# Patient Record
Sex: FEMALE | Race: WHITE | NOT HISPANIC OR LATINO | Employment: OTHER | URBAN - METROPOLITAN AREA
[De-identification: names, ages, dates, MRNs, and addresses within clinical notes are randomized per-mention and may not be internally consistent; named-entity substitution may affect disease eponyms.]

---

## 2017-08-17 ENCOUNTER — OFFICE VISIT (OUTPATIENT)
Dept: NEUROSURGERY | Facility: CLINIC | Age: 72
End: 2017-08-17

## 2017-08-17 VITALS
SYSTOLIC BLOOD PRESSURE: 120 MMHG | WEIGHT: 192.8 LBS | DIASTOLIC BLOOD PRESSURE: 80 MMHG | TEMPERATURE: 97.6 F | HEIGHT: 64 IN | BODY MASS INDEX: 32.91 KG/M2

## 2017-08-17 DIAGNOSIS — M51.36 DEGENERATIVE DISC DISEASE, LUMBAR: Primary | ICD-10-CM

## 2017-08-17 PROBLEM — M51.369 DEGENERATIVE DISC DISEASE, LUMBAR: Status: ACTIVE | Noted: 2017-08-17

## 2017-08-17 PROCEDURE — 99203 OFFICE O/P NEW LOW 30 MIN: CPT | Performed by: NEUROLOGICAL SURGERY

## 2017-08-17 RX ORDER — ALLOPURINOL 100 MG/1
100 TABLET ORAL DAILY
COMMUNITY
End: 2017-08-24

## 2017-08-17 RX ORDER — HYDROCODONE BITARTRATE AND ACETAMINOPHEN 5; 325 MG/1; MG/1
1 TABLET ORAL EVERY 6 HOURS PRN
Qty: 45 TABLET | Refills: 0 | Status: SHIPPED | OUTPATIENT
Start: 2017-08-17 | End: 2017-11-13 | Stop reason: HOSPADM

## 2017-08-17 RX ORDER — METHYLPREDNISOLONE 4 MG/1
TABLET ORAL
Qty: 21 TABLET | Refills: 0 | Status: SHIPPED | OUTPATIENT
Start: 2017-08-17 | End: 2017-11-06

## 2017-08-17 RX ORDER — ATENOLOL 100 MG/1
100 TABLET ORAL DAILY
COMMUNITY
End: 2018-09-10

## 2017-08-17 RX ORDER — CARISOPRODOL 350 MG/1
350 TABLET ORAL 3 TIMES DAILY
Qty: 30 TABLET | Refills: 0 | Status: SHIPPED | OUTPATIENT
Start: 2017-08-17 | End: 2017-11-06

## 2017-08-17 RX ORDER — GABAPENTIN 400 MG/1
400 CAPSULE ORAL 3 TIMES DAILY
COMMUNITY
End: 2017-11-06 | Stop reason: DRUGHIGH

## 2017-08-17 RX ORDER — LISINOPRIL AND HYDROCHLOROTHIAZIDE 25; 20 MG/1; MG/1
1 TABLET ORAL DAILY
COMMUNITY
End: 2018-09-12 | Stop reason: HOSPADM

## 2017-08-17 NOTE — PROGRESS NOTES
Akua Torres  1945  [id number]      Chief Complaint   Patient presents with   • Back Pain   • Hip Pain     left       HISTORY OF PRESENT ILLNESS:  This is a 72-year-old female who in the past has had a PLIF L4-S1 and unexpected unit well until approximately 6 weeks ago.  She was walking from her garden with the onset of severe pain in her back in her left hip.  It is persisted.  She has renal  toxicity therefore the use of NSAIDs are precluded.  She is had a lumbar MRI and is referred for neurosurgical consultation.  Her symptoms are primarily axial without claudication.     Past Medical History:   Diagnosis Date   • Arthritis    • H/O bladder infections    • Hypertension    • Low back pain        Past Surgical History:   Procedure Laterality Date   • BACK SURGERY     • KNEE ARTHROSCOPY         Family History   Problem Relation Age of Onset   • Cancer Mother    • Cancer Father        Social History     Social History   • Marital status:      Spouse name: N/A   • Number of children: N/A   • Years of education: N/A     Occupational History   • Not on file.     Social History Main Topics   • Smoking status: Never Smoker   • Smokeless tobacco: Never Used   • Alcohol use No   • Drug use: No   • Sexual activity: Defer     Other Topics Concern   • Not on file     Social History Narrative   • No narrative on file       Allergies   Allergen Reactions   • Nsaids    • Penicillins    • Quinidine          Current Outpatient Prescriptions:   •  allopurinol (ZYLOPRIM) 100 MG tablet, Take 100 mg by mouth Daily., Disp: , Rfl:   •  atenolol (TENORMIN) 100 MG tablet, Take 100 mg by mouth Daily., Disp: , Rfl:   •  Ezetimibe (ZETIA PO), Take  by mouth., Disp: , Rfl:   •  gabapentin (NEURONTIN) 400 MG capsule, Take 400 mg by mouth 3 (Three) Times a Day., Disp: , Rfl:   •  lisinopril-hydrochlorothiazide (PRINZIDE,ZESTORETIC) 20-25 MG per tablet, Take 1 tablet by mouth Daily., Disp: , Rfl:     Review of Systems    Constitutional: Negative for activity change, appetite change, chills, diaphoresis, fatigue, fever and unexpected weight change.   HENT: Negative for congestion, dental problem, drooling, ear discharge, ear pain, facial swelling, hearing loss, mouth sores, nosebleeds, postnasal drip, rhinorrhea, sinus pressure, sneezing, sore throat, tinnitus, trouble swallowing and voice change.    Eyes: Negative for photophobia, pain, discharge, redness, itching and visual disturbance.   Respiratory: Negative for apnea, cough, choking, chest tightness, shortness of breath, wheezing and stridor.    Cardiovascular: Negative for chest pain, palpitations and leg swelling.   Gastrointestinal: Positive for constipation. Negative for abdominal distention, abdominal pain, anal bleeding, blood in stool, diarrhea, nausea, rectal pain and vomiting.   Endocrine: Positive for heat intolerance. Negative for cold intolerance, polydipsia, polyphagia and polyuria.   Genitourinary: Positive for flank pain. Negative for decreased urine volume, difficulty urinating, dysuria, enuresis, frequency, genital sores, hematuria and urgency.   Musculoskeletal: Positive for arthralgias, back pain, gait problem, myalgias and neck pain. Negative for joint swelling and neck stiffness.   Skin: Negative for color change, pallor, rash and wound.   Allergic/Immunologic: Negative for environmental allergies, food allergies and immunocompromised state.   Neurological: Positive for tremors and weakness. Negative for dizziness, seizures, syncope, facial asymmetry, speech difficulty, light-headedness, numbness and headaches.   Hematological: Negative for adenopathy. Does not bruise/bleed easily.   Psychiatric/Behavioral: Positive for dysphoric mood. Negative for agitation, behavioral problems, confusion, decreased concentration, hallucinations, self-injury, sleep disturbance and suicidal ideas. The patient is nervous/anxious. The patient is not hyperactive.   "      Vitals:    08/17/17 1747   BP: 120/80   Temp: 97.6 °F (36.4 °C)   Weight: 192 lb 12.8 oz (87.5 kg)   Height: 64\" (162.6 cm)       Neurological Examination:    Mental status/speech: The patient is alert and oriented.  Speech is clear without aphysia or dysarthria.  No overt cognitive deficits.    Cranial nerve examination:    Olfaction: Smell is intact.  Vision: Vision is intact without visual field abnormalities.  Funduscopic examination is normal.  No pupillary irregularity.  Ocular motor examination: The extraocular muscles are intact.  There is no diplopia.  The pupil is round and reactive to both light and accommodation.  There is no nystagmus.  Facial movement/sensation: There is no facial weakness.  Sensation is intact in the first, second, and third divisions of the trigeminal nerve.  The corneal reflex is intact.  Auditory: Hearing is intact to finger rub bilaterally.  Cranial nerves IX, X, XI, XII: Phonation is normal.  No dysphagia.  Tongue is protruded in the midline without atrophy.  The gag reflex is intact.  Shoulder shrug is normal.    Musculoligamentous ligamentous examination: She has paraspinal spasm with restriction to range of motion.  She has pain in her left SI joint.  Straight leg raising is negative.  She has some pain with internal/external rotation of her hip.  She is generally hyporeflexic.  Her strength is intact.     Medical Decision Making:     Diagnostic Data Set:  Lumbar MRI shows excellent fusion L4-S1.  She has adjacent level disease with high-grade spinal stenosis primarily at L3-L4 with Modic changes and evidence of segmental instability.  There is also spinal stenosis at L2-L3       Assessment:  Musculoligamentous strain superimposed upon previous PLIF           Recommendations:  This could be managed conservatively.  I have given her a Medrol Dosepak, Soma 350 mg 3 times a day and Norco 5.0/325 mg every 6 hours when necessary pain.  I've scheduled for heard to have an " epidural or facet block by Dr. Radha bush.  I've send her to physical therapy.  She will call me in 2 weeks to let me know how things are going.  If she continues to have issue she would need to be referred to Dr. Gopi Dillard who is her treating spinal surgeon.         I greatly appreciate the opportunity to see and evaluate this individual.  If you have questions or concerns regarding issues that I may have overlooked please call me at any time: [phone].  Tien Hein M.D.  [facility]  [address] [place] [address].  [facility]  [id number]

## 2017-08-17 NOTE — PATIENT INSTRUCTIONS
Call Dr. Hein on a Monday or Tuesday.   Ask for Sunitha,  and leave a message for  Dr. Hein.  He will call you back at the end of the day as soon as he can.     [phone]

## 2017-08-18 ENCOUNTER — TELEPHONE (OUTPATIENT)
Dept: PAIN MEDICINE | Facility: CLINIC | Age: 72
End: 2017-08-18

## 2017-08-21 ENCOUNTER — HOSPITAL ENCOUNTER (OUTPATIENT)
Dept: PHYSICAL THERAPY | Facility: HOSPITAL | Age: 72
Setting detail: THERAPIES SERIES
Discharge: HOME | End: 2017-08-21
Attending: NEUROLOGICAL SURGERY

## 2017-08-21 DIAGNOSIS — M51.36 DEGENERATIVE DISC DISEASE, LUMBAR: Primary | ICD-10-CM

## 2017-08-21 DIAGNOSIS — Z74.09 IMPAIRED FUNCTIONAL MOBILITY, BALANCE, GAIT, AND ENDURANCE: ICD-10-CM

## 2017-08-21 PROCEDURE — 97161 PT EVAL LOW COMPLEX 20 MIN: CPT | Performed by: PHYSICAL THERAPIST

## 2017-08-21 PROCEDURE — G8979 MOBILITY GOAL STATUS: HCPCS | Performed by: PHYSICAL THERAPIST

## 2017-08-21 PROCEDURE — G8978 MOBILITY CURRENT STATUS: HCPCS | Performed by: PHYSICAL THERAPIST

## 2017-08-21 NOTE — THERAPY EVALUATION
Outpatient Physical Therapy Ortho Initial Evaluation  [facility]     Patient Name: Akua Torres  : 1945  MRN: [id number]  Today's Date: 2017      Visit Date: 2017    Patient Active Problem List   Diagnosis   • Degenerative disc disease, lumbar        Past Medical History:   Diagnosis Date   • Arthritis    • H/O bladder infections    • Hypertension    • Low back pain         Past Surgical History:   Procedure Laterality Date   • BACK SURGERY     • KNEE ARTHROSCOPY         Visit Dx:     ICD-10-CM ICD-9-CM   1. Degenerative disc disease, lumbar M51.36 722.52   2. Impaired functional mobility, balance, gait, and endurance Z74.09 V49.89             Patient History       17 1500          History    Chief Complaint Pain  -CP      Type of Pain Back pain;Lower Extremity / Leg  -CP      Date Current Problem(s) Began 17  -CP      Brief Description of Current Complaint Pt presents with insidious onset of low back pain. States she was in the backyard 5 weeks ago watering flowers, noticed increased L low back pain began quickly. She states she got in bed, rested, pain was less but still present. She states pain has progressed over the past 5 weeks. Currently, pain in low back midline and moves towards left side. Pt has h/o arthritis and PLIF L4-S1 performed about 13 years ago by Dr. Dillard. She has epidural or facet block scheduled for this Thursday.   -CP      Previous treatment for THIS PROBLEM Surgery;Medication  -CP      Patient/Caregiver Goals Relieve pain;Return to prior level of function;Improve mobility  -CP      Current Tobacco Use no  -CP      Smoking Status no  -CP      Patient's Rating of General Health Poor  -CP      Hand Dominance right-handed  -CP      Occupation/sports/leisure activities Pt enjoys gardening in her raised bed and watering her flowers. Pt is retired, states spouse lives with her and is retired as well, single level home.   pt has walk in shower.   -CP       Patient seeing anyone else for problem(s)? Yes  -CP      How has patient tried to help current problem? rest, medication  -CP      What clinical tests have you had for this problem? X-ray;MRI  -CP      Results of Clinical Tests spinal stenosis at L3-L4, modic changes and segmental instability; stenosis L2-L3.   -CP      Are you or can you be pregnant No  -CP      Pain     Pain Location Back  -CP      Pain at Present 3  -CP      Pain at Best 1  -CP      Pain at Worst 9  -CP      Pain Frequency Constant/continuous  -CP      Pain Description Aching;Dull  -CP      What Performance Factors Make the Current Problem(s) WORSE? prolonged standing, walking.   -CP      What Performance Factors Make the Current Problem(s) BETTER? sitting down and right sidelying are most comfortable.   -CP      Pain Comments Pt states can't walk past 10 minutes at a time.   -CP      Tolerance Time- Standing 5 minutes  -CP      Tolerance Time- Sitting unlimited  -CP      Tolerance Time- Walking 10 minutes  -CP      Tolerance Time- Lying 2-3 hours  -CP      Is your sleep disturbed? Yes  -CP      Is medication used to assist with sleep? Yes  -CP      What position do you sleep in? Right sidelying  -CP      Difficulties at work? retired  -CP      Difficulties with ADL's? increased time required to perform; increased pain with ADLs  -CP      Difficulties with recreational activities? pain with walking, gardening.   -CP      Fall Risk Assessment    Any falls in the past year: Yes  -CP      Number of falls reported in the last 12 months 1  -CP      Factors that contributed to the fall: Other (comment)   dizziness from new medication, fell forward, denies injury.   -CP      Daily Activities    Primary Language English  -CP      Are you able to read Yes  -CP      Are you able to write Yes  -CP      Teaching needs identified Home Exercise Program;Management of Condition;Falls Prevention  -CP      Patient is concerned about/has problems with  Walking;Transfers (getting out of a chair, bed);Standing;Performing home management (household chores, shopping, care of dependents);Bed Mobility  -CP      Does patient have problems with the following? Anxiety  -CP      Pt Participated in POC and Goals Yes  -CP      Safety    Are you being hurt, hit, or frightened by anyone at home or in your life? No  -CP        User Key  (r) = Recorded By, (t) = Taken By, (c) = Cosigned By    Initials Name Provider Type    CP Corinne E Perkins, PT Physical Therapist                PT Ortho       08/21/17 1500    Subjective Comments    Subjective Comments Pt presents with c/o low back pain and left leg pain, insidious onset.   -CP    Subjective Pain    Able to rate subjective pain? yes  -CP    Pre-Treatment Pain Level 3  -CP    Post-Treatment Pain Level 4  -CP    Special Tests/Palpation    Special Tests/Palpation Lumbar/SI  -CP    Lumbosacral Palpation    SI Left:;Tender  -CP    Lumbosacral Segment Bilateral:;Guarded/taut  -CP    Piriformis Left:;Guarded/taut  -CP    Erector Spinae (Paraspinals) Guarded/taut  -CP    Lumbosacral Palpation? Yes  -CP    Lumbar/SI Special Tests    Trendelenburg Test (Gluteus Medius Weakness) Positive  -CP    Seated Flexion Test (SI Dysfunction) Positive   pain  -CP    Slump Test (Neural Tension) Negative  -CP    Carmela Westley Test (HNP) Negative  -CP    Long Sit Test (Pelvic Malalignment) Positive   secondary to R knee lack full extension from TKA; vs pelvis  -CP    SLR (Neural Tension) Bilateral:;Negative  -CP    SI Compression Test (SI Dysfunction) Negative  -CP    SI Distraction Test (SI Dysfunction) Negative  -CP    VINAYAK (hip vs. SI Dysfunction) Negative;Bilateral:  -CP    ROM (Range of Motion)    Additional Documentation Trunk (Group)  -CP    Trunk    Flexion AROM Deficit 43   pain midline; increased with coming back to midline  -CP    Extension AROM Deficit 10   pain  -CP    Lt Lat Flexion AROM Deficit 10  -CP    Right Lateral Flexion AROM Deficit  12  -CP    Lt Rotation AROM Deficit WFL  -CP    Right Rotation AROM Deficit 50%  -CP    MMT (Manual Muscle Testing)    General MMT Assessment lower extremity strength deficits identified  -CP    Left Hip    Hip Flexion Gross Movement (4+/5) good plus  -CP    Hip ABduction Gross Movement (4+/5) good plus  -CP    Hip ADduction Gross Movement (4/5) good  -CP    Right Hip    Hip Flexion Gross Movement (4-/5) good minus   pain in left low back  -CP    Hip ABduction Gross Movement (4/5) good  -CP    Hip ADduction Gross Movement (4/5) good  -CP    Lower Extremity    Lower Ext Manual Muscle Testing left hip strength deficit;right hip strength deficit;left knee strength deficit;right knee strength deficit;left ankle strength deficit;right ankle strength deficit  -CP    Left Knee    Knee Extension Gross Movement (4+/5) good plus  -CP    Knee Flexion Gross Movement (4+/5) good plus  -CP    Right Knee    Knee Extension Gross Movement (4/5) good   in limited AROM; unable to achieve full extension; increased  -CP    Knee Flexion Gross Movement (4+/5) good plus  -CP    Left Ankle/Foot    Ankle Dorsiflexion Gross Movement (5/5) normal  -CP    Right Ankle/Foot    Ankle Dorsiflexion Gross Movement (5/5) normal  -CP    Flexibility    Flexibility Tested? Lower Extremity  -CP    Lower Extremity Flexibility    Hamstrings Bilateral:;Moderately limited  -CP    Transfers    Transfer, Comment Increased pain with f/t, increased use of BUE to perform sit-stand.   -CP    Gait Assessment/Treatment    Gait, Comment Ambulated with SPC in LUE. Decreased step length and arm swing noted bilaterally. Increased forward flexed posture and antalgic gait noted.   -CP    Stairs Assessment/Treatment    Stairs, Comment not tested this date.   -CP      User Key  (r) = Recorded By, (t) = Taken By, (c) = Cosigned By    Initials Name Provider Type    CP Corinne E Perkins, PT Physical Therapist                                PT OP Goals       08/21/17 1500        PT Short Term Goals    STG Date to Achieve 09/04/17  -CP     STG 1 Patient will report lumbar pain is reduced by at least 25%.  -CP     STG 1 Progress New  -CP     STG 2 Patient subjectively report that altered leg symptoms in L glute have decreased by 50% with frequency or intensity.  -CP     STG 2 Progress New  -CP     STG 3 Patient is independent with HEP for flexibility and strengthening.  -CP     STG 3 Progress New  -CP     Long Term Goals    LTG Date to Achieve 09/18/17  -CP     LTG 1 Modified Oswestry score is improved by at least 10%.  -CP     LTG 1 Progress New  -CP     LTG 2 Patient is independent with long term HEP for improved postural awareness and stabilization.  -CP     LTG 2 Progress New  -CP     LTG 3 Patient is able to tolerate at least 30 min of standing or walking to improved tolerance to ADLs.  -CP     LTG 3 Progress New  -CP     Time Calculation    PT Goal Re-Cert Due Date 09/18/17  -CP       User Key  (r) = Recorded By, (t) = Taken By, (c) = Cosigned By    Initials Name Provider Type    CP Corinne E Perkins, PT Physical Therapist                PT Assessment/Plan       08/21/17 1500       PT Assessment    Functional Limitations Decreased safety during functional activities;Impaired gait;Limitation in home management;Limitations in community activities;Limitations in functional capacity and performance;Performance in leisure activities;Performance in self-care ADL  -CP     Impairments Balance;Gait;Endurance;Range of motion;Posture;Poor body mechanics;Pain;Muscle strength;Locomotion;Joint mobility  -CP     Assessment Comments Pt is a 73 yo female referred to PT for lumbar DDD, low back pain, and recent left posterior hip pain. She has h/o arthritis, spinal stenosis, and PLIF L4-S1. She verbalized increased pain with both end range lumbar extension and flexion; however most limited with extension based functional activities. She would benefit from skilled PT to improve activity tolerance,  increase spinal stabilization, and decrease pain.   -CP     Please refer to paper survey for additional self-reported information Yes  -CP     Rehab Potential Good  -CP     Patient/caregiver participated in establishment of treatment plan and goals Yes  -CP     Patient would benefit from skilled therapy intervention Yes  -CP     PT Plan    PT Frequency 2x/week   due to higher co-pay, will attempt to decrease to 1x/wk  -CP     Predicted Duration of Therapy Intervention (days/wks) 4 weeks  -CP     Planned CPT's? PT EVAL LOW COMPLEXITY: 08043;PT RE-EVAL: 99573;PT THER PROC EA 15 MIN: 22576;PT MANUAL THERAPY EA 15 MIN: 64953;PT NEUROMUSC RE-EDUCATION EA 15 MIN: 11474;PT GAIT TRAINING EA 15 MIN: 36812;PT HOT OR COLD PACK TREAT MCARE;PT ELECTRICAL STIM UNATTEND: ;PT ULTRASOUND EA 15 MIN: 79102  -CP     PT Plan Comments Recommend skilled PT as noted above to improve functional mobility. Initiate spinal stabilization ther exercises from hooklying next visit and neutral spine position for strengthening. Assess response from injection, pending MD appt Thursday.   -CP       User Key  (r) = Recorded By, (t) = Taken By, (c) = Cosigned By    Initials Name Provider Type    CP Corinne E Perkins, PT Physical Therapist                  Exercises       08/21/17 1500          Subjective Comments    Subjective Comments Pt presents with c/o low back pain and left leg pain, insidious onset.   -CP      Subjective Pain    Able to rate subjective pain? yes  -CP      Pre-Treatment Pain Level 3  -CP      Post-Treatment Pain Level 4  -CP        User Key  (r) = Recorded By, (t) = Taken By, (c) = Cosigned By    Initials Name Provider Type    CP Corinne E Perkins, PT Physical Therapist                              Outcome Measures       08/21/17 1500          Modified Oswestry    Modified Oswestry Score/Comments 60%  -CP      Functional Assessment    Outcome Measure Options Modifed Owestry  -CP        User Key  (r) = Recorded By, (t) = Taken  By, (c) = Cosigned By    Initials Name Provider Type    CP Corinne E Perkins, PT Physical Therapist            Time Calculation:   Start Time: 1515     Therapy Charges for Today     Code Description Service Date Service Provider Modifiers Qty    87707057350 HC PT MOBILITY CURRENT 8/21/2017 Corinne E Perkins, PT GP, CL 1    98064712724 HC PT MOBILITY PROJECTED 8/21/2017 Corinne E Perkins, PT GP, CK 1    18432567312 HC PT EVAL LOW COMPLEXITY 4 8/21/2017 Corinne E Perkins, PT GP 1          PT G-Codes  PT Professional Judgement Used?: Yes  Outcome Measure Options: Bonifacio Chung  Score: 60%  Functional Limitation: Mobility: Walking and moving around  Mobility: Walking and Moving Around Current Status (): At least 60 percent but less than 80 percent impaired, limited or restricted  Mobility: Walking and Moving Around Goal Status (): At least 40 percent but less than 60 percent impaired, limited or restricted         Corinne E. Perkins, PT  8/21/2017

## 2017-08-24 ENCOUNTER — OFFICE VISIT (OUTPATIENT)
Dept: PAIN MEDICINE | Facility: CLINIC | Age: 72
End: 2017-08-24

## 2017-08-24 VITALS
RESPIRATION RATE: 18 BRPM | WEIGHT: 198 LBS | DIASTOLIC BLOOD PRESSURE: 73 MMHG | OXYGEN SATURATION: 95 % | HEIGHT: 64 IN | TEMPERATURE: 98.1 F | BODY MASS INDEX: 33.8 KG/M2 | HEART RATE: 79 BPM | SYSTOLIC BLOOD PRESSURE: 119 MMHG

## 2017-08-24 DIAGNOSIS — M48.062 LUMBAR STENOSIS WITH NEUROGENIC CLAUDICATION: ICD-10-CM

## 2017-08-24 DIAGNOSIS — M70.62 GREATER TROCHANTERIC BURSITIS OF BOTH HIPS: ICD-10-CM

## 2017-08-24 DIAGNOSIS — M70.61 GREATER TROCHANTERIC BURSITIS OF BOTH HIPS: ICD-10-CM

## 2017-08-24 DIAGNOSIS — M47.816 SPONDYLOSIS OF LUMBAR REGION WITHOUT MYELOPATHY OR RADICULOPATHY: ICD-10-CM

## 2017-08-24 DIAGNOSIS — F32.A ANXIETY AND DEPRESSION: ICD-10-CM

## 2017-08-24 DIAGNOSIS — F32.A ANXIETY AND DEPRESSION: Primary | ICD-10-CM

## 2017-08-24 DIAGNOSIS — Z96.653 STATUS POST TOTAL BILATERAL KNEE REPLACEMENT: ICD-10-CM

## 2017-08-24 DIAGNOSIS — M10.00 IDIOPATHIC GOUT, UNSPECIFIED CHRONICITY, UNSPECIFIED SITE: ICD-10-CM

## 2017-08-24 DIAGNOSIS — R53.81 PHYSICAL DECONDITIONING: ICD-10-CM

## 2017-08-24 DIAGNOSIS — F41.9 ANXIETY AND DEPRESSION: Primary | ICD-10-CM

## 2017-08-24 DIAGNOSIS — Z98.1 HISTORY OF LUMBAR FUSION: ICD-10-CM

## 2017-08-24 DIAGNOSIS — E66.9 MILD OBESITY: ICD-10-CM

## 2017-08-24 DIAGNOSIS — F41.9 ANXIETY AND DEPRESSION: ICD-10-CM

## 2017-08-24 PROCEDURE — 99203 OFFICE O/P NEW LOW 30 MIN: CPT | Performed by: ANESTHESIOLOGY

## 2017-08-24 RX ORDER — DULOXETIN HYDROCHLORIDE 30 MG/1
CAPSULE, DELAYED RELEASE ORAL
Refills: 3 | COMMUNITY
Start: 2017-08-11 | End: 2017-11-06

## 2017-08-24 RX ORDER — ALLOPURINOL 300 MG/1
TABLET ORAL
Refills: 0 | COMMUNITY
Start: 2017-07-17 | End: 2018-09-10

## 2017-08-24 RX ORDER — ZOLPIDEM TARTRATE 5 MG/1
TABLET ORAL
Refills: 2 | COMMUNITY
Start: 2017-06-23 | End: 2018-06-06

## 2017-08-24 RX ORDER — PREDNISONE 2.5 MG
TABLET ORAL
Refills: 3 | COMMUNITY
Start: 2017-06-15 | End: 2017-11-06

## 2017-08-24 RX ORDER — EZETIMIBE 10 MG/1
TABLET ORAL
Refills: 7 | COMMUNITY
Start: 2017-08-13 | End: 2018-09-10

## 2017-08-24 RX ORDER — TIZANIDINE 4 MG/1
TABLET ORAL
Refills: 3 | COMMUNITY
Start: 2017-06-15 | End: 2017-11-06

## 2017-08-24 NOTE — PROGRESS NOTES
"Chief Complaint: \"Pain in my lower back and left hip.\"    History of Present Illness:   Patient: Ms. Akua Torres, 72 y.o. female   Referring physician: Dr. Leon Hein   Reason for referral: Consultation for intractable chronic left hip and back pain.   Pain history: Patient reports a 13-year history of pain, which began without incident.  Patient underwent PLIF L4-S1 with Dr. Gopi Man in 2004.  Patient did well until 5 years ago. She underwent follow-up consultation with Dr. Man 4-5 years ago, patient is a poor historian, and was found not to be a surgical candidate. She underwent three injections 4 years ago in [place], without relief.  Pain description: constant lower back pain with intermittent exacerbation, described as sharp sensation.   Radiation of pain: The lower back pain radiates into the hips.  Pain intensity today: 5/10  Average pain intensity last week: 5/10  Pain intensity ranges from: 3/10 to 10/10 (with walking)  Aggravating factors: Pain increases with bending, standing longer than 10 minutes and ambulating more than 10 minutes.  Alleviating factors: Pain decreases with lying and occassionally with sitting.   Associated symptoms:   Patient denies any numbness or weakness in the lower extremities  Patient denies any new bladder or bowel problems.   Patient reports difficulties with her balance but denies recent falls. Patient uses a cane.    Review of previous therapies and additional medical records:  Akua Torres has already failed the following measures, including:   Conservative measures: opioids, physical therapy, and heat   Interventional measures: Three injections 4 years ago in [place], without relief.  Surgical measures: PLIF L4-S1 with Dr. Dillard, 2004. Patient did well until recently about 4-5 years ago.  Akua Torres underwent neurosurgical consultation with Dr. Leon Hein on 08/17/2017, and was found to be a potential surgical " candidate.  Akua Torres presents with significant comorbidities including anxiety and depression, engaged in treatment, hypertension and arthritis engaged in treatment.  In terms of current analgesics, Akua Torres takes: Soma, gabapentin, hydrocodone (occassionally, 1/2 tablet)   I have reviewed Ben Report #[id number] consistent to medication reconciliation.    Review of diagnostic Studies:    MRI Lumbar Spine without Contrast, 07/14/2017. Status post posterior fusion from L4 through S1. Paramagnetic artifact from the pedicle screws and posterior fusion hardware. Associated laminectomies.  Partial osseous fusion at L5-S1.  Lumbar spine is normal and alignment.  No subluxation.  No evidence of fracture.  Severe, anterior marginal osteophytic spurring.  No pathologic lesion is identified in the lumbar spine. Conus is normal in appearance at the L2 level.  T10-T11 and T11-T12: Minimal disc bulges  T12-L1: Mild disc bulge.  No central canal stenosis or neural foraminal stenosis.  L1-L2: Mild disc bulge with a small protrusion adjacent to the left neural foramen.  No central canal stenosis.  No neural foraminal stenosis.  L2-L3: Broad-based disc protrusion and mild endplate spurring.  Mild facet arthropathy. Moderate central canal stenosis.  Moderate bilateral neural foraminal stenosis.  L3-L4: Broad-based disc protrusion, mild endplate spurring, facet arthropathy.  Moderate central canal stenosis. Moderate bilateral neural foraminal stenosis.  L4-L5: Posterior fusion at this level. Broad based disc bulge and mild endplate spurring.  No central canal stenosis. Mild bilateral neural foraminal stenosis.  L5-S1: Diffuse endplate spurring and a diffuse disc/osteophyte complex.  No central canal stenosis.  Mild bilateral neural foraminal stenosis  Xray Bilateral Hips, 07/13/2017. Mild degenerative changes in both hips.  Mild marginal spurring.  Joint space appears normal.  Mild degenerative changes in the SI  joints.  No acute fracture.  Enthesopathic spurring along the greater trochanter both femurs.  Prior posterior fusion from L4 through S1.  Total Body Bone Scan, 06/30/2014. Photopenic deficits in the knees bilaterally consistent with joint replacements. Increased activity in the region of the right patella which is probably degenerative.  Single foci of activity in the left medial malleolus of uncertain significance. No other abnormal tracer activity is identified to suggest a call fracture or metastatic disease.  Three Phase Bone Scan, 06/30/2017. Asymmetric increased activity in the right patella seen on delayed images. No other abnormal radiotracer activity identified.  CT Head, 06/06/2017. Ventricles are enlarged.  Diffuse atrophy.  No evidence of hemorrhage.  No masses identified.  No extra-axial fluid is seen.  Sinuses are normal. Severe degenerative change of the right TMJ.  Xray Right Hip, 12/23/2016. No fracture identified.  Mild degenerative changes in the right hip.  Minimal to mild medial joint space loss.  Mild marginal spurring. Enthesopathic burning along the greater trochanter both hips.  Mild degenerative changes in the SI joints.  Prior fusion from L4 through S1. Limited visualization of the contralateral hip demonstrates minimal to mild degenerative changes.  Xray Right Knee, 12/23/2016. Interval placement of the right knee total arthroplasty.  No evidence of loosening.  No fracture identified. Contralateral knee: Total knee arthroplasty is placed.  Xray Lumbar Spine, 10/06/2016. Spinal fusion is noted at the L4-S1 levels.  Surgical hardware is satisfactorily Leist.  At other levels, disc space reduction seen with anterior and lateral osteophytes.  No facet joint abnormality is seen.  No pars deficit is seen.  No radiographic evidence of injury is noted.    Xray Bilateral Feet, 06/22/2016. Moderate bunions at the first metatarsal head in both feet with moderate osteoarthritic changes.  Moderate  enthesopathic change about the calcanei.  Bones are normal in alignment.  No fracture.  Patient is status post right prior repair of the left and right distal Achilles tendon.  Thickening of the distal Achilles tendon.  Soft tissue swelling in both feet.  Xray Right Knee, 01/29/2016. Moderate osteoarthritic changes in the right knee.  Moderate marginal osteophytic spurring.  Moderate loss of joint space in the medial femorotibial compartment.  No joint effusion.  No evidence of fracture.  Xray Bilateral Hips, 01/29/2016. Mild degenerative changes in both hips.  Mild marginal spurring.  Joint space appears normal.  Mild degenerative changes in the SI joints.  No fracture.  Enthesopathic spurring along the greater trochanter with femurs.  Postsurgical changes from L4 through S1.    Review of Systems   Musculoskeletal: Positive for arthralgias, back pain, gait problem, myalgias, neck pain and neck stiffness.   Neurological: Positive for tremors.   Psychiatric/Behavioral: The patient is nervous/anxious.    All other systems reviewed and are negative.      Patient Active Problem List   Diagnosis   • Degenerative disc disease, lumbar   • Lumbar stenosis with neurogenic claudication   • History of lumbar fusion   • Spondylosis of lumbar region without myelopathy or radiculopathy   • Mild obesity   • Physical deconditioning   • Idiopathic gout   • Anxiety and depression   • Greater trochanteric bursitis of both hips   • Status post total bilateral knee replacement       Past Medical History:   Diagnosis Date   • Anxiety    • Arthritis    • Extremity pain    • GERD (gastroesophageal reflux disease)    • H/O bladder infections    • Hypertension    • Joint pain    • Low back pain    • Neck pain          Past Surgical History:   Procedure Laterality Date   • BACK SURGERY     • KNEE ARTHROSCOPY     • REPLACEMENT TOTAL KNEE BILATERAL           Family History   Problem Relation Age of Onset   • Cancer Mother    • Cancer Father      "     Social History     Social History   • Marital status:      Spouse name: N/A   • Number of children: N/A   • Years of education: N/A     Social History Main Topics   • Smoking status: Never Smoker   • Smokeless tobacco: Never Used   • Alcohol use No   • Drug use: No   • Sexual activity: Defer     Other Topics Concern   • None     Social History Narrative        Current Outpatient Prescriptions:   •  allopurinol (ZYLOPRIM) 300 MG tablet, TK 1 T PO D, Disp: , Rfl: 0  •  atenolol (TENORMIN) 100 MG tablet, Take 100 mg by mouth Daily., Disp: , Rfl:   •  carisoprodol (SOMA) 350 MG tablet, Take 1 tablet by mouth 3 (Three) Times a Day., Disp: 30 tablet, Rfl: 0  •  DULoxetine (CYMBALTA) 30 MG capsule, TK 1 C  PO QD, Disp: , Rfl: 3  •  gabapentin (NEURONTIN) 400 MG capsule, Take 400 mg by mouth 3 (Three) Times a Day., Disp: , Rfl:   •  HYDROcodone-acetaminophen (NORCO) 5-325 MG per tablet, Take 1 tablet by mouth Every 6 (Six) Hours As Needed for Moderate Pain ., Disp: 45 tablet, Rfl: 0  •  lisinopril-hydrochlorothiazide (PRINZIDE,ZESTORETIC) 20-25 MG per tablet, Take 1 tablet by mouth Daily., Disp: , Rfl:   •  MethylPREDNISolone (MEDROL, JOSE ARMANDO,) 4 MG tablet, Take as directed on package instructions., Disp: 21 tablet, Rfl: 0  •  predniSONE (DELTASONE) 2.5 MG tablet, TK 1 T PO TID, Disp: , Rfl: 3  •  tiZANidine (ZANAFLEX) 4 MG tablet, TK 1 T PO Q 8 H, Disp: , Rfl: 3  •  ZETIA 10 MG tablet, TK 1 T   PO QD, Disp: , Rfl: 7  •  zolpidem (AMBIEN) 5 MG tablet, TK 1 T  PO QD, Disp: , Rfl: 2      Allergies   Allergen Reactions   • Nsaids    • Penicillins    • Quinidine          /73 (BP Location: Left arm, Patient Position: Sitting)  Pulse 79  Temp 98.1 °F (36.7 °C) (Temporal Artery )   Resp 18  Ht 64\" (162.6 cm)  Wt 198 lb (89.8 kg)  SpO2 95%  BMI 33.99 kg/m2      Physical Exam:  Constitutional: Patient is oriented to person, place, and time. Vital signs are normal. Patient appears well-developed and " well-nourished.   HENT: Head: Normocephalic and atraumatic. Eyes: Conjunctivae and lids are normal. Pupils: Equal, round, reactive to light.   Neck: Trachea normal. Neck supple. No JVD present.   Pulmonary Respiratory effort: No increased work of breathing or signs of respiratory distress. Auscultation of lungs: Clear to auscultation.   Cardiovascular Auscultation of heart: Normal rate and rhythm, normal S1 and S2, no murmurs.   Musculoskeletal   Gait and station: Gait evaluation demonstrated a normal gait.   Lumbar spine: The range of motion of the lumbar spine is limited secondary to lumbar fusion. Lumbar facet joint loading maneuvers are positive.  Juan Francisco and Gaenslen's tests are negative   Piriformis maneuvers are negative   Palpation of the bilateral ischial tuberosities, unrevealing   Palpation of the bilateral greater trochanter, reveals tenderness bilaterally.    Examination of the Iliotibial band: unrevealing   Hip joints: The range of motion of the hip joints is full and without pain   Neurological: Patient is alert and oriented to person, place, and time. Speech: speech is normal. Cortical function: Normal mental status.   Cranial nerves: Cranial nerves 2-12 intact.   Reflex Scores:  Right patellar: 1+  Left patellar: 1+  Right achilles: 1+  Left achilles: 1+  Motor strength: 5/5  Motor Tone: normal tone.   Involuntary movements: none.   Superficial/Primitive Reflexes: primitive reflexes were absent.   Right Villagran: absent  Left Villagran: absent  Right ankle clonus: absent  Left ankle clonus: absent   No nuchal rigidity. Negative Kernig and Brudzinski.  Spurling sign is negative. Lhermitte sign is negative. Negative long tract signs. Straight leg raising test is negative. Femoral stretch sign is negative.   Sensation: No sensory loss. Sensory exam: intact to light touch, intact pain and temperature sensation, intact vibration sensation and normal proprioception.   Coordination: Normal finger to nose and  heel to shin. Normal balance and negative. Romberg's sign negative.   Skin and subcutaneous tissue: Skin is warm and intact. No rash noted. No cyanosis.   Psychiatric: Judgment and insight: Normal. Orientation to person, place and time: Normal.   Recent and remote memory: Intact.   Mood and affect: Normal.     ASSESSMENT:   1. Lumbar stenosis with neurogenic claudication    2. History of lumbar fusion    3. Spondylosis of lumbar region without myelopathy or radiculopathy    4. Greater trochanteric bursitis of both hips    5. Status post total bilateral knee replacement    6. Idiopathic gout, unspecified chronicity, unspecified site    7. Mild obesity    8. Anxiety and depression    9. Physical deconditioning      PLAN/MEDICAL DECISION MAKING: I had a lengthy conversation with Ms. Akua Torres regarding her chronic pain condition and potential therapeutic options including risks, benefits, alternative therapies, to name a few. Patient has failed to obtain pain relief with conservative measures, as referenced above. I have reviewed all available patient's medical records as well as previous therapies as referenced above.  Patient is status post PLIF L4-S1 with evidence on MRI of adjacent level disease with high-grade spinal stenosis at L3-L4 with Modic changes and spinal stenosis at L2-L3. Therefore, I have proposed the following plan:  1. Pharmacological measures: Reviewed. Discussed. Patient takes Soma, gabapentin, Hydrocodone (occassionally, 1/2 tablet)   2. Interventional pain management measures: Patient will be scheduled for diagnostic and therapeutic bilateral L3-L4 transforaminal epidural steroid injection. We may repeat another epidural depending on patient's outcome.  Patient has declined the possibility of surgery. Therefore, we have discussed the possibility of a spinal cord stimulator trial with [facility] --if failure to obtain pain relief with less invasive therapies-. Patient has received an  educational DVD on spinal cord stimulation therapies.  3. Long-term rehabilitation efforts:  A. The patient does not have a history of falls. I did complete a risk assessment for falls. Fall precautions: Patient has been instructed regarding universal fall precautions, such as;   · Using gait aids a cane or a rolling walker at the appropriate height at all times for ambulation   · Removing all area rugs and coffee tables to create a safe environment at home  · Ensure clean, dry floors  · Wearing supportive footwear and properly fitting clothing  · Ensure bed/chair is appropriate height and patient's feet can touch the floor  · Using a shower transfer bench  · Using walk-in shower and having shower safety bars installed  · Ensure proper lighting, minimize glare  · Have nightlights operational and in use  · Participation in an exercise program for gait training, balance training and strength  · Ensure proper compliance and organization of medications to avoid errors   · Avoid use of over the counter sedatives and alcohol consumption  · Ensure easy access to call bell, glasses, TV control, telephone  · Ensure glasses/hearing aids are in use or close by (on top of night table)  B.  Patient will start a comprehensive physical therapy program for water therapy, therapeutic exercise, core strengthening, gait and balance training, neurodynamics, myofascial release, cupping and dry needling once pain is under control  C.  Start an exercise program such as water therapy  D.  Referral to Dr. Jw Skaggs for psychological screening for spinal cord stimulation, cognitive behavioral therapy and biofeedback.  E.  Referral to [facility]  4.  The patient and her family have been instructed to contact my office with any questions or difficulties. The patient understands the plan and agrees to proceed accordingly.       Patient Care Team:  Nanci Orr MD as PCP - General (Internal  Medicine)  Leon Hein MD as Consulting Physician (Neurosurgery)  Corinne E Perkins, PT as Physical Therapist (Physical Therapy)  Cricket Nettles MD as Consulting Physician (Pain Medicine)     New Medications Ordered This Visit   Medications   • ZETIA 10 MG tablet     Sig: TK 1 T   PO QD     Refill:  7   • DULoxetine (CYMBALTA) 30 MG capsule     Sig: TK 1 C  PO QD     Refill:  3   • allopurinol (ZYLOPRIM) 300 MG tablet     Sig: TK 1 T PO D     Refill:  0   • zolpidem (AMBIEN) 5 MG tablet     Sig: TK 1 T  PO QD     Refill:  2   • predniSONE (DELTASONE) 2.5 MG tablet     Sig: TK 1 T PO TID     Refill:  3   • tiZANidine (ZANAFLEX) 4 MG tablet     Sig: TK 1 T PO Q 8 H     Refill:  3         Future Appointments  Date Time Provider Department Center   8/25/2017 9:30 AM Corinne E Perkins, PT  [facility] None   8/29/2017 10:30 AM Corinne E Perkins, PT  [facility] None   9/1/2017 10:15 AM Corinne E Perkins, PT  [facility] None         Cricket Nettles MD     EMR Dragon/Transcription disclaimer:  Much of this encounter note is an electronic transcription of spoken language to printed text. Electronic transcription of spoken language may permit erroneous, or at times, nonsensical words or phrases to be inadvertently transcribed. Although I have reviewed the note for such errors, some may still exist.

## 2017-08-25 ENCOUNTER — HOSPITAL ENCOUNTER (OUTPATIENT)
Dept: PHYSICAL THERAPY | Facility: HOSPITAL | Age: 72
Setting detail: THERAPIES SERIES
Discharge: HOME | End: 2017-08-25

## 2017-08-25 DIAGNOSIS — Z74.09 IMPAIRED FUNCTIONAL MOBILITY, BALANCE, GAIT, AND ENDURANCE: ICD-10-CM

## 2017-08-25 DIAGNOSIS — M51.36 DEGENERATIVE DISC DISEASE, LUMBAR: Primary | ICD-10-CM

## 2017-08-25 PROCEDURE — G0283 ELEC STIM OTHER THAN WOUND: HCPCS | Performed by: PHYSICAL THERAPIST

## 2017-08-25 PROCEDURE — 97110 THERAPEUTIC EXERCISES: CPT | Performed by: PHYSICAL THERAPIST

## 2017-08-25 NOTE — THERAPY TREATMENT NOTE
Outpatient Physical Therapy Ortho Treatment Note  [facility]     Patient Name: Akua Torres  : 1945  MRN: [id number]  Today's Date: 2017      Visit Date: 2017    Visit Dx:    ICD-10-CM ICD-9-CM   1. Degenerative disc disease, lumbar M51.36 722.52   2. Impaired functional mobility, balance, gait, and endurance Z74.09 V49.89       Patient Active Problem List   Diagnosis   • Degenerative disc disease, lumbar   • Lumbar stenosis with neurogenic claudication   • History of lumbar fusion   • Spondylosis of lumbar region without myelopathy or radiculopathy   • Mild obesity   • Physical deconditioning   • Idiopathic gout   • Anxiety and depression   • Greater trochanteric bursitis of both hips   • Status post total bilateral knee replacement        Past Medical History:   Diagnosis Date   • Anxiety    • Arthritis    • Extremity pain    • GERD (gastroesophageal reflux disease)    • H/O bladder infections    • Hypertension    • Joint pain    • Low back pain    • Neck pain         Past Surgical History:   Procedure Laterality Date   • BACK SURGERY     • KNEE ARTHROSCOPY     • REPLACEMENT TOTAL KNEE BILATERAL                               PT Assessment/Plan       17 0900       PT Assessment    Assessment Comments Pt tolerated initial spinal stabilization ther exercises in clinic after cueing from PT for correct techniques. Trial of estim to lumbar spine today to assist in pain mgmt after ther exercises. Pt educated on aquatic therapy options.   -CP     PT Plan    PT Plan Comments Assess response from ther ex, implement progressed HEP next visit if tolerated. Pt is scheduled for injection .   -CP       User Key  (r) = Recorded By, (t) = Taken By, (c) = Cosigned By    Initials Name Provider Type    CP Corinne E Perkins, PT Physical Therapist                Modalities       17 0900          ELECTRICAL STIMULATION    Attended/Unattended Unattended   pt in supine position with  tioster under BLE for comfort  -CP      Stimulation Type IFC  -CP      Location/Electrode Placement/Other bilateral lumbar paraspinals, bilat SIJ  -CP      Rx Minutes 10 mins  -CP        User Key  (r) = Recorded By, (t) = Taken By, (c) = Cosigned By    Initials Name Provider Type    CP Corinne E Perkins, PT Physical Therapist                Exercises       08/25/17 0900          Subjective Comments    Subjective Comments Pt states she had consult with pain management, discussed injections and possible spinal stimulator. She reports low back pain is unchanged. She states she is considering aquatics at the [facility].   -CP      Subjective Pain    Able to rate subjective pain? yes  -CP      Pre-Treatment Pain Level 5  -CP      Post-Treatment Pain Level 3  -CP      Exercise 1    Exercise Name 1 Nustep Level 3  -CP      Time (Minutes) 1 5  -CP      Exercise 2    Exercise Name 2 Hip adduction iso  -CP      Cueing 2 Verbal  -CP      Reps 2 8  -CP      Exercise 3    Exercise Name 3 Hooklying LTR   BLE on red therapy ball  -CP      Cueing 3 Verbal;Tactile  -CP      Reps 3 8  -CP      Exercise 4    Exercise Name 4 Glute iso with deep abd breathing  -CP      Cueing 4 Verbal  -CP      Reps 4 8  -CP      Exercise 5    Exercise Name 5 Hip abduction supine  -CP      Cueing 5 Verbal  -CP      Reps 5 12  -CP      Additional Comments green theraband  -CP      Exercise 6    Exercise Name 6 SKTC PT assisted  -CP      Cueing 6 Verbal  -CP      Reps 6 2  -CP      Exercise 7    Exercise Name 7 Hip ER stretch  -CP      Cueing 7 Verbal  -CP      Reps 7 2  -CP      Exercise 8    Exercise Name 8 Hamstring stretch PT assisted  -CP      Cueing 8 Verbal  -CP      Reps 8 2  -CP      Exercise 9    Exercise Name 9 Hamstring curls into red therapy ball  -CP      Cueing 9 Verbal  -CP      Reps 9 8  -CP        User Key  (r) = Recorded By, (t) = Taken By, (c) = Cosigned By    Initials Name Provider Type    CP Corinne E Perkins, PT Physical Therapist                                        Time Calculation:   Start Time: 0930  Total Timed Code Minutes- PT: 33 minute(s)    Therapy Charges for Today     Code Description Service Date Service Provider Modifiers Qty    25528189122 HC PT THER PROC EA 15 MIN 8/25/2017 Corinne E Perkins, PT GP 2    16989035843 HC PT ELECTRICAL STIM UNATTENDED 8/25/2017 Corinne E Perkins, PT  1                    Corinne E. Perkins, PT  8/25/2017

## 2017-08-29 ENCOUNTER — HOSPITAL ENCOUNTER (OUTPATIENT)
Dept: PHYSICAL THERAPY | Facility: HOSPITAL | Age: 72
Setting detail: THERAPIES SERIES
Discharge: HOME | End: 2017-08-29

## 2017-08-29 DIAGNOSIS — M51.36 DEGENERATIVE DISC DISEASE, LUMBAR: Primary | ICD-10-CM

## 2017-08-29 DIAGNOSIS — Z74.09 IMPAIRED FUNCTIONAL MOBILITY, BALANCE, GAIT, AND ENDURANCE: ICD-10-CM

## 2017-08-29 PROCEDURE — 97110 THERAPEUTIC EXERCISES: CPT | Performed by: PHYSICAL THERAPIST

## 2017-08-29 PROCEDURE — G0283 ELEC STIM OTHER THAN WOUND: HCPCS | Performed by: PHYSICAL THERAPIST

## 2017-08-29 NOTE — THERAPY TREATMENT NOTE
Outpatient Physical Therapy Ortho Treatment Note  [facility]     Patient Name: Akua Torres  : 1945  MRN: [id number]  Today's Date: 2017      Visit Date: 2017    Visit Dx:    ICD-10-CM ICD-9-CM   1. Degenerative disc disease, lumbar M51.36 722.52   2. Impaired functional mobility, balance, gait, and endurance Z74.09 V49.89       Patient Active Problem List   Diagnosis   • Degenerative disc disease, lumbar   • Lumbar stenosis with neurogenic claudication   • History of lumbar fusion   • Spondylosis of lumbar region without myelopathy or radiculopathy   • Mild obesity   • Physical deconditioning   • Idiopathic gout   • Anxiety and depression   • Greater trochanteric bursitis of both hips   • Status post total bilateral knee replacement        Past Medical History:   Diagnosis Date   • Anxiety    • Arthritis    • Extremity pain    • GERD (gastroesophageal reflux disease)    • H/O bladder infections    • Hypertension    • Joint pain    • Low back pain    • Neck pain         Past Surgical History:   Procedure Laterality Date   • BACK SURGERY     • KNEE ARTHROSCOPY     • REPLACEMENT TOTAL KNEE BILATERAL                               PT Assessment/Plan       17 1000       PT Assessment    Assessment Comments Pt educated on initial HEP, given written copy and green theraband for home use. No change in low back pain with ther ex in clinic, fewer verbal cues required for correct technique with deep abd breathing.   -CP     PT Plan    PT Plan Comments Assess response from injection next visit, as appt was moved to tomorrow. Assess compliance with HEP.   -CP       User Key  (r) = Recorded By, (t) = Taken By, (c) = Cosigned By    Initials Name Provider Type    CP Corinne E Perkins, PT Physical Therapist                Modalities       17 1000          ELECTRICAL STIMULATION    Attended/Unattended Unattended   SUPINE with bolster under BLE  -CP      Stimulation Type IFC  -CP       Location/Electrode Placement/Other bilateral lumbar paraspinals, bilat SIJ  -CP      Rx Minutes 10 mins  -CP        User Key  (r) = Recorded By, (t) = Taken By, (c) = Cosigned By    Initials Name Provider Type    CP Corinne E Perkins, PT Physical Therapist                Exercises       08/29/17 1000          Subjective Comments    Subjective Comments Pt states pain seems to have moved from her left leg to her right leg over the weekend. She reports she wasn't sore after last visit.   -CP      Subjective Pain    Able to rate subjective pain? yes  -CP      Pre-Treatment Pain Level 4  -CP      Post-Treatment Pain Level 2  -CP      Exercise 1    Exercise Name 1 Nustep Level 4  -CP      Time (Minutes) 1 5  -CP      Exercise 2    Exercise Name 2 Hip adduction iso  -CP      Cueing 2 Verbal  -CP      Reps 2 10  -CP      Exercise 3    Exercise Name 3 Hooklying LTR  -CP      Reps 3 10  -CP      Exercise 4    Exercise Name 4 Glute iso with deep abd breathing  -CP      Reps 4 10  -CP      Exercise 5    Exercise Name 5 Hip abduction supine  -CP      Cueing 5 Verbal  -CP      Reps 5 15  -CP      Additional Comments green theraband  -CP      Exercise 6    Exercise Name 6 SKTC PT assisted  -CP      Reps 6 3  -CP      Exercise 7    Exercise Name 7 Hip ER stretch  -CP      Reps 7 2  -CP      Exercise 8    Exercise Name 8 Hamstring stretch PT assisted  -CP      Reps 8 2  -CP      Exercise 9    Exercise Name 9 Hamstring curls into red therapy ball  -CP      Reps 9 10  -CP        User Key  (r) = Recorded By, (t) = Taken By, (c) = Cosigned By    Initials Name Provider Type    CP Corinne E Perkins, PT Physical Therapist                                   Therapy Education       08/29/17 1030          Therapy Education    Education Details Pt educated on HEP, gave written copy and green theraband for home use. HEP included: SKTC, hip rolls, hip add iso, hip abd clams supine, quad set and glute iso, and PPT with abdominal breathing.  -CP       Given HEP  -CP      Program Progressed  -CP      How Provided Verbal;Demonstration;Written  -CP      Provided to Patient  -CP      Level of Understanding Verbalized;Demonstrated  -CP        User Key  (r) = Recorded By, (t) = Taken By, (c) = Cosigned By    Initials Name Provider Type    CP Corinne E Perkins, PT Physical Therapist                Time Calculation:   Start Time: 1030  Total Timed Code Minutes- PT: 35 minute(s)    Therapy Charges for Today     Code Description Service Date Service Provider Modifiers Qty    [id number]  PT THER PROC EA 15 MIN 8/29/2017 Corinne E Perkins, PT GP 2    [id number]  PT ELECTRICAL STIM UNATTENDED 8/29/2017 Corinne E Perkins, PT  1                    Corinne E. Perkins, PT  8/29/2017

## 2017-08-30 ENCOUNTER — OUTSIDE FACILITY SERVICE (OUTPATIENT)
Dept: PAIN MEDICINE | Facility: CLINIC | Age: 72
End: 2017-08-30

## 2017-08-30 PROCEDURE — 99152 MOD SED SAME PHYS/QHP 5/>YRS: CPT | Performed by: ANESTHESIOLOGY

## 2017-08-30 PROCEDURE — 64483 NJX AA&/STRD TFRM EPI L/S 1: CPT | Performed by: ANESTHESIOLOGY

## 2017-08-31 ENCOUNTER — TELEPHONE (OUTPATIENT)
Dept: PAIN MEDICINE | Facility: CLINIC | Age: 72
End: 2017-08-31

## 2017-09-01 ENCOUNTER — APPOINTMENT (OUTPATIENT)
Dept: PHYSICAL THERAPY | Facility: HOSPITAL | Age: 72
End: 2017-09-01

## 2017-09-05 ENCOUNTER — APPOINTMENT (OUTPATIENT)
Dept: PHYSICAL THERAPY | Facility: HOSPITAL | Age: 72
End: 2017-09-05

## 2017-09-07 ENCOUNTER — APPOINTMENT (OUTPATIENT)
Dept: PHYSICAL THERAPY | Facility: HOSPITAL | Age: 72
End: 2017-09-07

## 2017-09-07 ENCOUNTER — TELEPHONE (OUTPATIENT)
Dept: NEUROSURGERY | Facility: CLINIC | Age: 72
End: 2017-09-07

## 2017-09-07 NOTE — TELEPHONE ENCOUNTER
"Provider:  Angel Luis  Caller: Akua Torres  Time of call:  11:40 AM   Phone #:  [phone]  Last visit:   08/17/17  Next visit: none scheduled    Reason for call:     FYI call for Dr. Hein  Pt left vm saying that she was calling with an update for Dr. Hein, did not give any details.     Called pt back, pt states that she is doing better and her walking is better. However she is having a lot of \"stress in her life\", said that Dr. Nettles had given her information on the neuro stimulator and it really stressed her out and she doesn't want to do that.     She did have an HELENE on 08/30, she was interested in getting another appt set up with Dr. Nettles, adv that I would send his office a message for them to call her, she understood.       "

## 2017-09-12 ENCOUNTER — APPOINTMENT (OUTPATIENT)
Dept: PHYSICAL THERAPY | Facility: HOSPITAL | Age: 72
End: 2017-09-12

## 2017-09-13 ENCOUNTER — TELEPHONE (OUTPATIENT)
Dept: NEUROSURGERY | Facility: CLINIC | Age: 72
End: 2017-09-13

## 2017-09-13 NOTE — TELEPHONE ENCOUNTER
"LM with  for a call back to discuss.  She had seen Nishant \"years ago\" for a surgery.  We may have to make the referral but I will wait to speak with the patient before getting that put in.  "

## 2017-09-15 ENCOUNTER — APPOINTMENT (OUTPATIENT)
Dept: PHYSICAL THERAPY | Facility: HOSPITAL | Age: 72
End: 2017-09-15

## 2017-09-19 ENCOUNTER — APPOINTMENT (OUTPATIENT)
Dept: PHYSICAL THERAPY | Facility: HOSPITAL | Age: 72
End: 2017-09-19

## 2017-09-20 ENCOUNTER — APPOINTMENT (OUTPATIENT)
Dept: PHYSICAL THERAPY | Facility: HOSPITAL | Age: 72
End: 2017-09-20

## 2017-09-22 ENCOUNTER — APPOINTMENT (OUTPATIENT)
Dept: PHYSICAL THERAPY | Facility: HOSPITAL | Age: 72
End: 2017-09-22

## 2017-09-26 ENCOUNTER — APPOINTMENT (OUTPATIENT)
Dept: PHYSICAL THERAPY | Facility: HOSPITAL | Age: 72
End: 2017-09-26

## 2017-09-27 NOTE — TELEPHONE ENCOUNTER
Pt never called back. I assume at this point that they have gotten a referral to Dr. Man from another office or have called themselves for the appointment.  Closing encounter.

## 2017-10-02 ENCOUNTER — DOCUMENTATION (OUTPATIENT)
Dept: PHYSICAL THERAPY | Facility: HOSPITAL | Age: 72
End: 2017-10-02

## 2017-10-02 DIAGNOSIS — M51.36 DEGENERATIVE DISC DISEASE, LUMBAR: Primary | ICD-10-CM

## 2017-10-02 DIAGNOSIS — Z74.09 IMPAIRED FUNCTIONAL MOBILITY, BALANCE, GAIT, AND ENDURANCE: ICD-10-CM

## 2017-10-02 NOTE — THERAPY DISCHARGE NOTE
Outpatient Physical Therapy Discharge Summary         Patient Name: Akua Torres  : 1945  MRN: [id number]    Today's Date: 10/2/2017    Visit Dx:    ICD-10-CM ICD-9-CM   1. Degenerative disc disease, lumbar M51.36 722.52   2. Impaired functional mobility, balance, gait, and endurance Z74.09 V49.89             PT OP Goals       10/02/17 1100       PT Short Term Goals    STG Date to Achieve 17  -CP     STG 1 Patient will report lumbar pain is reduced by at least 25%.  -CP     STG 1 Progress Not Met  -CP     STG 2 Patient subjectively report that altered leg symptoms in L glute have decreased by 50% with frequency or intensity.  -CP     STG 2 Progress Not Met  -CP     STG 3 Patient is independent with HEP for flexibility and strengthening.  -CP     STG 3 Progress Not Met  -CP     Long Term Goals    LTG Date to Achieve 17  -CP     LTG 1 Modified Oswestry score is improved by at least 10%.  -CP     LTG 1 Progress Not Met  -CP     LTG 2 Patient is independent with long term HEP for improved postural awareness and stabilization.  -CP     LTG 2 Progress Not Met  -CP     LTG 3 Patient is able to tolerate at least 30 min of standing or walking to improved tolerance to ADLs.  -CP     LTG 3 Progress Not Met  -CP       User Key  (r) = Recorded By, (t) = Taken By, (c) = Cosigned By    Initials Name Provider Type    CP Corinne E Perkins, PT Physical Therapist          OP PT Discharge Summary  Date of Discharge: 10/02/17  Reason for Discharge: Patient/Caregiver request, Non-compliant  Outcomes Achieved: Patient able to partially acheive established goals  Discharge Instructions: Pt was seen for PT IE and 2 treatments. She had injection, was suppose to then f/u with PT; however lack of follow up. Pt will be d/c to home with HEP.       Time Calculation:                    Corinne E. Perkins, PT  10/2/2017

## 2017-11-06 ENCOUNTER — APPOINTMENT (OUTPATIENT)
Dept: PREADMISSION TESTING | Facility: HOSPITAL | Age: 72
End: 2017-11-06

## 2017-11-06 VITALS — HEIGHT: 64 IN | BODY MASS INDEX: 33.27 KG/M2 | WEIGHT: 194.89 LBS

## 2017-11-06 LAB
DEPRECATED RDW RBC AUTO: 50.4 FL (ref 37–54)
ERYTHROCYTE [DISTWIDTH] IN BLOOD BY AUTOMATED COUNT: 14.1 % (ref 11.3–14.5)
HBA1C MFR BLD: 6 % (ref 4.8–5.6)
HCT VFR BLD AUTO: 42.1 % (ref 34.5–44)
HGB BLD-MCNC: 13.3 G/DL (ref 11.5–15.5)
MCH RBC QN AUTO: 30.6 PG (ref 27–31)
MCHC RBC AUTO-ENTMCNC: 31.6 G/DL (ref 32–36)
MCV RBC AUTO: 97 FL (ref 80–99)
PLATELET # BLD AUTO: 251 10*3/MM3 (ref 150–450)
PMV BLD AUTO: 10 FL (ref 6–12)
POTASSIUM BLD-SCNC: 3.8 MMOL/L (ref 3.5–5.5)
RBC # BLD AUTO: 4.34 10*6/MM3 (ref 3.89–5.14)
WBC NRBC COR # BLD: 9.18 10*3/MM3 (ref 3.5–10.8)

## 2017-11-06 PROCEDURE — 36415 COLL VENOUS BLD VENIPUNCTURE: CPT

## 2017-11-06 PROCEDURE — 85027 COMPLETE CBC AUTOMATED: CPT | Performed by: ORTHOPAEDIC SURGERY

## 2017-11-06 PROCEDURE — 83036 HEMOGLOBIN GLYCOSYLATED A1C: CPT | Performed by: ORTHOPAEDIC SURGERY

## 2017-11-06 PROCEDURE — 84132 ASSAY OF SERUM POTASSIUM: CPT | Performed by: ORTHOPAEDIC SURGERY

## 2017-11-06 RX ORDER — GABAPENTIN 300 MG/1
300 CAPSULE ORAL 3 TIMES DAILY
COMMUNITY
End: 2018-09-12 | Stop reason: HOSPADM

## 2017-11-06 NOTE — PAT
MEASURED FOR TEDS/SCDS IN PAT  CALF MEASUREMENT    15IN  LENGTH MEASUREMENT 15IN    Patient to apply Chlorhexadine wipes  to surgical area (as instructed) the night before procedure and the AM of procedure. Wipes provided.    CHAN FROM DR THORNTON'S OFFICE NOTIFIED OF PT FINISHING MACROBID ON 11-2-17 FOR RECENT UTI, SHE STATED SHE WOULD LET DR THORNTON KNOW

## 2017-11-10 ENCOUNTER — HOSPITAL ENCOUNTER (INPATIENT)
Facility: HOSPITAL | Age: 72
LOS: 3 days | Discharge: HOME HEALTH | End: 2017-11-13
Attending: ORTHOPAEDIC SURGERY | Admitting: ORTHOPAEDIC SURGERY

## 2017-11-10 ENCOUNTER — ANESTHESIA EVENT (OUTPATIENT)
Dept: PERIOP | Facility: HOSPITAL | Age: 72
End: 2017-11-10

## 2017-11-10 ENCOUNTER — APPOINTMENT (OUTPATIENT)
Dept: GENERAL RADIOLOGY | Facility: HOSPITAL | Age: 72
End: 2017-11-10

## 2017-11-10 ENCOUNTER — ANESTHESIA (OUTPATIENT)
Dept: PERIOP | Facility: HOSPITAL | Age: 72
End: 2017-11-10

## 2017-11-10 DIAGNOSIS — Z74.09 IMPAIRED MOBILITY AND ADLS: ICD-10-CM

## 2017-11-10 DIAGNOSIS — Z98.1 S/P LUMBAR SPINAL FUSION: ICD-10-CM

## 2017-11-10 DIAGNOSIS — Z78.9 IMPAIRED MOBILITY AND ADLS: ICD-10-CM

## 2017-11-10 DIAGNOSIS — Z74.09 IMPAIRED FUNCTIONAL MOBILITY, BALANCE, GAIT, AND ENDURANCE: Primary | ICD-10-CM

## 2017-11-10 PROBLEM — M54.9 BACK PAIN: Status: ACTIVE | Noted: 2017-11-10

## 2017-11-10 PROBLEM — E78.5 HLD (HYPERLIPIDEMIA): Status: ACTIVE | Noted: 2017-11-10

## 2017-11-10 PROBLEM — I67.1 MIDDLE CEREBRAL ARTERY ANEURYSM: Chronic | Status: ACTIVE | Noted: 2017-11-10

## 2017-11-10 PROBLEM — I10 HTN (HYPERTENSION): Status: ACTIVE | Noted: 2017-11-10

## 2017-11-10 PROBLEM — R73.03 PREDIABETES: Status: ACTIVE | Noted: 2017-11-10

## 2017-11-10 PROBLEM — N28.9 RENAL INSUFFICIENCY: Status: ACTIVE | Noted: 2017-11-10

## 2017-11-10 LAB
ABO GROUP BLD: NORMAL
BLD GP AB SCN SERPL QL: NEGATIVE
GLUCOSE BLDC GLUCOMTR-MCNC: 186 MG/DL (ref 70–130)
POTASSIUM BLDA-SCNC: 4.14 MMOL/L (ref 3.5–5.3)
RH BLD: POSITIVE

## 2017-11-10 PROCEDURE — 86850 RBC ANTIBODY SCREEN: CPT | Performed by: ORTHOPAEDIC SURGERY

## 2017-11-10 PROCEDURE — 97116 GAIT TRAINING THERAPY: CPT

## 2017-11-10 PROCEDURE — 25010000002 DEXAMETHASONE PER 1 MG: Performed by: NURSE ANESTHETIST, CERTIFIED REGISTERED

## 2017-11-10 PROCEDURE — 0ST20ZZ RESECTION OF LUMBAR VERTEBRAL DISC, OPEN APPROACH: ICD-10-PCS | Performed by: ORTHOPAEDIC SURGERY

## 2017-11-10 PROCEDURE — 25010000002 HYDROMORPHONE PER 4 MG: Performed by: NURSE ANESTHETIST, CERTIFIED REGISTERED

## 2017-11-10 PROCEDURE — C1713 ANCHOR/SCREW BN/BN,TIS/BN: HCPCS | Performed by: ORTHOPAEDIC SURGERY

## 2017-11-10 PROCEDURE — 0SG1071 FUSION OF 2 OR MORE LUMBAR VERTEBRAL JOINTS WITH AUTOLOGOUS TISSUE SUBSTITUTE, POSTERIOR APPROACH, POSTERIOR COLUMN, OPEN APPROACH: ICD-10-PCS | Performed by: ORTHOPAEDIC SURGERY

## 2017-11-10 PROCEDURE — 86900 BLOOD TYPING SEROLOGIC ABO: CPT | Performed by: ORTHOPAEDIC SURGERY

## 2017-11-10 PROCEDURE — 25010000002 FENTANYL CITRATE (PF) 100 MCG/2ML SOLUTION: Performed by: NURSE ANESTHETIST, CERTIFIED REGISTERED

## 2017-11-10 PROCEDURE — 0SG10AJ FUSION OF 2 OR MORE LUMBAR VERTEBRAL JOINTS WITH INTERBODY FUSION DEVICE, POSTERIOR APPROACH, ANTERIOR COLUMN, OPEN APPROACH: ICD-10-PCS | Performed by: ORTHOPAEDIC SURGERY

## 2017-11-10 PROCEDURE — 86901 BLOOD TYPING SEROLOGIC RH(D): CPT | Performed by: ORTHOPAEDIC SURGERY

## 2017-11-10 PROCEDURE — 25010000002 HEPARIN (PORCINE) PER 1000 UNITS

## 2017-11-10 PROCEDURE — 25010000002 PROPOFOL 1000 MG/ML EMULSION: Performed by: NURSE ANESTHETIST, CERTIFIED REGISTERED

## 2017-11-10 PROCEDURE — 63710000001 INSULIN LISPRO (HUMAN) PER 5 UNITS: Performed by: NURSE PRACTITIONER

## 2017-11-10 PROCEDURE — 25810000003 SODIUM CHLORIDE 0.9 % WITH KCL 20 MEQ 20-0.9 MEQ/L-% SOLUTION: Performed by: ORTHOPAEDIC SURGERY

## 2017-11-10 PROCEDURE — 25010000002 ALBUMIN HUMAN 5% PER 50 ML: Performed by: NURSE ANESTHETIST, CERTIFIED REGISTERED

## 2017-11-10 PROCEDURE — 0SP304Z REMOVAL OF INTERNAL FIXATION DEVICE FROM LUMBOSACRAL JOINT, OPEN APPROACH: ICD-10-PCS | Performed by: ORTHOPAEDIC SURGERY

## 2017-11-10 PROCEDURE — 97162 PT EVAL MOD COMPLEX 30 MIN: CPT

## 2017-11-10 PROCEDURE — 84132 ASSAY OF SERUM POTASSIUM: CPT | Performed by: ANESTHESIOLOGY

## 2017-11-10 PROCEDURE — G0108 DIAB MANAGE TRN  PER INDIV: HCPCS

## 2017-11-10 PROCEDURE — C2617 STENT, NON-COR, TEM W/O DEL: HCPCS | Performed by: ORTHOPAEDIC SURGERY

## 2017-11-10 PROCEDURE — 86920 COMPATIBILITY TEST SPIN: CPT

## 2017-11-10 PROCEDURE — P9041 ALBUMIN (HUMAN),5%, 50ML: HCPCS | Performed by: NURSE ANESTHETIST, CERTIFIED REGISTERED

## 2017-11-10 PROCEDURE — 25010000002 VANCOMYCIN: Performed by: ORTHOPAEDIC SURGERY

## 2017-11-10 PROCEDURE — 25010000002 VANCOMYCIN 10 G RECONSTITUTED SOLUTION: Performed by: ORTHOPAEDIC SURGERY

## 2017-11-10 PROCEDURE — 76000 FLUOROSCOPY <1 HR PHYS/QHP: CPT

## 2017-11-10 PROCEDURE — 82962 GLUCOSE BLOOD TEST: CPT

## 2017-11-10 PROCEDURE — 25010000002 PROPOFOL 10 MG/ML EMULSION: Performed by: NURSE ANESTHETIST, CERTIFIED REGISTERED

## 2017-11-10 PROCEDURE — 25010000002 PHENYLEPHRINE PER 1 ML: Performed by: NURSE ANESTHETIST, CERTIFIED REGISTERED

## 2017-11-10 DEVICE — ALLOGRFT BONE VIVIGEN CELLUAR MATRX FORMABLE 5CC: Type: IMPLANTABLE DEVICE | Site: SPINE LUMBAR | Status: FUNCTIONAL

## 2017-11-10 DEVICE — SCRW VIPER INNR ST: Type: IMPLANTABLE DEVICE | Site: SPINE LUMBAR | Status: FUNCTIONAL

## 2017-11-10 DEVICE — SCRW CORT VIPER PLS5.5 TI FIX 6X40MM: Type: IMPLANTABLE DEVICE | Site: SPINE LUMBAR | Status: FUNCTIONAL

## 2017-11-10 DEVICE — IMPLANTABLE DEVICE: Type: IMPLANTABLE DEVICE | Site: SPINE LUMBAR | Status: FUNCTIONAL

## 2017-11-10 DEVICE — SPACR OPAL REVOLVE 9X28X10MM: Type: IMPLANTABLE DEVICE | Site: SPINE LUMBAR | Status: FUNCTIONAL

## 2017-11-10 RX ORDER — FAMOTIDINE 10 MG/ML
20 INJECTION, SOLUTION INTRAVENOUS ONCE
Status: DISCONTINUED | OUTPATIENT
Start: 2017-11-10 | End: 2017-11-10

## 2017-11-10 RX ORDER — PREGABALIN 75 MG/1
75 CAPSULE ORAL ONCE
Status: COMPLETED | OUTPATIENT
Start: 2017-11-10 | End: 2017-11-10

## 2017-11-10 RX ORDER — NICOTINE POLACRILEX 4 MG
15 LOZENGE BUCCAL
Status: DISCONTINUED | OUTPATIENT
Start: 2017-11-10 | End: 2017-11-13 | Stop reason: HOSPADM

## 2017-11-10 RX ORDER — FAMOTIDINE 20 MG/1
20 TABLET, FILM COATED ORAL ONCE
Status: COMPLETED | OUTPATIENT
Start: 2017-11-10 | End: 2017-11-10

## 2017-11-10 RX ORDER — ZOLPIDEM TARTRATE 5 MG/1
2.5 TABLET ORAL NIGHTLY PRN
Status: DISCONTINUED | OUTPATIENT
Start: 2017-11-10 | End: 2017-11-13 | Stop reason: HOSPADM

## 2017-11-10 RX ORDER — PROMETHAZINE HYDROCHLORIDE 25 MG/ML
6.25 INJECTION, SOLUTION INTRAMUSCULAR; INTRAVENOUS ONCE AS NEEDED
Status: DISCONTINUED | OUTPATIENT
Start: 2017-11-10 | End: 2017-11-10 | Stop reason: HOSPADM

## 2017-11-10 RX ORDER — ACETAMINOPHEN 500 MG
1000 TABLET ORAL ONCE
Status: COMPLETED | OUTPATIENT
Start: 2017-11-10 | End: 2017-11-10

## 2017-11-10 RX ORDER — FAMOTIDINE 20 MG/1
20 TABLET, FILM COATED ORAL EVERY 12 HOURS SCHEDULED
Status: DISCONTINUED | OUTPATIENT
Start: 2017-11-10 | End: 2017-11-13 | Stop reason: HOSPADM

## 2017-11-10 RX ORDER — ACETAMINOPHEN 325 MG/1
650 TABLET ORAL EVERY 4 HOURS PRN
Status: DISCONTINUED | OUTPATIENT
Start: 2017-11-10 | End: 2017-11-13 | Stop reason: HOSPADM

## 2017-11-10 RX ORDER — NALOXONE HCL 0.4 MG/ML
0.4 VIAL (ML) INJECTION AS NEEDED
Status: DISCONTINUED | OUTPATIENT
Start: 2017-11-10 | End: 2017-11-10 | Stop reason: HOSPADM

## 2017-11-10 RX ORDER — FENTANYL CITRATE 50 UG/ML
INJECTION, SOLUTION INTRAMUSCULAR; INTRAVENOUS AS NEEDED
Status: DISCONTINUED | OUTPATIENT
Start: 2017-11-10 | End: 2017-11-10 | Stop reason: SURG

## 2017-11-10 RX ORDER — OXYCODONE HYDROCHLORIDE AND ACETAMINOPHEN 5; 325 MG/1; MG/1
1 TABLET ORAL ONCE AS NEEDED
Status: DISCONTINUED | OUTPATIENT
Start: 2017-11-10 | End: 2017-11-10 | Stop reason: HOSPADM

## 2017-11-10 RX ORDER — ATENOLOL 50 MG/1
100 TABLET ORAL DAILY
Status: DISCONTINUED | OUTPATIENT
Start: 2017-11-11 | End: 2017-11-13 | Stop reason: HOSPADM

## 2017-11-10 RX ORDER — SODIUM CHLORIDE AND POTASSIUM CHLORIDE 150; 900 MG/100ML; MG/100ML
100 INJECTION, SOLUTION INTRAVENOUS CONTINUOUS
Status: DISCONTINUED | OUTPATIENT
Start: 2017-11-10 | End: 2017-11-13 | Stop reason: HOSPADM

## 2017-11-10 RX ORDER — DEXAMETHASONE SODIUM PHOSPHATE 4 MG/ML
INJECTION, SOLUTION INTRA-ARTICULAR; INTRALESIONAL; INTRAMUSCULAR; INTRAVENOUS; SOFT TISSUE AS NEEDED
Status: DISCONTINUED | OUTPATIENT
Start: 2017-11-10 | End: 2017-11-10 | Stop reason: SURG

## 2017-11-10 RX ORDER — MORPHINE SULFATE 4 MG/ML
1 INJECTION, SOLUTION INTRAMUSCULAR; INTRAVENOUS EVERY 4 HOURS PRN
Status: DISCONTINUED | OUTPATIENT
Start: 2017-11-10 | End: 2017-11-13 | Stop reason: HOSPADM

## 2017-11-10 RX ORDER — LIDOCAINE HYDROCHLORIDE 10 MG/ML
INJECTION, SOLUTION EPIDURAL; INFILTRATION; INTRACAUDAL; PERINEURAL AS NEEDED
Status: DISCONTINUED | OUTPATIENT
Start: 2017-11-10 | End: 2017-11-10 | Stop reason: SURG

## 2017-11-10 RX ORDER — IPRATROPIUM BROMIDE AND ALBUTEROL SULFATE 2.5; .5 MG/3ML; MG/3ML
3 SOLUTION RESPIRATORY (INHALATION) ONCE AS NEEDED
Status: DISCONTINUED | OUTPATIENT
Start: 2017-11-10 | End: 2017-11-10 | Stop reason: HOSPADM

## 2017-11-10 RX ORDER — ONDANSETRON 2 MG/ML
4 INJECTION INTRAMUSCULAR; INTRAVENOUS EVERY 6 HOURS PRN
Status: DISCONTINUED | OUTPATIENT
Start: 2017-11-10 | End: 2017-11-13 | Stop reason: HOSPADM

## 2017-11-10 RX ORDER — HYDROMORPHONE HCL 110MG/55ML
2 PATIENT CONTROLLED ANALGESIA SYRINGE INTRAVENOUS EVERY 4 HOURS PRN
Status: DISCONTINUED | OUTPATIENT
Start: 2017-11-10 | End: 2017-11-13 | Stop reason: HOSPADM

## 2017-11-10 RX ORDER — OXYCODONE AND ACETAMINOPHEN 7.5; 325 MG/1; MG/1
1 TABLET ORAL ONCE AS NEEDED
Status: DISCONTINUED | OUTPATIENT
Start: 2017-11-10 | End: 2017-11-10 | Stop reason: HOSPADM

## 2017-11-10 RX ORDER — DEXTROSE MONOHYDRATE 25 G/50ML
25 INJECTION, SOLUTION INTRAVENOUS
Status: DISCONTINUED | OUTPATIENT
Start: 2017-11-10 | End: 2017-11-13 | Stop reason: HOSPADM

## 2017-11-10 RX ORDER — OXYCODONE AND ACETAMINOPHEN 7.5; 325 MG/1; MG/1
1 TABLET ORAL EVERY 4 HOURS PRN
Status: DISCONTINUED | OUTPATIENT
Start: 2017-11-10 | End: 2017-11-13 | Stop reason: HOSPADM

## 2017-11-10 RX ORDER — ATRACURIUM BESYLATE 10 MG/ML
INJECTION, SOLUTION INTRAVENOUS AS NEEDED
Status: DISCONTINUED | OUTPATIENT
Start: 2017-11-10 | End: 2017-11-10 | Stop reason: SURG

## 2017-11-10 RX ORDER — ALLOPURINOL 300 MG/1
300 TABLET ORAL DAILY
Status: DISCONTINUED | OUTPATIENT
Start: 2017-11-11 | End: 2017-11-13 | Stop reason: HOSPADM

## 2017-11-10 RX ORDER — ALBUMIN, HUMAN INJ 5% 5 %
SOLUTION INTRAVENOUS CONTINUOUS PRN
Status: DISCONTINUED | OUTPATIENT
Start: 2017-11-10 | End: 2017-11-10 | Stop reason: SURG

## 2017-11-10 RX ORDER — HYDROMORPHONE HYDROCHLORIDE 1 MG/ML
0.5 INJECTION, SOLUTION INTRAMUSCULAR; INTRAVENOUS; SUBCUTANEOUS
Status: DISCONTINUED | OUTPATIENT
Start: 2017-11-10 | End: 2017-11-10 | Stop reason: HOSPADM

## 2017-11-10 RX ORDER — NALOXONE HCL 0.4 MG/ML
0.4 VIAL (ML) INJECTION
Status: DISCONTINUED | OUTPATIENT
Start: 2017-11-10 | End: 2017-11-13 | Stop reason: HOSPADM

## 2017-11-10 RX ORDER — BUPIVACAINE HYDROCHLORIDE AND EPINEPHRINE 2.5; 5 MG/ML; UG/ML
INJECTION, SOLUTION INFILTRATION; PERINEURAL AS NEEDED
Status: DISCONTINUED | OUTPATIENT
Start: 2017-11-10 | End: 2017-11-10 | Stop reason: HOSPADM

## 2017-11-10 RX ORDER — BISACODYL 5 MG/1
5 TABLET, DELAYED RELEASE ORAL DAILY PRN
Status: DISCONTINUED | OUTPATIENT
Start: 2017-11-10 | End: 2017-11-13 | Stop reason: HOSPADM

## 2017-11-10 RX ORDER — KETAMINE HCL IN 0.9 % NACL 50 MG/5 ML
SYRINGE (ML) INTRAVENOUS AS NEEDED
Status: DISCONTINUED | OUTPATIENT
Start: 2017-11-10 | End: 2017-11-10 | Stop reason: SURG

## 2017-11-10 RX ORDER — LABETALOL HYDROCHLORIDE 5 MG/ML
5 INJECTION, SOLUTION INTRAVENOUS
Status: DISCONTINUED | OUTPATIENT
Start: 2017-11-10 | End: 2017-11-10 | Stop reason: HOSPADM

## 2017-11-10 RX ORDER — MEPERIDINE HYDROCHLORIDE 25 MG/ML
12.5 INJECTION INTRAMUSCULAR; INTRAVENOUS; SUBCUTANEOUS
Status: DISCONTINUED | OUTPATIENT
Start: 2017-11-10 | End: 2017-11-10 | Stop reason: HOSPADM

## 2017-11-10 RX ORDER — SODIUM CHLORIDE 0.9 % (FLUSH) 0.9 %
1-10 SYRINGE (ML) INJECTION AS NEEDED
Status: DISCONTINUED | OUTPATIENT
Start: 2017-11-10 | End: 2017-11-13 | Stop reason: HOSPADM

## 2017-11-10 RX ORDER — PROMETHAZINE HYDROCHLORIDE 25 MG/1
25 SUPPOSITORY RECTAL ONCE AS NEEDED
Status: DISCONTINUED | OUTPATIENT
Start: 2017-11-10 | End: 2017-11-10 | Stop reason: HOSPADM

## 2017-11-10 RX ORDER — FAMOTIDINE 10 MG/ML
20 INJECTION, SOLUTION INTRAVENOUS EVERY 12 HOURS SCHEDULED
Status: DISCONTINUED | OUTPATIENT
Start: 2017-11-10 | End: 2017-11-13 | Stop reason: HOSPADM

## 2017-11-10 RX ORDER — PROPOFOL 10 MG/ML
VIAL (ML) INTRAVENOUS AS NEEDED
Status: DISCONTINUED | OUTPATIENT
Start: 2017-11-10 | End: 2017-11-10 | Stop reason: SURG

## 2017-11-10 RX ORDER — SODIUM CHLORIDE, SODIUM LACTATE, POTASSIUM CHLORIDE, CALCIUM CHLORIDE 600; 310; 30; 20 MG/100ML; MG/100ML; MG/100ML; MG/100ML
9 INJECTION, SOLUTION INTRAVENOUS CONTINUOUS
Status: DISCONTINUED | OUTPATIENT
Start: 2017-11-10 | End: 2017-11-13 | Stop reason: HOSPADM

## 2017-11-10 RX ORDER — VANCOMYCIN HYDROCHLORIDE
15 EVERY 12 HOURS
Status: COMPLETED | OUTPATIENT
Start: 2017-11-10 | End: 2017-11-10

## 2017-11-10 RX ORDER — HYDRALAZINE HYDROCHLORIDE 20 MG/ML
10 INJECTION INTRAMUSCULAR; INTRAVENOUS EVERY 6 HOURS PRN
Status: DISCONTINUED | OUTPATIENT
Start: 2017-11-10 | End: 2017-11-13 | Stop reason: HOSPADM

## 2017-11-10 RX ORDER — SODIUM CHLORIDE 0.9 % (FLUSH) 0.9 %
1-10 SYRINGE (ML) INJECTION AS NEEDED
Status: DISCONTINUED | OUTPATIENT
Start: 2017-11-10 | End: 2017-11-10 | Stop reason: HOSPADM

## 2017-11-10 RX ORDER — BISACODYL 10 MG
10 SUPPOSITORY, RECTAL RECTAL DAILY PRN
Status: DISCONTINUED | OUTPATIENT
Start: 2017-11-10 | End: 2017-11-13 | Stop reason: HOSPADM

## 2017-11-10 RX ORDER — ZOLPIDEM TARTRATE 5 MG/1
5 TABLET ORAL NIGHTLY PRN
Status: DISCONTINUED | OUTPATIENT
Start: 2017-11-10 | End: 2017-11-13 | Stop reason: HOSPADM

## 2017-11-10 RX ORDER — MAGNESIUM HYDROXIDE 1200 MG/15ML
LIQUID ORAL AS NEEDED
Status: DISCONTINUED | OUTPATIENT
Start: 2017-11-10 | End: 2017-11-10 | Stop reason: HOSPADM

## 2017-11-10 RX ORDER — PROMETHAZINE HYDROCHLORIDE 25 MG/1
25 TABLET ORAL ONCE AS NEEDED
Status: DISCONTINUED | OUTPATIENT
Start: 2017-11-10 | End: 2017-11-10 | Stop reason: HOSPADM

## 2017-11-10 RX ORDER — LISINOPRIL 20 MG/1
20 TABLET ORAL
Status: DISCONTINUED | OUTPATIENT
Start: 2017-11-11 | End: 2017-11-13 | Stop reason: HOSPADM

## 2017-11-10 RX ORDER — HYDRALAZINE HYDROCHLORIDE 20 MG/ML
5 INJECTION INTRAMUSCULAR; INTRAVENOUS
Status: DISCONTINUED | OUTPATIENT
Start: 2017-11-10 | End: 2017-11-10 | Stop reason: HOSPADM

## 2017-11-10 RX ORDER — GABAPENTIN 300 MG/1
300 CAPSULE ORAL EVERY 8 HOURS SCHEDULED
Status: DISCONTINUED | OUTPATIENT
Start: 2017-11-10 | End: 2017-11-13 | Stop reason: HOSPADM

## 2017-11-10 RX ORDER — LIDOCAINE HYDROCHLORIDE 10 MG/ML
0.5 INJECTION, SOLUTION EPIDURAL; INFILTRATION; INTRACAUDAL; PERINEURAL ONCE AS NEEDED
Status: COMPLETED | OUTPATIENT
Start: 2017-11-10 | End: 2017-11-10

## 2017-11-10 RX ORDER — ONDANSETRON 2 MG/ML
4 INJECTION INTRAMUSCULAR; INTRAVENOUS ONCE AS NEEDED
Status: DISCONTINUED | OUTPATIENT
Start: 2017-11-10 | End: 2017-11-10 | Stop reason: HOSPADM

## 2017-11-10 RX ORDER — FENTANYL CITRATE 50 UG/ML
50 INJECTION, SOLUTION INTRAMUSCULAR; INTRAVENOUS
Status: DISCONTINUED | OUTPATIENT
Start: 2017-11-10 | End: 2017-11-10 | Stop reason: HOSPADM

## 2017-11-10 RX ADMIN — GABAPENTIN 300 MG: 300 CAPSULE ORAL at 16:19

## 2017-11-10 RX ADMIN — FAMOTIDINE 20 MG: 20 TABLET, FILM COATED ORAL at 06:27

## 2017-11-10 RX ADMIN — SODIUM CHLORIDE, POTASSIUM CHLORIDE, SODIUM LACTATE AND CALCIUM CHLORIDE 9 ML/HR: 600; 310; 30; 20 INJECTION, SOLUTION INTRAVENOUS at 06:23

## 2017-11-10 RX ADMIN — PROPOFOL 35 MCG/KG/MIN: 10 INJECTION, EMULSION INTRAVENOUS at 08:40

## 2017-11-10 RX ADMIN — HYDROMORPHONE HYDROCHLORIDE 0.5 MG: 1 INJECTION, SOLUTION INTRAMUSCULAR; INTRAVENOUS; SUBCUTANEOUS at 12:46

## 2017-11-10 RX ADMIN — HYDROMORPHONE HYDROCHLORIDE 0.5 MG: 1 INJECTION, SOLUTION INTRAMUSCULAR; INTRAVENOUS; SUBCUTANEOUS at 13:06

## 2017-11-10 RX ADMIN — PHENYLEPHRINE HYDROCHLORIDE 200 MCG: 10 INJECTION INTRAVENOUS at 09:08

## 2017-11-10 RX ADMIN — HYDROMORPHONE HYDROCHLORIDE 0.5 MG: 1 INJECTION, SOLUTION INTRAMUSCULAR; INTRAVENOUS; SUBCUTANEOUS at 12:39

## 2017-11-10 RX ADMIN — INSULIN LISPRO 2 UNITS: 100 INJECTION, SOLUTION INTRAVENOUS; SUBCUTANEOUS at 21:08

## 2017-11-10 RX ADMIN — PHENYLEPHRINE HYDROCHLORIDE 100 MCG: 10 INJECTION INTRAVENOUS at 08:45

## 2017-11-10 RX ADMIN — MUPIROCIN: 20 OINTMENT TOPICAL at 06:27

## 2017-11-10 RX ADMIN — POTASSIUM CHLORIDE AND SODIUM CHLORIDE 100 ML/HR: 900; 150 INJECTION, SOLUTION INTRAVENOUS at 13:42

## 2017-11-10 RX ADMIN — SODIUM CHLORIDE, POTASSIUM CHLORIDE, SODIUM LACTATE AND CALCIUM CHLORIDE: 600; 310; 30; 20 INJECTION, SOLUTION INTRAVENOUS at 10:49

## 2017-11-10 RX ADMIN — DEXAMETHASONE SODIUM PHOSPHATE 4 MG: 4 INJECTION, SOLUTION INTRAMUSCULAR; INTRAVENOUS at 08:25

## 2017-11-10 RX ADMIN — ACETAMINOPHEN 1000 MG: 500 TABLET ORAL at 07:42

## 2017-11-10 RX ADMIN — PROPOFOL 100 MG: 10 INJECTION, EMULSION INTRAVENOUS at 08:17

## 2017-11-10 RX ADMIN — FENTANYL CITRATE 50 MCG: 50 INJECTION INTRAMUSCULAR; INTRAVENOUS at 12:53

## 2017-11-10 RX ADMIN — OXYCODONE HYDROCHLORIDE AND ACETAMINOPHEN 1 TABLET: 7.5; 325 TABLET ORAL at 16:19

## 2017-11-10 RX ADMIN — ATRACURIUM BESYLATE 10 MG: 10 INJECTION, SOLUTION INTRAVENOUS at 10:32

## 2017-11-10 RX ADMIN — FENTANYL CITRATE 25 MCG: 50 INJECTION, SOLUTION INTRAMUSCULAR; INTRAVENOUS at 11:27

## 2017-11-10 RX ADMIN — GABAPENTIN 300 MG: 300 CAPSULE ORAL at 21:01

## 2017-11-10 RX ADMIN — FAMOTIDINE 20 MG: 20 TABLET, FILM COATED ORAL at 21:01

## 2017-11-10 RX ADMIN — VANCOMYCIN HYDROCHLORIDE 1250 MG: 10 INJECTION, POWDER, LYOPHILIZED, FOR SOLUTION INTRAVENOUS at 21:01

## 2017-11-10 RX ADMIN — PHENYLEPHRINE HYDROCHLORIDE 100 MCG: 10 INJECTION INTRAVENOUS at 09:20

## 2017-11-10 RX ADMIN — ALBUMIN HUMAN: 0.05 INJECTION, SOLUTION INTRAVENOUS at 09:48

## 2017-11-10 RX ADMIN — PREGABALIN 75 MG: 75 CAPSULE ORAL at 07:42

## 2017-11-10 RX ADMIN — FENTANYL CITRATE 50 MCG: 50 INJECTION INTRAMUSCULAR; INTRAVENOUS at 13:16

## 2017-11-10 RX ADMIN — LIDOCAINE HYDROCHLORIDE 50 MG: 10 INJECTION, SOLUTION EPIDURAL; INFILTRATION; INTRACAUDAL; PERINEURAL at 08:17

## 2017-11-10 RX ADMIN — ATRACURIUM BESYLATE 50 MG: 10 INJECTION, SOLUTION INTRAVENOUS at 08:17

## 2017-11-10 RX ADMIN — TRANEXAMIC ACID 1 MG/KG/HR: 100 INJECTION, SOLUTION INTRAVENOUS at 09:15

## 2017-11-10 RX ADMIN — PHENYLEPHRINE HYDROCHLORIDE 100 MCG: 10 INJECTION INTRAVENOUS at 11:39

## 2017-11-10 RX ADMIN — LIDOCAINE HYDROCHLORIDE 0.2 ML: 10 INJECTION, SOLUTION EPIDURAL; INFILTRATION; INTRACAUDAL; PERINEURAL at 06:23

## 2017-11-10 RX ADMIN — TRANEXAMIC ACID 880 MG: 100 INJECTION, SOLUTION INTRAVENOUS at 08:45

## 2017-11-10 RX ADMIN — OXYCODONE HYDROCHLORIDE AND ACETAMINOPHEN 1 TABLET: 7.5; 325 TABLET ORAL at 21:02

## 2017-11-10 RX ADMIN — VANCOMYCIN HYDROCHLORIDE 1250 MG: 1 INJECTION, POWDER, LYOPHILIZED, FOR SOLUTION INTRAVENOUS at 07:39

## 2017-11-10 RX ADMIN — KETAMINE HCL-NACL SOLN PREF SY 50 MG/5ML-0.9% (10MG/ML) 20 MG: 10 SOLUTION PREFILLED SYRINGE at 09:01

## 2017-11-10 NOTE — H&P
Patient Care Team:      Chief complaint: back and bilateral hip pain    Subjective:Patient is a 72 y.o.female presents with long history of back problems.  She underwent L4-S1 fusion surgery in 2004 with good results.  About 2 years ago her pain returned with back and bilateral hip pain into right femoral area which has continued to worsen despite conservative treatment.  She denies any other changes in presenting symptoms since last seen by the MD.  No recent fever or chills.   Review of Systems:  General ROS: negative  Cardiovascular ROS: no chest pain or dyspnea on exertion  Respiratory ROS: admits to  Cough due to seasonal allergies, no shortness of breath, or wheezing      Allergies:   Allergies   Allergen Reactions   • Nsaids Other (See Comments)     KIDNEY DAMAGE   • Quinidine Nausea And Vomiting and Other (See Comments)     FEVER     • Penicillins Rash          Latex: no  Contrast Dye: no    Home Meds    Prescriptions Prior to Admission   Medication Sig Dispense Refill Last Dose   • allopurinol (ZYLOPRIM) 300 MG tablet TK 1 T PO D  0 Taking   • atenolol (TENORMIN) 100 MG tablet Take 100 mg by mouth Daily.   Taking   • gabapentin (NEURONTIN) 300 MG capsule Take 300 mg by mouth 3 (Three) Times a Day.      • HYDROcodone-acetaminophen (NORCO) 5-325 MG per tablet Take 1 tablet by mouth Every 6 (Six) Hours As Needed for Moderate Pain . (Patient taking differently: Take 0.5 tablets by mouth Every 6 (Six) Hours As Needed for Moderate Pain .) 45 tablet 0 Taking   • lisinopril-hydrochlorothiazide (PRINZIDE,ZESTORETIC) 20-25 MG per tablet Take 1 tablet by mouth Daily.   Taking   • ZETIA 10 MG tablet TK 1 T   PO QD  7 Taking   • zolpidem (AMBIEN) 5 MG tablet 1/2TABLET NIGHTLY AS NEEDED FOR SLEEP  2 Taking     PMH:   Past Medical History:   Diagnosis Date   • Anxiety    • Anxiety and depression    • Arthritis    • Elevated serum creatinine     SEES DR SMITH EVERY 6 MONTHS- NEPHROLOGIST    • Extremity pain    •  GERD (gastroesophageal reflux disease)    • H/O bladder infections     JUST FINISHED MACROBID ON 11-2-17 FOR RECENT UTI   • Hypercholesteremia    • Hypertension    • Joint pain    • Low back pain    • Neck pain    • Rheumatic fever    • Wears glasses      PSH:    Past Surgical History:   Procedure Laterality Date   • BACK SURGERY  2004    LUMBAR PER DR THORNTON    • CARPAL TUNNEL RELEASE Left    • COLONOSCOPY     • FINGER SURGERY Right     3RD FINGER   • HEEL SPUR EXCISION Bilateral    • KNEE ARTHROSCOPY Bilateral    • REPLACEMENT TOTAL KNEE BILATERAL Bilateral      Immunization History: pneumo: yes   Flu: yes  Tetanus: yes  Social History:   Tobacco: never   Alcohol: no      Physical Exam:There were no vitals taken for this visit.      General Appearance:    Alert, cooperative, no distress, appears stated age   Head:    Normocephalic, without obvious abnormality, atraumatic   Lungs:     Clear to auscultation bilaterally, respirations unlabored    Heart: Regular rate and rhythm, S1 and S2 normal, no murmur, rub    or gallop    Abdomen:    Soft without tenderness   Breast Exam:    deferred   Genitalia:    deferred   Extremities:   Extremities normal, atraumatic, no cyanosis or edema   Skin:   Skin color, texture, turgor normal, no rashes or lesions   Neurologic:   Grossly intact     Results Review: CBC, EKG and K+ on chart.     Impression: Lumbar stenosis and instability    Plan: For lumbar fusion surgery today.  KESHA Gr 11/10/2017 6:55 AM

## 2017-11-10 NOTE — H&P
Patient Name: Akua Torres  MRN: [id number]  : 1945  DOS: 11/10/2017    Attending: Gopi Man MD    Primary Care Provider: Nanci Orr MD      Chief complaint:  back and bilateral hip pain    Subjective   Patient is a 72 y.o. female presented for lumbar spinal fusion by Dr. Man under GA. She tolerated surgery well and is admitted for further medical management.     When seen in PACU she is sedated. She appears comfortable and in stable condition.     Allergies:  Allergies   Allergen Reactions   • Nsaids Other (See Comments)     KIDNEY DAMAGE   • Quinidine Nausea And Vomiting and Other (See Comments)     FEVER     • Penicillins Rash       Meds:  Prescriptions Prior to Admission   Medication Sig Dispense Refill Last Dose   • allopurinol (ZYLOPRIM) 300 MG tablet TK 1 T PO D  0 11/10/2017 at 0445   • atenolol (TENORMIN) 100 MG tablet Take 100 mg by mouth Daily.   11/10/2017 at 0445   • gabapentin (NEURONTIN) 300 MG capsule Take 300 mg by mouth 3 (Three) Times a Day.   11/10/2017 at 0445   • lisinopril-hydrochlorothiazide (PRINZIDE,ZESTORETIC) 20-25 MG per tablet Take 1 tablet by mouth Daily.   11/10/2017 at 0445   • HYDROcodone-acetaminophen (NORCO) 5-325 MG per tablet Take 1 tablet by mouth Every 6 (Six) Hours As Needed for Moderate Pain . (Patient taking differently: Take 0.5 tablets by mouth Every 6 (Six) Hours As Needed for Moderate Pain .) 45 tablet 0 2017   • ZETIA 10 MG tablet TK 1 T   PO QD  7 11/10/2017 at 0445   • zolpidem (AMBIEN) 5 MG tablet 1/2TABLET NIGHTLY AS NEEDED FOR SLEEP  2 2017       History:   Past Medical History:   Diagnosis Date   • Anxiety    • Anxiety and depression    • Arthritis    • Elevated serum creatinine     SEES DR SMITH EVERY 6 MONTHS- NEPHROLOGIST    • Extremity pain    • GERD (gastroesophageal reflux disease)    • H/O bladder infections     JUST FINISHED MACROBID ON 17 FOR RECENT UTI   • Hypercholesteremia    • Hypertension    • Joint  "pain    • Low back pain    • Neck pain    • Rheumatic fever    • Wears glasses      Past Surgical History:   Procedure Laterality Date   • BACK SURGERY  2004    LUMBAR PER DR THORNTON    • CARPAL TUNNEL RELEASE Left    • COLONOSCOPY     • FINGER SURGERY Right     3RD FINGER   • HEEL SPUR EXCISION Bilateral    • KNEE ARTHROSCOPY Bilateral    • REPLACEMENT TOTAL KNEE BILATERAL Bilateral      Family History   Problem Relation Age of Onset   • Cancer Mother    • Cancer Father      Social History   Substance Use Topics   • Smoking status: Never Smoker   • Smokeless tobacco: Never Used   • Alcohol use No       Review of Systems  Review of systems could not be obtained due to   patient sedation status.    Vital Signs  /51  Pulse 59  Temp 96.9 °F (36.1 °C)  Resp 16  Ht 64\" (162.6 cm)  Wt 194 lb (88 kg)  SpO2 97%  BMI 33.3 kg/m2    Physical Exam:    General Appearance:    Sedated, in no acute distress   Head:    Normocephalic, mild orbital and lip edema   Ears:    Ears appear intact with no abnormalities noted   Back:   Surgical dressing CDI. HV present   Lungs:     Clear to auscultation,respirations regular, even and                   unlabored    Heart:    Regular rhythm and normal rate, normal S1 and S2, no            murmur, no gallop, no rub, no click   Abdomen:     Normal bowel sounds, no masses, no organomegaly, soft        non-tender, non-distended, no guarding, no rebound                 tenderness   Genitalia:    Deferred. [place]   Extremities:   Moves all extremities well, no edema, no cyanosis, no              redness   Pulses:   Pulses palpable and equal bilaterally   Skin:   No bleeding, bruising or rash      I reviewed the patient's new clinical results.         Results from last 7 days  Lab Units 11/06/17  1420   WBC 10*3/mm3 9.18   HEMOGLOBIN g/dL 13.3   HEMATOCRIT % 42.1   PLATELETS 10*3/mm3 251       Results from last 7 days  Lab Units 11/06/17  1420   POTASSIUM mmol/L 3.8     Lab Results "   Component Value Date    HGBA1C 6.00 (H) 11/06/2017       Assessment and Plan:   Principal Problem:    S/P lumbar spinal fusion  Active Problems:    Back pain    Anxiety and depression    HTN (hypertension)    HLD (hyperlipidemia)    Prediabetes    Renal insufficiency      Plan  1. PT/OT- ambulate  2. Pain control-prns   3. IS-encourage  4. DVT proph- mechs  5. Bowel regimen  6. Resume home medications as appropriate  7. Monitor post-op labs  8. DC planning     HTN, HLD  - Continue home lisinopril and atenolol   - Hold HCTZ for now  - Monitor BP q4 hrs  - Holding parameters for BP meds  - PRN hydralazine for SBP >170    PreDM  - hgb A1c on 11/6/17 6.0  - Accuchecks ACHS with low dose SSI  - DM educator consult    Renal insufficiency  - BMP in AM      Jennifer Dale, WILFREDO  11/10/17  1:49 PM

## 2017-11-10 NOTE — ANESTHESIA POSTPROCEDURE EVALUATION
Patient: Akua Torres    Procedure Summary     Date Anesthesia Start Anesthesia Stop Room / Location    11/10/17 0813 1216 [facility] / [facility]       Procedure Diagnosis Surgeon Provider    LUMBAR SPINAL FUSION  (N/A Spine Lumbar) No diagnosis on file. MD Von Lazo MD          Anesthesia Type: general, ITN  Last vitals  BP   132/71 (11/10/17 0723)   Temp   97.8 °F (36.6 °C) (11/10/17 0723)   Pulse   79 (11/10/17 0723)   Resp   18 (11/10/17 0723)     SpO2   98 % (11/10/17 0723)     Post Anesthesia Care and Evaluation    Patient location during evaluation: PACU  Patient participation: complete - patient participated  Level of consciousness: responsive to physical stimuli  Pain management: adequate  Airway patency: patent  Anesthetic complications: No anesthetic complications  PONV Status: none  Cardiovascular status: hemodynamically stable and acceptable  Respiratory status: nonlabored ventilation, nasal cannula, oral airway and acceptable  Hydration status: acceptable

## 2017-11-10 NOTE — PLAN OF CARE
Problem: Patient Care Overview (Adult)  Goal: Plan of Care Review  Outcome: Ongoing (interventions implemented as appropriate)    11/10/17 4725   Coping/Psychosocial Response Interventions   Plan Of Care Reviewed With patient   Outcome Evaluation   Outcome Summary/Follow up Plan Patient pain controlled with oral meds. Pt ambulated. Has lozano catheter. Will continue to monitor.   Patient Care Overview   Progress improving

## 2017-11-10 NOTE — ANESTHESIA PROCEDURE NOTES
Airway  Urgency: elective    Date/Time: 11/10/2017 8:19 AM  Airway not difficult    General Information and Staff    Patient location during procedure: OR  CRNA: ELE GUNTER    Indications and Patient Condition  Indications for airway management: airway protection    Preoxygenated: yes  MILS not maintained throughout  Mask difficulty assessment: 1 - vent by mask    Final Airway Details  Final airway type: endotracheal airway      Successful airway: ETT  Cuffed: yes   Successful intubation technique: direct laryngoscopy  Facilitating devices/methods: Bougie  Endotracheal tube insertion site: oral  Blade: Shaji  Blade size: #3  ETT size: 7.0 mm  Cormack-Lehane Classification: grade III - view of epiglottis only  Placement verified by: chest auscultation and capnometry   Measured from: lips  ETT to lips (cm): 20  Number of attempts at approach: 1    Additional Comments  Negative epigastric sounds, Breath sound equal bilaterally with symmetric chest rise and fall

## 2017-11-10 NOTE — PLAN OF CARE
Problem: Patient Care Overview (Adult)  Goal: Plan of Care Review  Outcome: Ongoing (interventions implemented as appropriate)    11/10/17 1735   Coping/Psychosocial Response Interventions   Plan Of Care Reviewed With patient   Outcome Evaluation   Outcome Summary/Follow up Plan Pt ambulated 120 feet with CGA and RW, limited by fatigue. PT will follow to increase strength and progress functional mobility with spinal precautions. Plan is home with HHPT at discharge         Problem: Inpatient Physical Therapy  Goal: Bed Mobility Goal LTG- PT  Outcome: Ongoing (interventions implemented as appropriate)    11/10/17 1735   Bed Mobility PT LTG   Bed Mobility PT LTG, Date Established 11/10/17   Bed Mobility PT LTG, Time to Achieve 5 days   Bed Mobility PT LTG, Activity Type supine to sit/sit to supine  (log roll)   Bed Mobility PT LTG, Causey Level contact guard assist   Bed Mobility PT LTG, Date Goal Reviewed 11/10/17   Bed Mobility PT LTG, Outcome goal ongoing       Goal: Transfer Training Goal 1 LTG- PT  Outcome: Ongoing (interventions implemented as appropriate)    11/10/17 1735   Transfer Training PT LTG   Transfer Training PT LTG, Date Established 11/10/17   Transfer Training PT LTG, Time to Achieve 5 days   Transfer Training PT LTG, Activity Type sit to stand/stand to sit   Transfer Training PT LTG, Causey Level conditional independence   Transfer Training PT LTG, Assist Device walker, rolling   Transfer Training PT LTG, Date Goal Reviewed 11/10/17   Transfer Training PT LTG, Outcome goal ongoing       Goal: Gait Training Goal LTG- PT  Outcome: Ongoing (interventions implemented as appropriate)    11/10/17 1735   Gait Training PT LTG   Gait Training Goal PT LTG, Date Established 11/10/17   Gait Training Goal PT LTG, Time to Achieve 5 days   Gait Training Goal PT LTG, Causey Level conditional independence   Gait Training Goal PT LTG, Assist Device walker, rolling   Gait Training Goal PT LTG,  Distance to Achieve 500 feet   Gait Training Goal PT LTG, Date Goal Reviewed 11/10/17   Gait Training Goal PT LTG, Outcome goal ongoing

## 2017-11-10 NOTE — ANESTHESIA PREPROCEDURE EVALUATION
Anesthesia Evaluation     Patient summary reviewed and Nursing notes reviewed   NPO Solid Status: > 8 hours  NPO Liquid Status: > 8 hours     Airway   Mallampati: II  TM distance: >3 FB  Neck ROM: limited  possible difficult intubation  Dental - normal exam     Pulmonary - normal exam   Cardiovascular   Exercise tolerance: good (4-7 METS)    Rhythm: regular  Rate: normal    (+) hypertension well controlled, hyperlipidemia      Neuro/Psych  (+) psychiatric history,      ROS Comment: Hx of MCA aneurysm, evaluated by dr. corona  GI/Hepatic/Renal/Endo    (+) obesity,  GERD, renal disease,     Musculoskeletal     (+) neck pain,   Abdominal    Substance History      OB/GYN          Other   (+) arthritis                                 Anesthesia Plan    ASA 2     general     intravenous induction   Anesthetic plan and risks discussed with patient.    Plan discussed with CRNA and attending.    No nsaids

## 2017-11-10 NOTE — CONSULTS
"Diabetes Education  Assessment/Teaching    Patient Name:  Akua Torres  YOB: 1945  MRN: [id number]  Admit Date:  11/10/2017      Assessment Date:  11/10/2017       Most Recent Value    General Information      Referral From:  A1c, Blood glucose, MD order    Height  5' 4\" (1.626 m)    Height Method  Stated    Weight  194 lb (88 kg)    Weight Method  Stated    Pregnancy Assessment     Diabetes History     What type of diabetes do you have?  Pre-diabetes    Length of Diabetes Diagnosis  Newly diagnosed <6 months    Current DM knowledge  poor    Have you had diabetes education/teaching in the past?  no    Do you test your blood sugar at home?  no    Have you had low blood sugar? (<70mg/dl)  no    Education Preferences     What areas of diabetes would you like to learn about?  avoiding high blood sugar, diabetes complications, diet information, exercise information, understanding diabetes, testing my blood sugar at home, stress/coping skills, resources on diabetes    Nutrition Information     Assessment Topics     Healthy Eating - Assessment  Needs education    Being Active - Assessment  Needs education    Problem Solving - Assessment  Needs education    Reducing Risk - Assessment  Needs education    Healthy Coping - Assessment  Needs education    Monitoring - Assessment  Needs education    DM Goals     Healthy Eating - Goal  0-7 days from discharge    Being Active - Goal  30-90 days from discharge    Problem Solving - Goal  0-7 days from discharge    Reducing Risk - Goal  0-7 days from discharge    Healthy Coping - Goal  0-7 days from discharge    Monitoring - Goal  0-7 days from discharge               Most Recent Value    DM Education Needs     Meter  Meter provided    Meter Type  Freestyle    Frequency of Testing  3 times a week    Blood Glucose Target Range  <100    Reducing Risks  A1C testing, Blood pressure, Eye exam, Immunizations    Healthy Eating  Reviewed meal plan    Physical Activity  " None    Healthy Coping  Appropriate    Motivation  Moderate    Teaching Method  Explanation, Discussion, Handouts, Teach back    Patient Response  Verbalized understanding            Other Comments:    Education completed with patient and family at the bedside. Patient educated on Pre-diabetes, diabetes and the disease process, types of DM, diagnosis/A1C, monitoring, signs and symptoms, activity and exercise and how to prevent disease from progressing. Changing behavior and goal setting relative to diet, exercise and healthy lifestyle was emphasized. Patient provided a new donated meter and advised to contact PCP for prescription for lancets and strips. Pt. verbalized understanding and advised to consult with PCP for further instructions, discuss A1C result, and POC. Education handouts provided, questions answered and pt. advised to call with any other questions or concerns.        Electronically signed by:  Alen Ma RN  11/10/17 3:44 PM

## 2017-11-11 LAB
ANION GAP SERPL CALCULATED.3IONS-SCNC: 10 MMOL/L (ref 3–11)
BUN BLD-MCNC: 19 MG/DL (ref 9–23)
BUN/CREAT SERPL: 21.1 (ref 7–25)
CALCIUM SPEC-SCNC: 8.7 MG/DL (ref 8.7–10.4)
CHLORIDE SERPL-SCNC: 104 MMOL/L (ref 99–109)
CO2 SERPL-SCNC: 25 MMOL/L (ref 20–31)
CREAT BLD-MCNC: 0.9 MG/DL (ref 0.6–1.3)
DEPRECATED RDW RBC AUTO: 49.7 FL (ref 37–54)
ERYTHROCYTE [DISTWIDTH] IN BLOOD BY AUTOMATED COUNT: 13.8 % (ref 11.3–14.5)
GFR SERPL CREATININE-BSD FRML MDRD: 61 ML/MIN/1.73
GLUCOSE BLD-MCNC: 144 MG/DL (ref 70–100)
GLUCOSE BLDC GLUCOMTR-MCNC: 139 MG/DL (ref 70–130)
GLUCOSE BLDC GLUCOMTR-MCNC: 140 MG/DL (ref 70–130)
GLUCOSE BLDC GLUCOMTR-MCNC: 144 MG/DL (ref 70–130)
GLUCOSE BLDC GLUCOMTR-MCNC: 171 MG/DL (ref 70–130)
HCT VFR BLD AUTO: 37.4 % (ref 34.5–44)
HGB BLD-MCNC: 11.9 G/DL (ref 11.5–15.5)
MCH RBC QN AUTO: 31.2 PG (ref 27–31)
MCHC RBC AUTO-ENTMCNC: 31.8 G/DL (ref 32–36)
MCV RBC AUTO: 98.2 FL (ref 80–99)
PLATELET # BLD AUTO: 258 10*3/MM3 (ref 150–450)
PMV BLD AUTO: 10 FL (ref 6–12)
POTASSIUM BLD-SCNC: 4.4 MMOL/L (ref 3.5–5.5)
RBC # BLD AUTO: 3.81 10*6/MM3 (ref 3.89–5.14)
SODIUM BLD-SCNC: 139 MMOL/L (ref 132–146)
WBC NRBC COR # BLD: 15.87 10*3/MM3 (ref 3.5–10.8)

## 2017-11-11 PROCEDURE — 82962 GLUCOSE BLOOD TEST: CPT

## 2017-11-11 PROCEDURE — 97530 THERAPEUTIC ACTIVITIES: CPT

## 2017-11-11 PROCEDURE — 63710000001 DIPHENHYDRAMINE PER 50 MG: Performed by: INTERNAL MEDICINE

## 2017-11-11 PROCEDURE — 97165 OT EVAL LOW COMPLEX 30 MIN: CPT

## 2017-11-11 PROCEDURE — 25810000003 SODIUM CHLORIDE 0.9 % WITH KCL 20 MEQ 20-0.9 MEQ/L-% SOLUTION: Performed by: ORTHOPAEDIC SURGERY

## 2017-11-11 PROCEDURE — 97116 GAIT TRAINING THERAPY: CPT

## 2017-11-11 PROCEDURE — 85027 COMPLETE CBC AUTOMATED: CPT | Performed by: NURSE PRACTITIONER

## 2017-11-11 PROCEDURE — 80048 BASIC METABOLIC PNL TOTAL CA: CPT | Performed by: NURSE PRACTITIONER

## 2017-11-11 PROCEDURE — 97110 THERAPEUTIC EXERCISES: CPT

## 2017-11-11 RX ORDER — DIPHENHYDRAMINE HCL 25 MG
25 CAPSULE ORAL EVERY 6 HOURS PRN
Status: DISCONTINUED | OUTPATIENT
Start: 2017-11-11 | End: 2017-11-13 | Stop reason: HOSPADM

## 2017-11-11 RX ADMIN — OXYCODONE HYDROCHLORIDE AND ACETAMINOPHEN 1 TABLET: 7.5; 325 TABLET ORAL at 05:49

## 2017-11-11 RX ADMIN — FAMOTIDINE 20 MG: 20 TABLET, FILM COATED ORAL at 21:00

## 2017-11-11 RX ADMIN — DIPHENHYDRAMINE HYDROCHLORIDE 25 MG: 25 CAPSULE ORAL at 21:57

## 2017-11-11 RX ADMIN — GABAPENTIN 300 MG: 300 CAPSULE ORAL at 21:00

## 2017-11-11 RX ADMIN — OXYCODONE HYDROCHLORIDE AND ACETAMINOPHEN 1 TABLET: 7.5; 325 TABLET ORAL at 16:19

## 2017-11-11 RX ADMIN — ALLOPURINOL 300 MG: 300 TABLET ORAL at 09:00

## 2017-11-11 RX ADMIN — FAMOTIDINE 20 MG: 10 INJECTION, SOLUTION INTRAVENOUS at 09:00

## 2017-11-11 RX ADMIN — INSULIN LISPRO 2 UNITS: 100 INJECTION, SOLUTION INTRAVENOUS; SUBCUTANEOUS at 12:23

## 2017-11-11 RX ADMIN — OXYCODONE HYDROCHLORIDE AND ACETAMINOPHEN 1 TABLET: 7.5; 325 TABLET ORAL at 20:15

## 2017-11-11 RX ADMIN — POTASSIUM CHLORIDE AND SODIUM CHLORIDE 100 ML/HR: 900; 150 INJECTION, SOLUTION INTRAVENOUS at 02:15

## 2017-11-11 RX ADMIN — GABAPENTIN 300 MG: 300 CAPSULE ORAL at 16:19

## 2017-11-11 RX ADMIN — GABAPENTIN 300 MG: 300 CAPSULE ORAL at 05:49

## 2017-11-11 RX ADMIN — OXYCODONE HYDROCHLORIDE AND ACETAMINOPHEN 1 TABLET: 7.5; 325 TABLET ORAL at 02:15

## 2017-11-11 NOTE — PLAN OF CARE
Problem: Patient Care Overview (Adult)  Goal: Plan of Care Review  Outcome: Ongoing (interventions implemented as appropriate)    11/11/17 0902   Coping/Psychosocial Response Interventions   Plan Of Care Reviewed With patient   Outcome Evaluation   Outcome Summary/Follow up Plan Pt amb 150ft with RW with CGA, cues for safety. Pt has good pain control, increased independence with mobility today.    Patient Care Overview   Progress progress toward functional goals as expected         Problem: Inpatient Physical Therapy  Goal: Bed Mobility Goal LTG- PT  Outcome: Ongoing (interventions implemented as appropriate)    11/10/17 1735   Bed Mobility PT LTG   Bed Mobility PT LTG, Date Established 11/10/17   Bed Mobility PT LTG, Time to Achieve 5 days   Bed Mobility PT LTG, Activity Type supine to sit/sit to supine  (log roll)   Bed Mobility PT LTG, [place] Level contact guard assist   Bed Mobility PT LTG, Date Goal Reviewed 11/10/17   Bed Mobility PT LTG, Outcome goal ongoing       Goal: Transfer Training Goal 1 LTG- PT  Outcome: Ongoing (interventions implemented as appropriate)    11/10/17 1735   Transfer Training PT LTG   Transfer Training PT LTG, Date Established 11/10/17   Transfer Training PT LTG, Time to Achieve 5 days   Transfer Training PT LTG, Activity Type sit to stand/stand to sit   Transfer Training PT LTG, [place] Level conditional independence   Transfer Training PT LTG, Assist Device walker, rolling   Transfer Training PT LTG, Date Goal Reviewed 11/10/17   Transfer Training PT LTG, Outcome goal ongoing       Goal: Gait Training Goal LTG- PT  Outcome: Ongoing (interventions implemented as appropriate)    11/10/17 1735   Gait Training PT LTG   Gait Training Goal PT LTG, Date Established 11/10/17   Gait Training Goal PT LTG, Time to Achieve 5 days   Gait Training Goal PT LTG, [place] Level conditional independence   Gait Training Goal PT LTG, Assist Device walker, rolling   Gait Training Goal PT  LTG, Distance to Achieve 500 feet   Gait Training Goal PT LTG, Date Goal Reviewed 11/10/17   Gait Training Goal PT LTG, Outcome goal ongoing

## 2017-11-11 NOTE — PLAN OF CARE
Problem: Patient Care Overview (Adult)  Goal: Plan of Care Review  Outcome: Ongoing (interventions implemented as appropriate)    11/11/17 0905   Coping/Psychosocial Response Interventions   Plan Of Care Reviewed With patient   Outcome Evaluation   Outcome Summary/Follow up Plan OT initial eval completed. Pt demonstrates an increased need for assist 2/2 weakness. Recommend pt to receive skilled OT to address ADLs, functional mobility, safety and education to return to PLOF. Recommend pt to go home with assist and home health OT at time of DC.         Problem: Inpatient Occupational Therapy  Goal: Bed Mobility Goal LTG- OT  Outcome: Ongoing (interventions implemented as appropriate)    11/11/17 0905   Bed Mobility OT LTG   Bed Mobility OT LTG, Date Established 11/11/17   Bed Mobility OT LTG, Time to Achieve 2 wks   Bed Mobility OT LTG, Activity Type supine to sit/sit to supine  (log roll)   Bed Mobility OT LTG, [place] Level supervision required   Bed Mobility OT LTG, Outcome goal ongoing       Goal: Transfer Training Goal 1 LTG- OT  Outcome: Ongoing (interventions implemented as appropriate)    11/11/17 0905   Transfer Training OT LTG   Transfer Training OT LTG, Date Established 11/11/17   Transfer Training OT LTG, Time to Achieve 2 wks   Transfer Training OT LTG, Activity Type sit to stand/stand to sit;toilet   Transfer Training OT LTG, [place] Level supervision required   Transfer Training OT LTG, Assist Device walker, rolling   Transfer Training OT LTG, Outcome goal ongoing       Goal: Patient Education Goal LTG- OT  Outcome: Ongoing (interventions implemented as appropriate)    11/11/17 0905   Patient Education OT LTG   Patient Education OT LTG, Date Established 11/11/17   Patient Education OT LTG, Time to Achieve 2 wks   Patient Education OT LTG, Education Type precautions per surgeon;posture/body mechanics;home safety;adaptive equipment mgmt   Patient Education OT LTG, Education Understanding  verbalizes understanding;demonstrates adequately   Patient Education OT LTG Outcome goal ongoing       Goal: LB Dressing Goal LTG- OT  Outcome: Ongoing (interventions implemented as appropriate)    11/11/17 0905   LB Dressing OT LTG   LB Dressing Goal OT LTG, Date Established 11/11/17   LB Dressing Goal OT LTG, Time to Achieve 2 wks   LB Dressing Goal OT LTG, [place] Level supervision required   LB Dressing Goal OT LTG, Adaptive Equipment reacher;shoe horn, long handled;sock-aid   LB Dressing Goal OT LTG, Outcome goal ongoing

## 2017-11-11 NOTE — PROGRESS NOTES
"Ortho Spine Progress Note     LOS: 1 day   Patient Care Team:  Nanci Orr MD as PCP - General (Internal Medicine)  Leon Hein MD as Consulting Physician (Neurosurgery)  Corinne E Perkins, SUSAN as Physical Therapist (Physical Therapy)  Cricket Nettles MD as Consulting Physician (Pain Medicine)    Subjective: Doing well.  Pain much better.    Objective:   Vital Signs:  /67 (BP Location: Left arm, Patient Position: Lying)  Pulse 70  Temp 97.9 °F (36.6 °C) (Oral)   Resp 16  Ht 64\" (162.6 cm)  Wt 194 lb (88 kg)  SpO2 97%  BMI 33.3 kg/m2    Labs:  Lab Results (last 24 hours)     Procedure Component Value Units Date/Time    POC Glucose Fingerstick [[id number]]  (Abnormal) Collected:  11/10/17 2107    Specimen:  Blood Updated:  11/10/17 2109     Glucose 186 (H) mg/dL     Narrative:       Meter: [id number] : [id number] Ariel Connell    POC Glucose Fingerstick [[id number]]  (Abnormal) Collected:  11/11/17 0702    Specimen:  Blood Updated:  11/11/17 0704     Glucose 139 (H) mg/dL     Narrative:       Verify with Lab Meter: [id number] : [id number] Irvin Luna    CBC (No Diff) [[id number]]  (Abnormal) Collected:  11/11/17 0628    Specimen:  Blood Updated:  11/11/17 0732     WBC 15.87 (H) 10*3/mm3      RBC 3.81 (L) 10*6/mm3      Hemoglobin 11.9 g/dL      Hematocrit 37.4 %      MCV 98.2 fL      MCH 31.2 (H) pg      MCHC 31.8 (L) g/dL      RDW 13.8 %      RDW-SD 49.7 fl      MPV 10.0 fL      Platelets 258 10*3/mm3     Basic Metabolic Panel [[id number]]  (Abnormal) Collected:  11/11/17 0628    Specimen:  Blood Updated:  11/11/17 0752     Glucose 144 (H) mg/dL      BUN 19 mg/dL      Creatinine 0.90 mg/dL      Sodium 139 mmol/L      Potassium 4.4 mmol/L      Chloride 104 mmol/L      CO2 25.0 mmol/L      Calcium 8.7 mg/dL      eGFR Non African Amer 61 mL/min/1.73      BUN/Creatinine Ratio 21.1     Anion Gap 10.0 mmol/L     Narrative:       [employer] Guidelines    Stage     Description      "   GFR  1         Normal or High     90+  2         Mild decrease      60-89  3         Moderate decrease  30-59  4         Severe decrease    15-29  5         Kidney failure     <15          Motor intact in lower extremities.  Patient is awake, alert, and oriented.    Assessment/Plan: Doing well.  Preoperatively she had a brace fitted.  She says her  is bringing it from home today.  She may leave her brace off when at bed rest and just going to the bathroom.  She should wear when she is up and about otherwise.  Maybe home Sunday.  I left a prescription for Norco 7.5, #50, one by mouth every 4-6 when necessary pain.  She has staples in her back and these can be removed 2.5 weeks after surgery.    Gopi Man MD  11/11/17  9:31 AM

## 2017-11-11 NOTE — OP NOTE
PREOPERATIVE DIAGNOSES:  1.  Spinal stenosis L2-L3 and L3-L4.  2.  Severe disk degeneration/spondylosis L2-L3 and L3-L4.  3.  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   4.  Status post solid fusion L4-L5-S1.    POSTOPERATIVE DIAGNOSES:  1.  Spinal stenosis L2-L3 and L3-L4.  2.  Severe disk degeneration/spondylosis L2-L3 and L3-L4.  3.  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   4.  Status post solid fusion L4-L5-S1.    PROCEDURES PERFORMED:  1.  Transforaminal lumbar interbody fusion L2-L3 and L3-L4.  2.  Transpedicular fixation with DePuy instrumentation.  3.  Hardware removal L4-L5-S1.   4.  Posterolateral fusion L2-L3 and L3-L4 with bone graft.   5.  Epps-Gaming type osteotomies L2-L3 and L3-L4 to facilitate restoration of lordosis.   6. Harvesting of bone graft locally from spinous processes and laminae.     SURGEON: Gopi Man MD     ASSISTANT: Guillermo Cleary PA-C    ANESTHESIA: General.    DESCRIPTION OF PROCEDURE: Patient was given a preoperative dose of intravenous antibiotic. She had a PENICILLIN allergy. She was given a general anesthetic. She was placed in a supine position on the Ino table. The back was prepped and draped sterilely. The previous midline incision was used. This was carried down to the fascia. The fascia was split and paraspinal musculature and previous scar tissue were elevated.     First hardware at L4-L5-S1 was removed. I removed hardware and inspected the fusion. The fusion was noted to be solid.     Next, instrumentation was performed. Pedicle screws were placed on the left and right sides. The pedicle screws were DePuy transpedicular fixation. Patient had good quality bone and screws obtained good purchase. Two rods contouring lordosis were placed in these screws and the appropriate set screws applied.     To facilitate restoration of lordosis and reduction of retrolisthesis, I did Smith-Gaming type osteotomies. I performed first at  L2-L3. Inferior portion of the L2 lamina was removed. Bilateral facetectomies were performed. This made the motion segment more mobile. I then moved to L3-L4. A similar osteotomy was performed. Inferior portion of the L3 lamina was removed as well as both right and left facet joints. This made the motion segment more mobile.    Next, transforaminal lumbar interbody fusions were performed. These were performed on the right side. I started at L3-L4. An annular window was created between the exiting nerve root above and traversing nerve root medially. A complete diskectomy was performed using endplate abbi, curettes and pituitaries. The motion segment at L3-L4 was severely degenerate. I selected a cage 8 mm in height and 28 in length. Bone graft was packed in the anterior disk space. Bone graft was packed in the interbody fusion cage. The type of cage used was a DePuy/Synthes [place] cage. The cage was packed with bone graft and impacted tangentially the disk space at L3-L4.     I moved cephalad to L2-L3. This was a wider disk space and more mobile motion segment. I did a complete diskectomy from the right side through an annular window. I did this using pituitaries, curettes and endplate abbi. The cage used at L3-L4 was 28 in length and 10 mm in height. Bone graft was packed in the anterior disk space. The cage was then packed with bone graft and impacted in the disk space tangentially. It locked into place well. I then applied compression across the screws and retightened the set screws to lock the cage in place and restore lordosis. Under C-arm, the cages appeared in good position as well as instrumentation.     Next, a posterolateral fusion was performed. The transverse processes of L2 and L3 were decorticated. The prior fusion mass was decorticated. Bone graft was packed in the posterolateral gutters L2-L3-L4 on both the left and right sides.     It should be noted that bone graft was harvested locally from the  spinous processes and laminae. It was mixed with ViviGen allograft. This combination of bone was used as a fusion material.    Prior to bone grafting, I did a copious irrigation. A final torque tightening was done of all the screws. Final C-arm images showed all hardware in good position. I covered exposed dura with thrombin-soaked Gelfoam. I placed a deep Hemovac drain.     The wound was closed in layers using Vicryl. The skin was closed with staples. Sterile dressings were applied. Patient was awakened and transported to recovery room in stable condition.

## 2017-11-11 NOTE — PROGRESS NOTES
Discharge Planning Assessment  [facility]     Patient Name: Akua Torres  MRN: [id number]  Today's Date: 11/11/2017    Admit Date: 11/10/2017          Discharge Needs Assessment       11/11/17 0919    Living Environment    Lives With spouse    Living Arrangements house    Home Accessibility bed and bath on same level;other (see comments)   Has a walk in shower    Stair Railings at Home none    Type of Financial/Environmental Concern none    Transportation Available car;family or friend will provide    Living Environment    Provides Primary Care For no one    Quality Of Family Relationships supportive;involved;helpful    Able to Return to Prior Living Arrangements yes    Discharge Needs Assessment    Concerns To Be Addressed discharge planning concerns    Outpatient/Agency/Support Group Needs homecare agency (specify level of care)    Anticipated Changes Related to Illness none    Equipment Currently Used at Home cane, straight;wheelchair;walker, rolling;commode;shower chair   Pt added a 4 prong rubber base to cane.     Equipment Needed After Discharge none    Discharge Facility/Level Of Care Needs home with home health    Current Discharge Risk dependent with mobility/activities of daily living    Discharge Disposition still a patient    Discharge Contact Information if Applicable [phone] or cell [phone]            Discharge Plan       11/11/17 0921    Case Management/Social Work Plan    Plan Home with [facility]    Patient/Family In Agreement With Plan yes    Additional Comments Consult received for Home PT, supplies and staple removal. Discussed with pt at  and she is agreeable and would like to use someone at [facility]. Referral for Home PT called to Nikole for potential d/c for Sunday or Monday. She will have pt put on the schedule for Monday or Tuesday at the latest. [facility] info placed in AVS.  Pt denies needing any equipment due to she has multiple DME. CM will cont to  follow for any d/c needs.         Discharge Placement     No information found                Demographic Summary       11/11/17 0917    Referral Information    Referral Source physician    Reason For Consult other (see comments)   Home PT, supplies, staples out at home 2.5 weeks post op    Contact Information    Permission Granted to Share Information With     Primary Care Physician Information    Name Dr. Nanci Orr            Functional Status       11/11/17 0918    Functional Status Prior    Ambulation 1-->assistive equipment    Transferring 1-->assistive equipment    Toileting 1-->assistive equipment    Bathing 1-->assistive equipment   Has a bath chair    Dressing 2-->assistive person    Eating 0-->independent    Communication 0-->understands/communicates without difficulty    Swallowing 0-->swallows foods/liquids without difficulty    IADL    Medications independent    Meal Preparation assistive person    Housekeeping assistive person    Laundry assistive person    Shopping assistive person    Oral Care independent    Activity Tolerance    Current Activity Limitations none    Usual Activity Tolerance fair    Current Activity Tolerance fair    Employment/Financial    Employment/Finance Comments Has [employer] Medicare with script coverage that is affordable.             Psychosocial     None            Abuse/Neglect     None            Legal     None            Substance Abuse     None            Patient Forms     None          Talia Zamora

## 2017-11-11 NOTE — THERAPY EVALUATION
Acute Care - Occupational Therapy Initial Evaluation  [facility]     Patient Name: Akua Torres  : 1945  MRN: [id number]  Today's Date: 2017  Onset of Illness/Injury or Date of Surgery Date: 11/10/17  Date of Referral to OT: 11/10/17  Referring Physician: MD Donovan     Admit Date: 11/10/2017       ICD-10-CM ICD-9-CM   1. Impaired functional mobility, balance, gait, and endurance Z74.09 V49.89   2. Impaired mobility and ADLs Z74.09 799.89     Patient Active Problem List   Diagnosis   • Degenerative disc disease, lumbar   • Lumbar stenosis with neurogenic claudication   • History of lumbar fusion   • Spondylosis of lumbar region without myelopathy or radiculopathy   • Mild obesity   • Physical deconditioning   • Idiopathic gout   • Anxiety and depression   • Greater trochanteric bursitis of both hips   • Status post total bilateral knee replacement   • Back pain   • HTN (hypertension)   • HLD (hyperlipidemia)   • Prediabetes   • S/P lumbar spinal fusion   • Renal insufficiency     Past Medical History:   Diagnosis Date   • Anxiety    • Anxiety and depression    • Arthritis    • Elevated serum creatinine     SEES DR SMITH EVERY 6 MONTHS- NEPHROLOGIST    • Extremity pain    • GERD (gastroesophageal reflux disease)    • H/O bladder infections     JUST FINISHED MACROBID ON 17 FOR RECENT UTI   • Hypercholesteremia    • Hypertension    • Joint pain    • Low back pain    • Neck pain    • Rheumatic fever    • Wears glasses      Past Surgical History:   Procedure Laterality Date   • BACK SURGERY      LUMBAR PER DR THORNTON    • CARPAL TUNNEL RELEASE Left    • COLONOSCOPY     • FINGER SURGERY Right     3RD FINGER   • HEEL SPUR EXCISION Bilateral    • KNEE ARTHROSCOPY Bilateral    • REPLACEMENT TOTAL KNEE BILATERAL Bilateral           OT ASSESSMENT FLOWSHEET (last 72 hours)      OT Evaluation       17 0816 17 0812 11/10/17 1620 11/10/17 1600 11/10/17 0718    Rehab Evaluation     Document Type evaluation  - therapy note (daily note)  - evaluation  -      Subjective Information no complaints;complains of  - no complaints;agree to therapy  - agree to therapy;complains of;pain  -      Patient Effort, Rehab Treatment good  - good  - good  -      Symptoms Noted During/After Treatment fatigue  - fatigue  - fatigue  -      General Information    Patient Profile Review yes  -  yes  -      Onset of Illness/Injury or Date of Surgery Date 11/10/17  -  11/10/17  -      Referring Physician MD Donovan   -  MD Donovan  -      General Observations Pt received in supine, IV heplocked, no visitors present  -  Pt supine in bed, hemovac, lozano, IV, SCDs.  present  -      Pertinent History Of Current Problem Pt is 72 YOF who presented for surgical management of progressive and worsening back pain that failed to improve with conservative tx  -  Pt presents for surgical management of back pain and dysfunction that failed to improve with conservative management  -      Precautions/Limitations fall precautions;spinal precautions;other (see comments)   Hemovac  - fall precautions;spinal precautions  - fall precautions;spinal precautions   hemovac  -      Prior Level of Function min assist:;all household mobility;community mobility;gait;transfer;bed mobility;bathing;dressing   Increased time/pain/effort for LB ADLs  -  min assist:;all household mobility;community mobility;gait;transfer;bed mobility   Pain increased with activity, RW for mobility  -      Equipment Currently Used at Home walker, rolling;cane, straight;wheelchair;commode;shower chair  -  walker, rolling;cane, straight;wheelchair;commode;shower chair  -  walker, rolling;cane, straight;wheelchair  -    Plans/Goals Discussed With patient;agreed upon  -  patient and family;agreed upon  -      Risks Reviewed patient:;LOB;nausea/vomiting;dizziness;increased discomfort;increased  drainage;lines disloged  -  patient and family:;LOB;increased discomfort  -      Benefits Reviewed patient:;improve function;increase independence;increase strength;increase balance;increase knowledge  -  patient and family:;improve function;increase independence;increase strength;increase balance;decrease pain  -      Barriers to Rehab previous functional deficit  -  previous functional deficit  -      Living Environment    Lives With spouse  -  spouse  -  spouse  -    Living Arrangements house  -  house  -  house  -    Home Accessibility bed and bath on same level   walk in shower  -  bed and bath on same level   walk-in shower  -  bed and bath on same level;no concerns  -    Stair Railings at Home     none  -    Type of Financial/Environmental Concern     none  -    Transportation Available     car;family or friend will provide  -    Clinical Impression    Date of Referral to OT 11/10/17  -        OT Diagnosis Impaired ADLs and functional mobility  -        Criteria for Skilled Therapeutic Interventions Met yes  -        Rehab Potential good, to achieve stated therapy goals  -        Therapy Frequency daily  -        Anticipated Equipment Needs At Discharge bedside commode  -        Anticipated Discharge Disposition home with home health  -        Functional Level Prior    Ambulation    3-->assistive equipment and person  -TP 1-->assistive equipment   walker or cane  -TS    Transferring    2-->assistive person  -TP 1-->assistive equipment   walker or cane  -TS    Toileting    3-->assistive equipment and person  -TP 0-->independent  -TS    Bathing    2-->assistive person  -TP 0-->independent  -TS    Dressing    2-->assistive person  -TP 0-->independent  -TS    Eating    0-->independent  -TP 0-->independent  -TS    Communication    0-->understands/communicates without difficulty  -TP 0-->understands/communicates without difficulty  -TS    Swallowing    0-->swallows  foods/liquids without difficulty  -TP 0-->swallows foods/liquids without difficulty  -TS    Prior Functional Level Comment    uSED WALKER PRIOR TO SX  -TP     Vital Signs    Pre Systolic BP Rehab  122  -SJ       Pre Treatment Diastolic BP  75  -SJ       Pain Assessment    Pain Assessment 0-10  -MC 0-10  -SJ 0-10  -MJ      Pain Score 0  -MC 0  -SJ 7  -MJ      Post Pain Score 3  -MC 3  -SJ 0  -MJ      Pain Type Acute pain  - Acute pain;Surgical pain  -SJ Acute pain  -      Pain Location Back  -MC Back  -SJ Back  -MJ      Pain Intervention(s) Repositioned;Ambulation/increased activity  - Repositioned;Ambulation/increased activity  - Repositioned;Ambulation/increased activity;Medication (See MAR)  -      Vision Assessment/Intervention    Visual Impairment WFL with corrective lenses  -        Cognitive Assessment/Intervention    Current Cognitive/Communication Assessment functional  - functional  - functional  -      Orientation Status oriented x 4  -MC oriented x 4  -SJ oriented x 4  -MJ      Follows Commands/Answers Questions 100% of the time  - 100% of the time;able to follow single-step instructions;needs cueing;needs increased time;needs repetition  - 100% of the time;able to follow multi-step instructions;needs cueing  -      Personal Safety mild impairment;decreased awareness, need for safety;decreased insight to deficits   Cues for safety  - mild impairment  - mild impairment   cues for back precautions  -      Personal Safety Interventions fall prevention program maintained;gait belt;muscle strengthening facilitated;nonskid shoes/slippers when out of bed;supervised activity  - fall prevention program maintained;gait belt;muscle strengthening facilitated;nonskid shoes/slippers when out of bed  -SJ       Short/Long Term Memory intact short term memory;intact long term memory  -        ROM (Range of Motion)    General ROM no range of motion deficits identified  -  no range of  motion deficits identified  -      General ROM Detail for BUE. Defer BLE to PT  -MC  for B LE; defer UE to OT  -MJ      MMT (Manual Muscle Testing)    General MMT Assessment no strength deficits identified  -  lower extremity strength deficits identified  -      General MMT Assessment Detail BUE not formally tested 2/2 back sx. Pt observed using BUE functionally throughout IE  -        Bed Mobility, Assessment/Treatment    Bed Mobility, Assistive Device bed rails  -MC bed rails  -SJ bed rails  -      Bed Mobility, Roll Left, [place] supervision required  -MC supervision required  -SJ       Bed Mob, Supine to Sit, [place] supervision required  -MC supervision required  -SJ moderate assist (50% patient effort);verbal cues required  -      Bed Mob, Sit to Supine, [place] minimum assist (75% patient effort);verbal cues required  -MC minimum assist (75% patient effort);verbal cues required  -SJ minimum assist (75% patient effort);verbal cues required  -      Bed Mobility, Safety Issues decreased use of arms for pushing/pulling;decreased use of legs for bridging/pushing  - decreased use of arms for pushing/pulling;decreased use of legs for bridging/pushing  -SJ decreased use of arms for pushing/pulling;decreased use of legs for bridging/pushing  -      Bed Mobility, Impairments strength decreased;pain  -MC strength decreased;pain  -SJ strength decreased;pain  -MJ      Bed Mobility, Comment Cues for log roll technique and to adhere to spinal precautions.  Pt required assist with BLE for sit to supine   - CUES FOR LOG ROLLING AND BACK PRECAUTIONS  -SJ Pt performed log roll into/out of bed, verbal cues for sequencing. Assist with trunk for sidelying to sit and assist with legs for sit to sidelying  -      Transfer Assessment/Treatment    Transfers, Sit-Stand [place] contact guard assist;2 person assist required  - contact guard assist;verbal cues required  -SJ minimum assist (75%  patient effort);verbal cues required  -MJ      Transfers, Stand-Sit [place] contact guard assist;2 person assist required  -MC contact guard assist;verbal cues required  -SJ contact guard assist;verbal cues required  -MJ      Transfers, Sit-Stand-Sit, Assist Device rolling walker  -MC rolling walker  -SJ rolling walker  -MJ      Transfer, Safety Issues sequencing ability decreased;step length decreased;weight-shifting ability decreased  -MC sequencing ability decreased;step length decreased;weight-shifting ability decreased  -SJ sequencing ability decreased;step length decreased;weight-shifting ability decreased  -MJ      Transfer, Impairments strength decreased;pain  -MC strength decreased;pain  -SJ strength decreased;pain  -MJ      Transfer, Comment VC for hand placement, safe transfer technique and to adhere to spinal precautions  -MC cues for safety and back precautions  -SJ Verbal cues to push up from bed with UEs to stand and to reach back for bed to lower into sitting.  -MJ      Functional Mobility    Functional Mobility- Ind. Level contact guard assist;2 person assist required  -MC        Functional Mobility- Device rolling walker  -MC        Functional Mobility-Distance (Feet) 175  -        Functional Mobility- Comment Pt required max VC to maintain hands on RWx at all times during mobility. Pt impulsively lets go of walker multiple times throughout IE when walking and talking at same time  -        Lower Body Bathing Assessment/Training    LB Bathing Assess/Train, Comment OT issued LH sponge, simulated use and educated pt on use of AE to perform LB ADLs while adhering to spinal precautions  -        Toileting Assessment/Training    Toileting Assess/Train, Comment OT issued reacher and sock aid, simulated use and educated pt on use of AE to perform LB ADLs while adhering to spinal precautions  -        Motor Skills/Interventions    Additional Documentation Balance Skills Training (Group);Fine  Motor Coordination Training (Group);Gross Motor Coordination Training (Group)  -        Balance Skills Training    Sitting-Level of Assistance Close supervision  -        Sitting-Balance Support Feet supported  -        Standing-Level of Assistance Contact guard  -        Static Standing Balance Support assistive device  -        Gait Balance-Level of Assistance Contact guard;x2  -        Gait Balance Support assistive device  -        Therapy Exercises    Bilateral Lower Extremities  AROM:   post op day 2 protocol  -SJ --   initiate POD #1  -MJ      Fine Motor Coordination Training    Opposition Right:;Left:;intact  -        Sensory Assessment/Intervention    Light Touch LUE;RUE  -  --   denies numbness/tingling; can actively DF both ankles  -      LUE Light Touch WNL  -        RUE Light Touch WNL  -        General Therapy Interventions    Planned Therapy Interventions ADL retraining;adaptive equipment training;balance training;bed mobility training;strengthening;transfer training  -        Positioning and Restraints    Pre-Treatment Position in bed  - in bed  - in bed  -      Post Treatment Position bed  - bed  - bed  -      In Bed notified nsg;fowlers;call light within reach;encouraged to call for assist;with PT  - fowlers;call light within reach;encouraged to call for assist;with nsg;with other staff  - notified nsg;supine;call light within reach;encouraged to call for assist;with family/caregiver;SCD pump applied  -MJ      Lower Extremity    Lower Ext Manual Muscle Testing Detail   R LE grossly 4/5; L LE grossly 4+/5  -MJ        User Key  (r) = Recorded By, (t) = Taken By, (c) = Cosigned By    Initials Name Effective Dates    SJ Maribel Gibson, PT 06/19/15 -     TS Tona Block, RN 06/16/16 -     MC Suzanne Bowers, OT 03/14/16 -     MJ Henrique Clements, PT 03/14/16 -     TP Priscila Ward RN 07/10/17 -            Occupational Therapy Education     Title: PT OT SLP  Therapies (Active)     Topic: Occupational Therapy (Active)     Point: ADL training (Done)    Description: Instruct learner(s) on proper safety adaptation and remediation techniques during self care or transfers.   Instruct in proper use of assistive devices.    Learning Progress Summary    Learner Readiness Method Response Comment Documented by Status   Patient Acceptance E,D,H SAVANNAH,NR   11/11/17 0905 Done               Point: Precautions (Done)    Description: Instruct learner(s) on prescribed precautions during self-care and functional transfers.    Learning Progress Summary    Learner Readiness Method Response Comment Documented by Status   Patient Acceptance E,D,EMILIANO NUÑEZ,AUBREY   11/11/17 0905 Done               Point: Body mechanics (Done)    Description: Instruct learner(s) on proper positioning and spine alignment during self-care, functional mobility activities and/or exercises.    Learning Progress Summary    Learner Readiness Method Response Comment Documented by Status   Patient Acceptance E,ARPITA,H SAVANNAH,AUBREY   11/11/17 0905 Done                      User Key     Initials Effective Dates Name Provider Type Discipline     03/14/16 -  Suzanne Bowers, OT Occupational Therapist OT                  OT Recommendation and Plan  Anticipated Equipment Needs At Discharge: bedside commode  Anticipated Discharge Disposition: home with home health  Planned Therapy Interventions: ADL retraining, adaptive equipment training, balance training, bed mobility training, strengthening, transfer training  Therapy Frequency: daily  Plan of Care Review  Plan Of Care Reviewed With: patient  Outcome Summary/Follow up Plan: OT initial eval completed. Pt demonstrates an increased need for assist 2/2 weakness.  Recommend pt to receive skilled OT to address ADLs, functional mobility, safety and education to return to PLOF.  Recommend pt to go home with assist and home health OT at time of DC.          OT Goals       11/11/17 0905          Bed  Mobility OT LTG    Bed Mobility OT LTG, Date Established 11/11/17  -      Bed Mobility OT LTG, Time to Achieve 2 wks  -      Bed Mobility OT LTG, Activity Type supine to sit/sit to supine   log roll  -      Bed Mobility OT LTG, [place] Level supervision required  -      Bed Mobility OT LTG, Outcome goal ongoing  -      Transfer Training OT LTG    Transfer Training OT LTG, Date Established 11/11/17  -      Transfer Training OT LTG, Time to Achieve 2 wks  -      Transfer Training OT LTG, Activity Type sit to stand/stand to sit;toilet  -      Transfer Training OT LTG, [place] Level supervision required  -      Transfer Training OT LTG, Assist Device walker, rolling  -      Transfer Training OT LTG, Outcome goal ongoing  -      Patient Education OT LTG    Patient Education OT LTG, Date Established 11/11/17  -      Patient Education OT LTG, Time to Achieve 2 wks  -      Patient Education OT LTG, Education Type precautions per surgeon;posture/body mechanics;home safety;adaptive equipment mgmt  -      Patient Education OT LTG, Education Understanding verbalizes understanding;demonstrates adequately  -      Patient Education OT LTG Outcome goal ongoing  -      LB Dressing OT LTG    LB Dressing Goal OT LTG, Date Established 11/11/17  -      LB Dressing Goal OT LTG, Time to Achieve 2 wks  -      LB Dressing Goal OT LTG, [place] Level supervision required  -      LB Dressing Goal OT LTG, Adaptive Equipment reacher;shoe horn, long handled;sock-aid  -      LB Dressing Goal OT LTG, Outcome goal ongoing  -        User Key  (r) = Recorded By, (t) = Taken By, (c) = Cosigned By    Initials Name Provider Type    DAVI Bowers OT Occupational Therapist                Outcome Measures       11/11/17 0816 11/11/17 0812 11/10/17 1620    How much help from another person do you currently need...    Turning from your back to your side while in flat bed without using bedrails?  4   -SJ 3  -MJ    Moving from lying on back to sitting on the side of a flat bed without bedrails?  3  -SJ 2  -MJ    Moving to and from a bed to a chair (including a wheelchair)?  3  -SJ 3  -MJ    Standing up from a chair using your arms (e.g., wheelchair, bedside chair)?  3  -SJ 3  -MJ    Climbing 3-5 steps with a railing?  3  -SJ 2  -MJ    To walk in hospital room?  3  -SJ 3  -MJ    AM-PAC 6 Clicks Score  19  -SJ 16  -MJ    How much help from another is currently needed...    Putting on and taking off regular lower body clothing? 2  -MC      Bathing (including washing, rinsing, and drying) 2  -MC      Toileting (which includes using toilet bed pan or urinal) 2  -MC      Putting on and taking off regular upper body clothing 3  -MC      Taking care of personal grooming (such as brushing teeth) 3  -MC      Eating meals 3  -MC      Score 15  -MC      Functional Assessment    Outcome Measure Options AM-PAC 6 Clicks Daily Activity (OT)  -MC AM-PAC 6 Clicks Basic Mobility (PT)  -SJ AM-PAC 6 Clicks Basic Mobility (PT)  -      User Key  (r) = Recorded By, (t) = Taken By, (c) = Cosigned By    Initials Name Provider Type    BERE Gibson, PT Physical Therapist    DAVI Bowers OT Occupational Therapist    CLAY Clements, PT Physical Therapist          Time Calculation:   OT Start Time: 0816    Therapy Charges for Today     Code Description Service Date Service Provider Modifiers Qty    [id number]  OT EVAL LOW COMPLEXITY 3 11/11/2017 Suzanne Bowers OT GO 1    [id number]  OT THERAPEUTIC ACT EA 15 MIN 11/11/2017 Suzanne Bowers OT GO 1               Suzanne Bowers OT  11/11/2017

## 2017-11-11 NOTE — PLAN OF CARE
Problem: Patient Care Overview (Adult)  Goal: Plan of Care Review  Outcome: Ongoing (interventions implemented as appropriate)    11/11/17 0232   Coping/Psychosocial Response Interventions   Plan Of Care Reviewed With patient   Outcome Evaluation   Outcome Summary/Follow up Plan rested well this shift with pain meds given. lozano intact and will be removed at 0600. no c/o numbness or tingling. vss and noted   Patient Care Overview   Progress improving       Goal: Adult Individualization and Mutuality  Outcome: Ongoing (interventions implemented as appropriate)  Goal: Discharge Needs Assessment  Outcome: Ongoing (interventions implemented as appropriate)    Problem: Perioperative Period (Adult)  Goal: Signs and Symptoms of Listed Potential Problems Will be Absent or Manageable (Perioperative Period)  Outcome: Ongoing (interventions implemented as appropriate)

## 2017-11-11 NOTE — THERAPY TREATMENT NOTE
Acute Care - Physical Therapy Treatment Note  [facility]     Patient Name: Akua Torres  : 1945  MRN: [id number]  Today's Date: 2017  Onset of Illness/Injury or Date of Surgery Date: 11/10/17  Date of Referral to PT: 11/10/17  Referring Physician: MD Donovan     Admit Date: 11/10/2017    Visit Dx:    ICD-10-CM ICD-9-CM   1. Impaired functional mobility, balance, gait, and endurance Z74.09 V49.89   2. Impaired mobility and ADLs Z74.09 799.89     Patient Active Problem List   Diagnosis   • Degenerative disc disease, lumbar   • Lumbar stenosis with neurogenic claudication   • History of lumbar fusion   • Spondylosis of lumbar region without myelopathy or radiculopathy   • Mild obesity   • Physical deconditioning   • Idiopathic gout   • Anxiety and depression   • Greater trochanteric bursitis of both hips   • Status post total bilateral knee replacement   • Back pain   • HTN (hypertension)   • HLD (hyperlipidemia)   • Prediabetes   • S/P lumbar spinal fusion   • Renal insufficiency               Adult Rehabilitation Note       17 0812          Rehab Assessment/Intervention    Discipline physical therapist  -      Document Type therapy note (daily note)  -SJ      Subjective Information no complaints;agree to therapy  -SJ      Patient Effort, Rehab Treatment good  -SJ      Symptoms Noted During/After Treatment fatigue  -SJ      Precautions/Limitations fall precautions;spinal precautions  -SJ      Recorded by [SJ] Maribel Gibson PT      Vital Signs    Pre Systolic BP Rehab 122  -SJ      Pre Treatment Diastolic BP 75  -SJ      Recorded by [SJ] Maribel Gibson PT      Pain Assessment    Pain Assessment 0-10  -SJ      Pain Score 0  -SJ      Post Pain Score 3  -SJ      Pain Type Acute pain;Surgical pain  -SJ      Pain Location Back  -SJ      Pain Intervention(s) Repositioned;Ambulation/increased activity  -SJ      Recorded by [SJ] Maribel Gibson PT      Cognitive Assessment/Intervention     Current Cognitive/Communication Assessment functional  -SJ      Orientation Status oriented x 4  -SJ      Follows Commands/Answers Questions 100% of the time;able to follow single-step instructions;needs cueing;needs increased time;needs repetition  -SJ      Personal Safety mild impairment  -SJ      Personal Safety Interventions fall prevention program maintained;gait belt;muscle strengthening facilitated;nonskid shoes/slippers when out of bed  -SJ      Recorded by [SJ] Maribel Gibson, PT      Bed Mobility, Assessment/Treatment    Bed Mobility, Assistive Device bed rails  -SJ      Bed Mobility, Roll Left, Lane supervision required  -SJ      Bed Mob, Supine to Sit, Lane supervision required  -SJ      Bed Mob, Sit to Supine, Lane minimum assist (75% patient effort);verbal cues required  -SJ      Bed Mobility, Safety Issues decreased use of arms for pushing/pulling;decreased use of legs for bridging/pushing  -SJ      Bed Mobility, Impairments strength decreased;pain  -SJ      Bed Mobility, Comment CUES FOR LOG ROLLING AND BACK PRECAUTIONS  -SJ      Recorded by [SJ] Maribel Gibson PT      Transfer Assessment/Treatment    Transfers, Sit-Stand Lane contact guard assist;verbal cues required  -SJ      Transfers, Stand-Sit Lane contact guard assist;verbal cues required  -SJ      Transfers, Sit-Stand-Sit, Assist Device rolling walker  -SJ      Transfer, Safety Issues sequencing ability decreased;step length decreased;weight-shifting ability decreased  -SJ      Transfer, Impairments strength decreased;pain  -SJ      Transfer, Comment cues for safety and back precautions  -SJ      Recorded by [SJ] Maribel Gibson, PT      Gait Assessment/Treatment    Gait, Lane Level contact guard assist;verbal cues required  -SJ      Gait, Assistive Device rolling walker  -SJ      Gait, Distance (Feet) 150  -SJ      Gait, Gait Pattern Analysis swing-through gait  -SJ      Gait, Gait Deviations  nixon decreased;decreased heel strike;double stance time increased  -SJ      Gait, Safety Issues step length decreased  -SJ      Gait, Impairments strength decreased;pain  -SJ      Gait, Comment cues for safety, as pt frequently lets go of RW. Slow gait speed.  -SJ      Recorded by [SJ] Maribel Gibson PT      Therapy Exercises    Bilateral Lower Extremities AROM:   post op day 2 protocol  -SJ      Recorded by [SJ] Maribel Gibson PT      Positioning and Restraints    Pre-Treatment Position in bed  -SJ      Post Treatment Position bed  -SJ      In Bed fowlers;call light within reach;encouraged to call for assist;with nsg;with other staff  -SJ      Recorded by [SJ] Maribel Gibson, PT        User Key  (r) = Recorded By, (t) = Taken By, (c) = Cosigned By    Initials Name Effective Dates    BERE Gibson, PT 06/19/15 -                 IP PT Goals       11/10/17 1735          Bed Mobility PT LTG    Bed Mobility PT LTG, Date Established 11/10/17  -MJ      Bed Mobility PT LTG, Time to Achieve 5 days  -MJ      Bed Mobility PT LTG, Activity Type supine to sit/sit to supine   log roll  -MJ      Bed Mobility PT LTG, [place] Level contact guard assist  -MJ      Bed Mobility PT LTG, Date Goal Reviewed 11/10/17  -MJ      Bed Mobility PT LTG, Outcome goal ongoing  -MJ      Transfer Training PT LTG    Transfer Training PT LTG, Date Established 11/10/17  -MJ      Transfer Training PT LTG, Time to Achieve 5 days  -MJ      Transfer Training PT LTG, Activity Type sit to stand/stand to sit  -MJ      Transfer Training PT LTG, [place] Level conditional independence  -MJ      Transfer Training PT LTG, Assist Device walker, rolling  -MJ      Transfer Training PT  LTG, Date Goal Reviewed 11/10/17  -MJ      Transfer Training PT LTG, Outcome goal ongoing  -MJ      Gait Training PT LTG    Gait Training Goal PT LTG, Date Established 11/10/17  -MJ      Gait Training Goal PT LTG, Time to Achieve 5 days  -MJ      Gait Training  Goal PT LTG, [place] Level conditional independence  -MJ      Gait Training Goal PT LTG, Assist Device walker, rolling  -MJ      Gait Training Goal PT LTG, Distance to Achieve 500 feet  -MJ      Gait Training Goal PT LTG, Date Goal Reviewed 11/10/17  -MJ      Gait Training Goal PT LTG, Outcome goal ongoing  -MJ        User Key  (r) = Recorded By, (t) = Taken By, (c) = Cosigned By    Initials Name Provider Type    CLAY Clements PT Physical Therapist          Physical Therapy Education     Title: PT OT SLP Therapies (Active)     Topic: Physical Therapy (Active)     Point: Mobility training (Active)    Learning Progress Summary    Learner Readiness Method Response Comment Documented by Status   Patient Acceptance E,D NR   11/11/17 0904 Active    Acceptance TB DU  TP 11/10/17 1739 Done    Acceptance E,D SAVANNAH,NR Reviewed back precautions, log roll, benefits of mobility  11/10/17 1735 Done   Significant Other Acceptance E,D SAVANNAHNR Reviewed back precautions, log roll, benefits of mobility  11/10/17 1735 Done               Point: Home exercise program (Active)    Learning Progress Summary    Learner Readiness Method Response Comment Documented by Status   Patient Acceptance E,D NR   11/11/17 0904 Active    Acceptance TB DU  TP 11/10/17 1739 Done               Point: Body mechanics (Active)    Learning Progress Summary    Learner Readiness Method Response Comment Documented by Status   Patient Acceptance E,D NR   11/11/17 0904 Active    Acceptance TB DU  TP 11/10/17 1739 Done    Acceptance ED SAVANNAHNR Reviewed back precautions, log roll, benefits of mobility  11/10/17 1735 Done   Significant Other Acceptance EARPITANR Reviewed back precautions, log roll, benefits of mobility  11/10/17 1735 Done               Point: Precautions (Active)    Learning Progress Summary    Learner Readiness Method Response Comment Documented by Status   Patient Acceptance E,D NR   11/11/17 0904 Active    Acceptance TB DU  TP  11/10/17 1739 Done    Acceptance E,AUBREY MALIK Reviewed back precautions, log roll, benefits of mobility  11/10/17 1735 Done   Significant Other Acceptance E,D AUBREY NUÑEZ Reviewed back precautions, log roll, benefits of mobility  11/10/17 1735 Done                      User Key     Initials Effective Dates Name Provider Type Discipline     06/19/15 -  Maribel Gibson, PT Physical Therapist PT     03/14/16 -  Henrique Clements, PT Physical Therapist PT     07/10/17 -  Priscila Ward RN Registered Nurse Nurse                    PT Recommendation and Plan  Anticipated Discharge Disposition: home with assist, home with home health  Demonstrates Need for Referral to Another Service: home health care  Planned Therapy Interventions: balance training, bed mobility training, gait training, home exercise program, patient/family education, strengthening, transfer training  PT Frequency: daily  Plan of Care Review  Plan Of Care Reviewed With: patient  Progress: progress toward functional goals as expected  Outcome Summary/Follow up Plan: Pt amb 150ft with RW with CGA, cues for safety. Pt has good pain control, increased independence with mobility today.           Outcome Measures       11/11/17 0812 11/10/17 1620       How much help from another person do you currently need...    Turning from your back to your side while in flat bed without using bedrails? 4  -SJ 3  -MJ     Moving from lying on back to sitting on the side of a flat bed without bedrails? 3  -SJ 2  -MJ     Moving to and from a bed to a chair (including a wheelchair)? 3  -SJ 3  -MJ     Standing up from a chair using your arms (e.g., wheelchair, bedside chair)? 3  -SJ 3  -MJ     Climbing 3-5 steps with a railing? 3  -SJ 2  -MJ     To walk in hospital room? 3  -SJ 3  -MJ     AM-PAC 6 Clicks Score 19  -SJ 16  -MJ     Functional Assessment    Outcome Measure Options AM-PAC 6 Clicks Basic Mobility (PT)  -SJ AM-PAC 6 Clicks Basic Mobility (PT)  -MJ       User Key  (r) =  Recorded By, (t) = Taken By, (c) = Cosigned By    Initials Name Provider Type    SJ Maribel Gibson, PT Physical Therapist    CLAY Clements PT Physical Therapist           Time Calculation:         PT Charges       11/11/17 0904          Time Calculation    Start Time 0812  -      PT Received On 11/11/17  -      PT Goal Re-Cert Due Date 11/20/17  -      Time Calculation- PT    Total Timed Code Minutes- PT 39 minute(s)  -        User Key  (r) = Recorded By, (t) = Taken By, (c) = Cosigned By    Initials Name Provider Type    BERE Gibson, PT Physical Therapist          Therapy Charges for Today     Code Description Service Date Service Provider Modifiers Qty    30570727923 HC GAIT TRAINING EA 15 MIN 11/11/2017 Maribel Gibson, PT GP 2    70402538487 HC PT THER PROC EA 15 MIN 11/11/2017 Maribel Gibson PT GP 1          PT G-Codes  Outcome Measure Options: AM-PAC 6 Clicks Basic Mobility (PT)    Maribel Gibson PT  11/11/2017

## 2017-11-12 LAB
GLUCOSE BLDC GLUCOMTR-MCNC: 113 MG/DL (ref 70–130)
GLUCOSE BLDC GLUCOMTR-MCNC: 138 MG/DL (ref 70–130)
GLUCOSE BLDC GLUCOMTR-MCNC: 149 MG/DL (ref 70–130)
GLUCOSE BLDC GLUCOMTR-MCNC: 168 MG/DL (ref 70–130)

## 2017-11-12 PROCEDURE — 97110 THERAPEUTIC EXERCISES: CPT

## 2017-11-12 PROCEDURE — 97116 GAIT TRAINING THERAPY: CPT

## 2017-11-12 PROCEDURE — 82962 GLUCOSE BLOOD TEST: CPT

## 2017-11-12 PROCEDURE — 25010000002 HYDROMORPHONE PER 4 MG: Performed by: ORTHOPAEDIC SURGERY

## 2017-11-12 RX ADMIN — HYDROMORPHONE HYDROCHLORIDE 2 MG: 2 INJECTION, SOLUTION INTRAMUSCULAR; INTRAVENOUS; SUBCUTANEOUS at 22:09

## 2017-11-12 RX ADMIN — FAMOTIDINE 20 MG: 20 TABLET, FILM COATED ORAL at 10:16

## 2017-11-12 RX ADMIN — GABAPENTIN 300 MG: 300 CAPSULE ORAL at 14:33

## 2017-11-12 RX ADMIN — FAMOTIDINE 20 MG: 20 TABLET, FILM COATED ORAL at 22:09

## 2017-11-12 RX ADMIN — OXYCODONE HYDROCHLORIDE AND ACETAMINOPHEN 1 TABLET: 7.5; 325 TABLET ORAL at 22:09

## 2017-11-12 RX ADMIN — GABAPENTIN 300 MG: 300 CAPSULE ORAL at 05:35

## 2017-11-12 RX ADMIN — ALLOPURINOL 300 MG: 300 TABLET ORAL at 10:15

## 2017-11-12 RX ADMIN — GABAPENTIN 300 MG: 300 CAPSULE ORAL at 22:09

## 2017-11-12 RX ADMIN — LISINOPRIL 20 MG: 20 TABLET ORAL at 10:16

## 2017-11-12 RX ADMIN — OXYCODONE HYDROCHLORIDE AND ACETAMINOPHEN 1 TABLET: 7.5; 325 TABLET ORAL at 03:36

## 2017-11-12 RX ADMIN — INSULIN LISPRO 2 UNITS: 100 INJECTION, SOLUTION INTRAVENOUS; SUBCUTANEOUS at 12:54

## 2017-11-12 RX ADMIN — ATENOLOL 100 MG: 50 TABLET ORAL at 10:16

## 2017-11-12 RX ADMIN — ZOLPIDEM TARTRATE 5 MG: 5 TABLET, FILM COATED ORAL at 22:09

## 2017-11-12 RX ADMIN — OXYCODONE HYDROCHLORIDE AND ACETAMINOPHEN 1 TABLET: 7.5; 325 TABLET ORAL at 10:16

## 2017-11-12 RX ADMIN — OXYCODONE HYDROCHLORIDE AND ACETAMINOPHEN 1 TABLET: 7.5; 325 TABLET ORAL at 14:31

## 2017-11-12 NOTE — PLAN OF CARE
Problem: Patient Care Overview (Adult)  Goal: Plan of Care Review  Outcome: Ongoing (interventions implemented as appropriate)    11/12/17 0940   Coping/Psychosocial Response Interventions   Plan Of Care Reviewed With patient;spouse   Outcome Evaluation   Outcome Summary/Follow up Plan Patient increased gait distance to 210 feet with RW this AM, ambulated with back brace on. Still requires cues for back precautions with t/f and bed mobility. Min assist to get in and out of bed today. Plan is d/c home tomorrow. Will continue to progress as able.    Patient Care Overview   Progress progress toward functional goals as expected         Problem: Inpatient Physical Therapy  Goal: Bed Mobility Goal LTG- PT  Outcome: Ongoing (interventions implemented as appropriate)    11/12/17 0940   Bed Mobility PT LTG   Bed Mobility PT LTG, Date Established 11/10/17   Bed Mobility PT LTG, Time to Achieve 5 days   Bed Mobility PT LTG, Activity Type supine to sit/sit to supine   Bed Mobility PT LTG, [place] Level contact guard assist   Bed Mobility PT LTG, Date Goal Reviewed 11/12/17   Bed Mobility PT LTG, Outcome goal ongoing       Goal: Transfer Training Goal 1 LTG- PT  Outcome: Ongoing (interventions implemented as appropriate)    11/12/17 0940   Transfer Training PT LTG   Transfer Training PT LTG, Date Established 11/10/17   Transfer Training PT LTG, Time to Achieve 5 days   Transfer Training PT LTG, Activity Type sit to stand/stand to sit   Transfer Training PT LTG, [place] Level conditional independence   Transfer Training PT LTG, Assist Device walker, rolling   Transfer Training PT LTG, Date Goal Reviewed 11/12/17   Transfer Training PT LTG, Outcome goal ongoing       Goal: Gait Training Goal LTG- PT  Outcome: Ongoing (interventions implemented as appropriate)    11/12/17 0940   Gait Training PT LTG   Gait Training Goal PT LTG, Date Established 11/10/17   Gait Training Goal PT LTG, Time to Achieve 5 days   Gait Training  Goal PT LTG, [place] Level conditional independence   Gait Training Goal PT LTG, Assist Device walker, rolling   Gait Training Goal PT LTG, Distance to Achieve 500 feet   Gait Training Goal PT LTG, Date Goal Reviewed 11/12/17   Gait Training Goal PT LTG, Outcome goal ongoing

## 2017-11-12 NOTE — PROGRESS NOTES
"Ortho Spine Progress Note     LOS: 2 days   Patient Care Team:  Nanci Orr MD as PCP - General (Internal Medicine)  Leon Hein MD as Consulting Physician (Neurosurgery)  Corinne E Perkins, SUSAN as Physical Therapist (Physical Therapy)  Cricket Nettles MD as Consulting Physician (Pain Medicine)    Subjective: Doing well.  Eating okay.  Up walking in hallways.    Objective:   Vital Signs:  /73 (BP Location: Left arm, Patient Position: Lying)  Pulse 91  Temp 98.6 °F (37 °C) (Oral)   Resp 16  Ht 64\" (162.6 cm)  Wt 194 lb (88 kg)  SpO2 95%  BMI 33.3 kg/m2    Labs:  Lab Results (last 24 hours)     Procedure Component Value Units Date/Time    POC Glucose Fingerstick [[id number]]  (Abnormal) Collected:  11/11/17 1133    Specimen:  Blood Updated:  11/11/17 1136     Glucose 171 (H) mg/dL     Narrative:       Verify with Lab Meter: [id number] : [id number] Bryan Cheryl    POC Glucose Fingerstick [[id number]]  (Abnormal) Collected:  11/11/17 1618    Specimen:  Blood Updated:  11/11/17 1623     Glucose 144 (H) mg/dL     Narrative:       Verify with Lab Meter: [id number] : [id number] Bryan Cheryl    POC Glucose Fingerstick [[id number]]  (Abnormal) Collected:  11/11/17 2001    Specimen:  Blood Updated:  11/11/17 2003     Glucose 140 (H) mg/dL     Narrative:       Meter: [id number] : [id number] Ariel Connell    POC Glucose Fingerstick [[id number]]  (Abnormal) Collected:  11/12/17 0721    Specimen:  Blood Updated:  11/12/17 0722     Glucose 149 (H) mg/dL     Narrative:       Meter: [id number] : [id number] Garland Anderson          Patient is up walking in hallways.    Assessment/Plan: Anticipate home Monday.  If okay with Jennifer and Dr. DICK  There is a prescription already written for her.    Gopi Man MD  11/12/17  10:08 AM      "

## 2017-11-12 NOTE — THERAPY TREATMENT NOTE
Acute Care - Physical Therapy Treatment Note  [facility]     Patient Name: Akua Torres  : 1945  MRN: [id number]  Today's Date: 2017  Onset of Illness/Injury or Date of Surgery Date: 11/10/17  Date of Referral to PT: 11/10/17  Referring Physician: MD Donovan     Admit Date: 11/10/2017    Visit Dx:    ICD-10-CM ICD-9-CM   1. Impaired functional mobility, balance, gait, and endurance Z74.09 V49.89   2. Impaired mobility and ADLs Z74.09 799.89   3. S/P lumbar spinal fusion Z98.1 V45.4     Patient Active Problem List   Diagnosis   • Degenerative disc disease, lumbar   • Lumbar stenosis with neurogenic claudication   • History of lumbar fusion   • Spondylosis of lumbar region without myelopathy or radiculopathy   • Mild obesity   • Physical deconditioning   • Idiopathic gout   • Anxiety and depression   • Greater trochanteric bursitis of both hips   • Status post total bilateral knee replacement   • Back pain   • HTN (hypertension)   • HLD (hyperlipidemia)   • Prediabetes   • S/P lumbar spinal fusion   • Renal insufficiency               Adult Rehabilitation Note       17 0940 17 0812       Rehab Assessment/Intervention    Discipline physical therapist  -LR physical therapist  -SJ     Document Type therapy note (daily note)  -LR therapy note (daily note)  -SJ     Subjective Information agree to therapy;complains of;pain  -LR no complaints;agree to therapy  -SJ     Patient Effort, Rehab Treatment good  -LR good  -SJ     Symptoms Noted During/After Treatment none  -LR fatigue  -SJ     Precautions/Limitations brace on when up;fall precautions;spinal precautions;other (see comments)   hemovac, back brace when up walking  -LR fall precautions;spinal precautions  -SJ     Specific Treatment Considerations  brought in back brace from [employer] today.   -LR      Recorded by [LR] Teresa Blackwell, PT [SJ] Maribel Gibson PT     Vital Signs    Pre Systolic BP Rehab  122  -SJ      Pre Treatment Diastolic BP  75  -SJ     Recorded by  [SJ] Maribel Gibson PT     Pain Assessment    Pain Assessment 0-10  -LR 0-10  -SJ     Pain Score 3  -LR 0  -SJ     Post Pain Score 3  -LR 3  -SJ     Pain Type Acute pain  -LR Acute pain;Surgical pain  -SJ     Pain Location Back  -LR Back  -SJ     Pain Orientation Lower  -LR      Pain Intervention(s) Repositioned;Ambulation/increased activity  -LR Repositioned;Ambulation/increased activity  -SJ     Recorded by [LR] Teresa Blackwell, PT [SJ] Maribel Gibson PT     Cognitive Assessment/Intervention    Current Cognitive/Communication Assessment functional  -LR functional  -SJ     Orientation Status oriented x 4;required verbal cueing (specifiy in comments)  -LR oriented x 4  -SJ     Follows Commands/Answers Questions 100% of the time;able to follow single-step instructions;needs cueing;needs repetition  -% of the time;able to follow single-step instructions;needs cueing;needs increased time;needs repetition  -SJ     Personal Safety mild impairment;at risk behaviors demonstrated;decreased awareness, need for safety  -LR mild impairment  -SJ     Personal Safety Interventions  fall prevention program maintained;gait belt;muscle strengthening facilitated;nonskid shoes/slippers when out of bed  -SJ     Recorded by [LR] Teresa Blackwell, PT [SJ] Maribel Gibson, PT     Bed Mobility, Assessment/Treatment    Bed Mobility, Assistive Device head of bed elevated;bed rails  -LR bed rails  -SJ     Bed Mobility, Roll Left, [place]  supervision required  -SJ     Bed Mob, Supine to Sit, [place] verbal cues required;minimum assist (75% patient effort)  -LR supervision required  -SJ     Bed Mob, Sit to Supine, [place] verbal cues required;minimum assist (75% patient effort)  -LR minimum assist (75% patient effort);verbal cues required  -SJ     Bed Mobility, Safety Issues impaired trunk control for bed mobility  -LR decreased use of arms for  pushing/pulling;decreased use of legs for bridging/pushing  -SJ     Bed Mobility, Impairments pain  -LR strength decreased;pain  -SJ     Bed Mobility, Comment Verbal cues for correct log roll technique. Cues to keep hips and shoulders aligned throughout. Min assist with trunk to sit up and min assist to lift LEs back up into bed.   -LR CUES FOR LOG ROLLING AND BACK PRECAUTIONS  -SJ     Recorded by [LR] Teresa Blackwell, PT [SJ] Maribel Gibson, PT     Transfer Assessment/Treatment    Transfers, Sit-Stand [place] verbal cues required;contact guard assist  -LR contact guard assist;verbal cues required  -SJ     Transfers, Stand-Sit [place] verbal cues required;contact guard assist  -LR contact guard assist;verbal cues required  -SJ     Transfers, Sit-Stand-Sit, Assist Device rolling walker  -LR rolling walker  -SJ     Toilet Transfer, [place] verbal cues required;contact guard assist  -LR      Toilet Transfer, Assistive Device rolling walker  -LR      Transfer, Safety Issues sequencing ability decreased;step length decreased;weight-shifting ability decreased  -LR sequencing ability decreased;step length decreased;weight-shifting ability decreased  -SJ     Transfer, Impairments strength decreased;pain  -LR strength decreased;pain  -SJ     Transfer, Comment Verbal cues for correct hand placement with t/f and to limit trunk flexion during t/f. Patient initially leaning far forward when rising to stand. Assisted pt to and from bathroom. Cues ofr use of grab bar.   -LR cues for safety and back precautions  -SJ     Recorded by [LR] Teresa Blackwell, PT [SJ] Maribel Gibson, PT     Gait Assessment/Treatment    Gait, [place] Level verbal cues required;contact guard assist  -LR contact guard assist;verbal cues required  -SJ     Gait, Assistive Device rolling walker  -LR rolling walker  -SJ     Gait, Distance (Feet) 210  -  -SJ     Gait, Gait Pattern Analysis swing-through gait  -LR  swing-through gait  -SJ     Gait, Gait Deviations bilateral:;step length decreased;toe-to-floor clearance decreased;weight-shifting ability decreased  -LR nixon decreased;decreased heel strike;double stance time increased  -SJ     Gait, Safety Issues step length decreased;weight-shifting ability decreased  -LR step length decreased  -SJ     Gait, Impairments strength decreased;pain  -LR strength decreased;pain  -SJ     Gait, Comment Patient ambulated with step through gait pattern at slow pace. Verbal cues for upright posture, decreased UE weight bearing, and increased LE weight bearing. Improved with cues for correction. Cues to stop walking if she has to let go of RW with one hand. Gait limited by fatigue.   -LR cues for safety, as pt frequently lets go of RW. Slow gait speed.  -SJ     Recorded by [LR] Teresa Blackwell, PT [SJ] Maribel Gibson PT     Therapy Exercises    Bilateral Lower Extremities AROM:;10 reps;supine;ankle pumps/circles;glut sets;heel slides;hip ER;hip IR;quad sets;other reps   ab sets; cues for technique  -LR AROM:   post op day 2 protocol  -SJ     Recorded by [LR] Teresa Blackwell, PT [SJ] Maribel Gibson PT     Positioning and Restraints    Pre-Treatment Position in bed  -LR in bed  -SJ     Post Treatment Position bed  -LR bed  -SJ     In Bed notified nsg;supine;call light within reach;encouraged to call for assist;exit alarm on;with nsg;side rails up x2  -LR fowlers;call light within reach;encouraged to call for assist;with nsg;with other staff  -SJ     Recorded by [LR] Teresa Blackwell, PT [SJ] Maribel Gibson PT       User Key  (r) = Recorded By, (t) = Taken By, (c) = Cosigned By    Initials Name Effective Dates    BERE Gibson, PT 06/19/15 -     LR Teresa Blackwell PT 06/19/15 -                 IP PT Goals       11/12/17 0940 11/10/17 9708       Bed Mobility PT LTG    Bed Mobility PT LTG, Date Established 11/10/17  -LR 11/10/17  -MJ     Bed Mobility PT LTG,  Time to Achieve 5 days  -LR 5 days  -MJ     Bed Mobility PT LTG, Activity Type supine to sit/sit to supine  -LR supine to sit/sit to supine   log roll  -MJ     Bed Mobility PT LTG, [place] Level contact guard assist  -LR contact guard assist  -MJ     Bed Mobility PT LTG, Date Goal Reviewed 11/12/17  -LR 11/10/17  -MJ     Bed Mobility PT LTG, Outcome goal ongoing  -LR goal ongoing  -MJ     Transfer Training PT LTG    Transfer Training PT LTG, Date Established 11/10/17  -LR 11/10/17  -MJ     Transfer Training PT LTG, Time to Achieve 5 days  -LR 5 days  -MJ     Transfer Training PT LTG, Activity Type sit to stand/stand to sit  -LR sit to stand/stand to sit  -MJ     Transfer Training PT LTG, [place] Level conditional independence  -LR conditional independence  -MJ     Transfer Training PT LTG, Assist Device walker, rolling  -LR walker, rolling  -MJ     Transfer Training PT  LTG, Date Goal Reviewed 11/12/17  -LR 11/10/17  -MJ     Transfer Training PT LTG, Outcome goal ongoing  -LR goal ongoing  -MJ     Gait Training PT LTG    Gait Training Goal PT LTG, Date Established 11/10/17  -LR 11/10/17  -MJ     Gait Training Goal PT LTG, Time to Achieve 5 days  -LR 5 days  -MJ     Gait Training Goal PT LTG, [place] Level conditional independence  -LR conditional independence  -MJ     Gait Training Goal PT LTG, Assist Device walker, rolling  -LR walker, rolling  -MJ     Gait Training Goal PT LTG, Distance to Achieve 500 feet  - feet  -MJ     Gait Training Goal PT LTG, Date Goal Reviewed 11/12/17  -LR 11/10/17  -MJ     Gait Training Goal PT LTG, Outcome goal ongoing  -LR goal ongoing  -MJ       User Key  (r) = Recorded By, (t) = Taken By, (c) = Cosigned By    Initials Name Provider Type    LR Teresa Blackwell, PT Physical Therapist    CLAY Clements, PT Physical Therapist          Physical Therapy Education     Title: PT OT SLP Therapies (Active)     Topic: Physical Therapy (Done)     Point: Mobility  training (Done)    Learning Progress Summary    Learner Readiness Method Response Comment Documented by Status   Patient Acceptance E,D VU,NR Educated on donning/doffing back brace. Educated on safety with mobility and back precautions.  11/12/17 1026 Done    Acceptance E,D NR   11/11/17 0904 Active    Acceptance TB DU  TP 11/10/17 1739 Done    Acceptance E,D VU,NR Reviewed back precautions, log roll, benefits of mobility MJ 11/10/17 1735 Done   Significant Other Acceptance E,D VU,NR Educated on donning/doffing back brace. Educated on safety with mobility and back precautions.  11/12/17 1026 Done    Acceptance E,D VU,NR Reviewed back precautions, log roll, benefits of mobility  11/10/17 1735 Done               Point: Home exercise program (Done)    Learning Progress Summary    Learner Readiness Method Response Comment Documented by Status   Patient Acceptance E,D VU,NR Educated on donning/doffing back brace. Educated on safety with mobility and back precautions.  11/12/17 1026 Done    Acceptance E,D NR   11/11/17 0904 Active    Acceptance TB DU   11/10/17 1739 Done   Significant Other Acceptance E,D VU,NR Educated on donning/doffing back brace. Educated on safety with mobility and back precautions.  11/12/17 1026 Done               Point: Body mechanics (Done)    Learning Progress Summary    Learner Readiness Method Response Comment Documented by Status   Patient Acceptance E,D VU,NR Educated on donning/doffing back brace. Educated on safety with mobility and back precautions.  11/12/17 1026 Done    Acceptance E,D NR   11/11/17 0904 Active    Acceptance TB DU   11/10/17 1739 Done    Acceptance E,D VU,NR Reviewed back precautions, log roll, benefits of mobility  11/10/17 1735 Done   Significant Other Acceptance E,D VU,NR Educated on donning/doffing back brace. Educated on safety with mobility and back precautions.  11/12/17 1026 Done    Acceptance E,D VU,NR Reviewed back precautions, log  roll, benefits of mobility  11/10/17 1735 Done               Point: Precautions (Done)    Learning Progress Summary    Learner Readiness Method Response Comment Documented by Status   Patient Acceptance ARPITA MCALLISTER NR Educated on donning/doffing back brace. Educated on safety with mobility and back precautions.  11/12/17 1026 Done    Acceptance ARPITA MCALLISTER  SJ 11/11/17 0904 Active    Acceptance TB DU  TP 11/10/17 1739 Done    Acceptance ARPITA MCALLISTER NR Reviewed back precautions, log roll, benefits of mobility  11/10/17 1735 Done   Significant Other Acceptance ARPITA MCALLISTER NR Educated on donning/doffing back brace. Educated on safety with mobility and back precautions.  11/12/17 1026 Done    Acceptance ARPITA MCALLISTER NR Reviewed back precautions, log roll, benefits of mobility  11/10/17 1735 Done                      User Key     Initials Effective Dates Name Provider Type Discipline     06/19/15 -  Maribel Gibson, PT Physical Therapist PT     06/19/15 -  Teresa Blackwell, PT Physical Therapist PT     03/14/16 -  Henrique Clements, PT Physical Therapist PT     07/10/17 -  Priscila Ward RN Registered Nurse Nurse                    PT Recommendation and Plan  Anticipated Discharge Disposition: home with assist, home with home health  Demonstrates Need for Referral to Another Service: home health care  Planned Therapy Interventions: balance training, bed mobility training, gait training, home exercise program, patient/family education, strengthening, transfer training  PT Frequency: daily  Plan of Care Review  Plan Of Care Reviewed With: patient, spouse  Progress: progress toward functional goals as expected  Outcome Summary/Follow up Plan: Patient increased gait distance to 210 feet with RW this AM, ambulated with back brace on. Still requires cues for back precautions with t/f and bed mobility. Min assist to get in and out of bed today. Plan is d/c home tomorrow.  Will continue to progress as able.           Outcome Measures        11/12/17 0940 11/11/17 0816 11/11/17 0812    How much help from another person do you currently need...    Turning from your back to your side while in flat bed without using bedrails? 4  -LR  4  -SJ    Moving from lying on back to sitting on the side of a flat bed without bedrails? 3  -LR  3  -SJ    Moving to and from a bed to a chair (including a wheelchair)? 3  -LR  3  -SJ    Standing up from a chair using your arms (e.g., wheelchair, bedside chair)? 3  -LR  3  -SJ    Climbing 3-5 steps with a railing? 3  -LR  3  -SJ    To walk in hospital room? 3  -LR  3  -SJ    AM-PAC 6 Clicks Score 19  -LR  19  -SJ    How much help from another is currently needed...    Putting on and taking off regular lower body clothing?  2  -MC     Bathing (including washing, rinsing, and drying)  2  -MC     Toileting (which includes using toilet bed pan or urinal)  2  -MC     Putting on and taking off regular upper body clothing  3  -MC     Taking care of personal grooming (such as brushing teeth)  3  -MC     Eating meals  3  -MC     Score  15  -MC     Functional Assessment    Outcome Measure Options AM-PAC 6 Clicks Basic Mobility (PT)  -LR AM-PAC 6 Clicks Daily Activity (OT)  -MC AM-PAC 6 Clicks Basic Mobility (PT)  -SJ      11/10/17 1620          How much help from another person do you currently need...    Turning from your back to your side while in flat bed without using bedrails? 3  -MJ      Moving from lying on back to sitting on the side of a flat bed without bedrails? 2  -MJ      Moving to and from a bed to a chair (including a wheelchair)? 3  -MJ      Standing up from a chair using your arms (e.g., wheelchair, bedside chair)? 3  -MJ      Climbing 3-5 steps with a railing? 2  -MJ      To walk in hospital room? 3  -MJ      AM-PAC 6 Clicks Score 16  -MJ      Functional Assessment    Outcome Measure Options AM-PAC 6 Clicks Basic Mobility (PT)  -MJ        User Key  (r) = Recorded By, (t) = Taken By, (c) = Cosigned By    Initials  Name Provider Type    BERE Gibson, PT Physical Therapist    LR Teresa Blackwell, PT Physical Therapist    DAVI Bowers, OT Occupational Therapist    CLAY Clements, PT Physical Therapist           Time Calculation:         PT Charges       11/12/17 1029          Time Calculation    Start Time 0940  -LR      PT Received On 11/12/17  -LR      PT Goal Re-Cert Due Date 11/20/17  -LR      Time Calculation- PT    Total Timed Code Minutes- PT 32 minute(s)  -LR        User Key  (r) = Recorded By, (t) = Taken By, (c) = Cosigned By    Initials Name Provider Type    LR Teresa Blackwell, PT Physical Therapist          Therapy Charges for Today     Code Description Service Date Service Provider Modifiers Qty    41070287305 HC GAIT TRAINING EA 15 MIN 11/12/2017 Teresa Blackwell, PT GP 1    76676567492 HC PT THER PROC EA 15 MIN 11/12/2017 Teresa Blackwell, PT GP 1          PT G-Codes  Outcome Measure Options: AM-PAC 6 Clicks Basic Mobility (PT)    Teresa Blackwell, PT  11/12/2017

## 2017-11-12 NOTE — PLAN OF CARE
Problem: Patient Care Overview (Adult)  Goal: Plan of Care Review  Outcome: Ongoing (interventions implemented as appropriate)    11/12/17 0322   Coping/Psychosocial Response Interventions   Plan Of Care Reviewed With patient   Outcome Evaluation   Outcome Summary/Follow up Plan Pt VSS. No c/o pain. Rested.   Patient Care Overview   Progress progress toward functional goals as expected         Problem: Perioperative Period (Adult)  Goal: Signs and Symptoms of Listed Potential Problems Will be Absent or Manageable (Perioperative Period)  Outcome: Ongoing (interventions implemented as appropriate)

## 2017-11-13 VITALS
WEIGHT: 194 LBS | RESPIRATION RATE: 17 BRPM | TEMPERATURE: 99.3 F | BODY MASS INDEX: 33.12 KG/M2 | HEIGHT: 64 IN | SYSTOLIC BLOOD PRESSURE: 128 MMHG | OXYGEN SATURATION: 96 % | HEART RATE: 87 BPM | DIASTOLIC BLOOD PRESSURE: 89 MMHG

## 2017-11-13 PROBLEM — D72.829 LEUKOCYTOSIS: Status: ACTIVE | Noted: 2017-11-13

## 2017-11-13 LAB
GLUCOSE BLDC GLUCOMTR-MCNC: 116 MG/DL (ref 70–130)
GLUCOSE BLDC GLUCOMTR-MCNC: 122 MG/DL (ref 70–130)

## 2017-11-13 PROCEDURE — 97110 THERAPEUTIC EXERCISES: CPT

## 2017-11-13 PROCEDURE — 82962 GLUCOSE BLOOD TEST: CPT

## 2017-11-13 PROCEDURE — 97116 GAIT TRAINING THERAPY: CPT

## 2017-11-13 PROCEDURE — 94799 UNLISTED PULMONARY SVC/PX: CPT

## 2017-11-13 RX ORDER — DOCUSATE SODIUM 100 MG/1
100 CAPSULE, LIQUID FILLED ORAL 2 TIMES DAILY
Qty: 60 CAPSULE | Refills: 0 | Status: SHIPPED | OUTPATIENT
Start: 2017-11-13 | End: 2019-06-25

## 2017-11-13 RX ORDER — HYDROCODONE BITARTRATE AND ACETAMINOPHEN 7.5; 325 MG/1; MG/1
1 TABLET ORAL EVERY 4 HOURS PRN
Qty: 50 TABLET | Refills: 0
Start: 2017-11-13 | End: 2018-06-06

## 2017-11-13 RX ADMIN — FAMOTIDINE 20 MG: 20 TABLET, FILM COATED ORAL at 08:35

## 2017-11-13 RX ADMIN — ALLOPURINOL 300 MG: 300 TABLET ORAL at 08:35

## 2017-11-13 RX ADMIN — ATENOLOL 100 MG: 50 TABLET ORAL at 08:35

## 2017-11-13 RX ADMIN — OXYCODONE HYDROCHLORIDE AND ACETAMINOPHEN 1 TABLET: 7.5; 325 TABLET ORAL at 08:35

## 2017-11-13 RX ADMIN — LISINOPRIL 20 MG: 20 TABLET ORAL at 08:35

## 2017-11-13 RX ADMIN — BISACODYL 10 MG: 10 SUPPOSITORY RECTAL at 11:53

## 2017-11-13 RX ADMIN — ZOLPIDEM TARTRATE 2.5 MG: 5 TABLET, FILM COATED ORAL at 01:37

## 2017-11-13 RX ADMIN — BISACODYL 5 MG: 5 TABLET, COATED ORAL at 11:53

## 2017-11-13 NOTE — DISCHARGE SUMMARY
Patient Name: Akua Torres  MRN: [id number]  : 1945  DOS: 2017    Attending: Gopi Man MD    Primary Care Provider: Nanci Orr MD    Date of Admission:.11/10/2017  5:30 AM    Date of Discharge:  2017    Discharge Diagnosis: Principal Problem:    S/P lumbar spinal fusion  Active Problems:    Back pain    Anxiety and depression    HTN (hypertension)    HLD (hyperlipidemia)    Prediabetes    Renal insufficiency    Leukocytosis, likely reactive      Hospital Course  Patient is a 72 y.o. female presented for lumbar spinal fusion by Dr. Man under [place]. She tolerated surgery well and was admitted for further medical management.      Patient was provided pain medications as needed for pain control.    She was seen by PT and has progressed well over her stay.  She used an IS for atelectasis prophylaxis and mechanicals for DVT prophylaxis.  Home medications were resumed as appropriate, and labs were monitored and remained fairly stable.   Her Hgb A1C was elevated on her pre-op labs. A diabetes educator provided her with diabetic education and a glucometer.    With the progress she has made, she is ready for DC home today.    Discussed with patient regarding plan and she shows understanding and agreement.        Procedures Performed  PREOPERATIVE DIAGNOSES:  1.  Spinal stenosis L2-L3 and L3-L4.  2.  Severe disk degeneration/spondylosis L2-L3 and L3-L4.  3.  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   4.  Status post solid fusion L4-L5-S1.     POSTOPERATIVE DIAGNOSES:  1.  Spinal stenosis L2-L3 and L3-L4.  2.  Severe disk degeneration/spondylosis L2-L3 and L3-L4.  3.  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   4.  Status post solid fusion L4-L5-S1.     PROCEDURES PERFORMED:  1.  Transforaminal lumbar interbody fusion L2-L3 and L3-L4.  2.  Transpedicular fixation with DePuy instrumentation.  3.  Hardware removal L4-L5-S1.   4.  Posterolateral fusion L2-L3 and  "L3-L4 with bone graft.   5.  Epps-Gaming type osteotomies L2-L3 and L3-L4 to facilitate restoration of lordosis.   6. Harvesting of bone graft locally from spinous processes and laminae.      SURGEON: Gopi Man MD        Pertinent Test Results:    I reviewed the patient's new clinical results.     Results from last 7 days  Lab Units 17  0628 17  1420   WBC 10*3/mm3 15.87* 9.18   HEMOGLOBIN g/dL 11.9 13.3   HEMATOCRIT % 37.4 42.1   PLATELETS 10*3/mm3 258 251       Results from last 7 days  Lab Units 17  0628 17  1420   SODIUM mmol/L 139  --    POTASSIUM mmol/L 4.4 3.8   CHLORIDE mmol/L 104  --    CO2 mmol/L 25.0  --    BUN mg/dL 19  --    CREATININE mg/dL 0.90  --    CALCIUM mg/dL 8.7  --    GLUCOSE mg/dL 144*  --      Results for ERYN STEVE (MRN [id number]) as of 2017 12:00   Ref. Range 2017 16:41 2017 20:11 2017 07:06 2017 11:45   Glucose Latest Ref Range: 70 - 130 mg/dL 113 138 (H) 116 122     I reviewed the patient's new imaging including images and reports.      Physical therapy: Patient increased gait distance to 210 feet with RW this AM, ambulated with back brace on. Still requires cues for back precautions with t/f and bed mobility. Min assist to get in and out of bed today. Plan is d/c home tomorrow.  Will continue to progress as able.     Discharge Assessment:    Vital Signs  /89 (BP Location: Right arm, Patient Position: Lying)  Pulse 87  Temp 99.3 °F (37.4 °C) (Oral)   Resp 17  Ht 64\" (162.6 cm)  Wt 194 lb (88 kg)  SpO2 96%  BMI 33.3 kg/m2  Temp (24hrs), Av.4 °F (36.9 °C), Min:97.8 °F (36.6 °C), Max:99.3 °F (37.4 °C)      General Appearance:    Alert, cooperative, in no acute distress   Lungs:     Clear to auscultation,respirations regular, even and                   unlabored    Heart:    Regular rhythm and normal rate, normal S1 and S2   Abdomen:     Normal bowel sounds, no masses, no organomegaly, soft        " non-tender, non-distended, no guarding, no rebound                 tenderness   Extremities:   Moves all extremities well, no edema, no cyanosis, no              redness   Pulses:   Pulses palpable and equal bilaterally   Skin:   No bleeding, bruising or rash. Back dressing CDI   Neurologic:   Cranial nerves 2 - 12 grossly intact, sensation intact. Flexion and dorsiflexion intact bilateral feet.       Discharge Disposition: Home    Discharge Medications   Akua Torres   Home Medication Instructions [facility]:[id number]    Printed on:11/13/17 1201   Medication Information                      allopurinol (ZYLOPRIM) 300 MG tablet  TK 1 T PO D             atenolol (TENORMIN) 100 MG tablet  Take 100 mg by mouth Daily.             docusate sodium (COLACE) 100 MG capsule  Take 1 capsule by mouth 2 (Two) Times a Day.             gabapentin (NEURONTIN) 300 MG capsule  Take 300 mg by mouth 3 (Three) Times a Day.             HYDROcodone-acetaminophen (NORCO) 7.5-325 MG per tablet  Take 1 tablet by mouth Every 6 (Six) Hours As Needed for Moderate Pain .             lisinopril-hydrochlorothiazide (PRINZIDE,ZESTORETIC) 20-25 MG per tablet  Take 1 tablet by mouth Daily.             ZETIA 10 MG tablet  TK 1 T   PO QD             zolpidem (AMBIEN) 5 MG tablet  1/2TABLET NIGHTLY AS NEEDED FOR SLEEP                 Discharge Diet: Consistent carb    Activity at Discharge: ambulate    Follow-up Appointments  Dr. Man per his orders    Discharge took over 30 min.    WILFREDO Kramer  11/13/17  12:01 PM

## 2017-11-13 NOTE — PLAN OF CARE
Problem: Patient Care Overview (Adult)  Goal: Plan of Care Review  Outcome: Ongoing (interventions implemented as appropriate)    11/13/17 1031   Coping/Psychosocial Response Interventions   Plan Of Care Reviewed With patient;spouse   Outcome Evaluation   Outcome Summary/Follow up Plan Patient ambulated 140 feet with RW, limited by pain. Patient has been d/c home with assist.    Patient Care Overview   Progress progress toward functional goals as expected         Problem: Inpatient Physical Therapy  Goal: Bed Mobility Goal LTG- PT  Outcome: Unable to achieve outcome(s) by discharge Date Met:  11/13/17 11/13/17 1031   Bed Mobility PT LTG   Bed Mobility PT LTG, Date Established 11/10/17   Bed Mobility PT LTG, Time to Achieve 5 days   Bed Mobility PT LTG, Activity Type supine to sit/sit to supine   Bed Mobility PT LTG, [place] Level contact guard assist   Bed Mobility PT LTG, Date Goal Reviewed 11/13/17   Bed Mobility PT LTG, Outcome goal partially met  (achieved for supine to sit)   Bed Mobility PT LTG, Reason Goal Not Met discharged from facility       Goal: Transfer Training Goal 1 LTG- PT  Outcome: Unable to achieve outcome(s) by discharge Date Met:  11/13/17 11/13/17 1031   Transfer Training PT LTG   Transfer Training PT LTG, Date Established 11/10/17   Transfer Training PT LTG, Time to Achieve 5 days   Transfer Training PT LTG, Activity Type sit to stand/stand to sit   Transfer Training PT LTG, [place] Level conditional independence   Transfer Training PT LTG, Assist Device walker, rolling   Transfer Training PT LTG, Date Goal Reviewed 11/13/17   Transfer Training PT LTG, Outcome goal not met   Transfer Training PT LTG, Reason Goal Not Met discharged from facility       Goal: Gait Training Goal LTG- PT  Outcome: Unable to achieve outcome(s) by discharge Date Met:  11/13/17 11/13/17 1031   Gait Training PT LTG   Gait Training Goal PT LTG, Date Established 11/10/17   Gait Training Goal PT LTG,  Time to Achieve 5 days   Gait Training Goal PT LTG, [place] Level conditional independence   Gait Training Goal PT LTG, Assist Device walker, rolling   Gait Training Goal PT LTG, Distance to Achieve 500 feet   Gait Training Goal PT LTG, Date Goal Reviewed 11/13/17   Gait Training Goal PT LTG, Outcome goal not met   Gait Training Goal PT LTG, Reason Goal Not Met discharged from facility

## 2017-11-13 NOTE — PROGRESS NOTES
" progress note      Akua Torres  [id number]  1945     LOS: 2 days     Attending: Gopi Man MD    Primary Care Provider: Nanci Orr MD      Chief Complaint/Reason for visit:  No chief complaint on file.      Subjective   Doing well. Ambulated. Had a rash last night.  C/o some urinary incontinence .   Objective     Vital Signs  Blood pressure 136/70, pulse 86, temperature 98.3 °F (36.8 °C), temperature source Oral, resp. rate 16, height 64\" (162.6 cm), weight 194 lb (88 kg), SpO2 95 %.  Temp (24hrs), Av.5 °F (36.9 °C), Min:98.3 °F (36.8 °C), Max:98.6 °F (37 °C)      Intake/Output:    Intake/Output Summary (Last 24 hours) at 17  Last data filed at 17 1100   Gross per 24 hour   Intake              200 ml   Output              125 ml   Net               75 ml            Physical Exam:     General Appearance:    Alert, cooperative, in no acute distress   Head:    Normocephalic, without obvious abnormality, atraumatic    Lungs:     Normal effort, symmetric chest rise, no crepitus, clear to      auscultation bilaterally                Heart:    Regular rhythm and normal rate, normal S1 and S2    Abdomen:     Normal bowel sounds, no masses, no organomegaly, soft        non-tender, non-distended, no guarding, no rebound                tenderness   Extremities:   No clubbing, cyanosis or edema.  No deformities.    Back: Intact dressing. HV in place.   Pulses:   Pulses palpable and equal bilaterally   Skin:   No bleeding, bruising or rash   Neurologic:   Moves all extremities with no obvious focal motor deficit.  Cranial nerves 2 - 12 grossly intact     Results Review:     I reviewed the patient's new clinical results.     Results from last 7 days  Lab Units 17  0617  1420   WBC 10*3/mm3 15.87* 9.18   HEMOGLOBIN g/dL 11.9 13.3   HEMATOCRIT % 37.4 42.1   PLATELETS 10*3/mm3 258 251          Results from last 7 days  Lab Units 1728 17  1420   SODIUM mmol/L " 139  --    POTASSIUM mmol/L 4.4 3.8   CHLORIDE mmol/L 104  --    CO2 mmol/L 25.0  --    BUN mg/dL 19  --    CREATININE mg/dL 0.90  --    CALCIUM mg/dL 8.7  --    GLUCOSE mg/dL 144*  --        Results for ERYN STEVE (MRN [id number]) as of 11/12/2017 20:51   Ref. Range 11/11/2017 20:01 11/12/2017 07:21 11/12/2017 11:21 11/12/2017 16:41 11/12/2017 20:11   Glucose Latest Ref Range: 70 - 130 mg/dL 140 (H) 149 (H) 168 (H) 113 138 (H)     I reviewed the patient's new imaging including images and reports.    All medications reviewed.     allopurinol 300 mg Oral Daily   atenolol 100 mg Oral Daily   famotidine 20 mg Intravenous Q12H   Or      famotidine 20 mg Oral Q12H   gabapentin 300 mg Oral Q8H   insulin lispro 0-7 Units Subcutaneous 4x Daily AC & at Bedtime   lisinopril 20 mg Oral Q24H       acetaminophen 650 mg Q4H PRN   bisacodyl 5 mg Daily PRN   bisacodyl 10 mg Daily PRN   dextrose 25 g Q15 Min PRN   dextrose 15 g Q15 Min PRN   diphenhydrAMINE 25 mg Q6H PRN   glucagon (human recombinant) 1 mg Q15 Min PRN   hydrALAZINE 10 mg Q6H PRN   HYDROmorphone 2 mg Q4H PRN   And     naloxone 0.4 mg Q5 Min PRN   Morphine 1 mg Q4H PRN   And     naloxone 0.4 mg Q5 Min PRN   ondansetron 4 mg Q6H PRN   ondansetron 4 mg Q6H PRN   oxyCODONE-acetaminophen 1 tablet Q4H PRN   sodium chloride 500 mL TID PRN   sodium chloride 1-10 mL PRN   zolpidem 2.5 mg Nightly PRN   zolpidem 5 mg Nightly PRN       Assessment/Plan     S/P lumbar spinal fusion    Anxiety and depression    Back pain    HTN (hypertension)    HLD (hyperlipidemia)    Prediabetes    Renal insufficiency       Plan  1. PT/OT. Ambulate.   2. Pain control-prns   3. IS-encouraged  4. DVT proph-Mech.   5. Bowel regimen  6. Monitor post-op labs  7. DC planning. Home soon, possibly tomorrow.     HTN, HLD  - Continue home lisinopril and atenolol   - Hold HCTZ for now  - Monitor BP q4 hrs  - Holding parameters for BP meds  - PRN hydralazine for SBP >170     PreDM  - hgb A1c on  11/6/17 6.0  - Accuchecks ACHS with low dose SSI  - DM educator consult( 11/10)    Check PVR. If symptoms persist, check UA    Discussed with patient and RN    Brit Galo MD  11/12/17  8:47 PM

## 2017-11-13 NOTE — THERAPY DISCHARGE NOTE
Acute Care - Physical Therapy Treatment Note/Discharge   [place]     Patient Name: Akua Torres  : 1945  MRN: [id number]  Today's Date: 2017  Onset of Illness/Injury or Date of Surgery Date: 11/10/17  Date of Referral to PT: 11/10/17  Referring Physician: MD Donovan     Admit Date: 11/10/2017    Visit Dx:    ICD-10-CM ICD-9-CM   1. Impaired functional mobility, balance, gait, and endurance Z74.09 V49.89   2. Impaired mobility and ADLs Z74.09 799.89   3. S/P lumbar spinal fusion Z98.1 V45.4     Patient Active Problem List   Diagnosis   • Degenerative disc disease, lumbar   • Lumbar stenosis with neurogenic claudication   • History of lumbar fusion   • Spondylosis of lumbar region without myelopathy or radiculopathy   • Mild obesity   • Physical deconditioning   • Idiopathic gout   • Anxiety and depression   • Greater trochanteric bursitis of both hips   • Status post total bilateral knee replacement   • Back pain   • HTN (hypertension)   • HLD (hyperlipidemia)   • Prediabetes   • S/P lumbar spinal fusion   • Renal insufficiency   • Leukocytosis, likely reactive       Physical Therapy Education     Title: PT OT SLP Therapies (Done)     Topic: Physical Therapy (Done)     Point: Mobility training (Done)    Learning Progress Summary    Learner Readiness Method Response Comment Documented by Status   Patient Acceptance E,D VU,NR Educated on back precautions and safety with mobility. LR 17 1413 Done    Acceptance E AUBREY NUÑEZ  MM 17 0918 Done    Acceptance E,D SAVANNAHNR Educated on donning/doffing back brace. Educated on safety with mobility and back precautions. LR 17 1026 Done    Acceptance E,D NR  SJ 17 0904 Active    Acceptance TB DU  TP 11/10/17 1739 Done    Acceptance E,D SAVANNAHNR Reviewed back precautions, log roll, benefits of mobility  11/10/17 1735 Done   Family Acceptance E SAVANNAHNR   17 0918 Done   Significant Other Acceptance E,D SAVANNAH,NR Educated on back precautions  and safety with mobility. LR 11/13/17 1413 Done    Acceptance E,D VU,NR Educated on donning/doffing back brace. Educated on safety with mobility and back precautions. LR 11/12/17 1026 Done    Acceptance E,D VU,NR Reviewed back precautions, log roll, benefits of mobility  11/10/17 1735 Done               Point: Home exercise program (Done)    Learning Progress Summary    Learner Readiness Method Response Comment Documented by Status   Patient Acceptance E,D VU,NR Educated on back precautions and safety with mobility. LR 11/13/17 1413 Done    Acceptance E VU,NR   11/13/17 0918 Done    Acceptance E,D VU,NR Educated on donning/doffing back brace. Educated on safety with mobility and back precautions. LR 11/12/17 1026 Done    Acceptance E,D NR   11/11/17 0904 Active    Acceptance TB DU  TP 11/10/17 1739 Done   Family Acceptance E VU,NR   11/13/17 0918 Done   Significant Other Acceptance E,D VU,NR Educated on back precautions and safety with mobility. LR 11/13/17 1413 Done    Acceptance E,D VU,NR Educated on donning/doffing back brace. Educated on safety with mobility and back precautions.  11/12/17 1026 Done               Point: Body mechanics (Done)    Learning Progress Summary    Learner Readiness Method Response Comment Documented by Status   Patient Acceptance E,D VU,NR Educated on back precautions and safety with mobility.  11/13/17 1413 Done    Acceptance E VU,NR   11/13/17 0918 Done    Acceptance E,D VU,NR Educated on donning/doffing back brace. Educated on safety with mobility and back precautions. LR 11/12/17 1026 Done    Acceptance E,D NR   11/11/17 0904 Active    Acceptance TB DU  TP 11/10/17 1739 Done    Acceptance E,D VU,NR Reviewed back precautions, log roll, benefits of mobility  11/10/17 1735 Done   Family Acceptance E VU,NR   11/13/17 0918 Done   Significant Other Acceptance E,D VU,NR Educated on back precautions and safety with mobility. LR 11/13/17 1413 Done    Acceptance E,D VU,NR  Educated on donning/doffing back brace. Educated on safety with mobility and back precautions.  11/12/17 1026 Done    Acceptance E,D SAVANNAHNR Reviewed back precautions, log roll, benefits of mobility  11/10/17 1735 Done               Point: Precautions (Done)    Learning Progress Summary    Learner Readiness Method Response Comment Documented by Status   Patient Acceptance E,D SAVANNAHNR Educated on back precautions and safety with mobility.  11/13/17 1413 Done    Acceptance E AUBREY NUÑEZ   11/13/17 0918 Done    Acceptance E,D SAVANNAHNR Educated on donning/doffing back brace. Educated on safety with mobility and back precautions.  11/12/17 1026 Done    Acceptance E,D AUBREY   11/11/17 0904 Active    Acceptance TB DU   11/10/17 1739 Done    Acceptance E,D SAVANNAHNR Reviewed back precautions, log roll, benefits of mobility  11/10/17 1735 Done   Family Acceptance AUBREY BOYLE   11/13/17 0918 Done   Significant Other Acceptance E,D SAVANNAHNR Educated on back precautions and safety with mobility.  11/13/17 1413 Done    Acceptance E,D SAVANNAHNR Educated on donning/doffing back brace. Educated on safety with mobility and back precautions.  11/12/17 1026 Done    Acceptance E,D SAVANNAHNR Reviewed back precautions, log roll, benefits of mobility  11/10/17 1735 Done                      User Key     Initials Effective Dates Name Provider Type Discipline     06/19/15 -  Maribel Gibson, PT Physical Therapist PT     06/19/15 -  Teresa Blackwell, PT Physical Therapist PT     03/14/16 -  Henrique Clements, PT Physical Therapist PT     07/25/16 -  Suzanne Sue, RN Registered Nurse Nurse    TP 07/10/17 -  Priscila Ward, RN Registered Nurse Nurse                    IP PT Goals       11/13/17 1031 11/12/17 0940 11/10/17 1735    Bed Mobility PT LTG    Bed Mobility PT LTG, Date Established 11/10/17  -LR 11/10/17  -LR 11/10/17  -MJ    Bed Mobility PT LTG, Time to Achieve 5 days  -LR 5 days  -LR 5 days  -MJ    Bed Mobility PT LTG, Activity Type  supine to sit/sit to supine  -LR supine to sit/sit to supine  -LR supine to sit/sit to supine   log roll  -MJ    Bed Mobility PT LTG, [place] contact guard assist  -LR contact guard assist  -LR contact guard assist  -MJ    Bed Mobility PT LTG, Date Goal Reviewed 11/13/17  -LR 11/12/17  -LR 11/10/17  -MJ    Bed Mobility PT LTG, Outcome goal partially met   achieved for supine to sit  -LR goal ongoing  -LR goal ongoing  -MJ    Bed Mobility PT LTG, Reason Goal Not Met discharged from facility  -LR      Transfer Training PT LTG    Transfer Training PT LTG, Date Established 11/10/17  -LR 11/10/17  -LR 11/10/17  -MJ    Transfer Training PT LTG, Time to Achieve 5 days  -LR 5 days  -LR 5 days  -MJ    Transfer Training PT LTG, Activity Type sit to stand/stand to sit  -LR sit to stand/stand to sit  -LR sit to stand/stand to sit  -MJ    Transfer Training PT LTG, [place] conditional independence  -LR conditional independence  -LR conditional independence  -MJ    Transfer Training PT LTG, Assist Device walker, rolling  -LR walker, rolling  -LR walker, rolling  -MJ    Transfer Training PT  LTG, Date Goal Reviewed 11/13/17  -LR 11/12/17  -LR 11/10/17  -MJ    Transfer Training PT LTG, Outcome goal not met  -LR goal ongoing  -LR goal ongoing  -MJ    Transfer Training PT LTG, Reason Goal Not Met discharged from facility  -LR      Gait Training PT LTG    Gait Training Goal PT LTG, Date Established 11/10/17  -LR 11/10/17  -LR 11/10/17  -MJ    Gait Training Goal PT LTG, Time to Achieve 5 days  -LR 5 days  -LR 5 days  -MJ    Gait Training Goal PT LTG, [place] conditional independence  -LR conditional independence  -LR conditional independence  -MJ    Gait Training Goal PT LTG, Assist Device walker, rolling  -LR walker, rolling  -LR walker, rolling  -MJ    Gait Training Goal PT LTG, Distance to Achieve 500 feet  - feet  - feet  -MJ    Gait Training Goal PT LTG, Date Goal Reviewed 11/13/17  -LR  11/12/17  -LR 11/10/17  -MJ    Gait Training Goal PT LTG, Outcome goal not met  -LR goal ongoing  -LR goal ongoing  -MJ    Gait Training Goal PT LTG, Reason Goal Not Met discharged from facility  -LR        User Key  (r) = Recorded By, (t) = Taken By, (c) = Cosigned By    Initials Name Provider Type    LR Teresa Blackwell, PT Physical Therapist    MJ Henrique Clements, PT Physical Therapist              Adult Rehabilitation Note       11/13/17 1031 11/12/17 0940 11/11/17 0812    Rehab Assessment/Intervention    Discipline physical therapist  -LR physical therapist  -LR physical therapist  -    Document Type therapy note (daily note);discharge summary  -LR therapy note (daily note)  -LR therapy note (daily note)  -SJ    Subjective Information agree to therapy;no complaints  -LR agree to therapy;complains of;pain  -LR no complaints;agree to therapy  -SJ    Patient Effort, Rehab Treatment good  -LR good  -LR good  -SJ    Symptoms Noted During/After Treatment increased pain  -LR none  -LR fatigue  -SJ    Symptoms Noted Comment Notified RN.   -LR      Precautions/Limitations fall precautions;spinal precautions  -LR brace on when up;fall precautions;spinal precautions;other (see comments)   hemovac, back brace when up walking  -LR fall precautions;spinal precautions  -SJ    Specific Treatment Considerations   brought in back brace from [facility] today.   -LR     Recorded by [LR] Teresa Blackwell, PT [LR] Teresa Blackwell, PT [SJ] Maribel Gibson, PT    Vital Signs    Pre Systolic BP Rehab   122  -SJ    Pre Treatment Diastolic BP   75  -SJ    Recorded by   [SJ] Maribel Gibson PT    Pain Assessment    Pain Assessment 0-10  -LR 0-10  -LR 0-10  -SJ    Pain Score 4  -LR 3  -LR 0  -SJ    Post Pain Score 5  -LR 3  -LR 3  -SJ    Pain Type Acute pain  -LR Acute pain  -LR Acute pain;Surgical pain  -SJ    Pain Location Back  -LR Back  -LR Back  -SJ    Pain Orientation Lower  -LR Lower  -LR     Pain  Intervention(s) Repositioned;Ambulation/increased activity  -LR Repositioned;Ambulation/increased activity  -LR Repositioned;Ambulation/increased activity  -SJ    Recorded by [LR] Teresa Blackwell, PT [LR] Teresa Blackwell, PT [SJ] Maribel Gibson, PT    Cognitive Assessment/Intervention    Current Cognitive/Communication Assessment functional  -LR functional  -LR functional  -SJ    Orientation Status oriented x 4;required verbal cueing (specifiy in comments)  -LR oriented x 4;required verbal cueing (specifiy in comments)  -LR oriented x 4  -SJ    Follows Commands/Answers Questions 100% of the time;able to follow single-step instructions;needs cueing;needs repetition  -% of the time;able to follow single-step instructions;needs cueing;needs repetition  -% of the time;able to follow single-step instructions;needs cueing;needs increased time;needs repetition  -SJ    Personal Safety WNL/WFL  -LR mild impairment;at risk behaviors demonstrated;decreased awareness, need for safety  -LR mild impairment  -SJ    Personal Safety Interventions   fall prevention program maintained;gait belt;muscle strengthening facilitated;nonskid shoes/slippers when out of bed  -SJ    Recorded by [LR] Teresa Blackwell, PT [LR] Teresa Blackwell, PT [SJ] Maribel Gibson, PT    Bed Mobility, Assessment/Treatment    Bed Mobility, Assistive Device head of bed elevated;bed rails  -LR head of bed elevated;bed rails  -LR bed rails  -SJ    Bed Mobility, Roll Left, [place]   supervision required  -SJ    Bed Mob, Supine to Sit, [place] verbal cues required;contact guard assist  -LR verbal cues required;minimum assist (75% patient effort)  -LR supervision required  -SJ    Bed Mob, Sit to Supine, [place] verbal cues required;minimum assist (75% patient effort)  -LR verbal cues required;minimum assist (75% patient effort)  -LR minimum assist (75% patient effort);verbal cues required  -SJ    Bed Mobility, Safety  Issues decreased use of legs for bridging/pushing  -LR impaired trunk control for bed mobility  -LR decreased use of arms for pushing/pulling;decreased use of legs for bridging/pushing  -SJ    Bed Mobility, Impairments strength decreased;pain  -LR pain  -LR strength decreased;pain  -SJ    Bed Mobility, Comment Verbal cues for correct log roll technique and to keep hips and shoulders aligned throughout. Min assist to lift LEs back up into bed.   -LR Verbal cues for correct log roll technique. Cues to keep hips and shoulders aligned throughout. Min assist with trunk to sit up and min assist to lift LEs back up into bed.   -LR CUES FOR LOG ROLLING AND BACK PRECAUTIONS  -SJ    Recorded by [LR] Teresa Blackwell, PT [LR] Teresa Blackwell, PT [SJ] Maribel Gibson PT    Transfer Assessment/Treatment    Transfers, Sit-Stand [place] verbal cues required;contact guard assist  -LR verbal cues required;contact guard assist  -LR contact guard assist;verbal cues required  -SJ    Transfers, Stand-Sit [place] verbal cues required;contact guard assist  -LR verbal cues required;contact guard assist  -LR contact guard assist;verbal cues required  -SJ    Transfers, Sit-Stand-Sit, Assist Device rolling walker  -LR rolling walker  -LR rolling walker  -SJ    Toilet Transfer, [place]  verbal cues required;contact guard assist  -LR     Toilet Transfer, Assistive Device  rolling walker  -LR     Transfer, Safety Issues sequencing ability decreased;step length decreased;weight-shifting ability decreased  -LR sequencing ability decreased;step length decreased;weight-shifting ability decreased  -LR sequencing ability decreased;step length decreased;weight-shifting ability decreased  -SJ    Transfer, Impairments strength decreased;pain  -LR strength decreased;pain  -LR strength decreased;pain  -SJ    Transfer, Comment Verbal cues to push up from bed to stand and to reach back for bed to lower into sitting. Requires verbal  cues to limit trunk flexion during t/f. Donned back brace while sitting on EOB.   -LR Verbal cues for correct hand placement with t/f and to limit trunk flexion during t/f. Patient initially leaning far forward when rising to stand. Assisted pt to and from bathroom. Cues ofr use of grab bar.   -LR cues for safety and back precautions  -SJ    Recorded by [LR] Teresa Blackwell, PT [LR] Teresa Blackwell, PT [SJ] Maribel Gibson PT    Gait Assessment/Treatment    Gait, [place] Level verbal cues required;contact guard assist  -LR verbal cues required;contact guard assist  -LR contact guard assist;verbal cues required  -SJ    Gait, Assistive Device rolling walker  -LR rolling walker  -LR rolling walker  -SJ    Gait, Distance (Feet) 140  -  -  -SJ    Gait, Gait Pattern Analysis swing-through gait  -LR swing-through gait  -LR swing-through gait  -SJ    Gait, Gait Deviations bilateral:;step length decreased;toe-to-floor clearance decreased;weight-shifting ability decreased  -LR bilateral:;step length decreased;toe-to-floor clearance decreased;weight-shifting ability decreased  -LR nixon decreased;decreased heel strike;double stance time increased  -SJ    Gait, Safety Issues sequencing ability decreased;step length decreased;weight-shifting ability decreased  -LR step length decreased;weight-shifting ability decreased  -LR step length decreased  -SJ    Gait, Impairments strength decreased;pain  -LR strength decreased;pain  -LR strength decreased;pain  -SJ    Gait, Comment Patient ambulated with step through gait pattern at slow pace. Verbal cues for increased step length, increased heel strike, and increased foot clearance. Improved with cues for correction. Gait limited by pain.   -LR Patient ambulated with step through gait pattern at slow pace. Verbal cues for upright posture, decreased UE weight bearing, and increased LE weight bearing. Improved with cues for correction. Cues to stop walking  if she has to let go of RW with one hand. Gait limited by fatigue.   -LR cues for safety, as pt frequently lets go of RW. Slow gait speed.  -SJ    Recorded by [LR] Teresa Blackwell, PT [LR] Teresa Blackwell, PT [SJ] Maribel Gibson PT    Therapy Exercises    Bilateral Lower Extremities AROM:;10 reps;supine;ankle pumps/circles;glut sets;heel slides;hip ER;hip IR;quad sets;other reps   ab sets; cues for technique.   -LR AROM:;10 reps;supine;ankle pumps/circles;glut sets;heel slides;hip ER;hip IR;quad sets;other reps   ab sets; cues for technique  -LR AROM:   post op day 2 protocol  -SJ    Recorded by [LR] Teresa Blackwell PT [LR] Teresa Blackwell, PT [SJ] Maribel Gibson PT    Positioning and Restraints    Pre-Treatment Position in bed  -LR in bed  -LR in bed  -SJ    Post Treatment Position bed  -LR bed  -LR bed  -SJ    In Bed notified nsg;supine;call light within reach;encouraged to call for assist;exit alarm on;with family/caregiver;side rails up x2  -LR notified nsg;supine;call light within reach;encouraged to call for assist;exit alarm on;with nsg;side rails up x2  -LR fowlers;call light within reach;encouraged to call for assist;with nsg;with other staff  -SJ    Recorded by [LR] Teresa Blackwell PT [LR] Teresa Blackwell, PT [SJ] Maribel Gibson PT      User Key  (r) = Recorded By, (t) = Taken By, (c) = Cosigned By    Initials Name Effective Dates    BERE Gibson, PT 06/19/15 -     LR Teresa Blackwell PT 06/19/15 -           PT Recommendation and Plan  Anticipated Discharge Disposition: home with assist, home with home health  Demonstrates Need for Referral to Another Service: home health care  Planned Therapy Interventions: balance training, bed mobility training, gait training, home exercise program, patient/family education, strengthening, transfer training  PT Frequency: daily  Plan of Care Review  Plan Of Care Reviewed With: patient, spouse  Progress: progress  toward functional goals as expected  Outcome Summary/Follow up Plan: Patient ambulated 140 feet with RW, limited by pain. Patient has been d/c home with assist.           Outcome Measures       11/13/17 1031 11/12/17 0940 11/11/17 0816    How much help from another person do you currently need...    Turning from your back to your side while in flat bed without using bedrails? 4  -LR 4  -LR     Moving from lying on back to sitting on the side of a flat bed without bedrails? 3  -LR 3  -LR     Moving to and from a bed to a chair (including a wheelchair)? 3  -LR 3  -LR     Standing up from a chair using your arms (e.g., wheelchair, bedside chair)? 3  -LR 3  -LR     Climbing 3-5 steps with a railing? 3  -LR 3  -LR     To walk in hospital room? 3  -LR 3  -LR     AM-PAC 6 Clicks Score 19  -LR 19  -LR     How much help from another is currently needed...    Putting on and taking off regular lower body clothing?   2  -MC    Bathing (including washing, rinsing, and drying)   2  -MC    Toileting (which includes using toilet bed pan or urinal)   2  -MC    Putting on and taking off regular upper body clothing   3  -MC    Taking care of personal grooming (such as brushing teeth)   3  -MC    Eating meals   3  -MC    Score   15  -MC    Functional Assessment    Outcome Measure Options AM-PAC 6 Clicks Basic Mobility (PT)  -LR AM-PAC 6 Clicks Basic Mobility (PT)  -LR AM-PAC 6 Clicks Daily Activity (OT)  -MC      11/11/17 0812 11/10/17 1620       How much help from another person do you currently need...    Turning from your back to your side while in flat bed without using bedrails? 4  -SJ 3  -MJ     Moving from lying on back to sitting on the side of a flat bed without bedrails? 3  -SJ 2  -MJ     Moving to and from a bed to a chair (including a wheelchair)? 3  -SJ 3  -MJ     Standing up from a chair using your arms (e.g., wheelchair, bedside chair)? 3  -SJ 3  -MJ     Climbing 3-5 steps with a railing? 3  -SJ 2  -MJ     To walk in  hospital room? 3  -SJ 3  -MJ     AM-PAC 6 Clicks Score 19  -SJ 16  -MJ     Functional Assessment    Outcome Measure Options AM-PAC 6 Clicks Basic Mobility (PT)  -SJ AM-PAC 6 Clicks Basic Mobility (PT)  -MJ       User Key  (r) = Recorded By, (t) = Taken By, (c) = Cosigned By    Initials Name Provider Type    SJ Maribel Gibson, PT Physical Therapist    LR Teresa Blackwell, PT Physical Therapist    DAVI Bowers, OT Occupational Therapist    CLAY Clements, PT Physical Therapist           Time Calculation:         PT Charges       11/13/17 1416          Time Calculation    Start Time 1031  -LR      PT Received On 11/13/17  -LR      PT Goal Re-Cert Due Date 11/20/17  -LR      Time Calculation- PT    Total Timed Code Minutes- PT 32 minute(s)  -LR        User Key  (r) = Recorded By, (t) = Taken By, (c) = Cosigned By    Initials Name Provider Type    LR Teresa Blackwell, PT Physical Therapist          Therapy Charges for Today     Code Description Service Date Service Provider Modifiers Qty    24185233050 HC GAIT TRAINING EA 15 MIN 11/12/2017 Teresa Blackwell, PT GP 1    19086274829 HC PT THER PROC EA 15 MIN 11/12/2017 Teresa Blackwell, PT GP 1    55437515368 HC GAIT TRAINING EA 15 MIN 11/13/2017 Teresa Blackwell, PT GP 1    10592905399 HC PT THER PROC EA 15 MIN 11/13/2017 Teresa Blackwell, PT GP 1          PT G-Codes  Outcome Measure Options: AM-PAC 6 Clicks Basic Mobility (PT)    PT Discharge Summary  Anticipated Discharge Disposition: home with assist, home with home health  Reason for Discharge: Discharge from facility  Outcomes Achieved: Patient able to partially acheive established goals  Discharge Destination: Home with assist, Home with home health    Teresa Blackwell, PT  11/13/2017

## 2017-11-13 NOTE — PLAN OF CARE
Problem: Patient Care Overview (Adult)  Goal: Plan of Care Review  Outcome: Ongoing (interventions implemented as appropriate)    11/13/17 1213   Coping/Psychosocial Response Interventions   Plan Of Care Reviewed With patient;spouse   Outcome Evaluation   Outcome Summary/Follow up Plan [facility]   Patient Care Overview   Progress improving

## 2017-11-14 LAB
ABO + RH BLD: NORMAL
ABO + RH BLD: NORMAL
BH BB BLOOD EXPIRATION DATE: NORMAL
BH BB BLOOD EXPIRATION DATE: NORMAL
BH BB BLOOD TYPE BARCODE: 5100
BH BB BLOOD TYPE BARCODE: 5100
BH BB DISPENSE STATUS: NORMAL
BH BB DISPENSE STATUS: NORMAL
BH BB PRODUCT CODE: NORMAL
BH BB PRODUCT CODE: NORMAL
BH BB UNIT NUMBER: NORMAL
BH BB UNIT NUMBER: NORMAL
CROSSMATCH INTERPRETATION: NORMAL
CROSSMATCH INTERPRETATION: NORMAL
UNIT  ABO: NORMAL
UNIT  ABO: NORMAL
UNIT  RH: NORMAL
UNIT  RH: NORMAL

## 2017-11-22 ENCOUNTER — TELEPHONE (OUTPATIENT)
Dept: PAIN MEDICINE | Facility: CLINIC | Age: 72
End: 2017-11-22

## 2017-11-24 ENCOUNTER — APPOINTMENT (OUTPATIENT)
Dept: MRI IMAGING | Facility: HOSPITAL | Age: 72
End: 2017-11-24

## 2017-11-24 ENCOUNTER — HOSPITAL ENCOUNTER (EMERGENCY)
Facility: HOSPITAL | Age: 72
Discharge: HOME | End: 2017-11-24
Attending: EMERGENCY MEDICINE | Admitting: EMERGENCY MEDICINE

## 2017-11-24 VITALS
OXYGEN SATURATION: 94 % | HEIGHT: 64 IN | HEART RATE: 70 BPM | TEMPERATURE: 98.1 F | BODY MASS INDEX: 30.73 KG/M2 | RESPIRATION RATE: 26 BRPM | SYSTOLIC BLOOD PRESSURE: 120 MMHG | DIASTOLIC BLOOD PRESSURE: 48 MMHG | WEIGHT: 180 LBS

## 2017-11-24 DIAGNOSIS — G89.18 POSTOPERATIVE PAIN: Primary | ICD-10-CM

## 2017-11-24 DIAGNOSIS — M54.5 LOW BACK PAIN, UNSPECIFIED BACK PAIN LATERALITY, UNSPECIFIED CHRONICITY, WITH SCIATICA PRESENCE UNSPECIFIED: ICD-10-CM

## 2017-11-24 LAB
ALBUMIN SERPL-MCNC: 4.1 G/DL (ref 3.2–4.8)
ALBUMIN/GLOB SERPL: 1.6 G/DL (ref 1.5–2.5)
ALP SERPL-CCNC: 113 U/L (ref 25–100)
ALT SERPL W P-5'-P-CCNC: 10 U/L (ref 7–40)
ANION GAP SERPL CALCULATED.3IONS-SCNC: 11 MMOL/L (ref 3–11)
AST SERPL-CCNC: 16 U/L (ref 0–33)
BASOPHILS # BLD AUTO: 0.03 10*3/MM3 (ref 0–0.2)
BASOPHILS NFR BLD AUTO: 0.3 % (ref 0–1)
BILIRUB SERPL-MCNC: 0.5 MG/DL (ref 0.3–1.2)
BUN BLD-MCNC: 17 MG/DL (ref 9–23)
BUN/CREAT SERPL: 18.9 (ref 7–25)
CALCIUM SPEC-SCNC: 9.2 MG/DL (ref 8.7–10.4)
CHLORIDE SERPL-SCNC: 98 MMOL/L (ref 99–109)
CO2 SERPL-SCNC: 31 MMOL/L (ref 20–31)
CREAT BLD-MCNC: 0.9 MG/DL (ref 0.6–1.3)
CRP SERPL-MCNC: 3.34 MG/DL (ref 0–1)
DEPRECATED RDW RBC AUTO: 51.3 FL (ref 37–54)
EOSINOPHIL # BLD AUTO: 0.3 10*3/MM3 (ref 0–0.3)
EOSINOPHIL NFR BLD AUTO: 3.3 % (ref 0–3)
ERYTHROCYTE [DISTWIDTH] IN BLOOD BY AUTOMATED COUNT: 14.4 % (ref 11.3–14.5)
ERYTHROCYTE [SEDIMENTATION RATE] IN BLOOD: 60 MM/HR (ref 0–30)
GFR SERPL CREATININE-BSD FRML MDRD: 61 ML/MIN/1.73
GLOBULIN UR ELPH-MCNC: 2.5 GM/DL
GLUCOSE BLD-MCNC: 104 MG/DL (ref 70–100)
HCT VFR BLD AUTO: 32.8 % (ref 34.5–44)
HGB BLD-MCNC: 10.2 G/DL (ref 11.5–15.5)
IMM GRANULOCYTES # BLD: 0.01 10*3/MM3 (ref 0–0.03)
IMM GRANULOCYTES NFR BLD: 0.1 % (ref 0–0.6)
LYMPHOCYTES # BLD AUTO: 2.6 10*3/MM3 (ref 0.6–4.8)
LYMPHOCYTES NFR BLD AUTO: 28.7 % (ref 24–44)
MCH RBC QN AUTO: 30 PG (ref 27–31)
MCHC RBC AUTO-ENTMCNC: 31.1 G/DL (ref 32–36)
MCV RBC AUTO: 96.5 FL (ref 80–99)
MONOCYTES # BLD AUTO: 0.52 10*3/MM3 (ref 0–1)
MONOCYTES NFR BLD AUTO: 5.7 % (ref 0–12)
NEUTROPHILS # BLD AUTO: 5.59 10*3/MM3 (ref 1.5–8.3)
NEUTROPHILS NFR BLD AUTO: 61.9 % (ref 41–71)
PLATELET # BLD AUTO: 359 10*3/MM3 (ref 150–450)
PMV BLD AUTO: 9.3 FL (ref 6–12)
POTASSIUM BLD-SCNC: 3.1 MMOL/L (ref 3.5–5.5)
PROT SERPL-MCNC: 6.6 G/DL (ref 5.7–8.2)
RBC # BLD AUTO: 3.4 10*6/MM3 (ref 3.89–5.14)
SODIUM BLD-SCNC: 140 MMOL/L (ref 132–146)
WBC NRBC COR # BLD: 9.05 10*3/MM3 (ref 3.5–10.8)

## 2017-11-24 PROCEDURE — 0 GADOBENATE DIMEGLUMINE 529 MG/ML SOLUTION: Performed by: EMERGENCY MEDICINE

## 2017-11-24 PROCEDURE — 25010000002 HYDROMORPHONE PER 4 MG: Performed by: EMERGENCY MEDICINE

## 2017-11-24 PROCEDURE — 72158 MRI LUMBAR SPINE W/O & W/DYE: CPT

## 2017-11-24 PROCEDURE — 85652 RBC SED RATE AUTOMATED: CPT | Performed by: EMERGENCY MEDICINE

## 2017-11-24 PROCEDURE — 96375 TX/PRO/DX INJ NEW DRUG ADDON: CPT

## 2017-11-24 PROCEDURE — 96374 THER/PROPH/DIAG INJ IV PUSH: CPT

## 2017-11-24 PROCEDURE — 80053 COMPREHEN METABOLIC PANEL: CPT | Performed by: EMERGENCY MEDICINE

## 2017-11-24 PROCEDURE — 86140 C-REACTIVE PROTEIN: CPT | Performed by: EMERGENCY MEDICINE

## 2017-11-24 PROCEDURE — A9577 INJ MULTIHANCE: HCPCS | Performed by: EMERGENCY MEDICINE

## 2017-11-24 PROCEDURE — 99284 EMERGENCY DEPT VISIT MOD MDM: CPT

## 2017-11-24 PROCEDURE — 85025 COMPLETE CBC W/AUTO DIFF WBC: CPT | Performed by: EMERGENCY MEDICINE

## 2017-11-24 PROCEDURE — 25010000002 ONDANSETRON PER 1 MG: Performed by: EMERGENCY MEDICINE

## 2017-11-24 RX ORDER — ONDANSETRON 2 MG/ML
4 INJECTION INTRAMUSCULAR; INTRAVENOUS ONCE
Status: COMPLETED | OUTPATIENT
Start: 2017-11-24 | End: 2017-11-24

## 2017-11-24 RX ORDER — OXYCODONE AND ACETAMINOPHEN 7.5; 325 MG/1; MG/1
1 TABLET ORAL EVERY 6 HOURS PRN
Qty: 10 TABLET | Refills: 0 | Status: SHIPPED | OUTPATIENT
Start: 2017-11-24 | End: 2018-06-06

## 2017-11-24 RX ORDER — HYDROMORPHONE HYDROCHLORIDE 1 MG/ML
0.5 INJECTION, SOLUTION INTRAMUSCULAR; INTRAVENOUS; SUBCUTANEOUS ONCE
Status: COMPLETED | OUTPATIENT
Start: 2017-11-24 | End: 2017-11-24

## 2017-11-24 RX ADMIN — HYDROMORPHONE HYDROCHLORIDE 0.5 MG: 1 INJECTION, SOLUTION INTRAMUSCULAR; INTRAVENOUS; SUBCUTANEOUS at 13:08

## 2017-11-24 RX ADMIN — GADOBENATE DIMEGLUMINE 17 ML: 529 INJECTION, SOLUTION INTRAVENOUS at 13:58

## 2017-11-24 RX ADMIN — ONDANSETRON 4 MG: 2 INJECTION INTRAMUSCULAR; INTRAVENOUS at 13:08

## 2017-11-24 NOTE — ED PROVIDER NOTES
Subjective   HPI Comments: 72-year-old white female status post spinal fusion lumbar 11/10/2017 per Dr. Man complaining of low back pain with reference to right hip.  Patient states that she had been doing well, her wound has looked good she has not had any fever but states that she started having intense right hip and posterior buttock pain for the past 2 days.  Last night was the worst.  She denies any fall injury or other complaints including numbness or weakness to lower extremities.    Patient is a 72 y.o. female presenting with back pain.   History provided by:  Patient and relative  Back Pain   Location:  Lumbar spine  Radiates to:  Does not radiate  Pain severity:  Severe  Pain is:  Same all the time  Onset quality:  Gradual  Timing:  Constant  Chronicity:  New  Context: not falling    Relieved by:  Nothing  Worsened by:  Movement  Ineffective treatments:  None tried  Associated symptoms: no abdominal pain, no abdominal swelling, no bladder incontinence, no fever, no perianal numbness and no tingling        Review of Systems   Constitutional: Negative for fever.   Gastrointestinal: Negative for abdominal pain.   Genitourinary: Negative for bladder incontinence.   Musculoskeletal: Positive for back pain.   Neurological: Negative for tingling.   All other systems reviewed and are negative.      Past Medical History:   Diagnosis Date   • Anxiety    • Anxiety and depression    • Arthritis    • Elevated serum creatinine     SEES DR SMITH EVERY 6 MONTHS- NEPHROLOGIST    • Extremity pain    • GERD (gastroesophageal reflux disease)    • H/O bladder infections     JUST FINISHED MACROBID ON 11-2-17 FOR RECENT UTI   • Hypercholesteremia    • Hypertension    • Joint pain    • Low back pain    • Neck pain    • Rheumatic fever    • Wears glasses        Allergies   Allergen Reactions   • Nsaids Other (See Comments)     KIDNEY DAMAGE   • Quinidine Nausea And Vomiting and Other (See Comments)     FEVER     •  Penicillins Rash       Past Surgical History:   Procedure Laterality Date   • BACK SURGERY  2004    LUMBAR PER DR MAN    • CARPAL TUNNEL RELEASE Left    • COLONOSCOPY     • FINGER SURGERY Right     3RD FINGER   • HEEL SPUR EXCISION Bilateral    • KNEE ARTHROSCOPY Bilateral    • LUMBAR DISCECTOMY FUSION INSTRUMENTATION N/A 11/10/2017    Procedure: LUMBAR SPINAL FUSION ;  Surgeon: Gopi Man MD;  Location: [facility];  Service:    • REPLACEMENT TOTAL KNEE BILATERAL Bilateral        Family History   Problem Relation Age of Onset   • Cancer Mother    • Cancer Father        Social History     Social History   • Marital status:      Spouse name: N/A   • Number of children: N/A   • Years of education: N/A     Social History Main Topics   • Smoking status: Never Smoker   • Smokeless tobacco: Never Used   • Alcohol use No   • Drug use: No   • Sexual activity: Defer     Other Topics Concern   • None     Social History Narrative           Objective   Physical Exam   Constitutional: She appears well-developed and well-nourished.   In pain   HENT:   Head: Normocephalic and atraumatic.   Right Ear: External ear normal.   Left Ear: External ear normal.   Eyes: Conjunctivae are normal.   Cardiovascular: Normal rate, regular rhythm and normal heart sounds.    No murmur heard.  Pulmonary/Chest: Effort normal and breath sounds normal. No respiratory distress.   Musculoskeletal: Normal range of motion. She exhibits no edema or tenderness.   Incision down lumbar area looks normal with minimal redness around staples and no discharge or swelling.  Lower extremity is normal with no tenderness to the extremity itself including the calf or upper leg.  Neurovascular status is normal.   Neurological: She is alert. She has normal reflexes.   Skin: Skin is warm and dry. No rash noted.   Psychiatric: She has a normal mood and affect. Her behavior is normal. Judgment and thought content normal.   Nursing note and vitals  reviewed.      Procedures        Recent Results (from the past 24 hour(s))   Comprehensive Metabolic Panel    Collection Time: 11/24/17  1:04 PM   Result Value Ref Range    Glucose 104 (H) 70 - 100 mg/dL    BUN 17 9 - 23 mg/dL    Creatinine 0.90 0.60 - 1.30 mg/dL    Sodium 140 132 - 146 mmol/L    Potassium 3.1 (L) 3.5 - 5.5 mmol/L    Chloride 98 (L) 99 - 109 mmol/L    CO2 31.0 20.0 - 31.0 mmol/L    Calcium 9.2 8.7 - 10.4 mg/dL    Total Protein 6.6 5.7 - 8.2 g/dL    Albumin 4.10 3.20 - 4.80 g/dL    ALT (SGPT) 10 7 - 40 U/L    AST (SGOT) 16 0 - 33 U/L    Alkaline Phosphatase 113 (H) 25 - 100 U/L    Total Bilirubin 0.5 0.3 - 1.2 mg/dL    eGFR Non African Amer 61 >60 mL/min/1.73    Globulin 2.5 gm/dL    A/G Ratio 1.6 1.5 - 2.5 g/dL    BUN/Creatinine Ratio 18.9 7.0 - 25.0    Anion Gap 11.0 3.0 - 11.0 mmol/L   Sedimentation Rate    Collection Time: 11/24/17  1:04 PM   Result Value Ref Range    Sed Rate 60 (H) 0 - 30 mm/hr   C-reactive Protein    Collection Time: 11/24/17  1:04 PM   Result Value Ref Range    C-Reactive Protein 3.34 (H) 0.00 - 1.00 mg/dL   CBC Auto Differential    Collection Time: 11/24/17  1:04 PM   Result Value Ref Range    WBC 9.05 3.50 - 10.80 10*3/mm3    RBC 3.40 (L) 3.89 - 5.14 10*6/mm3    Hemoglobin 10.2 (L) 11.5 - 15.5 g/dL    Hematocrit 32.8 (L) 34.5 - 44.0 %    MCV 96.5 80.0 - 99.0 fL    MCH 30.0 27.0 - 31.0 pg    MCHC 31.1 (L) 32.0 - 36.0 g/dL    RDW 14.4 11.3 - 14.5 %    RDW-SD 51.3 37.0 - 54.0 fl    MPV 9.3 6.0 - 12.0 fL    Platelets 359 150 - 450 10*3/mm3    Neutrophil % 61.9 41.0 - 71.0 %    Lymphocyte % 28.7 24.0 - 44.0 %    Monocyte % 5.7 0.0 - 12.0 %    Eosinophil % 3.3 (H) 0.0 - 3.0 %    Basophil % 0.3 0.0 - 1.0 %    Immature Grans % 0.1 0.0 - 0.6 %    Neutrophils, Absolute 5.59 1.50 - 8.30 10*3/mm3    Lymphocytes, Absolute 2.60 0.60 - 4.80 10*3/mm3    Monocytes, Absolute 0.52 0.00 - 1.00 10*3/mm3    Eosinophils, Absolute 0.30 0.00 - 0.30 10*3/mm3    Basophils, Absolute 0.03 0.00 - 0.20  "10*3/mm3    Immature Grans, Absolute 0.01 0.00 - 0.03 10*3/mm3     Note: In addition to lab results from this visit, the labs listed above may include labs taken at another facility or during a different encounter within the last 24 hours. Please correlate lab times with ED admission and discharge times for further clarification of the services performed during this visit.    MRI Lumbar Spine With & Without Contrast   Final Result   1. Postoperative changes from L2 to S1 posterior lumbar interbody fusion   with postsurgical changes of the posterior soft tissues.   2. Fluid collection within the posterior soft tissues spanning the L3-S1   levels which extends to epidural location at the L2-L3 and L3-L4 levels.   Postcontrast administration sequences demonstrate minimal peripheral   enhancement of the superior portions without thick rind which may   represent developing phlegmonous process or early abscess formation   without mature abscess identified. Sterility of this collection cannot   be determined from imaging alone, most consistent with postsurgical   collection.   3. Corrugated appearance of the distal cauda equina specifically past   the L2-L3 level concerning for arachnoiditis in the setting of recent   instrumentation.   4. Multilevel spondylitic changes with limited visualization however   severe spinal canal narrowing noted at the L2-L3 and L3-L4 levels with   disc osteophyte complexes and posterior element degenerative changes.        DICTATED:     11/24/2017   EDITED:          11/24/2017           This report was finalized on 11/24/2017 3:06 PM by Dr. Benji Valera.            Vitals:    11/24/17 1017 11/24/17 1300 11/24/17 1530   BP: 134/84 108/74 104/74   BP Location: Left arm     Patient Position: Sitting     Pulse: 76 63 71   Resp: 26     Temp: 98.1 °F (36.7 °C)     TempSrc: Oral     SpO2: 100% 97% 94%   Weight: 180 lb (81.6 kg)     Height: 64\" (162.6 cm)       Medications   HYDROmorphone (DILAUDID) " injection 0.5 mg (0.5 mg Intravenous Given 11/24/17 1308)   ondansetron (ZOFRAN) injection 4 mg (4 mg Intravenous Given 11/24/17 1308)   gadobenate dimeglumine (MULTIHANCE) injection 17 mL (17 mL Intravenous Given 11/24/17 1358)     ECG/EMG Results (last 24 hours)     ** No results found for the last 24 hours. **          ED Course  ED Course   Comment By Time   I spoke with the radiologist Dr. Valera as well as Dr. Henderson who is on call for Dr. Man.  H&P and results were given.  Dr. Henderson read through the findings as well as reading the MRI images himself.  His conclusion is that these are normal postoperative changes.  Given this, and the fact that the patient is afebrile, nontoxic, and has a normal white blood cell count suggest that there is no postoperative infection at this time.  He recommended the patient go home, more pain medication written, and that she follow up with Dr. Man Monday morning.  If however, the patient has any fever or pain worsens she is to come back. KESHA Perez 11/24 [id number]                  [facility]    Final diagnoses:   Postoperative pain   Low back pain, unspecified back pain laterality, unspecified chronicity, with sciatica presence unspecified            KESHA Perez  11/24/17 2551

## 2017-11-24 NOTE — DISCHARGE INSTRUCTIONS
Follow-up with Dr. Man this Monday.  Return if pain worsens, fever, loss of bowel or bladder control, or problems sooner.

## 2017-12-29 ENCOUNTER — HOSPITAL ENCOUNTER (EMERGENCY)
Facility: HOSPITAL | Age: 72
Discharge: HOME | End: 2017-12-29
Attending: EMERGENCY MEDICINE | Admitting: EMERGENCY MEDICINE

## 2017-12-29 ENCOUNTER — APPOINTMENT (OUTPATIENT)
Dept: GENERAL RADIOLOGY | Facility: HOSPITAL | Age: 72
End: 2017-12-29

## 2017-12-29 VITALS
OXYGEN SATURATION: 98 % | RESPIRATION RATE: 16 BRPM | HEIGHT: 63 IN | DIASTOLIC BLOOD PRESSURE: 57 MMHG | HEART RATE: 73 BPM | TEMPERATURE: 98.4 F | SYSTOLIC BLOOD PRESSURE: 107 MMHG | WEIGHT: 170 LBS | BODY MASS INDEX: 30.12 KG/M2

## 2017-12-29 DIAGNOSIS — M54.50 ACUTE MIDLINE LOW BACK PAIN WITHOUT SCIATICA: Primary | ICD-10-CM
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
## 2017-12-29 PROCEDURE — 72100 X-RAY EXAM L-S SPINE 2/3 VWS: CPT

## 2017-12-29 PROCEDURE — 96372 THER/PROPH/DIAG INJ SC/IM: CPT

## 2017-12-29 PROCEDURE — 25010000002 FENTANYL CITRATE (PF) 100 MCG/2ML SOLUTION: Performed by: EMERGENCY MEDICINE

## 2017-12-29 PROCEDURE — 99285 EMERGENCY DEPT VISIT HI MDM: CPT

## 2017-12-29 PROCEDURE — 73030 X-RAY EXAM OF SHOULDER: CPT

## 2017-12-29 RX ORDER — DIAZEPAM 5 MG/1
5 TABLET ORAL ONCE
Status: COMPLETED | OUTPATIENT
Start: 2017-12-29 | End: 2017-12-29

## 2017-12-29 RX ORDER — FENTANYL CITRATE 50 UG/ML
100 INJECTION, SOLUTION INTRAMUSCULAR; INTRAVENOUS ONCE
Status: COMPLETED | OUTPATIENT
Start: 2017-12-29 | End: 2017-12-29

## 2017-12-29 RX ORDER — OXYCODONE AND ACETAMINOPHEN 7.5; 325 MG/1; MG/1
1-2 TABLET ORAL EVERY 6 HOURS PRN
Qty: 10 TABLET | Refills: 0 | Status: SHIPPED | OUTPATIENT
Start: 2017-12-29 | End: 2018-06-06

## 2017-12-29 RX ORDER — OXYCODONE AND ACETAMINOPHEN 7.5; 325 MG/1; MG/1
1 TABLET ORAL ONCE
Status: COMPLETED | OUTPATIENT
Start: 2017-12-29 | End: 2017-12-29

## 2017-12-29 RX ADMIN — OXYCODONE HYDROCHLORIDE AND ACETAMINOPHEN 1 TABLET: 7.5; 325 TABLET ORAL at 17:14

## 2017-12-29 RX ADMIN — DIAZEPAM 5 MG: 5 TABLET ORAL at 14:37

## 2017-12-29 RX ADMIN — FENTANYL CITRATE 100 MCG: 50 INJECTION INTRAMUSCULAR; INTRAVENOUS at 14:32

## 2018-06-06 ENCOUNTER — APPOINTMENT (OUTPATIENT)
Dept: GENERAL RADIOLOGY | Facility: HOSPITAL | Age: 73
End: 2018-06-06

## 2018-06-06 ENCOUNTER — HOSPITAL ENCOUNTER (EMERGENCY)
Facility: HOSPITAL | Age: 73
Discharge: HOME | End: 2018-06-06
Attending: EMERGENCY MEDICINE | Admitting: EMERGENCY MEDICINE

## 2018-06-06 ENCOUNTER — APPOINTMENT (OUTPATIENT)
Dept: CT IMAGING | Facility: HOSPITAL | Age: 73
End: 2018-06-06

## 2018-06-06 VITALS
HEIGHT: 64 IN | OXYGEN SATURATION: 98 % | RESPIRATION RATE: 20 BRPM | DIASTOLIC BLOOD PRESSURE: 65 MMHG | HEART RATE: 80 BPM | SYSTOLIC BLOOD PRESSURE: 138 MMHG | TEMPERATURE: 97.4 F | BODY MASS INDEX: 30.73 KG/M2 | WEIGHT: 180 LBS

## 2018-06-06 DIAGNOSIS — R25.1 TREMOR: ICD-10-CM

## 2018-06-06 DIAGNOSIS — T50.905A ADVERSE EFFECT OF DRUG, INITIAL ENCOUNTER: ICD-10-CM

## 2018-06-06 DIAGNOSIS — R19.7 DIARRHEA, UNSPECIFIED TYPE: ICD-10-CM

## 2018-06-06 DIAGNOSIS — Z87.440 HISTORY OF UTI: ICD-10-CM

## 2018-06-06 DIAGNOSIS — R10.84 DIFFUSE ABDOMINAL PAIN: Primary | ICD-10-CM

## 2018-06-06 LAB
ALBUMIN SERPL-MCNC: 4.52 G/DL (ref 3.2–4.8)
ALBUMIN/GLOB SERPL: 1.8 G/DL (ref 1.5–2.5)
ALP SERPL-CCNC: 110 U/L (ref 25–100)
ALT SERPL W P-5'-P-CCNC: 18 U/L (ref 7–40)
ANION GAP SERPL CALCULATED.3IONS-SCNC: 11 MMOL/L (ref 3–11)
AST SERPL-CCNC: 25 U/L (ref 0–33)
BACTERIA UR QL AUTO: ABNORMAL /HPF
BASOPHILS # BLD AUTO: 0.02 10*3/MM3 (ref 0–0.2)
BASOPHILS NFR BLD AUTO: 0.2 % (ref 0–1)
BILIRUB SERPL-MCNC: 0.7 MG/DL (ref 0.3–1.2)
BILIRUB UR QL STRIP: NEGATIVE
BUN BLD-MCNC: 25 MG/DL (ref 9–23)
BUN/CREAT SERPL: 23.1 (ref 7–25)
CALCIUM SPEC-SCNC: 9.4 MG/DL (ref 8.7–10.4)
CHLORIDE SERPL-SCNC: 104 MMOL/L (ref 99–109)
CLARITY UR: ABNORMAL
CO2 SERPL-SCNC: 27 MMOL/L (ref 20–31)
COLOR UR: YELLOW
CREAT BLD-MCNC: 1.08 MG/DL (ref 0.6–1.3)
DEPRECATED RDW RBC AUTO: 48.9 FL (ref 37–54)
EOSINOPHIL # BLD AUTO: 0.13 10*3/MM3 (ref 0–0.3)
EOSINOPHIL NFR BLD AUTO: 1.6 % (ref 0–3)
ERYTHROCYTE [DISTWIDTH] IN BLOOD BY AUTOMATED COUNT: 13.7 % (ref 11.3–14.5)
GFR SERPL CREATININE-BSD FRML MDRD: 50 ML/MIN/1.73
GLOBULIN UR ELPH-MCNC: 2.6 GM/DL
GLUCOSE BLD-MCNC: 162 MG/DL (ref 70–100)
GLUCOSE UR STRIP-MCNC: NEGATIVE MG/DL
HCT VFR BLD AUTO: 42.5 % (ref 34.5–44)
HGB BLD-MCNC: 13.5 G/DL (ref 11.5–15.5)
HGB UR QL STRIP.AUTO: NEGATIVE
HOLD SPECIMEN: NORMAL
HOLD SPECIMEN: NORMAL
HYALINE CASTS UR QL AUTO: ABNORMAL /LPF
IMM GRANULOCYTES # BLD: 0.01 10*3/MM3 (ref 0–0.03)
IMM GRANULOCYTES NFR BLD: 0.1 % (ref 0–0.6)
KETONES UR QL STRIP: ABNORMAL
LEUKOCYTE ESTERASE UR QL STRIP.AUTO: ABNORMAL
LIPASE SERPL-CCNC: 30 U/L (ref 6–51)
LYMPHOCYTES # BLD AUTO: 2.32 10*3/MM3 (ref 0.6–4.8)
LYMPHOCYTES NFR BLD AUTO: 28.2 % (ref 24–44)
MCH RBC QN AUTO: 30.7 PG (ref 27–31)
MCHC RBC AUTO-ENTMCNC: 31.8 G/DL (ref 32–36)
MCV RBC AUTO: 96.6 FL (ref 80–99)
MONOCYTES # BLD AUTO: 0.35 10*3/MM3 (ref 0–1)
MONOCYTES NFR BLD AUTO: 4.3 % (ref 0–12)
NEUTROPHILS # BLD AUTO: 5.39 10*3/MM3 (ref 1.5–8.3)
NEUTROPHILS NFR BLD AUTO: 65.6 % (ref 41–71)
NITRITE UR QL STRIP: NEGATIVE
PH UR STRIP.AUTO: <=5 [PH] (ref 5–8)
PLATELET # BLD AUTO: 210 10*3/MM3 (ref 150–450)
PMV BLD AUTO: 10.7 FL (ref 6–12)
POTASSIUM BLD-SCNC: 3.8 MMOL/L (ref 3.5–5.5)
PROT SERPL-MCNC: 7.1 G/DL (ref 5.7–8.2)
PROT UR QL STRIP: ABNORMAL
RBC # BLD AUTO: 4.4 10*6/MM3 (ref 3.89–5.14)
RBC # UR: ABNORMAL /HPF
REF LAB TEST METHOD: ABNORMAL
SODIUM BLD-SCNC: 142 MMOL/L (ref 132–146)
SP GR UR STRIP: 1.03 (ref 1–1.03)
SQUAMOUS #/AREA URNS HPF: ABNORMAL /HPF
TROPONIN I SERPL-MCNC: 0.01 NG/ML (ref 0–0.07)
UROBILINOGEN UR QL STRIP: ABNORMAL
WBC NRBC COR # BLD: 8.22 10*3/MM3 (ref 3.5–10.8)
WBC UR QL AUTO: ABNORMAL /HPF
WHOLE BLOOD HOLD SPECIMEN: NORMAL
WHOLE BLOOD HOLD SPECIMEN: NORMAL

## 2018-06-06 PROCEDURE — 25010000002 ONDANSETRON PER 1 MG: Performed by: PHYSICIAN ASSISTANT

## 2018-06-06 PROCEDURE — 83690 ASSAY OF LIPASE: CPT | Performed by: EMERGENCY MEDICINE

## 2018-06-06 PROCEDURE — 93005 ELECTROCARDIOGRAM TRACING: CPT

## 2018-06-06 PROCEDURE — 74176 CT ABD & PELVIS W/O CONTRAST: CPT

## 2018-06-06 PROCEDURE — 99284 EMERGENCY DEPT VISIT MOD MDM: CPT

## 2018-06-06 PROCEDURE — 71045 X-RAY EXAM CHEST 1 VIEW: CPT

## 2018-06-06 PROCEDURE — 84484 ASSAY OF TROPONIN QUANT: CPT

## 2018-06-06 PROCEDURE — 96374 THER/PROPH/DIAG INJ IV PUSH: CPT

## 2018-06-06 PROCEDURE — 96361 HYDRATE IV INFUSION ADD-ON: CPT

## 2018-06-06 PROCEDURE — 80053 COMPREHEN METABOLIC PANEL: CPT | Performed by: EMERGENCY MEDICINE

## 2018-06-06 PROCEDURE — 93005 ELECTROCARDIOGRAM TRACING: CPT | Performed by: EMERGENCY MEDICINE

## 2018-06-06 PROCEDURE — 81001 URINALYSIS AUTO W/SCOPE: CPT | Performed by: EMERGENCY MEDICINE

## 2018-06-06 PROCEDURE — 85025 COMPLETE CBC W/AUTO DIFF WBC: CPT

## 2018-06-06 RX ORDER — ARIPIPRAZOLE 2 MG/1
2 TABLET ORAL DAILY
COMMUNITY
End: 2018-09-10

## 2018-06-06 RX ORDER — SODIUM CHLORIDE 0.9 % (FLUSH) 0.9 %
10 SYRINGE (ML) INJECTION AS NEEDED
Status: DISCONTINUED | OUTPATIENT
Start: 2018-06-06 | End: 2018-06-07 | Stop reason: HOSPADM

## 2018-06-06 RX ORDER — ESCITALOPRAM OXALATE 20 MG/1
20 TABLET ORAL DAILY
COMMUNITY
End: 2018-09-10

## 2018-06-06 RX ORDER — ONDANSETRON 2 MG/ML
4 INJECTION INTRAMUSCULAR; INTRAVENOUS ONCE
Status: COMPLETED | OUTPATIENT
Start: 2018-06-06 | End: 2018-06-06

## 2018-06-06 RX ADMIN — ONDANSETRON 4 MG: 2 INJECTION INTRAMUSCULAR; INTRAVENOUS at 20:42

## 2018-06-06 RX ADMIN — SODIUM CHLORIDE 1000 ML: 9 INJECTION, SOLUTION INTRAVENOUS at 20:22

## 2018-06-06 NOTE — ED PROVIDER NOTES
Subjective   Akua Torres is a 73 y.o.female who presents to the ED with complaints of diarrhea. The patient reports she visited the ED at [facility] 2 weeks ago where she was diagnosed with a kidney infection and started on Bactrim. She then saw her PCP 5 days ago, who switched her to Macrobid. However, she developed severe diarrhea after starting Macrobid so she called her PCP 3 days ago, who told her to stop taking it. She states that then she experienced some relief of her diarrhea after stopping the Macrobid, but she has continued to have intermittent diarrhea with up to 8 episodes a day, which prompted presentation to the ED. She describes her stool to be loose and watery. She denies seeing any blood or mucous in her stool. She also complains of substernal abdominal pain and vomiting. She denies any dysuria, fever, or chills. She has not had any abdominal surgeries in the past. She does have a chronic tremor in her hands and feet. The patient is allergic to Penicillin, Sulfa, and Microbid. There are no other complaints at this time.        History provided by:  Patient and relative  Diarrhea   The primary symptoms include abdominal pain, vomiting and diarrhea. Primary symptoms do not include fever or dysuria. The illness began more than 7 days ago. The onset was sudden. Progression since onset: waxing and waning.   The illness does not include chills. Significant associated medical issues include GERD.       Review of Systems   Constitutional: Negative for chills and fever.   Gastrointestinal: Positive for abdominal pain, diarrhea and vomiting.   Genitourinary: Negative for dysuria.   All other systems reviewed and are negative.      Past Medical History:   Diagnosis Date   • Anxiety    • Anxiety and depression    • Arthritis    • Elevated serum creatinine     SEES DR SMITH EVERY 6 MONTHS- NEPHROLOGIST    • Extremity pain    • GERD (gastroesophageal reflux disease)    • H/O bladder infections      JUST FINISHED MACROBID ON 11-2-17 FOR RECENT UTI   • Hypercholesteremia    • Hypertension    • Joint pain    • Low back pain    • Neck pain    • Rheumatic fever    • Wears glasses        Allergies   Allergen Reactions   • Nsaids Other (See Comments)     KIDNEY DAMAGE   • Quinidine Nausea And Vomiting and Other (See Comments)     FEVER     • Nitrofuran Derivatives Diarrhea   • Bactrim [Sulfamethoxazole-Trimethoprim] Rash   • Penicillins Rash       Past Surgical History:   Procedure Laterality Date   • BACK SURGERY  2004    LUMBAR PER DR MAN    • CARPAL TUNNEL RELEASE Left    • COLONOSCOPY     • FINGER SURGERY Right     3RD FINGER   • HEEL SPUR EXCISION Bilateral    • KNEE ARTHROSCOPY Bilateral    • LUMBAR DISCECTOMY FUSION INSTRUMENTATION N/A 11/10/2017    Procedure: LUMBAR SPINAL FUSION ;  Surgeon: Gopi Man MD;  Location: [facility];  Service:    • REPLACEMENT TOTAL KNEE BILATERAL Bilateral        Family History   Problem Relation Age of Onset   • Cancer Mother    • Cancer Father        Social History     Social History   • Marital status:      Social History Main Topics   • Smoking status: Never Smoker   • Smokeless tobacco: Never Used   • Alcohol use No   • Drug use: No   • Sexual activity: Defer     Other Topics Concern   • Not on file         Objective   Physical Exam   Constitutional: She is oriented to person, place, and time. She appears well-developed and well-nourished.   General weakness   HENT:   Head: Normocephalic and atraumatic.   Nose: Nose normal.   Eyes: Conjunctivae are normal. No scleral icterus.   Neck: Normal range of motion. Neck supple.   Cardiovascular: Normal rate, regular rhythm and normal heart sounds.    No murmur heard.  Pulmonary/Chest: Effort normal and breath sounds normal. No respiratory distress.   Abdominal: Soft. Bowel sounds are increased. There is tenderness. There is no CVA tenderness.   Hyperactive bowel sounds. Mild diffuse abdominal TTP.    Musculoskeletal: Normal range of motion. She exhibits no edema (No pedal edema).   Neurological: She is alert and oriented to person, place, and time. She displays tremor.   General tremor to BUE and BLE. Pt has close follow up with neurology in near future. Generalized weakness.   Skin: Skin is warm and dry.   Psychiatric: She has a normal mood and affect. Her behavior is normal.   Nursing note and vitals reviewed.      Procedures         ED Course  ED Course as of Jun 06 2211 Wed Jun 06, 2018   2140 ER workup revealed normal CBC and chemistries.  Lipase is normal at 30.  Troponin is 0.01.  Chest x-ray showed no active disease.  Urinalysis revealed 3-5 white blood cells, no bacteria, and negative nitrite.  CT scan of the abdomen and pelvis without contrast revealed no evidence of acute intra-abdominal or intrapelvic pathology.  Exam is stable.  Suspect adverse side effects from recent antibiotics.  We did order stool studies, including fecal leukocytes and C. difficile, as well as stool culture, but patient has been unable to provide a specimen during the ER evaluation.  She needs to follow up closely with her PCP, Dr. Orr, and provide a sample if diarrhea persists.  [FC]   2159 Discussed all results with patient and spouse.  Patient says that she is feeling much better after IV fluids.  She still cannot provide a stool specimen.  She will follow-up closely with PCP for recheck in 2-3 days.  She also has some antinausea medication at home and does not need anything at the time of discharge.  [FC]      ED Course User Index  [FC] Reba Beck PA-C     Recent Results (from the past 24 hour(s))   Comprehensive Metabolic Panel    Collection Time: 06/06/18  7:36 PM   Result Value Ref Range    Glucose 162 (H) 70 - 100 mg/dL    BUN 25 (H) 9 - 23 mg/dL    Creatinine 1.08 0.60 - 1.30 mg/dL    Sodium 142 132 - 146 mmol/L    Potassium 3.8 3.5 - 5.5 mmol/L    Chloride 104 99 - 109 mmol/L    CO2 27.0 20.0 - 31.0 mmol/L     Calcium 9.4 8.7 - 10.4 mg/dL    Total Protein 7.1 5.7 - 8.2 g/dL    Albumin 4.52 3.20 - 4.80 g/dL    ALT (SGPT) 18 7 - 40 U/L    AST (SGOT) 25 0 - 33 U/L    Alkaline Phosphatase 110 (H) 25 - 100 U/L    Total Bilirubin 0.7 0.3 - 1.2 mg/dL    eGFR Non African Amer 50 (L) >60 mL/min/1.73    Globulin 2.6 gm/dL    A/G Ratio 1.8 1.5 - 2.5 g/dL    BUN/Creatinine Ratio 23.1 7.0 - 25.0    Anion Gap 11.0 3.0 - 11.0 mmol/L   Lipase    Collection Time: 06/06/18  7:36 PM   Result Value Ref Range    Lipase 30 6 - 51 U/L   Light Blue Top    Collection Time: 06/06/18  7:36 PM   Result Value Ref Range    Extra Tube hold for add-on    Green Top (Gel)    Collection Time: 06/06/18  7:36 PM   Result Value Ref Range    Extra Tube Hold for add-ons.    Lavender Top    Collection Time: 06/06/18  7:36 PM   Result Value Ref Range    Extra Tube hold for add-on    Gold Top - SST    Collection Time: 06/06/18  7:36 PM   Result Value Ref Range    Extra Tube Hold for add-ons.    CBC Auto Differential    Collection Time: 06/06/18  7:36 PM   Result Value Ref Range    WBC 8.22 3.50 - 10.80 10*3/mm3    RBC 4.40 3.89 - 5.14 10*6/mm3    Hemoglobin 13.5 11.5 - 15.5 g/dL    Hematocrit 42.5 34.5 - 44.0 %    MCV 96.6 80.0 - 99.0 fL    MCH 30.7 27.0 - 31.0 pg    MCHC 31.8 (L) 32.0 - 36.0 g/dL    RDW 13.7 11.3 - 14.5 %    RDW-SD 48.9 37.0 - 54.0 fl    MPV 10.7 6.0 - 12.0 fL    Platelets 210 150 - 450 10*3/mm3    Neutrophil % 65.6 41.0 - 71.0 %    Lymphocyte % 28.2 24.0 - 44.0 %    Monocyte % 4.3 0.0 - 12.0 %    Eosinophil % 1.6 0.0 - 3.0 %    Basophil % 0.2 0.0 - 1.0 %    Immature Grans % 0.1 0.0 - 0.6 %    Neutrophils, Absolute 5.39 1.50 - 8.30 10*3/mm3    Lymphocytes, Absolute 2.32 0.60 - 4.80 10*3/mm3    Monocytes, Absolute 0.35 0.00 - 1.00 10*3/mm3    Eosinophils, Absolute 0.13 0.00 - 0.30 10*3/mm3    Basophils, Absolute 0.02 0.00 - 0.20 10*3/mm3    Immature Grans, Absolute 0.01 0.00 - 0.03 10*3/mm3   POC Troponin, Rapid    Collection Time: 06/06/18  7:40  PM   Result Value Ref Range    Troponin I 0.01 0.00 - 0.07 ng/mL   Urinalysis With / Microscopic If Indicated (No Culture) - Urine, Clean Catch    Collection Time: 06/06/18  7:43 PM   Result Value Ref Range    Color, UA Yellow Yellow, Straw    Appearance, UA Cloudy (A) Clear    pH, UA <=5.0 5.0 - 8.0    Specific Gravity, UA 1.026 1.001 - 1.030    Glucose, UA Negative Negative    Ketones, UA Trace (A) Negative    Bilirubin, UA Negative Negative    Blood, UA Negative Negative    Protein, UA Trace (A) Negative    Leuk Esterase, UA Small (1+) (A) Negative    Nitrite, UA Negative Negative    Urobilinogen, UA 1.0 E.U./dL 0.2 - 1.0 E.U./dL   Urinalysis, Microscopic Only - Urine, Clean Catch    Collection Time: 06/06/18  7:43 PM   Result Value Ref Range    RBC, UA 0-2 None Seen, 0-2 /HPF    WBC, UA 3-5 (A) None Seen, 0-2 /HPF    Bacteria, UA None Seen None Seen, Trace /HPF    Squamous Epithelial Cells, UA 7-12 (A) None Seen, 0-2 /HPF    Hyaline Casts, UA 0-6 0 - 6 /LPF    Methodology Automated Microscopy      Note: In addition to lab results from this visit, the labs listed above may include labs taken at another facility or during a different encounter within the last 24 hours. Please correlate lab times with ED admission and discharge times for further clarification of the services performed during this visit.    CT Abdomen Pelvis Without Contrast   Final Result     No acute findings visualized in the abdomen or pelvis.      THIS DOCUMENT HAS BEEN ELECTRONICALLY SIGNED BY ANA SAHU MD      XR Chest 1 View   Final Result     No acute findings.      THIS DOCUMENT HAS BEEN ELECTRONICALLY SIGNED BY ANA SAHU MD        Vitals:    06/06/18 1924 06/06/18 2030 06/06/18 2100 06/06/18 2130   BP: 177/79 118/60 135/64 138/65   BP Location: Left arm      Patient Position: Sitting      Pulse: 80      Resp: 20      Temp: 97.4 °F (36.3 °C)      TempSrc: Oral      SpO2: 99% 95% 94% 98%   Weight: 81.6 kg (180 lb)      Height: 162.6 cm  "(64\")        Medications   sodium chloride 0.9 % flush 10 mL (not administered)   sodium chloride 0.9 % bolus 1,000 mL (0 mL Intravenous Stopped 6/6/18 2202)   ondansetron (ZOFRAN) injection 4 mg (4 mg Intravenous Given 6/6/18 2042)     ECG/EMG Results (last 24 hours)     Procedure Component Value Units Date/Time    ECG 12 Lead [[id number]] Collected:  06/06/18 1930     Updated:  06/06/18 1931                        MDM    Final diagnoses:   Diffuse abdominal pain   Diarrhea, unspecified type   History of UTI   Adverse effect of drug, initial encounter   Tremor       Documentation assistance provided by carmen Harris.  Information recorded by the carmen was done at my direction and has been verified and validated by me.     Arcelia Harris  06/06/18 2106       Arcelia Harris  06/06/18 2158       Reba Beck PA-C  06/06/18 2211    "

## 2018-06-07 NOTE — DISCHARGE INSTRUCTIONS
ER evaluation showed resolution of acute urinary tract infection.  All other lab work, as well as CT scan of abdomen and pelvis without contrast showed no acute abnormalities.  Patient needs to continue to increase fluid intake.  She has antinausea medications at home and she needs to continue those.  If diarrhea persists, she needs to follow-up with PCP and has stool studies performed.    Immediately return to the ER if symptoms worsen.    Schedule an appointment with Dr. Orr to follow up with current symptoms.    Keep appointment with Dr. Little to follow up with tremor.

## 2018-06-18 ENCOUNTER — HOSPITAL ENCOUNTER (OUTPATIENT)
Dept: PHYSICAL THERAPY | Facility: HOSPITAL | Age: 73
Setting detail: THERAPIES SERIES
Discharge: HOME | End: 2018-06-18

## 2018-06-18 ENCOUNTER — TRANSCRIBE ORDERS (OUTPATIENT)
Dept: PHYSICAL THERAPY | Facility: HOSPITAL | Age: 73
End: 2018-06-18

## 2018-06-18 DIAGNOSIS — M75.102 ROTATOR CUFF SYNDROME OF LEFT SHOULDER: Primary | ICD-10-CM

## 2018-06-18 DIAGNOSIS — M75.102 ROTATOR CUFF SYNDROME, LEFT: Primary | ICD-10-CM

## 2018-06-18 PROCEDURE — G8985 CARRY GOAL STATUS: HCPCS | Performed by: PHYSICAL THERAPIST

## 2018-06-18 PROCEDURE — 97110 THERAPEUTIC EXERCISES: CPT | Performed by: PHYSICAL THERAPIST

## 2018-06-18 PROCEDURE — 97161 PT EVAL LOW COMPLEX 20 MIN: CPT | Performed by: PHYSICAL THERAPIST

## 2018-06-18 PROCEDURE — G8984 CARRY CURRENT STATUS: HCPCS | Performed by: PHYSICAL THERAPIST

## 2018-06-18 NOTE — THERAPY EVALUATION
Outpatient Physical Therapy Ortho Initial Evaluation  [facility]     Patient Name: Akua Torres  : 1945  MRN: [id number]  Today's Date: 2018      Visit Date: 2018    Patient Active Problem List   Diagnosis   • Degenerative disc disease, lumbar   • Lumbar stenosis with neurogenic claudication   • History of lumbar fusion   • Spondylosis of lumbar region without myelopathy or radiculopathy   • Mild obesity   • Physical deconditioning   • Idiopathic gout   • Anxiety and depression   • Greater trochanteric bursitis of both hips   • Status post total bilateral knee replacement   • Back pain   • HTN (hypertension)   • HLD (hyperlipidemia)   • Prediabetes   • S/P lumbar spinal fusion   • Renal insufficiency   • Leukocytosis, likely reactive        Past Medical History:   Diagnosis Date   • Anxiety    • Anxiety and depression    • Arthritis    • Elevated serum creatinine     SEES DR SMITH EVERY 6 MONTHS- NEPHROLOGIST    • Extremity pain    • GERD (gastroesophageal reflux disease)    • H/O bladder infections     JUST FINISHED MACROBID ON 17 FOR RECENT UTI   • Hypercholesteremia    • Hypertension    • Joint pain    • Low back pain    • Neck pain    • Rheumatic fever    • Wears glasses         Past Surgical History:   Procedure Laterality Date   • BACK SURGERY      LUMBAR PER DR THORNTON    • CARPAL TUNNEL RELEASE Left    • COLONOSCOPY     • FINGER SURGERY Right     3RD FINGER   • HEEL SPUR EXCISION Bilateral    • KNEE ARTHROSCOPY Bilateral    • LUMBAR DISCECTOMY FUSION INSTRUMENTATION N/A 11/10/2017    Procedure: LUMBAR SPINAL FUSION ;  Surgeon: Gopi Thornton MD;  Location: [facility];  Service:    • REPLACEMENT TOTAL KNEE BILATERAL Bilateral        Visit Dx:     ICD-10-CM ICD-9-CM   1. Rotator cuff syndrome of left shoulder M75.102 726.10             Patient History     Row Name 18 0900             History    Chief Complaint Pain  -JANE      Type of Pain Shoulder pain  -JANE       "Date Current Problem(s) Began 03/18/18   \"2 to 3 months\"  -JANE      Brief Description of Current Complaint Patient states that she had an insidious onset of left shoulder a few months ago.  The pain is with elevation and is from the neck to the upper arm that is \"sore\" in nature, while the whole arm \"aches\" at times.  Pain is with elevation whether it is weighted or unweighted.  -JANE      Occupation/sports/leisure activities Pt enjoys gardening in her raised bed and watering her flowers. Pt is retired, states spouse lives with her and is retired as well, single level home.  -JANE      How has patient tried to help current problem? voltaren (didn't help)  -JANE      What clinical tests have you had for this problem? X-ray  -JANE      Results of Clinical Tests degenerative  -JANE      Are you or can you be pregnant No  -JANE         Pain     Pain Location Shoulder  -JANE      Pain at Present 5  -JANE      Pain at Best 3  -JANE      Pain at Worst 9  -JANE      What Performance Factors Make the Current Problem(s) WORSE? \"any movement\"  -JANE      What Performance Factors Make the Current Problem(s) BETTER? avoidance, rest  -JANE      Difficulties at work? 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 gardening  -JANE         Fall Risk Assessment    Any falls in the past year: No  -JANE         Services    Do you plan to receive Home Health services in the near future No  -JANE         Daily Activities    Primary Language English  -JANE      Pt Participated in POC and Goals Yes  -JANE         Safety    Are you being hurt, hit, or frightened by anyone at home or in your life? No  -JANE        User Key  (r) = Recorded By, (t) = Taken By, (c) = Cosigned By    Initials Name Provider Type    JANE Cain, PT Physical Therapist                PT Ortho     Row Name 06/18/18 1000       Posture/Observations    Posture/Observations Comments generalized unwillingness to move the LUE.  No aparent " swelling/bruising.  -JANE       Special Tests/Palpation    Special Tests/Palpation Shoulder  -JANE       Shoulder Girdle Palpation    Subacromial Space Left:;Tender  -JANE    AC Joint Left:;Crepitus;Tender  -JANE    Long Head of Biceps Left:;Guarded/taut;Tender  -JANE    Deltoid Left:;Tender  -JANE    Upper Trap Left:;Guarded/taut;Tender  -JANE    Shoulder Girdle Palpation? Yes  -JANE       Shoulder Impingement/Rotator Cuff Special Tests    Jarrell-Rayray Test (RC Lesion vs. Bursitis) Left:;Positive  -JANE    Full Can Test (RC Lesion) Left:;Positive   weak and painful  -JANE    Drop Arm Test (Full Thickness RC Lesion) Negative  -JANE    Shoulder Impingement/Rotator Cuff Special Tests Comments +AC shear  -JANE       Biceps/Labral Special Tests    Athens's Test (Labral Test) --   pain in both positions  -JANE       General ROM    RT Upper Ext Rt Shoulder Flexion;Rt Shoulder External Rotation;Rt Shoulder Internal Rotation;Rt Shoulder ABduction  -JANE    LT Upper Ext Lt Shoulder ABduction;Lt Shoulder Flexion;Lt Shoulder External Rotation;Lt Shoulder Internal Rotation  -JANE       Right Upper Ext    Rt Shoulder Abduction AROM 130  -JANE    Rt Shoulder Flexion AROM 130  -JANE    Rt Shoulder External Rotation AROM C6  -JANE    Rt Shoulder Internal Rotation AROM L3  -JANE       Left Upper Ext    Lt Shoulder Abduction AROM 60  -JANE    Lt Shoulder Abduction PROM 90  -JANE    Lt Shoulder Flexion AROM 60  -JANE    Lt Shoulder Flexion PROM 90  -JANE    Lt Shoulder External Rotation AROM hbh with elbow at side to ear with head turn  -JANE    Lt Shoulder External Rotation PROM 60 at 45 ABD  -JANE    Lt Shoulder Internal Rotation AROM no attempt at HBB  -JANE    Lt Shoulder Internal Rotation PROM 35 at 45 ABD  -JANE       MMT (Manual Muscle Testing)    Additional Documentation General Assessment (Manual Muscle Testing) (Group)  -JANE       General Assessment (Manual Muscle Testing)    Comment, General Manual Muscle Testing (MMT) Assessment rt shoulder 5/5. left ER 4/5 in neutral, 4/5  in IR position, ABD 3/5 in range,   -JANE      User Key  (r) = Recorded By, (t) = Taken By, (c) = Cosigned By    Initials Name Provider Type    JANE Cain PT Physical Therapist                      Therapy Education  Education Details: initiated HEP per exercise flowsheet  Given: HEP  Program: New  How Provided: Verbal, Demonstration, Written  Provided to: Patient  Level of Understanding: Verbalized, Demonstrated           PT OP Goals     Row Name 06/18/18 1100          PT Short Term Goals    STG Date to Achieve 07/09/18  -JANE     STG 1 Patient to demonstrate shoulder elevation in the scapular plane AROM to at least 90 without pain  -JANE     STG 1 Progress New  -JANE     STG 2 Patient to report pre treatment pain </= 3/10  -JANE     STG 2 Progress New  -JANE        Long Term Goals    LTG Date to Achieve 07/16/18  -JANE     LTG 1 Patient to acheive HBH without head turn  -JANE     LTG 1 Progress New  -JANE     LTG 2 Patient to improve Quick Dash score to at least 36%  -JANE     LTG 2 Progress New  -JANE     LTG 3 Patient to acheive shoulder elevation to at least 125 degrees AROM  -JANE     LTG 3 Progress New  -JANE     LTG 4 Patient to acheive hand behind back to at least the belt line  -JANE     LTG 4 Progress New  -JANE        Time Calculation    PT Goal Re-Cert Due Date 09/16/18  -JANE       User Key  (r) = Recorded By, (t) = Taken By, (c) = Cosigned By    Initials Name Provider Type    JANE Cain PT Physical Therapist                PT Assessment/Plan     Row Name 06/18/18 1100          PT Assessment    Functional Limitations Decreased safety during functional activities;Impaired gait;Limitation in home management;Limitations in community activities;Limitations in functional capacity and performance;Performance in leisure activities;Performance in self-care ADL  -JANE     Impairments Endurance;Range of motion;Posture;Poor body mechanics;Pain;Muscle strength;Locomotion;Joint mobility  -JANE     Assessment Comments Patient presents  with 4 month history of progressive left shoulder pain with signs of a probably RTC tear along supraspinatus.  Patient has pain with initiating overhead flexion and with acheiving hand behind back/head.  Pain limits even PROM to at least 90  -JANE     Please refer to paper survey for additional self-reported information Yes  -JANE     Rehab Potential Good  -JANE     Patient/caregiver participated in establishment of treatment plan and goals Yes  -JANE     Patient would benefit from skilled therapy intervention Yes  -JANE        PT Plan    PT Frequency 1x/week   d/t high copay  -JANE     Predicted Duration of Therapy Intervention (Therapy Eval) 6 visits  -JANE     Planned CPT's? PT EVAL LOW COMPLEXITY: 74876;PT THER PROC EA 15 MIN: 63244;PT MANUAL THERAPY EA 15 MIN: 72256;PT HOT OR COLD PACK TREAT MCARE;PT ELECTRICAL STIM UNATTEND: ;PT ULTRASOUND EA 15 MIN: 98391  -JANE     PT Plan Comments ROM activities progressing to controlled stretching/strengthening.  postural exercises, modalities/manual prn  -JANE       User Key  (r) = Recorded By, (t) = Taken By, (c) = Cosigned By    Initials Name Provider Type    JANE Cain, PT Physical Therapist                  Exercises     Row Name 06/18/18 1100             Total Minutes    80334 - PT Therapeutic Exercise Minutes 10  -JANE         Exercise 2    Exercise Name 2 pendelums fwd/back  -JANE      Reps 2 15  -JANE      Additional Comments attempted circles and side to side but had pain and held  -JANE         Exercise 3    Exercise Name 3 table walk outs  -JANE      Reps 3 10  -JANE         Exercise 4    Exercise Name 4 upper trap stretch  -JANE      Sets 4 2  -JANE      Time 4 30 seconds  -JANE        User Key  (r) = Recorded By, (t) = Taken By, (c) = Cosigned By    Initials Name Provider Type    JANE Cain, PT Physical Therapist                        Outcome Measure Options: Quick DASH         Time Calculation:     Therapy Suggested Charges     Code   Minutes Charges    39622 (CPT®)  Pt  Neuromusc Re Education Ea 15 Min      73530 (CPT®) Hc Pt Ther Proc Ea 15 Min 10 1    86514 (CPT®) Hc Gait Training Ea 15 Min      43093 (CPT®) Hc Pt Therapeutic Act Ea 15 Min      28118 (CPT®) Hc Pt Manual Therapy Ea 15 Min      58731 (CPT®) Hc Pt Ther Massage- Per 15 Min      91078 (CPT®) Hc Pt Iontophoresis Ea 15 Min      60916 (CPT®) Hc Pt Elec Stim Ea-Per 15 Min      19710 (CPT®) Hc Pt Ultrasound Ea 15 Min      90136 (CPT®) Hc Pt Self Care/Mgmt/Train Ea 15 Min      Total  10 1          Start Time: 0944     Therapy Charges for Today     Code Description Service Date Service Provider Modifiers Qty    93255708739 HC PT CARRY MOV HAND OBJ CURRENT 6/18/2018 Charli Cain, PT GP, CK 1    68135727919 HC PT CARRY MOV HAND OBJ PROJECTED 6/18/2018 Charli Cain, PT GP, CI 1    39994385751 HC PT THER PROC EA 15 MIN 6/18/2018 Chalri Cain, PT GP 1    51609764769 HC PT EVAL LOW COMPLEXITY 3 6/18/2018 Charli Cain, PT GP 1          PT G-Codes  PT Professional Judgement Used?: Yes  Outcome Measure Options: Quick DASH  Score: 59.09  Functional Limitation: Carrying, moving and handling objects  Carrying, Moving and Handling Objects Current Status (): At least 40 percent but less than 60 percent impaired, limited or restricted  Carrying, Moving and Handling Objects Goal Status (): At least 1 percent but less than 20 percent impaired, limited or restricted         Charli Cain, PT  6/18/2018

## 2018-06-25 ENCOUNTER — HOSPITAL ENCOUNTER (OUTPATIENT)
Dept: PHYSICAL THERAPY | Facility: HOSPITAL | Age: 73
Setting detail: THERAPIES SERIES
Discharge: HOME | End: 2018-06-25

## 2018-06-25 DIAGNOSIS — M75.102 ROTATOR CUFF SYNDROME OF LEFT SHOULDER: Primary | ICD-10-CM

## 2018-06-25 PROCEDURE — 97140 MANUAL THERAPY 1/> REGIONS: CPT | Performed by: PHYSICAL THERAPIST

## 2018-06-25 PROCEDURE — 97110 THERAPEUTIC EXERCISES: CPT | Performed by: PHYSICAL THERAPIST

## 2018-06-25 NOTE — THERAPY TREATMENT NOTE
Outpatient Physical Therapy Ortho Treatment Note   [facility]     Patient Name: Akua Torres  : 1945  MRN: [id number]  Today's Date: 2018      Visit Date: 2018    Visit Dx:    ICD-10-CM ICD-9-CM   1. Rotator cuff syndrome of left shoulder M75.102 726.10       Patient Active Problem List   Diagnosis   • Degenerative disc disease, lumbar   • Lumbar stenosis with neurogenic claudication   • History of lumbar fusion   • Spondylosis of lumbar region without myelopathy or radiculopathy   • Mild obesity   • Physical deconditioning   • Idiopathic gout   • Anxiety and depression   • Greater trochanteric bursitis of both hips   • Status post total bilateral knee replacement   • Back pain   • HTN (hypertension)   • HLD (hyperlipidemia)   • Prediabetes   • S/P lumbar spinal fusion   • Renal insufficiency   • Leukocytosis, likely reactive        Past Medical History:   Diagnosis Date   • Anxiety    • Anxiety and depression    • Arthritis    • Elevated serum creatinine     SEES DR SMITH EVERY 6 MONTHS- NEPHROLOGIST    • Extremity pain    • GERD (gastroesophageal reflux disease)    • H/O bladder infections     JUST FINISHED MACROBID ON 17 FOR RECENT UTI   • Hypercholesteremia    • Hypertension    • Joint pain    • Low back pain    • Neck pain    • Rheumatic fever    • Wears glasses         Past Surgical History:   Procedure Laterality Date   • BACK SURGERY  2004    LUMBAR PER DR THORNTON    • CARPAL TUNNEL RELEASE Left    • COLONOSCOPY     • FINGER SURGERY Right     3RD FINGER   • HEEL SPUR EXCISION Bilateral    • KNEE ARTHROSCOPY Bilateral    • LUMBAR DISCECTOMY FUSION INSTRUMENTATION N/A 11/10/2017    Procedure: LUMBAR SPINAL FUSION ;  Surgeon: Gopi Thornton MD;  Location: [facility];  Service:    • REPLACEMENT TOTAL KNEE BILATERAL Bilateral                              PT Assessment/Plan     Row Name 18 1000          PT Assessment    Assessment Comments Patient demonstrates much  improved ROM and strength than on evaluation.    -JANE        PT Plan    PT Plan Comments add band strengthening exercises to tolerance  -JANE       User Key  (r) = Recorded By, (t) = Taken By, (c) = Cosigned By    Initials Name Provider Type    JANE Cain, PT Physical Therapist                    Exercises     Row Name 06/25/18 1000 06/25/18 0900          Subjective Comments    Subjective Comments Patient states that since she was seen last week she received an injection and her shoulder feels a lot better.  She has been compliant with her exercises and her ROM is improved.  She continues with painful arc.  -JANE  --        Subjective Pain    Able to rate subjective pain? yes  -JANE  --     Pre-Treatment Pain Level 3  -JANE  --     Post-Treatment Pain Level 2  -JANE  --        Total Minutes    96658 - PT Therapeutic Exercise Minutes 15  -JNAE  --     47179 - PT Manual Therapy Minutes  -- 15  -JANE        Exercise 2    Exercise Name 2 pendelums fwd/back and circles  -JANE  --     Reps 2 20 each  -JANE  --        Exercise 3    Exercise Name 3 table walk outs  -JANE  --     Reps 3 10  -JANE  --        Exercise 4    Exercise Name 4 upper trap stretch  -JANE  --     Sets 4 2  -JANE  --     Time 4 30 seconds  -JANE  --        Exercise 5    Exercise Name 5 wand flexion  -JANE  --     Reps 5 15  -JANE  --        Exercise 6    Exercise Name 6 wand ER  -JANE  --     Reps 6 12  -JANE  --        Exercise 7    Exercise Name 7 pulleys flexion  -JANE  --     Time 7 3 minutes  -JANE  --       User Key  (r) = Recorded By, (t) = Taken By, (c) = Cosigned By    Initials Name Provider Type    JANE Cain, PT Physical Therapist                        Manual Rx (last 36 hours)      Manual Treatments     Row Name 06/25/18 0900             Total Minutes    47147 - PT Manual Therapy Minutes 15  -JANE         Manual Rx 1    Manual Rx 1 Location left shoulder  -JANE      Manual Rx 1 Type PROM;joint play;LAD;AP mobilizations  -JANE      Manual Rx 1 Duration 15  -JANE        User  Key  (r) = Recorded By, (t) = Taken By, (c) = Cosigned By    Initials Name Provider Type    JANE Charli Cain, PT Physical Therapist              Therapy Education  Education Details: gave home pulley unit, added wand flexion and ER.  Given: HEP  Program: New  How Provided: Verbal, Demonstration, Written  Provided to: Patient  Level of Understanding: Verbalized, Demonstrated              Time Calculation:   Start Time: 0944  Therapy Suggested Charges     Code   Minutes Charges    63862 (CPT®) Hc Pt Neuromusc Re Education Ea 15 Min      99096 (CPT®) Hc Pt Ther Proc Ea 15 Min 15 1    30729 (CPT®) Hc Gait Training Ea 15 Min      03901 (CPT®) Hc Pt Therapeutic Act Ea 15 Min      31649 (CPT®) Hc Pt Manual Therapy Ea 15 Min 15 1    00946 (CPT®) Hc Pt Ther Massage- Per 15 Min      15694 (CPT®) Hc Pt Iontophoresis Ea 15 Min      76715 (CPT®) Hc Pt Elec Stim Ea-Per 15 Min      94160 (CPT®) Hc Pt Ultrasound Ea 15 Min      40848 (CPT®) Hc Pt Self Care/Mgmt/Train Ea 15 Min      Total  30 2        Therapy Charges for Today     Code Description Service Date Service Provider Modifiers Qty    92249655829 HC PT THER PROC EA 15 MIN 6/25/2018 Charli Cain, PT GP 1    83438441046 HC PT MANUAL THERAPY EA 15 MIN 6/25/2018 Charli Cain, PT GP 1                    Charli Cain, PT  6/25/2018

## 2018-07-05 ENCOUNTER — HOSPITAL ENCOUNTER (OUTPATIENT)
Dept: PHYSICAL THERAPY | Facility: HOSPITAL | Age: 73
Setting detail: THERAPIES SERIES
Discharge: HOME | End: 2018-07-05

## 2018-07-05 DIAGNOSIS — M75.102 ROTATOR CUFF SYNDROME OF LEFT SHOULDER: Primary | ICD-10-CM

## 2018-07-05 PROCEDURE — 97110 THERAPEUTIC EXERCISES: CPT | Performed by: PHYSICAL THERAPIST

## 2018-07-05 PROCEDURE — 97140 MANUAL THERAPY 1/> REGIONS: CPT | Performed by: PHYSICAL THERAPIST

## 2018-07-05 NOTE — THERAPY TREATMENT NOTE
Outpatient Physical Therapy Ortho Treatment Note   [facility]     Patient Name: Akua Torres  : 1945  MRN: [id number]  Today's Date: 2018      Visit Date: 2018    Visit Dx:    ICD-10-CM ICD-9-CM   1. Rotator cuff syndrome of left shoulder M75.102 726.10       Patient Active Problem List   Diagnosis   • Degenerative disc disease, lumbar   • Lumbar stenosis with neurogenic claudication   • History of lumbar fusion   • Spondylosis of lumbar region without myelopathy or radiculopathy   • Mild obesity   • Physical deconditioning   • Idiopathic gout   • Anxiety and depression   • Greater trochanteric bursitis of both hips   • Status post total bilateral knee replacement   • Back pain   • HTN (hypertension)   • HLD (hyperlipidemia)   • Prediabetes   • S/P lumbar spinal fusion   • Renal insufficiency   • Leukocytosis, likely reactive        Past Medical History:   Diagnosis Date   • Anxiety    • Anxiety and depression    • Arthritis    • Elevated serum creatinine     SEES DR SMITH EVERY 6 MONTHS- NEPHROLOGIST    • Extremity pain    • GERD (gastroesophageal reflux disease)    • H/O bladder infections     JUST FINISHED MACROBID ON 17 FOR RECENT UTI   • Hypercholesteremia    • Hypertension    • Joint pain    • Low back pain    • Neck pain    • Rheumatic fever    • Wears glasses         Past Surgical History:   Procedure Laterality Date   • BACK SURGERY  2004    LUMBAR PER DR THORNTON    • CARPAL TUNNEL RELEASE Left    • COLONOSCOPY     • FINGER SURGERY Right     3RD FINGER   • HEEL SPUR EXCISION Bilateral    • KNEE ARTHROSCOPY Bilateral    • LUMBAR DISCECTOMY FUSION INSTRUMENTATION N/A 11/10/2017    Procedure: LUMBAR SPINAL FUSION ;  Surgeon: Gopi Thornton MD;  Location: [facility];  Service:    • REPLACEMENT TOTAL KNEE BILATERAL Bilateral                              PT Assessment/Plan     Row Name 18 1100          PT Assessment    Assessment Comments Patient demonstrates much  "improved pain and ROM.  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  -JANE        Subjective Pain    Able to rate subjective pain?  -- yes  -JANE     Pre-Treatment Pain Level  -- 2  -JANE     Post-Treatment Pain Level  -- 1  -JANE        Total Minutes    61885 - PT Therapeutic Exercise Minutes  -- 30  -JANE     92152 - PT Manual Therapy Minutes 10  -JANE  --        Exercise 3    Exercise Name 3  -- table walk outs  -JANE     Reps 3  -- 10  -JANE     Time 3  -- 5 seconds hold at end  -JANE        Exercise 4    Exercise Name 4  -- upper trap stretch  -JANE     Sets 4  -- 2  -JANE     Time 4  -- 30 seconds  -JANE        Exercise 5    Exercise Name 5  -- wand flexion  -JANE     Reps 5  -- 15  -JANE        Exercise 6    Exercise Name 6  -- wand ER  -JANE     Reps 6  -- 15  -JANE        Exercise 7    Exercise Name 7  -- pulleys flexion  -JANE     Time 7  -- 3 minutes  -JANE        Exercise 8    Exercise Name 8  -- red band ER  -JANE     Reps 8  -- 15  -JANE        Exercise 9    Exercise Name 9  -- red band IR  -JANE     Reps 9  -- 15  -JANE        Exercise 10    Exercise Name 10  -- red band mid rows  -JANE     Reps 10  -- 15  -JANE        Exercise 11    Exercise Name 11  -- red band low rows  -JANE     Reps 11  -- 15  -JANE       User Key  (r) = Recorded By, (t) = Taken By, (c) = Cosigned By    Initials Name Provider Type    JANE Cain, PT Physical Therapist                        Manual Rx (last 36 hours)      Manual Treatments     Row Name 07/05/18 1100             Total Minutes    28676 - PT Manual Therapy Minutes 10  -JANE         Manual Rx 1    Manual Rx 1 Location left shoulder  -JANE  "     Manual Rx 1 Type PROM;joint play;LAD;AP mobilizations  -JANE      Manual Rx 1 Duration 10 minutes  -JNAE        User Key  (r) = Recorded By, (t) = Taken By, (c) = Cosigned By    Initials Name Provider Type    JANE Cain, PT Physical Therapist              Therapy Education  Education Details: gave red band and added ER/IR and mid/low rows  Given: HEP  Program: New  How Provided: Verbal, Demonstration, Written  Provided to: Patient  Level of Understanding: Verbalized, Demonstrated              Time Calculation:   Start Time: 0945  Therapy Suggested Charges     Code   Minutes Charges    78351 (CPT®) Hc Pt Neuromusc Re Education Ea 15 Min      80201 (CPT®) Hc Pt Ther Proc Ea 15 Min      32938 (CPT®) Hc Gait Training Ea 15 Min      80147 (CPT®) Hc Pt Therapeutic Act Ea 15 Min      79814 (CPT®) Hc Pt Manual Therapy Ea 15 Min 10 1    65396 (CPT®) Hc Pt Ther Massage- Per 15 Min      03878 (CPT®) Hc Pt Iontophoresis Ea 15 Min      48123 (CPT®) Hc Pt Elec Stim Ea-Per 15 Min      45814 (CPT®) Hc Pt Ultrasound Ea 15 Min      36274 (CPT®) Hc Pt Self Care/Mgmt/Train Ea 15 Min      Total  10 1        Therapy Charges for Today     Code Description Service Date Service Provider Modifiers Qty    15175467458 HC PT MANUAL THERAPY EA 15 MIN 7/5/2018 Charli Cain, PT GP 1    04937364972 HC PT THER PROC EA 15 MIN 7/5/2018 Charli Cain, PT GP 2                    Charli Cain, PT  7/5/2018

## 2018-07-16 ENCOUNTER — HOSPITAL ENCOUNTER (OUTPATIENT)
Dept: PHYSICAL THERAPY | Facility: HOSPITAL | Age: 73
Setting detail: THERAPIES SERIES
Discharge: HOME | End: 2018-07-16

## 2018-07-16 DIAGNOSIS — M75.102 ROTATOR CUFF SYNDROME OF LEFT SHOULDER: Primary | ICD-10-CM

## 2018-07-16 PROCEDURE — G8985 CARRY GOAL STATUS: HCPCS | Performed by: PHYSICAL THERAPIST

## 2018-07-16 PROCEDURE — 97110 THERAPEUTIC EXERCISES: CPT | Performed by: PHYSICAL THERAPIST

## 2018-07-16 PROCEDURE — G8984 CARRY CURRENT STATUS: HCPCS | Performed by: PHYSICAL THERAPIST

## 2018-07-16 NOTE — THERAPY PROGRESS REPORT/RE-CERT
Outpatient Physical Therapy Ortho Re-Assessment  [facility]     Patient Name: Akua Torres  : 1945  MRN: [id number]  Today's Date: 2018      Visit Date: 2018    Patient Active Problem List   Diagnosis   • Degenerative disc disease, lumbar   • Lumbar stenosis with neurogenic claudication   • History of lumbar fusion   • Spondylosis of lumbar region without myelopathy or radiculopathy   • Mild obesity   • Physical deconditioning   • Idiopathic gout   • Anxiety and depression   • Greater trochanteric bursitis of both hips   • Status post total bilateral knee replacement   • Back pain   • HTN (hypertension)   • HLD (hyperlipidemia)   • Prediabetes   • S/P lumbar spinal fusion   • Renal insufficiency   • Leukocytosis, likely reactive        Past Medical History:   Diagnosis Date   • Anxiety    • Anxiety and depression    • Arthritis    • Elevated serum creatinine     SEES DR SMITH EVERY 6 MONTHS- NEPHROLOGIST    • Extremity pain    • GERD (gastroesophageal reflux disease)    • H/O bladder infections     JUST FINISHED MACROBID ON 17 FOR RECENT UTI   • Hypercholesteremia    • Hypertension    • Joint pain    • Low back pain    • Neck pain    • Rheumatic fever    • Wears glasses         Past Surgical History:   Procedure Laterality Date   • BACK SURGERY      LUMBAR PER DR THORNTON    • CARPAL TUNNEL RELEASE Left    • COLONOSCOPY     • FINGER SURGERY Right     3RD FINGER   • HEEL SPUR EXCISION Bilateral    • KNEE ARTHROSCOPY Bilateral    • LUMBAR DISCECTOMY FUSION INSTRUMENTATION N/A 11/10/2017    Procedure: LUMBAR SPINAL FUSION ;  Surgeon: Gopi Thornton MD;  Location: [facility];  Service:    • REPLACEMENT TOTAL KNEE BILATERAL Bilateral        Visit Dx:     ICD-10-CM ICD-9-CM   1. Rotator cuff syndrome of left shoulder M75.102 726.10                 PT Ortho     Row Name 18 0900       Posture/Observations    Posture/Observations Comments fwd shoulder and head posture.   resting tremors noted worse in sitting/standing than supine.  -JANE       Shoulder Girdle Palpation    Subacromial Space --   nontender  -JANE    AC Joint --   nontender  -JANE    Long Head of Biceps --   nontender  -JANE    Deltoid Guarded/taut  -JANE    Upper Trap Guarded/taut  -JANE       Shoulder Impingement/Rotator Cuff Special Tests    Jarrell-Rayray Test (RC Lesion vs. Bursitis) Left:;Positive  -JANE    Neer Impingement Test (RC Lesion vs. Bursitis) Negative  -JANE    Empty Can Test (RC Lesion) Negative  -JANE    Speed's Test (LH of Biceps Lesion) Negative  -JANE       Biceps/Labral Special Tests    [place]'s Test (Labral Test) Negative  -JANE       Left Upper Ext    Lt Shoulder Abduction AROM 95  -JANE    Lt Shoulder Abduction PROM WNL  -JANE    Lt Shoulder Flexion AROM 115  -JANE    Lt Shoulder Flexion PROM WNL  -JANE    Lt Shoulder External Rotation AROM HBH without head turn  -JANE    Lt Shoulder External Rotation PROM 80 at 80 ABD  -JANE    Lt Shoulder Internal Rotation AROM HBB to T11  -JANE    Lt Shoulder Internal Rotation PROM WNL  -JANE       General Assessment (Manual Muscle Testing)    Comment, General Manual Muscle Testing (MMT) Assessment left ER 4+/5 in neutral, 4+/5 in IR position, 5/5 IR and ABD, FC weak and painful  -JANE      User Key  (r) = Recorded By, (t) = Taken By, (c) = Cosigned By    Initials Name Provider Type    JANE Cain, PT Physical Therapist                      Therapy Education  Education Details: added pulley ABD and band ABD/FLX  Given: HEP  Program: New  How Provided: Verbal, Demonstration, Written  Provided to: Patient  Level of Understanding: Verbalized, Demonstrated           PT OP Goals     Row Name 07/16/18 0900          PT Short Term Goals    STG Date to Achieve 07/09/18  -JANE     STG 1 Patient to demonstrate shoulder elevation in the scapular plane AROM to at least 90 without pain  -JANE     STG 1 Progress Met  -JANE     STG 2 Patient to report pre treatment pain </= 3/10  -JANE     STG 2 Progress Met   -JANE        Long Term Goals    LTG Date to Achieve 07/16/18  -JANE     LTG 1 Patient to acheive HBH without head turn  -JANE     LTG 1 Progress Met  -JANE     LTG 2 Patient to improve Quick Dash score to at least 36%  -JANE     LTG 2 Progress Met  -JANE     LTG 2 Progress Comments 18.18%  -JANE     LTG 3 Patient to acheive shoulder elevation to at least 125 degrees AROM  -JANE     LTG 3 Progress Progressing  -JANE     LTG 3 Progress Comments 115  -JANE     LTG 4 Patient to acheive hand behind back to at least the belt line  -JANE     LTG 4 Progress Met  -JANE       User Key  (r) = Recorded By, (t) = Taken By, (c) = Cosigned By    Initials Name Provider Type    JANE Cain, PT Physical Therapist                PT Assessment/Plan     Row Name 07/16/18 1000          PT Assessment    Assessment Comments Patient demonstrates significant improvement in her overall reactivity/ROM and strength.  She continues to be limited in active shoulder abduction that is not limted passively.  She is compliant with her HEP and has made signfiicant improvements  -JANE        PT Plan    Predicted Duration of Therapy Intervention (Therapy Eval) 2 vsiits  -JANE     Planned CPT's? PT THER PROC EA 15 MIN: 31437;PT MANUAL THERAPY EA 15 MIN: 11218;PT HOT OR COLD PACK TREAT MCARE  -JANE     PT Plan Comments follow up in two week for finalization and progression if indicated.  At that time d/c will be anticipated.  -JANE       User Key  (r) = Recorded By, (t) = Taken By, (c) = Cosigned By    Initials Name Provider Type    JANE Cain, PT Physical Therapist                  Exercises     Row Name 07/16/18 0900             Subjective Comments    Subjective Comments Patient states that she is doing better every day.  She notes that she thinks the exercises from last session may have made her a little sore.  -JANE         Subjective Pain    Able to rate subjective pain? yes  -JANE      Pre-Treatment Pain Level 2  -JANE      Subjective Pain Comment only while raising  -JANE          Total Minutes    35727 - PT Therapeutic Exercise Minutes 26  -JANE      00968 - PT Manual Therapy Minutes 5  -JANE         Exercise 1    Exercise Name 1 reassessment performed today with functional testing including strength and ROM.  -JANE         Exercise 5    Exercise Name 5 wand flexion  -JANE      Reps 5 15  -JANE         Exercise 6    Exercise Name 6 wand ER  -JANE      Reps 6 15  -JANE         Exercise 7    Exercise Name 7 pulleys ABD  -JANE      Time 7 3 minutes  -JANE         Exercise 8    Exercise Name 8 red band ER  -JANE      Reps 8 15  -JANE         Exercise 9    Exercise Name 9 red band IR  -JANE      Reps 9 15  -JANE         Exercise 10    Exercise Name 10 red band mid rows  -JANE      Reps 10 15  -JANE         Exercise 11    Exercise Name 11 red band low rows  -JANE      Reps 11 15  -JANE         Exercise 12    Exercise Name 12 red band ABD  -JANE      Reps 12 15  -JANE         Exercise 13    Exercise Name 13 red band FLX  -JANE      Reps 13 15  -JANE        User Key  (r) = Recorded By, (t) = Taken By, (c) = Cosigned By    Initials Name Provider Type    JANE Cain, PT Physical Therapist           Manual Rx (last 36 hours)      Manual Treatments     Row Name 07/16/18 0900             Total Minutes    52676 - PT Manual Therapy Minutes 5  -JANE         Manual Rx 1    Manual Rx 1 Location left shoulder  -JANE      Manual Rx 1 Type PROM;joint play;LAD;AP mobilizations  -JANE      Manual Rx 1 Duration 5 mins  -JANE        User Key  (r) = Recorded By, (t) = Taken By, (c) = Cosigned By    Initials Name Provider Type    JANE Cain, PT Physical Therapist                      Outcome Measure Options: Quick DASH         Time Calculation:     Therapy Suggested Charges     Code   Minutes Charges    53949 (CPT®) Hc Pt Neuromusc Re Education Ea 15 Min      58447 (CPT®) Hc Pt Ther Proc Ea 15 Min 26 2    35982 (CPT®) Hc Gait Training Ea 15 Min      35148 (CPT®) Hc Pt Therapeutic Act Ea 15 Min      47215 (CPT®) Hc Pt Manual Therapy Ea 15 Min 5      92976 (CPT®) Hc Pt Ther Massage- Per 15 Min      11085 (CPT®) Hc Pt Iontophoresis Ea 15 Min      70356 (CPT®) Hc Pt Elec Stim Ea-Per 15 Min      66219 (CPT®) Hc Pt Ultrasound Ea 15 Min      29426 (CPT®) Hc Pt Self Care/Mgmt/Train Ea 15 Min      Total  31 2          Start Time: 0945     Therapy Charges for Today     Code Description Service Date Service Provider Modifiers Qty    40526194587 HC PT CARRY MOV HAND OBJ CURRENT 7/16/2018 Charli Cain, PT GP, CI 1    05888365216 HC PT CARRY MOV HAND OBJ PROJECTED 7/16/2018 Charli Cain, PT GP, CI 1    63530090431 HC PT THER PROC EA 15 MIN 7/16/2018 Charli Cain, PT GP 2          PT G-Codes  PT Professional Judgement Used?: Yes  Outcome Measure Options: Quick DASH  Score: 18.18%  Functional Limitation: Carrying, moving and handling objects  Carrying, Moving and Handling Objects Current Status (): At least 1 percent but less than 20 percent impaired, limited or restricted  Carrying, Moving and Handling Objects Goal Status (): At least 1 percent but less than 20 percent impaired, limited or restricted         Charli Cain, PT  7/16/2018

## 2018-07-24 ENCOUNTER — OFFICE VISIT (OUTPATIENT)
Dept: FAMILY MEDICINE CLINIC | Facility: CLINIC | Age: 73
End: 2018-07-24

## 2018-07-24 VITALS
HEART RATE: 66 BPM | TEMPERATURE: 97.7 F | OXYGEN SATURATION: 98 % | WEIGHT: 183 LBS | BODY MASS INDEX: 31.41 KG/M2 | DIASTOLIC BLOOD PRESSURE: 60 MMHG | SYSTOLIC BLOOD PRESSURE: 112 MMHG

## 2018-07-24 DIAGNOSIS — F32.A ANXIETY AND DEPRESSION: ICD-10-CM

## 2018-07-24 DIAGNOSIS — M10.09 IDIOPATHIC GOUT OF MULTIPLE SITES, UNSPECIFIED CHRONICITY: ICD-10-CM

## 2018-07-24 DIAGNOSIS — F41.9 ANXIETY AND DEPRESSION: ICD-10-CM

## 2018-07-24 DIAGNOSIS — E78.2 MIXED HYPERLIPIDEMIA: ICD-10-CM

## 2018-07-24 DIAGNOSIS — Z98.1 HISTORY OF LUMBAR FUSION: ICD-10-CM

## 2018-07-24 DIAGNOSIS — E66.9 MILD OBESITY: ICD-10-CM

## 2018-07-24 DIAGNOSIS — R25.2 LEG CRAMPS: Primary | ICD-10-CM

## 2018-07-24 DIAGNOSIS — R25.1 TREMORS OF NERVOUS SYSTEM: ICD-10-CM

## 2018-07-24 DIAGNOSIS — I10 ESSENTIAL HYPERTENSION: ICD-10-CM

## 2018-07-24 DIAGNOSIS — Z96.653 STATUS POST TOTAL BILATERAL KNEE REPLACEMENT: ICD-10-CM

## 2018-07-24 DIAGNOSIS — M48.062 LUMBAR STENOSIS WITH NEUROGENIC CLAUDICATION: ICD-10-CM

## 2018-07-24 PROCEDURE — 99214 OFFICE O/P EST MOD 30 MIN: CPT | Performed by: FAMILY MEDICINE

## 2018-07-24 RX ORDER — TRAMADOL HYDROCHLORIDE 50 MG/1
50 TABLET ORAL EVERY 8 HOURS SCHEDULED
COMMUNITY
End: 2018-09-12 | Stop reason: HOSPADM

## 2018-07-24 NOTE — PROGRESS NOTES
Subjective   Akua Torres is a 73 y.o. female    Patient presents today as a new patient to this practice.  Her previous primary care physician was Dr. Orr at the [facility].  Her primary concerns today are her tremor and leg cramps.  She has an appointment tomorrow with her neurologist regarding her tremor.  She was placed on gabapentin 300 mg 3 times a day for cramping approximately 3 years ago but doesn't think it has significantly improved her symptoms and she still will scream out in pain with cramps in her legs.  These generally occur while she is seated.  She points to her inner thigh areas as her primary area of cramps.  She does not have typical cramping in the calves or feet at night.  She is uncertain if any lab testing has been done to check for causes.  She does get regular lab work particularly following her chronic kidney disease so I suspect she is at least had calcium levels checked as well as potassium sodium and chloride.  In anticipation of the neurologist ordering some lab work will bring out my orders today so that she can combine whatever needs to be done and avoid duplication.    Other chronic medical problems include hyperlipidemia, hypertension, obesity, chronic back pain with previous lumbar fusion surgery, chronic kidney disease, gout, anxiety and depression, osteoarthritis with previous bilateral total knee replacement surgeries.  Medication list is reconciled.  No refills are needed today.  She will be due for her annual Medicare wellness in October.        The following portions of the patient's history were reviewed and updated as appropriate: allergies, current medications, past social history and problem list    Review of Systems   Constitutional: Negative.  Negative for chills, fatigue, fever and unexpected weight change.   HENT: Negative for trouble swallowing and voice change.    Respiratory: Negative.  Negative for cough, chest tightness, shortness of breath and  wheezing.    Cardiovascular: Negative for chest pain, palpitations and leg swelling.   Gastrointestinal: Negative.  Negative for abdominal pain, nausea and vomiting.   Endocrine: Negative for polydipsia, polyphagia and polyuria.   Genitourinary: Negative.  Negative for dysuria, frequency and urgency.   Musculoskeletal: Positive for back pain. Negative for arthralgias, gait problem and myalgias.   Skin: Negative for color change and rash.   Neurological: Negative for dizziness, tremors, syncope, weakness, numbness and headaches.   Hematological: Negative for adenopathy. Does not bruise/bleed easily.   Psychiatric/Behavioral: Negative for dysphoric mood. The patient is not nervous/anxious.        Objective     Vitals:    07/24/18 1304   BP: 112/60   Pulse: 66   Temp: 97.7 °F (36.5 °C)   SpO2: 98%       Physical Exam   Constitutional: She is oriented to person, place, and time. She appears well-developed and well-nourished.   HENT:   Head: Normocephalic.   Mouth/Throat: Oropharynx is clear and moist.   Eyes: Pupils are equal, round, and reactive to light. Conjunctivae are normal.   Neck: Neck supple. No JVD present. No thyromegaly present.   Cardiovascular: Normal rate, regular rhythm, normal heart sounds, intact distal pulses and normal pulses.    No murmur heard.  Pulmonary/Chest: Effort normal and breath sounds normal. No respiratory distress.   Abdominal: Soft. Bowel sounds are normal. There is no hepatosplenomegaly. There is no tenderness.   Musculoskeletal: She exhibits no edema.   Lymphadenopathy:     She has no cervical adenopathy.   Neurological: She is alert and oriented to person, place, and time. She has normal reflexes. She displays tremor. No cranial nerve deficit or sensory deficit.   Skin: Skin is warm and dry. No rash noted.   Psychiatric: She has a normal mood and affect. Her behavior is normal.   Nursing note and vitals reviewed.      Assessment/Plan   Problem List Items Addressed This Visit         Cardiovascular and Mediastinum    HTN (hypertension)    HLD (hyperlipidemia)       Digestive    Mild obesity       Other    Lumbar stenosis with neurogenic claudication    History of lumbar fusion    Idiopathic gout    Anxiety and depression    Status post total bilateral knee replacement      Other Visit Diagnoses     Leg cramps    -  Primary    Relevant Orders    Comprehensive Metabolic Panel    TSH    T4, Free    Magnesium    Vitamin B12    Calcium, Ionized    C-reactive Protein    Sedimentation Rate    Tremors of nervous system        Relevant Orders    Comprehensive Metabolic Panel    TSH    T4, Free    Magnesium    Vitamin B12    Calcium, Ionized    C-reactive Protein    Sedimentation Rate

## 2018-07-29 ENCOUNTER — RESULTS ENCOUNTER (OUTPATIENT)
Dept: FAMILY MEDICINE CLINIC | Facility: CLINIC | Age: 73
End: 2018-07-29

## 2018-07-29 DIAGNOSIS — R25.2 LEG CRAMPS: ICD-10-CM

## 2018-07-29 DIAGNOSIS — R25.1 TREMORS OF NERVOUS SYSTEM: ICD-10-CM

## 2018-08-16 ENCOUNTER — TELEPHONE (OUTPATIENT)
Dept: FAMILY MEDICINE CLINIC | Facility: CLINIC | Age: 73
End: 2018-08-16

## 2018-08-16 NOTE — TELEPHONE ENCOUNTER
Labs all appear quite good.  There is slight deterioration in her kidney function.  Male copy of labs to patient

## 2018-08-16 NOTE — TELEPHONE ENCOUNTER
----- Message from Anabel Salvador sent at 8/15/2018  4:08 PM EDT -----  Contact: PT  REQUESTING A CALL ABOUT LAB RESULTS, COLLECTED AT [facility] ON 7/26/18. CAN REACH PT [phone]

## 2018-08-20 NOTE — TELEPHONE ENCOUNTER
Spoke with patient about her labs and kidney functions.  She understood and had no other questions.  Did not want copy mailed to her at this time

## 2018-08-30 ENCOUNTER — TRANSCRIBE ORDERS (OUTPATIENT)
Dept: FAMILY MEDICINE CLINIC | Facility: CLINIC | Age: 73
End: 2018-08-30

## 2018-08-30 ENCOUNTER — TELEPHONE (OUTPATIENT)
Dept: FAMILY MEDICINE CLINIC | Facility: CLINIC | Age: 73
End: 2018-08-30

## 2018-08-30 DIAGNOSIS — M25.552 HIP PAIN, BILATERAL: Primary | ICD-10-CM

## 2018-08-30 DIAGNOSIS — M25.551 HIP PAIN, BILATERAL: Primary | ICD-10-CM

## 2018-08-30 NOTE — TELEPHONE ENCOUNTER
----- Message from Concetta Iniguez sent at 8/30/2018 11:03 AM EDT -----  Patient is having bilateral hip pain and wants to see Dr.Kirk turner? I put in the referral/just need the order signed. Patient sees Dr.Van Tony..  Thanks,  Concetta..

## 2018-08-31 ENCOUNTER — DOCUMENTATION (OUTPATIENT)
Dept: PHYSICAL THERAPY | Facility: HOSPITAL | Age: 73
End: 2018-08-31

## 2018-08-31 DIAGNOSIS — M75.102 ROTATOR CUFF SYNDROME OF LEFT SHOULDER: Primary | ICD-10-CM

## 2018-08-31 DIAGNOSIS — Z74.09 IMPAIRED FUNCTIONAL MOBILITY, BALANCE, GAIT, AND ENDURANCE: ICD-10-CM

## 2018-08-31 DIAGNOSIS — M51.36 DEGENERATIVE DISC DISEASE, LUMBAR: ICD-10-CM

## 2018-09-07 ENCOUNTER — HOSPITAL ENCOUNTER (EMERGENCY)
Facility: HOSPITAL | Age: 73
Discharge: HOME | End: 2018-09-08
Attending: EMERGENCY MEDICINE | Admitting: EMERGENCY MEDICINE

## 2018-09-07 ENCOUNTER — APPOINTMENT (OUTPATIENT)
Dept: GENERAL RADIOLOGY | Facility: HOSPITAL | Age: 73
End: 2018-09-07

## 2018-09-07 DIAGNOSIS — G89.29 OTHER CHRONIC PAIN: Primary | ICD-10-CM

## 2018-09-07 DIAGNOSIS — F32.A DEPRESSION, UNSPECIFIED DEPRESSION TYPE: ICD-10-CM

## 2018-09-07 LAB
ALBUMIN SERPL-MCNC: 4.53 G/DL (ref 3.2–4.8)
ALBUMIN/GLOB SERPL: 2.2 G/DL (ref 1.5–2.5)
ALP SERPL-CCNC: 106 U/L (ref 25–100)
ALT SERPL W P-5'-P-CCNC: 12 U/L (ref 7–40)
AMPHET+METHAMPHET UR QL: NEGATIVE
AMPHETAMINES UR QL: NEGATIVE
ANION GAP SERPL CALCULATED.3IONS-SCNC: 7 MMOL/L (ref 3–11)
APAP SERPL-MCNC: <10 MCG/ML (ref 0–30)
AST SERPL-CCNC: 23 U/L (ref 0–33)
BACTERIA UR QL AUTO: ABNORMAL /HPF
BARBITURATES UR QL SCN: NEGATIVE
BASOPHILS # BLD AUTO: 0.02 10*3/MM3 (ref 0–0.2)
BASOPHILS NFR BLD AUTO: 0.2 % (ref 0–1)
BENZODIAZ UR QL SCN: POSITIVE
BILIRUB SERPL-MCNC: 1.2 MG/DL (ref 0.3–1.2)
BILIRUB UR QL STRIP: ABNORMAL
BUN BLD-MCNC: 24 MG/DL (ref 9–23)
BUN/CREAT SERPL: 24.5 (ref 7–25)
BUPRENORPHINE SERPL-MCNC: NEGATIVE NG/ML
CALCIUM SPEC-SCNC: 9.6 MG/DL (ref 8.7–10.4)
CANNABINOIDS SERPL QL: NEGATIVE
CHLORIDE SERPL-SCNC: 100 MMOL/L (ref 99–109)
CLARITY UR: ABNORMAL
CO2 SERPL-SCNC: 31 MMOL/L (ref 20–31)
COCAINE UR QL: NEGATIVE
COLOR UR: ABNORMAL
CREAT BLD-MCNC: 0.98 MG/DL (ref 0.6–1.3)
DEPRECATED RDW RBC AUTO: 50.5 FL (ref 37–54)
EOSINOPHIL # BLD AUTO: 0.12 10*3/MM3 (ref 0–0.3)
EOSINOPHIL NFR BLD AUTO: 1.1 % (ref 0–3)
ERYTHROCYTE [DISTWIDTH] IN BLOOD BY AUTOMATED COUNT: 14.7 % (ref 11.3–14.5)
ETHANOL BLD-MCNC: <10 MG/DL (ref 0–10)
GFR SERPL CREATININE-BSD FRML MDRD: 55 ML/MIN/1.73
GLOBULIN UR ELPH-MCNC: 2.1 GM/DL
GLUCOSE BLD-MCNC: 98 MG/DL (ref 70–100)
GLUCOSE UR STRIP-MCNC: NEGATIVE MG/DL
HCT VFR BLD AUTO: 46.8 % (ref 34.5–44)
HGB BLD-MCNC: 15.3 G/DL (ref 11.5–15.5)
HGB UR QL STRIP.AUTO: NEGATIVE
IMM GRANULOCYTES # BLD: 0.05 10*3/MM3 (ref 0–0.03)
IMM GRANULOCYTES NFR BLD: 0.5 % (ref 0–0.6)
INR PPP: 1.02 (ref 0.91–1.09)
KETONES UR QL STRIP: ABNORMAL
LEUKOCYTE ESTERASE UR QL STRIP.AUTO: NEGATIVE
LYMPHOCYTES # BLD AUTO: 2.57 10*3/MM3 (ref 0.6–4.8)
LYMPHOCYTES NFR BLD AUTO: 24.2 % (ref 24–44)
MAGNESIUM SERPL-MCNC: 1.8 MG/DL (ref 1.3–2.7)
MCH RBC QN AUTO: 30.8 PG (ref 27–31)
MCHC RBC AUTO-ENTMCNC: 32.7 G/DL (ref 32–36)
MCV RBC AUTO: 94.2 FL (ref 80–99)
METHADONE UR QL SCN: NEGATIVE
MONOCYTES # BLD AUTO: 0.77 10*3/MM3 (ref 0–1)
MONOCYTES NFR BLD AUTO: 7.2 % (ref 0–12)
NEUTROPHILS # BLD AUTO: 7.15 10*3/MM3 (ref 1.5–8.3)
NEUTROPHILS NFR BLD AUTO: 67.3 % (ref 41–71)
NITRITE UR QL STRIP: NEGATIVE
OPIATES UR QL: POSITIVE
OXYCODONE UR QL SCN: NEGATIVE
PCP UR QL SCN: NEGATIVE
PH UR STRIP.AUTO: <=5 [PH] (ref 5–8)
PLATELET # BLD AUTO: 201 10*3/MM3 (ref 150–450)
PMV BLD AUTO: 10.5 FL (ref 6–12)
POTASSIUM BLD-SCNC: 4.1 MMOL/L (ref 3.5–5.5)
PROPOXYPH UR QL: NEGATIVE
PROT SERPL-MCNC: 6.6 G/DL (ref 5.7–8.2)
PROT UR QL STRIP: ABNORMAL
PROTHROMBIN TIME: 10.7 SECONDS (ref 9.6–11.5)
RBC # BLD AUTO: 4.97 10*6/MM3 (ref 3.89–5.14)
RBC # UR: ABNORMAL /HPF
REF LAB TEST METHOD: ABNORMAL
SALICYLATES SERPL-MCNC: 1.8 MG/DL (ref 0–29)
SODIUM BLD-SCNC: 138 MMOL/L (ref 132–146)
SP GR UR STRIP: 1.03 (ref 1–1.03)
SQUAMOUS #/AREA URNS HPF: ABNORMAL /HPF
T4 FREE SERPL-MCNC: 1.38 NG/DL (ref 0.89–1.76)
TRICYCLICS UR QL SCN: NEGATIVE
TROPONIN I SERPL-MCNC: 0 NG/ML (ref 0–0.07)
TSH SERPL DL<=0.05 MIU/L-ACNC: 1.25 MIU/ML (ref 0.35–5.35)
UROBILINOGEN UR QL STRIP: ABNORMAL
WBC NRBC COR # BLD: 10.63 10*3/MM3 (ref 3.5–10.8)
WBC UR QL AUTO: ABNORMAL /HPF

## 2018-09-07 PROCEDURE — 93005 ELECTROCARDIOGRAM TRACING: CPT | Performed by: EMERGENCY MEDICINE

## 2018-09-07 PROCEDURE — 71045 X-RAY EXAM CHEST 1 VIEW: CPT

## 2018-09-07 PROCEDURE — 80307 DRUG TEST PRSMV CHEM ANLYZR: CPT | Performed by: EMERGENCY MEDICINE

## 2018-09-07 PROCEDURE — 81001 URINALYSIS AUTO W/SCOPE: CPT | Performed by: EMERGENCY MEDICINE

## 2018-09-07 PROCEDURE — 83735 ASSAY OF MAGNESIUM: CPT | Performed by: EMERGENCY MEDICINE

## 2018-09-07 PROCEDURE — 80053 COMPREHEN METABOLIC PANEL: CPT | Performed by: EMERGENCY MEDICINE

## 2018-09-07 PROCEDURE — 25010000002 HYDROMORPHONE PER 4 MG: Performed by: EMERGENCY MEDICINE

## 2018-09-07 PROCEDURE — 84484 ASSAY OF TROPONIN QUANT: CPT

## 2018-09-07 PROCEDURE — 84443 ASSAY THYROID STIM HORMONE: CPT | Performed by: EMERGENCY MEDICINE

## 2018-09-07 PROCEDURE — 80306 DRUG TEST PRSMV INSTRMNT: CPT | Performed by: EMERGENCY MEDICINE

## 2018-09-07 PROCEDURE — 85610 PROTHROMBIN TIME: CPT | Performed by: EMERGENCY MEDICINE

## 2018-09-07 PROCEDURE — 96374 THER/PROPH/DIAG INJ IV PUSH: CPT

## 2018-09-07 PROCEDURE — 99284 EMERGENCY DEPT VISIT MOD MDM: CPT

## 2018-09-07 PROCEDURE — 85025 COMPLETE CBC W/AUTO DIFF WBC: CPT | Performed by: EMERGENCY MEDICINE

## 2018-09-07 PROCEDURE — 84439 ASSAY OF FREE THYROXINE: CPT | Performed by: EMERGENCY MEDICINE

## 2018-09-07 RX ADMIN — HYDROMORPHONE HYDROCHLORIDE 1 MG: 1 INJECTION, SOLUTION INTRAMUSCULAR; INTRAVENOUS; SUBCUTANEOUS at 22:07

## 2018-09-08 VITALS
HEIGHT: 64 IN | TEMPERATURE: 98.2 F | OXYGEN SATURATION: 97 % | RESPIRATION RATE: 18 BRPM | SYSTOLIC BLOOD PRESSURE: 134 MMHG | WEIGHT: 180 LBS | DIASTOLIC BLOOD PRESSURE: 80 MMHG | HEART RATE: 104 BPM | BODY MASS INDEX: 30.73 KG/M2

## 2018-09-08 NOTE — DISCHARGE INSTRUCTIONS
Follow up at [facility] tomorrow morning for in-patient treatment as discussed.     Return to the ED if you develop any acute or worsening symptoms.

## 2018-09-08 NOTE — ED PROVIDER NOTES
"Subjective   73-year-old female presents for evaluation of depression and chronic pain.  Of note, the patient states that she has a history of chronic pain in her back.  She states that she has been experiencing pain for years.  She states that for the past several months she has been experiencing significant depression secondary to her chronic pain.  She states that \"no one will help me.\"  She denies any suicidality.  No homicidal ideation.  No prior history of suicide attempts.  She lives at home with her spouse who takes good care of her and is a good historian.  He feels safe with her at home.  However, he states that the patient clearly needs help with her depression at this point that she is extremely tearful and has poor quality of life secondary to her chronic pain.        History provided by:  Patient and spouse  Mental Health Problem   Presenting symptoms: depression    Presenting symptoms: no homicidal ideas, no suicidal thoughts, no suicidal threats and no suicide attempt    Patient accompanied by: Spouse.  Onset quality:  Gradual  Timing:  Constant  Relieved by:  Nothing  Ineffective treatments:  None tried  Risk factors: no hx of suicide attempts        Review of Systems   Musculoskeletal: Positive for arthralgias (Shoulder pain) and back pain.   Psychiatric/Behavioral: Negative for homicidal ideas and suicidal ideas.       Past Medical History:   Diagnosis Date   • Anxiety    • Anxiety and depression    • Arthritis    • Cataract    • Depression    • Elevated serum creatinine     SEES DR SMITH EVERY 6 MONTHS- NEPHROLOGIST    • Extremity pain    • GERD (gastroesophageal reflux disease)    • Gout    • H/O bladder infections     JUST FINISHED MACROBID ON 11-2-17 FOR RECENT UTI   • Hypercholesteremia    • Hypertension    • Joint pain    • Kidney disease    • Low back pain    • Neck pain    • Rheumatic fever    • Urinary tract infection    • Wears glasses        Allergies   Allergen Reactions   • Nsaids " Other (See Comments)     KIDNEY DAMAGE   • Quinidine Nausea And Vomiting and Other (See Comments)     FEVER     • Nitrofuran Derivatives Diarrhea   • Bactrim [Sulfamethoxazole-Trimethoprim] Rash   • Penicillins Rash       Past Surgical History:   Procedure Laterality Date   • BACK SURGERY  2004    LUMBAR PER DR MAN    • CARPAL TUNNEL RELEASE Left    • COLONOSCOPY     • EYE SURGERY     • FINGER SURGERY Right     3RD FINGER   • HEEL SPUR EXCISION Bilateral    • KNEE ARTHROSCOPY Bilateral    • LUMBAR DISCECTOMY FUSION INSTRUMENTATION N/A 11/10/2017    Procedure: LUMBAR SPINAL FUSION ;  Surgeon: Gopi Man MD;  Location: [facility];  Service:    • REPLACEMENT TOTAL KNEE BILATERAL Bilateral        Family History   Problem Relation Age of Onset   • Cancer Mother    • Colon polyps Mother    • Hypertension Mother    • Cancer Father    • Hypertension Brother    • Hyperlipidemia Maternal Uncle    • Kidney disease Maternal Uncle        Social History     Social History   • Marital status:      Social History Main Topics   • Smoking status: Never Smoker   • Smokeless tobacco: Never Used   • Alcohol use No   • Drug use: No   • Sexual activity: Defer     Other Topics Concern   • Not on file         Objective   Physical Exam   Constitutional: She is oriented to person, place, and time. She appears well-developed and well-nourished.   Tearful, anxious elderly female   HENT:   Head: Normocephalic and atraumatic.   Mouth/Throat: Oropharynx is clear and moist.   Eyes: Pupils are equal, round, and reactive to light. EOM are normal.   Cardiovascular: Normal rate, regular rhythm and normal heart sounds.  Exam reveals no gallop and no friction rub.    No murmur heard.  Pulmonary/Chest: Effort normal and breath sounds normal. No respiratory distress. She has no wheezes. She has no rales.   Abdominal: Soft. Bowel sounds are normal. She exhibits no distension and no mass. There is no tenderness. There is no rebound and  no guarding.   Musculoskeletal: Normal range of motion. She exhibits no edema.   Neurological: She is alert and oriented to person, place, and time. No cranial nerve deficit.   Skin: Skin is warm and dry. No rash noted. She is not diaphoretic. No erythema.   Psychiatric: Judgment and thought content normal.   Anxious   Nursing note and vitals reviewed.      Procedures         ED Course  ED Course as of Sep 07 2329   Fri Sep 07, 2018   2326 73-year-old female presents for evaluation of chronic pain and depression.  She states that she has been experiencing depression for the past several months secondary to her chronic pain.  On arrival to the ED, patient tearful and anxious but nontoxic appearing.  She denies any SI, HI, ADH, and has no prior history of suicide attempts.  She lives with her  at home and lives in a safe environment.  Benign exam.  Labs unrevealing.  EKG unrevealing.  Patient deemed medically clear.  The patient was evaluated by the mobile  from [facility] who felt that the patient would benefit from inpatient treatment.  However, the patient cannot be admitted after midnight as she is a geriatric patient and this is [facility]'s new policy.  She can't however be admitted first thing tomorrow morning.  Options were discussed with the patient's  who is agreeable to take the patient home tonight and take her to [facility] for inpatient treatment first thing in the morning.  Pain control provided in the ED.  Patient deemed stable for discharge.  She will follow-up tomorrow morning as directed.  Agreeable with plan and given appropriate return precautions.  [DD]      ED Course User Index  [DD] Alen Pulido MD       Recent Results (from the past 24 hour(s))   Comprehensive Metabolic Panel    Collection Time: 09/07/18 10:06 PM   Result Value Ref Range    Glucose 98 70 - 100 mg/dL    BUN 24 (H) 9 - 23 mg/dL    Creatinine 0.98 0.60 - 1.30 mg/dL    Sodium 138 132 - 146 mmol/L     Potassium 4.1 3.5 - 5.5 mmol/L    Chloride 100 99 - 109 mmol/L    CO2 31.0 20.0 - 31.0 mmol/L    Calcium 9.6 8.7 - 10.4 mg/dL    Total Protein 6.6 5.7 - 8.2 g/dL    Albumin 4.53 3.20 - 4.80 g/dL    ALT (SGPT) 12 7 - 40 U/L    AST (SGOT) 23 0 - 33 U/L    Alkaline Phosphatase 106 (H) 25 - 100 U/L    Total Bilirubin 1.2 0.3 - 1.2 mg/dL    eGFR Non African Amer 55 (L) >60 mL/min/1.73    Globulin 2.1 gm/dL    A/G Ratio 2.2 1.5 - 2.5 g/dL    BUN/Creatinine Ratio 24.5 7.0 - 25.0    Anion Gap 7.0 3.0 - 11.0 mmol/L   Protime-INR    Collection Time: 09/07/18 10:06 PM   Result Value Ref Range    Protime 10.7 9.6 - 11.5 Seconds    INR 1.02 0.91 - 1.09   T4, Free    Collection Time: 09/07/18 10:06 PM   Result Value Ref Range    Free T4 1.38 0.89 - 1.76 ng/dL   TSH    Collection Time: 09/07/18 10:06 PM   Result Value Ref Range    TSH 1.248 0.350 - 5.350 mIU/mL   Magnesium    Collection Time: 09/07/18 10:06 PM   Result Value Ref Range    Magnesium 1.8 1.3 - 2.7 mg/dL   Salicylate Level    Collection Time: 09/07/18 10:06 PM   Result Value Ref Range    Salicylate 1.8 0.0 - 29.0 mg/dL   Ethanol    Collection Time: 09/07/18 10:06 PM   Result Value Ref Range    Ethanol <10 0 - 10 mg/dL   Acetaminophen Level    Collection Time: 09/07/18 10:06 PM   Result Value Ref Range    Acetaminophen <10.0 0.0 - 30.0 mcg/mL   CBC Auto Differential    Collection Time: 09/07/18 10:07 PM   Result Value Ref Range    WBC 10.63 3.50 - 10.80 10*3/mm3    RBC 4.97 3.89 - 5.14 10*6/mm3    Hemoglobin 15.3 11.5 - 15.5 g/dL    Hematocrit 46.8 (H) 34.5 - 44.0 %    MCV 94.2 80.0 - 99.0 fL    MCH 30.8 27.0 - 31.0 pg    MCHC 32.7 32.0 - 36.0 g/dL    RDW 14.7 (H) 11.3 - 14.5 %    RDW-SD 50.5 37.0 - 54.0 fl    MPV 10.5 6.0 - 12.0 fL    Platelets 201 150 - 450 10*3/mm3    Neutrophil % 67.3 41.0 - 71.0 %    Lymphocyte % 24.2 24.0 - 44.0 %    Monocyte % 7.2 0.0 - 12.0 %    Eosinophil % 1.1 0.0 - 3.0 %    Basophil % 0.2 0.0 - 1.0 %    Immature Grans % 0.5 0.0 - 0.6 %     Neutrophils, Absolute 7.15 1.50 - 8.30 10*3/mm3    Lymphocytes, Absolute 2.57 0.60 - 4.80 10*3/mm3    Monocytes, Absolute 0.77 0.00 - 1.00 10*3/mm3    Eosinophils, Absolute 0.12 0.00 - 0.30 10*3/mm3    Basophils, Absolute 0.02 0.00 - 0.20 10*3/mm3    Immature Grans, Absolute 0.05 (H) 0.00 - 0.03 10*3/mm3   POC Troponin, Rapid    Collection Time: 09/07/18 10:09 PM   Result Value Ref Range    Troponin I 0.00 0.00 - 0.07 ng/mL   Urinalysis With Microscopic If Indicated (No Culture) - Urine, Catheter    Collection Time: 09/07/18 10:14 PM   Result Value Ref Range    Color, UA Dark Yellow (A) Yellow, Straw    Appearance, UA Cloudy (A) Clear    pH, UA <=5.0 5.0 - 8.0    Specific Gravity, UA 1.033 (H) 1.001 - 1.030    Glucose, UA Negative Negative    Ketones, UA Trace (A) Negative    Bilirubin, UA Moderate (2+) (A) Negative    Blood, UA Negative Negative    Protein, UA Trace (A) Negative    Leuk Esterase, UA Negative Negative    Nitrite, UA Negative Negative    Urobilinogen, UA 1.0 E.U./dL 0.2 - 1.0 E.U./dL   Urine Drug Screen - Urine, Clean Catch    Collection Time: 09/07/18 10:14 PM   Result Value Ref Range    THC, Screen, Urine Negative Negative    Phencyclidine (PCP), Urine Negative Negative    Cocaine Screen, Urine Negative Negative    Methamphetamine, Urine Negative Negative    Opiate Screen Positive (A) Negative    Amphetamine Screen, Urine Negative Negative    Benzodiazepine Screen, Urine Positive (A) Negative    Tricyclic Antidepressants Screen Negative Negative    Methadone Screen, Urine Negative Negative    Barbiturates Screen, Urine Negative Negative    Oxycodone Screen, Urine Negative Negative    Propoxyphene Screen Negative Negative    Buprenorphine, Screen, Urine Negative Negative   Urinalysis, Microscopic Only - Urine, Clean Catch    Collection Time: 09/07/18 10:14 PM   Result Value Ref Range    RBC, UA 3-6 (A) None Seen, 0-2 /HPF    WBC, UA 0-2 None Seen, 0-2 /HPF    Bacteria, UA None Seen None Seen, Trace  "/HPF    Squamous Epithelial Cells, UA 3-6 (A) None Seen, 0-2 /HPF    Methodology Manual Light Microscopy      Note: In addition to lab results from this visit, the labs listed above may include labs taken at another facility or during a different encounter within the last 24 hours. Please correlate lab times with ED admission and discharge times for further clarification of the services performed during this visit.    XR Chest 1 View   Final Result     No acute abnormality.      THIS DOCUMENT HAS BEEN ELECTRONICALLY SIGNED BY CATIA NAILS MD        Vitals:    09/07/18 1852 09/07/18 1858 09/07/18 2230   BP:  145/86 137/84   BP Location:  Left arm    Patient Position:  Sitting    Pulse:  104    Resp:  18    Temp:  98.2 °F (36.8 °C)    TempSrc: Oral Oral    SpO2:  98%    Weight:  81.6 kg (180 lb)    Height:  162.6 cm (64\")      Medications   HYDROmorphone (DILAUDID) injection 1 mg (1 mg Intravenous Given 9/7/18 2207)     ECG/EMG Results (last 24 hours)     Procedure Component Value Units Date/Time    ECG 12 Lead [[id number]] Collected:  09/07/18 2158     Updated:  09/07/18 2200                  Recent Results (from the past 24 hour(s))   Comprehensive Metabolic Panel    Collection Time: 09/07/18 10:06 PM   Result Value Ref Range    Glucose 98 70 - 100 mg/dL    BUN 24 (H) 9 - 23 mg/dL    Creatinine 0.98 0.60 - 1.30 mg/dL    Sodium 138 132 - 146 mmol/L    Potassium 4.1 3.5 - 5.5 mmol/L    Chloride 100 99 - 109 mmol/L    CO2 31.0 20.0 - 31.0 mmol/L    Calcium 9.6 8.7 - 10.4 mg/dL    Total Protein 6.6 5.7 - 8.2 g/dL    Albumin 4.53 3.20 - 4.80 g/dL    ALT (SGPT) 12 7 - 40 U/L    AST (SGOT) 23 0 - 33 U/L    Alkaline Phosphatase 106 (H) 25 - 100 U/L    Total Bilirubin 1.2 0.3 - 1.2 mg/dL    eGFR Non African Amer 55 (L) >60 mL/min/1.73    Globulin 2.1 gm/dL    A/G Ratio 2.2 1.5 - 2.5 g/dL    BUN/Creatinine Ratio 24.5 7.0 - 25.0    Anion Gap 7.0 3.0 - 11.0 mmol/L   Protime-INR    Collection Time: 09/07/18 10:06 PM   Result " Value Ref Range    Protime 10.7 9.6 - 11.5 Seconds    INR 1.02 0.91 - 1.09   T4, Free    Collection Time: 09/07/18 10:06 PM   Result Value Ref Range    Free T4 1.38 0.89 - 1.76 ng/dL   TSH    Collection Time: 09/07/18 10:06 PM   Result Value Ref Range    TSH 1.248 0.350 - 5.350 mIU/mL   Magnesium    Collection Time: 09/07/18 10:06 PM   Result Value Ref Range    Magnesium 1.8 1.3 - 2.7 mg/dL   Salicylate Level    Collection Time: 09/07/18 10:06 PM   Result Value Ref Range    Salicylate 1.8 0.0 - 29.0 mg/dL   Ethanol    Collection Time: 09/07/18 10:06 PM   Result Value Ref Range    Ethanol <10 0 - 10 mg/dL   Acetaminophen Level    Collection Time: 09/07/18 10:06 PM   Result Value Ref Range    Acetaminophen <10.0 0.0 - 30.0 mcg/mL   CBC Auto Differential    Collection Time: 09/07/18 10:07 PM   Result Value Ref Range    WBC 10.63 3.50 - 10.80 10*3/mm3    RBC 4.97 3.89 - 5.14 10*6/mm3    Hemoglobin 15.3 11.5 - 15.5 g/dL    Hematocrit 46.8 (H) 34.5 - 44.0 %    MCV 94.2 80.0 - 99.0 fL    MCH 30.8 27.0 - 31.0 pg    MCHC 32.7 32.0 - 36.0 g/dL    RDW 14.7 (H) 11.3 - 14.5 %    RDW-SD 50.5 37.0 - 54.0 fl    MPV 10.5 6.0 - 12.0 fL    Platelets 201 150 - 450 10*3/mm3    Neutrophil % 67.3 41.0 - 71.0 %    Lymphocyte % 24.2 24.0 - 44.0 %    Monocyte % 7.2 0.0 - 12.0 %    Eosinophil % 1.1 0.0 - 3.0 %    Basophil % 0.2 0.0 - 1.0 %    Immature Grans % 0.5 0.0 - 0.6 %    Neutrophils, Absolute 7.15 1.50 - 8.30 10*3/mm3    Lymphocytes, Absolute 2.57 0.60 - 4.80 10*3/mm3    Monocytes, Absolute 0.77 0.00 - 1.00 10*3/mm3    Eosinophils, Absolute 0.12 0.00 - 0.30 10*3/mm3    Basophils, Absolute 0.02 0.00 - 0.20 10*3/mm3    Immature Grans, Absolute 0.05 (H) 0.00 - 0.03 10*3/mm3   POC Troponin, Rapid    Collection Time: 09/07/18 10:09 PM   Result Value Ref Range    Troponin I 0.00 0.00 - 0.07 ng/mL   Urinalysis With Microscopic If Indicated (No Culture) - Urine, Catheter    Collection Time: 09/07/18 10:14 PM   Result Value Ref Range    Color,  UA Dark Yellow (A) Yellow, Straw    Appearance, UA Cloudy (A) Clear    pH, UA <=5.0 5.0 - 8.0    Specific Gravity, UA 1.033 (H) 1.001 - 1.030    Glucose, UA Negative Negative    Ketones, UA Trace (A) Negative    Bilirubin, UA Moderate (2+) (A) Negative    Blood, UA Negative Negative    Protein, UA Trace (A) Negative    Leuk Esterase, UA Negative Negative    Nitrite, UA Negative Negative    Urobilinogen, UA 1.0 E.U./dL 0.2 - 1.0 E.U./dL   Urine Drug Screen - Urine, Clean Catch    Collection Time: 09/07/18 10:14 PM   Result Value Ref Range    THC, Screen, Urine Negative Negative    Phencyclidine (PCP), Urine Negative Negative    Cocaine Screen, Urine Negative Negative    Methamphetamine, Urine Negative Negative    Opiate Screen Positive (A) Negative    Amphetamine Screen, Urine Negative Negative    Benzodiazepine Screen, Urine Positive (A) Negative    Tricyclic Antidepressants Screen Negative Negative    Methadone Screen, Urine Negative Negative    Barbiturates Screen, Urine Negative Negative    Oxycodone Screen, Urine Negative Negative    Propoxyphene Screen Negative Negative    Buprenorphine, Screen, Urine Negative Negative   Urinalysis, Microscopic Only - Urine, Clean Catch    Collection Time: 09/07/18 10:14 PM   Result Value Ref Range    RBC, UA 3-6 (A) None Seen, 0-2 /HPF    WBC, UA 0-2 None Seen, 0-2 /HPF    Bacteria, UA None Seen None Seen, Trace /HPF    Squamous Epithelial Cells, UA 3-6 (A) None Seen, 0-2 /HPF    Methodology Manual Light Microscopy      Note: In addition to lab results from this visit, the labs listed above may include labs taken at another facility or during a different encounter within the last 24 hours. Please correlate lab times with ED admission and discharge times for further clarification of the services performed during this visit.    XR Chest 1 View   Final Result     No acute abnormality.      THIS DOCUMENT HAS BEEN ELECTRONICALLY SIGNED BY CATIA NAILS MD        Vitals:    09/07/18  "1852 09/07/18 1858 09/07/18 2230   BP:  145/86 137/84   BP Location:  Left arm    Patient Position:  Sitting    Pulse:  104    Resp:  18    Temp:  98.2 °F (36.8 °C)    TempSrc: Oral Oral    SpO2:  98%    Weight:  81.6 kg (180 lb)    Height:  162.6 cm (64\")      Medications   HYDROmorphone (DILAUDID) injection 1 mg (1 mg Intravenous Given 9/7/18 2207)     ECG/EMG Results (last 24 hours)     Procedure Component Value Units Date/Time    ECG 12 Lead [[id number]] Collected:  09/07/18 2158     Updated:  09/07/18 2200          Recent Results (from the past 24 hour(s))   Comprehensive Metabolic Panel    Collection Time: 09/07/18 10:06 PM   Result Value Ref Range    Glucose 98 70 - 100 mg/dL    BUN 24 (H) 9 - 23 mg/dL    Creatinine 0.98 0.60 - 1.30 mg/dL    Sodium 138 132 - 146 mmol/L    Potassium 4.1 3.5 - 5.5 mmol/L    Chloride 100 99 - 109 mmol/L    CO2 31.0 20.0 - 31.0 mmol/L    Calcium 9.6 8.7 - 10.4 mg/dL    Total Protein 6.6 5.7 - 8.2 g/dL    Albumin 4.53 3.20 - 4.80 g/dL    ALT (SGPT) 12 7 - 40 U/L    AST (SGOT) 23 0 - 33 U/L    Alkaline Phosphatase 106 (H) 25 - 100 U/L    Total Bilirubin 1.2 0.3 - 1.2 mg/dL    eGFR Non African Amer 55 (L) >60 mL/min/1.73    Globulin 2.1 gm/dL    A/G Ratio 2.2 1.5 - 2.5 g/dL    BUN/Creatinine Ratio 24.5 7.0 - 25.0    Anion Gap 7.0 3.0 - 11.0 mmol/L   Protime-INR    Collection Time: 09/07/18 10:06 PM   Result Value Ref Range    Protime 10.7 9.6 - 11.5 Seconds    INR 1.02 0.91 - 1.09   T4, Free    Collection Time: 09/07/18 10:06 PM   Result Value Ref Range    Free T4 1.38 0.89 - 1.76 ng/dL   TSH    Collection Time: 09/07/18 10:06 PM   Result Value Ref Range    TSH 1.248 0.350 - 5.350 mIU/mL   Magnesium    Collection Time: 09/07/18 10:06 PM   Result Value Ref Range    Magnesium 1.8 1.3 - 2.7 mg/dL   Salicylate Level    Collection Time: 09/07/18 10:06 PM   Result Value Ref Range    Salicylate 1.8 0.0 - 29.0 mg/dL   Ethanol    Collection Time: 09/07/18 10:06 PM   Result Value Ref Range    " Ethanol <10 0 - 10 mg/dL   Acetaminophen Level    Collection Time: 09/07/18 10:06 PM   Result Value Ref Range    Acetaminophen <10.0 0.0 - 30.0 mcg/mL   CBC Auto Differential    Collection Time: 09/07/18 10:07 PM   Result Value Ref Range    WBC 10.63 3.50 - 10.80 10*3/mm3    RBC 4.97 3.89 - 5.14 10*6/mm3    Hemoglobin 15.3 11.5 - 15.5 g/dL    Hematocrit 46.8 (H) 34.5 - 44.0 %    MCV 94.2 80.0 - 99.0 fL    MCH 30.8 27.0 - 31.0 pg    MCHC 32.7 32.0 - 36.0 g/dL    RDW 14.7 (H) 11.3 - 14.5 %    RDW-SD 50.5 37.0 - 54.0 fl    MPV 10.5 6.0 - 12.0 fL    Platelets 201 150 - 450 10*3/mm3    Neutrophil % 67.3 41.0 - 71.0 %    Lymphocyte % 24.2 24.0 - 44.0 %    Monocyte % 7.2 0.0 - 12.0 %    Eosinophil % 1.1 0.0 - 3.0 %    Basophil % 0.2 0.0 - 1.0 %    Immature Grans % 0.5 0.0 - 0.6 %    Neutrophils, Absolute 7.15 1.50 - 8.30 10*3/mm3    Lymphocytes, Absolute 2.57 0.60 - 4.80 10*3/mm3    Monocytes, Absolute 0.77 0.00 - 1.00 10*3/mm3    Eosinophils, Absolute 0.12 0.00 - 0.30 10*3/mm3    Basophils, Absolute 0.02 0.00 - 0.20 10*3/mm3    Immature Grans, Absolute 0.05 (H) 0.00 - 0.03 10*3/mm3   POC Troponin, Rapid    Collection Time: 09/07/18 10:09 PM   Result Value Ref Range    Troponin I 0.00 0.00 - 0.07 ng/mL   Urinalysis With Microscopic If Indicated (No Culture) - Urine, Catheter    Collection Time: 09/07/18 10:14 PM   Result Value Ref Range    Color, UA Dark Yellow (A) Yellow, Straw    Appearance, UA Cloudy (A) Clear    pH, UA <=5.0 5.0 - 8.0    Specific Gravity, UA 1.033 (H) 1.001 - 1.030    Glucose, UA Negative Negative    Ketones, UA Trace (A) Negative    Bilirubin, UA Moderate (2+) (A) Negative    Blood, UA Negative Negative    Protein, UA Trace (A) Negative    Leuk Esterase, UA Negative Negative    Nitrite, UA Negative Negative    Urobilinogen, UA 1.0 E.U./dL 0.2 - 1.0 E.U./dL   Urine Drug Screen - Urine, Clean Catch    Collection Time: 09/07/18 10:14 PM   Result Value Ref Range    THC, Screen, Urine Negative Negative     "Phencyclidine (PCP), Urine Negative Negative    Cocaine Screen, Urine Negative Negative    Methamphetamine, Urine Negative Negative    Opiate Screen Positive (A) Negative    Amphetamine Screen, Urine Negative Negative    Benzodiazepine Screen, Urine Positive (A) Negative    Tricyclic Antidepressants Screen Negative Negative    Methadone Screen, Urine Negative Negative    Barbiturates Screen, Urine Negative Negative    Oxycodone Screen, Urine Negative Negative    Propoxyphene Screen Negative Negative    Buprenorphine, Screen, Urine Negative Negative   Urinalysis, Microscopic Only - Urine, Clean Catch    Collection Time: 09/07/18 10:14 PM   Result Value Ref Range    RBC, UA 3-6 (A) None Seen, 0-2 /HPF    WBC, UA 0-2 None Seen, 0-2 /HPF    Bacteria, UA None Seen None Seen, Trace /HPF    Squamous Epithelial Cells, UA 3-6 (A) None Seen, 0-2 /HPF    Methodology Manual Light Microscopy      Note: In addition to lab results from this visit, the labs listed above may include labs taken at another facility or during a different encounter within the last 24 hours. Please correlate lab times with ED admission and discharge times for further clarification of the services performed during this visit.    XR Chest 1 View   Final Result     No acute abnormality.      THIS DOCUMENT HAS BEEN ELECTRONICALLY SIGNED BY CATIA NAILS MD        Vitals:    09/07/18 1858 09/07/18 2230 09/07/18 2330 09/07/18 2357   BP: 145/86 137/84 134/80    BP Location: Left arm      Patient Position: Sitting      Pulse: 104      Resp: 18      Temp: 98.2 °F (36.8 °C)      TempSrc: Oral      SpO2: 98%   97%   Weight: 81.6 kg (180 lb)      Height: 162.6 cm (64\")        Medications   HYDROmorphone (DILAUDID) injection 1 mg (1 mg Intravenous Given 9/7/18 2207)     ECG/EMG Results (last 24 hours)     Procedure Component Value Units Date/Time    ECG 12 Lead [[id number]] Collected:  09/07/18 2158     Updated:  09/07/18 2200          MDM    Final diagnoses:   Other " chronic pain   Depression, unspecified depression type       Documentation assistance provided by carmen Harris.  Information recorded by the carmen was done at my direction and has been verified and validated by me.     Arcelia Harris  09/07/18 [id number]       Arcelia Harris  09/07/18 1642       Alen Pulido MD  09/08/18 0136

## 2018-09-10 ENCOUNTER — APPOINTMENT (OUTPATIENT)
Dept: GENERAL RADIOLOGY | Facility: HOSPITAL | Age: 73
End: 2018-09-10

## 2018-09-10 ENCOUNTER — APPOINTMENT (OUTPATIENT)
Dept: CT IMAGING | Facility: HOSPITAL | Age: 73
End: 2018-09-10

## 2018-09-10 ENCOUNTER — HOSPITAL ENCOUNTER (INPATIENT)
Facility: HOSPITAL | Age: 73
LOS: 2 days | Discharge: HOME | End: 2018-09-12
Attending: EMERGENCY MEDICINE | Admitting: INTERNAL MEDICINE

## 2018-09-10 ENCOUNTER — APPOINTMENT (OUTPATIENT)
Dept: ULTRASOUND IMAGING | Facility: HOSPITAL | Age: 73
End: 2018-09-10

## 2018-09-10 ENCOUNTER — APPOINTMENT (OUTPATIENT)
Dept: NEUROLOGY | Facility: HOSPITAL | Age: 73
End: 2018-09-10
Attending: EMERGENCY MEDICINE

## 2018-09-10 DIAGNOSIS — I95.9 HYPOTENSION, UNSPECIFIED HYPOTENSION TYPE: ICD-10-CM

## 2018-09-10 DIAGNOSIS — R00.1 BRADYCARDIA: ICD-10-CM

## 2018-09-10 DIAGNOSIS — Z74.09 IMPAIRED FUNCTIONAL MOBILITY, BALANCE, GAIT, AND ENDURANCE: ICD-10-CM

## 2018-09-10 DIAGNOSIS — N28.9 RENAL INSUFFICIENCY: ICD-10-CM

## 2018-09-10 DIAGNOSIS — Z74.09 IMPAIRED MOBILITY AND ADLS: ICD-10-CM

## 2018-09-10 DIAGNOSIS — R41.82 ALTERED MENTAL STATUS, UNSPECIFIED ALTERED MENTAL STATUS TYPE: Primary | ICD-10-CM

## 2018-09-10 DIAGNOSIS — Z78.9 IMPAIRED MOBILITY AND ADLS: ICD-10-CM

## 2018-09-10 DIAGNOSIS — R94.01 ABNORMAL EEG: ICD-10-CM

## 2018-09-10 DIAGNOSIS — R82.5 POSITIVE URINE DRUG SCREEN: ICD-10-CM

## 2018-09-10 PROBLEM — R40.0 SOMNOLENCE: Status: ACTIVE | Noted: 2018-09-10

## 2018-09-10 PROBLEM — N17.9 ACUTE RENAL FAILURE (ARF) (HCC): Status: ACTIVE | Noted: 2018-09-10

## 2018-09-10 PROBLEM — F32.A DEPRESSION: Status: ACTIVE | Noted: 2018-09-10

## 2018-09-10 LAB
ALBUMIN SERPL-MCNC: 4.35 G/DL (ref 3.2–4.8)
ALBUMIN/GLOB SERPL: 2.1 G/DL (ref 1.5–2.5)
ALP SERPL-CCNC: 94 U/L (ref 25–100)
ALT SERPL W P-5'-P-CCNC: 22 U/L (ref 7–40)
AMPHET+METHAMPHET UR QL: NEGATIVE
AMPHETAMINES UR QL: NEGATIVE
ANION GAP SERPL CALCULATED.3IONS-SCNC: 10 MMOL/L (ref 3–11)
ARTERIAL PATENCY WRIST A: ABNORMAL
AST SERPL-CCNC: 30 U/L (ref 0–33)
ATMOSPHERIC PRESS: ABNORMAL MMHG
BACTERIA UR QL AUTO: ABNORMAL /HPF
BARBITURATES UR QL SCN: NEGATIVE
BASE EXCESS BLDA CALC-SCNC: 3.8 MMOL/L (ref 0–2)
BASOPHILS # BLD AUTO: 0.01 10*3/MM3 (ref 0–0.2)
BASOPHILS NFR BLD AUTO: 0.1 % (ref 0–1)
BDY SITE: ABNORMAL
BENZODIAZ UR QL SCN: POSITIVE
BILIRUB SERPL-MCNC: 0.6 MG/DL (ref 0.3–1.2)
BILIRUB UR QL STRIP: NEGATIVE
BUN BLD-MCNC: 57 MG/DL (ref 9–23)
BUN/CREAT SERPL: 25 (ref 7–25)
BUPRENORPHINE SERPL-MCNC: NEGATIVE NG/ML
CALCIUM SPEC-SCNC: 9 MG/DL (ref 8.7–10.4)
CANNABINOIDS SERPL QL: NEGATIVE
CHLORIDE SERPL-SCNC: 100 MMOL/L (ref 99–109)
CLARITY UR: ABNORMAL
CO2 BLDA-SCNC: 31.5 MMOL/L (ref 22–33)
CO2 SERPL-SCNC: 30 MMOL/L (ref 20–31)
COCAINE UR QL: NEGATIVE
COHGB MFR BLD: 1 % (ref 0–2)
COLOR UR: YELLOW
CREAT BLD-MCNC: 2.28 MG/DL (ref 0.6–1.3)
CREAT UR-MCNC: 88.5 MG/DL
D-LACTATE SERPL-SCNC: 1 MMOL/L (ref 0.5–2)
DEPRECATED RDW RBC AUTO: 51.3 FL (ref 37–54)
EOSINOPHIL # BLD AUTO: 0.05 10*3/MM3 (ref 0–0.3)
EOSINOPHIL NFR BLD AUTO: 0.5 % (ref 0–3)
EOSINOPHIL SPEC QL MICRO: 0 % EOS/100 CELLS (ref 0–0)
ERYTHROCYTE [DISTWIDTH] IN BLOOD BY AUTOMATED COUNT: 14.9 % (ref 11.3–14.5)
GFR SERPL CREATININE-BSD FRML MDRD: 21 ML/MIN/1.73
GLOBULIN UR ELPH-MCNC: 2.1 GM/DL
GLUCOSE BLD-MCNC: 148 MG/DL (ref 70–100)
GLUCOSE BLDC GLUCOMTR-MCNC: 124 MG/DL (ref 70–130)
GLUCOSE UR STRIP-MCNC: NEGATIVE MG/DL
HCO3 BLDA-SCNC: 29.9 MMOL/L (ref 20–26)
HCT VFR BLD AUTO: 41.9 % (ref 34.5–44)
HCT VFR BLD CALC: 42.3 %
HGB BLD-MCNC: 13.2 G/DL (ref 11.5–15.5)
HGB BLDA-MCNC: 13.8 G/DL (ref 14–18)
HGB UR QL STRIP.AUTO: NEGATIVE
HOLD SPECIMEN: NORMAL
HOLD SPECIMEN: NORMAL
HOROWITZ INDEX BLD+IHG-RTO: 28 %
HYALINE CASTS UR QL AUTO: ABNORMAL /LPF
IMM GRANULOCYTES # BLD: 0.03 10*3/MM3 (ref 0–0.03)
IMM GRANULOCYTES NFR BLD: 0.3 % (ref 0–0.6)
KETONES UR QL STRIP: ABNORMAL
LEUKOCYTE ESTERASE UR QL STRIP.AUTO: NEGATIVE
LYMPHOCYTES # BLD AUTO: 1.84 10*3/MM3 (ref 0.6–4.8)
LYMPHOCYTES NFR BLD AUTO: 18.5 % (ref 24–44)
MAGNESIUM SERPL-MCNC: 2.2 MG/DL (ref 1.3–2.7)
MCH RBC QN AUTO: 29.7 PG (ref 27–31)
MCHC RBC AUTO-ENTMCNC: 31.5 G/DL (ref 32–36)
MCV RBC AUTO: 94.2 FL (ref 80–99)
METHADONE UR QL SCN: NEGATIVE
METHGB BLD QL: 0.9 % (ref 0–1.5)
MODALITY: ABNORMAL
MONOCYTES # BLD AUTO: 0.64 10*3/MM3 (ref 0–1)
MONOCYTES NFR BLD AUTO: 6.4 % (ref 0–12)
NEUTROPHILS # BLD AUTO: 7.4 10*3/MM3 (ref 1.5–8.3)
NEUTROPHILS NFR BLD AUTO: 74.5 % (ref 41–71)
NITRITE UR QL STRIP: NEGATIVE
OPIATES UR QL: POSITIVE
OXYCODONE UR QL SCN: POSITIVE
OXYHGB MFR BLDV: 97 % (ref 94–99)
PCO2 BLDA: 50.3 MM HG (ref 35–45)
PCP UR QL SCN: NEGATIVE
PH BLDA: 7.38 PH UNITS (ref 7.35–7.45)
PH UR STRIP.AUTO: <=5 [PH] (ref 5–8)
PLATELET # BLD AUTO: 191 10*3/MM3 (ref 150–450)
PMV BLD AUTO: 10.8 FL (ref 6–12)
PO2 BLDA: 121 MM HG (ref 83–108)
POTASSIUM BLD-SCNC: 4.4 MMOL/L (ref 3.5–5.5)
PROPOXYPH UR QL: NEGATIVE
PROT SERPL-MCNC: 6.4 G/DL (ref 5.7–8.2)
PROT UR QL STRIP: NEGATIVE
RBC # BLD AUTO: 4.45 10*6/MM3 (ref 3.89–5.14)
RBC # UR: ABNORMAL /HPF
REF LAB TEST METHOD: ABNORMAL
SODIUM BLD-SCNC: 140 MMOL/L (ref 132–146)
SODIUM UR-SCNC: 69 MMOL/L (ref 30–90)
SP GR UR STRIP: 1.02 (ref 1–1.03)
SQUAMOUS #/AREA URNS HPF: ABNORMAL /HPF
TRICYCLICS UR QL SCN: NEGATIVE
TROPONIN I SERPL-MCNC: 0 NG/ML (ref 0–0.07)
UROBILINOGEN UR QL STRIP: ABNORMAL
WBC NRBC COR # BLD: 9.94 10*3/MM3 (ref 3.5–10.8)
WBC UR QL AUTO: ABNORMAL /HPF
WHOLE BLOOD HOLD SPECIMEN: NORMAL
WHOLE BLOOD HOLD SPECIMEN: NORMAL

## 2018-09-10 PROCEDURE — 94760 N-INVAS EAR/PLS OXIMETRY 1: CPT

## 2018-09-10 PROCEDURE — 85025 COMPLETE CBC W/AUTO DIFF WBC: CPT | Performed by: EMERGENCY MEDICINE

## 2018-09-10 PROCEDURE — 80053 COMPREHEN METABOLIC PANEL: CPT | Performed by: EMERGENCY MEDICINE

## 2018-09-10 PROCEDURE — 70450 CT HEAD/BRAIN W/O DYE: CPT

## 2018-09-10 PROCEDURE — 95819 EEG AWAKE AND ASLEEP: CPT

## 2018-09-10 PROCEDURE — 82805 BLOOD GASES W/O2 SATURATION: CPT | Performed by: EMERGENCY MEDICINE

## 2018-09-10 PROCEDURE — 99291 CRITICAL CARE FIRST HOUR: CPT | Performed by: INTERNAL MEDICINE

## 2018-09-10 PROCEDURE — 94799 UNLISTED PULMONARY SVC/PX: CPT

## 2018-09-10 PROCEDURE — 76775 US EXAM ABDO BACK WALL LIM: CPT

## 2018-09-10 PROCEDURE — 80306 DRUG TEST PRSMV INSTRMNT: CPT | Performed by: EMERGENCY MEDICINE

## 2018-09-10 PROCEDURE — 25010000002 HEPARIN (PORCINE) PER 1000 UNITS: Performed by: NURSE PRACTITIONER

## 2018-09-10 PROCEDURE — 83735 ASSAY OF MAGNESIUM: CPT | Performed by: EMERGENCY MEDICINE

## 2018-09-10 PROCEDURE — 81001 URINALYSIS AUTO W/SCOPE: CPT | Performed by: EMERGENCY MEDICINE

## 2018-09-10 PROCEDURE — 84300 ASSAY OF URINE SODIUM: CPT | Performed by: INTERNAL MEDICINE

## 2018-09-10 PROCEDURE — 25010000002 NALOXONE PER 1 MG: Performed by: EMERGENCY MEDICINE

## 2018-09-10 PROCEDURE — 84484 ASSAY OF TROPONIN QUANT: CPT

## 2018-09-10 PROCEDURE — 94660 CPAP INITIATION&MGMT: CPT

## 2018-09-10 PROCEDURE — 82570 ASSAY OF URINE CREATININE: CPT | Performed by: INTERNAL MEDICINE

## 2018-09-10 PROCEDURE — 99285 EMERGENCY DEPT VISIT HI MDM: CPT

## 2018-09-10 PROCEDURE — 25010000002 NALOXONE PER 1 MG

## 2018-09-10 PROCEDURE — 87040 BLOOD CULTURE FOR BACTERIA: CPT | Performed by: EMERGENCY MEDICINE

## 2018-09-10 PROCEDURE — 36600 WITHDRAWAL OF ARTERIAL BLOOD: CPT | Performed by: EMERGENCY MEDICINE

## 2018-09-10 PROCEDURE — 82962 GLUCOSE BLOOD TEST: CPT

## 2018-09-10 PROCEDURE — 83605 ASSAY OF LACTIC ACID: CPT | Performed by: EMERGENCY MEDICINE

## 2018-09-10 PROCEDURE — 71045 X-RAY EXAM CHEST 1 VIEW: CPT

## 2018-09-10 PROCEDURE — 87205 SMEAR GRAM STAIN: CPT | Performed by: INTERNAL MEDICINE

## 2018-09-10 PROCEDURE — 93005 ELECTROCARDIOGRAM TRACING: CPT | Performed by: EMERGENCY MEDICINE

## 2018-09-10 RX ORDER — BACLOFEN 10 MG/1
10 TABLET ORAL 3 TIMES DAILY
COMMUNITY
End: 2018-09-12 | Stop reason: HOSPADM

## 2018-09-10 RX ORDER — FLUOXETINE 10 MG/1
10 CAPSULE ORAL DAILY
Status: ON HOLD | COMMUNITY
End: 2018-09-11

## 2018-09-10 RX ORDER — SODIUM CHLORIDE 0.9 % (FLUSH) 0.9 %
1-10 SYRINGE (ML) INJECTION AS NEEDED
Status: DISCONTINUED | OUTPATIENT
Start: 2018-09-10 | End: 2018-09-12 | Stop reason: HOSPADM

## 2018-09-10 RX ORDER — ALLOPURINOL 300 MG/1
300 TABLET ORAL DAILY
COMMUNITY
End: 2018-09-12 | Stop reason: HOSPADM

## 2018-09-10 RX ORDER — ATENOLOL 50 MG/1
50 TABLET ORAL DAILY
COMMUNITY
End: 2018-09-12 | Stop reason: HOSPADM

## 2018-09-10 RX ORDER — NALOXONE HYDROCHLORIDE 0.4 MG/ML
INJECTION, SOLUTION INTRAMUSCULAR; INTRAVENOUS; SUBCUTANEOUS
Status: COMPLETED
Start: 2018-09-10 | End: 2018-09-10

## 2018-09-10 RX ORDER — LORAZEPAM 0.5 MG/1
0.5 TABLET ORAL EVERY 8 HOURS PRN
COMMUNITY
End: 2018-09-12 | Stop reason: HOSPADM

## 2018-09-10 RX ORDER — ACETAMINOPHEN 325 MG/1
650 TABLET ORAL EVERY 6 HOURS PRN
Status: ON HOLD | COMMUNITY
End: 2021-01-19 | Stop reason: SDUPTHER

## 2018-09-10 RX ORDER — HEPARIN SODIUM 5000 [USP'U]/ML
5000 INJECTION, SOLUTION INTRAVENOUS; SUBCUTANEOUS EVERY 8 HOURS SCHEDULED
Status: DISCONTINUED | OUTPATIENT
Start: 2018-09-10 | End: 2018-09-12 | Stop reason: HOSPADM

## 2018-09-10 RX ORDER — IPRATROPIUM BROMIDE AND ALBUTEROL SULFATE 2.5; .5 MG/3ML; MG/3ML
3 SOLUTION RESPIRATORY (INHALATION) EVERY 6 HOURS PRN
Status: DISCONTINUED | OUTPATIENT
Start: 2018-09-10 | End: 2018-09-12 | Stop reason: HOSPADM

## 2018-09-10 RX ORDER — NALOXONE HCL 0.4 MG/ML
1.6 VIAL (ML) INJECTION ONCE
Status: COMPLETED | OUTPATIENT
Start: 2018-09-10 | End: 2018-09-10

## 2018-09-10 RX ORDER — ACETAMINOPHEN 650 MG/1
650 SUPPOSITORY RECTAL EVERY 6 HOURS PRN
Status: DISCONTINUED | OUTPATIENT
Start: 2018-09-10 | End: 2018-09-11

## 2018-09-10 RX ORDER — NALOXONE HCL 0.4 MG/ML
0.4 VIAL (ML) INJECTION ONCE
Status: COMPLETED | OUTPATIENT
Start: 2018-09-10 | End: 2018-09-10

## 2018-09-10 RX ORDER — ALUMINA, MAGNESIA, AND SIMETHICONE 2400; 2400; 240 MG/30ML; MG/30ML; MG/30ML
SUSPENSION ORAL EVERY 6 HOURS PRN
COMMUNITY
End: 2018-12-19

## 2018-09-10 RX ORDER — ESCITALOPRAM OXALATE 10 MG/1
10 TABLET ORAL DAILY
COMMUNITY
End: 2018-09-12 | Stop reason: HOSPADM

## 2018-09-10 RX ORDER — SODIUM CHLORIDE 0.9 % (FLUSH) 0.9 %
10 SYRINGE (ML) INJECTION AS NEEDED
Status: DISCONTINUED | OUTPATIENT
Start: 2018-09-10 | End: 2018-09-12 | Stop reason: HOSPADM

## 2018-09-10 RX ORDER — TRAZODONE HYDROCHLORIDE 50 MG/1
50 TABLET ORAL NIGHTLY
COMMUNITY
End: 2018-09-12 | Stop reason: HOSPADM

## 2018-09-10 RX ORDER — SODIUM CHLORIDE, SODIUM LACTATE, POTASSIUM CHLORIDE, CALCIUM CHLORIDE 600; 310; 30; 20 MG/100ML; MG/100ML; MG/100ML; MG/100ML
125 INJECTION, SOLUTION INTRAVENOUS CONTINUOUS
Status: DISCONTINUED | OUTPATIENT
Start: 2018-09-10 | End: 2018-09-12 | Stop reason: HOSPADM

## 2018-09-10 RX ORDER — PANTOPRAZOLE SODIUM 40 MG/10ML
40 INJECTION, POWDER, LYOPHILIZED, FOR SOLUTION INTRAVENOUS
Status: DISCONTINUED | OUTPATIENT
Start: 2018-09-11 | End: 2018-09-12 | Stop reason: HOSPADM

## 2018-09-10 RX ADMIN — NALOXONE HYDROCHLORIDE 0.8 MG: 0.4 INJECTION, SOLUTION INTRAMUSCULAR; INTRAVENOUS; SUBCUTANEOUS at 13:10

## 2018-09-10 RX ADMIN — SODIUM CHLORIDE 1000 ML: 9 INJECTION, SOLUTION INTRAVENOUS at 09:31

## 2018-09-10 RX ADMIN — NALOXONE HYDROCHLORIDE 0.4 MG: 0.4 INJECTION, SOLUTION INTRAMUSCULAR; INTRAVENOUS; SUBCUTANEOUS at 07:59

## 2018-09-10 RX ADMIN — NALOXONE HYDROCHLORIDE 1.6 MG: 0.4 INJECTION, SOLUTION INTRAMUSCULAR; INTRAVENOUS; SUBCUTANEOUS at 08:06

## 2018-09-10 RX ADMIN — HEPARIN SODIUM 5000 UNITS: 5000 INJECTION, SOLUTION INTRAVENOUS; SUBCUTANEOUS at 22:05

## 2018-09-10 RX ADMIN — SODIUM CHLORIDE 1000 ML: 9 INJECTION, SOLUTION INTRAVENOUS at 10:50

## 2018-09-10 RX ADMIN — SODIUM CHLORIDE, POTASSIUM CHLORIDE, SODIUM LACTATE AND CALCIUM CHLORIDE 125 ML/HR: 600; 310; 30; 20 INJECTION, SOLUTION INTRAVENOUS at 13:34

## 2018-09-10 NOTE — ED PROVIDER NOTES
Subjective   Akua Torres is a 73 y.o.female who presents to the ED with c/o altered mental status with her last known normal to be yesterday at 2100. Per nursing staff, she is at [facility] for depression and this morning [facility] staff checked on her at 0500 noting her to mentally altered. She experienced several vomiting episodes and was given Zofran. She was recently placed on Trazodone. Per her , it is normal for her to sleep a lot but she will wake up easily and will be alert and will be responsive. An adequate history is not able to be obtained due to her mental status change.               History provided by:  Caregiver  History limited by:  Mental status change  Altered Mental Status   Presenting symptoms: partial responsiveness    Severity:  Moderate  Most recent episode:  Today  Episode history:  Multiple  Timing:  Unable to specify  Progression:  Unable to specify  Chronicity:  Recurrent  Context: recent change in medication    Associated symptoms: nausea and vomiting        Review of Systems   Unable to perform ROS: Mental status change   HENT:        Tongue abrasion   Gastrointestinal: Positive for nausea and vomiting.   Skin: Positive for wound.   Psychiatric/Behavioral: Positive for behavioral problems.       Past Medical History:   Diagnosis Date   • Anxiety    • Anxiety and depression    • Arthritis    • Cataract    • Depression    • Elevated serum creatinine     SEES DR SMITH EVERY 6 MONTHS- NEPHROLOGIST    • Extremity pain    • GERD (gastroesophageal reflux disease)    • Gout    • H/O bladder infections     JUST FINISHED MACROBID ON 11-2-17 FOR RECENT UTI   • Hypercholesteremia    • Hypertension    • Joint pain    • Kidney disease    • Low back pain    • Neck pain    • Rheumatic fever    • Urinary tract infection    • Wears glasses        Allergies   Allergen Reactions   • Nsaids Other (See Comments)     KIDNEY DAMAGE   • Quinidine Nausea And Vomiting and Other (See Comments)      FEVER     • Nitrofuran Derivatives Diarrhea   • Bactrim [Sulfamethoxazole-Trimethoprim] Rash   • Penicillins Rash       Past Surgical History:   Procedure Laterality Date   • BACK SURGERY  2004    LUMBAR PER DR MAN    • CARPAL TUNNEL RELEASE Left    • COLONOSCOPY     • EYE SURGERY     • FINGER SURGERY Right     3RD FINGER   • HEEL SPUR EXCISION Bilateral    • KNEE ARTHROSCOPY Bilateral    • LUMBAR DISCECTOMY FUSION INSTRUMENTATION N/A 11/10/2017    Procedure: LUMBAR SPINAL FUSION ;  Surgeon: Gopi Man MD;  Location: [facility];  Service:    • REPLACEMENT TOTAL KNEE BILATERAL Bilateral        Family History   Problem Relation Age of Onset   • Cancer Mother    • Colon polyps Mother    • Hypertension Mother    • Cancer Father    • Hypertension Brother    • Hyperlipidemia Maternal Uncle    • Kidney disease Maternal Uncle        Social History     Social History   • Marital status:      Social History Main Topics   • Smoking status: Never Smoker   • Smokeless tobacco: Never Used   • Alcohol use No   • Drug use: No   • Sexual activity: Defer     Other Topics Concern   • Not on file         Objective   Physical Exam   Constitutional: She appears well-developed and well-nourished.   Poorly responsive.    HENT:   Head: Normocephalic and atraumatic.   Right Ear: External ear normal.   Left Ear: External ear normal.   Nose: Nose normal.   Bite marks on right lateral tongue adjacent to the right canine.  No facial deformity or traumatic changes    Eyes: Pupils are equal, round, and reactive to light. Conjunctivae are normal. No scleral icterus.   Neck: Normal range of motion. Neck supple.   Cardiovascular: Regular rhythm and normal heart sounds.  Bradycardia present.  Exam reveals no gallop and no friction rub.    No murmur heard.  Pulmonary/Chest: Effort normal. No accessory muscle usage. No respiratory distress. She has no decreased breath sounds. She has no wheezes. She has no rhonchi. She has no  rales.   Sonorous respirations.    Abdominal: Soft. Bowel sounds are normal.   Neurological: She displays normal reflexes.   Skin: Skin is warm and dry.       Critical Care  Performed by: KOFI MCMILLAN  Authorized by: KOFI MCMILLAN     Critical care provider statement:     Critical care time (minutes):  60    Critical care time was exclusive of:  Separately billable procedures and treating other patients and teaching time    Critical care was necessary to treat or prevent imminent or life-threatening deterioration of the following conditions:  CNS failure or compromise and renal failure (altered mental status and renal insufficiency )    Critical care was time spent personally by me on the following activities:  Evaluation of patient's response to treatment, examination of patient, obtaining history from patient or surrogate, ordering and performing treatments and interventions, ordering and review of laboratory studies, ordering and review of radiographic studies, re-evaluation of patient's condition, development of treatment plan with patient or surrogate, pulse oximetry and discussions with consultants               ED Course  Recent Results (from the past 24 hour(s))   Comprehensive Metabolic Panel    Collection Time: 09/10/18  8:14 AM   Result Value Ref Range    Glucose 148 (H) 70 - 100 mg/dL    BUN 57 (H) 9 - 23 mg/dL    Creatinine 2.28 (H) 0.60 - 1.30 mg/dL    Sodium 140 132 - 146 mmol/L    Potassium 4.4 3.5 - 5.5 mmol/L    Chloride 100 99 - 109 mmol/L    CO2 30.0 20.0 - 31.0 mmol/L    Calcium 9.0 8.7 - 10.4 mg/dL    Total Protein 6.4 5.7 - 8.2 g/dL    Albumin 4.35 3.20 - 4.80 g/dL    ALT (SGPT) 22 7 - 40 U/L    AST (SGOT) 30 0 - 33 U/L    Alkaline Phosphatase 94 25 - 100 U/L    Total Bilirubin 0.6 0.3 - 1.2 mg/dL    eGFR Non African Amer 21 (L) >60 mL/min/1.73    Globulin 2.1 gm/dL    A/G Ratio 2.1 1.5 - 2.5 g/dL    BUN/Creatinine Ratio 25.0 7.0 - 25.0    Anion Gap 10.0 3.0 - 11.0 mmol/L   Magnesium     Collection Time: 09/10/18  8:14 AM   Result Value Ref Range    Magnesium 2.2 1.3 - 2.7 mg/dL   Light Blue Top    Collection Time: 09/10/18  8:14 AM   Result Value Ref Range    Extra Tube hold for add-on    Green Top (Gel)    Collection Time: 09/10/18  8:14 AM   Result Value Ref Range    Extra Tube Hold for add-ons.    Lavender Top    Collection Time: 09/10/18  8:14 AM   Result Value Ref Range    Extra Tube hold for add-on    Gold Top - SST    Collection Time: 09/10/18  8:14 AM   Result Value Ref Range    Extra Tube Hold for add-ons.    CBC Auto Differential    Collection Time: 09/10/18  8:14 AM   Result Value Ref Range    WBC 9.94 3.50 - 10.80 10*3/mm3    RBC 4.45 3.89 - 5.14 10*6/mm3    Hemoglobin 13.2 11.5 - 15.5 g/dL    Hematocrit 41.9 34.5 - 44.0 %    MCV 94.2 80.0 - 99.0 fL    MCH 29.7 27.0 - 31.0 pg    MCHC 31.5 (L) 32.0 - 36.0 g/dL    RDW 14.9 (H) 11.3 - 14.5 %    RDW-SD 51.3 37.0 - 54.0 fl    MPV 10.8 6.0 - 12.0 fL    Platelets 191 150 - 450 10*3/mm3    Neutrophil % 74.5 (H) 41.0 - 71.0 %    Lymphocyte % 18.5 (L) 24.0 - 44.0 %    Monocyte % 6.4 0.0 - 12.0 %    Eosinophil % 0.5 0.0 - 3.0 %    Basophil % 0.1 0.0 - 1.0 %    Immature Grans % 0.3 0.0 - 0.6 %    Neutrophils, Absolute 7.40 1.50 - 8.30 10*3/mm3    Lymphocytes, Absolute 1.84 0.60 - 4.80 10*3/mm3    Monocytes, Absolute 0.64 0.00 - 1.00 10*3/mm3    Eosinophils, Absolute 0.05 0.00 - 0.30 10*3/mm3    Basophils, Absolute 0.01 0.00 - 0.20 10*3/mm3    Immature Grans, Absolute 0.03 0.00 - 0.03 10*3/mm3   Lactic Acid, Plasma    Collection Time: 09/10/18  8:14 AM   Result Value Ref Range    Lactate 1.0 0.5 - 2.0 mmol/L   Urinalysis With Microscopic If Indicated (No Culture) - Urine, Clean Catch    Collection Time: 09/10/18  8:16 AM   Result Value Ref Range    Color, UA Yellow Yellow, Straw    Appearance, UA Cloudy (A) Clear    pH, UA <=5.0 5.0 - 8.0    Specific Gravity, UA 1.020 1.001 - 1.030    Glucose, UA Negative Negative    Ketones, UA Trace (A) Negative     Bilirubin, UA Negative Negative    Blood, UA Negative Negative    Protein, UA Negative Negative    Leuk Esterase, UA Negative Negative    Nitrite, UA Negative Negative    Urobilinogen, UA 0.2 E.U./dL 0.2 - 1.0 E.U./dL   Urine Drug Screen - Urine, Clean Catch    Collection Time: 09/10/18  8:16 AM   Result Value Ref Range    THC, Screen, Urine Negative Negative    Phencyclidine (PCP), Urine Negative Negative    Cocaine Screen, Urine Negative Negative    Methamphetamine, Urine Negative Negative    Opiate Screen Positive (A) Negative    Amphetamine Screen, Urine Negative Negative    Benzodiazepine Screen, Urine Positive (A) Negative    Tricyclic Antidepressants Screen Negative Negative    Methadone Screen, Urine Negative Negative    Barbiturates Screen, Urine Negative Negative    Oxycodone Screen, Urine Positive (A) Negative    Propoxyphene Screen Negative Negative    Buprenorphine, Screen, Urine Negative Negative   Urinalysis, Microscopic Only - Urine, Clean Catch    Collection Time: 09/10/18  8:16 AM   Result Value Ref Range    RBC, UA 3-6 (A) None Seen, 0-2 /HPF    WBC, UA 3-5 (A) None Seen, 0-2 /HPF    Bacteria, UA None Seen None Seen, Trace /HPF    Squamous Epithelial Cells, UA 0-2 None Seen, 0-2 /HPF    Hyaline Casts, UA 13-20 0 - 6 /LPF    Methodology Manual Light Microscopy    POC Troponin, Rapid    Collection Time: 09/10/18  8:21 AM   Result Value Ref Range    Troponin I 0.00 0.00 - 0.07 ng/mL   Blood Gas, Arterial    Collection Time: 09/10/18  8:22 AM   Result Value Ref Range    Site Arterial: right radial     Melvin's Test N/A     pH, Arterial 7.383 7.350 - 7.450 pH units    pCO2, Arterial 50.3 (H) 35.0 - 45.0 mm Hg    pO2, Arterial 121.0 (H) 83.0 - 108.0 mm Hg    HCO3, Arterial 29.9 (H) 20.0 - 26.0 mmol/L    Base Excess, Arterial 3.8 (H) 0.0 - 2.0 mmol/L    Hemoglobin, Blood Gas 13.8 (L) 14 - 18 g/dL    Hematocrit, Blood Gas 42.3 %    Oxyhemoglobin 97.0 94 - 99 %    Methemoglobin 0.90 0.00 - 1.50 %     Carboxyhemoglobin 1.0 0 - 2 %    CO2 Content 31.5 22 - 33    Barometric Pressure for Blood Gas  mmHg    Modality Cannula     FIO2 28 %   POC Glucose Once    Collection Time: 09/10/18  8:24 AM   Result Value Ref Range    Glucose 124 70 - 130 mg/dL     Note: In addition to lab results from this visit, the labs listed above may include labs taken at another facility or during a different encounter within the last 24 hours. Please correlate lab times with ED admission and discharge times for further clarification of the services performed during this visit.    XR Chest 1 View   Preliminary Result   Mild elevation identified of the left hemidiaphragm. No   evidence of acute parenchymal disease.       D:  09/10/2018   E:  09/10/2018              CT Head Without Contrast   Final Result     No acute findings.      THIS DOCUMENT HAS BEEN ELECTRONICALLY SIGNED BY MADY HUMMEL MD        Vitals:    09/10/18 1015 09/10/18 1017 09/10/18 1030 09/10/18 1045   BP: 101/61 101/61 (!) 80/48 (!) 84/60   Pulse:  56 55 54   Resp:  16     Temp:       TempSrc:       SpO2:  100% 99% 99%   Weight:       Height:         Medications   sodium chloride 0.9 % flush 10 mL (not administered)   sodium chloride 0.9 % bolus 1,000 mL (1,000 mL Intravenous New Bag 9/10/18 1050)   naloxone (NARCAN) injection 0.4 mg (0.4 mg Intravenous Given 9/10/18 0759)   naloxone (NARCAN) injection 1.6 mg (1.6 mg Intravenous Given 9/10/18 0806)   sodium chloride 0.9 % bolus 1,000 mL (0 mL Intravenous Stopped 9/10/18 1030)     ECG/EMG Results (last 24 hours)     Procedure Component Value Units Date/Time    ECG 12 Lead [[id number]] Collected:  09/10/18 0801     Updated:  09/10/18 0803          ED Course as of Sep 10 1056   Mon Sep 10, 2018   0751 Patient is reexamined in the CT scanner.  She is slightly more responsive and actually verbal with discomfort on moving.  [HH]   0818 Vision slightly more alert on latest reevaluation.  EEG is pending.  [HH]   0921 Patient remains  quite somnolent.  No clear seizure activity seen on the EEG.  CO2 is mildly elevated though with normal pH and adequate oxygenation.  Rapid drug screen is positive for opiates, though she has not known to be on these at the [facility], and additionally did not respond to Narcan.  She is known to be on benzodiazepine and took a dose of trazodone yesterday.  Evaluation today is otherwise unrewarding.  Systolic blood pressure remains adequate no her diastolic is mildly depressed.  Creatinine is newly elevated.  [HH]   0948 Slowing consistent with medication.   [ML]   1002 She does serially reevaluated.  Her blood pressure is mildly depressed.  Her mental status remains depressed.  I discussed findings with Dr Lu will admit for definitive inpatient care  [HH]      ED Course User Index  [HH] Phong Robles MD  [ML] Vaughn Velásquez                     [facility]    Final diagnoses:   Altered mental status, unspecified altered mental status type   Renal insufficiency   Positive urine drug screen   Abnormal EEG   Hypotension, unspecified hypotension type   Bradycardia       Documentation assistance provided by carmen Velásquez.  Information recorded by the scribe was done at my direction and has been verified and validated by me.     Vaughn Velásquez  09/10/18 0813       Vaughn Velásquez  09/10/18 0927       Vaughn Velásquez  09/10/18 1006       Phong Robles MD  09/10/18 1056

## 2018-09-10 NOTE — PROGRESS NOTES
Discharge Planning Assessment  [facility]     Patient Name: Akua Torres  MRN: [id number]  Today's Date: 9/10/2018    Admit Date: 9/10/2018          Discharge Needs Assessment     Row Name 09/10/18 1415       Living Environment    Lives With spouse    Name(s) of Who Lives With Patient Mehran Torres (spouse)    Current Living Arrangements home/apartment/condo    Primary Care Provided by spouse/significant other    Provides Primary Care For no one, unable/limited ability to care for self    Family Caregiver if Needed spouse    Family Caregiver Names Mehran    Quality of Family Relationships involved;helpful;supportive    Able to Return to Prior Arrangements yes    Living Arrangement Comments Pt and spouse live in 1 level home in [facility].       Transition Planning    Patient/Family Anticipates Transition to home with family    Transportation Anticipated family or friend will provide       Discharge Needs Assessment    Readmission Within the Last 30 Days no previous admission in last 30 days    Concerns to be Addressed discharge planning    Equipment Currently Used at Home bath bench;wheelchair;walker, rolling;grab bar;shower chair            Discharge Plan     Row Name 09/10/18 1418       Plan    Plan TBD    Patient/Family in Agreement with Plan yes    Plan Comments Met with spouse at bedside. Spouse states patient was in the ER at [facility] on 9-7 for depression and  was told to go to go home then go to  [facility] the next morning. She has been there until this morning when she came to our ER today.  They live in 1 level home in [facility]. Patient does use RW while in the house. Pt has had [facility] in the past.. She does see Dr Sales PCP. She has [employer] insurance to cover her medication, she gets her medication filled at [facility] on [address].        Destination     No service coordination in this encounter.      Durable Medical Equipment     No service coordination in this encounter.       Dialysis/Infusion     No service coordination in this encounter.      Home Medical Care     No service coordination in this encounter.      Social Care     No service coordination in this encounter.                Demographic Summary     Row Name 09/10/18 1413       General Information    Admission Type inpatient    Arrived From --   [facility]    Referral Source admission list    Reason for Consult discharge planning    Preferred Language English       Contact Information    Permission Granted to Share Info With             Functional Status     Row Name 09/10/18 1413       Functional Status    Usual Activity Tolerance good    Functional Status Comments Pt does use RW in the house       Functional Status, IADL    Medications independent    Meal Preparation assistive equipment    Housekeeping assistive equipment    Laundry assistive equipment    Shopping assistive person    IADL Comments Pt's spouse does assist with ADL's if needed.            Psychosocial    No documentation.           Abuse/Neglect    No documentation.           Legal    No documentation.           Substance Abuse    No documentation.           Patient Forms    No documentation.         Lexie Rabago RN

## 2018-09-10 NOTE — NURSING NOTE
[facility] contacted regarding medical records. Records requested. Spoke with NICHOLE Lilly. She stated she would fax any documents she was able to locate.

## 2018-09-10 NOTE — H&P
"  CRITICAL CARE ADMISSION NOTE    Chief Complaint     Altered mental status      Principal Problem:    Altered mental status. Felt to be drug related (trazodone, opiates, benzodiazepine)  Active Problems:    HTN (hypertension)    Acute renal failure (ARF) (CMS/HCC)    HLD (hyperlipidemia)    Depression    Spondylosis of lumbar region without myelopathy or radiculopathy    Prediabetes      History of Present Illness     Akua Torres is a 73 y.o. female, nonsmoker, who was found earlier this morning with altered mental status while a patient at \"[facility]".  Patient had presented to [facility] ER because of profound depression on 9/7/18. The next morning her  transported her from the ER to \"[facility]\" where she has been since that time. She was being treated for depression and was apparently doing well on her  last talked to her yesterday evening.  Her last known normal was 2100 the evening of 9/9/18.  At 0500 this morning patient was found lethargic with altered mental status. She then suffered with multiple episodes of vomiting. In reviewing her medication she was apparently given trazodone last evening. She was not supposed to be receiving any narcotics but drug screen revealed the presence of opiates and oxycodone. In the ER it was apparent that since being seen in [facility] ER 9/7/18 she had developed acute renal insufficiency (creatinine had increased from 0.98 to 2.28. On arrival to determine whether or not she has had adequate urine output while at \"[facility]". Her  reports the patient is unable to tolerate NSAIDs or oxycodone though this has not been related on allergy list.  Complete review of view of systems is unable to be obtained at this time due to patient's mental status.      Problem List, Surgical History, Family, Social History, and ROS     Patient Active Problem List   Diagnosis   • Degenerative disc disease, lumbar   • Lumbar stenosis with neurogenic claudication   • History of " lumbar fusion   • Spondylosis of lumbar region without myelopathy or radiculopathy   • Mild obesity   • Physical deconditioning   • Idiopathic gout   • Anxiety and depression   • Greater trochanteric bursitis of both hips   • Status post total bilateral knee replacement   • Back pain   • HTN (hypertension)   • HLD (hyperlipidemia)   • Prediabetes   • Depression     Past Surgical History:   Procedure Laterality Date   • BACK SURGERY  2004    LUMBAR PER DR MAN    • CARPAL TUNNEL RELEASE Left    • COLONOSCOPY     • EYE SURGERY     • FINGER SURGERY Right     3RD FINGER   • HEEL SPUR EXCISION Bilateral    • KNEE ARTHROSCOPY Bilateral    • LUMBAR DISCECTOMY FUSION INSTRUMENTATION N/A 11/10/2017    Procedure: LUMBAR SPINAL FUSION ;  Surgeon: Gopi Man MD;  Location: [facility];  Service:    • REPLACEMENT TOTAL KNEE BILATERAL Bilateral        Allergies   Allergen Reactions   • Nsaids Other (See Comments)     KIDNEY DAMAGE   • Quinidine Nausea And Vomiting and Other (See Comments)     FEVER     • Nitrofuran Derivatives Diarrhea   • Bactrim [Sulfamethoxazole-Trimethoprim] Rash   • Penicillins Rash     No current facility-administered medications on file prior to encounter.      Current Outpatient Prescriptions on File Prior to Encounter   Medication Sig   • gabapentin (NEURONTIN) 300 MG capsule Take 300 mg by mouth 3 (Three) Times a Day.   • lisinopril-hydrochlorothiazide (PRINZIDE,ZESTORETIC) 20-25 MG per tablet Take 1 tablet by mouth Daily.   • traMADol (ULTRAM) 50 MG tablet Take 50 mg by mouth Every 8 (Eight) Hours.   • [DISCONTINUED] allopurinol (ZYLOPRIM) 300 MG tablet TK 1 T PO D   • [DISCONTINUED] atenolol (TENORMIN) 100 MG tablet Take 100 mg by mouth Daily.   • [DISCONTINUED] escitalopram (LEXAPRO) 20 MG tablet Take 20 mg by mouth Daily.   • docusate sodium (COLACE) 100 MG capsule Take 1 capsule by mouth 2 (Two) Times a Day.   • [DISCONTINUED] ARIPiprazole (ABILIFY) 2 MG tablet Take 2 mg by mouth Daily.  "  • [DISCONTINUED] ZETIA 10 MG tablet TK 1 T   PO QD     MEDICATION LIST AND ALLERGIES REVIEWED.    Family History   Problem Relation Age of Onset   • Cancer Mother    • Colon polyps Mother    • Hypertension Mother    • Cancer Father    • Hypertension Brother    • Hyperlipidemia Maternal Uncle    • Kidney disease Maternal Uncle      Social History   Substance Use Topics   • Smoking status: Never Smoker   • Smokeless tobacco: Never Used   • Alcohol use No     Social History     Social History Narrative   • Patient is  to her second . She has 2 daughters one of whom is a nurse at [employer] but the  tells me they do not \"come around\". He suggests this is the reason for her depression      FAMILY AND SOCIAL HISTORY REVIEWED.    Review of Systems   Unable to perform ROS: Mental status change         Physical Exam and Clinical Information   BP (!) 83/40   Pulse 56   Temp 97.6 °F (36.4 °C) (Oral)   Resp 10   Ht 165.1 cm (65\")   Wt 77.1 kg (170 lb)   SpO2 99%   BMI 28.29 kg/m²     Physical Exam:    Telemetry: Rhythm: sinus bradycardia (09/10/18 1200)       Constitutional:  Somnolent     HEENT: Normocephalic and atraumatic. Pupils equal and reactive but large    Neck:  Normal range of motion. Neck supple.   No JVD present.   No thyromegaly.    Cardiovascular: Regular rate and rhythm.  No murmurs, rub or gallop.  No peripheral edema.   Respiratory: Normal respiratory effort.  Clear to auscultation.  Clear to percussion.    Abdominal:  Soft. No masses.   Non-tender. No distension.   No hepatosplenomegaly.   MSK: No deformities. Evidence of previous bilateral total knee replacements    Extremities: No digital cyanosis or clubbing. positive pulses    Skin: Skin is warm and dry.  No rashes, lesions or ulcers noted.    Neurological:   Will respond to her name but otherwise does not follow commands. Although only minimal improvement she does become more alert after Narcan. She begins to moan " and will grimace to discomfort. She tried to answer her 's questions verbally and was able to give him date of birth.    Exam nonfocal. Difficult. Reflexes are depressed    Psychiatric:  Unable to assess at the time of exam due to lethargy      Lines/Drains/Airways: Peripheral IV(s)       Results from last 7 days  Lab Units 09/10/18  0814 09/07/18  2207   WBC 10*3/mm3 9.94 10.63   HEMOGLOBIN g/dL 13.2 15.3   PLATELETS 10*3/mm3 191 201       Results from last 7 days  Lab Units 09/10/18  0814 09/07/18  2206   SODIUM mmol/L 140 138   POTASSIUM mmol/L 4.4 4.1   CO2 mmol/L 30.0 31.0   BUN mg/dL 57* 24*   CREATININE mg/dL 2.28* 0.98   MAGNESIUM mg/dL 2.2 1.8   GLUCOSE mg/dL 148* 98     Estimated Creatinine Clearance: 22.2 mL/min (A) (by C-G formula based on SCr of 2.28 mg/dL (H)).        Results from last 7 days  Lab Units 09/10/18  0822   PH, ARTERIAL pH units 7.383   PCO2, ARTERIAL mm Hg 50.3*   PO2 ART mm Hg 121.0*     Lab Results   Component Value Date    LACTATE 1.0 09/10/2018        IMAGES:     CT of head: No focal abnormalities    Chest x-ray: No consolidative infiltrates. Mild elevation of left diaphragm      I reviewed the patient's results and images.     Assesment     Hospital Problem List     * (Principal)Altered mental status. Felt to be drug related (trazodone, opiates, benzodiazepine)    HTN (hypertension)    Acute renal failure (ARF) (CMS/HCC)    HLD (hyperlipidemia)    Depression    Spondylosis of lumbar region without myelopathy or radiculopathy    Prediabetes        Discussion: Situation somewhat confusing. I am fairly sure this is drug related as it fits her presentation, and she did have a slight response to Narcan. She has benzodiazepines in her tox screen which I presume are playing a role, but I do not want to reverse them at this time unless her respiratory status deteriorated. Trazodone was not picked up on the drug screen and would not respond immediately given by the vent support. I do  not think she has had a neurologic event as her exam is nonfocal.    Regarding her acute renal insufficiency. This is come on very rapidly. She was hypotensive in the ER which may play a role, and was taking lisinopril as an outpatient.    Plan/Recommendations       1. Admit to ICU  2. Place Alston catheter, IV fluids, and if not improved by the a.m. consult nephrology.  3. Supportive measures, monitor neurologic status, hold sedatives/narcotics  4. NIPPV for hypercapnia, follow ABG  5. Am labs  6. DVT Prophylaxis: Heparin  7. GI Prophylaxis: Pantoprazole  8. Renal ultrasound, avoid NSAID's, avoid lisinopril, let blood pressure ride high if necessary    Critical Care time spent in direct patient care: 45 minutes (excluding procedure time, if applicable) including high complexity decision making to assess, manipulate, and support vital organ system failure in this individual who has impairment of one or more vital organ systems such that there is a high probability of imminent or life threatening deterioration in the patient’s condition.    WILFREDO Hernandez, ACNP-BC  Pulmonary and Critical Care Service  9/10/2018 2:03 PM     Gopi Puente MD  Pulmonary and Critical Care Medicine  09/10/18 2:20 PM        CC: Nanci Orr MD

## 2018-09-10 NOTE — CONSULTS
NAL Consult Note    Akua Torres  1945  [id number]    Date of Admit:  9/10/2018    Date of Consult: 9/10/2018        Requesting Provider: Gerber Lu,*  Evaluating Physician: Leon Calderon MD        Reason for Consultation:  LOTTIE  History of present illness:    Patient is a 73 y.o.  Yr old female WITH H/O HTN, DEPRESSION, GOUT TRANSERRED HERE FROM THE [facility] WITH AMS AND RESP DISTRESS AND WE ARE ASKED TO SEE THE PATIENT BECAUSE OF LOTTIE. CREAT 0.98-2.28 OVER THE PAST 3 DAYS. HAS HAD LOW BP'S IN THE 90'S. WAS ON ACE AND HCTZ. PATIENT LETHARGIC AND UNABLE TO GIVE ANY H/O. ? CKD AND SEES DR SMITH.     Past Medical History:   Diagnosis Date   • Anxiety    • Anxiety and depression    • Arthritis    • Cataract    • Depression    • Elevated serum creatinine     SEES DR SMITH EVERY 6 MONTHS- NEPHROLOGIST    • Extremity pain    • GERD (gastroesophageal reflux disease)    • Gout    • H/O bladder infections     JUST FINISHED MACROBID ON 11-2-17 FOR RECENT UTI   • Hypercholesteremia    • Hypertension    • Joint pain    • Kidney disease    • Low back pain    • Neck pain    • Rheumatic fever    • Urinary tract infection    • Wears glasses        Past Surgical History:   Procedure Laterality Date   • BACK SURGERY  2004    LUMBAR PER DR MAN    • CARPAL TUNNEL RELEASE Left    • COLONOSCOPY     • EYE SURGERY     • FINGER SURGERY Right     3RD FINGER   • HEEL SPUR EXCISION Bilateral    • KNEE ARTHROSCOPY Bilateral    • LUMBAR DISCECTOMY FUSION INSTRUMENTATION N/A 11/10/2017    Procedure: LUMBAR SPINAL FUSION ;  Surgeon: Gopi Man MD;  Location: [facility];  Service:    • REPLACEMENT TOTAL KNEE BILATERAL Bilateral        Social History     Social History   • Marital status:      Social History Main Topics   • Smoking status: Never Smoker   • Smokeless tobacco: Never Used   • Alcohol use No   • Drug use: No   • Sexual activity: Defer     Other Topics Concern   • Not on  file       family history includes Cancer in her father and mother; Colon polyps in her mother; Hyperlipidemia in her maternal uncle; Hypertension in her brother and mother; Kidney disease in her maternal uncle.    Allergies   Allergen Reactions   • Nsaids Other (See Comments)     KIDNEY DAMAGE   • Quinidine Nausea And Vomiting and Other (See Comments)     FEVER     • Nitrofuran Derivatives Diarrhea   • Bactrim [Sulfamethoxazole-Trimethoprim] Rash   • Penicillins Rash       Medication:    Current Facility-Administered Medications:   •  acetaminophen (TYLENOL) suppository 650 mg, 650 mg, Rectal, Q6H PRN, Gopi Puente MD  •  heparin (porcine) 5000 UNIT/ML injection 5,000 Units, 5,000 Units, Subcutaneous, Q8H, Paramjit Smith APRN  •  ipratropium-albuterol (DUO-NEB) nebulizer solution 3 mL, 3 mL, Nebulization, Q6H PRN, Paramjit Smith APRN  •  lactated ringers infusion, 125 mL/hr, Intravenous, Continuous, Gopi Puente MD  •  [START ON 9/11/2018] pantoprazole (PROTONIX) injection 40 mg, 40 mg, Intravenous, Q AM, Paramjit Smith APRN  •  sodium chloride 0.9 % flush 1-10 mL, 1-10 mL, Intravenous, PRN, Paramjit Smith APRN  •  sodium chloride 0.9 % flush 10 mL, 10 mL, Intravenous, PRN, Phong Robles MD    Prescriptions Prior to Admission   Medication Sig Dispense Refill Last Dose   • acetaminophen (TYLENOL) 325 MG tablet Take 650 mg by mouth Every 6 (Six) Hours As Needed for Mild Pain .      • allopurinol (ZYLOPRIM) 300 MG tablet Take 300 mg by mouth Daily.      • aluminum-magnesium hydroxide-simethicone (MAALOX MAX) 400-400-40 MG/5ML suspension Take  by mouth Every 6 (Six) Hours As Needed for Indigestion or Heartburn.      • atenolol (TENORMIN) 50 MG tablet Take 50 mg by mouth Daily.      • baclofen (LIORESAL) 10 MG tablet Take 10 mg by mouth 3 (Three) Times a Day.      • bismuth subsalicylate (KAOPECTATE) 262 MG/15ML suspension Take 30 mL by mouth Every 6 (Six) Hours As Needed  "for Indigestion.      • escitalopram (LEXAPRO) 10 MG tablet Take 10 mg by mouth Daily.      • FLUoxetine (PROzac) 10 MG capsule Take 10 mg by mouth Daily.      • gabapentin (NEURONTIN) 300 MG capsule Take 300 mg by mouth 3 (Three) Times a Day.   Taking   • lisinopril-hydrochlorothiazide (PRINZIDE,ZESTORETIC) 20-25 MG per tablet Take 1 tablet by mouth Daily.   Taking   • LORazepam (ATIVAN) 0.5 MG tablet Take 0.5 mg by mouth Every 8 (Eight) Hours As Needed for Anxiety.      • traMADol (ULTRAM) 50 MG tablet Take 50 mg by mouth Every 8 (Eight) Hours.   Taking   • traZODone (DESYREL) 50 MG tablet Take 50 mg by mouth Every Night.      • docusate sodium (COLACE) 100 MG capsule Take 1 capsule by mouth 2 (Two) Times a Day. 60 capsule 0 Taking       Review of Systems: UNOBTAINABLE    Physical Exam:   Vital Signs   Blood pressure 106/56, pulse 57, temperature 97.6 °F (36.4 °C), temperature source Oral, resp. rate 24, height 165.1 cm (65\"), weight 77.1 kg (170 lb), SpO2 100 %, not currently breastfeeding.     GENERAL: BIPAP. UNRESPONSIVE   HEENT: Normocephalic, atraumatic.  PER.  No conjunctivitis. No icterus. Oropharynx clear without evidence of thrush or exudate. No evidence of peridontal disease.    NECK: Supple without nuchal rigidity. No mass.  LYMPH: No painful cervical, axillary or inguinal lymphadenopathy.  HEART: RRR; No murmur, rubs, gallops. No bruits in neck, abdomen, or groins, no edema  LUNGS: rhonchi.  ABDOMEN: Soft, nontender, nondistended. Positive bowel sounds. No rebound or guarding. NO mass or HSM.  JOINTS:  Full range of motion, no redness or tenderness.  EXT:  No cyanosis, clubbing or edema.  :  BLADDER NOT DISTENDED.  SKIN: Warm and dry without rash  NEURO: Oriented to PPT. No focal neurological deficits. Strength equal bilateral  PSYCHIATRIC: Normal insight and judgement. Cooperative with PE    Laboratory Data    Results from last 7 days  Lab Units 09/10/18  0814 09/07/18  2207   HEMOGLOBIN g/dL 13.2 " 15.3   HEMATOCRIT % 41.9 46.8*       Results from last 7 days  Lab Units 09/10/18  0814 09/07/18  2206   SODIUM mmol/L 140 138   POTASSIUM mmol/L 4.4 4.1   CHLORIDE mmol/L 100 100   CO2 mmol/L 30.0 31.0   BUN mg/dL 57* 24*   CREATININE mg/dL 2.28* 0.98   CALCIUM mg/dL 9.0 9.6   MAGNESIUM mg/dL 2.2 1.8   ALBUMIN g/dL 4.35 4.53       Results from last 7 days  Lab Units 09/10/18  0814   GLUCOSE mg/dL 148*       Results from last 7 days  Lab Units 09/10/18  0816   COLOR UA  Yellow   CLARITY UA  Cloudy*   PH, URINE  <=5.0   SPECIFIC GRAVITY, URINE  1.020   GLUCOSE UA  Negative   KETONES UA  Trace*   BILIRUBIN UA  Negative   PROTEIN UA  Negative   BLOOD UA  Negative   LEUKOCYTES UA  Negative   NITRITE UA  Negative       Results from last 7 days  Lab Units 09/10/18  0814   ALK PHOS U/L 94   BILIRUBIN mg/dL 0.6   ALT (SGPT) U/L 22   AST (SGOT) U/L 30     Estimated Creatinine Clearance: 22.2 mL/min (A) (by C-G formula based on SCr of 2.28 mg/dL (H)).    Radiology: RENAL U/S PENDING.      Impression: LOTTIE LIKELY FROM VOLUME DEPLETION AND HYPOTENSION WITH ACE EFFECT. STARTING TO MAKE URINE.      PLAN: Thank you for asking us to see Akua Torres, I recommend the following: AGREE HOLDING ACE AND IVF'S. CHECK U EOS'S AND CPK. WILL FOLLOW. SHOULD RECOVER GFR.       Leon Calderon MD  9/10/2018  3:37 PM

## 2018-09-11 ENCOUNTER — APPOINTMENT (OUTPATIENT)
Dept: GENERAL RADIOLOGY | Facility: HOSPITAL | Age: 73
End: 2018-09-11

## 2018-09-11 ENCOUNTER — ANCILLARY PROCEDURE (OUTPATIENT)
Dept: SPEECH THERAPY | Facility: HOSPITAL | Age: 73
End: 2018-09-11
Attending: INTERNAL MEDICINE

## 2018-09-11 LAB
ALBUMIN SERPL-MCNC: 3.55 G/DL (ref 3.2–4.8)
ALBUMIN/GLOB SERPL: 2.2 G/DL (ref 1.5–2.5)
ALP SERPL-CCNC: 75 U/L (ref 25–100)
ALT SERPL W P-5'-P-CCNC: 29 U/L (ref 7–40)
ANION GAP SERPL CALCULATED.3IONS-SCNC: 6 MMOL/L (ref 3–11)
APTT PPP: 27.1 SECONDS (ref 55–70)
AST SERPL-CCNC: 72 U/L (ref 0–33)
BASOPHILS # BLD AUTO: 0.01 10*3/MM3 (ref 0–0.2)
BASOPHILS NFR BLD AUTO: 0.1 % (ref 0–1)
BILIRUB SERPL-MCNC: 0.9 MG/DL (ref 0.3–1.2)
BUN BLD-MCNC: 28 MG/DL (ref 9–23)
BUN/CREAT SERPL: 29.2 (ref 7–25)
CALCIUM SPEC-SCNC: 8.4 MG/DL (ref 8.7–10.4)
CHLORIDE SERPL-SCNC: 105 MMOL/L (ref 99–109)
CK SERPL-CCNC: 1638 U/L (ref 26–174)
CO2 SERPL-SCNC: 28 MMOL/L (ref 20–31)
CREAT BLD-MCNC: 0.96 MG/DL (ref 0.6–1.3)
DEPRECATED RDW RBC AUTO: 53.3 FL (ref 37–54)
EOSINOPHIL # BLD AUTO: 0.08 10*3/MM3 (ref 0–0.3)
EOSINOPHIL NFR BLD AUTO: 1 % (ref 0–3)
ERYTHROCYTE [DISTWIDTH] IN BLOOD BY AUTOMATED COUNT: 15.1 % (ref 11.3–14.5)
GFR SERPL CREATININE-BSD FRML MDRD: 57 ML/MIN/1.73
GLOBULIN UR ELPH-MCNC: 1.7 GM/DL
GLUCOSE BLD-MCNC: 114 MG/DL (ref 70–100)
HCT VFR BLD AUTO: 37.8 % (ref 34.5–44)
HGB BLD-MCNC: 11.6 G/DL (ref 11.5–15.5)
IMM GRANULOCYTES # BLD: 0.01 10*3/MM3 (ref 0–0.03)
IMM GRANULOCYTES NFR BLD: 0.1 % (ref 0–0.6)
INR PPP: 1.02 (ref 0.91–1.09)
LYMPHOCYTES # BLD AUTO: 1.8 10*3/MM3 (ref 0.6–4.8)
LYMPHOCYTES NFR BLD AUTO: 23.1 % (ref 24–44)
MAGNESIUM SERPL-MCNC: 1.8 MG/DL (ref 1.3–2.7)
MCH RBC QN AUTO: 29.8 PG (ref 27–31)
MCHC RBC AUTO-ENTMCNC: 30.7 G/DL (ref 32–36)
MCV RBC AUTO: 97.2 FL (ref 80–99)
MONOCYTES # BLD AUTO: 0.51 10*3/MM3 (ref 0–1)
MONOCYTES NFR BLD AUTO: 6.5 % (ref 0–12)
NEUTROPHILS # BLD AUTO: 5.39 10*3/MM3 (ref 1.5–8.3)
NEUTROPHILS NFR BLD AUTO: 69.3 % (ref 41–71)
PHOSPHATE SERPL-MCNC: 2.6 MG/DL (ref 2.4–5.1)
PLATELET # BLD AUTO: 164 10*3/MM3 (ref 150–450)
PMV BLD AUTO: 11 FL (ref 6–12)
POTASSIUM BLD-SCNC: 4.3 MMOL/L (ref 3.5–5.5)
PROT SERPL-MCNC: 5.2 G/DL (ref 5.7–8.2)
PROTHROMBIN TIME: 10.7 SECONDS (ref 9.6–11.5)
RBC # BLD AUTO: 3.89 10*6/MM3 (ref 3.89–5.14)
SODIUM BLD-SCNC: 139 MMOL/L (ref 132–146)
WBC NRBC COR # BLD: 7.79 10*3/MM3 (ref 3.5–10.8)

## 2018-09-11 PROCEDURE — 36600 WITHDRAWAL OF ARTERIAL BLOOD: CPT | Performed by: INTERNAL MEDICINE

## 2018-09-11 PROCEDURE — 92612 ENDOSCOPY SWALLOW (FEES) VID: CPT

## 2018-09-11 PROCEDURE — 94799 UNLISTED PULMONARY SVC/PX: CPT

## 2018-09-11 PROCEDURE — 99233 SBSQ HOSP IP/OBS HIGH 50: CPT | Performed by: INTERNAL MEDICINE

## 2018-09-11 PROCEDURE — 94660 CPAP INITIATION&MGMT: CPT

## 2018-09-11 PROCEDURE — 83735 ASSAY OF MAGNESIUM: CPT | Performed by: INTERNAL MEDICINE

## 2018-09-11 PROCEDURE — 80053 COMPREHEN METABOLIC PANEL: CPT | Performed by: INTERNAL MEDICINE

## 2018-09-11 PROCEDURE — 82550 ASSAY OF CK (CPK): CPT | Performed by: INTERNAL MEDICINE

## 2018-09-11 PROCEDURE — 85025 COMPLETE CBC W/AUTO DIFF WBC: CPT | Performed by: INTERNAL MEDICINE

## 2018-09-11 PROCEDURE — 71045 X-RAY EXAM CHEST 1 VIEW: CPT

## 2018-09-11 PROCEDURE — 82805 BLOOD GASES W/O2 SATURATION: CPT | Performed by: INTERNAL MEDICINE

## 2018-09-11 PROCEDURE — 25010000002 HEPARIN (PORCINE) PER 1000 UNITS: Performed by: NURSE PRACTITIONER

## 2018-09-11 PROCEDURE — 92610 EVALUATE SWALLOWING FUNCTION: CPT

## 2018-09-11 PROCEDURE — 85610 PROTHROMBIN TIME: CPT | Performed by: INTERNAL MEDICINE

## 2018-09-11 PROCEDURE — 85730 THROMBOPLASTIN TIME PARTIAL: CPT | Performed by: INTERNAL MEDICINE

## 2018-09-11 PROCEDURE — 84100 ASSAY OF PHOSPHORUS: CPT | Performed by: INTERNAL MEDICINE

## 2018-09-11 RX ORDER — ACETAMINOPHEN 325 MG/1
650 TABLET ORAL EVERY 6 HOURS PRN
Status: DISCONTINUED | OUTPATIENT
Start: 2018-09-11 | End: 2018-09-12 | Stop reason: HOSPADM

## 2018-09-11 RX ORDER — ESCITALOPRAM OXALATE 20 MG/1
20 TABLET ORAL DAILY
Status: DISCONTINUED | OUTPATIENT
Start: 2018-09-11 | End: 2018-09-12 | Stop reason: HOSPADM

## 2018-09-11 RX ORDER — FLUOXETINE HYDROCHLORIDE 20 MG/1
20 CAPSULE ORAL DAILY
Status: DISCONTINUED | OUTPATIENT
Start: 2018-09-11 | End: 2018-09-11 | Stop reason: ALTCHOICE

## 2018-09-11 RX ORDER — MAGNESIUM SULFATE HEPTAHYDRATE 40 MG/ML
4 INJECTION, SOLUTION INTRAVENOUS DAILY PRN
Status: DISCONTINUED | OUTPATIENT
Start: 2018-09-11 | End: 2018-09-12 | Stop reason: HOSPADM

## 2018-09-11 RX ADMIN — MAGNESIUM SULFATE HEPTAHYDRATE 4 G: 40 INJECTION, SOLUTION INTRAVENOUS at 10:46

## 2018-09-11 RX ADMIN — HEPARIN SODIUM 5000 UNITS: 5000 INJECTION, SOLUTION INTRAVENOUS; SUBCUTANEOUS at 14:12

## 2018-09-11 RX ADMIN — HEPARIN SODIUM 5000 UNITS: 5000 INJECTION, SOLUTION INTRAVENOUS; SUBCUTANEOUS at 05:43

## 2018-09-11 RX ADMIN — HEPARIN SODIUM 5000 UNITS: 5000 INJECTION, SOLUTION INTRAVENOUS; SUBCUTANEOUS at 21:08

## 2018-09-11 RX ADMIN — PANTOPRAZOLE SODIUM 40 MG: 40 INJECTION, POWDER, FOR SOLUTION INTRAVENOUS at 05:43

## 2018-09-11 RX ADMIN — ACETAMINOPHEN 650 MG: 325 TABLET ORAL at 20:01

## 2018-09-11 RX ADMIN — SODIUM CHLORIDE, POTASSIUM CHLORIDE, SODIUM LACTATE AND CALCIUM CHLORIDE 125 ML/HR: 600; 310; 30; 20 INJECTION, SOLUTION INTRAVENOUS at 05:43

## 2018-09-11 NOTE — PLAN OF CARE
Problem: Fall Risk (Adult)  Goal: Identify Related Risk Factors and Signs and Symptoms  Outcome: Outcome(s) achieved Date Met: 09/11/18    Goal: Absence of Fall  Outcome: Ongoing (interventions implemented as appropriate)      Problem: Patient Care Overview  Goal: Plan of Care Review  Outcome: Ongoing (interventions implemented as appropriate)   09/11/18 0456   Coping/Psychosocial   Plan of Care Reviewed With patient   Coping/Psychosocial   Patient Agreement with Plan of Care agrees   Plan of Care Review   Progress improving   OTHER   Outcome Summary Patient is now arousable to voice and follows commands - GCS 13. Oriented to self and time only. VSS, UOP adequate. Will continue to monitor.       Problem: Renal Failure/Kidney Injury, Acute (Adult)  Goal: Signs and Symptoms of Listed Potential Problems Will be Absent, Minimized or Managed (Renal Failure/Kidney Injury, Acute)  Outcome: Ongoing (interventions implemented as appropriate)

## 2018-09-11 NOTE — PROGRESS NOTES
Clinical Nutrition Note      Patient Name: Akua Torres  MRN: [id number]  Admission date: 9/10/2018      Reason for Assessment:  Multidisciplinary Rounds    Additional information obtained during MDR: RN reports pt drowsy becoming  more alert, requesting food. MD states he is okay w/ feeding if she passes dysphagia evaluation.    Current diet: Diet Regular; Thin; Cardiac    Pertinent medical data reviewed    Intervention:  Menu provided, advised of alternate selections, menu adjusted to pt's preferences  Plan of care and goals reviewed    Monitor:  RD to follow per protocol      Yun Jansen MS,RD,LD  09/11/18 5:41 PM  Time: 20min

## 2018-09-11 NOTE — PROGRESS NOTES
"   LOS: 1 day    Patient Care Team:  Nanci Orr MD as PCP - General (Internal Medicine)  Leon Hein MD as Consulting Physician (Neurosurgery)  Perkins, Corinne E, PT as Physical Therapist (Physical Therapy)  Cricket Nettles MD as Consulting Physician (Pain Medicine)    Reason For Visit:  F/U LOTTIE.  Subjective           Review of Systems:    Pulm: No soa   CV:  No CP      Objective       FLUoxetine 20 mg Oral Daily   heparin (porcine) 5,000 Units Subcutaneous Q8H   pantoprazole 40 mg Intravenous Q AM       lactated ringers 125 mL/hr Last Rate: 125 mL/hr (09/11/18 0543)         Vital Signs:  Blood pressure 108/52, pulse 65, temperature 98 °F (36.7 °C), temperature source Axillary, resp. rate 16, height 165.1 cm (65\"), weight 77.1 kg (170 lb), SpO2 91 %, not currently breastfeeding.    Flowsheet Rows      First Filed Value   Admission Height  165.1 cm (65\") Documented at 09/10/2018 0734   Admission Weight  77.1 kg (170 lb) Documented at 09/10/2018 0734          09/10 0701 - 09/11 0700  In: 2026.4 [I.V.:2026.4]  Out: 1380 [Urine:1380]    Physical Exam:    General Appearance: NAD, alert and cooperative, Ox3  Eyes: PER, conjunctivae and sclerae normal, no icterus  Lungs: respirations regular and unlabored, no crepitus, clear to auscultation  Heart/CV: regular rhythm & normal rate, no murmur, no gallop, no rub and no edema  Abdomen: not distended, soft, non-tender, no masses,  bowel sounds present  Skin: No rash, Warm and dry    Radiology: RENAL U/S: UNREMARKABLE EXCEPT CORTICAL CYSTS.            Labs: CPK 1638. U EOS NEG. U NA 69.    Results from last 7 days  Lab Units 09/11/18  0659 09/10/18  0814 09/07/18  2207   WBC 10*3/mm3 7.79 9.94 10.63   HEMOGLOBIN g/dL 11.6 13.2 15.3   HEMATOCRIT % 37.8 41.9 46.8*   PLATELETS 10*3/mm3 164 191 201       Results from last 7 days  Lab Units 09/11/18  0659 09/10/18  0814 09/07/18  2206   SODIUM mmol/L 139 140 138   POTASSIUM mmol/L 4.3 4.4 4.1   CHLORIDE mmol/L 105 100 100 "   CO2 mmol/L 28.0 30.0 31.0   BUN mg/dL 28* 57* 24*   CREATININE mg/dL 0.96 2.28* 0.98   CALCIUM mg/dL 8.4* 9.0 9.6   PHOSPHORUS mg/dL 2.6  --   --    MAGNESIUM mg/dL 1.8 2.2 1.8   ALBUMIN g/dL 3.55 4.35 4.53       Results from last 7 days  Lab Units 09/11/18  0659   GLUCOSE mg/dL 114*         Results from last 7 days  Lab Units 09/11/18  0659   ALK PHOS U/L 75   BILIRUBIN mg/dL 0.9   ALT (SGPT) U/L 29   AST (SGOT) U/L 72*       Results from last 7 days  Lab Units 09/10/18  0822   PH, ARTERIAL pH units 7.383   PO2 ART mm Hg 121.0*   PCO2, ARTERIAL mm Hg 50.3*   HCO3 ART mmol/L 29.9*         Results from last 7 days  Lab Units 09/10/18  0816   COLOR UA  Yellow   CLARITY UA  Cloudy*   PH, URINE  <=5.0   SPECIFIC GRAVITY, URINE  1.020   GLUCOSE UA  Negative   KETONES UA  Trace*   BILIRUBIN UA  Negative   PROTEIN UA  Negative   BLOOD UA  Negative   LEUKOCYTES UA  Negative   NITRITE UA  Negative       Estimated Creatinine Clearance: 52.8 mL/min (by C-G formula based on SCr of 0.96 mg/dL).      Assessment     Principal Problem:    Altered mental status. Felt to be drug related (trazodone, opiates, benzodiazepine)  Active Problems:    Spondylosis of lumbar region without myelopathy or radiculopathy    HTN (hypertension)    HLD (hyperlipidemia)    Prediabetes    Acute renal failure (ARF) (CMS/HCC)    Depression            Impression: LOTTIE RESOLVED. MILD RHABDO. HTN WITH LOW BP.            Recommendations: DISCHARGE SOON OK WITH RENAL. WOULD KEEP OFF BP MEDS. LAB AM.      Leon Calderon MD  09/11/18  9:26 AM

## 2018-09-11 NOTE — PROGRESS NOTES
"Intensivist Note     9/11/2018  Hospital Day: 1  * No surgery found *      Ms. Akua Torres, 73 y.o. female is followed for:  Principal Problem:    Altered mental status. Felt to be drug related (trazodone, opiates, benzodiazepine)  Active Problems:    Acute renal failure due to hypotension. Now resolved (ARF) (CMS/HCC)    HTN (hypertension)    HLD (hyperlipidemia)    Depression    Spondylosis of lumbar region without myelopathy or radiculopathy    Prediabetes       SUBJECTIVE     Akua Torres is a 73 y.o. female, nonsmoker, who was found earlier this morning with altered mental status while a patient at \"[facility]".  Patient had presented to [facility] ER because of profound depression on 9/7/18. The next morning her  transported her from the ER to \"[facility]\" where she has been since that time. She was being treated for depression and was apparently doing well when her  last talked to her yesterday evening.  Her last known normal was 2100 the evening of 9/9/18.  At 0500 9/10/18 the patient was found lethargic with altered mental status. She then suffered multiple episodes of vomiting. In reviewing her medication she was apparently given trazodone the previous evening. She was not supposed to be receiving any narcotics but drug screen revealed the presence of opiates and oxycodone. In the ER it was apparent that since being seen in [facility] ER 9/7/18 she had developed acute renal insufficiency (creatinine had increased from 0.98 to 2.28. It was impossible to determine from records whether or not she has had adequate urine output while at \"[facility]".     Over the course of the evening the patient has \"awakened\" and is now alert and oriented ×3. She remains profoundly depressed but is able to discuss her situation appropriately. I note that her CK this morning was 1638 indicating that she at least had a component of rhabdomyolysis suggesting that she was \"down\" for quite a while before she was found. " "This explains why she had such a bump in her creatinine (was down, hypotensive, causing rhabdomyolysis and subsequent renal insufficiency). She has no nausea or vomiting or diarrhea. Denies headache, chest pain, dyspnea etc.    The patient's relevant past medical, surgical and social history were reviewed and updated in Epic as appropriate.    OBJECTIVE     /61   Pulse 65   Temp 98 °F (36.7 °C) (Axillary)   Resp 18   Ht 165.1 cm (65\")   Wt 77.1 kg (170 lb)   SpO2 91%   BMI 28.29 kg/m²   Flow (L/min): 2    Flowsheet Rows      First Filed Value   Admission Height  165.1 cm (65\") Documented at 09/10/2018 0734   Admission Weight  77.1 kg (170 lb) Documented at 09/10/2018 0734        Intake & Output (last day)       09/10 0701 - 09/11 0700 09/11 0701 - 09/12 0700    I.V. (mL/kg) 2026.4 (26.3) 330 (4.3)    Total Intake(mL/kg) 2026.4 (26.3) 330 (4.3)    Urine (mL/kg/hr) 1380 350 (0.6)    Total Output 1380 350    Net +646.4 -20                Exam:  General Exam:  Elderly depressed appearing white female in NAD.  HEENT: Pupils equal and reactive. Nose and throat clear.  Neck:                          Supple, no JVD, thyromegaly, or adenopathy  Lungs: Clear anteriorly and laterally  Cardiovascular: RRR without murmurs or gallops. HR 66  Abdomen: Soft nontender without organomegaly or masses.   and rectal: Deferred.  Extremities: No cyanosis clubbing edema.  Neurologic:                 Symmetric strength but diffusely weak. No focal deficits.    Chest X-Ray 9/11/18: Normal heart size and clear lung fields.    INFUSIONS    lactated ringers 125 mL/hr Last Rate: 125 mL/hr (09/11/18 0543)         Results from last 7 days  Lab Units 09/11/18  0659 09/10/18  0814 09/07/18  2207   WBC 10*3/mm3 7.79 9.94 10.63   HEMOGLOBIN g/dL 11.6 13.2 15.3   HEMATOCRIT % 37.8 41.9 46.8*   PLATELETS 10*3/mm3 164 191 201       Results from last 7 days  Lab Units 09/11/18  0659 09/10/18  0814   SODIUM mmol/L 139 140   POTASSIUM mmol/L " "4.3 4.4   CHLORIDE mmol/L 105 100   CO2 mmol/L 28.0 30.0   BUN mg/dL 28* 57*   CREATININE mg/dL 0.96 2.28*   GLUCOSE mg/dL 114* 148*   CALCIUM mg/dL 8.4* 9.0       Results from last 7 days  Lab Units 09/11/18  0659 09/10/18  0814 09/07/18  2206   MAGNESIUM mg/dL 1.8 2.2 1.8   PHOSPHORUS mg/dL 2.6  --   --        Results from last 7 days  Lab Units 09/11/18  0659 09/10/18  0814 09/07/18  2206   ALK PHOS U/L 75 94 106*   BILIRUBIN mg/dL 0.9 0.6 1.2   ALT (SGPT) U/L 29 22 12   AST (SGOT) U/L 72* 30 23       Lab Results   Component Value Date    SEDRATE 60 (H) 11/24/2017     No results found for: BNP  Lab Results   Component Value Date    CKTOTAL 1,638 (H) 09/11/2018     Lab Results   Component Value Date    TSH 1.248 09/07/2018     Lab Results   Component Value Date    LACTATE 1.0 09/10/2018     No results found for: CORTISOL      Results from last 7 days  Lab Units 09/10/18  0822   PH, ARTERIAL pH units 7.383   PCO2, ARTERIAL mm Hg 50.3*   PO2 ART mm Hg 121.0*   HCO3 ART mmol/L 29.9*   FIO2 % 28       I reviewed the patient's results, images and medication.    Assessment/Plan   ASSESSMENT      Principal Problem:    Altered mental status. Felt to be drug related (trazodone, opiates, benzodiazepine)  Active Problems:    Acute renal failure due to hypotension. Now resolved (ARF) (CMS/HCC)    HTN (hypertension)    HLD (hyperlipidemia)    Depression    Spondylosis of lumbar region without myelopathy or radiculopathy    Prediabetes      DISCUSSION: It appears that her problem was drug related. I do not know why she had narcotics in her drug screen as she tells me that she is \"afraid of them due to her concerns about addiction\". I know she was given trazodone there and that in combination with possible narcotics could explain why she was \"down\". She must of been hypotensive inducing some rhabdomyolysis and acute renal insufficiency. That however has now completely resolved.    PLAN     1. Discontinue fluids  2. Advance " diet  3. Can be transferred directly back to [facility] if they are ready to accept her today    Plan of care and goals reviewed with mulitdisciplinary team at daily rounds.    I discussed the patient's findings and my recommendations with patient, family and nursing staff    Time spent Critical care 30 min (It does not include procedure time).    Gopi Puente MD  Intensive Care Medicine  09/11/18 2:24 PM

## 2018-09-11 NOTE — THERAPY EVALUATION
Acute Care - Speech Language Pathology   Swallow Initial Evaluation [facility]     Patient Name: Akua Torres  : 1945  MRN: [id number]  Today's Date: 2018               Admit Date: 9/10/2018    Visit Dx:     ICD-10-CM ICD-9-CM   1. Altered mental status, unspecified altered mental status type R41.82 780.97   2. Renal insufficiency N28.9 593.9   3. Positive urine drug screen R82.5 796.0   4. Abnormal EEG R94.01 794.02   5. Hypotension, unspecified hypotension type I95.9 458.9   6. Bradycardia R00.1 427.89     Patient Active Problem List   Diagnosis   • Degenerative disc disease, lumbar   • Lumbar stenosis with neurogenic claudication   • History of lumbar fusion   • Spondylosis of lumbar region without myelopathy or radiculopathy   • Mild obesity   • Physical deconditioning   • Idiopathic gout   • Anxiety and depression   • Greater trochanteric bursitis of both hips   • Status post total bilateral knee replacement   • Back pain   • HTN (hypertension)   • HLD (hyperlipidemia)   • Prediabetes   • S/P lumbar spinal fusion   • Renal insufficiency   • Leukocytosis, likely reactive   • Altered mental status. Felt to be drug related (trazodone, opiates, benzodiazepine)   • Acute renal failure (ARF) (CMS/HCC)   • Depression     Past Medical History:   Diagnosis Date   • Anxiety    • Anxiety and depression    • Arthritis    • Cataract    • Depression    • Elevated serum creatinine     SEES DR SMITH EVERY 6 MONTHS- NEPHROLOGIST    • Extremity pain    • GERD (gastroesophageal reflux disease)    • Gout    • H/O bladder infections     JUST FINISHED MACROBID ON 17 FOR RECENT UTI   • Hypercholesteremia    • Hypertension    • Joint pain    • Kidney disease    • Low back pain    • Neck pain    • Rheumatic fever    • Urinary tract infection    • Wears glasses      Past Surgical History:   Procedure Laterality Date   • BACK SURGERY      LUMBAR PER DR THORNTON    • CARPAL TUNNEL RELEASE Left    •  "COLONOSCOPY     • EYE SURGERY     • FINGER SURGERY Right     3RD FINGER   • HEEL SPUR EXCISION Bilateral    • KNEE ARTHROSCOPY Bilateral    • LUMBAR DISCECTOMY FUSION INSTRUMENTATION N/A 11/10/2017    Procedure: LUMBAR SPINAL FUSION ;  Surgeon: Gopi Man MD;  Location: [facility];  Service:    • REPLACEMENT TOTAL KNEE BILATERAL Bilateral           SWALLOW EVALUATION (last 72 hours)      SLP Adult Swallow Evaluation     Row Name 09/11/18 1045                   Rehab Evaluation    Document Type evaluation  -MP        Subjective Information complains of;pain   Blood pressure cuff pinching - nurse aid notified  -MP        Patient Observations alert;cooperative  -MP        Patient Effort good  -MP           General Information    Patient Profile Reviewed yes  -MP        Pertinent History Of Current Problem Pt admitted w/ AMS (appears to be drug-related). Pt at \"[facility]. Recent ER visit 9/7 for profound depression.  transferred pt to \"[facility]\" 9/8.  w/ concerns of pt safety when eating and drinking. Pt reports getting \"strangled\" when drinking liquids.  -MP        Current Method of Nutrition NPO  -MP        Precautions/Limitations, Vision WFL;for purposes of eval  -MP        Precautions/Limitations, Hearing WFL;for purposes of eval  -MP        Prior Level of Function-Communication WFL  -MP        Prior Level of Function-Swallowing no diet consistency restrictions;safe, efficient swallowing in all situations  -MP        Plans/Goals Discussed with patient;spouse/S.O.;agreed upon  -MP        Barriers to Rehab none identified  -MP        Patient's Goals for Discharge patient did not state  -MP           Pain Assessment    Additional Documentation Pain Scale: Numbers Pre/Post-Treatment (Group)  -MP           Pain Scale: Numbers Pre/Post-Treatment    Pain Scale: Numbers, Pretreatment 7/10  -MP        Pain Scale: Numbers, Post-Treatment 7/10  -MP        Pain Location - Side Bilateral  -MP        Pain " Location - Orientation lower  -MP        Pain Location hip  -MP           Oral Motor and Function    Dentition Assessment natural, present and adequate  -MP        Secretion Management WNL/WFL  -MP        Mucosal Quality moist, healthy  -MP           Oral Musculature and Cranial Nerve Assessment    Oral Motor General Assessment WFL  -MP           Clinical Swallow Eval    Oral Prep Phase WFL  -MP        Oral Transit WFL  -MP        Oral Residue WFL  -MP        Pharyngeal Phase suspected pharyngeal impairment  -MP        Clinical Swallow Evaluation Summary Pt presents w/ inconsistent throat clear on sips of thin. Will proceed w/ instrumental assessment.  -MP           Pharyngeal Phase Concerns    Pharyngeal Phase Concerns throat clear  -MP        Throat Clear thin  -MP        Pharyngeal Phase Concerns, Comment Inconsistent throat clear w/ sips of thin  -MP           Clinical Impression    SLP Swallowing Diagnosis suspected pharyngeal dysfunction  -MP        Functional Impact risk of aspiration/pneumonia  -MP        Rehab Potential/Prognosis, Swallowing good, to achieve stated therapy goals  -MP        Swallow Criteria for Skilled Therapeutic Interventions Met demonstrates skilled criteria  -MP           Recommendations    Therapy Frequency (Swallow) 5 days per week  -MP        Predicted Duration Therapy Intervention (Days) until discharge  -MP        SLP Diet Recommendation NPO;other (see comments)   until FEES  -MP        Recommended Diagnostics FEES  -MP        SLP Rec. for Method of Medication Administration meds whole;meds crushed;with pudding or applesauce;as tolerated  -          User Key  (r) = Recorded By, (t) = Taken By, (c) = Cosigned By    Initials Name Effective Dates    Henrique Salas MS, CFY-SLP 08/15/18 -         EDUCATION  The patient has been educated in the following areas:   Dysphagia (Swallowing Impairment).    SLP Recommendation and Plan  SLP Swallowing Diagnosis: suspected pharyngeal  dysfunction  SLP Diet Recommendation: NPO, other (see comments) (until FEES)           Recommended Diagnostics: FEES  Swallow Criteria for Skilled Therapeutic Interventions Met: demonstrates skilled criteria     Rehab Potential/Prognosis, Swallowing: good, to achieve stated therapy goals  Therapy Frequency (Swallow): 5 days per week  Predicted Duration Therapy Intervention (Days): until discharge       Plan of Care Reviewed With: patient, spouse  Plan of Care Review  Plan of Care Reviewed With: patient, spouse             Time Calculation:         Time Calculation- SLP     Row Name 09/11/18 1149             Time Calculation- SLP    SLP Start Time 1045  -MP      SLP Received On 09/11/18  -        User Key  (r) = Recorded By, (t) = Taken By, (c) = Cosigned By    Initials Name Provider Type    MP Henrique Rose MS, CFY-SLP Speech and Language Pathologist          Therapy Charges for Today     Code Description Service Date Service Provider Modifiers Qty    [id number]  ST EVAL ORAL PHARYNG SWALLOW 4 9/11/2018 Henrique Rose MS, CFY-SLP GN 1               Henrique Rose MS, CFTYRA-SLP  9/11/2018

## 2018-09-11 NOTE — MBS/VFSS/FEES
Acute Care - Speech Language Pathology   Swallow Initial Evaluation [facility]   Fiberoptic Endoscopic Evaluation of Swallowing (FEES)     Patient Name: Akua Torres  : 1945  MRN: [id number]  Today's Date: 2018               Admit Date: 9/10/2018    Visit Dx:     ICD-10-CM ICD-9-CM   1. Altered mental status, unspecified altered mental status type R41.82 780.97   2. Renal insufficiency N28.9 593.9   3. Positive urine drug screen R82.5 796.0   4. Abnormal EEG R94.01 794.02   5. Hypotension, unspecified hypotension type I95.9 458.9   6. Bradycardia R00.1 427.89     Patient Active Problem List   Diagnosis   • Degenerative disc disease, lumbar   • Lumbar stenosis with neurogenic claudication   • History of lumbar fusion   • Spondylosis of lumbar region without myelopathy or radiculopathy   • Mild obesity   • Physical deconditioning   • Idiopathic gout   • Anxiety and depression   • Greater trochanteric bursitis of both hips   • Status post total bilateral knee replacement   • Back pain   • HTN (hypertension)   • HLD (hyperlipidemia)   • Prediabetes   • S/P lumbar spinal fusion   • Renal insufficiency   • Leukocytosis, likely reactive   • Altered mental status. Felt to be drug related (trazodone, opiates, benzodiazepine)   • Acute renal failure due to hypotension. Now resolved (ARF) (CMS/HCC)   • Depression     Past Medical History:   Diagnosis Date   • Anxiety    • Anxiety and depression    • Arthritis    • Cataract    • Depression    • Elevated serum creatinine     SEES DR SMITH EVERY 6 MONTHS- NEPHROLOGIST    • Extremity pain    • GERD (gastroesophageal reflux disease)    • Gout    • H/O bladder infections     JUST FINISHED MACROBID ON 17 FOR RECENT UTI   • Hypercholesteremia    • Hypertension    • Joint pain    • Kidney disease    • Low back pain    • Neck pain    • Rheumatic fever    • Urinary tract infection    • Wears glasses      Past Surgical History:   Procedure Laterality Date   •  "BACK SURGERY  2004    LUMBAR PER DR MAN    • CARPAL TUNNEL RELEASE Left    • COLONOSCOPY     • EYE SURGERY     • FINGER SURGERY Right     3RD FINGER   • HEEL SPUR EXCISION Bilateral    • KNEE ARTHROSCOPY Bilateral    • LUMBAR DISCECTOMY FUSION INSTRUMENTATION N/A 11/10/2017    Procedure: LUMBAR SPINAL FUSION ;  Surgeon: Gopi Man MD;  Location: [facility];  Service:    • REPLACEMENT TOTAL KNEE BILATERAL Bilateral           SWALLOW EVALUATION (last 72 hours)      SLP Adult Swallow Evaluation     Row Name 09/11/18 1500 09/11/18 1045                Rehab Evaluation    Document Type evaluation  - evaluation  -MP       Subjective Information no complaints  - complains of;pain   Blood pressure cuff pinching - nurse aid notified  -MP       Patient Observations alert;cooperative  -SM alert;cooperative  -MP       Patient Effort  -- good  -MP          General Information    Patient Profile Reviewed  -- yes  -MP       Pertinent History Of Current Problem  -- Pt admitted w/ AMS (appears to be drug-related). Pt at [facility]. Recent ER visit 9/7 for profound depression.  transferred pt to \"[facility] 9/8.  w/ concerns of pt safety when eating and drinking. Pt reports getting \"strangled\" when drinking liquids.  -MP       Current Method of Nutrition NPO  -SM NPO  -MP       Precautions/Limitations, Vision  -- WFL;for purposes of eval  -MP       Precautions/Limitations, Hearing  -- WFL;for purposes of eval  -MP       Prior Level of Function-Communication  -- WFL  -MP       Prior Level of Function-Swallowing safe, efficient swallowing in all situations  - no diet consistency restrictions;safe, efficient swallowing in all situations  -MP       Plans/Goals Discussed with patient;spouse/S.O.;agreed upon  -SM patient;spouse/S.O.;agreed upon  -MP       Barriers to Rehab none identified  -SM none identified  -MP       Patient's Goals for Discharge patient did not state  -SM patient did not state  -MP       "    Pain Assessment    Additional Documentation Pain Scale: FACES Pre/Post-Treatment (Group)  -SM Pain Scale: Numbers Pre/Post-Treatment (Group)  -MP          Pain Scale: Numbers Pre/Post-Treatment    Pain Scale: Numbers, Pretreatment  -- 7/10  -MP       Pain Scale: Numbers, Post-Treatment  -- 7/10  -MP       Pain Location - Side  -- Bilateral  -MP       Pain Location - Orientation  -- lower  -MP       Pain Location  -- hip  -MP          Pain Scale: FACES Pre/Post-Treatment    Pain: FACES Scale, Pretreatment 4-->hurts little more  -SM  --       Pain: FACES Scale, Post-Treatment 2-->hurts little bit  -SM  --       Pre/Post Treatment Pain Comment c/o tongue pain and soreness  -SM  --          Oral Motor and Function    Dentition Assessment  -- natural, present and adequate  -MP       Secretion Management  -- WNL/WFL  -MP       Mucosal Quality  -- moist, healthy  -MP          Oral Musculature and Cranial Nerve Assessment    Oral Motor General Assessment  -- WFL  -MP          Clinical Swallow Eval    Oral Prep Phase  -- WFL  -MP       Oral Transit  -- WFL  -MP       Oral Residue  -- WFL  -MP       Pharyngeal Phase  -- suspected pharyngeal impairment  -MP       Clinical Swallow Evaluation Summary  -- Pt presents w/ inconsistent throat clear on sips of thin. Will proceed w/ instrumental assessment.  -MP          Pharyngeal Phase Concerns    Pharyngeal Phase Concerns  -- throat clear  -MP       Throat Clear  -- thin  -MP       Pharyngeal Phase Concerns, Comment  -- Inconsistent throat clear w/ sips of thin  -MP          Fiberoptic Endoscopic Evaluation of Swallowing (FEES)    Risks/Benefits Reviewed risks/benefits explained;patient;family;agreed to eval  -SM  --       Nasal Entry right:  -SM  --          Anatomy and Physiology    Velopharyngeal WFL  -SM  --       Base of Tongue symmetrical;range reduced  -SM  --       Epiglottis WFL  -SM  --       Laryngeal Function Breathing symmetrical  -SM  --       Laryngeal Function  Phonation symmetrical  -SM  --       Laryngeal Function to Breath Hold TVT/FVF/Arytenoid;sustained closure  -SM  --       Secretion Rating Scale (Rey et al. 1996) 0- normal, no visible secretions  -SM  --       Secretion Description thin;clear  -SM  --       Ice Chips DNA  -SM  --       Spontaneous Swallow frequency adequate  -SM  --       Sensory sensed scope  -SM  --       Consistencies Trialed thin liquids;pudding/puree;Regular textures  -SM  --          FEES Interpretation    Oral Phase prolonged manipulation;with solids only;prespill of liquids into pharynx;with liquids only  -SM  --          Initiation of Pharyngeal Swallow    Initiation of Pharyngeal Swallow bolus in pyriform sinuses  -SM  --       Pharyngeal Phase impaired pharyngeal phase of swallowing  -SM  --       Penetration Before the Swallow thin liquids;secondary to reduced back of tongue control;secondary to delayed swallow initiation or mistiming  -SM  --       Rosenbek's Scale thin:;5-->Level 5  -SM  --       Attempted Compensatory Maneuvers bolus size  -SM  --       Response to Attempted Compensatory Maneuvers prevented penetration  -SM  --       Pharyngeal Phase, Comment Suspect partially, if not fully, related to lethargy.   -SM  --          Clinical Impression    SLP Swallowing Diagnosis mild-moderate;oral dysfunction;mild;pharyngeal dysfunction;other (see comments)   waxing/waning alertness impacting  -SM suspected pharyngeal dysfunction  -MP       Functional Impact risk of aspiration/pneumonia  -SM risk of aspiration/pneumonia  -MP       Rehab Potential/Prognosis, Swallowing good, to achieve stated therapy goals  -SM good, to achieve stated therapy goals  -MP       Swallow Criteria for Skilled Therapeutic Interventions Met demonstrates skilled criteria  -SM demonstrates skilled criteria  -MP          Recommendations    Therapy Frequency (Swallow) 5 days per week  -SM 5 days per week  -MP       Predicted Duration Therapy Intervention  (Days) until discharge  -SM until discharge  -MP       SLP Diet Recommendation regular textures;thin liquids;other (see comments)   feed only when alert. Adjust solids as needed  -SM NPO;other (see comments)   until FEES  -MP       Recommended Diagnostics  -- FEES  -MP       Recommended Precautions and Strategies upright posture during/after eating;small bites of food and sips of liquid  -SM  --       SLP Rec. for Method of Medication Administration meds whole;with thin liquids;with pudding or applesauce;as tolerated  - meds whole;meds crushed;with pudding or applesauce;as tolerated  -MP         User Key  (r) = Recorded By, (t) = Taken By, (c) = Cosigned By    Initials Name Effective Dates    Kenna Bravo, MS CCC-SLP 06/22/15 -     Henrique Salas MS, CFY-SLP 08/15/18 -         EDUCATION  The patient has been educated in the following areas:   Dysphagia (Swallowing Impairment) Modified Diet Instruction.    SLP Recommendation and Plan  SLP Swallowing Diagnosis: mild-moderate, oral dysfunction, mild, pharyngeal dysfunction, other (see comments) (waxing/waning alertness impacting)  SLP Diet Recommendation: regular textures, thin liquids, other (see comments) (feed only when alert. Adjust solids as needed)  Recommended Precautions and Strategies: upright posture during/after eating, small bites of food and sips of liquid           Swallow Criteria for Skilled Therapeutic Interventions Met: demonstrates skilled criteria     Rehab Potential/Prognosis, Swallowing: good, to achieve stated therapy goals  Therapy Frequency (Swallow): 5 days per week  Predicted Duration Therapy Intervention (Days): until discharge       Plan of Care Reviewed With: patient, spouse  Plan of Care Review  Plan of Care Reviewed With: patient, spouse          SLP GOALS     Row Name 09/11/18 2609             Oral Nutrition/Hydration Goal 1 (SLP)    Oral Nutrition/Hydration Goal 1, SLP LTG: Tolerate regular diet and thin liquids  without s/s aspiration with 100% accuracy and no cues  -SM      Time Frame (Oral Nutrition/Hydration Goal 1, SLP) by discharge  -SM      Barriers (Oral Nutrition/Hydration Goal 1, SLP) Use of compensations until returns to baseline. If issues persist, will need to add lignual strengthening and timing/initiation goals  -SM         Swallow Management Recall Goal 1 (SLP)    Activity (Swallow Management Recall Goal 1, SLP) safe diet level/texture  -SM      [place]/Accuracy (Swallow Management Recall Goal 1, SLP) independently (over 90% accuracy)  -SM      Time Frame (Swallow Management Recall Goal 1, SLP) short term goal (STG);by discharge  -SM         Swallow Compensatory Strategies Goal 1 (SLP)    Activity (Swallow Compensatory Strategies/Techniques Goal 1, SLP) compensatory strategies;small cup sips;small straw sips  -SM      [place]/Accuracy (Swallow Compensatory Strategies/Techniques Goal 1, SLP) independently (over 90% accuracy)  -SM      Time Frame (Swallow Compensatory Strategies/Techniques Goal 1, SLP) short term goal (STG);by discharge  -        User Key  (r) = Recorded By, (t) = Taken By, (c) = Cosigned By    Initials Name Provider Type    Kenna Bravo MS CCC-SLP Speech and Language Pathologist               Time Calculation:         Time Calculation- SLP     Row Name 09/11/18 1620 09/11/18 1149          Time Calculation- SLP    SLP Start Time 1500  - 1045  -MP     SLP Received On 09/11/18  - 09/11/18  -MP       User Key  (r) = Recorded By, (t) = Taken By, (c) = Cosigned By    Initials Name Provider Type    Kenna Bravo MS CCC-SLP Speech and Language Pathologist    Henrique Salas MS, CFY-SLP Speech and Language Pathologist          Therapy Charges for Today     Code Description Service Date Service Provider Modifiers Qty    [id number] HC ST FIBEROPTIC ENDO EVAL SWALL 5 9/11/2018 Kenna Mustafa MS CCC-SLP GN 1               Kenna Mustafa MS  CCC-SLP  9/11/2018

## 2018-09-11 NOTE — PROGRESS NOTES
Continued Stay Note  [facility]     Patient Name: Akua Torres  MRN: [id number]  Today's Date: 9/11/2018    Admit Date: 9/10/2018          Discharge Plan     Row Name 09/11/18 1320       Plan    Plan pt ok to d/c home.     Plan Comments Spoke with pt and spouse. Pt reportds she was feeling depressed and went to [place] for help. She states she does not want to return to [place]. She is concerned that she is feeling so weak and wants to work with PT. Spouse states pt is usually not this sleepy and agrees working with PT woul dbe helpful.     Final Discharge Disposition Code 01 - home or self-care              Discharge Codes    No documentation.       Expected Discharge Date and Time     Expected Discharge Date Expected Discharge Time    Sep 14, 2018             CAROLYNN Doan

## 2018-09-11 NOTE — PLAN OF CARE
Problem: Patient Care Overview  Goal: Plan of Care Review  Outcome: Ongoing (interventions implemented as appropriate)   09/11/18 1147   Coping/Psychosocial   Plan of Care Reviewed With patient;spouse   SLP evaluation completed. Pt presenting w/ inconsistent throat clear on thin liquids. Reports getting strangled when drinking liquids. Will proceed w/ FEES, scheduled for this PM. Okay for meds crushed or whole in applesauce, as tolerated. Please see note for further details and recommendations.

## 2018-09-11 NOTE — PLAN OF CARE
Problem: Patient Care Overview  Goal: Plan of Care Review  Outcome: Ongoing (interventions implemented as appropriate)   09/11/18 0688   Coping/Psychosocial   Plan of Care Reviewed With patient;spouse   SLP evaluation completed. FEES: Lethargy impacting. Prolonged oral manipulation with solids - benefited from liquid mix to assist. Decreased oral control with liquids resulting in deep penetration before the swallow with thin liquids - did not sense or attempt to clear. Prevented penetration with small single sips. No significant residue. Recommend: Regular diet and thin liquids when fully alert. Small single sips, ok with straws. Meds as tolerated. SLP will follow for likely temporary use of precautions. If difficulties persist, will add strengthening goals. Thanks. Please see note for further details and recommendations.

## 2018-09-12 VITALS
SYSTOLIC BLOOD PRESSURE: 160 MMHG | WEIGHT: 170 LBS | TEMPERATURE: 98.5 F | DIASTOLIC BLOOD PRESSURE: 85 MMHG | BODY MASS INDEX: 28.32 KG/M2 | RESPIRATION RATE: 17 BRPM | HEIGHT: 65 IN | OXYGEN SATURATION: 98 % | HEART RATE: 78 BPM

## 2018-09-12 LAB
ALBUMIN SERPL-MCNC: 3.33 G/DL (ref 3.2–4.8)
ANION GAP SERPL CALCULATED.3IONS-SCNC: 9 MMOL/L (ref 3–11)
ARTERIAL PATENCY WRIST A: ABNORMAL
ATMOSPHERIC PRESS: ABNORMAL MMHG
BASE EXCESS BLDA CALC-SCNC: 5.7 MMOL/L (ref 0–2)
BDY SITE: ABNORMAL
BUN BLD-MCNC: 19 MG/DL (ref 9–23)
BUN/CREAT SERPL: 24.7 (ref 7–25)
CALCIUM SPEC-SCNC: 8.4 MG/DL (ref 8.7–10.4)
CHLORIDE SERPL-SCNC: 105 MMOL/L (ref 99–109)
CK SERPL-CCNC: 693 U/L (ref 26–174)
CO2 BLDA-SCNC: 32.3 MMOL/L (ref 22–33)
CO2 SERPL-SCNC: 26 MMOL/L (ref 20–31)
COHGB MFR BLD: 1.4 % (ref 0–2)
CREAT BLD-MCNC: 0.77 MG/DL (ref 0.6–1.3)
GFR SERPL CREATININE-BSD FRML MDRD: 73 ML/MIN/1.73
GLUCOSE BLD-MCNC: 85 MG/DL (ref 70–100)
HCO3 BLDA-SCNC: 30.8 MMOL/L (ref 20–26)
HCT VFR BLD CALC: 36.3 %
HGB BLDA-MCNC: 11.9 G/DL (ref 14–18)
HOROWITZ INDEX BLD+IHG-RTO: 28 %
METHGB BLD QL: 1 % (ref 0–1.5)
MODALITY: ABNORMAL
OXYHGB MFR BLDV: 95.7 % (ref 94–99)
PCO2 BLDA: 46.3 MM HG (ref 35–45)
PH BLDA: 7.43 PH UNITS (ref 7.35–7.45)
PHOSPHATE SERPL-MCNC: 2.8 MG/DL (ref 2.4–5.1)
PO2 BLDA: 89.8 MM HG (ref 83–108)
POTASSIUM BLD-SCNC: 4.5 MMOL/L (ref 3.5–5.5)
SODIUM BLD-SCNC: 140 MMOL/L (ref 132–146)

## 2018-09-12 PROCEDURE — 97165 OT EVAL LOW COMPLEX 30 MIN: CPT

## 2018-09-12 PROCEDURE — 97116 GAIT TRAINING THERAPY: CPT

## 2018-09-12 PROCEDURE — 99239 HOSP IP/OBS DSCHRG MGMT >30: CPT | Performed by: NURSE PRACTITIONER

## 2018-09-12 PROCEDURE — 25010000002 HEPARIN (PORCINE) PER 1000 UNITS: Performed by: NURSE PRACTITIONER

## 2018-09-12 PROCEDURE — 80069 RENAL FUNCTION PANEL: CPT | Performed by: INTERNAL MEDICINE

## 2018-09-12 PROCEDURE — 97162 PT EVAL MOD COMPLEX 30 MIN: CPT

## 2018-09-12 PROCEDURE — 82550 ASSAY OF CK (CPK): CPT | Performed by: INTERNAL MEDICINE

## 2018-09-12 RX ORDER — ESCITALOPRAM OXALATE 20 MG/1
20 TABLET ORAL DAILY
Qty: 30 TABLET | Refills: 1 | Status: SHIPPED | OUTPATIENT
Start: 2018-09-13 | End: 2018-09-19 | Stop reason: SDUPTHER

## 2018-09-12 RX ADMIN — ESCITALOPRAM OXALATE 20 MG: 20 TABLET ORAL at 08:11

## 2018-09-12 RX ADMIN — SODIUM CHLORIDE, POTASSIUM CHLORIDE, SODIUM LACTATE AND CALCIUM CHLORIDE 125 ML/HR: 600; 310; 30; 20 INJECTION, SOLUTION INTRAVENOUS at 02:43

## 2018-09-12 RX ADMIN — PANTOPRAZOLE SODIUM 40 MG: 40 INJECTION, POWDER, FOR SOLUTION INTRAVENOUS at 06:18

## 2018-09-12 RX ADMIN — HEPARIN SODIUM 5000 UNITS: 5000 INJECTION, SOLUTION INTRAVENOUS; SUBCUTANEOUS at 06:18

## 2018-09-12 NOTE — THERAPY EVALUATION
Acute Care - Occupational Therapy Initial Evaluation  [facility]     Patient Name: Akua Torres  : 1945  MRN: [id number]  Today's Date: 2018  Onset of Illness/Injury or Date of Surgery: 09/10/18  Date of Referral to OT: 18  Referring Physician: WILFREDO Smith    Admit Date: 9/10/2018       ICD-10-CM ICD-9-CM   1. Altered mental status, unspecified altered mental status type R41.82 780.97   2. Renal insufficiency N28.9 593.9   3. Positive urine drug screen R82.5 796.0   4. Abnormal EEG R94.01 794.02   5. Hypotension, unspecified hypotension type I95.9 458.9   6. Bradycardia R00.1 427.89   7. Impaired functional mobility, balance, gait, and endurance Z74.09 V49.89   8. Impaired mobility and ADLs Z74.09 799.89     Patient Active Problem List   Diagnosis   • Degenerative disc disease, lumbar   • Lumbar stenosis with neurogenic claudication   • History of lumbar fusion   • Spondylosis of lumbar region without myelopathy or radiculopathy   • Mild obesity   • Physical deconditioning   • Idiopathic gout   • Anxiety and depression   • Greater trochanteric bursitis of both hips   • Status post total bilateral knee replacement   • Back pain   • HTN (hypertension)   • HLD (hyperlipidemia)   • Prediabetes   • S/P lumbar spinal fusion   • Renal insufficiency   • Leukocytosis, likely reactive   • Altered mental status. Felt to be drug related (trazodone, opiates, benzodiazepine)   • Acute renal failure due to hypotension. Now resolved (ARF) (CMS/[facility]ly [facility])   • Depression     Past Medical History:   Diagnosis Date   • Anxiety    • Anxiety and depression    • Arthritis    • Cataract    • Depression    • Elevated serum creatinine     SEES DR SMITH EVERY 6 MONTHS- NEPHROLOGIST    • Extremity pain    • GERD (gastroesophageal reflux disease)    • Gout    • H/O bladder infections     JUST FINISHED MACROBID ON 17 FOR RECENT UTI   • Hypercholesteremia    • Hypertension    • Joint pain    • Kidney disease    •  Low back pain    • Neck pain    • Rheumatic fever    • Urinary tract infection    • Wears glasses      Past Surgical History:   Procedure Laterality Date   • BACK SURGERY  2004    LUMBAR PER DR MAN    • CARPAL TUNNEL RELEASE Left    • COLONOSCOPY     • EYE SURGERY     • FINGER SURGERY Right     3RD FINGER   • HEEL SPUR EXCISION Bilateral    • KNEE ARTHROSCOPY Bilateral    • LUMBAR DISCECTOMY FUSION INSTRUMENTATION N/A 11/10/2017    Procedure: LUMBAR SPINAL FUSION ;  Surgeon: Gopi Man MD;  Location: [facility] OR;  Service:    • REPLACEMENT TOTAL KNEE BILATERAL Bilateral           OT ASSESSMENT FLOWSHEET (last 72 hours)      Occupational Therapy Evaluation     Row Name 09/12/18 0805                   OT Evaluation Time/Intention    Subjective Information no complaints  -CL        Document Type evaluation  -CL        Mode of Treatment occupational therapy  -CL        Patient Effort good  -CL        Symptoms Noted During/After Treatment none  -CL           General Information    Patient Profile Reviewed? yes  -CL        Onset of Illness/Injury or Date of Surgery 09/11/18  -CL        Referring Physician WILFREDO Smith  -CL        Prior Level of Function independent:;all household mobility;transfer;ADL's;dressing;bathing  -CL        Equipment Currently Used at Home walker, rolling;wheelchair;shower chair  -CL        Pertinent History of Current Functional Problem Pt presented to the ED from the [place] 2/2 to [facility]. Pt found to have acute renal failure.   -CL        Existing Precautions/Restrictions fall;other (see comments)   hx of back sx 11/2017, back brace for comfort  -CL        Risks Reviewed patient and family:;LOB;nausea/vomiting;dizziness;increased discomfort;change in vital signs;lines disloged  -CL        Benefits Reviewed patient and family:;improve function;increase independence;increase strength;increase balance;decrease pain;increase knowledge  -CL        Barriers to Rehab none identified  -CL            Relationship/Environment    Lives With spouse  -CL           Resource/Environmental Concerns    Current Living Arrangements home/apartment/condo  -CL           Home Main Entrance    Number of Stairs, Main Entrance one  -CL           Cognitive Assessment/Intervention- PT/OT    Affect/Mental Status (Cognitive) emotionally labile  -CL        Orientation Status (Cognition) oriented x 4  -CL        Follows Commands (Cognition) follows one step commands;over 90% accuracy;verbal cues/prompting required;repetition of directions required  -CL        Cognitive Function (Cognitive) safety deficit  -CL        Safety Deficit (Cognitive) mild deficit;awareness of need for assistance;safety precautions awareness  -CL        Personal Safety Interventions fall prevention program maintained;gait belt;nonskid shoes/slippers when out of bed  -CL           Bed Mobility Assessment/Treatment    Bed Mobility Assessment/Treatment rolling right;sidelying-sit  -CL        Rolling Right [place] (Bed Mobility) minimum assist (75% patient effort);verbal cues  -CL        Sidelying-Sit [place] (Bed Mobility) minimum assist (75% patient effort);2 person assist;verbal cues  -CL        Assistive Device (Bed Mobility) draw sheet;bed rails;head of bed elevated  -CL        Comment (Bed Mobility) VCs for log roll d/t c/o back pain.   -CL           Functional Mobility    Functional Mobility- Comment Defer to PT.   -CL           Transfer Assessment/Treatment    Transfer Assessment/Treatment sit-stand transfer;stand-sit transfer;toilet transfer  -CL        Comment (Transfers) Pt stood from the EOB and took 3-4 side steps to the BSC w/ HHA.   -CL           Sit-Stand Transfer    Sit-Stand [place] (Transfers) minimum assist (75% patient effort);verbal cues  -CL           Stand-Sit Transfer    Stand-Sit [place] (Transfers) minimum assist (75% patient effort);verbal cues  -CL           Toilet Transfer    Type (Toilet Transfer) stand  pivot/stand step  -CL        [place] Level (Toilet Transfer) minimum assist (75% patient effort);verbal cues  -CL        Assistive Device (Toilet Transfer) commode, bedside without drop arms  -CL           ADL Assessment/Intervention    BADL Assessment/Intervention toileting;upper body dressing  -CL           Upper Body Dressing Assessment/Training    Upper Body Dressing [place] Level don;moderate assist (50% patient effort)   back brace  -CL        Upper Body Dressing Position edge of bed sitting  -CL           Toileting Assessment/Training    [place] Level (Toileting) adjust/manage clothing;minimum assist (75% patient effort);perform perineal hygiene;set up  -CL        Assistive Devices (Toileting) commode, bedside without drop arms  -CL        Toileting Position supported sitting;supported standing  -CL           General ROM    GENERAL ROM COMMENTS BUE grossly WFL.   -CL           MMT (Manual Muscle Testing)    General MMT Comments BUE grossly 3+/5.   -CL           Motor Assessment/Interventions    Additional Documentation Balance (Group)  -CL           Balance    Balance static sitting balance;static standing balance  -CL           Static Sitting Balance    Level of [place] (Unsupported Sitting, Static Balance) supervision  -CL        Sitting Position (Unsupported Sitting, Static Balance) sitting in chair  -CL           Static Standing Balance    Level of [place] (Supported Standing, Static Balance) contact guard assist  -CL        Assistive Device Utilized (Supported Standing, Static Balance) rolling walker  -CL           Sensory Assessment/Intervention    Sensory General Assessment no sensation deficits identified  -CL           Positioning and Restraints    Pre-Treatment Position in bed  -CL        Post Treatment Position bsc  -CL        On BS commode notified nsg;sitting;call light within reach;encouraged to call for assist;with PT   Pt transitioned to PT treatment  -CL            Pain Scale: Numbers Pre/Post-Treatment    Pain Scale: Numbers, Pretreatment 0/10 - no pain  -CL        Pain Scale: Numbers, Post-Treatment 0/10 - no pain  -CL           Clinical Impression (OT)    Date of Referral to OT 09/11/18  -CL        OT Diagnosis Decreased independence in ADLs.   -CL        Patient/Family Goals Statement (OT Eval) Return to PLOF.   -CL        Criteria for Skilled Therapeutic Interventions Met (OT Eval) yes;treatment indicated  -CL        Rehab Potential (OT Eval) good, to achieve stated therapy goals  -CL        Therapy Frequency (OT Eval) daily  -CL        Anticipated Equipment Needs at Discharge (OT) --   TBA further  -CL        Anticipated Discharge Disposition (OT) home with assist;home with home health  -CL           Vital Signs    Pre Systolic BP Rehab 134  -CL        Pre Treatment Diastolic BP 69  -CL        Post Treatment Diastolic BP 63  -CL        Post SpO2 (%) 97  -CL        O2 Delivery Post Treatment room air  -CL        Pre Patient Position Supine  -CL        Intra Patient Position Standing  -CL        Post Patient Position Sitting  -CL           OT Goals    Bed Mobility Goal Selection (OT) bed mobility, OT goal 1  -CL        Transfer Goal Selection (OT) transfer, OT goal 1  -CL        Dressing Goal Selection (OT) dressing, OT goal 1  -CL        Functional Mobility Goal Selection (OT) functional mobility, OT goal 1  -CL        Additional Documentation Functional Mobility Selection (OT) (Row)  -CL           Bed Mobility Goal 1 (OT)    Activity/Assistive Device (Bed Mobility Goal 1, OT) rolling to left;rolling to right;sidelying to sit;sit to sidelying   log roll technique  -CL        [place] Level/Cues Needed (Bed Mobility Goal 1, OT) contact guard assist;verbal cues required  -CL        Time Frame (Bed Mobility Goal 1, OT) by discharge;long term goal (LTG)  -CL        Progress/Outcomes (Bed Mobility Goal 1, OT) goal ongoing  -CL           Transfer Goal 1 (OT)     Activity/Assistive Device (Transfer Goal 1, OT) sit-to-stand/stand-to-sit;toilet  -CL        [place] Level/Cues Needed (Transfer Goal 1, OT) standby assist;verbal cues required  -CL        Time Frame (Transfer Goal 1, OT) by discharge;long term goal (LTG)  -CL        Progress/Outcome (Transfer Goal 1, OT) goal ongoing  -CL           Dressing Goal 1 (OT)    Activity/Assistive Device (Dressing Goal 1, OT) lower body dressing   Don/doff socks  -CL        [place]/Cues Needed (Dressing Goal 1, OT) minimum assist (75% or more patient effort);verbal cues required  -CL        Time Frame (Dressing Goal 1, OT) by discharge;long term goal (LTG)  -CL        Progress/Outcome (Dressing Goal 1, OT) goal ongoing  -CL           Functional Mobility Goal 1 (OT)    Activity/Assistive Device (Functional Mobility Goal 1, OT) walker, rolling  -CL        [place] Level/Cues Needed (Functional Mobility Goal 1, OT) standby assist;verbal cues required  -CL        Distance Goal 1 (Functional Mobility, OT) --   to/from the bathroom  -CL        Time Frame (Functional Mobility Goal 1, OT) by discharge;long term goal (LTG)  -CL        Progress/Outcome (Functional Mobility Goal 1, OT) goal ongoing  -CL           Living Environment    Home Accessibility stairs to enter home   walk-in shower  -CL          User Key  (r) = Recorded By, (t) = Taken By, (c) = Cosigned By    Initials Name Effective Dates    CL Drea Phillip, OT 04/03/18 -            Occupational Therapy Education     Title: PT OT SLP Therapies (Active)     Topic: Occupational Therapy (Active)     Point: ADL training (Active)     Description: Instruct learner(s) on proper safety adaptation and remediation techniques during self care or transfers.   Instruct in proper use of assistive devices.   Learning Progress Summary     Learner Status Readiness Method Response Comment Documented by    Patient Active Acceptance E NR Pt educated on appropriate safety precautions, t/f  techniques, positioning, role of OT, and benefits of therapy. CL 09/12/18 1348    Family Active Acceptance E NR Pt educated on appropriate safety precautions, t/f techniques, positioning, role of OT, and benefits of therapy. CL 09/12/18 1348          Point: Precautions (Active)     Description: Instruct learner(s) on prescribed precautions during self-care and functional transfers.   Learning Progress Summary     Learner Status Readiness Method Response Comment Documented by    Patient Active Acceptance E NR Pt educated on appropriate safety precautions, t/f techniques, positioning, role of OT, and benefits of therapy. CL 09/12/18 1348    Family Active Acceptance E NR Pt educated on appropriate safety precautions, t/f techniques, positioning, role of OT, and benefits of therapy. CL 09/12/18 1348          Point: Body mechanics (Active)     Description: Instruct learner(s) on proper positioning and spine alignment during self-care, functional mobility activities and/or exercises.   Learning Progress Summary     Learner Status Readiness Method Response Comment Documented by    Patient Active Acceptance E NR Pt educated on appropriate safety precautions, t/f techniques, positioning, role of OT, and benefits of therapy. CL 09/12/18 1348    Family Active Acceptance E NR Pt educated on appropriate safety precautions, t/f techniques, positioning, role of OT, and benefits of therapy.  09/12/18 1348                      User Key     Initials Effective Dates Name Provider Type Discipline    CL 04/03/18 -  Drea Phillip, OT Occupational Therapist OT                  OT Recommendation and Plan  Outcome Summary/Treatment Plan (OT)  Anticipated Equipment Needs at Discharge (OT):  (TBA further)  Anticipated Discharge Disposition (OT): home with assist, home with home health  Therapy Frequency (OT Eval): daily  Plan of Care Review  Plan of Care Reviewed With: patient, spouse  Plan of Care Reviewed With: patient, spouse  Outcome  Summary: OT completed a brief chart review relating to presenting physical deficits. Pt Set-up-Supervision for toileting, Min Ax2 for bed mobility. Recommend cont skilled IPOT intervention. Recommend pt DC home w/ assist and HH OT/PT services.           Outcome Measures     Row Name 09/12/18 0825 09/12/18 0805          How much help from another person do you currently need...    Turning from your back to your side while in flat bed without using bedrails? 3  -LS  --     Moving from lying on back to sitting on the side of a flat bed without bedrails? 2  -LS  --     Moving to and from a bed to a chair (including a wheelchair)? 3  -LS  --     Standing up from a chair using your arms (e.g., wheelchair, bedside chair)? 3  -LS  --     Climbing 3-5 steps with a railing? 2  -LS  --     To walk in hospital room? 3  -LS  --     AM-PAC 6 Clicks Score 16  -LS  --        How much help from another is currently needed...    Putting on and taking off regular lower body clothing?  -- 2  -CL     Bathing (including washing, rinsing, and drying)  -- 2  -CL     Toileting (which includes using toilet bed pan or urinal)  -- 3  -CL     Putting on and taking off regular upper body clothing  -- 3  -CL     Taking care of personal grooming (such as brushing teeth)  -- 4  -CL     Eating meals  -- 4  -CL     Score  -- 18  -CL        Functional Assessment    Outcome Measure Options AM-PAC 6 Clicks Basic Mobility (PT)  -LS AM-PAC 6 Clicks Daily Activity (OT)  -CL       User Key  (r) = Recorded By, (t) = Taken By, (c) = Cosigned By    Initials Name Provider Type    LS Shira Su, PT Physical Therapist    CL Drea Phillip, OT Occupational Therapist          Time Calculation:         Time Calculation- OT     Row Name 09/12/18 1349 09/12/18 0825          Time Calculation- OT    OT Start Time 0805 -CL  --     OT Received On 09/12/18  -CL  --     OT Goal Re-Cert Due Date 09/22/18 -CL  --        Timed Charges    98482 - Gait Training Minutes   --  11  -LS       User Key  (r) = Recorded By, (t) = Taken By, (c) = Cosigned By    Initials Name Provider Type    LS Shira Su, PT Physical Therapist    CL Drea Phillip OT Occupational Therapist        Therapy Suggested Charges     Code   Minutes Charges    None           Therapy Charges for Today     Code Description Service Date Service Provider Modifiers Qty    22555449823 HC OT EVAL LOW COMPLEXITY 4 9/12/2018 Drea Phillip OT GO 1    [id number]  OT THER SUPP EA 15 MIN 9/12/2018 Drea Phillip OT GO 2               Drea Phillip OT  9/12/2018

## 2018-09-12 NOTE — PROGRESS NOTES
Continued Stay Note  [facility]     Patient Name: Akua Torres  MRN: [id number]  Today's Date: 9/12/2018    Admit Date: 9/10/2018          Discharge Plan     Row Name 09/12/18 1011       Plan    Plan Pt scheduled with [facility] for therapy    Plan Comments spoke with pt. she states she does not want to return to [place]. Scheduled outpatient therapy with [facility] for October 17, 2:30 p.m. pt requests home health. notified CM.     Row Name 09/12/18 1009       Plan    Plan Comments Home with [facility] PT/OT    Row Name 09/12/18 1008       Plan    Final Discharge Disposition Code 06 - home with home health care    Final Note Home with [facility] for pt/ot              Discharge Codes    No documentation.       Expected Discharge Date and Time     Expected Discharge Date Expected Discharge Time    Sep 14, 2018             CAROLYNN Doan

## 2018-09-12 NOTE — PLAN OF CARE
Problem: Fall Risk (Adult)  Goal: Absence of Fall  Outcome: Ongoing (interventions implemented as appropriate)      Problem: Patient Care Overview  Goal: Plan of Care Review  Outcome: Ongoing (interventions implemented as appropriate)   09/12/18 0419   Coping/Psychosocial   Plan of Care Reviewed With patient   Coping/Psychosocial   Patient Agreement with Plan of Care agrees   Plan of Care Review   Progress no change   OTHER   Outcome Summary Patient in much better mood tonight. Oriented x4. VSS, UOP adequate. Will continue to monitor.       Problem: Renal Failure/Kidney Injury, Acute (Adult)  Goal: Signs and Symptoms of Listed Potential Problems Will be Absent, Minimized or Managed (Renal Failure/Kidney Injury, Acute)  Outcome: Ongoing (interventions implemented as appropriate)

## 2018-09-12 NOTE — DISCHARGE SUMMARY
"DISCHARGE SUMMARY     Admit date: 9/10/2018  Date of Discharge:  9/12/2018    Discharge Diagnoses  Hospital Problem List     * (Principal)Altered mental status. Felt to be drug related (trazodone, opiates, benzodiazepine)    Acute renal failure due to hypotension. Now resolved (ARF) (CMS/HCC    Depression     HTN (hypertension)    Prediabetes    HLD (hyperlipidemia)    Spondylosis of lumbar region without myelopathy or radiculopathy           Past Surgical History:   Procedure Laterality Date   • BACK SURGERY  2004    LUMBAR PER DR MAN    • CARPAL TUNNEL RELEASE Left    • COLONOSCOPY     • EYE SURGERY     • FINGER SURGERY Right     3RD FINGER   • HEEL SPUR EXCISION Bilateral    • KNEE ARTHROSCOPY Bilateral    • LUMBAR DISCECTOMY FUSION INSTRUMENTATION N/A 11/10/2017    Procedure: LUMBAR SPINAL FUSION ;  Surgeon: Gopi Man MD;  Location: [facility];  Service:    • REPLACEMENT TOTAL KNEE BILATERAL Bilateral        History of Present Illness    Patient is a 73 y.o. female presented with: Altered mental status     Akua Torres is a 73 y.o. female, nonsmoker, who was found earlier the morning of 9/10 with altered mental status while being a patient at \"[facility]\".  Previously the patient had presented to [facility] ER because of profound depression on 9/7, and on 9/8 her  transported her from the ER to \"[facility]\" where she has been since that time. She was being treated for depression and was apparently doing well when her  last talked to her the previous evening (9/9/18 at 2130).  At 0500 on 9/10 Mrs. Torres was found lethargic with altered mental status. She then was noted to have multiple episodes of vomiting. In reviewing her medication she was apparently given trazodone the previous evening. She was not supposed to be receiving any narcotics but drug screen at [facility] ER revealed the presence of opiates and oxycodone. In addition it was apparent that since being seen in [facility] ER 9/7 she had " "developed acute renal insufficiency (creatinine had increased from 0.98 to 2.28). It if difficult to determine whether or not she has had adequate urine output while at \"[facility]\". Her  reported the patient is unable to tolerate NSAIDs or oxycodone though this has not been related on allergy list. There was no documentation however she had received either of these.  Complete review of view of systems was unable to be obtained at this time due to patient's mental status.    Hospital Course    Mrs. Torres was admitted to the ICU for reasons mentioned above in the HPI. She was placed on IVFs with avoidance of nephrotoxic agents and nephrology was consulted. Her CK was elevated indicating a likely diagnosis of rhabdomyolysis. Over the next few days her mental status began to improve to baseline as did her renal function.     Throughout her hospitalization Mrs. Torres has remained profoundly depressed but discusses her situation appropriately and without suicidal ideations. SLP was consulted with concerned for aspiration and FEES was performed with only minor abnormalities. She was placed on a regular diet and strengthening exercises. Swallowing improved with her baseline mentation. PT/OT were consulted to aid in enhanced mobilization and recommend further skilled PT services. The patient refused to go back to  [facility] although she had good reason to be there initially and still seemed significantly depressed. I could not encourage her however to go back, and although I talked to the  he did not wish to try to encourage her to do so. Social work and case management were then consulted and have arranged for Mrs. Torres to go home with home health care for PT/OT and scheduled outpatient therapy with [facility]. The patient informs me that she has assisted devices in the home and does not require additional equipment. At this time it is felt that Mrs. Torres has returned to " baseline, has been deemed mentally/emotionally sound, and has reached the maximum benefit from hospital therapy and is appropriate for discharge on 9/12/18.     Procedures Performed:    9/11/18 FEES     Consults:    Leon Calderon MD Nephrology     Pertinent Test Results:     Results from last 7 days  Lab Units 09/11/18  0659   WBC 10*3/mm3 7.79   HEMOGLOBIN g/dL 11.6   HEMATOCRIT % 37.8   PLATELETS 10*3/mm3 164       Results from last 7 days  Lab Units 09/12/18  0445 09/11/18  0659 09/10/18  0814   SODIUM mmol/L 140 139 140   POTASSIUM mmol/L 4.5 4.3 4.4   CHLORIDE mmol/L 105 105 100   CO2 mmol/L 26.0 28.0 30.0   BUN mg/dL 19 28* 57*   CREATININE mg/dL 0.77 0.96 2.28*   GLUCOSE mg/dL 85 114* 148*   CALCIUM mg/dL 8.4* 8.4* 9.0   PHOSPHORUS mg/dL 2.8 2.6  --      Physical Exam:    GENERAL: Patient sitting in chair and conversant. No acute distress.   HEENT:  Atraumatic. Neck supple. Trachea midline. PERRLA 4mm. EOM WNL. Tongue midline.     LUNGS:  Chest rise of normal depth and symmetric. Lungs clear to auscultation throughout all lung fields.    HEART:  S1S2 detected. No JVD. No cartoid bruit. Regular rate and rhythm. NSR. No rub, murmur, or gallop.    ABDOMEN:  Soft, round, nondistended, and nontender. Bowel sounds normoactive in all quadrants. No HSM.    EXTREMITIES:  No clubbing, edema, or cyanosis. Peripheral pulses 2+ and regular. Capillary refill <3sec. Mild tremor.     NEURO/PSYCH:  A&O x4. Follows commands. Moves all extremities.      Condition on Discharge:  Stable     Discharge Disposition: Home or Self Care    Discharge Medications:      Discharge Medications      Changes to Medications      Instructions Start Date   escitalopram 20 MG tablet  Commonly known as:  LEXAPRO  What changed:  · medication strength  · how much to take   20 mg, Oral, Daily         Continue These Medications      Instructions Start Date   acetaminophen 325 MG tablet  Commonly known as:  TYLENOL   650 mg, Oral, Every 6  Hours PRN      aluminum-magnesium hydroxide-simethicone 400-400-40 MG/5ML suspension  Commonly known as:  MAALOX MAX   Oral, Every 6 Hours PRN      docusate sodium 100 MG capsule  Commonly known as:  COLACE   100 mg, Oral, 2 Times Daily      KAOPECTATE 262 MG/15ML suspension  Generic drug:  bismuth subsalicylate   30 mL, Oral, Every 6 Hours PRN         Stop These Medications    allopurinol 300 MG tablet  Commonly known as:  ZYLOPRIM     atenolol 50 MG tablet  Commonly known as:  TENORMIN     baclofen 10 MG tablet  Commonly known as:  LIORESAL     gabapentin 300 MG capsule  Commonly known as:  NEURONTIN     lisinopril-hydrochlorothiazide 20-25 MG per tablet  Commonly known as:  PRINZIDE,ZESTORETIC     LORazepam 0.5 MG tablet  Commonly known as:  ATIVAN     traMADol 50 MG tablet  Commonly known as:  ULTRAM     traZODone 50 MG tablet  Commonly known as:  DESYREL            Discharge Diet: Diet Regular; Thin; Cardiac    Activity at Discharge: As tolerated    Follow-up Appointments  Future Appointments  Date Time Provider Department Center   10/24/2018 10:00 AM Salty Hernandez MD [facility] None     Additional Instructions for the Follow-ups that You Need to Schedule     Ambulatory Referral to Home Health    As directed      Face to Face Visit Date:  9/12/2018    Follow-up Provider for Plan of Care?:  I treated the patient in an acute care facility and will not continue treatment after discharge.    Follow-up Provider:  NANCI ORR [[id number]]    Reason/Clinical Findings:  pt/ot    Describe mobility limitations that make leaving home difficult:  pt is very weak. s/p hospitalization    Nursing/Therapeutic Services Requested:  Physical Therapy Occupational Therapy    Frequency:  1 Week 1         Discharge Follow-up with PCP    As directed      Currently Documented PCP:  Nanci Orr MD  PCP Phone Number:  [phone]    Follow Up Details:  1 week- review home medications and restart per PCP clearance          Discharge Follow-up with Specified Provider: [facility]    As directed      To:  [facility]    Follow Up Details:  appointment already scheduled for Oct 17 at 2:30PM               Test Results Pending at Discharge   Order Current Status    Blood Culture - Blood, Preliminary result    Blood Culture - Blood, Preliminary result           Code Status and Medical Interventions:   Ordered at: 09/10/18 1145     Code Status:    CPR     Medical Interventions (Level of Support Prior to Arrest):    Full     1. Discharge today   2. Follow-up with PCP in 1 week regarding home medications  3. Appointment made for [facility] on October 17 at 2:30PM  4. Home health arranged per social work (HH has already contacted )   5. Educated on avoidance of nephrotoxic agents  6. Educated on risk factors of depression, self harm, and/or suicidal ideation- please seek immediate assistance and call 911 if these signs/symptoms develop     WILFREDO Bravo, [facility]   Pulmonary and Critical Care     Discharge Time Spent: 45 Minutes

## 2018-09-12 NOTE — PROGRESS NOTES
Continued Stay Note  [facility]     Patient Name: Akua Torres  MRN: [id number]  Today's Date: 9/12/2018    Admit Date: 9/10/2018          Discharge Plan     Row Name 09/12/18 1009       Plan    Plan Comments Home with [facility] PT/OT    Row Name 09/12/18 1008       Plan    Final Discharge Disposition Code 06 - home with home health care    Final Note Home with [facility]  for pt/ot              Discharge Codes    No documentation.       Expected Discharge Date and Time     Expected Discharge Date Expected Discharge Time    Sep 14, 2018             Lexie Rabago RN

## 2018-09-12 NOTE — PROGRESS NOTES
Clinical Nutrition Note      Patient Name: Akua Torres  MRN: [id number]  Admission date: 9/10/2018      Reason for Assessment:  Multidisciplinary Rounds    Additional information obtained during MDR:  RN reports ready for dc or transfer; eating well, if she goes home,  will need assistance w/ HH for PT/OT.    Current diet: Diet Regular; Thin; Cardiac    Pertinent medical data reviewed    Intervention:  Plan of care and goals reviewed    Monitor:  RD to follow per protocol      Yun Jansen MS,RD,LD  09/12/18 11:24 AM  Time: 20min

## 2018-09-12 NOTE — PLAN OF CARE
Problem: Patient Care Overview  Goal: Plan of Care Review  Outcome: Ongoing (interventions implemented as appropriate)   09/12/18 8851   Coping/Psychosocial   Plan of Care Reviewed With patient;spouse   OTHER   Outcome Summary PT evaluation completed. Pt demonstrates generalized LE weakness and decreased indep re: functional mobility (with evolving signs/symptoms), warranting further skilled PT services to promote PLOF and safe d/c. Recommend d/c home with assist, HHPT, and use of RWx based upon current level of function. Edu pt/family re: PT recommendations and benefit of further ambulation with NSG.

## 2018-09-12 NOTE — PLAN OF CARE
Problem: Patient Care Overview  Goal: Plan of Care Review  Outcome: Ongoing (interventions implemented as appropriate)   09/12/18 4312   Coping/Psychosocial   Plan of Care Reviewed With patient;spouse   Coping/Psychosocial   Patient Agreement with Plan of Care agrees   OTHER   Outcome Summary OT completed a brief chart review relating to presenting physical deficits. Pt Set-up-Supervision for toileting, Min Ax2 for bed mobility. Recommend cont skilled IPOT intervention. Recommend pt DC home w/ assist and HH OT/PT services.

## 2018-09-12 NOTE — THERAPY EVALUATION
Acute Care - Physical Therapy Initial Evaluation  [facility]     Patient Name: Akua Torres  : 1945  MRN: [id number]  Today's Date: 2018   Onset of Illness/Injury or Date of Surgery: 09/10/18  Date of Referral to PT: 18  Referring Physician: WILFREDO Smith      Admit Date: 9/10/2018    Visit Dx:     ICD-10-CM ICD-9-CM   1. Altered mental status, unspecified altered mental status type R41.82 780.97   2. Renal insufficiency N28.9 593.9   3. Positive urine drug screen R82.5 796.0   4. Abnormal EEG R94.01 794.02   5. Hypotension, unspecified hypotension type I95.9 458.9   6. Bradycardia R00.1 427.89   7. Impaired functional mobility, balance, gait, and endurance Z74.09 V49.89     Patient Active Problem List   Diagnosis   • Degenerative disc disease, lumbar   • Lumbar stenosis with neurogenic claudication   • History of lumbar fusion   • Spondylosis of lumbar region without myelopathy or radiculopathy   • Mild obesity   • Physical deconditioning   • Idiopathic gout   • Anxiety and depression   • Greater trochanteric bursitis of both hips   • Status post total bilateral knee replacement   • Back pain   • HTN (hypertension)   • HLD (hyperlipidemia)   • Prediabetes   • S/P lumbar spinal fusion   • Renal insufficiency   • Leukocytosis, likely reactive   • Altered mental status. Felt to be drug related (trazodone, opiates, benzodiazepine)   • Acute renal failure due to hypotension. Now resolved (ARF) (CMS/[facility])   • Depression     Past Medical History:   Diagnosis Date   • Anxiety    • Anxiety and depression    • Arthritis    • Cataract    • Depression    • Elevated serum creatinine     SEES DR SMITH EVERY 6 MONTHS- NEPHROLOGIST    • Extremity pain    • GERD (gastroesophageal reflux disease)    • Gout    • H/O bladder infections     JUST FINISHED MACROBID ON 17 FOR RECENT UTI   • Hypercholesteremia    • Hypertension    • Joint pain    • Kidney disease    • Low back pain    • Neck pain    •  Rheumatic fever    • Urinary tract infection    • Wears glasses      Past Surgical History:   Procedure Laterality Date   • BACK SURGERY  2004    LUMBAR PER DR MAN    • CARPAL TUNNEL RELEASE Left    • COLONOSCOPY     • EYE SURGERY     • FINGER SURGERY Right     3RD FINGER   • HEEL SPUR EXCISION Bilateral    • KNEE ARTHROSCOPY Bilateral    • LUMBAR DISCECTOMY FUSION INSTRUMENTATION N/A 11/10/2017    Procedure: LUMBAR SPINAL FUSION ;  Surgeon: Gopi Man MD;  Location: [facility] OR;  Service:    • REPLACEMENT TOTAL KNEE BILATERAL Bilateral         PT ASSESSMENT (last 12 hours)      Physical Therapy Evaluation     Row Name 09/12/18 0825          PT Evaluation Time/Intention    Subjective Information no complaints  -LS     Document Type evaluation  -LS     Mode of Treatment physical therapy  -LS     Patient Effort good  -LS     Row Name 09/12/18 0825          General Information    Patient Profile Reviewed? yes  -LS     Onset of Illness/Injury or Date of Surgery 09/10/18  -LS     Referring Physician WILFREDO Smith  -LS     Patient Observations alert;cooperative;agree to therapy  -LS     Patient/Family Observations  present.   -LS     Prior Level of Function independent:;gait;transfer;ADL's;bathing;dressing  -LS     Equipment Currently Used at Home walker, rolling   please refer to OT eval for full equipment list  -LS     Pertinent History of Current Functional Problem To [facility] from [facility] with AMS, vomiting; found to have acute renal failure. Being treated at [facility] for depression.   -LS     Existing Precautions/Restrictions fall;other (see comments)   hx back sx Nov 2017; use of brace for comfort  -LS     Risks Reviewed patient:;spouse/S.O.:;LOB;dizziness;increased discomfort;change in vital signs  -LS     Benefits Reviewed patient:;spouse/S.O.:;improve function;increase independence;increase strength;increase balance;increase knowledge  -LS     Row Name 09/12/18 0825          Relationship/Environment     Lives With spouse  -[facility] Name 09/12/18 0825          Resource/Environmental Concerns    Current Living Arrangements home/apartment/condo   was at [facility]  -[facility] Name 09/12/18 0825          Home Main Entrance    Number of Stairs, Main Entrance one  -[facility] Name 09/12/18 0825          Cognitive Assessment/Interventions    Additional Documentation Cognitive Assessment/Intervention (Group)  -[facility] Name 09/12/18 0825          Cognitive Assessment/Intervention- PT/OT    Affect/Mental Status (Cognitive) emotionally labile  -     Orientation Status (Cognition) oriented x 4  -     Follows Commands (Cognition) follows one step commands;over 90% accuracy;verbal cues/prompting required  -     Cognitive Function (Cognitive) safety deficit  -     Safety Deficit (Cognitive) mild deficit;insight into deficits/self awareness;safety precautions awareness  -     Personal Safety Interventions fall prevention program maintained;gait belt;nonskid shoes/slippers when out of bed  -[facility] Name 09/12/18 0825          Safety Issues, Functional Mobility    Impairments Affecting Function (Mobility) balance;cognition;coordination;endurance/activity tolerance;strength  -[facility] Name 09/12/18 0825          Bed Mobility Assessment/Treatment    Comment (Bed Mobility) on [facility] upon arrival  -[facility] Name 09/12/18 0825          Transfer Assessment/Treatment    Transfer Assessment/Treatment sit-stand transfer;stand-sit transfer;toilet transfer  -     Comment (Transfers) VC's for hand placement.   -LS     Sit-Stand [place] (Transfers) contact guard;verbal cues  -     Stand-Sit [place] (Transfers) contact guard;verbal cues  -     [place] Level (Toilet Transfer) contact guard;verbal cues  -     Assistive Device (Toilet Transfer) walker, front-wheeled;commode, bedside without drop arms  -[facility] Name 09/12/18 0825          Sit-Stand Transfer    Assistive Device (Sit-Stand Transfers)  walker, front-wheeled  -LS     Row Name 09/12/18 0825          Stand-Sit Transfer    Assistive Device (Stand-Sit Transfers) walker, front-wheeled  -LS     Row Name 09/12/18 0825          Toilet Transfer    Type (Toilet Transfer) sit-stand  -     Row Name 09/12/18 0825          Gait/Stairs Assessment/Training    07378 - Gait Training Minutes  11  -     Gait/Stairs Assessment/Training gait/ambulation assistive device  -     [place] Level (Gait) minimum assist (75% patient effort);verbal cues  -     Assistive Device (Gait) walker, front-wheeled  -LS     Distance in Feet (Gait) 90  -LS     Deviations/Abnormal Patterns (Gait) nixon decreased;stride length decreased;gait speed decreased  -LS     Bilateral Gait Deviations forward flexed posture;heel strike decreased  -LS     Comment (Gait/Stairs) Very slow pace; constant vc's for increasing step length. Distance limited by fatigue; progressed to CGA with distance.   -     Row Name 09/12/18 0825          General ROM    GENERAL ROM COMMENTS BLE WFL  -     Row Name 09/12/18 0825          MMT (Manual Muscle Testing)    General MMT Comments BLE grossly 4-/5  -     Row Name 09/12/18 0825          Motor Assessment/Intervention    Additional Documentation Balance (Group);Therapeutic Exercise (Group)  -     Row Name 09/12/18 0825          Balance    Balance static sitting balance;static standing balance  -     Row Name 09/12/18 0825          Static Sitting Balance    Level of [place] (Unsupported Sitting, Static Balance) supervision  -     Sitting Position (Unsupported Sitting, Static Balance) sitting in chair  -     Row Name 09/12/18 0825          Static Standing Balance    Level of [place] (Supported Standing, Static Balance) contact guard assist  -     Assistive Device Utilized (Supported Standing, Static Balance) rolling walker  -     Row Name 09/12/18 0825          Sensory Assessment/Intervention    Sensory General Assessment no  sensation deficits identified   BLE  -LS     Row Name 09/12/18 0825          Pain Scale: Numbers Pre/Post-Treatment    Pain Scale: Numbers, Pretreatment 0/10 - no pain  -LS     Pain Scale: Numbers, Post-Treatment 0/10 - no pain  -LS     Row Name 09/12/18 0825          Plan of Care Review    Plan of Care Reviewed With patient;spouse  -LS     Row Name 09/12/18 0825          Physical Therapy Clinical Impression    Date of Referral to PT 09/11/18  -LS     PT Diagnosis (PT Clinical Impression) impaired functional mobility, balance, gait  -LS     Patient/Family Goals Statement (PT Clinical Impression) return to PLOF; go home  -     Criteria for Skilled Interventions Met (PT Clinical Impression) yes;treatment indicated  -LS     Rehab Potential (PT Clinical Summary) good, to achieve stated therapy goals  -LS     Care Plan Review (PT) evaluation/treatment results reviewed  -LS     Care Plan Review, Other Participant (PT Clinical Impression) spouse  -LS     Row Name 09/12/18 0825          Vital Signs    Pre Systolic BP Rehab 134  -LS     Pre Treatment Diastolic BP 69  -LS     Post Systolic BP Rehab 147  -LS     Post Treatment Diastolic BP 71  -LS     Posttreatment Heart Rate (beats/min) 67  -LS     O2 Delivery Pre Treatment room air  -LS     Post SpO2 (%) 98  -LS     O2 Delivery Post Treatment room air  -LS     Pre Patient Position Sitting  -LS     Intra Patient Position Standing  -LS     Post Patient Position Sitting  -LS     Row Name 09/12/18 0825          Physical Therapy Goals    Bed Mobility Goal Selection (PT) bed mobility, PT goal 1  -LS     Transfer Goal Selection (PT) transfer, PT goal 1  -LS     Gait Training Goal Selection (PT) gait training, PT goal 1  -LS     Stairs Goal Selection (PT) stairs, PT goal 1  -LS     Additional Documentation Stairs Goal Selection (PT) (Row)  -     Row Name 09/12/18 0825          Bed Mobility Goal 1 (PT)    Activity/Assistive Device (Bed Mobility Goal 1, PT) sit to supine/supine to  sit  -LS     [place] Level/Cues Needed (Bed Mobility Goal 1, PT) independent  -LS     Time Frame (Bed Mobility Goal 1, PT) 2 weeks  -LS     Progress/Outcomes (Bed Mobility Goal 1, PT) goal ongoing  -LS     Row Name 09/12/18 0825          Transfer Goal 1 (PT)    Activity/Assistive Device (Transfer Goal 1, PT) sit-to-stand/stand-to-sit;walker, rolling  -LS     [place] Level/Cues Needed (Transfer Goal 1, PT) supervision required  -LS     Time Frame (Transfer Goal 1, PT) 2 weeks  -LS     Progress/Outcome (Transfer Goal 1, PT) goal ongoing  -LS     Row Name 09/12/18 0825          Gait Training Goal 1 (PT)    Activity/Assistive Device (Gait Training Goal 1, PT) gait (walking locomotion);walker, rolling  -LS     [place] Level (Gait Training Goal 1, PT) supervision required  -LS     Distance (Gait Goal 1, PT) 150  -LS     Time Frame (Gait Training Goal 1, PT) 2 weeks  -LS     Progress/Outcome (Gait Training Goal 1, PT) goal ongoing  -LS     Row Name 09/12/18 0825          Stairs Goal 1 (PT)    Activity/Assistive Device (Stairs Goal 1, PT) ascending stairs;descending stairs  -LS     [place] Level/Cues Needed (Stairs Goal 1, PT) contact guard assist  -LS     Number of Stairs (Stairs Goal 1, PT) 1  -LS     Time Frame (Stairs Goal 1, PT) 2 weeks  -LS     Progress/Outcome (Stairs Goal 1, PT) goal ongoing  -LS     Row Name 09/12/18 0825          Positioning and Restraints    Pre-Treatment Position bedside commode  -LS     Post Treatment Position chair  -LS     In Chair notified nsg;reclined;call light within reach;encouraged to call for assist;exit alarm on;with family/caregiver;RUE elevated;LUE elevated;waffle cushion;legs elevated;heels elevated  -LS     Row Name 09/12/18 0825          Living Environment    Home Accessibility stairs to enter home  -LS       User Key  (r) = Recorded By, (t) = Taken By, (c) = Cosigned By    Initials Name Provider Type    LS Shira Su, PT Physical Therapist           Physical Therapy Education     Title: PT OT SLP Therapies (Active)     Topic: Physical Therapy (Active)     Point: Mobility training (Active)    Learning Progress Summary     Learner Status Readiness Method Response Comment Documented by    Patient Active Acceptance E,D NR  LS 09/12/18 0825    Significant Other Active Acceptance E,D NR  LS 09/12/18 0825          Point: Home exercise program (Active)    Learning Progress Summary     Learner Status Readiness Method Response Comment Documented by    Patient Active Acceptance E,D NR  LS 09/12/18 0825    Significant Other Active Acceptance E,D NR  LS 09/12/18 0825          Point: Body mechanics (Active)    Learning Progress Summary     Learner Status Readiness Method Response Comment Documented by    Patient Active Acceptance E,D NR  LS 09/12/18 0825    Significant Other Active Acceptance E,D NR  LS 09/12/18 0825          Point: Precautions (Active)    Learning Progress Summary     Learner Status Readiness Method Response Comment Documented by    Patient Active Acceptance E,D NR  LS 09/12/18 0825    Significant Other Active Acceptance E,D NR  LS 09/12/18 0825                      User Key     Initials Effective Dates Name Provider Type Discipline     06/19/15 -  Shira Su, PT Physical Therapist PT                PT Recommendation and Plan  Anticipated Discharge Disposition (PT): home with assist, home with home health (based upon mobility status; return to [facility] if approp)  Planned Therapy Interventions (PT Eval): balance training, bed mobility training, gait training, home exercise program, patient/family education, postural re-education, strengthening, stair training, transfer training  Therapy Frequency (PT Clinical Impression): daily  Outcome Summary/Treatment Plan (PT)  Anticipated Discharge Disposition (PT): home with assist, home with home health (based upon mobility status; return to [facility] if approp)  Plan of Care Reviewed With: patient,  spouse  Outcome Summary: PT evaluation completed. Pt demonstrates generalized LE weakness and decreased indep re: functional mobility (with evolving signs/symptoms), warranting further skilled PT services to promote PLOF and safe d/c. Recommend d/c home with assist, HHPT, and use of RWx based upon current level of function. Edu pt/family re: PT recommendations and benefit of further ambulation with NSG.           Outcome Measures     Row Name 09/12/18 0825             How much help from another person do you currently need...    Turning from your back to your side while in flat bed without using bedrails? 3  -LS      Moving from lying on back to sitting on the side of a flat bed without bedrails? 2  -LS      Moving to and from a bed to a chair (including a wheelchair)? 3  -LS      Standing up from a chair using your arms (e.g., wheelchair, bedside chair)? 3  -LS      Climbing 3-5 steps with a railing? 2  -LS      To walk in hospital room? 3  -LS      AM-PAC 6 Clicks Score 16  -         Functional Assessment    Outcome Measure Options AM-PAC 6 Clicks Basic Mobility (PT)  -        User Key  (r) = Recorded By, (t) = Taken By, (c) = Cosigned By    Initials Name Provider Type    Shira Carlton, PT Physical Therapist           Time Calculation:         PT Charges     Row Name 09/12/18 0825             Time Calculation    Start Time 0825  -      PT Received On 09/12/18  -      PT Goal Re-Cert Due Date 09/22/18  -         Time Calculation- PT    Total Timed Code Minutes- PT 11 minute(s)  -LS         Timed Charges    41815 - Gait Training Minutes  11  -LS        User Key  (r) = Recorded By, (t) = Taken By, (c) = Cosigned By    Initials Name Provider Type    Shira Carlton, PT Physical Therapist        Therapy Suggested Charges     Code   Minutes Charges    42031 (CPT®) Hc Pt Neuromusc Re Education Ea 15 Min      95279 (CPT®) Hc Pt Ther Proc Ea 15 Min      51123 (CPT®) Hc Gait Training Ea 15 Min 11 1    78132  (CPT®) Hc Pt Therapeutic Act Ea 15 Min      41672 (CPT®) Hc Pt Manual Therapy Ea 15 Min      59799 (CPT®) Hc Pt Iontophoresis Ea 15 Min      53028 (CPT®) Hc Pt Elec Stim Ea-Per 15 Min      79684 (CPT®) Hc Pt Ultrasound Ea 15 Min      69421 (CPT®) Hc Pt Self Care/Mgmt/Train Ea 15 Min      02102 (CPT®) Hc Pt Prosthetic (S) Train Initial Encounter, Each 15 Min      59369 (CPT®) Hc Pt Orthotic(S)/Prosthetic(S) Encounter, Each 15 Min      93812 (CPT®) Hc Orthotic(S) Mgmt/Train Initial Encounter, Each 15min      Total  11 1        Therapy Charges for Today     Code Description Service Date Service Provider Modifiers Qty    43403963450 HC PT EVAL MOD COMPLEXITY 3 9/12/2018 Shira Su, PT GP 1    84532913892 HC GAIT TRAINING EA 15 MIN 9/12/2018 Shira Su, PT GP 1    98380201013 HC PT THER SUPP EA 15 MIN 9/12/2018 Shira Su, PT GP 2          PT G-Codes  Outcome Measure Options: AM-PAC 6 Clicks Basic Mobility (PT)  AM-PAC 6 Clicks Score: 16      Shira Su, PT  9/12/2018

## 2018-09-12 NOTE — PROGRESS NOTES
"   LOS: 2 days    Patient Care Team:  Nanci Orr MD as PCP - General (Internal Medicine)  Leon Hein MD as Consulting Physician (Neurosurgery)  Perkins, Corinne E, PT as Physical Therapist (Physical Therapy)  Cricket Nettles MD as Consulting Physician (Pain Medicine)    Reason For Visit:  F/U LOTTIE  Subjective           Review of Systems:    Pulm: No soa   CV:  No CP      Objective       escitalopram 20 mg Oral Daily   heparin (porcine) 5,000 Units Subcutaneous Q8H   pantoprazole 40 mg Intravenous Q AM       lactated ringers 125 mL/hr Last Rate: 125 mL/hr (09/12/18 0243)         Vital Signs:  Blood pressure 134/69, pulse 64, temperature 97.1 °F (36.2 °C), temperature source Axillary, resp. rate 16, height 165.1 cm (65\"), weight 77.1 kg (170 lb), SpO2 94 %, not currently breastfeeding.    Flowsheet Rows      First Filed Value   Admission Height  165.1 cm (65\") Documented at 09/10/2018 0734   Admission Weight  77.1 kg (170 lb) Documented at 09/10/2018 0734          09/11 0701 - 09/12 0700  In: 2403.4 [I.V.:2403.4]  Out: 2250 [Urine:2250]    Physical Exam:    General Appearance: NAD, alert and cooperative, Ox3  Eyes: PER, conjunctivae and sclerae normal, no icterus  Lungs: respirations regular and unlabored, no crepitus, clear to auscultation  Heart/CV: regular rhythm & normal rate, no murmur, no gallop, no rub and no edema  Abdomen: not distended, soft, non-tender, no masses,  bowel sounds present  Skin: No rash, Warm and dry    Radiology:            Labs:     Results from last 7 days  Lab Units 09/11/18  0659 09/10/18  0814 09/07/18  2207   WBC 10*3/mm3 7.79 9.94 10.63   HEMOGLOBIN g/dL 11.6 13.2 15.3   HEMATOCRIT % 37.8 41.9 46.8*   PLATELETS 10*3/mm3 164 191 201       Results from last 7 days  Lab Units 09/12/18  0445 09/11/18  0659 09/10/18  0814 09/07/18  2206   SODIUM mmol/L 140 139 140 138   POTASSIUM mmol/L 4.5 4.3 4.4 4.1   CHLORIDE mmol/L 105 105 100 100   CO2 mmol/L 26.0 28.0 30.0 31.0   BUN " mg/dL 19 28* 57* 24*   CREATININE mg/dL 0.77 0.96 2.28* 0.98   CALCIUM mg/dL 8.4* 8.4* 9.0 9.6   PHOSPHORUS mg/dL 2.8 2.6  --   --    MAGNESIUM mg/dL  --  1.8 2.2 1.8   ALBUMIN g/dL 3.33 3.55 4.35 4.53       Results from last 7 days  Lab Units 09/12/18  0445   GLUCOSE mg/dL 85         Results from last 7 days  Lab Units 09/11/18  0659   ALK PHOS U/L 75   BILIRUBIN mg/dL 0.9   ALT (SGPT) U/L 29   AST (SGOT) U/L 72*       Results from last 7 days  Lab Units 09/11/18  0455   PH, ARTERIAL pH units 7.432   PO2 ART mm Hg 89.8   PCO2, ARTERIAL mm Hg 46.3*   HCO3 ART mmol/L 30.8*         Results from last 7 days  Lab Units 09/10/18  0816   COLOR UA  Yellow   CLARITY UA  Cloudy*   PH, URINE  <=5.0   SPECIFIC GRAVITY, URINE  1.020   GLUCOSE UA  Negative   KETONES UA  Trace*   BILIRUBIN UA  Negative   PROTEIN UA  Negative   BLOOD UA  Negative   LEUKOCYTES UA  Negative   NITRITE UA  Negative       Estimated Creatinine Clearance: 63.3 mL/min (by C-G formula based on SCr of 0.77 mg/dL).      Assessment     Principal Problem:    Altered mental status. Felt to be drug related (trazodone, opiates, benzodiazepine)  Active Problems:    Spondylosis of lumbar region without myelopathy or radiculopathy    HTN (hypertension)    HLD (hyperlipidemia)    Prediabetes    Acute renal failure due to hypotension. Now resolved (ARF) (CMS/[facility])    Depression            Impression: LOTTIE RESOLVED. RHABDO WITH IMPROVED CPK.            Recommendations: WILL SIGN OFF.      Leon Calderon MD  09/12/18  9:19 AM

## 2018-09-12 NOTE — PROGRESS NOTES
Case Management Discharge Note    Final Note: Home with [facility]  for pt/ot    Destination     No service has been selected for the patient.      Durable Medical Equipment     No service has been selected for the patient.      Dialysis/Infusion     No service has been selected for the patient.      Home Medical Care     No service has been selected for the patient.      Social Care     No service has been selected for the patient.             Final Discharge Disposition Code: 06 - home with home health care

## 2018-09-13 ENCOUNTER — NURSE TRIAGE (OUTPATIENT)
Dept: CALL CENTER | Facility: HOSPITAL | Age: 73
End: 2018-09-13

## 2018-09-13 ENCOUNTER — TRANSITIONAL CARE MANAGEMENT TELEPHONE ENCOUNTER (OUTPATIENT)
Dept: FAMILY MEDICINE CLINIC | Facility: CLINIC | Age: 73
End: 2018-09-13

## 2018-09-13 ENCOUNTER — TELEPHONE (OUTPATIENT)
Dept: FAMILY MEDICINE CLINIC | Facility: CLINIC | Age: 73
End: 2018-09-13

## 2018-09-13 NOTE — TELEPHONE ENCOUNTER
----- Message from Mer Daley sent at 9/13/2018  9:07 AM EDT -----  Contact: PT.  PT. SEE'S DR. OROZCO.  PT. JUST WAS DISCHARGED FROM [facility] RECENTLY.    PT. WONDERING WHO PUT HER ON LEXAPRO?  PT. WANTS CLARIFICATION ON THIS MED.    PT. HAS A F/U APPT. NEXT Wednesday; PT. IS NOT SLEEPING.    PT. CAN BE REACHED @ [facility] #.

## 2018-09-13 NOTE — TELEPHONE ENCOUNTER
"Pt advised per guideline and states she will call in a.m and make appointment to see MD within 24 hour's.     Reason for Disposition  • [1] Depression AND [2] worsening (e.g.,sleeping poorly, less able to do activities of daily living)    Additional Information  • Negative: Difficulty breathing  • Depression is suspected  • Negative: Patient attempted suicide  • Negative: Patient is threatening suicide now  • Negative: Patient is threatening to kill a specific person  • Negative: [1] Patient is very confused (disoriented, slurred speech) AND [2] no other adult (e.g., friend or family member) available  • Negative: [1] Difficult to awaken or acting very confused (disoriented, slurred speech) AND [2] new onset  • Negative: Sounds like a life-threatening emergency to the triager  • Negative: Alcohol use, abuse or dependence, question or problem related to  • Negative: Drug abuse or dependence, question or problem related to  • Negative: Bipolar disorder (manic depression)  • Negative: Depression during the postpartum period (< 1 year since delivery)  • Negative: [1] Depression AND [2] unable to do any of normal activities (e.g., self care, school, work; in comparison to baseline).  • Negative: Bizarre or confused behavior  • Negative: Patient sounds very sick or weak to the triager    Answer Assessment - Initial Assessment Questions  1. DESCRIPTION: \"Tell me about your sleeping problem.\"       \"I'm afraid to sleep they had to pump on my chest recently\"  2. ONSET: \"How long have you been having trouble sleeping?\" (e.g., days, weeks, months)      day's  3. RECURRENT: \"Have you had sleeping problems before?\"  If yes: \"What happened that time?\" \"What helped your sleeping problem go away in the past?\"       Denies  4. STRESS: \"Is there anything in your life that is making you feel stressed or tense?\"      Recent depression  5. PAIN: \"Do you have any pain that is keeping you awake?\" (e.g., back pain, headache, abdominal " "pain)      Denies  6. CAFFEINE ABUSE: \"Do you drink caffeinated beverages, and how much each day?\" (e.g., coffee, tea, mayra)      Denies  7. SUBSTANCE ABUSE: \"Do you use any illegal drugs or alcohol?\"      Denies  8. OTHER SYMPTOMS: \"Do you have any other symptoms?\"  (e.g., difficulty breathing)      Denies    Answer Assessment - Initial Assessment Questions  1. CONCERN: \"What happened that made you call today?\"      insomnia  2. DEPRESSION SYMPTOM SCREENING: \"How are you feeling overall?\" (e.g., decreased energy, increased sleeping or difficulty sleeping, difficulty concentrating, feelings of sadness, guilt, hopelessness, or worthlessness)      Yes  3. RISK OF HARM - SUICIDAL IDEATION:  \"Do you ever have thoughts of hurting or killing yourself?\"  (e.g., yes, no, no but preoccupation with thoughts about death)    - INTENT:  \"Do you have thoughts of hurting or killing yourself right NOW?\" (e.g., yes, no, N/A)    - PLAN: \"Do you have a specific plan for how you would do this?\" (e.g., gun, knife, overdose, no plan, N/A)      Denies  4. RISK OF HARM - HOMICIDAL IDEATION:  \"Do you ever have thoughts of hurting or killing someone else?\"  (e.g., yes, no, no but preoccupation with thoughts about death) Denies     - INTENT:  \"Do you have thoughts of hurting or killing someone right NOW?\" (e.g., yes, no, N/A)    - PLAN: \"Do you have a specific plan for how you would do this?\" (e.g., gun, knife, no plan, N/A)       Denies  5. FUNCTIONAL IMPAIRMENT: \"How have things been going for you overall in your life? Have you had any more difficulties than usual doing your normal daily activities?\"  (e.g., better, same, worse; self-care, school, work, interactions)      Yes  6. SUPPORT: \"Who is with you now?\" \"Who do you live with?\" \"Do you have family or friends nearby who you can talk to?\"         7. THERAPIST: \"Do you have a counselor or therapist? Name?\"      PCP and [facility]  8. STRESSORS: \"Has there been " "any new stress or recent changes in your life?\"      Mother passed away two year's ago and my children won't have anything to do with me  9. DRUG ABUSE/ALCOHOL: \"Do you drink alcohol or use any illegal drugs?\"       Denies  10. OTHER: \"Do you have any other health or medical symptoms right now?\" (e.g., fever)        Denies  11. PREGNANCY: \"Is there any chance you are pregnant?\" \"When was your last menstrual period?\"        n/a    Protocols used: DEPRESSION-ADULT-AH, INSOMNIA-ADULT-AH      "

## 2018-09-13 NOTE — TELEPHONE ENCOUNTER
Patient informed that medication had been prescribed while she was inpatient and advised her to continue taking it. She has a follow up September 19. I also recommended Imodium for diarrhea and melatonin for insomnia.

## 2018-09-13 NOTE — OUTREACH NOTE
"NURIS call completed.  Please refer to TCM call flowsheet for call documentation.  Pt states she is doing \"allright\" though only slept 1 hr last night and the night before.  She confirms she is taking lexapro as ordered at discharge and has stopped all DC stop-RXs, as instructed.  She is eating and drinking adequately. C/O \"slight\" diarrhea.  Will start otc melatonin and immodium prn as recommended by PCP office earlier today. Good sleep hygiene practices recommended as well. Pt confirms NURIS appt 9/19/18.  States she wishes Dr Hernandes to be her PCP, not Nanci Orr.  Care team information updated as per pt request  "

## 2018-09-14 ENCOUNTER — HOSPITAL ENCOUNTER (EMERGENCY)
Facility: HOSPITAL | Age: 73
Discharge: HOME | End: 2018-09-15
Attending: EMERGENCY MEDICINE | Admitting: EMERGENCY MEDICINE

## 2018-09-14 ENCOUNTER — NURSE TRIAGE (OUTPATIENT)
Dept: CALL CENTER | Facility: HOSPITAL | Age: 73
End: 2018-09-14

## 2018-09-14 VITALS — BODY MASS INDEX: 28.29 KG/M2 | WEIGHT: 170 LBS

## 2018-09-14 DIAGNOSIS — G47.00 INSOMNIA, UNSPECIFIED TYPE: ICD-10-CM

## 2018-09-14 DIAGNOSIS — F41.9 ANXIETY: Primary | ICD-10-CM

## 2018-09-14 PROCEDURE — 93005 ELECTROCARDIOGRAM TRACING: CPT | Performed by: EMERGENCY MEDICINE

## 2018-09-14 PROCEDURE — 99284 EMERGENCY DEPT VISIT MOD MDM: CPT

## 2018-09-14 NOTE — TELEPHONE ENCOUNTER
"Vu is asking if she can take 2 melatonin at hs for sleep.  Melatonin is not listed on her avs.  She states that the \"head nurse\" called her yesterday to check on her and told her that she could take 3mg melatonin at hs for sleep.  Caller states that she did take it and she only slept for 2 hours.  Referred her to her PCP or her pharmacist.    Reason for Disposition  • Caller has medication question about med not prescribed by PCP and triager unable to answer question (e.g., compatibility with other med, storage)    Additional Information  • Negative: Drug overdose and nurse unable to answer question  • Negative: Caller requesting information not related to medicine  • Negative: Caller requesting a prescription for Strep throat and has a positive culture result  • Negative: Rash while taking a medication or within 3 days of stopping it  • Negative: Immunization reaction suspected  • Negative: [1] Asthma and [2] having symptoms of asthma (cough, wheezing, etc)  • Negative: MORE THAN A DOUBLE DOSE of a prescription or over-the-counter (OTC) drug  • Negative: [1] DOUBLE DOSE (an extra dose or lesser amount) of over-the-counter (OTC) drug AND [2] any symptoms (e.g., dizziness, nausea, pain, sleepiness)  • Negative: [1] DOUBLE DOSE (an extra dose or lesser amount) of prescription drug AND [2] any symptoms (e.g., dizziness, nausea, pain, sleepiness)  • Negative: Took another person's prescription drug  • Negative: [1] DOUBLE DOSE (an extra dose or lesser amount) of prescription drug AND [2] NO symptoms (Exception: a double dose of antibiotics)  • Negative: Diabetes drug error or overdose (e.g., insulin or extra dose)  • Negative: [1] Request for URGENT new prescription or refill of \"essential\" medication (i.e., likelihood of harm to patient if not taken) AND [2] triager unable to fill per unit policy  • Negative: [1] Prescription not at pharmacy AND [2] was prescribed today by PCP  • Negative: Pharmacy calling with " "prescription questions and triager unable to answer question  • Negative: Caller has URGENT medication question about med that PCP prescribed and triager unable to answer question  • Negative: Caller has NON-URGENT medication question about med that PCP prescribed and triager unable to answer question  • Negative: Caller requesting a NON-URGENT new prescription or refill and triager unable to refill per unit policy    Answer Assessment - Initial Assessment Questions  1. SYMPTOMS: \"Do you have any symptoms?\"  no  2. SEVERITY: If symptoms are present, ask \"Are they mild, moderate or severe?\"      no    Protocols used: MEDICATION QUESTION CALL-ADULT-      "

## 2018-09-15 ENCOUNTER — APPOINTMENT (OUTPATIENT)
Dept: GENERAL RADIOLOGY | Facility: HOSPITAL | Age: 73
End: 2018-09-15

## 2018-09-15 VITALS
DIASTOLIC BLOOD PRESSURE: 70 MMHG | HEART RATE: 76 BPM | HEIGHT: 64 IN | OXYGEN SATURATION: 96 % | BODY MASS INDEX: 29.02 KG/M2 | WEIGHT: 170 LBS | TEMPERATURE: 98 F | SYSTOLIC BLOOD PRESSURE: 136 MMHG | RESPIRATION RATE: 18 BRPM

## 2018-09-15 LAB
ALBUMIN SERPL-MCNC: 3.81 G/DL (ref 3.2–4.8)
ALBUMIN/GLOB SERPL: 1.9 G/DL (ref 1.5–2.5)
ALP SERPL-CCNC: 74 U/L (ref 25–100)
ALT SERPL W P-5'-P-CCNC: 29 U/L (ref 7–40)
AMPHET+METHAMPHET UR QL: NEGATIVE
AMPHETAMINES UR QL: NEGATIVE
ANION GAP SERPL CALCULATED.3IONS-SCNC: 6 MMOL/L (ref 3–11)
AST SERPL-CCNC: 50 U/L (ref 0–33)
BACTERIA SPEC AEROBE CULT: NORMAL
BACTERIA SPEC AEROBE CULT: NORMAL
BACTERIA UR QL AUTO: ABNORMAL /HPF
BARBITURATES UR QL SCN: NEGATIVE
BASOPHILS # BLD AUTO: 0.02 10*3/MM3 (ref 0–0.2)
BASOPHILS NFR BLD AUTO: 0.2 % (ref 0–1)
BENZODIAZ UR QL SCN: NEGATIVE
BILIRUB SERPL-MCNC: 0.6 MG/DL (ref 0.3–1.2)
BILIRUB UR QL STRIP: NEGATIVE
BNP SERPL-MCNC: 79 PG/ML (ref 0–100)
BUN BLD-MCNC: 11 MG/DL (ref 9–23)
BUN/CREAT SERPL: 12.5 (ref 7–25)
BUPRENORPHINE SERPL-MCNC: NEGATIVE NG/ML
CALCIUM SPEC-SCNC: 8.7 MG/DL (ref 8.7–10.4)
CANNABINOIDS SERPL QL: NEGATIVE
CHLORIDE SERPL-SCNC: 106 MMOL/L (ref 99–109)
CLARITY UR: CLEAR
CO2 SERPL-SCNC: 26 MMOL/L (ref 20–31)
COCAINE UR QL: NEGATIVE
COLOR UR: YELLOW
CREAT BLD-MCNC: 0.88 MG/DL (ref 0.6–1.3)
DEPRECATED RDW RBC AUTO: 49.5 FL (ref 37–54)
EOSINOPHIL # BLD AUTO: 0.09 10*3/MM3 (ref 0–0.3)
EOSINOPHIL NFR BLD AUTO: 1.1 % (ref 0–3)
ERYTHROCYTE [DISTWIDTH] IN BLOOD BY AUTOMATED COUNT: 14.5 % (ref 11.3–14.5)
GFR SERPL CREATININE-BSD FRML MDRD: 63 ML/MIN/1.73
GLOBULIN UR ELPH-MCNC: 2 GM/DL
GLUCOSE BLD-MCNC: 112 MG/DL (ref 70–100)
GLUCOSE UR STRIP-MCNC: NEGATIVE MG/DL
HCT VFR BLD AUTO: 38.1 % (ref 34.5–44)
HGB BLD-MCNC: 12.1 G/DL (ref 11.5–15.5)
HGB UR QL STRIP.AUTO: NEGATIVE
HOLD SPECIMEN: NORMAL
HOLD SPECIMEN: NORMAL
HYALINE CASTS UR QL AUTO: ABNORMAL /LPF
IMM GRANULOCYTES # BLD: 0.03 10*3/MM3 (ref 0–0.03)
IMM GRANULOCYTES NFR BLD: 0.4 % (ref 0–0.6)
KETONES UR QL STRIP: NEGATIVE
LEUKOCYTE ESTERASE UR QL STRIP.AUTO: ABNORMAL
LYMPHOCYTES # BLD AUTO: 2.15 10*3/MM3 (ref 0.6–4.8)
LYMPHOCYTES NFR BLD AUTO: 26.4 % (ref 24–44)
MCH RBC QN AUTO: 30 PG (ref 27–31)
MCHC RBC AUTO-ENTMCNC: 31.8 G/DL (ref 32–36)
MCV RBC AUTO: 94.5 FL (ref 80–99)
METHADONE UR QL SCN: NEGATIVE
MONOCYTES # BLD AUTO: 0.56 10*3/MM3 (ref 0–1)
MONOCYTES NFR BLD AUTO: 6.9 % (ref 0–12)
NEUTROPHILS # BLD AUTO: 5.33 10*3/MM3 (ref 1.5–8.3)
NEUTROPHILS NFR BLD AUTO: 65.4 % (ref 41–71)
NITRITE UR QL STRIP: NEGATIVE
OPIATES UR QL: NEGATIVE
OXYCODONE UR QL SCN: NEGATIVE
PCP UR QL SCN: NEGATIVE
PH UR STRIP.AUTO: 7.5 [PH] (ref 5–8)
PLATELET # BLD AUTO: 167 10*3/MM3 (ref 150–450)
PMV BLD AUTO: 10.2 FL (ref 6–12)
POTASSIUM BLD-SCNC: 3.6 MMOL/L (ref 3.5–5.5)
PROPOXYPH UR QL: NEGATIVE
PROT SERPL-MCNC: 5.8 G/DL (ref 5.7–8.2)
PROT UR QL STRIP: NEGATIVE
RBC # BLD AUTO: 4.03 10*6/MM3 (ref 3.89–5.14)
RBC # UR: ABNORMAL /HPF
REF LAB TEST METHOD: ABNORMAL
SODIUM BLD-SCNC: 138 MMOL/L (ref 132–146)
SP GR UR STRIP: 1.01 (ref 1–1.03)
SQUAMOUS #/AREA URNS HPF: ABNORMAL /HPF
TRICYCLICS UR QL SCN: NEGATIVE
TROPONIN I SERPL-MCNC: 0.01 NG/ML (ref 0–0.07)
UROBILINOGEN UR QL STRIP: ABNORMAL
WBC NRBC COR # BLD: 8.15 10*3/MM3 (ref 3.5–10.8)
WBC UR QL AUTO: ABNORMAL /HPF
WHOLE BLOOD HOLD SPECIMEN: NORMAL
WHOLE BLOOD HOLD SPECIMEN: NORMAL

## 2018-09-15 PROCEDURE — 96376 TX/PRO/DX INJ SAME DRUG ADON: CPT

## 2018-09-15 PROCEDURE — 96374 THER/PROPH/DIAG INJ IV PUSH: CPT

## 2018-09-15 PROCEDURE — 81001 URINALYSIS AUTO W/SCOPE: CPT | Performed by: EMERGENCY MEDICINE

## 2018-09-15 PROCEDURE — 85025 COMPLETE CBC W/AUTO DIFF WBC: CPT | Performed by: EMERGENCY MEDICINE

## 2018-09-15 PROCEDURE — 84484 ASSAY OF TROPONIN QUANT: CPT

## 2018-09-15 PROCEDURE — 83880 ASSAY OF NATRIURETIC PEPTIDE: CPT | Performed by: EMERGENCY MEDICINE

## 2018-09-15 PROCEDURE — 71045 X-RAY EXAM CHEST 1 VIEW: CPT

## 2018-09-15 PROCEDURE — 80053 COMPREHEN METABOLIC PANEL: CPT | Performed by: EMERGENCY MEDICINE

## 2018-09-15 PROCEDURE — 25010000002 LORAZEPAM PER 2 MG: Performed by: EMERGENCY MEDICINE

## 2018-09-15 PROCEDURE — 80306 DRUG TEST PRSMV INSTRMNT: CPT | Performed by: EMERGENCY MEDICINE

## 2018-09-15 RX ORDER — LORAZEPAM 2 MG/ML
0.5 INJECTION INTRAMUSCULAR ONCE
Status: COMPLETED | OUTPATIENT
Start: 2018-09-15 | End: 2018-09-15

## 2018-09-15 RX ORDER — SODIUM CHLORIDE 0.9 % (FLUSH) 0.9 %
10 SYRINGE (ML) INJECTION AS NEEDED
Status: DISCONTINUED | OUTPATIENT
Start: 2018-09-15 | End: 2018-09-15 | Stop reason: HOSPADM

## 2018-09-15 RX ADMIN — LORAZEPAM 0.5 MG: 2 INJECTION INTRAMUSCULAR; INTRAVENOUS at 06:06

## 2018-09-15 RX ADMIN — LORAZEPAM 0.5 MG: 2 INJECTION INTRAMUSCULAR; INTRAVENOUS at 01:33

## 2018-09-15 NOTE — DISCHARGE INSTRUCTIONS
Follow-up at the [facility] or with their other supplied psychiatric resources.  She develop fever, chills, nausea, vomiting, chest pain or other new symptoms return to the emergency department immediately for further evaluation.

## 2018-09-15 NOTE — ED PROVIDER NOTES
"John Torres is a 73 y.o.female with past medical history of anxiety and depression who presents to the ED via EMS with c/o anxiety and shortness of breath with onset four days ago. The patient reports she went to [facility] last Saturday, , for depression. She states that the next night, she believes she was overdosed on her medications and reports the staff at [facility] was unable to wake her up. She reports she \"almost \" due to the medications, and she was brought to the ICU. She stayed in the ICU through Wednesday, and she was taken off all her old medications and was put on Lexapro. She reports recent problems with insomnia and states she has been unable to sleep in the past four days. She currently complains of feeling anxious and \"scared\" due to her inability to sleep, as well as shortness of breath. There are no other acute complaints at this time. She denies any similar previous episodes and denies seeing her psychiatrist for her current anxiety. She reports she lives at home.        History provided by:  Patient  Anxiety   Presents for initial visit. Onset was in the past 7 days. The problem has been gradually worsening. Symptoms include insomnia, nervous/anxious behavior and shortness of breath. Symptoms occur constantly. The most recent episode lasted 4 days. The severity of symptoms is moderate and causing significant distress. The quality of sleep is poor.     Risk factors include change in medication. Her past medical history is significant for anxiety/panic attacks and depression. Treatments tried: Lexapro. The treatment provided no relief.       Review of Systems   Respiratory: Positive for shortness of breath.    Psychiatric/Behavioral: The patient is nervous/anxious and has insomnia.    All other systems reviewed and are negative.      Past Medical History:   Diagnosis Date   • Anxiety    • Anxiety and depression    • Arthritis    • Cataract    • Depression    • Elevated " serum creatinine     SEES DR SMITH EVERY 6 MONTHS- NEPHROLOGIST    • Extremity pain    • GERD (gastroesophageal reflux disease)    • Gout    • H/O bladder infections     JUST FINISHED MACROBID ON 11-2-17 FOR RECENT UTI   • Hypercholesteremia    • Hypertension    • Joint pain    • Kidney disease    • Low back pain    • Neck pain    • Rheumatic fever    • Urinary tract infection    • Wears glasses        Allergies   Allergen Reactions   • Nsaids Other (See Comments)     KIDNEY DAMAGE   • Quinidine Nausea And Vomiting and Other (See Comments)     FEVER     • Nitrofuran Derivatives Diarrhea   • Bactrim [Sulfamethoxazole-Trimethoprim] Rash   • Penicillins Rash       Past Surgical History:   Procedure Laterality Date   • BACK SURGERY  2004    LUMBAR PER DR THORNTON    • CARPAL TUNNEL RELEASE Left    • COLONOSCOPY     • EYE SURGERY     • FINGER SURGERY Right     3RD FINGER   • HEEL SPUR EXCISION Bilateral    • KNEE ARTHROSCOPY Bilateral    • LUMBAR DISCECTOMY FUSION INSTRUMENTATION N/A 11/10/2017    Procedure: LUMBAR SPINAL FUSION ;  Surgeon: Gopi Thornton MD;  Location: [facility];  Service:    • REPLACEMENT TOTAL KNEE BILATERAL Bilateral        Family History   Problem Relation Age of Onset   • Cancer Mother    • Colon polyps Mother    • Hypertension Mother    • Cancer Father    • Hypertension Brother    • Hyperlipidemia Maternal Uncle    • Kidney disease Maternal Uncle        Social History     Social History   • Marital status:      Social History Main Topics   • Smoking status: Never Smoker   • Smokeless tobacco: Never Used   • Alcohol use No   • Drug use: No   • Sexual activity: Defer     Other Topics Concern   • Not on file         Objective   Physical Exam   Constitutional: She is oriented to person, place, and time. She appears well-developed and well-nourished. No distress.   Patient is tearful   HENT:   Head: Normocephalic and atraumatic.   Eyes: Conjunctivae are normal. No scleral icterus.    Neck: Normal range of motion. Neck supple.   Cardiovascular: Normal rate, regular rhythm and normal heart sounds.    No murmur heard.  Pulmonary/Chest: Effort normal and breath sounds normal. No respiratory distress.   Abdominal: Soft. Bowel sounds are normal. There is no tenderness. There is no rebound and no guarding.   Musculoskeletal: Normal range of motion. She exhibits no tenderness.   Neurological: She is alert and oriented to person, place, and time.   Skin: Skin is warm and dry. She is not diaphoretic.   Psychiatric:   Anxious   Nursing note and vitals reviewed.      Procedures         ED Course     Recent Results (from the past 24 hour(s))   Comprehensive Metabolic Panel    Collection Time: 09/15/18  1:26 AM   Result Value Ref Range    Glucose 112 (H) 70 - 100 mg/dL    BUN 11 9 - 23 mg/dL    Creatinine 0.88 0.60 - 1.30 mg/dL    Sodium 138 132 - 146 mmol/L    Potassium 3.6 3.5 - 5.5 mmol/L    Chloride 106 99 - 109 mmol/L    CO2 26.0 20.0 - 31.0 mmol/L    Calcium 8.7 8.7 - 10.4 mg/dL    Total Protein 5.8 5.7 - 8.2 g/dL    Albumin 3.81 3.20 - 4.80 g/dL    ALT (SGPT) 29 7 - 40 U/L    AST (SGOT) 50 (H) 0 - 33 U/L    Alkaline Phosphatase 74 25 - 100 U/L    Total Bilirubin 0.6 0.3 - 1.2 mg/dL    eGFR Non African Amer 63 >60 mL/min/1.73    Globulin 2.0 gm/dL    A/G Ratio 1.9 1.5 - 2.5 g/dL    BUN/Creatinine Ratio 12.5 7.0 - 25.0    Anion Gap 6.0 3.0 - 11.0 mmol/L   BNP    Collection Time: 09/15/18  1:26 AM   Result Value Ref Range    BNP 79.0 0.0 - 100.0 pg/mL   Urinalysis With Microscopic If Indicated (No Culture) - Urine, Clean Catch    Collection Time: 09/15/18  1:26 AM   Result Value Ref Range    Color, UA Yellow Yellow, Straw    Appearance, UA Clear Clear    pH, UA 7.5 5.0 - 8.0    Specific Gravity, UA 1.010 1.001 - 1.030    Glucose, UA Negative Negative    Ketones, UA Negative Negative    Bilirubin, UA Negative Negative    Blood, UA Negative Negative    Protein, UA Negative Negative    Leuk Esterase, UA  Trace (A) Negative    Nitrite, UA Negative Negative    Urobilinogen, UA 0.2 E.U./dL 0.2 - 1.0 E.U./dL   Light Blue Top    Collection Time: 09/15/18  1:26 AM   Result Value Ref Range    Extra Tube hold for add-on    Green Top (Gel)    Collection Time: 09/15/18  1:26 AM   Result Value Ref Range    Extra Tube Hold for add-ons.    Lavender Top    Collection Time: 09/15/18  1:26 AM   Result Value Ref Range    Extra Tube hold for add-on    Gold Top - SST    Collection Time: 09/15/18  1:26 AM   Result Value Ref Range    Extra Tube Hold for add-ons.    CBC Auto Differential    Collection Time: 09/15/18  1:26 AM   Result Value Ref Range    WBC 8.15 3.50 - 10.80 10*3/mm3    RBC 4.03 3.89 - 5.14 10*6/mm3    Hemoglobin 12.1 11.5 - 15.5 g/dL    Hematocrit 38.1 34.5 - 44.0 %    MCV 94.5 80.0 - 99.0 fL    MCH 30.0 27.0 - 31.0 pg    MCHC 31.8 (L) 32.0 - 36.0 g/dL    RDW 14.5 11.3 - 14.5 %    RDW-SD 49.5 37.0 - 54.0 fl    MPV 10.2 6.0 - 12.0 fL    Platelets 167 150 - 450 10*3/mm3    Neutrophil % 65.4 41.0 - 71.0 %    Lymphocyte % 26.4 24.0 - 44.0 %    Monocyte % 6.9 0.0 - 12.0 %    Eosinophil % 1.1 0.0 - 3.0 %    Basophil % 0.2 0.0 - 1.0 %    Immature Grans % 0.4 0.0 - 0.6 %    Neutrophils, Absolute 5.33 1.50 - 8.30 10*3/mm3    Lymphocytes, Absolute 2.15 0.60 - 4.80 10*3/mm3    Monocytes, Absolute 0.56 0.00 - 1.00 10*3/mm3    Eosinophils, Absolute 0.09 0.00 - 0.30 10*3/mm3    Basophils, Absolute 0.02 0.00 - 0.20 10*3/mm3    Immature Grans, Absolute 0.03 0.00 - 0.03 10*3/mm3   Urinalysis, Microscopic Only - Urine, Clean Catch    Collection Time: 09/15/18  1:26 AM   Result Value Ref Range    RBC, UA 0-2 None Seen, 0-2 /HPF    WBC, UA 0-2 None Seen, 0-2 /HPF    Bacteria, UA None Seen None Seen, Trace /HPF    Squamous Epithelial Cells, UA 3-6 (A) None Seen, 0-2 /HPF    Hyaline Casts, UA None Seen 0 - 6 /LPF    Methodology Automated Microscopy    POC Troponin, Rapid    Collection Time: 09/15/18  2:11 AM   Result Value Ref Range     "Troponin I 0.01 0.00 - 0.07 ng/mL     Note: In addition to lab results from this visit, the labs listed above may include labs taken at another facility or during a different encounter within the last 24 hours. Please correlate lab times with ED admission and discharge times for further clarification of the services performed during this visit.    XR Chest 1 View   Final Result   Addendum 1 of 1   Typographic error in the impression. The word \"malady\" should state    \"abnormality\".      THIS DOCUMENT HAS BEEN ELECTRONICALLY SIGNED BY SUELLEN LYLE MD      Final   No acute cardiopulmonary malady or significant change since 09/07/2018.       THIS DOCUMENT HAS BEEN ELECTRONICALLY SIGNED BY SUELLEN LYLE MD        Vitals:    09/14/18 2306   BP: (!) 166/133   BP Location: Right arm   Patient Position: Lying   Pulse: 71   Resp: 20   Temp: 97.6 °F (36.4 °C)   TempSrc: Oral   SpO2: 98%   Weight: 77.1 kg (170 lb)   Height: 162.6 cm (64\")     Medications   sodium chloride 0.9 % flush 10 mL (not administered)   LORazepam (ATIVAN) injection 0.5 mg (0.5 mg Intravenous Given 9/15/18 0133)     ECG/EMG Results (last 24 hours)     ** No results found for the last 24 hours. **                        [facility]    Final diagnoses:   Anxiety   Insomnia, unspecified type       Documentation assistance provided by carmen Oliva.  Information recorded by the scribe was done at my direction and has been verified and validated by me.     Arturo Oliva  09/15/18 0003       Arturo Oliva  09/15/18 0248       Mahad Fierro DO  09/16/18 0150    "

## 2018-09-19 ENCOUNTER — OFFICE VISIT (OUTPATIENT)
Dept: FAMILY MEDICINE CLINIC | Facility: CLINIC | Age: 73
End: 2018-09-19

## 2018-09-19 VITALS
TEMPERATURE: 98.4 F | OXYGEN SATURATION: 99 % | BODY MASS INDEX: 31.76 KG/M2 | SYSTOLIC BLOOD PRESSURE: 126 MMHG | DIASTOLIC BLOOD PRESSURE: 78 MMHG | HEART RATE: 70 BPM | WEIGHT: 186 LBS | HEIGHT: 64 IN

## 2018-09-19 DIAGNOSIS — G47.00 INSOMNIA, UNSPECIFIED TYPE: ICD-10-CM

## 2018-09-19 DIAGNOSIS — Z86.79 HISTORY OF HYPERTENSION: ICD-10-CM

## 2018-09-19 DIAGNOSIS — F33.41 RECURRENT MAJOR DEPRESSIVE DISORDER, IN PARTIAL REMISSION (HCC): Primary | ICD-10-CM

## 2018-09-19 DIAGNOSIS — F41.9 ANXIETY: ICD-10-CM

## 2018-09-19 PROCEDURE — 99495 TRANSJ CARE MGMT MOD F2F 14D: CPT | Performed by: PHYSICIAN ASSISTANT

## 2018-09-19 RX ORDER — ESCITALOPRAM OXALATE 20 MG/1
20 TABLET ORAL DAILY
Qty: 30 TABLET | Refills: 3 | Status: SHIPPED | OUTPATIENT
Start: 2018-09-19 | End: 2019-09-25 | Stop reason: SDUPTHER

## 2018-09-19 NOTE — PROGRESS NOTES
Subjective    is being seen for follow-up after hospitalization at [facility].      The date of hospitalization were September 10 through September 12, 2018 (Not on file)- .     Discharge Diagnosis during hospitalization was number-one altered mental status #2 acute renal failure due to hypotension #3 depression #4 history of hypertension for 5 prediabetes #6 hyperlipidemia #7 spondylosis of lumbar region.     Hospital course: Patient is a 73-year-old female who was brought to the emergency department on 10 September after being found with altered mental status at the [facility].  She had presented there with worsening depression.  She had been at the [facility] September 7 and eighth through 10th.  Medications had been changed while she was there.  Patient was admitted to the ICU for acute renal failure in the setting of altered mental status with relationship to excessive overmedication.  She continued to deny any homicidal or suicidal ideations or any intentional medication overdosage.  Throughout hospitalization she continued to be profoundly depressed but discussed her situation without any suicidal ideations.  She refused to go back to the [facility] and was scheduled to be followed up with her psychiatric nurse practitioner [facility].    Discharged to: Home    Discharged on the following medicines: See below     Discharge Medications          Accurate as of 9/19/18 12:38 PM. If you have any questions, ask your nurse or doctor.               Continue These Medications      Instructions Start Date   acetaminophen 325 MG tablet  Commonly known as:  TYLENOL   650 mg, Oral, Every 6 Hours PRN      aluminum-magnesium hydroxide-simethicone 400-400-40 MG/5ML suspension  Commonly known as:  MAALOX MAX   Oral, Every 6 Hours PRN      docusate sodium 100 MG capsule  Commonly known as:  COLACE   100 mg, Oral, 2 Times Daily      escitalopram 20 MG tablet  Commonly known as:  LEXAPRO   20 mg, Oral, Daily       KAOPECTATE 262 MG/15ML suspension  Generic drug:  bismuth subsalicylate   30 mL, Oral, Every 6 Hours PRN               Information from the hospitalization was patient was to schedule follow-up with [facility].  She has since decided that she would like to see a new psychiatrist.  She is requesting referral to new psychiatrist.    Review of Systems   Constitutional: Negative for appetite change, fatigue and unexpected weight change.   Respiratory: Negative for chest tightness and shortness of breath.    Gastrointestinal: Negative for abdominal pain, diarrhea and nausea.   Neurological: Negative for dizziness, tremors, weakness, light-headedness and headaches.   Psychiatric/Behavioral: Positive for dysphoric mood and sleep disturbance. Negative for agitation, behavioral problems, confusion, decreased concentration, hallucinations and suicidal ideas. The patient is nervous/anxious. The patient is not hyperactive.         Again denies any homicidal or suicidal ideations.  Has slept well with alprazolam 1 mg tablets half to 1 nightly for the past week.  She states this is an old prescription from Dr. Orr.       Objective     Vitals:    09/19/18 1115   BP: 126/78   Pulse: 70   Temp: 98.4 °F (36.9 °C)   SpO2: 99%       Physical Exam   Constitutional: She is oriented to person, place, and time. She appears well-developed and well-nourished. No distress.   Neck: No thyroid mass and no thyromegaly present.   Cardiovascular: Normal rate, regular rhythm and normal heart sounds.    Pulmonary/Chest: Effort normal.   Neurological: She is alert and oriented to person, place, and time.   Skin: She is not diaphoretic.   Psychiatric: Her speech is normal and behavior is normal. Judgment and thought content normal. Her mood appears anxious. Her affect is not angry and not inappropriate. Cognition and memory are normal. She exhibits a depressed mood. She is attentive.   Nursing note and vitals  reviewed.      Assessment/Plan     Problem List Items Addressed This Visit        Other    Depression - Primary    Relevant Medications    escitalopram (LEXAPRO) 20 MG tablet    Other Relevant Orders    Ambulatory Referral to Psychiatry      Other Visit Diagnoses     Anxiety        Insomnia, unspecified type        History of hypertension                Transitional Care Management Certification  I certify that the following are true:  1. Communication was made within 2 business days of discharge.  2. Complexity of Medical Decision Making is moderate.  3. Face to face visit occurred within 7 days.    *Note: 46595 is for high complexity patients with a face to face visit within 7 days of discharge.  83107 is for high complexity patients with a face to face on days 8-14 post discharge or medium complexity with face to face visit within 14 days post discharge.    Prescription written for Xanax 1 mg half to 1 nightly #30 with 2 refills discussed abuse potential is medication controlled substance status of this medication, clearly expressed not to take higher dose than prescribed.  We'll refer to new psychiatrist, patient will follow-up with us in 3 months and as needed.  She will continue on Lexapro current dosage until seen by psychiatry.  Blood pressure is been high in the past and is normal now off medication we discussed this at blood pressure becomes elevated again she will follow-up for further evaluation

## 2018-09-21 ENCOUNTER — TELEPHONE (OUTPATIENT)
Dept: FAMILY MEDICINE CLINIC | Facility: CLINIC | Age: 73
End: 2018-09-21

## 2018-09-21 NOTE — TELEPHONE ENCOUNTER
----- Message from Conectta Iniguez sent at 9/21/2018 10:06 AM EDT -----  Patient's home health nurse called me, Jeannette from [facility] and patient is having a hard time getting in to counseling therapy. She was going to [facility] and Jeannette stated that she has a follow up appt there next week but you didn't want her to go to [facility]? I wanted to ask you before I tell her to keep her appt at [facility]. Just let me know and I'll call Jeannette back..  Thanks,  Concetta..

## 2018-09-21 NOTE — TELEPHONE ENCOUNTER
I have only seen this patient once before which was the other day as a hospital follow-up.  Evidently she has a lot of depression which was uncontrolled to the point of being admitted to the [facility] then back to the hospital ER, she ended up in the ICU because of medication issues.  She states that she wanted to see somebody else besides [facility], I don't know the reasons why, maybe you can talk to the patient.  May be somebody else at [facility] can see her other than who she was seen by previously I don't have a preference at all.

## 2018-09-24 ENCOUNTER — TELEPHONE (OUTPATIENT)
Dept: FAMILY MEDICINE CLINIC | Facility: CLINIC | Age: 73
End: 2018-09-24

## 2018-09-24 RX ORDER — LISINOPRIL AND HYDROCHLOROTHIAZIDE 20; 12.5 MG/1; MG/1
1 TABLET ORAL DAILY
Qty: 30 TABLET | Refills: 2 | Status: SHIPPED | OUTPATIENT
Start: 2018-09-24 | End: 2018-12-19 | Stop reason: SDUPTHER

## 2018-09-24 NOTE — TELEPHONE ENCOUNTER
BP was taken by physical therapy earlier today, patient was having a headache earlier today but feeling ok now.

## 2018-09-24 NOTE — TELEPHONE ENCOUNTER
They stopped her blood pressure medications because her pressure went too low in the hospital Im going to restart her on a lower dosage.  Please call in lisinopril 20/HCT 12.5 area have her take 1 daily #30 with 2 refills.  Have her follow-up with us in the office in one month for recheck of blood pressure.

## 2018-09-24 NOTE — TELEPHONE ENCOUNTER
----- Message from Anabel Salvador sent at 9/24/2018 11:35 AM EDT -----  Contact: PT  PT WAS JUST THERE WORKING WITH HER AND HER BP /92. SAID SHE HAS BEEN RESTING SO SHE WASN'T SURE WHAT WAS CAUSING IT. REQUESTING A CALL BACK FROM NURSE

## 2018-10-03 ENCOUNTER — TELEPHONE (OUTPATIENT)
Dept: FAMILY MEDICINE CLINIC | Facility: CLINIC | Age: 73
End: 2018-10-03

## 2018-10-03 NOTE — TELEPHONE ENCOUNTER
Pt states she is itching, she says she wanted to let us know that  fit her in today at 3:30 for her Psych referral. Patient notified to call back if she needs to schedule an appt with us. Patient verbalized understanding

## 2018-10-03 NOTE — TELEPHONE ENCOUNTER
----- Message from Mer Daley sent at 10/3/2018  1:21 PM EDT -----  Contact: PT.  PT. SEE'S ILIR.  PT. IS WANTING MICHELLE BACH, TO CONTACT HER BACK RE. PROBLEM'S WITH MEDICATIONS.   PT. HAS AN UPCOMING APPT. ON: 12-; PT. TOLD TO CALL ANYTIME FOR ANY ?'S/PROBLEM'S.    PT. CAN BE REACHED @ [facility] #.

## 2018-12-19 ENCOUNTER — LAB REQUISITION (OUTPATIENT)
Dept: LAB | Facility: HOSPITAL | Age: 73
End: 2018-12-19

## 2018-12-19 ENCOUNTER — OFFICE VISIT (OUTPATIENT)
Dept: FAMILY MEDICINE CLINIC | Facility: CLINIC | Age: 73
End: 2018-12-19

## 2018-12-19 VITALS
DIASTOLIC BLOOD PRESSURE: 86 MMHG | HEART RATE: 85 BPM | OXYGEN SATURATION: 99 % | HEIGHT: 64 IN | TEMPERATURE: 98.3 F | SYSTOLIC BLOOD PRESSURE: 138 MMHG | BODY MASS INDEX: 34.01 KG/M2 | WEIGHT: 199.2 LBS

## 2018-12-19 DIAGNOSIS — Z00.00 ROUTINE GENERAL MEDICAL EXAMINATION AT A HEALTH CARE FACILITY: ICD-10-CM

## 2018-12-19 DIAGNOSIS — M10.09 IDIOPATHIC GOUT OF MULTIPLE SITES, UNSPECIFIED CHRONICITY: ICD-10-CM

## 2018-12-19 DIAGNOSIS — M25.50 ARTHRALGIA, UNSPECIFIED JOINT: ICD-10-CM

## 2018-12-19 DIAGNOSIS — I10 ESSENTIAL HYPERTENSION: Primary | ICD-10-CM

## 2018-12-19 DIAGNOSIS — E78.2 MIXED HYPERLIPIDEMIA: ICD-10-CM

## 2018-12-19 LAB
CHOLEST SERPL-MCNC: 220 MG/DL (ref 0–200)
HDLC SERPL-MCNC: 52 MG/DL (ref 40–60)
LDLC SERPL CALC-MCNC: 105 MG/DL (ref 0–100)
TRIGL SERPL-MCNC: 317 MG/DL (ref 0–150)
URATE SERPL-MCNC: 9 MG/DL (ref 3.1–7.8)
VLDLC SERPL CALC-MCNC: 63.4 MG/DL

## 2018-12-19 PROCEDURE — 99213 OFFICE O/P EST LOW 20 MIN: CPT | Performed by: PHYSICIAN ASSISTANT

## 2018-12-19 PROCEDURE — 36415 COLL VENOUS BLD VENIPUNCTURE: CPT | Performed by: PHYSICIAN ASSISTANT

## 2018-12-19 RX ORDER — ESTRADIOL 10 UG/1
TABLET VAGINAL
COMMUNITY
Start: 2018-10-11 | End: 2019-08-29

## 2018-12-19 RX ORDER — LISINOPRIL AND HYDROCHLOROTHIAZIDE 20; 12.5 MG/1; MG/1
1 TABLET ORAL DAILY
Qty: 30 TABLET | Refills: 5 | Status: SHIPPED | OUTPATIENT
Start: 2018-12-19 | End: 2019-03-19

## 2018-12-19 RX ORDER — MIRTAZAPINE 15 MG/1
15 TABLET, FILM COATED ORAL NIGHTLY
Refills: 0 | COMMUNITY
Start: 2018-11-05 | End: 2019-09-25

## 2019-01-23 NOTE — PROGRESS NOTES
" progress note      Akua Torres  [id number]  1945     LOS: 1 day     Attending: Gopi Man MD    Primary Care Provider: Nanci Orr MD      Chief Complaint/Reason for visit:  No chief complaint on file.      Subjective   Doing well. Ambulated. Good pain control. Excellent relief of pain in buttocks and legs following surgery. No n/vom/sob.   [place]ston out, awaits spontaneous voiding.  Objective     Vital Signs  Blood pressure 155/62, pulse 93, temperature 98.7 °F (37.1 °C), temperature source Oral, resp. rate 16, height 64\" (162.6 cm), weight 194 lb (88 kg), SpO2 97 %.  Temp (24hrs), Av.1 °F (36.7 °C), Min:97.6 °F (36.4 °C), Max:98.7 °F (37.1 °C)      Intake/Output:    Intake/Output Summary (Last 24 hours) at 17 2155  Last data filed at 17 1716   Gross per 24 hour   Intake              480 ml   Output              700 ml   Net             -220 ml            Physical Exam:     General Appearance:    Alert, cooperative, in no acute distress   Head:    Normocephalic, without obvious abnormality, atraumatic    Lungs:     Normal effort, symmetric chest rise, no crepitus, clear to      auscultation bilaterally                Heart:    Regular rhythm and normal rate, normal S1 and S2    Abdomen:     Normal bowel sounds, no masses, no organomegaly, soft        non-tender, non-distended, no guarding, no rebound                tenderness   Extremities:   No clubbing, cyanosis or edema.  No deformities.    Back: Intact dressing. HV in place.   Pulses:   Pulses palpable and equal bilaterally   Skin:   No bleeding, bruising or rash   Neurologic:   Moves all extremities with no obvious focal motor deficit.  Cranial nerves 2 - 12 grossly intact     Results Review:     I reviewed the patient's new clinical results.     Results from last 7 days  Lab Units 17  0628 17  1420   WBC 10*3/mm3 15.87* 9.18   HEMOGLOBIN g/dL 11.9 13.3   HEMATOCRIT % 37.4 42.1   PLATELETS 10*3/mm3 258 251 " Telephone Encounter by Lyla Douglas RN, BSN at 08/18/17 03:32 PM     Author:  Lyla Douglas RN, BSN Service:  (none) Author Type:  Registered Nurse     Filed:  08/18/17 03:34 PM Encounter Date:  8/18/2017 Status:  Signed     :  Lyla Douglas RN, BSN (Registered Nurse)            Sent to Dr. Lucas.   Procedure patient from this morning.[KM1.1M]  Any reason she can't take clonazepam today? (already has at home)[KM1.2M]      Revision History        User Key Date/Time User Provider Type Action    > KM1.2 08/18/17 03:34 PM Lyla Douglas RN, BSN Registered Nurse Sign     KM1.1 08/18/17 03:32 PM Lyla Douglas RN, BSN Registered Nurse     M - Manual                 Results for ERYN STEVE (MRN [id number]) as of 11/11/2017 21:57   Ref. Range 11/11/2017 06:28 11/11/2017 07:02 11/11/2017 11:33 11/11/2017 16:18   Glucose Latest Ref Range: 70 - 130 mg/dL 144 (H) 139 (H) 171 (H) 144 (H)         Results from last 7 days  Lab Units 11/11/17  0628 11/06/17  1420   SODIUM mmol/L 139  --    POTASSIUM mmol/L 4.4 3.8   CHLORIDE mmol/L 104  --    CO2 mmol/L 25.0  --    BUN mg/dL 19  --    CREATININE mg/dL 0.90  --    CALCIUM mg/dL 8.7  --    GLUCOSE mg/dL 144*  --      I reviewed the patient's new imaging including images and reports.    All medications reviewed.     allopurinol 300 mg Oral Daily   atenolol 100 mg Oral Daily   famotidine 20 mg Intravenous Q12H   Or      famotidine 20 mg Oral Q12H   gabapentin 300 mg Oral Q8H   insulin lispro 0-7 Units Subcutaneous 4x Daily AC & at Bedtime   lisinopril 20 mg Oral Q24H       acetaminophen 650 mg Q4H PRN   bisacodyl 5 mg Daily PRN   bisacodyl 10 mg Daily PRN   dextrose 25 g Q15 Min PRN   dextrose 15 g Q15 Min PRN   diphenhydrAMINE 25 mg Q6H PRN   glucagon (human recombinant) 1 mg Q15 Min PRN   hydrALAZINE 10 mg Q6H PRN   HYDROmorphone 2 mg Q4H PRN   And     naloxone 0.4 mg Q5 Min PRN   Morphine 1 mg Q4H PRN   And     naloxone 0.4 mg Q5 Min PRN   ondansetron 4 mg Q6H PRN   ondansetron 4 mg Q6H PRN   oxyCODONE-acetaminophen 1 tablet Q4H PRN   sodium chloride 500 mL TID PRN   sodium chloride 1-10 mL PRN   zolpidem 2.5 mg Nightly PRN   zolpidem 5 mg Nightly PRN       Assessment/Plan     Principal Problem:    S/P lumbar spinal fusion  Active Problems:    Anxiety and depression    Back pain    HTN (hypertension)    HLD (hyperlipidemia)    Prediabetes    Renal insufficiency       Plan  1. PT/OT. Ambulate.   2. Pain control-prns   3. IS-encouraged  4. DVT proph-Mech.   5. Bowel regimen  6. Monitor post-op labs  7. DC planning. Home soon, possibly tomorrow.     HTN, HLD  - Continue home lisinopril and atenolol   - Hold HCTZ for now  -  Monitor BP q4 hrs  - Holding parameters for BP meds  - PRN hydralazine for SBP >170     PreDM  - hgb A1c on 11/6/17 6.0  - Accuchecks ACHS with low dose SSI  - DM educator consult( 11/10)    Discussed with patient and with .    Brit Galo MD  11/11/17  9:55 PM

## 2019-03-19 ENCOUNTER — LAB REQUISITION (OUTPATIENT)
Dept: LAB | Facility: HOSPITAL | Age: 74
End: 2019-03-19

## 2019-03-19 ENCOUNTER — OFFICE VISIT (OUTPATIENT)
Dept: FAMILY MEDICINE CLINIC | Facility: CLINIC | Age: 74
End: 2019-03-19

## 2019-03-19 VITALS
DIASTOLIC BLOOD PRESSURE: 80 MMHG | BODY MASS INDEX: 35.85 KG/M2 | HEART RATE: 77 BPM | OXYGEN SATURATION: 98 % | SYSTOLIC BLOOD PRESSURE: 138 MMHG | HEIGHT: 64 IN | WEIGHT: 210 LBS | TEMPERATURE: 98.1 F

## 2019-03-19 DIAGNOSIS — I10 ESSENTIAL HYPERTENSION: ICD-10-CM

## 2019-03-19 DIAGNOSIS — R53.83 FATIGUE, UNSPECIFIED TYPE: ICD-10-CM

## 2019-03-19 DIAGNOSIS — Z00.00 ROUTINE GENERAL MEDICAL EXAMINATION AT A HEALTH CARE FACILITY: ICD-10-CM

## 2019-03-19 DIAGNOSIS — E66.9 OBESITY (BMI 30-39.9): ICD-10-CM

## 2019-03-19 DIAGNOSIS — R73.03 PREDIABETES: ICD-10-CM

## 2019-03-19 DIAGNOSIS — R63.5 UNEXPLAINED WEIGHT GAIN: Primary | ICD-10-CM

## 2019-03-19 DIAGNOSIS — Z00.00 ENCOUNTER FOR MEDICARE ANNUAL WELLNESS EXAM: ICD-10-CM

## 2019-03-19 PROCEDURE — 36415 COLL VENOUS BLD VENIPUNCTURE: CPT | Performed by: PHYSICIAN ASSISTANT

## 2019-03-19 PROCEDURE — G0439 PPPS, SUBSEQ VISIT: HCPCS | Performed by: PHYSICIAN ASSISTANT

## 2019-03-19 RX ORDER — LISINOPRIL 20 MG/1
20 TABLET ORAL DAILY
Qty: 30 TABLET | Refills: 5 | Status: SHIPPED | OUTPATIENT
Start: 2019-03-19 | End: 2019-06-25 | Stop reason: SDUPTHER

## 2019-03-19 RX ORDER — HYDROXYZINE HYDROCHLORIDE 25 MG/1
TABLET, FILM COATED ORAL
Refills: 2 | COMMUNITY
Start: 2019-02-19 | End: 2019-08-29

## 2019-03-19 RX ORDER — ATENOLOL 25 MG/1
25 TABLET ORAL 2 TIMES DAILY
Refills: 0 | COMMUNITY
Start: 2019-02-23 | End: 2019-03-19 | Stop reason: SDUPTHER

## 2019-03-19 RX ORDER — ATENOLOL 25 MG/1
25 TABLET ORAL 2 TIMES DAILY
Qty: 60 TABLET | Refills: 5 | Status: SHIPPED | OUTPATIENT
Start: 2019-03-19 | End: 2019-06-25 | Stop reason: SDUPTHER

## 2019-03-19 RX ORDER — LISINOPRIL 20 MG/1
20 TABLET ORAL DAILY
Refills: 0 | COMMUNITY
Start: 2019-02-23 | End: 2019-03-19 | Stop reason: SDUPTHER

## 2019-03-19 NOTE — PROGRESS NOTES
John Torres is a 73 y.o. female  Medicare Wellness-Initial Visit (Medicare Wellness Exam ) and Hypertension (Follow up on ERIKA, req refills on Lisinopril and Atenolol )      History of Present Illness  Patient is here today for 2 separate issues she is here for follow-up on medications for hypertension, she had medication changes where she was seen at [facility] for tachycardia, was seen by Dr. Pearl and was changed from hydrochlorothiazide to atenolol 1 month ago.  States her blood pressures been doing very well and heart rate stayed well controlled.  She is also here for a Medicare wellness visit see separate template.  The following portions of the patient's history were reviewed and updated as appropriate: allergies, current medications, past social history and problem list    Review of Systems   Constitutional: Negative for fatigue and unexpected weight change.   Respiratory: Negative for cough, chest tightness and shortness of breath.    Cardiovascular: Negative for chest pain, palpitations and leg swelling.   Gastrointestinal: Negative for nausea.   Skin: Negative for color change and rash.   Neurological: Negative for dizziness, syncope, weakness and headaches.       Objective     Vitals:    03/19/19 1200   BP: 138/80   Pulse: 77   Temp: 98.1 °F (36.7 °C)   SpO2: 98%       Physical Exam   Constitutional: She appears well-developed and well-nourished. No distress.   Neck: No JVD present.   Cardiovascular: Normal rate, regular rhythm, normal heart sounds and intact distal pulses.   No murmur heard.  Pulmonary/Chest: Effort normal and breath sounds normal. No respiratory distress. She exhibits no tenderness.   Abdominal: Soft. She exhibits no distension. There is no tenderness.   Musculoskeletal: She exhibits no edema.   Skin: Skin is warm and dry. She is not diaphoretic. No erythema. No pallor.   Psychiatric: She has a normal mood and affect.   Nursing note and vitals  reviewed.      Assessment/Plan     There are no diagnoses linked to this encounter.

## 2019-03-19 NOTE — PROGRESS NOTES
QUICK REFERENCE INFORMATION:  The ABCs of the Annual Wellness Visit    Initial Medicare Wellness Visit    HEALTH RISK ASSESSMENT    1945    Recent Hospitalizations:  Recently treated at the following:  Other: [facility].        Current Medical Providers:  Patient Care Team:  Salty Hernandez MD as PCP - General (Family Medicine)  Leon Hein MD as Consulting Physician (Neurosurgery)  Perkins, Corinne E, PT as Physical Therapist (Physical Therapy)  Cricket Nettles MD as Consulting Physician (Pain Medicine)        Smoking Status:  Social History     Tobacco Use   Smoking Status Never Smoker   Smokeless Tobacco Never Used       Alcohol Consumption:  Social History     Substance and Sexual Activity   Alcohol Use No       Depression Screen:   PHQ-2/PHQ-9 Depression Screening 3/19/2019   Little interest or pleasure in doing things 0   Feeling down, depressed, or hopeless 0   Total Score 0       Health Habits and Functional and Cognitive Screening:  Functional & Cognitive Status 3/19/2019   Do you have difficulty preparing food and eating? No   Do you have difficulty bathing yourself, getting dressed or grooming yourself? No   Do you have difficulty using the toilet? No   Do you have difficulty moving around from place to place? No   Do you have trouble with steps or getting out of a bed or a chair? Yes   In the past year have you fallen or experienced a near fall? Yes   Current Diet Well Balanced Diet   Dental Exam Up to date   Eye Exam Up to date   Exercise (times per week) 0 times per week   Current Exercise Activities Include None   Do you need help using the phone?  No   Are you deaf or do you have serious difficulty hearing?  No   Do you need help with transportation? Yes   Do you need help shopping? No   Do you need help preparing meals?  No   Do you need help with housework?  No   Do you need help with laundry? No   Do you need help taking your medications? No   Do you need help  managing money? No   Have you felt unusual stress, anger or loneliness in the last month? Yes   Who do you live with? Spouse   If you need help, do you have trouble finding someone available to you? No   Have you been bothered in the last four weeks by sexual problems? No   Do you have difficulty concentrating, remembering or making decisions? No           Does the patient have evidence of cognitive impairment? No    Asiprin use counseling: Does not need ASA (and currently is not on it)      Recent Lab Results:    Visual Acuity:  No exam data present    Age-appropriate Screening Schedule:  Refer to the list below for future screening recommendations based on patient's age, sex and/or medical conditions. Orders for these recommended tests are listed in the plan section. The patient has been provided with a written plan.    Health Maintenance   Topic Date Due   • ZOSTER VACCINE (1 of 2) 03/19/2020 (Originally 4/30/1994)   • MAMMOGRAM  09/01/2019   • LIPID PANEL  12/19/2019   • TDAP/TD VACCINES (2 - Td) 04/20/2021   • COLONOSCOPY  02/01/2025   • INFLUENZA VACCINE  Completed   • PNEUMOCOCCAL VACCINES (65+ LOW/MEDIUM RISK)  Completed        Subjective   History of Present Illness    Akua Torres is a 73 y.o. female who presents for an Annual Wellness Visit.    The following portions of the patient's history were reviewed and updated as appropriate: allergies, current medications, past medical history, past social history, past surgical history and problem list.    Outpatient Medications Prior to Visit   Medication Sig Dispense Refill   • acetaminophen (TYLENOL) 325 MG tablet Take 650 mg by mouth Every 6 (Six) Hours As Needed for Mild Pain .     • docusate sodium (COLACE) 100 MG capsule Take 1 capsule by mouth 2 (Two) Times a Day. 60 capsule 0   • escitalopram (LEXAPRO) 20 MG tablet Take 1 tablet by mouth Daily. 30 tablet 3   • hydrOXYzine (ATARAX) 25 MG tablet TK 1 T PO BID PRA  2   • mirtazapine (REMERON) 15 MG  "tablet TK 1/2 T PO HS FOR 1 WK THEN TK 1 T PO HS  0   • YUVAFEM 10 MCG tablet vaginal tablet      • atenolol (TENORMIN) 25 MG tablet Take 25 mg by mouth 2 (Two) Times a Day. Take 25 mg by mouth 2 (Two) Times a Day.  0   • lisinopril (PRINIVIL,ZESTRIL) 20 MG tablet Take 20 mg by mouth Daily. Take 20 mg by mouth Daily.  0   • lisinopril-hydrochlorothiazide (ZESTORETIC) 20-12.5 MG per tablet Take 1 tablet by mouth Daily. 30 tablet 5     No facility-administered medications prior to visit.        Patient Active Problem List   Diagnosis   • Degenerative disc disease, lumbar   • Lumbar stenosis with neurogenic claudication   • History of lumbar fusion   • Spondylosis of lumbar region without myelopathy or radiculopathy   • Mild obesity   • Physical deconditioning   • Idiopathic gout   • Anxiety and depression   • Greater trochanteric bursitis of both hips   • Status post total bilateral knee replacement   • Back pain   • HTN (hypertension)   • HLD (hyperlipidemia)   • Prediabetes   • S/P lumbar spinal fusion   • Renal insufficiency   • Leukocytosis, likely reactive   • Altered mental status. Felt to be drug related (trazodone, opiates, benzodiazepine)   • Acute renal failure due to hypotension. Now resolved (ARF) (CMS/HCC)   • Depression   • Encounter for Medicare annual wellness exam       Advance Care Planning:  has an advance directive - a copy has been provided and is in file    Identification of Risk Factors:  Risk factors include: weight .    Review of Systems    Compared to one year ago, the patient feels her physical health is better.  Compared to one year ago, the patient feels her mental health is better.    Objective     Physical Exam    Vitals:    03/19/19 1200   BP: 138/80   Pulse: 77   Temp: 98.1 °F (36.7 °C)   TempSrc: Oral   SpO2: 98%   Weight: 95.3 kg (210 lb)   Height: 162.6 cm (64\")       Patient's Body mass index is 36.05 kg/m². BMI is above normal parameters. Recommendations include: nutrition " counseling.      Assessment/Plan   Patient Self-Management and Personalized Health Advice  The patient has been provided with information about: weight management and preventive services including:   · Nutrition counseling provided.    Visit Diagnoses:    ICD-10-CM ICD-9-CM   1. Unexplained weight gain R63.5 783.1   2. Fatigue, unspecified type R53.83 780.79   3. Prediabetes R73.03 790.29   4. Obesity (BMI 30-39.9) E66.9 278.00   5. Essential hypertension I10 401.9   6. Encounter for Medicare annual wellness exam Z00.00 V70.0       Orders Placed This Encounter   Procedures   • TSH     Standing Status:   Future     Number of Occurrences:   1     Standing Expiration Date:   3/19/2020   • Hemoglobin A1c     Standing Status:   Future     Number of Occurrences:   1     Standing Expiration Date:   3/19/2020       Outpatient Encounter Medications as of 3/19/2019   Medication Sig Dispense Refill   • acetaminophen (TYLENOL) 325 MG tablet Take 650 mg by mouth Every 6 (Six) Hours As Needed for Mild Pain .     • atenolol (TENORMIN) 25 MG tablet Take 1 tablet by mouth 2 (Two) Times a Day. Take 25 mg by mouth 2 (Two) Times a Day. 60 tablet 5   • docusate sodium (COLACE) 100 MG capsule Take 1 capsule by mouth 2 (Two) Times a Day. 60 capsule 0   • escitalopram (LEXAPRO) 20 MG tablet Take 1 tablet by mouth Daily. 30 tablet 3   • hydrOXYzine (ATARAX) 25 MG tablet TK 1 T PO BID PRA  2   • lisinopril (PRINIVIL,ZESTRIL) 20 MG tablet Take 1 tablet by mouth Daily. Take 20 mg by mouth Daily. 30 tablet 5   • mirtazapine (REMERON) 15 MG tablet TK 1/2 T PO HS FOR 1 WK THEN TK 1 T PO HS  0   • YUVAFEM 10 MCG tablet vaginal tablet      • [DISCONTINUED] atenolol (TENORMIN) 25 MG tablet Take 25 mg by mouth 2 (Two) Times a Day. Take 25 mg by mouth 2 (Two) Times a Day.  0   • [DISCONTINUED] lisinopril (PRINIVIL,ZESTRIL) 20 MG tablet Take 20 mg by mouth Daily. Take 20 mg by mouth Daily.  0   • [DISCONTINUED] lisinopril-hydrochlorothiazide  (ZESTORETIC) 20-12.5 MG per tablet Take 1 tablet by mouth Daily. 30 tablet 5     No facility-administered encounter medications on file as of 3/19/2019.        Reviewed use of high risk medication in the elderly: yes  Reviewed for potential of harmful drug interactions in the elderly: yes    Follow Up:  Return in about 6 months (around 9/19/2019).     An After Visit Summary and PPPS with all of these plans were given to the patient.

## 2019-03-20 LAB
HBA1C MFR BLD: 6 % (ref 4.8–5.6)
Lab: NORMAL
TSH SERPL DL<=0.005 MIU/L-ACNC: 0.92 UIU/ML (ref 0.45–4.5)

## 2019-03-28 ENCOUNTER — HOSPITAL ENCOUNTER (OUTPATIENT)
Dept: NUTRITION | Facility: HOSPITAL | Age: 74
Setting detail: RECURRING SERIES
Discharge: HOME | End: 2019-03-28

## 2019-03-28 VITALS — BODY MASS INDEX: 36.94 KG/M2 | WEIGHT: 208.5 LBS | HEIGHT: 63 IN

## 2019-03-28 PROCEDURE — 97802 MEDICAL NUTRITION INDIV IN: CPT | Performed by: DIETITIAN, REGISTERED

## 2019-04-19 ENCOUNTER — TELEPHONE (OUTPATIENT)
Dept: NUTRITION | Facility: HOSPITAL | Age: 74
End: 2019-04-19

## 2019-04-19 NOTE — PROGRESS NOTES
Adult Outpatient Nutrition  Assessment    Patient Name:  Akua Torres  YOB: 1945  MRN: [id number]    Assessment Date:  4/19/2019    Comments:  Patient was seen on 3/28/19 for nutrition counseling appointment, she cancelled follow up visit scheduled for 3/24/19 due to health issues. Patient reports she has been sick a lot, having a hard time focusing on nutrition. However, she has been mindful of her food choices such as switching to whole wheat products and reading nutrition labels. She thinks some weight loss, but unsure exact weight or how much she lost. Patient does resistance band exercises every other day. Encouraged patient to call back to reschedule follow up visit when she feels better.         Electronically signed by:  Frida Humphrey RD  04/19/19 10:36 AM

## 2019-04-24 ENCOUNTER — APPOINTMENT (OUTPATIENT)
Dept: NUTRITION | Facility: HOSPITAL | Age: 74
End: 2019-04-24

## 2019-06-25 ENCOUNTER — OFFICE VISIT (OUTPATIENT)
Dept: FAMILY MEDICINE CLINIC | Facility: CLINIC | Age: 74
End: 2019-06-25

## 2019-06-25 VITALS
SYSTOLIC BLOOD PRESSURE: 132 MMHG | OXYGEN SATURATION: 98 % | DIASTOLIC BLOOD PRESSURE: 80 MMHG | WEIGHT: 205 LBS | HEART RATE: 66 BPM | HEIGHT: 64 IN | TEMPERATURE: 98.8 F | BODY MASS INDEX: 35 KG/M2

## 2019-06-25 DIAGNOSIS — G47.33 OBSTRUCTIVE SLEEP APNEA SYNDROME: ICD-10-CM

## 2019-06-25 DIAGNOSIS — I10 ESSENTIAL HYPERTENSION: Primary | ICD-10-CM

## 2019-06-25 PROCEDURE — 99213 OFFICE O/P EST LOW 20 MIN: CPT | Performed by: PHYSICIAN ASSISTANT

## 2019-06-25 RX ORDER — LISINOPRIL 20 MG/1
20 TABLET ORAL DAILY
Qty: 30 TABLET | Refills: 5 | Status: SHIPPED | OUTPATIENT
Start: 2019-06-25 | End: 2020-01-06 | Stop reason: SDUPTHER

## 2019-06-25 RX ORDER — ATENOLOL 25 MG/1
25 TABLET ORAL 2 TIMES DAILY
Qty: 60 TABLET | Refills: 5 | Status: SHIPPED | OUTPATIENT
Start: 2019-06-25 | End: 2020-01-06 | Stop reason: SDUPTHER

## 2019-06-25 NOTE — PROGRESS NOTES
Subjective   Akua Torres is a 74 y.o. female  Hypertension (Follow up on BP, doing well on medications )      History of Present Illness  Patient comes today for follow-up on hypertension blood pressures well controlled medication states she is feeling very well.  She was recently diagnosed with sleep apnea and states she feels tremendously better on her CPAP machine she no longer has palpitations and wakes up feeling energized.  The following portions of the patient's history were reviewed and updated as appropriate: allergies, current medications, past social history and problem list    Review of Systems   Constitutional: Negative for fatigue and unexpected weight change.   Respiratory: Negative for cough, chest tightness and shortness of breath.    Cardiovascular: Negative for chest pain, palpitations and leg swelling.   Gastrointestinal: Negative for nausea.   Skin: Negative for color change and rash.   Neurological: Negative for dizziness, syncope, weakness and headaches.       Objective     Vitals:    06/25/19 1116   BP: 132/80   Pulse: 66   Temp: 98.8 °F (37.1 °C)   SpO2: 98%       Physical Exam   Constitutional: She appears well-developed and well-nourished. No distress.   Neck: No JVD present.   Cardiovascular: Normal rate, regular rhythm, normal heart sounds and intact distal pulses.   No murmur heard.  Pulmonary/Chest: Effort normal and breath sounds normal. No respiratory distress. She exhibits no tenderness.   Abdominal: Soft. She exhibits no distension. There is no tenderness.   Musculoskeletal: She exhibits no edema.   Skin: Skin is warm and dry. She is not diaphoretic. No erythema. No pallor.   Psychiatric: She has a normal mood and affect.   Nursing note and vitals reviewed.      Assessment/Plan     Diagnoses and all orders for this visit:    Essential hypertension    Obstructive sleep apnea syndrome    Other orders  -     atenolol (TENORMIN) 25 MG tablet; Take 1 tablet by mouth 2 (Two)  Times a Day. Take 25 mg by mouth 2 (Two) Times a Day.  -     lisinopril (PRINIVIL,ZESTRIL) 20 MG tablet; Take 1 tablet by mouth Daily. Take 20 mg by mouth Daily.    Follow-up in 6 months for recheck.

## 2019-08-29 ENCOUNTER — APPOINTMENT (OUTPATIENT)
Dept: PREADMISSION TESTING | Facility: HOSPITAL | Age: 74
End: 2019-08-29

## 2019-08-29 ENCOUNTER — HOSPITAL ENCOUNTER (OUTPATIENT)
Dept: GENERAL RADIOLOGY | Facility: HOSPITAL | Age: 74
Discharge: HOME | End: 2019-08-29
Admitting: ORTHOPAEDIC SURGERY

## 2019-08-29 VITALS — BODY MASS INDEX: 32.44 KG/M2 | HEIGHT: 64 IN | WEIGHT: 190 LBS

## 2019-08-29 LAB
ABO GROUP BLD: NORMAL
ALBUMIN SERPL-MCNC: 4.4 G/DL (ref 3.5–5.2)
ALBUMIN/GLOB SERPL: 2.1 G/DL
ALP SERPL-CCNC: 112 U/L (ref 39–117)
ALT SERPL W P-5'-P-CCNC: 8 U/L (ref 1–33)
ANION GAP SERPL CALCULATED.3IONS-SCNC: 13 MMOL/L (ref 5–15)
AST SERPL-CCNC: 14 U/L (ref 1–32)
BACTERIA UR QL AUTO: ABNORMAL /HPF
BASOPHILS # BLD AUTO: 0.06 10*3/MM3 (ref 0–0.2)
BASOPHILS NFR BLD AUTO: 0.7 % (ref 0–1.5)
BILIRUB SERPL-MCNC: 0.5 MG/DL (ref 0.2–1.2)
BILIRUB UR QL STRIP: NEGATIVE
BLD GP AB SCN SERPL QL: NEGATIVE
BUN BLD-MCNC: 13 MG/DL (ref 8–23)
BUN/CREAT SERPL: 13.5 (ref 7–25)
CALCIUM SPEC-SCNC: 9.4 MG/DL (ref 8.6–10.5)
CHLORIDE SERPL-SCNC: 105 MMOL/L (ref 98–107)
CLARITY UR: CLEAR
CO2 SERPL-SCNC: 26 MMOL/L (ref 22–29)
COLOR UR: YELLOW
CREAT BLD-MCNC: 0.96 MG/DL (ref 0.57–1)
CRP SERPL-MCNC: 0.55 MG/DL (ref 0–0.5)
DEPRECATED RDW RBC AUTO: 50.9 FL (ref 37–54)
EOSINOPHIL # BLD AUTO: 0.29 10*3/MM3 (ref 0–0.4)
EOSINOPHIL NFR BLD AUTO: 3.6 % (ref 0.3–6.2)
ERYTHROCYTE [DISTWIDTH] IN BLOOD BY AUTOMATED COUNT: 14.5 % (ref 12.3–15.4)
ERYTHROCYTE [SEDIMENTATION RATE] IN BLOOD: 15 MM/HR (ref 0–30)
GFR SERPL CREATININE-BSD FRML MDRD: 57 ML/MIN/1.73
GLOBULIN UR ELPH-MCNC: 2.1 GM/DL
GLUCOSE BLD-MCNC: 213 MG/DL (ref 65–99)
GLUCOSE UR STRIP-MCNC: NEGATIVE MG/DL
HBA1C MFR BLD: 5.6 % (ref 4.8–5.6)
HCT VFR BLD AUTO: 48 % (ref 34–46.6)
HGB BLD-MCNC: 14.6 G/DL (ref 12–15.9)
HGB UR QL STRIP.AUTO: NEGATIVE
HYALINE CASTS UR QL AUTO: ABNORMAL /LPF
IMM GRANULOCYTES # BLD AUTO: 0.02 10*3/MM3 (ref 0–0.05)
IMM GRANULOCYTES NFR BLD AUTO: 0.2 % (ref 0–0.5)
KETONES UR QL STRIP: NEGATIVE
LEUKOCYTE ESTERASE UR QL STRIP.AUTO: ABNORMAL
LYMPHOCYTES # BLD AUTO: 2.32 10*3/MM3 (ref 0.7–3.1)
LYMPHOCYTES NFR BLD AUTO: 28.7 % (ref 19.6–45.3)
MCH RBC QN AUTO: 29 PG (ref 26.6–33)
MCHC RBC AUTO-ENTMCNC: 30.4 G/DL (ref 31.5–35.7)
MCV RBC AUTO: 95.4 FL (ref 79–97)
MONOCYTES # BLD AUTO: 0.43 10*3/MM3 (ref 0.1–0.9)
MONOCYTES NFR BLD AUTO: 5.3 % (ref 5–12)
NEUTROPHILS # BLD AUTO: 4.96 10*3/MM3 (ref 1.7–7)
NEUTROPHILS NFR BLD AUTO: 61.5 % (ref 42.7–76)
NITRITE UR QL STRIP: NEGATIVE
NRBC BLD AUTO-RTO: 0 /100 WBC (ref 0–0.2)
PH UR STRIP.AUTO: <=5 [PH] (ref 5–8)
PLATELET # BLD AUTO: 241 10*3/MM3 (ref 140–450)
PMV BLD AUTO: 9.9 FL (ref 6–12)
POTASSIUM BLD-SCNC: 4.1 MMOL/L (ref 3.5–5.2)
PROT SERPL-MCNC: 6.5 G/DL (ref 6–8.5)
PROT UR QL STRIP: ABNORMAL
RBC # BLD AUTO: 5.03 10*6/MM3 (ref 3.77–5.28)
RBC # UR: ABNORMAL /HPF
REF LAB TEST METHOD: ABNORMAL
RH BLD: POSITIVE
SODIUM BLD-SCNC: 144 MMOL/L (ref 136–145)
SP GR UR STRIP: 1.02 (ref 1–1.03)
SQUAMOUS #/AREA URNS HPF: ABNORMAL /HPF
UROBILINOGEN UR QL STRIP: ABNORMAL
WBC NRBC COR # BLD: 8.08 10*3/MM3 (ref 3.4–10.8)
WBC UR QL AUTO: ABNORMAL /HPF

## 2019-08-29 PROCEDURE — 71046 X-RAY EXAM CHEST 2 VIEWS: CPT

## 2019-08-29 PROCEDURE — 36415 COLL VENOUS BLD VENIPUNCTURE: CPT

## 2019-08-29 PROCEDURE — 87086 URINE CULTURE/COLONY COUNT: CPT | Performed by: ORTHOPAEDIC SURGERY

## 2019-08-29 PROCEDURE — 83036 HEMOGLOBIN GLYCOSYLATED A1C: CPT | Performed by: ORTHOPAEDIC SURGERY

## 2019-08-29 PROCEDURE — 86900 BLOOD TYPING SEROLOGIC ABO: CPT | Performed by: ORTHOPAEDIC SURGERY

## 2019-08-29 PROCEDURE — 87186 SC STD MICRODIL/AGAR DIL: CPT | Performed by: ORTHOPAEDIC SURGERY

## 2019-08-29 PROCEDURE — 86140 C-REACTIVE PROTEIN: CPT | Performed by: ORTHOPAEDIC SURGERY

## 2019-08-29 PROCEDURE — 85025 COMPLETE CBC W/AUTO DIFF WBC: CPT | Performed by: ORTHOPAEDIC SURGERY

## 2019-08-29 PROCEDURE — 85652 RBC SED RATE AUTOMATED: CPT | Performed by: ORTHOPAEDIC SURGERY

## 2019-08-29 PROCEDURE — 81001 URINALYSIS AUTO W/SCOPE: CPT | Performed by: ORTHOPAEDIC SURGERY

## 2019-08-29 PROCEDURE — 80053 COMPREHEN METABOLIC PANEL: CPT | Performed by: ORTHOPAEDIC SURGERY

## 2019-08-29 PROCEDURE — 86901 BLOOD TYPING SEROLOGIC RH(D): CPT | Performed by: ORTHOPAEDIC SURGERY

## 2019-08-29 PROCEDURE — 87077 CULTURE AEROBIC IDENTIFY: CPT | Performed by: ORTHOPAEDIC SURGERY

## 2019-08-29 PROCEDURE — 86850 RBC ANTIBODY SCREEN: CPT | Performed by: ORTHOPAEDIC SURGERY

## 2019-08-29 RX ORDER — HYDROXYZINE HYDROCHLORIDE 25 MG/1
25 TABLET, FILM COATED ORAL 2 TIMES DAILY PRN
COMMUNITY
End: 2020-02-18 | Stop reason: SDUPTHER

## 2019-08-29 ASSESSMENT — HOOS JR
HOOS JR SCORE: 32.735
HOOS JR SCORE: 18

## 2019-08-31 LAB
BACTERIA SPEC AEROBE CULT: ABNORMAL
BACTERIA SPEC AEROBE CULT: ABNORMAL

## 2019-09-03 ENCOUNTER — APPOINTMENT (OUTPATIENT)
Dept: PREADMISSION TESTING | Facility: HOSPITAL | Age: 74
End: 2019-09-03

## 2019-09-03 LAB
BACTERIA UR QL AUTO: ABNORMAL /HPF
BILIRUB UR QL STRIP: NEGATIVE
CLARITY UR: CLEAR
COLOR UR: YELLOW
GLUCOSE UR STRIP-MCNC: NEGATIVE MG/DL
HGB UR QL STRIP.AUTO: NEGATIVE
HYALINE CASTS UR QL AUTO: ABNORMAL /LPF
KETONES UR QL STRIP: NEGATIVE
LEUKOCYTE ESTERASE UR QL STRIP.AUTO: NEGATIVE
MUCOUS THREADS URNS QL MICRO: ABNORMAL /HPF
NITRITE UR QL STRIP: NEGATIVE
PH UR STRIP.AUTO: 5.5 [PH] (ref 5–8)
PROT UR QL STRIP: ABNORMAL
RBC # UR: ABNORMAL /HPF
REF LAB TEST METHOD: ABNORMAL
SP GR UR STRIP: 1.02 (ref 1–1.03)
SQUAMOUS #/AREA URNS HPF: ABNORMAL /HPF
UROBILINOGEN UR QL STRIP: ABNORMAL
WBC UR QL AUTO: ABNORMAL /HPF

## 2019-09-03 PROCEDURE — 87086 URINE CULTURE/COLONY COUNT: CPT | Performed by: ORTHOPAEDIC SURGERY

## 2019-09-03 PROCEDURE — 81001 URINALYSIS AUTO W/SCOPE: CPT | Performed by: ORTHOPAEDIC SURGERY

## 2019-09-05 LAB — BACTERIA SPEC AEROBE CULT: NO GROWTH

## 2019-09-09 ENCOUNTER — ANESTHESIA EVENT (OUTPATIENT)
Dept: PERIOP | Facility: HOSPITAL | Age: 74
End: 2019-09-09

## 2019-09-09 ENCOUNTER — APPOINTMENT (OUTPATIENT)
Dept: GENERAL RADIOLOGY | Facility: HOSPITAL | Age: 74
End: 2019-09-09

## 2019-09-09 ENCOUNTER — HOSPITAL ENCOUNTER (INPATIENT)
Facility: HOSPITAL | Age: 74
LOS: 9 days | Discharge: NURSING, REHAB OR LONG-TERM CARE | End: 2019-09-18
Attending: ORTHOPAEDIC SURGERY | Admitting: ORTHOPAEDIC SURGERY

## 2019-09-09 ENCOUNTER — ANESTHESIA (OUTPATIENT)
Dept: PERIOP | Facility: HOSPITAL | Age: 74
End: 2019-09-09

## 2019-09-09 DIAGNOSIS — Z74.09 IMPAIRED FUNCTIONAL MOBILITY, BALANCE, GAIT, AND ENDURANCE: Primary | ICD-10-CM

## 2019-09-09 DIAGNOSIS — Z78.9 IMPAIRED MOBILITY AND ADLS: ICD-10-CM

## 2019-09-09 DIAGNOSIS — I63.232 STENOSIS OF LEFT INTERNAL CAROTID ARTERY WITH CEREBRAL INFARCTION (HCC): ICD-10-CM

## 2019-09-09 DIAGNOSIS — Z74.09 IMPAIRED MOBILITY AND ADLS: ICD-10-CM

## 2019-09-09 PROBLEM — I48.91 A-FIB (HCC): Status: ACTIVE | Noted: 2019-09-09

## 2019-09-09 PROBLEM — G47.33 OSA TREATED WITH BIPAP: Status: ACTIVE | Noted: 2019-09-09

## 2019-09-09 PROBLEM — M16.11 ARTHRITIS OF RIGHT HIP: Status: ACTIVE | Noted: 2019-09-09

## 2019-09-09 LAB
ABO GROUP BLD: NORMAL
BLD GP AB SCN SERPL QL: NEGATIVE
GLUCOSE BLDC GLUCOMTR-MCNC: 174 MG/DL (ref 70–130)
RH BLD: POSITIVE
T&S EXPIRATION DATE: NORMAL

## 2019-09-09 PROCEDURE — 25010000002 FENTANYL CITRATE (PF) 100 MCG/2ML SOLUTION: Performed by: NURSE ANESTHETIST, CERTIFIED REGISTERED

## 2019-09-09 PROCEDURE — 86923 COMPATIBILITY TEST ELECTRIC: CPT

## 2019-09-09 PROCEDURE — 25010000002 ONDANSETRON PER 1 MG: Performed by: NURSE ANESTHETIST, CERTIFIED REGISTERED

## 2019-09-09 PROCEDURE — 86850 RBC ANTIBODY SCREEN: CPT | Performed by: ORTHOPAEDIC SURGERY

## 2019-09-09 PROCEDURE — 82962 GLUCOSE BLOOD TEST: CPT

## 2019-09-09 PROCEDURE — 76000 FLUOROSCOPY <1 HR PHYS/QHP: CPT

## 2019-09-09 PROCEDURE — 97530 THERAPEUTIC ACTIVITIES: CPT

## 2019-09-09 PROCEDURE — C1776 JOINT DEVICE (IMPLANTABLE): HCPCS | Performed by: ORTHOPAEDIC SURGERY

## 2019-09-09 PROCEDURE — 86901 BLOOD TYPING SEROLOGIC RH(D): CPT | Performed by: ORTHOPAEDIC SURGERY

## 2019-09-09 PROCEDURE — 25010000003 CEFAZOLIN IN DEXTROSE 2-4 GM/100ML-% SOLUTION: Performed by: ORTHOPAEDIC SURGERY

## 2019-09-09 PROCEDURE — 0SR902A REPLACEMENT OF RIGHT HIP JOINT WITH METAL ON POLYETHYLENE SYNTHETIC SUBSTITUTE, UNCEMENTED, OPEN APPROACH: ICD-10-PCS | Performed by: ORTHOPAEDIC SURGERY

## 2019-09-09 PROCEDURE — 86900 BLOOD TYPING SEROLOGIC ABO: CPT | Performed by: ORTHOPAEDIC SURGERY

## 2019-09-09 PROCEDURE — 25010000002 PROPOFOL 10 MG/ML EMULSION: Performed by: NURSE ANESTHETIST, CERTIFIED REGISTERED

## 2019-09-09 PROCEDURE — 25010000002 HYDROMORPHONE PER 4 MG: Performed by: NURSE ANESTHETIST, CERTIFIED REGISTERED

## 2019-09-09 PROCEDURE — 73502 X-RAY EXAM HIP UNI 2-3 VIEWS: CPT

## 2019-09-09 PROCEDURE — 97162 PT EVAL MOD COMPLEX 30 MIN: CPT

## 2019-09-09 DEVICE — R3 3 HOLE ACETABULAR SHELL 52MM
Type: IMPLANTABLE DEVICE | Site: HIP | Status: FUNCTIONAL
Brand: R3 ACETABULAR

## 2019-09-09 DEVICE — POLARSTEM COLLAR STANDARD                                    NON-CEMENTED WITH TI/HA 2
Type: IMPLANTABLE DEVICE | Site: HIP | Status: FUNCTIONAL
Brand: POLARSTEM

## 2019-09-09 DEVICE — IMPLANTABLE DEVICE: Type: IMPLANTABLE DEVICE | Site: HIP | Status: FUNCTIONAL

## 2019-09-09 DEVICE — OXINIUM FEMORAL HEAD 12/14 TAPER                                    36 MM +0
Type: IMPLANTABLE DEVICE | Site: HIP | Status: FUNCTIONAL
Brand: OXINIUM

## 2019-09-09 DEVICE — REFLECTION SPHERICAL HEAD SCREW 20MM
Type: IMPLANTABLE DEVICE | Site: HIP | Status: FUNCTIONAL
Brand: REFLECTION

## 2019-09-09 DEVICE — R3 0 DEGREE XLPE ACETABULAR LINER                                    36MM INNER DIAMETER X OUTER DIAMETER 52MM
Type: IMPLANTABLE DEVICE | Site: HIP | Status: FUNCTIONAL
Brand: R3

## 2019-09-09 DEVICE — REFLECTION SPHERICAL HEAD SCREW 25MM
Type: IMPLANTABLE DEVICE | Site: HIP | Status: FUNCTIONAL
Brand: REFLECTION

## 2019-09-09 RX ORDER — ESCITALOPRAM OXALATE 20 MG/1
20 TABLET ORAL DAILY
Status: DISCONTINUED | OUTPATIENT
Start: 2019-09-10 | End: 2019-09-18 | Stop reason: HOSPADM

## 2019-09-09 RX ORDER — DOCUSATE SODIUM 100 MG/1
100 CAPSULE, LIQUID FILLED ORAL 2 TIMES DAILY
Status: DISCONTINUED | OUTPATIENT
Start: 2019-09-09 | End: 2019-09-18 | Stop reason: HOSPADM

## 2019-09-09 RX ORDER — TRAMADOL HYDROCHLORIDE 50 MG/1
50 TABLET ORAL EVERY 6 HOURS PRN
Status: DISCONTINUED | OUTPATIENT
Start: 2019-09-09 | End: 2019-09-18 | Stop reason: HOSPADM

## 2019-09-09 RX ORDER — FENTANYL CITRATE 50 UG/ML
INJECTION, SOLUTION INTRAMUSCULAR; INTRAVENOUS AS NEEDED
Status: DISCONTINUED | OUTPATIENT
Start: 2019-09-09 | End: 2019-09-09 | Stop reason: SURG

## 2019-09-09 RX ORDER — ONDANSETRON 2 MG/ML
4 INJECTION INTRAMUSCULAR; INTRAVENOUS EVERY 6 HOURS PRN
Status: DISCONTINUED | OUTPATIENT
Start: 2019-09-09 | End: 2019-09-18 | Stop reason: HOSPADM

## 2019-09-09 RX ORDER — PROMETHAZINE HYDROCHLORIDE 25 MG/1
25 TABLET ORAL ONCE AS NEEDED
Status: DISCONTINUED | OUTPATIENT
Start: 2019-09-09 | End: 2019-09-09 | Stop reason: HOSPADM

## 2019-09-09 RX ORDER — SODIUM CHLORIDE 9 MG/ML
150 INJECTION, SOLUTION INTRAVENOUS CONTINUOUS
Status: DISCONTINUED | OUTPATIENT
Start: 2019-09-09 | End: 2019-09-10

## 2019-09-09 RX ORDER — SODIUM CHLORIDE 0.9 % (FLUSH) 0.9 %
3 SYRINGE (ML) INJECTION EVERY 12 HOURS SCHEDULED
Status: DISCONTINUED | OUTPATIENT
Start: 2019-09-09 | End: 2019-09-09 | Stop reason: HOSPADM

## 2019-09-09 RX ORDER — ONDANSETRON 2 MG/ML
INJECTION INTRAMUSCULAR; INTRAVENOUS AS NEEDED
Status: DISCONTINUED | OUTPATIENT
Start: 2019-09-09 | End: 2019-09-09 | Stop reason: SURG

## 2019-09-09 RX ORDER — CEFAZOLIN SODIUM 2 G/100ML
2 INJECTION, SOLUTION INTRAVENOUS ONCE
Status: COMPLETED | OUTPATIENT
Start: 2019-09-09 | End: 2019-09-09

## 2019-09-09 RX ORDER — PROMETHAZINE HYDROCHLORIDE 12.5 MG/1
12.5 TABLET ORAL EVERY 6 HOURS PRN
Status: DISCONTINUED | OUTPATIENT
Start: 2019-09-09 | End: 2019-09-18 | Stop reason: HOSPADM

## 2019-09-09 RX ORDER — MIRTAZAPINE 15 MG/1
15 TABLET, FILM COATED ORAL NIGHTLY
Status: DISCONTINUED | OUTPATIENT
Start: 2019-09-09 | End: 2019-09-18 | Stop reason: HOSPADM

## 2019-09-09 RX ORDER — HYDROCODONE BITARTRATE AND ACETAMINOPHEN 5; 325 MG/1; MG/1
1 TABLET ORAL EVERY 4 HOURS PRN
Status: DISCONTINUED | OUTPATIENT
Start: 2019-09-09 | End: 2019-09-18 | Stop reason: HOSPADM

## 2019-09-09 RX ORDER — HYDROMORPHONE HYDROCHLORIDE 1 MG/ML
0.5 INJECTION, SOLUTION INTRAMUSCULAR; INTRAVENOUS; SUBCUTANEOUS
Status: DISCONTINUED | OUTPATIENT
Start: 2019-09-09 | End: 2019-09-18 | Stop reason: HOSPADM

## 2019-09-09 RX ORDER — BISACODYL 10 MG
10 SUPPOSITORY, RECTAL RECTAL DAILY PRN
Status: DISCONTINUED | OUTPATIENT
Start: 2019-09-09 | End: 2019-09-18 | Stop reason: HOSPADM

## 2019-09-09 RX ORDER — HYDROMORPHONE HYDROCHLORIDE 1 MG/ML
0.25 INJECTION, SOLUTION INTRAMUSCULAR; INTRAVENOUS; SUBCUTANEOUS
Status: DISCONTINUED | OUTPATIENT
Start: 2019-09-09 | End: 2019-09-09 | Stop reason: HOSPADM

## 2019-09-09 RX ORDER — LISINOPRIL 20 MG/1
20 TABLET ORAL DAILY
Status: DISCONTINUED | OUTPATIENT
Start: 2019-09-09 | End: 2019-09-10

## 2019-09-09 RX ORDER — PROMETHAZINE HYDROCHLORIDE 25 MG/ML
6.25 INJECTION, SOLUTION INTRAMUSCULAR; INTRAVENOUS ONCE AS NEEDED
Status: DISCONTINUED | OUTPATIENT
Start: 2019-09-09 | End: 2019-09-09 | Stop reason: HOSPADM

## 2019-09-09 RX ORDER — FAMOTIDINE 20 MG/1
20 TABLET, FILM COATED ORAL
Status: COMPLETED | OUTPATIENT
Start: 2019-09-09 | End: 2019-09-09

## 2019-09-09 RX ORDER — PREGABALIN 75 MG/1
75 CAPSULE ORAL ONCE
Status: COMPLETED | OUTPATIENT
Start: 2019-09-09 | End: 2019-09-09

## 2019-09-09 RX ORDER — FENTANYL CITRATE 50 UG/ML
50 INJECTION, SOLUTION INTRAMUSCULAR; INTRAVENOUS
Status: DISCONTINUED | OUTPATIENT
Start: 2019-09-09 | End: 2019-09-09 | Stop reason: HOSPADM

## 2019-09-09 RX ORDER — LIDOCAINE HYDROCHLORIDE 10 MG/ML
0.5 INJECTION, SOLUTION EPIDURAL; INFILTRATION; INTRACAUDAL; PERINEURAL ONCE AS NEEDED
Status: COMPLETED | OUTPATIENT
Start: 2019-09-09 | End: 2019-09-09

## 2019-09-09 RX ORDER — MAGNESIUM HYDROXIDE 1200 MG/15ML
LIQUID ORAL AS NEEDED
Status: DISCONTINUED | OUTPATIENT
Start: 2019-09-09 | End: 2019-09-09 | Stop reason: HOSPADM

## 2019-09-09 RX ORDER — PROPOFOL 10 MG/ML
VIAL (ML) INTRAVENOUS AS NEEDED
Status: DISCONTINUED | OUTPATIENT
Start: 2019-09-09 | End: 2019-09-09 | Stop reason: SURG

## 2019-09-09 RX ORDER — BISACODYL 5 MG/1
10 TABLET, DELAYED RELEASE ORAL DAILY PRN
Status: DISCONTINUED | OUTPATIENT
Start: 2019-09-09 | End: 2019-09-18 | Stop reason: HOSPADM

## 2019-09-09 RX ORDER — CEFAZOLIN SODIUM 2 G/100ML
2 INJECTION, SOLUTION INTRAVENOUS EVERY 8 HOURS
Status: COMPLETED | OUTPATIENT
Start: 2019-09-09 | End: 2019-09-10

## 2019-09-09 RX ORDER — SODIUM CHLORIDE, SODIUM LACTATE, POTASSIUM CHLORIDE, CALCIUM CHLORIDE 600; 310; 30; 20 MG/100ML; MG/100ML; MG/100ML; MG/100ML
9 INJECTION, SOLUTION INTRAVENOUS CONTINUOUS PRN
Status: DISCONTINUED | OUTPATIENT
Start: 2019-09-09 | End: 2019-09-09 | Stop reason: HOSPADM

## 2019-09-09 RX ORDER — ASPIRIN 325 MG
325 TABLET ORAL DAILY
Status: DISCONTINUED | OUTPATIENT
Start: 2019-09-10 | End: 2019-09-09

## 2019-09-09 RX ORDER — LABETALOL HYDROCHLORIDE 5 MG/ML
10 INJECTION, SOLUTION INTRAVENOUS EVERY 4 HOURS PRN
Status: DISCONTINUED | OUTPATIENT
Start: 2019-09-09 | End: 2019-09-11

## 2019-09-09 RX ORDER — HYDROXYZINE HYDROCHLORIDE 25 MG/1
25 TABLET, FILM COATED ORAL 2 TIMES DAILY PRN
Status: DISCONTINUED | OUTPATIENT
Start: 2019-09-09 | End: 2019-09-18 | Stop reason: HOSPADM

## 2019-09-09 RX ORDER — ATENOLOL 25 MG/1
25 TABLET ORAL 2 TIMES DAILY
Status: DISCONTINUED | OUTPATIENT
Start: 2019-09-09 | End: 2019-09-18 | Stop reason: HOSPADM

## 2019-09-09 RX ORDER — ASPIRIN 325 MG
325 TABLET ORAL DAILY
Status: DISCONTINUED | OUTPATIENT
Start: 2019-09-09 | End: 2019-09-11

## 2019-09-09 RX ORDER — NALOXONE HCL 0.4 MG/ML
0.1 VIAL (ML) INJECTION
Status: DISCONTINUED | OUTPATIENT
Start: 2019-09-09 | End: 2019-09-18 | Stop reason: HOSPADM

## 2019-09-09 RX ORDER — SODIUM CHLORIDE 0.9 % (FLUSH) 0.9 %
3-10 SYRINGE (ML) INJECTION AS NEEDED
Status: DISCONTINUED | OUTPATIENT
Start: 2019-09-09 | End: 2019-09-09 | Stop reason: HOSPADM

## 2019-09-09 RX ORDER — PROMETHAZINE HYDROCHLORIDE 25 MG/1
25 SUPPOSITORY RECTAL ONCE AS NEEDED
Status: DISCONTINUED | OUTPATIENT
Start: 2019-09-09 | End: 2019-09-09 | Stop reason: HOSPADM

## 2019-09-09 RX ADMIN — HYDROMORPHONE HYDROCHLORIDE 0.25 MG: 1 INJECTION, SOLUTION INTRAMUSCULAR; INTRAVENOUS; SUBCUTANEOUS at 15:25

## 2019-09-09 RX ADMIN — LIDOCAINE HYDROCHLORIDE 0.5 ML: 10 INJECTION, SOLUTION EPIDURAL; INFILTRATION; INTRACAUDAL; PERINEURAL at 10:19

## 2019-09-09 RX ADMIN — PROPOFOL 100 MG: 10 INJECTION, EMULSION INTRAVENOUS at 12:49

## 2019-09-09 RX ADMIN — PROPOFOL 25 MCG/KG/MIN: 10 INJECTION, EMULSION INTRAVENOUS at 12:57

## 2019-09-09 RX ADMIN — FENTANYL CITRATE 50 MCG: 50 INJECTION INTRAMUSCULAR; INTRAVENOUS at 15:35

## 2019-09-09 RX ADMIN — FENTANYL CITRATE 50 MCG: 50 INJECTION INTRAMUSCULAR; INTRAVENOUS at 16:15

## 2019-09-09 RX ADMIN — CEFAZOLIN SODIUM 2 G: 2 INJECTION, SOLUTION INTRAVENOUS at 21:35

## 2019-09-09 RX ADMIN — ASPIRIN 325 MG ORAL TABLET 325 MG: 325 PILL ORAL at 22:06

## 2019-09-09 RX ADMIN — EPHEDRINE SULFATE 10 MG: 50 INJECTION INTRAMUSCULAR; INTRAVENOUS; SUBCUTANEOUS at 14:22

## 2019-09-09 RX ADMIN — SODIUM CHLORIDE 150 ML/HR: 9 INJECTION, SOLUTION INTRAVENOUS at 16:55

## 2019-09-09 RX ADMIN — FENTANYL CITRATE 25 MCG: 50 INJECTION, SOLUTION INTRAMUSCULAR; INTRAVENOUS at 13:33

## 2019-09-09 RX ADMIN — PROPOFOL 30 MG: 10 INJECTION, EMULSION INTRAVENOUS at 12:47

## 2019-09-09 RX ADMIN — CEFAZOLIN SODIUM 2 G: 2 INJECTION, SOLUTION INTRAVENOUS at 12:21

## 2019-09-09 RX ADMIN — EPHEDRINE SULFATE 10 MG: 50 INJECTION INTRAMUSCULAR; INTRAVENOUS; SUBCUTANEOUS at 13:08

## 2019-09-09 RX ADMIN — HYDROCODONE BITARTRATE AND ACETAMINOPHEN 1 TABLET: 5; 325 TABLET ORAL at 18:01

## 2019-09-09 RX ADMIN — FENTANYL CITRATE 25 MCG: 50 INJECTION, SOLUTION INTRAMUSCULAR; INTRAVENOUS at 13:12

## 2019-09-09 RX ADMIN — HYDROXYZINE HYDROCHLORIDE 25 MG: 25 TABLET, FILM COATED ORAL at 21:04

## 2019-09-09 RX ADMIN — ONDANSETRON 4 MG: 2 INJECTION INTRAMUSCULAR; INTRAVENOUS at 14:35

## 2019-09-09 RX ADMIN — FENTANYL CITRATE 50 MCG: 50 INJECTION, SOLUTION INTRAMUSCULAR; INTRAVENOUS at 13:38

## 2019-09-09 RX ADMIN — EPHEDRINE SULFATE 5 MG: 50 INJECTION INTRAMUSCULAR; INTRAVENOUS; SUBCUTANEOUS at 13:04

## 2019-09-09 RX ADMIN — FAMOTIDINE 20 MG: 20 TABLET ORAL at 10:19

## 2019-09-09 RX ADMIN — FENTANYL CITRATE 50 MCG: 50 INJECTION, SOLUTION INTRAMUSCULAR; INTRAVENOUS at 15:03

## 2019-09-09 RX ADMIN — PROPOFOL 30 MG: 10 INJECTION, EMULSION INTRAVENOUS at 12:39

## 2019-09-09 RX ADMIN — HYDROMORPHONE HYDROCHLORIDE 0.25 MG: 1 INJECTION, SOLUTION INTRAMUSCULAR; INTRAVENOUS; SUBCUTANEOUS at 15:15

## 2019-09-09 RX ADMIN — MUPIROCIN 1 APPLICATION: 20 OINTMENT TOPICAL at 10:19

## 2019-09-09 RX ADMIN — FENTANYL CITRATE 25 MCG: 50 INJECTION, SOLUTION INTRAMUSCULAR; INTRAVENOUS at 14:43

## 2019-09-09 RX ADMIN — TRANEXAMIC ACID 1000 MG: 100 INJECTION, SOLUTION INTRAVENOUS at 14:17

## 2019-09-09 RX ADMIN — PREGABALIN 75 MG: 75 CAPSULE ORAL at 10:19

## 2019-09-09 RX ADMIN — FENTANYL CITRATE 25 MCG: 50 INJECTION, SOLUTION INTRAMUSCULAR; INTRAVENOUS at 14:50

## 2019-09-09 RX ADMIN — SODIUM CHLORIDE, POTASSIUM CHLORIDE, SODIUM LACTATE AND CALCIUM CHLORIDE 9 ML/HR: 600; 310; 30; 20 INJECTION, SOLUTION INTRAVENOUS at 10:19

## 2019-09-09 RX ADMIN — TRANEXAMIC ACID 1000 MG: 100 INJECTION, SOLUTION INTRAVENOUS at 12:31

## 2019-09-09 RX ADMIN — DOCUSATE SODIUM 100 MG: 100 CAPSULE, LIQUID FILLED ORAL at 21:04

## 2019-09-09 RX ADMIN — PROPOFOL 30 MG: 10 INJECTION, EMULSION INTRAVENOUS at 12:44

## 2019-09-09 RX ADMIN — MIRTAZAPINE 15 MG: 15 TABLET, FILM COATED ORAL at 21:04

## 2019-09-09 RX ADMIN — TRAMADOL HYDROCHLORIDE 50 MG: 50 TABLET, FILM COATED ORAL at 21:04

## 2019-09-09 NOTE — ANESTHESIA PREPROCEDURE EVALUATION
Anesthesia Evaluation     Patient summary reviewed and Nursing notes reviewed   no history of anesthetic complications:  NPO Solid Status: > 8 hours  NPO Liquid Status: > 2 hours           Airway   Mallampati: III  TM distance: >3 FB  Neck ROM: full  Possible difficult intubation  Dental - normal exam     Pulmonary    (+) sleep apnea, decreased breath sounds,   Cardiovascular   Exercise tolerance: good (4-7 METS)    Rhythm: regular  Rate: normal    (+) hypertension well controlled 2 medications or greater, dysrhythmias Atrial Fib, hyperlipidemia,       Neuro/Psych  (+) psychiatric history Anxiety,     GI/Hepatic/Renal/Endo    (+) morbid obesity, GERD,  renal disease ARF, diabetes mellitus type 2,     Musculoskeletal     (+) back pain, neck pain,   Abdominal   (+) obese,     Abdomen: soft.   Substance History      OB/GYN          Other   (+) arthritis                     Anesthesia Plan    ASA 3     spinal     intravenous induction   Anesthetic plan, all risks, benefits, and alternatives have been provided, discussed and informed consent has been obtained with: patient.    Plan discussed with CRNA.

## 2019-09-09 NOTE — H&P
Pre-Op H&P  Akua Torres  [id number]  1945    Chief complaint: Right hip pain    HPI:    Patient is a 74 y.o.female who presents with right hip pain and found to have right hip osteoarthritis.  Failed conservative treatment.  Here today to undergo right anterior total hip arthroplasty.     Review of Systems:  General ROS: negative for chills, fever or skin lesions;  No changes since last office visit  Cardiovascular ROS: no chest pain or dyspnea on exertion.  History of afib with uncontrolled YUNIOR.  No further episodes  Respiratory ROS: no cough, shortness of breath, or wheezing    Allergies:   Allergies   Allergen Reactions   • Nsaids Other (See Comments)     KIDNEY DAMAGE   • Quinidine Nausea And Vomiting and Other (See Comments)     FEVER     • Nitrofuran Derivatives Diarrhea   • Bactrim [Sulfamethoxazole-Trimethoprim] Rash   • Penicillins Rash       Home Meds:    No current facility-administered medications on file prior to encounter.      Current Outpatient Medications on File Prior to Encounter   Medication Sig Dispense Refill   • acetaminophen (TYLENOL) 325 MG tablet Take 650 mg by mouth Every 6 (Six) Hours As Needed for Mild Pain .     • atenolol (TENORMIN) 25 MG tablet Take 1 tablet by mouth 2 (Two) Times a Day. Take 25 mg by mouth 2 (Two) Times a Day. 60 tablet 5   • escitalopram (LEXAPRO) 20 MG tablet Take 1 tablet by mouth Daily. 30 tablet 3   • lisinopril (PRINIVIL,ZESTRIL) 20 MG tablet Take 1 tablet by mouth Daily. Take 20 mg by mouth Daily. 30 tablet 5   • mirtazapine (REMERON) 15 MG tablet Take 15 mg by mouth Every Night. TK 1/2 T PO HS FOR 1 WK THEN TK 1 T PO HS  0       PMH:   Past Medical History:   Diagnosis Date   • A-fib (CMS/[facility])    • Anxiety    • Anxiety and depression    • Arthritis    • Cataract    • Depression    • Elevated serum creatinine     SEES DR SMITH EVERY 6 MONTHS- NEPHROLOGIST    • Extremity pain    • GERD (gastroesophageal reflux disease)    • Gout    • H/O  "bladder infections     JUST FINISHED MACROBID ON 11-2-17 FOR RECENT UTI   • Hypercholesteremia    • Hypertension    • Joint pain    • Kidney disease    • Kidney disease, chronic, stage III (GFR 30-59 ml/min) (CMS/[facility])    • Low back pain    • Neck pain    • Rheumatic fever    • Sleep apnea    • Urinary tract infection    • Wears glasses      PSH:    Past Surgical History:   Procedure Laterality Date   • BACK SURGERY  2004    LUMBAR PER DR MAN    • CARPAL TUNNEL RELEASE Left    • COLONOSCOPY     • EYE SURGERY     • FINGER SURGERY Right     3RD FINGER   • HEEL SPUR EXCISION Bilateral    • KNEE ARTHROSCOPY Bilateral    • LUMBAR DISCECTOMY FUSION INSTRUMENTATION N/A 11/10/2017    Procedure: LUMBAR SPINAL FUSION ;  Surgeon: Gopi Man MD;  Location: [facility];  Service:    • REPLACEMENT TOTAL KNEE BILATERAL Bilateral      Social History:   Tobacco:   Social History     Tobacco Use   Smoking Status Never Smoker   Smokeless Tobacco Never Used      Alcohol:     Social History     Substance and Sexual Activity   Alcohol Use No       Vitals:           /81 (BP Location: Right arm, Patient Position: Lying)   Pulse 67   Temp 98.2 °F (36.8 °C) (Temporal)   Resp 18   Ht 162.6 cm (64\")   Wt 86.2 kg (190 lb)   SpO2 96%   BMI 32.61 kg/m²     Physical Exam:  General Appearance:    Alert, cooperative, no distress, appears stated age   Head:    Normocephalic, without obvious abnormality, atraumatic   Lungs:     Clear to auscultation bilaterally, respirations unlabored    Heart:   Regular rate and rhythm, S1 and S2 normal, no murmur, rub    or gallop    Abdomen:    Soft, non-tender.  +bowel sounds   Breast Exam:    deferred   Genitalia:    deferred   Extremities:   Extremities normal, atraumatic, no cyanosis or edema   Skin:   Skin color, texture, turgor normal, no rashes or lesions   Neurologic:   Grossly intact   Results Review  LABS:  Lab Results   Component Value Date    WBC 8.08 08/29/2019    HGB 14.6 08/29/2019 "    HCT 48.0 (H) 08/29/2019    MCV 95.4 08/29/2019     08/29/2019    NEUTROABS 4.96 08/29/2019    GLUCOSE 213 (H) 08/29/2019    BUN 13 08/29/2019    CREATININE 0.96 08/29/2019    EGFRIFNONA 57 (L) 08/29/2019     08/29/2019    K 4.1 08/29/2019     08/29/2019    CO2 26.0 08/29/2019    MG 1.8 09/11/2018    PHOS 2.8 09/12/2018    CALCIUM 9.4 08/29/2019    ALBUMIN 4.40 08/29/2019    AST 14 08/29/2019    ALT 8 08/29/2019    BILITOT 0.5 08/29/2019       RADIOLOGY:  Imaging Results (last 72 hours)     ** No results found for the last 72 hours. **          I reviewed the patient's new clinical results.    Cancer Staging (if applicable)  Cancer Patient: __ yes _x_no __unknown; If yes, clinical stage T:__ N:__M:__, stage group or __N/A    Impression: Right hip osteoarthritis    Plan: Right anterior total hip arthroplasty    Veena Lam, APRN   9/9/2019   10:55 AM

## 2019-09-09 NOTE — PLAN OF CARE
Problem: Patient Care Overview  Goal: Plan of Care Review  Outcome: Ongoing (interventions implemented as appropriate)   09/09/19 9162   Plan of Care Review   Progress improving   OTHER   Outcome Summary Patient able to take steps from bed to chair, limited by dizziness, near syncopal event. Recommend rehab at d/c based on current functional status and limited to no support at home. Will continue to progress as able. PADD score of 4.    Coping/Psychosocial   Plan of Care Reviewed With patient;friend

## 2019-09-09 NOTE — H&P
Patient Name: Akua Torres  MRN: [id number]  : 1945  DOS: 2019    Attending: Darrin New MD    Primary Care Provider: Salty Hernandez MD      Chief complaint:  Right hip pain    Subjective   Patient is a 74 y.o. female presented for scheduled surgery by Dr. New. She anticipates a right total hip replacement today. Her hip has been painful for about 2 years. She uses a walker for ambulation. She denies recent falls.    When seen preop she is doing well. Her pain is well controlled. She denies nausea, shortness of breath or chest pain. No hx of DVT or PE.    She is known to us from lumbar fusion in 2017; which she recovered well.  She has history of afib and is followed by cardiology, Dr. Pearl.     ( above is noted/ agree. She underwent right NICA by  under GA, tolerated surgery well and was admitted for further management.    Seen in her room afterwards. Doing well. Sleepy. No c/o f/c/n/vom/sob. Took only steps from bed to chair with PT due to dizziness, near syncope. )wy    Allergies:  Allergies   Allergen Reactions   • Nsaids Other (See Comments)     KIDNEY DAMAGE   • Quinidine Nausea And Vomiting and Other (See Comments)     FEVER     • Nitrofuran Derivatives Diarrhea   • Bactrim [Sulfamethoxazole-Trimethoprim] Rash   • Penicillins Rash       Meds:  Medications Prior to Admission   Medication Sig Dispense Refill Last Dose   • acetaminophen (TYLENOL) 325 MG tablet Take 650 mg by mouth Every 6 (Six) Hours As Needed for Mild Pain .   2019 at Unknown time   • atenolol (TENORMIN) 25 MG tablet Take 1 tablet by mouth 2 (Two) Times a Day. Take 25 mg by mouth 2 (Two) Times a Day. 60 tablet 5 2019 at 0800   • escitalopram (LEXAPRO) 20 MG tablet Take 1 tablet by mouth Daily. 30 tablet 3 2019 at 0800   • hydrOXYzine (ATARAX) 25 MG tablet Take 25 mg by mouth 2 (Two) Times a Day As Needed for Itching.   2019 at 0800   • lisinopril (PRINIVIL,ZESTRIL) 20 MG  tablet Take 1 tablet by mouth Daily. Take 20 mg by mouth Daily. 30 tablet 5 9/8/2019 at Unknown time   • mirtazapine (REMERON) 15 MG tablet Take 15 mg by mouth Every Night. TK 1/2 T PO HS FOR 1 WK THEN TK 1 T PO HS  0 9/7/2019 at 2100       History:   Past Medical History:   Diagnosis Date   • A-fib (CMS/[facility])    • Anxiety    • Anxiety and depression    • Arthritis    • Cataract    • Depression    • Elevated serum creatinine     SEES DR SMITH EVERY 6 MONTHS- NEPHROLOGIST    • Extremity pain    • GERD (gastroesophageal reflux disease)    • Gout    • H/O bladder infections     JUST FINISHED MACROBID ON 11-2-17 FOR RECENT UTI   • Hypercholesteremia    • Hypertension    • Joint pain    • Kidney disease    • Kidney disease, chronic, stage III (GFR 30-59 ml/min) (CMS/[facility])    • Low back pain    • Neck pain    • Rheumatic fever    • Sleep apnea    • Urinary tract infection    • Wears glasses      Past Surgical History:   Procedure Laterality Date   • BACK SURGERY  2004    LUMBAR PER DR THORNTON    • CARPAL TUNNEL RELEASE Left    • COLONOSCOPY     • EYE SURGERY     • FINGER SURGERY Right     3RD FINGER   • HEEL SPUR EXCISION Bilateral    • KNEE ARTHROSCOPY Bilateral    • LUMBAR DISCECTOMY FUSION INSTRUMENTATION N/A 11/10/2017    Procedure: LUMBAR SPINAL FUSION ;  Surgeon: Gopi Thornton MD;  Location: [facility];  Service:    • REPLACEMENT TOTAL KNEE BILATERAL Bilateral      Family History   Problem Relation Age of Onset   • Cancer Mother    • Colon polyps Mother    • Hypertension Mother    • Cancer Father    • Hypertension Brother    • Hyperlipidemia Maternal Uncle    • Kidney disease Maternal Uncle      Social History     Tobacco Use   • Smoking status: Never Smoker   • Smokeless tobacco: Never Used   Substance Use Topics   • Alcohol use: No   • Drug use: No   She is  with 2 children. She is a retired .    Review of Systems  Pertinent items are noted in HPI, all other systems reviewed and negative    Vital  "Signs  Visit Vitals  /81 (BP Location: Right arm, Patient Position: Lying)   Pulse 67   Temp 98.2 °F (36.8 °C) (Temporal)   Resp 18   Ht 162.6 cm (64\")   Wt 86.2 kg (190 lb)   SpO2 96%   BMI 32.61 kg/m²       Physical Exam:    General Appearance:    Alert, cooperative, in no acute distress   Head:    Normocephalic, without obvious abnormality, atraumatic   Eyes:            Lids and lashes normal, conjunctivae and sclerae normal, no   icterus, no pallor, corneas clear   Ears:    Ears appear intact with no abnormalities noted   Neck:   No adenopathy, supple, trachea midline, no thyromegaly   Lungs:     Clear to auscultation,respirations regular, even and                   unlabored    Heart:    Regular rhythm and normal rate, normal S1 and S2, no            murmur, no gallop   Abdomen:     Normal bowel sounds, no masses, no organomegaly, soft        non-tender, non-distended, no guarding, no rebound                 tenderness   Genitalia:    Deferred   Extremities:   no edema, no cyanosis, no              Redness  ( postop: right hip dressing CDI)wy   Pulses:   Pulses palpable and equal bilaterally   Skin:   No bleeding, bruising or rash   Neurologic:   Cranial nerves 2 - 12 grossly intact, sensation intact      I reviewed the patient's new clinical results.     Results for ERYN STEVE (MRN [id number]) as of 9/9/2019 12:41   Ref. Range 8/29/2019 10:58   Glucose Latest Ref Range: 65 - 99 mg/dL 213 (H)   Sodium Latest Ref Range: 136 - 145 mmol/L 144   Potassium Latest Ref Range: 3.5 - 5.2 mmol/L 4.1   CO2 Latest Ref Range: 22.0 - 29.0 mmol/L 26.0   Chloride Latest Ref Range: 98 - 107 mmol/L 105   Anion Gap Latest Ref Range: 5.0 - 15.0 mmol/L 13.0   Creatinine Latest Ref Range: 0.57 - 1.00 mg/dL 0.96   BUN Latest Ref Range: 8 - 23 mg/dL 13   BUN/Creatinine Ratio Latest Ref Range: 7.0 - 25.0  13.5   Calcium Latest Ref Range: 8.6 - 10.5 mg/dL 9.4   eGFR Non  Am Latest Ref Range: >60 mL/min/1.73 57 (L) "   Alkaline Phosphatase Latest Ref Range: 39 - 117 U/L 112   Total Protein Latest Ref Range: 6.0 - 8.5 g/dL 6.5   ALT (SGPT) Latest Ref Range: 1 - 33 U/L 8   AST (SGOT) Latest Ref Range: 1 - 32 U/L 14   Total Bilirubin Latest Ref Range: 0.2 - 1.2 mg/dL 0.5   Albumin Latest Ref Range: 3.50 - 5.20 g/dL 4.40   Globulin Latest Units: gm/dL 2.1   A/G Ratio Latest Units: g/dL 2.1   Hemoglobin A1C Latest Ref Range: 4.80 - 5.60 % 5.60   C-Reactive Protein Latest Ref Range: 0.00 - 0.50 mg/dL 0.55 (H)   WBC Latest Ref Range: 3.40 - 10.80 10*3/mm3 8.08   RBC Latest Ref Range: 3.77 - 5.28 10*6/mm3 5.03   Hemoglobin Latest Ref Range: 12.0 - 15.9 g/dL 14.6   Hematocrit Latest Ref Range: 34.0 - 46.6 % 48.0 (H)   RDW Latest Ref Range: 12.3 - 15.4 % 14.5   MCV Latest Ref Range: 79.0 - 97.0 fL 95.4   MCH Latest Ref Range: 26.6 - 33.0 pg 29.0   MCHC Latest Ref Range: 31.5 - 35.7 g/dL 30.4 (L)   MPV Latest Ref Range: 6.0 - 12.0 fL 9.9   Platelets Latest Ref Range: 140 - 450 10*3/mm3 241     Assessment and Plan:    s/p right NICA, anterior.    Arthritis of right hip    Anxiety and depression    HTN (hypertension)    HO A-fib (CMS/HCC)    YUNIOR treated with BiPAP      Plan  1. PT/OT- early ambulation postop  2. Pain control-prns   3. IS-encourage  4. DVT proph- mechs/ASA  5. Bowel regimen  6. Resume home medications as appropriate  7. Monitor post-op labs  8. Discharge planning ( IPR recommended due to functional status and limited help at discharge at home)wy    HTN, aifb  - Continue home atenolol and lisinopril  - Monitor BP   - Holding parameters for BP meds  - Labetalol PRN for SBP>170    YUNIOR  - bipap at night      WILFREDO Kramer  09/09/19  12:38 PM     I have personally performed the evaluation on this patient. My history is consistent  with HPI obtained. My exam findings are listed above. I have personally reviewed and discussed the above formulated treatment plan with pt and MARIUM VILLALOBOS.wy

## 2019-09-09 NOTE — ANESTHESIA POSTPROCEDURE EVALUATION
Patient: Akua Torres    Procedure Summary     Date:  09/09/19 Room / Location:   [facility] 19 /  [facility]    Anesthesia Start:  1220 Anesthesia Stop:      Procedure:  TOTAL ANTERIOR RIGHT HIP ARTHROPLASTY (Right Hip) Diagnosis:      Surgeon:  Darrin New MD Provider:  Valeriano Foy MD    Anesthesia Type:  spinal ASA Status:  3          Anesthesia Type: spinal  Last vitals  BP   142/77 (09/09/19 1500)   Temp   99.1 °F (37.3 °C) (09/09/19 1500)   Pulse   76 (09/09/19 1500)   Resp   18 (09/09/19 1008)     SpO2   94 % (09/09/19 1500)     Post Anesthesia Care and Evaluation    Patient location during evaluation: PACU  Patient participation: complete - patient cannot participate  Level of consciousness: responsive to physical stimuli  Pain score: 0  Pain management: adequate  Airway patency: patent  Anesthetic complications: No anesthetic complications    Cardiovascular status: stable  Respiratory status: acceptable, nasal cannula, oral airway and spontaneous ventilation  Hydration status: stable    Comments: Pt transferred to PACU with O2. Vital signs stable. Report to PACU RN and care accepted.

## 2019-09-09 NOTE — OP NOTE
TOTAL HIP ARTHROPLASTY ANTERIOR  Progress Note    Akua Torres  9/9/2019    Pre-op Diagnosis:   Osteoarthritis right hip       Post-Op Diagnosis Codes:  Osteoarthritis right hip    Procedure/CPT® Codes:  73025    Procedure(s):  TOTAL ANTERIOR RIGHT HIP ARTHROPLASTY    Surgeon(s):  Darrin New MD    Anesthesia: Spinal    Staff:   Circulator: Aminata Locke RN; Blas Pizarro RN  Radiology Technologist: Salty Leary RT  Scrub Person: Jodie Martin  Vendor Representative: David Uribe  Nursing Assistant: Sunitha Watts; Ramila Ortiz  Assistant: Yue Benedict PA    Estimated Blood Loss: 620 mL    Urine Voided: * No values recorded between 9/9/2019 12:20 PM and 9/9/2019  2:50 PM *    Specimens:                None          Drains:      Findings: Expected with acetabular margin osteophytes and high-grade posterior superior femoral head delamination and eburnation    Complications: None      Implants: [employer] R3 52 acetabular cup; screw x2; N/N polyethylene liner             Polarstem press-fit femur size 2 KA with +0/36 neck/head adapter      Indications:  74-year-old woman with end-stage right hip osteoarthritis symptoms. X-rays show circumferential narrowing both hips with mild acetabular margin osteophytes. Pain is limiting routine daily activities. Risks benefits indications and rationale for total hip arthroplasty discussed at length with patient. She signs her own consent after all questions are answered.      Procedure:  While in the OR spinal anesthetic achieved with satisfactory level. Turned to the supine position and prepped and draped in standard fashion for anterior approach to the left hip on the [place] table. Fluoroscopy used to assess limb lengths. These were restored with final component selection and position. Incision made over the tensor fascia veena and routine dissection carried down to raise the fascia over the medial margin of the tensor muscle belly.  Circumflex vessels were identified and cauterized. Bleeding reasonably controlled with estimated total blood loss 620 mL. Femoral neck isolated. T-shaped capsulotomy created. Mild effusion relieved. Standard neck cut made and head removed without difficulty noting superior cartilage irregularities. Acetabulum was exposed circumferentially. Acetabulum reamed from size 45 to size 51 noting satisfactory exposure of the subchondral bone with the size 51. Size 52 final component was seated with satisfactory press-fit characteristics.  2 additional fixation screws were placed due to uncertain press-fit. Polyethylene insert placed without difficulty. Horizontal tilt was thought to be approximately 35 to 40° from Hilgenreiner's line. Anteversion approximately 20 to 25°. Component was under the anterior inferior acetabular margin. Acetabulum was thoroughly irrigated before during and after component placement.      Attention turned to the proximal femur. Trochanteric hook placed and hip externally rotated eventually to 155 ° after complete posterior lateral releases. Standard piriformis landmarks were used. Broached from size 0/1 to size 2 with good position relative to standard landmarks. Calcar reamer was passed after trials showed satisfactory recovery of limb lengths. Final component seated completely relative to the reaming with excellent rotational stability. Limb lengths and soft tissue tension appeared to be restored best with +0x36 mm head and neck adapter. Final components assembled and reduced. Wound thoroughly irrigated and closed in layers without a drain. Bleeding appeared to be well controlled at closure. Standard Prineo dressing applied. Final counts were correct. Patient stable to recovery having tolerated procedure well throughout.          Darrin New MD     Date: 9/9/2019  Time: 2:53 PM

## 2019-09-09 NOTE — ANESTHESIA PROCEDURE NOTES
Airway  Urgency: elective    Date/Time: 9/9/2019 12:50 PM  Airway not difficult    General Information and Staff    Patient location during procedure: OR  CRNA: Mildred Ryan CRNA    Indications and Patient Condition  Indications for airway management: airway protection    Preoxygenated: yes  MILS maintained throughout  Mask difficulty assessment: 1 - vent by mask    Final Airway Details  Final airway type: supraglottic airway      Successful airway: I-gel  Size 4    Number of attempts at approach: 1    Additional Comments  Pt to OR 19. After multiple attempts at spinal per two CRNAs and dr. Foy. POC changed to GA. Pt supine on stretcher with bilateral arms to side.  ASA monitors applied. Pre-O2 with 100%. SIVI. LMA placed atraumatic. +ETCO2. +BBS. Ortho boots applied and then pt moved to [place] table with OR staff assistance.

## 2019-09-09 NOTE — THERAPY EVALUATION
Patient Name: Akua Torres  : 1945    MRN: [id number]                              Today's Date: 2019       Admit Date: 2019    Visit Dx:     ICD-10-CM ICD-9-CM   1. Impaired functional mobility, balance, gait, and endurance Z74.09 V49.89     Patient Active Problem List   Diagnosis   • Degenerative disc disease, lumbar   • Lumbar stenosis with neurogenic claudication   • History of lumbar fusion   • Spondylosis of lumbar region without myelopathy or radiculopathy   • Mild obesity   • Physical deconditioning   • Idiopathic gout   • Anxiety and depression   • Greater trochanteric bursitis of both hips   • Status post total bilateral knee replacement   • Back pain   • HTN (hypertension)   • HLD (hyperlipidemia)   • Prediabetes   • S/P lumbar spinal fusion   • Renal insufficiency   • Leukocytosis, likely reactive   • Altered mental status. Felt to be drug related (trazodone, opiates, benzodiazepine)   • Acute renal failure due to hypotension. Now resolved (ARF) (CMS/[facility])   • Depression   • Encounter for Medicare annual wellness exam   • Arthritis of right hip   • HO A-fib (CMS/HCC)   • YUNIOR treated with BiPAP     Past Medical History:   Diagnosis Date   • A-fib (CMS/HCC)    • Anxiety    • Anxiety and depression    • Arthritis    • Cataract    • Depression    • Elevated serum creatinine     SEES DR SMITH EVERY 6 MONTHS- NEPHROLOGIST    • Extremity pain    • GERD (gastroesophageal reflux disease)    • Gout    • H/O bladder infections     JUST FINISHED MACROBID ON 17 FOR RECENT UTI   • Hypercholesteremia    • Hypertension    • Joint pain    • Kidney disease    • Kidney disease, chronic, stage III (GFR 30-59 ml/min) (CMS/HCC)    • Low back pain    • Neck pain    • Rheumatic fever    • Sleep apnea    • Urinary tract infection    • Wears glasses      Past Surgical History:   Procedure Laterality Date   • BACK SURGERY  2004    LUMBAR PER DR THORNTON    • CARPAL TUNNEL RELEASE Left    • COLONOSCOPY      • EYE SURGERY     • FINGER SURGERY Right     3RD FINGER   • HEEL SPUR EXCISION Bilateral    • KNEE ARTHROSCOPY Bilateral    • LUMBAR DISCECTOMY FUSION INSTRUMENTATION N/A 11/10/2017    Procedure: LUMBAR SPINAL FUSION ;  Surgeon: Gopi Man MD;  Location: [facility] OR;  Service:    • REPLACEMENT TOTAL KNEE BILATERAL Bilateral      General Information     Row Name 09/09/19 1705          PT Evaluation Time/Intention    Document Type  evaluation  -LR     Mode of Treatment  physical therapy;individual therapy  -LR     Row Name 09/09/19 1705          General Information    Patient Profile Reviewed?  yes  -LR     Prior Level of Function  min assist:;all household mobility;community mobility;gait;transfer;bed mobility;home management;cooking;cleaning;using stairs;shopping;independent:;driving used cane at all times, limited long distances  -LR     Existing Precautions/Restrictions  fall;right;hip, anterior;other (see comments) dizziness decreased safety awareness  -LR     Barriers to Rehab  previous functional deficit  -LR     Row Name 09/09/19 1705          Relationship/Environment    Lives With  spouse patient's spouse is currently sick and unable to care for patient  -LR     Row Name 09/09/19 1705          Resource/Environmental Concerns    Current Living Arrangements  home/apartment/condo  -LR     Row Name 09/09/19 1705          Home Main Entrance    Number of Stairs, Main Entrance  none  -LR     Row Name 09/09/19 1705          Stairs Within Home, Primary    Number of Stairs, Within Home, Primary  none  -LR     Row Name 09/09/19 1705          Cognitive Assessment/Intervention- PT/OT    Orientation Status (Cognition)  oriented x 4;other (see comments)  -LR     Cognitive Assessment/Intervention Comment  lethargic and sleepy d/t anesthesia  -LR     Row Name 09/09/19 1705          Safety Issues, Functional Mobility    Safety Issues Affecting Function (Mobility)  ability to follow commands;at risk behavior  observed;awareness of need for assistance;impulsivity;safety precaution awareness;problem solving;sequencing abilities;positioning of assistive device;safety precautions follow-through/compliance;judgment;insight into deficits/self awareness stood before being cued to do so, difficulty following commands during mobility training.   -LR       User Key  (r) = Recorded By, (t) = Taken By, (c) = Cosigned By    Initials Name Provider Type    Teresa Yang, PT Physical Therapist        Mobility     Row Name 09/09/19 1705          Bed Mobility Assessment/Treatment    Bed Mobility Assessment/Treatment  supine-sit  -LR     Supine-Sit [place] (Bed Mobility)  verbal cues;minimum assist (75% patient effort)  -LR     Assistive Device (Bed Mobility)  head of bed elevated;bed rails  -LR     Comment (Bed Mobility)  Verbal cues to move LEs towards EOB and to push up from bed to raise trunk into sitting. Required increased time to perform. Denied dizziness upon sitting up.   -LR     Row Name 09/09/19 1705          Transfer Assessment/Treatment    Comment (Transfers)  Patient stood up from bed prior to being cued to do so. Donned gait belt in standing and placed RW in front of patient. Patient c/o worsening dizziness and demonstrated inability to  RW on R. Patient assisted to chair.   -LR     Row Name 09/09/19 1705          Bed-Chair Transfer    Bed-Chair [place] (Transfers)  verbal cues;minimum assist (75% patient effort);2 person assist  -LR     Assistive Device (Bed-Chair Transfers)  walker, front-wheeled  -LR     Row Name 09/09/19 1705          Sit-Stand Transfer    Sit-Stand [place] (Transfers)  verbal cues;contact guard;2 person assist  -LR     Assistive Device (Sit-Stand Transfers)  walker, front-wheeled  -LR     Row Name 09/09/19 1705          Gait/Stairs Assessment/Training    [place] Level (Gait)  verbal cues;minimum assist (75% patient effort);2 person assist  -LR     Assistive Device  (Gait)  walker, front-wheeled  -LR     Distance in Feet (Gait)  2  -LR     Pattern (Gait)  step-to  -LR     Deviations/Abnormal Patterns (Gait)  bilateral deviations;nixon decreased;gait speed decreased  -LR     Bilateral Gait Deviations  heel strike decreased;forward flexed posture  -LR     Right Sided Gait Deviations  weight shift ability decreased  -LR     Comment (Gait/Stairs)  Patient took a few steps from bed, unsteady throughout and having difficulty maintaining  on RW with R hand. Patient stepped forward with L LE first and had difficulty advancing R LE forward. Cued for correct sequencing. Patient c/o worsening dizziness and requested to sit down. Assisted to recliner chair. Obtained BP and notified RN.   -LR     Row Name 09/09/19 1705          Mobility Assessment/Intervention    Extremity Weight-bearing Status  right lower extremity  -LR     Right Lower Extremity (Weight-bearing Status)  weight-bearing as tolerated (WBAT)  -LR       User Key  (r) = Recorded By, (t) = Taken By, (c) = Cosigned By    Initials Name Provider Type    Teresa Yang, PT Physical Therapist        Obj/Interventions     Row Name 09/09/19 1710          General ROM    GENERAL ROM COMMENTS  L LE AROM WFL; R hip AROM impaired 25%  -LR     Row Name 09/09/19 1710          MMT (Manual Muscle Testing)    General MMT Comments  L LE WFL; R hip functionally 2/5, unable to perform SLR independently, no knee buckling with gait  -LR     Row Name 09/09/19 1710          Therapeutic Exercise    Lower Extremity (Therapeutic Exercise)  gluteal sets;quad sets, right  -LR     Lower Extremity Range of Motion (Therapeutic Exercise)  ankle dorsiflexion/plantar flexion, right  -LR     Exercise Type (Therapeutic Exercise)  AROM (active range of motion);isotonic contraction, concentric;isometric contraction, static  -LR     Position (Therapeutic Exercise)  supine  -LR     Sets/Reps (Therapeutic Exercise)  x10 reps each  -LR     Comment  (Therapeutic Exercise)  cues for technique; able to actively DF bilaterally  -LR     Row Name 09/09/19 1710          Sensory Assessment/Intervention    Sensory General Assessment  -- denies numbness/tingling; light touch equal and intact  -LR       User Key  (r) = Recorded By, (t) = Taken By, (c) = Cosigned By    Initials Name Provider Type    LR Teresa Blackwell, PT Physical Therapist        Goals/Plan     Row Name 09/09/19 1705          Bed Mobility Goal 1 (PT)    Activity/Assistive Device (Bed Mobility Goal 1, PT)  sit to supine/supine to sit  -LR     [place] Level/Cues Needed (Bed Mobility Goal 1, PT)  conditional independence  -LR     Time Frame (Bed Mobility Goal 1, PT)  long term goal (LTG);3 days  -LR     Progress/Outcomes (Bed Mobility Goal 1, PT)  goal ongoing  -LR     Row Name 09/09/19 1705          Transfer Goal 1 (PT)    Activity/Assistive Device (Transfer Goal 1, PT)  sit-to-stand/stand-to-sit;walker, rolling  -LR     [place] Level/Cues Needed (Transfer Goal 1, PT)  conditional independence  -LR     Time Frame (Transfer Goal 1, PT)  long term goal (LTG);3 days  -LR     Progress/Outcome (Transfer Goal 1, PT)  goal ongoing  -LR     Row Name 09/09/19 1705          Gait Training Goal 1 (PT)    Activity/Assistive Device (Gait Training Goal 1, PT)  gait (walking locomotion);walker, rolling  -LR     [place] Level (Gait Training Goal 1, PT)  conditional independence  -LR     Distance (Gait Goal 1, PT)  150 feet  -LR     Time Frame (Gait Training Goal 1, PT)  long term goal (LTG);3 days  -LR     Progress/Outcome (Gait Training Goal 1, PT)  goal ongoing  -LR       User Key  (r) = Recorded By, (t) = Taken By, (c) = Cosigned By    Initials Name Provider Type    LR Teresa Blackwell, PT Physical Therapist        Clinical Impression     Row Name 09/09/19 1705          Pain Assessment    Additional Documentation  Pain Scale: Numbers Pre/Post-Treatment (Group)  -LR     Row Name 09/09/19 1705           Pain Scale: Numbers Pre/Post-Treatment    Pain Scale: Numbers, Pretreatment  5/10  -LR     Pain Scale: Numbers, Post-Treatment  7/10  -LR     Pain Location - Side  Right  -LR     Pain Location  hip  -LR     Pain Intervention(s)  Ambulation/increased activity;Cold applied;Repositioned  -LR     Row Name 09/09/19 1705          Plan of Care Review    Plan of Care Reviewed With  patient;friend  -LR     Row Name 09/09/19 1705          Physical Therapy Clinical Impression    Patient/Family Goals Statement (PT Clinical Impression)  decrease pain  -LR     Criteria for Skilled Interventions Met (PT Clinical Impression)  yes;treatment indicated  -LR     Rehab Potential (PT Clinical Summary)  good, to achieve stated therapy goals  -LR     Row Name 09/09/19 1705          Positioning and Restraints    Pre-Treatment Position  in bed  -LR     Post Treatment Position  chair  -LR     In Chair  notified nsg;reclined;sitting;call light within reach;encouraged to call for assist;exit alarm on;with family/caregiver;compression device;legs elevated  -LR       User Key  (r) = Recorded By, (t) = Taken By, (c) = Cosigned By    Initials Name Provider Type    LR Teresa Blackwell, PT Physical Therapist        Outcome Measures     Row Name 09/09/19 1705          How much help from another person do you currently need...    Turning from your back to your side while in flat bed without using bedrails?  3  -LR     Moving from lying on back to sitting on the side of a flat bed without bedrails?  3  -LR     Moving to and from a bed to a chair (including a wheelchair)?  2  -LR     Standing up from a chair using your arms (e.g., wheelchair, bedside chair)?  2  -LR     Climbing 3-5 steps with a railing?  1  -LR     To walk in hospital room?  2  -LR     AM-PAC 6 Clicks Score (PT)  13  -LR     Row Name 09/09/19 1705          Functional Assessment    Outcome Measure Options  AM-PAC 6 Clicks Basic Mobility (PT)  -LR       User Key  (r) =  Recorded By, (t) = Taken By, (c) = Cosigned By    Initials Name Provider Type    Teresa Yang, PT Physical Therapist        Physical Therapy Education     Title: PT OT SLP Therapies (Done)     Topic: Physical Therapy (Done)     Point: Mobility training (Done)     Learning Progress Summary           Patient Acceptance, E,D, VU,NR by LR at 9/9/2019  5:05 PM    Comment:  Educated on anterior hip precautions, weight bearing status, correct supine to sit t/f technique, correct sit<->stand t/f technique, correct gait mechanics, and progression of POC.   Family Acceptance, E,D, VU,NR by LR at 9/9/2019  5:05 PM    Comment:  Educated on anterior hip precautions, weight bearing status, correct supine to sit t/f technique, correct sit<->stand t/f technique, correct gait mechanics, and progression of POC.                   Point: Home exercise program (Done)     Learning Progress Summary           Patient Acceptance, E,D, VU,NR by LR at 9/9/2019  5:05 PM    Comment:  Educated on anterior hip precautions, weight bearing status, correct supine to sit t/f technique, correct sit<->stand t/f technique, correct gait mechanics, and progression of POC.   Family Acceptance, E,D, VU,NR by LR at 9/9/2019  5:05 PM    Comment:  Educated on anterior hip precautions, weight bearing status, correct supine to sit t/f technique, correct sit<->stand t/f technique, correct gait mechanics, and progression of POC.                   Point: Body mechanics (Done)     Learning Progress Summary           Patient Acceptance, E,D, VU,NR by LR at 9/9/2019  5:05 PM    Comment:  Educated on anterior hip precautions, weight bearing status, correct supine to sit t/f technique, correct sit<->stand t/f technique, correct gait mechanics, and progression of POC.   Family Acceptance, E,D, VU,NR by LR at 9/9/2019  5:05 PM    Comment:  Educated on anterior hip precautions, weight bearing status, correct supine to sit t/f technique, correct sit<->stand t/f  technique, correct gait mechanics, and progression of POC.                   Point: Precautions (Done)     Learning Progress Summary           Patient Acceptance, E,D, VU,NR by LR at 9/9/2019  5:05 PM    Comment:  Educated on anterior hip precautions, weight bearing status, correct supine to sit t/f technique, correct sit<->stand t/f technique, correct gait mechanics, and progression of POC.   Family Acceptance, E,D, VU,NR by LR at 9/9/2019  5:05 PM    Comment:  Educated on anterior hip precautions, weight bearing status, correct supine to sit t/f technique, correct sit<->stand t/f technique, correct gait mechanics, and progression of POC.                               User Key     Initials Effective Dates Name Provider Type Discipline    LR 06/19/15 -  Teresa Blackwell, PT Physical Therapist PT              PT Recommendation and Plan  Planned Therapy Interventions (PT Eval): balance training, bed mobility training, gait training, home exercise program, patient/family education, ROM (range of motion), stair training, strengthening, transfer training  Outcome Summary/Treatment Plan (PT)  Anticipated Equipment Needs at Discharge (PT): (none)  Anticipated Discharge Disposition (PT): skilled nursing facility  Plan of Care Reviewed With: patient, friend  Progress: improving  Outcome Summary: Patient able to take steps from bed to chair, limited by dizziness, near syncopal event. Recommend rehab at d/c based on current functional status and limited to no support at home. Will continue to progress as able. PADD score of 4.      Time Calculation:   PT Charges     Row Name 09/09/19 1705             Time Calculation    Start Time  1705  -LR      PT Received On  09/09/19  -LR      PT Goal Re-Cert Due Date  09/19/19  -LR         Time Calculation- PT    Total Timed Code Minutes- PT  8 minute(s)  -LR         Timed Charges    19094 - PT Therapeutic Exercise Minutes  2  -LR      45846 - PT Therapeutic Activity Minutes  6  -LR         User Key  (r) = Recorded By, (t) = Taken By, (c) = Cosigned By    Initials Name Provider Type    LR Teresa Blackwell, PT Physical Therapist        Therapy Charges for Today     Code Description Service Date Service Provider Modifiers Qty    [id number]  PT THERAPEUTIC ACT EA 15 MIN 9/9/2019 Teresa Blackwell, PT GP 1    [id number]  PT THER SUPP EA 15 MIN 9/9/2019 Teresa Blackwell, PT GP 2    [id number]  PT EVAL MOD COMPLEXITY 3 9/9/2019 Teresa Blackwell, PT GP 1          PT G-Codes  Outcome Measure Options: AM-PAC 6 Clicks Basic Mobility (PT)  AM-PAC 6 Clicks Score (PT): 13    Teresa Blackwell, PT  9/9/2019

## 2019-09-10 ENCOUNTER — APPOINTMENT (OUTPATIENT)
Dept: CT IMAGING | Facility: HOSPITAL | Age: 74
End: 2019-09-10

## 2019-09-10 ENCOUNTER — APPOINTMENT (OUTPATIENT)
Dept: MRI IMAGING | Facility: HOSPITAL | Age: 74
End: 2019-09-10

## 2019-09-10 PROBLEM — Z96.641 STATUS POST TOTAL REPLACEMENT OF RIGHT HIP: Status: ACTIVE | Noted: 2019-09-10

## 2019-09-10 PROBLEM — D62 ACUTE BLOOD LOSS ANEMIA: Status: ACTIVE | Noted: 2019-09-10

## 2019-09-10 LAB
ANION GAP SERPL CALCULATED.3IONS-SCNC: 11 MMOL/L (ref 5–15)
BASOPHILS # BLD AUTO: 0.01 10*3/MM3 (ref 0–0.2)
BASOPHILS NFR BLD AUTO: 0.1 % (ref 0–1.5)
BUN BLD-MCNC: 29 MG/DL (ref 8–23)
BUN/CREAT SERPL: 19.7 (ref 7–25)
CALCIUM SPEC-SCNC: 7.6 MG/DL (ref 8.6–10.5)
CHLORIDE SERPL-SCNC: 103 MMOL/L (ref 98–107)
CO2 SERPL-SCNC: 27 MMOL/L (ref 22–29)
CREAT BLD-MCNC: 1.47 MG/DL (ref 0.57–1)
DEPRECATED RDW RBC AUTO: 52.3 FL (ref 37–54)
EOSINOPHIL # BLD AUTO: 0.01 10*3/MM3 (ref 0–0.4)
EOSINOPHIL NFR BLD AUTO: 0.1 % (ref 0.3–6.2)
ERYTHROCYTE [DISTWIDTH] IN BLOOD BY AUTOMATED COUNT: 15 % (ref 12.3–15.4)
GFR SERPL CREATININE-BSD FRML MDRD: 35 ML/MIN/1.73
GLUCOSE BLD-MCNC: 158 MG/DL (ref 65–99)
HCT VFR BLD AUTO: 33.9 % (ref 34–46.6)
HGB BLD-MCNC: 10.2 G/DL (ref 12–15.9)
IMM GRANULOCYTES # BLD AUTO: 0.07 10*3/MM3 (ref 0–0.05)
IMM GRANULOCYTES NFR BLD AUTO: 0.6 % (ref 0–0.5)
LYMPHOCYTES # BLD AUTO: 1.63 10*3/MM3 (ref 0.7–3.1)
LYMPHOCYTES NFR BLD AUTO: 13.4 % (ref 19.6–45.3)
MCH RBC QN AUTO: 28.6 PG (ref 26.6–33)
MCHC RBC AUTO-ENTMCNC: 30.1 G/DL (ref 31.5–35.7)
MCV RBC AUTO: 95 FL (ref 79–97)
MONOCYTES # BLD AUTO: 1.13 10*3/MM3 (ref 0.1–0.9)
MONOCYTES NFR BLD AUTO: 9.3 % (ref 5–12)
NEUTROPHILS # BLD AUTO: 9.35 10*3/MM3 (ref 1.7–7)
NEUTROPHILS NFR BLD AUTO: 76.5 % (ref 42.7–76)
NRBC BLD AUTO-RTO: 0 /100 WBC (ref 0–0.2)
PLATELET # BLD AUTO: 172 10*3/MM3 (ref 140–450)
PMV BLD AUTO: 9.9 FL (ref 6–12)
POTASSIUM BLD-SCNC: 5 MMOL/L (ref 3.5–5.2)
RBC # BLD AUTO: 3.57 10*6/MM3 (ref 3.77–5.28)
SODIUM BLD-SCNC: 141 MMOL/L (ref 136–145)
WBC NRBC COR # BLD: 12.2 10*3/MM3 (ref 3.4–10.8)

## 2019-09-10 PROCEDURE — 97530 THERAPEUTIC ACTIVITIES: CPT | Performed by: OCCUPATIONAL THERAPIST

## 2019-09-10 PROCEDURE — 85025 COMPLETE CBC W/AUTO DIFF WBC: CPT | Performed by: ORTHOPAEDIC SURGERY

## 2019-09-10 PROCEDURE — 99223 1ST HOSP IP/OBS HIGH 75: CPT | Performed by: PSYCHIATRY & NEUROLOGY

## 2019-09-10 PROCEDURE — 70450 CT HEAD/BRAIN W/O DYE: CPT

## 2019-09-10 PROCEDURE — 97110 THERAPEUTIC EXERCISES: CPT

## 2019-09-10 PROCEDURE — 70551 MRI BRAIN STEM W/O DYE: CPT

## 2019-09-10 PROCEDURE — 97116 GAIT TRAINING THERAPY: CPT

## 2019-09-10 PROCEDURE — 25010000003 CEFAZOLIN IN DEXTROSE 2-4 GM/100ML-% SOLUTION: Performed by: ORTHOPAEDIC SURGERY

## 2019-09-10 PROCEDURE — 97166 OT EVAL MOD COMPLEX 45 MIN: CPT | Performed by: OCCUPATIONAL THERAPIST

## 2019-09-10 PROCEDURE — 80048 BASIC METABOLIC PNL TOTAL CA: CPT | Performed by: NURSE PRACTITIONER

## 2019-09-10 PROCEDURE — 25010000002 HYDROMORPHONE PER 4 MG: Performed by: ORTHOPAEDIC SURGERY

## 2019-09-10 RX ORDER — HYDROCODONE BITARTRATE AND ACETAMINOPHEN 5; 325 MG/1; MG/1
1 TABLET ORAL ONCE AS NEEDED
Status: COMPLETED | OUTPATIENT
Start: 2019-09-10 | End: 2019-09-10

## 2019-09-10 RX ORDER — SODIUM CHLORIDE 9 MG/ML
100 INJECTION, SOLUTION INTRAVENOUS CONTINUOUS
Status: DISCONTINUED | OUTPATIENT
Start: 2019-09-10 | End: 2019-09-18 | Stop reason: HOSPADM

## 2019-09-10 RX ADMIN — MIRTAZAPINE 15 MG: 15 TABLET, FILM COATED ORAL at 20:26

## 2019-09-10 RX ADMIN — DOCUSATE SODIUM 100 MG: 100 CAPSULE, LIQUID FILLED ORAL at 09:25

## 2019-09-10 RX ADMIN — HYDROCODONE BITARTRATE AND ACETAMINOPHEN 1 TABLET: 5; 325 TABLET ORAL at 12:56

## 2019-09-10 RX ADMIN — SODIUM CHLORIDE 150 ML/HR: 9 INJECTION, SOLUTION INTRAVENOUS at 00:22

## 2019-09-10 RX ADMIN — HYDROCODONE BITARTRATE AND ACETAMINOPHEN 1 TABLET: 5; 325 TABLET ORAL at 09:24

## 2019-09-10 RX ADMIN — DOCUSATE SODIUM 100 MG: 100 CAPSULE, LIQUID FILLED ORAL at 20:27

## 2019-09-10 RX ADMIN — ATENOLOL 25 MG: 25 TABLET ORAL at 20:27

## 2019-09-10 RX ADMIN — SODIUM CHLORIDE 150 ML/HR: 9 INJECTION, SOLUTION INTRAVENOUS at 08:06

## 2019-09-10 RX ADMIN — TRAMADOL HYDROCHLORIDE 50 MG: 50 TABLET, FILM COATED ORAL at 04:13

## 2019-09-10 RX ADMIN — HYDROCODONE BITARTRATE AND ACETAMINOPHEN 1 TABLET: 5; 325 TABLET ORAL at 18:05

## 2019-09-10 RX ADMIN — ESCITALOPRAM OXALATE 20 MG: 20 TABLET ORAL at 09:24

## 2019-09-10 RX ADMIN — HYDROXYZINE HYDROCHLORIDE 25 MG: 25 TABLET, FILM COATED ORAL at 10:18

## 2019-09-10 RX ADMIN — ATENOLOL 25 MG: 25 TABLET ORAL at 09:26

## 2019-09-10 RX ADMIN — CEFAZOLIN SODIUM 2 G: 2 INJECTION, SOLUTION INTRAVENOUS at 04:13

## 2019-09-10 RX ADMIN — SODIUM CHLORIDE 100 ML/HR: 9 INJECTION, SOLUTION INTRAVENOUS at 12:03

## 2019-09-10 RX ADMIN — HYDROMORPHONE HYDROCHLORIDE 0.5 MG: 1 INJECTION, SOLUTION INTRAMUSCULAR; INTRAVENOUS; SUBCUTANEOUS at 20:26

## 2019-09-10 RX ADMIN — SODIUM CHLORIDE 100 ML/HR: 9 INJECTION, SOLUTION INTRAVENOUS at 20:36

## 2019-09-10 RX ADMIN — HYDROCODONE BITARTRATE AND ACETAMINOPHEN 1 TABLET: 5; 325 TABLET ORAL at 20:26

## 2019-09-10 NOTE — PLAN OF CARE
Problem: Patient Care Overview  Goal: Plan of Care Review  Outcome: Ongoing (interventions implemented as appropriate)   09/10/19 1507   Plan of Care Review   Progress declining   OTHER   Outcome Summary Pt only able to ambulate 2 feet with MaxAx2 and platform RW due to worsening weakness and knee buckling. Pt required more assist with all functional mobility and ther ex. RN aware and neuro following. Will continue to progress mobility as able.    Coping/Psychosocial   Plan of Care Reviewed With patient       Problem: Hip Arthroplasty (Total, Partial) (Adult)  Goal: Signs and Symptoms of Listed Potential Problems Will be Absent, Minimized or Managed (Hip Arthroplasty)  Outcome: Ongoing (interventions implemented as appropriate)   09/10/19 1507   Goal/Outcome Evaluation   Problems Assessed (Hip Arthroplasty) functional deficit;pain   Problems Present (Hip Arthroplasty) functional deficit;pain

## 2019-09-10 NOTE — PROGRESS NOTES
Discharge Planning Assessment  [facility]     Patient Name: Akua Torres  MRN: [id number]  Today's Date: 9/10/2019    Admit Date: 9/9/2019    Discharge Needs Assessment     Row Name 09/10/19 0941       Living Environment    Lives With  spouse    Name(s) of Who Lives With Patient  Mehran Torres- spouse    Current Living Arrangements  home/apartment/condo    Primary Care Provided by  self    Provides Primary Care For  no one    Family Caregiver if Needed  spouse    Family Caregiver Names  Mehran- spouse    Quality of Family Relationships  unable to assess       Resource/Environmental Concerns    Resource/Environmental Concerns  none    Transportation Concerns  car, none       Transition Planning    Patient/Family Anticipates Transition to  inpatient rehabilitation facility    Patient/Family Anticipated Services at Transition  ;rehabilitation services    Transportation Anticipated  health plan transportation       Discharge Needs Assessment    Concerns to be Addressed  discharge planning    Equipment Currently Used at Home  commode;shower chair;walker, rolling;wheelchair    Anticipated Changes Related to Illness  inability to care for self    Equipment Needed After Discharge  none    Outpatient/Agency/Support Group Needs  inpatient rehabilitation facility    Discharge Facility/Level of Care Needs  nursing facility, skilled        Discharge Plan     Row Name 09/10/19 0944       Plan    Plan  SNF/STR- [place]    Patient/Family in Agreement with Plan  yes    Plan Comments  Spoke with pt. at bedside. Pt. plans to dc to STR facility. Lives in [place] with spouse who is ill amd unable to help care for her. She states she has RW, BSC and WC from previous surgeries. Denies need for additional DME. Have sent referral to [facility] and they have tentatively accepted pending insurance approval. Will cont. to monitor and update.     Final Discharge Disposition Code  03 - skilled nursing facility (SNF)         Destination      No service coordination in this encounter.      Durable Medical Equipment      No service coordination in this encounter.      Dialysis/Infusion      No service coordination in this encounter.      Home Medical Care      No service coordination in this encounter.      Therapy      No service coordination in this encounter.      Community Resources      No service coordination in this encounter.        Expected Discharge Date and Time     Expected Discharge Date Expected Discharge Time    Sep 12, 2019         Demographic Summary    No documentation.       Functional Status     Row Name 09/10/19 0940       Functional Status    Usual Activity Tolerance  moderate       Functional Status, IADL    Medications  independent    Meal Preparation  independent    Housekeeping  independent    Laundry  independent    Shopping  independent        Psychosocial    No documentation.       Abuse/Neglect    No documentation.       Legal    No documentation.       Substance Abuse    No documentation.       Patient Forms    No documentation.           Nikole Marino, RN

## 2019-09-10 NOTE — PLAN OF CARE
Problem: Patient Care Overview  Goal: Plan of Care Review  Outcome: Ongoing (interventions implemented as appropriate)   09/09/19 1955   Plan of Care Review   Progress improving   OTHER   Outcome Summary Patient arrived to the unit at aprox 1635 . Asleep for the first hour and drowsy. Prineo dressing CDI. Numbness present on RLE. Attempted to ambulate with PT however pt was still drowsy and was experiencing BUE weakness. Up to chair ambulated to bathroom.  vitals stable lisinopril held. Will continue to monitor    Coping/Psychosocial   Plan of Care Reviewed With patient       Problem: Hip Arthroplasty (Total, Partial) (Adult)  Goal: Signs and Symptoms of Listed Potential Problems Will be Absent, Minimized or Managed (Hip Arthroplasty)  Outcome: Ongoing (interventions implemented as appropriate)   09/09/19 1955   Goal/Outcome Evaluation   Problems Assessed (Hip Arthroplasty) all   Problems Present (Hip Arthroplasty) pain;peripheral nerve impairment;functional deficit     Goal: Anesthesia/Sedation Recovery   09/09/19 1955   Goal/Outcome Evaluation   Anesthesia/Sedation Recovery progressing toward baseline

## 2019-09-10 NOTE — PLAN OF CARE
"Problem: Patient Care Overview  Goal: Plan of Care Review   09/09/19 5870   Plan of Care Review   Progress no change   OTHER   Outcome Summary RUE weakness and numbness, Dr MARY contacted, stated to \"give AM aspirin (325mg) now, monitor, may do a CT scan in AM, if it hasn't resolved.\" Pt has allergy to NSAIDs, states she's not supposed to takes Nsaids due to their risk for kidney damage, per her doctor. Informed patient that her pre-op BUN/Creatinine were WNL. Pt then stated, \"well I guess a few aspirin after surgery won't hurt.\" Aspirin administered. AM creatinine increased to 1.47 BUN 29. IVF NS at 150ml continued. VSS, SBP low 100's HS atentolol held, 2L while sleeping. Pt able to ambulate a few steps in room, distance limited by fatigue and pain. PRN tramadol and ice packs for pain, pt has a history of AMS with opiate use. Dr New and Dr MARY following. PT working with patient. CM consulted, pt concerned  is elderly and unable to help her physically.   Coping/Psychosocial   Plan of Care Reviewed With patient         "

## 2019-09-10 NOTE — CONSULTS
Referring Provider: Dr. Galo  Reason for Consultation: Postoperative right hand numbness and weakness    Patient Care Team:  Salty Hernandez MD as PCP - General (Family Medicine)  Leon Hein MD as Consulting Physician (Neurosurgery)  Perkins, Corinne E, PT as Physical Therapist (Physical Therapy)  Cricket Nettles MD as Consulting Physician (Pain Medicine)    Chief complaint postoperative right hand numbness and weakness    Subjective .     History of present illness: 74-yo RH woman with PMH notable for atrial fibrillation, anxiety and depression, HTN, HLD, YUNIOR on BiPAP, chronic kidney disease and rheumatic fever admitted on 9/9/2019 for elective right anterior total hip arthroplasty due to underlying osteoarthritis and hip pain for about 2 years, having failed conservative management.  She has had lumbar fusion in November 2017, and numerous other surgeries.  She underwent the procedure yesterday without immediate complications.  This morning she reported numbness and lack of  in her right hand although she is able to move her thumb and index finger slightly.  She reports she noticed this yesterday sometime after the surgery, and has noted some improvement in that initially she could not move any of her fingers.  She notes no new numbness at all.  She acknowledges some weakness of the proximal arm as well but can at least move it.  She has not noticed asymmetry of foot movement.  She has been very hesitant to move her right proximal lower extremity at all as it is exquisitely painful with any movement.  She notes that she has had pain in her shoulders for a few months which she has attributed to a great deal of pushing and pulling and lifting herself with her arms.  She denies any vision loss, monocular or in the visual fields or any visual disturbance at all.  She has not had any jaw claudication.  She notes no change in her usual shoulder pain today, except that perhaps the  left hurts more than the right, and notes no new pain in her right upper extremity.  She has no headache and has noticed no difficulty with speech or swallowing.  She denies previous history of stroke or right arm weakness.  She has had no fever or chills, no nausea or vomiting or abdominal pain no shortness of breath or chest pain or palpitations.  Currently on aspirin.    Review of Systems  Pertinent items are noted in HPI, all other systems reviewed and negative     History  Past Medical History:   Diagnosis Date   • A-fib (CMS/[facility])    • Anxiety    • Anxiety and depression    • Arthritis    • Cataract    • Depression    • Elevated serum creatinine     SEES DR SMITH EVERY 6 MONTHS- NEPHROLOGIST    • Extremity pain    • GERD (gastroesophageal reflux disease)    • Gout    • H/O bladder infections     JUST FINISHED MACROBID ON 11-2-17 FOR RECENT UTI   • Hypercholesteremia    • Hypertension    • Joint pain    • Kidney disease    • Kidney disease, chronic, stage III (GFR 30-59 ml/min) (CMS/[facility])    • Low back pain    • Neck pain    • Rheumatic fever    • Sleep apnea    • Urinary tract infection    • Wears glasses    ,   Past Surgical History:   Procedure Laterality Date   • BACK SURGERY  2004    LUMBAR PER DR THORNTON    • CARPAL TUNNEL RELEASE Left    • COLONOSCOPY     • EYE SURGERY     • FINGER SURGERY Right     3RD FINGER   • HEEL SPUR EXCISION Bilateral    • KNEE ARTHROSCOPY Bilateral    • LUMBAR DISCECTOMY FUSION INSTRUMENTATION N/A 11/10/2017    Procedure: LUMBAR SPINAL FUSION ;  Surgeon: Gopi Thornton MD;  Location:  [facility];  Service:    • REPLACEMENT TOTAL KNEE BILATERAL Bilateral    • TOTAL HIP ARTHROPLASTY Right 9/9/2019    Procedure: TOTAL ANTERIOR RIGHT HIP ARTHROPLASTY;  Surgeon: Darrin New MD;  Location:  [facility];  Service: Orthopedics   ,   Family History   Problem Relation Age of Onset   • Cancer Mother    • Colon polyps Mother    • Hypertension Mother    • Cancer Father    • Hypertension  "Brother    • Hyperlipidemia Maternal Uncle    • Kidney disease Maternal Uncle    ,   Social History     Tobacco Use   • Smoking status: Never Smoker   • Smokeless tobacco: Never Used   Substance Use Topics   • Alcohol use: No   • Drug use: No   ,   Medications Prior to Admission   Medication Sig Dispense Refill Last Dose   • acetaminophen (TYLENOL) 325 MG tablet Take 650 mg by mouth Every 6 (Six) Hours As Needed for Mild Pain .   9/8/2019 at Unknown time   • atenolol (TENORMIN) 25 MG tablet Take 1 tablet by mouth 2 (Two) Times a Day. Take 25 mg by mouth 2 (Two) Times a Day. 60 tablet 5 9/9/2019 at 0800   • escitalopram (LEXAPRO) 20 MG tablet Take 1 tablet by mouth Daily. 30 tablet 3 9/9/2019 at 0800   • hydrOXYzine (ATARAX) 25 MG tablet Take 25 mg by mouth 2 (Two) Times a Day As Needed for Itching.   9/9/2019 at 0800   • lisinopril (PRINIVIL,ZESTRIL) 20 MG tablet Take 1 tablet by mouth Daily. Take 20 mg by mouth Daily. 30 tablet 5 9/8/2019 at Unknown time   • mirtazapine (REMERON) 15 MG tablet Take 15 mg by mouth Every Night. TK 1/2 T PO HS FOR 1 WK THEN TK 1 T PO HS  0 9/7/2019 at 2100   , Scheduled Meds:    aspirin 325 mg Oral Daily   atenolol 25 mg Oral BID   docusate sodium 100 mg Oral BID   escitalopram 20 mg Oral Daily   mirtazapine 15 mg Oral Nightly   , Continuous Infusions:    sodium chloride 100 mL/hr Last Rate: 100 mL/hr (09/10/19 1203)   , PRN Meds:  bisacodyl  •  bisacodyl  •  HYDROcodone-acetaminophen  •  HYDROmorphone **AND** naloxone  •  hydrOXYzine  •  labetalol  •  magnesium hydroxide  •  ondansetron  •  promethazine  •  sodium chloride  •  traMADol and Allergies:  Nsaids; Quinidine; Nitrofuran derivatives; Bactrim [sulfamethoxazole-trimethoprim]; and Penicillins    Objective     Vital Signs   Blood pressure 123/52, pulse 84, temperature 98.7 °F (37.1 °C), temperature source Oral, resp. rate 16, height 162.6 cm (64\"), weight 86.2 kg (190 lb), SpO2 96 %, not currently breastfeeding.    Physical " Exam:   Well-developed elderly white woman in no acute distress, alert and fully oriented, pleasant and cooperative with exam.  She has normal language, attention, memory and fund of knowledge and no dysarthria.  Pupils 2.5 mm and reactive, visual fields full to confrontation, extraocular movements intact, normal facial sensation and movement, hearing intact to voice, palate elevates symmetrically and tongue protrudes midline.  She has mild decrease in right shoulder elevation  Motor: Strength is 5/5 in the left upper extremity with no pronator drift, on the right, proximal right upper extremity strength is 2/5, elbow flexion is 2-, elbow extension 2+,  is 0-1 except she has slight movement in the first and second digits of the right hand; cannot extend the fingers.  Distal right lower extremity is 5/5, symmetric to the left.  Proximal right lower extremity not tested, not tolerable to patient  Muscle tone is normal to decreased in the right upper extremity  Coordination is commensurate with strength  Light touch is intact and symmetric in upper extremities  Reflexes hypoactive throughout, no response to plantar stim  Neck is supple, no carotid bruit appreciated  Heart sounds regular, no murmur appreciated  Lungs clear to auscultation, normal respiratory effort  Abdomen soft, obese, NT, ND with positive bowel sounds  Skin warm and dry, no rashes appreciated  Head normocephalic and atraumatic  Psych: Normal affect and thought content, cooperative with exam    Results Review:   I reviewed the patient's new clinical results.  I reviewed the patient's new imaging results and agree with the interpretation.  I reviewed the patient's other test results and agree with the interpretation  Labs reviewed, glucose 158 BUN 29 creatinine 1.47 GFR 35  CBC with white count 12.2, H&H 10.2 and 33.9, platelets 172  Hemoglobin A1c 5.6  UA with 2+ bacteria 100 protein otherwise unremarkable  Sed rate 15 CRP slightly elevated at  0.55  Head CT images from this morning reviewed, agree unremarkable    Assessment/Plan       Status post total replacement of right hip    Anxiety and depression    HTN (hypertension)    Renal insufficiency    Leukocytosis, likely reactive    Arthritis of right hip    HO A-fib (CMS/HCC)    YUNIOR treated with BiPAP    Acute blood loss anemia, mild, asymptomatic      Right upper extremity weakness without numbness or pain-concerning for possible small stroke, given weakness of multiple muscle groups without pain or numbness (as would be more typical with peripheral nerve injury).  We will plan on MRI.  Discussed with patient and Dr. Galo.    I discussed the patients findings and my recommendations with patient and consulting provider    Maribel East MD  09/10/19  1:57 PM

## 2019-09-10 NOTE — THERAPY TREATMENT NOTE
Acute Care - Physical Therapy Treatment Note   [facility]     Patient Name: Akua Torres  : 1945  MRN: [id number]  Today's Date: 9/10/2019             Admit Date: 2019    Visit Dx:    ICD-10-CM ICD-9-CM   1. Impaired functional mobility, balance, gait, and endurance Z74.09 V49.89     Patient Active Problem List   Diagnosis   • Degenerative disc disease, lumbar   • Lumbar stenosis with neurogenic claudication   • History of lumbar fusion   • Spondylosis of lumbar region without myelopathy or radiculopathy   • Mild obesity   • Physical deconditioning   • Idiopathic gout   • Anxiety and depression   • Greater trochanteric bursitis of both hips   • Status post total bilateral knee replacement   • Back pain   • HTN (hypertension)   • HLD (hyperlipidemia)   • Prediabetes   • S/P lumbar spinal fusion   • Renal insufficiency   • Leukocytosis, likely reactive   • Altered mental status. Felt to be drug related (trazodone, opiates, benzodiazepine)   • Acute renal failure due to hypotension. Now resolved (ARF) (CMS/[facility])   • Depression   • Encounter for Medicare annual wellness exam   • Arthritis of right hip   • HO A-fib (CMS/[facility])   • YUNIOR treated with BiPAP   • Status post total replacement of right hip   • Acute blood loss anemia, mild, asymptomatic       Therapy Treatment    Rehabilitation Treatment Summary     Row Name 09/10/19 1507 09/10/19 0905          Treatment Time/Intention    Discipline  physical therapist  -MJ  physical therapist  -MJ     Document Type  therapy note (daily note)  -MJ  therapy note (daily note)  -MJ     Subjective Information  complains of;weakness  -MJ  complains of;pain  -MJ     Mode of Treatment  physical therapy  -MJ  physical therapy  -MJ     Patient/Family Observations  Pt sitting EOB with OT  -  Pt sitting UIC  -     Care Plan Review  patient/other agree to care plan  -MJ  patient/other agree to care plan  -MJ     Patient Effort  adequate  -MJ  good  -MJ     Existing  Precautions/Restrictions  fall;right;hip, anterior;other (see comments)  (Significant)  new onset R sided weakness  -MJ  fall;right;hip, anterior;other (see comments)  (Significant)  R hand numbness/weakness  -MJ     Treatment Considerations/Comments  Pt with worse sitting balance this session and worsening R UE and LE weakness  (Significant)   -MJ  --     Recorded by [MJ] Henrique Clements, PT 09/10/19 1555 [MJ] Henrique Clements, PT 09/10/19 0937     Row Name 09/10/19 0905             Cognitive Assessment/Intervention    Additional Documentation  Cognitive Assessment/Intervention (Group)  -MJ      Recorded by [MJ] Henrique Clements, PT 09/10/19 0937      Row Name 09/10/19 1507 09/10/19 0905          Cognitive Assessment/Intervention- PT/OT    Affect/Mental Status (Cognitive)  anxious  -MJ  anxious  -MJ     Orientation Status (Cognition)  oriented x 4  -MJ  oriented x 4  -MJ     Follows Commands (Cognition)  follows one step commands;75-90% accuracy;verbal cues/prompting required;repetition of directions required  -MJ  follows one step commands;over 90% accuracy;verbal cues/prompting required;repetition of directions required  -MJ     Recorded by [MJ] Henrique Clements, PT 09/10/19 1555 [MJ] Henrique Clements, PT 09/10/19 0937     Row Name 09/10/19 1507 09/10/19 0905          Safety Issues, Functional Mobility    Impairments Affecting Function (Mobility)  balance;coordination;endurance/activity tolerance;pain;grasp;strength;range of motion (ROM)  -MJ  endurance/activity tolerance;pain;range of motion (ROM);strength  -MJ     Recorded by [MJ] Henrique Clements, PT 09/10/19 1555 [MJ] Henrique Clements, PT 09/10/19 0937     Row Name 09/10/19 1507 09/10/19 0905          Mobility Assessment/Intervention    Right Lower Extremity (Weight-bearing Status)  weight-bearing as tolerated (WBAT)  -MJ  weight-bearing as tolerated (WBAT)  -MJ     Recorded by [MJ] Henrique Clements, PT 09/10/19 1555 [MJ] Henrique Clements, PT 09/10/19 0937     Row Name 09/10/19 1507 09/10/19  0905          Bed Mobility Assessment/Treatment    Bed Mobility Assessment/Treatment  --  sit-supine  -MJ     Supine-Sit [place] (Bed Mobility)  not tested Pt sitting EOB  -MJ  not tested Pt UIC  -MJ     Sit-Supine [place] (Bed Mobility)  moderate assist (50% patient effort);2 person assist;verbal cues  -MJ  moderate assist (50% patient effort);2 person assist;verbal cues  -MJ     Bed Mobility, Safety Issues  decreased use of legs for bridging/pushing;decreased use of arms for pushing/pulling  -MJ  decreased use of legs for bridging/pushing;decreased use of arms for pushing/pulling  -MJ     Assistive Device (Bed Mobility)  bed rails;head of bed elevated  -  --     Comment (Bed Mobility)  Verbal cues for sequencing, assist with legs and trunk  -  Verbal cues for sequencing, assist with legs and trunk. Pt yells out during transition from sit to supine  -     Recorded by [MJ] Henrique Clements, PT 09/10/19 1555 [MJ] Henrique Clements, PT 09/10/19 0937     Row Name 09/10/19 1507 09/10/19 0905          Transfer Assessment/Treatment    Transfer Assessment/Treatment  toilet transfer;chair-bed transfer  -MJ  sit-stand transfer;stand-sit transfer;chair-bed transfer  -     Comment (Transfers)  Verbal cues for correct hand placement. Perfomed x4 reps. Pt required assist to place R UE on platform RW and cues to push from bed/chair with L UE. Assisted pt to [facility], pt with knee buckling with weight bearing. After attempting gait assisted pt back to bed.   -  Verbal cues for correct hand placement and to step R LE out prior to t/f. Performed x2 reps. Pt required assist to keep R hand on RW as inability to  has been present since surgery  -     Recorded by [MJ] Henrique Clements, PT 09/10/19 1555 [MJ] Henrique Clements, PT 09/10/19 0937     Row Name 09/10/19 1507 09/10/19 0905          Chair-Bed Transfer    Chair-Bed [place] (Transfers)  maximum assist (25% patient effort);2 person assist;verbal cues  -MJ  minimum assist  (75% patient effort);2 person assist;verbal cues  -MJ     Assistive Device (Chair-Bed Transfers)  other (see comments) B UE support, gait belt  -MJ  walker, front-wheeled  -MJ     Recorded by [MJ] Henrique Clements, PT 09/10/19 1555 [MJ] Henrique Clements, PT 09/10/19 0937     Row Name 09/10/19 1507 09/10/19 0905          Sit-Stand Transfer    Sit-Stand [place] (Transfers)  moderate assist (50% patient effort);2 person assist;verbal cues  -MJ  minimum assist (75% patient effort);2 person assist;verbal cues  -MJ     Assistive Device (Sit-Stand Transfers)  walker, platform  -MJ  walker, front-wheeled  -MJ     Recorded by [MJ] Henrique Clements, PT 09/10/19 1555 [MJ] Henrique Clements, PT 09/10/19 0937     Row Name 09/10/19 1507 09/10/19 0905          Stand-Sit Transfer    Stand-Sit [place] (Transfers)  moderate assist (50% patient effort);2 person assist;verbal cues  -MJ  minimum assist (75% patient effort);verbal cues  -MJ     Assistive Device (Stand-Sit Transfers)  walker, platform  -MJ  walker, front-wheeled  -MJ     Recorded by [MJ] Henrique Clements, PT 09/10/19 1555 [MJ] Henrique Clements, PT 09/10/19 0937     Row Name 09/10/19 1507             Toilet Transfer    Type (Toilet Transfer)  sit-stand;stand-sit  -MJ      [place] (Toilet Transfer)  maximum assist (25% patient effort);2 person assist;verbal cues  -MJ      Assistive Device (Toilet Transfer)  commode, bedside without drop arms;walker, platform  -MJ      Recorded by [MJ] Henrique Clements, PT 09/10/19 1555      Row Name 09/10/19 1507 09/10/19 0905          Gait/Stairs Assessment/Training    58327 - Gait Training Minutes   8  -MJ  10  -MJ     [place] (Gait)  maximum assist (25% patient effort);2 person assist;verbal cues  -MJ  minimum assist (75% patient effort);2 person assist;verbal cues  -MJ     Assistive Device (Gait)  walker, front-wheeled  -MJ  walker, front-wheeled  -MJ     Distance in Feet (Gait)  2  -MJ  10  -MJ     Pattern (Gait)  step-to  -MJ   step-to;step-through  -MJ     Deviations/Abnormal Patterns (Gait)  right sided deviations;antalgic;bilateral deviations;nixon decreased;stride length decreased  -MJ  right sided deviations;antalgic;bilateral deviations;nixon decreased;stride length decreased  -MJ     Bilateral Gait Deviations  forward flexed posture;heel strike decreased;weight shift ability decreased  -MJ  forward flexed posture;heel strike decreased;weight shift ability decreased  -MJ     Right Sided Gait Deviations  knee buckling, right side  -MJ  --     Comment (Gait/Stairs)  Pt demo step to gait pattern at slow pace. Pt with worsening knee buckling when weight bearing on R knee, unable to progress ambulation due to it. Assist to keep R UE on platform RW. Close chair follow [place] [facility] [place] due to deteriorating quality.   -MJ  Pt initially demo step to gait pattern, moments of progression to step through. Cues for upright posture and to stay in middle of RW. Pt requires MaxA to keep R hand on RW, unable to  at all. Pt reports R knee weakness with increased distance. Gait limited by pain and fatigue, chair brought up behind pt  -MJ     Recorded by [MJ] Henrique Clements, PT 09/10/19 1555 [MJ] Henrique Clements, PT 09/10/19 0937     Row Name 09/10/19 1507 09/10/19 0905          Therapeutic Exercise    44272 - PT Therapeutic Exercise Minutes  10  -MJ  9  -MJ     16376 - PT Therapeutic Activity Minutes  8  -MJ  4  -MJ     Recorded by [MJ] Henrique Clements, PT 09/10/19 1555 [MJ] Henrique Clements, PT 09/10/19 0937     Row Name 09/10/19 1507 09/10/19 0905          Therapeutic Exercise    Lower Extremity (Therapeutic Exercise)  gluteal sets;heel slides, right;LAQ (long arc quad), right;quad sets, right;SAQ (short arc quad), right;SLR (straight leg raise), right  -MJ  gluteal sets;heel slides, right;LAQ (long arc quad), right;quad sets, right;SLR (straight leg raise), right  -MJ     Lower Extremity Range of Motion (Therapeutic Exercise)  ankle dorsiflexion/plantar  flexion, right;hip abduction/adduction, right  -MJ  hip abduction/adduction, right;ankle dorsiflexion/plantar flexion, right  -MJ     Exercise Type (Therapeutic Exercise)  AAROM (active assistive range of motion)  -MJ  AROM (active range of motion);isometric contraction, static  -MJ     Position (Therapeutic Exercise)  seated;supine  -MJ  seated;supine  -MJ     Sets/Reps (Therapeutic Exercise)  15x each  -MJ  15x each  -MJ     Comment (Therapeutic Exercise)  Cues for technique. ModA w/ SLR, hip abd, heel slides  -MJ  Cues for technique. ModA w/ hip abd, SLR  -MJ     Recorded by [MJ] Henrique Clements, PT 09/10/19 1555 [MJ] Henrique Clements, PT 09/10/19 0937     Row Name 09/10/19 1507 09/10/19 0905          Positioning and Restraints    Pre-Treatment Position  in bed  -MJ  sitting in chair/recliner  -MJ     Post Treatment Position  bed  -MJ  bed  -MJ     In Bed  notified nsg;supine;call light within reach;encouraged to call for assist;exit alarm on  -MJ  notified nsg;supine;call light within reach;encouraged to call for assist;exit alarm on;SCD pump applied  -MJ     Recorded by [MJ] Henrique Clements, PT 09/10/19 1555 [MJ] Henrique Clements, PT 09/10/19 0937     Row Name 09/10/19 1507 09/10/19 0905          Pain Scale: Numbers Pre/Post-Treatment    Pain Scale: Numbers, Pretreatment  2/10  -MJ  3/10  -MJ     Pain Scale: Numbers, Post-Treatment  3/10  -MJ  5/10  -MJ     Pain Location - Side  Right  -MJ  Right  -MJ     Pain Location  hip  -MJ  hip  -MJ     Pain Intervention(s)  Repositioned;Ambulation/increased activity  -MJ  Repositioned;Ambulation/increased activity;Cold pack  -MJ     Recorded by [MJ] Henrique Clements, PT 09/10/19 1555 [MJ] Henrique Clements, PT 09/10/19 0937     Row Name                Wound 09/09/19 1352 Right hip Incision    Wound - Properties Group Date first assessed: 09/09/19 [KD] Time first assessed: 1352 [KD] Side: Right [KD] Location: hip [KD] Primary Wound Type: Incision [KD] Recorded by:  [KD] Blas Pizarro, RN  09/09/19 1352    Row Name 09/10/19 1507 09/10/19 0905          Plan of Care Review    Plan of Care Reviewed With  patient  -MJ  patient  -MJ     Recorded by [MJ] Henrique Clements, PT 09/10/19 1555 [MJ] Henrique Clements, PT 09/10/19 0937     Row Name 09/10/19 1507 09/10/19 0905          Outcome Summary/Treatment Plan (PT)    Daily Summary of Progress (PT)  unable to show any progress toward functional goals  -MJ  progress toward functional goals is gradual  -MJ     Recorded by [MJ] Henrique Clements, PT 09/10/19 1555 [MJ] Henrique Clements, PT 09/10/19 0937       User Key  (r) = Recorded By, (t) = Taken By, (c) = Cosigned By    Initials Name Effective Dates Discipline     Henrique Clements, PT 04/03/18 -  PT    Blas Gray, RN 06/18/19 -  Nurse          Wound 09/09/19 1352 Right hip Incision (Active)   Dressing Appearance no drainage;intact;dry 9/10/2019 12:00 PM   Closure Liquid skin adhesive 9/9/2019  9:04 PM   Base dry;clean 9/9/2019  9:04 PM   Drainage Amount none 9/9/2019  9:04 PM   Dressing Care, Wound open to air 9/9/2019  4:35 PM           Physical Therapy Education     Title: PT OT SLP Therapies (Done)     Topic: Physical Therapy (Done)     Point: Mobility training (Done)     Learning Progress Summary           Patient Acceptance, E,D, VU,NR by CLAY at 9/10/2019  3:07 PM    Comment:  Reviewed HEP, gait mechanics, benefits of mobility, safety with mobility    Acceptance, E,D, VU,NR by CLAY at 9/10/2019  9:05 AM    Comment:  Reviewed anterior hip precautions, gait mechanics, benefits of mobility    Acceptance, E,D, VU,NR by REJI at 9/9/2019  5:05 PM    Comment:  Educated on anterior hip precautions, weight bearing status, correct supine to sit t/f technique, correct sit<->stand t/f technique, correct gait mechanics, and progression of POC.   Family Acceptance, E,D, VU,NR by REJI at 9/9/2019  5:05 PM    Comment:  Educated on anterior hip precautions, weight bearing status, correct supine to sit t/f technique, correct sit<->stand  t/f technique, correct gait mechanics, and progression of POC.                   Point: Home exercise program (Done)     Learning Progress Summary           Patient Acceptance, E,D, VU,NR by CLAY at 9/10/2019  3:07 PM    Comment:  Reviewed HEP, gait mechanics, benefits of mobility, safety with mobility    Acceptance, E,D, VU,NR by MJ at 9/10/2019  9:05 AM    Comment:  Reviewed anterior hip precautions, gait mechanics, benefits of mobility    Acceptance, E,D, VU,NR by LR at 9/9/2019  5:05 PM    Comment:  Educated on anterior hip precautions, weight bearing status, correct supine to sit t/f technique, correct sit<->stand t/f technique, correct gait mechanics, and progression of POC.   Family Acceptance, E,D, VU,NR by LR at 9/9/2019  5:05 PM    Comment:  Educated on anterior hip precautions, weight bearing status, correct supine to sit t/f technique, correct sit<->stand t/f technique, correct gait mechanics, and progression of POC.                   Point: Body mechanics (Done)     Learning Progress Summary           Patient Acceptance, E,D, VU,NR by CLAY at 9/10/2019  3:07 PM    Comment:  Reviewed HEP, gait mechanics, benefits of mobility, safety with mobility    Acceptance, E,D, VU,NR by CLAY at 9/10/2019  9:05 AM    Comment:  Reviewed anterior hip precautions, gait mechanics, benefits of mobility    Acceptance, E,D, VU,NR by LR at 9/9/2019  5:05 PM    Comment:  Educated on anterior hip precautions, weight bearing status, correct supine to sit t/f technique, correct sit<->stand t/f technique, correct gait mechanics, and progression of POC.   Family Acceptance, E,D, VU,NR by LR at 9/9/2019  5:05 PM    Comment:  Educated on anterior hip precautions, weight bearing status, correct supine to sit t/f technique, correct sit<->stand t/f technique, correct gait mechanics, and progression of POC.                   Point: Precautions (Done)     Learning Progress Summary           Patient Acceptance, E,D, VU,NR by  at 9/10/2019   3:07 PM    Comment:  Reviewed HEP, gait mechanics, benefits of mobility, safety with mobility    Acceptance, E,ARPITA, SAVANNAH,NR by MJ at 9/10/2019  9:05 AM    Comment:  Reviewed anterior hip precautions, gait mechanics, benefits of mobility    Acceptance, E,ARPITA, SAVANNAH,NR by LR at 9/9/2019  5:05 PM    Comment:  Educated on anterior hip precautions, weight bearing status, correct supine to sit t/f technique, correct sit<->stand t/f technique, correct gait mechanics, and progression of POC.   Family Acceptance, E,ARPITA, SAVANNAH,NR by LR at 9/9/2019  5:05 PM    Comment:  Educated on anterior hip precautions, weight bearing status, correct supine to sit t/f technique, correct sit<->stand t/f technique, correct gait mechanics, and progression of POC.                               User Key     Initials Effective Dates Name Provider Type Discipline     06/19/15 -  Teresa Blackwell, PT Physical Therapist PT     04/03/18 -  Henrique Clements, PT Physical Therapist PT                PT Recommendation and Plan     Outcome Summary/Treatment Plan (PT)  Daily Summary of Progress (PT): unable to show any progress toward functional goals  Plan of Care Reviewed With: patient  Progress: declining  Outcome Summary: Pt only able to ambulate 2 feet with MaxAx2 and platform RW due to worsening weakness and knee buckling. Pt required more assist with all functional mobility and ther ex. RN aware and neuro following. Will continue to progress mobility as able.   Outcome Measures     Row Name 09/10/19 1507 09/10/19 0905          How much help from another person do you currently need...    Turning from your back to your side while in flat bed without using bedrails?  2  -MJ  2  -MJ     Moving from lying on back to sitting on the side of a flat bed without bedrails?  2  -MJ  2  -MJ     Moving to and from a bed to a chair (including a wheelchair)?  2  -MJ  2  -MJ     Standing up from a chair using your arms (e.g., wheelchair, bedside chair)?  2  -MJ  2  -MJ      Climbing 3-5 steps with a railing?  1  -MJ  1  -MJ     To walk in hospital room?  2  -MJ  2  -MJ     AM-PAC 6 Clicks Score (PT)  11  -MJ  11  -MJ        Functional Assessment    Outcome Measure Options  AM-PAC 6 Clicks Basic Mobility (PT)  -MJ  AM-PAC 6 Clicks Basic Mobility (PT)  -MJ       User Key  (r) = Recorded By, (t) = Taken By, (c) = Cosigned By    Initials Name Provider Type    Henrique Alfaro PT Physical Therapist         Time Calculation:   PT Charges     Row Name 09/10/19 1507 09/10/19 0905          Time Calculation    Start Time  1507  -MJ  0905  -MJ     PT Received On  09/10/19  -MJ  09/10/19  -MJ     PT Goal Re-Cert Due Date  09/19/19  -MJ  09/19/19  -MJ        Time Calculation- PT    Total Timed Code Minutes- PT  26 minute(s)  -MJ  23 minute(s)  -MJ        Timed Charges    44434 - PT Therapeutic Exercise Minutes  10  -MJ  9  -MJ     83352 - Gait Training Minutes   8  -MJ  10  -MJ     40035 - PT Therapeutic Activity Minutes  8  -MJ  4  -MJ       User Key  (r) = Recorded By, (t) = Taken By, (c) = Cosigned By    Initials Name Provider Type    Henrique Alfaro, PT Physical Therapist        Therapy Charges for Today     Code Description Service Date Service Provider Modifiers Qty    09732583819 HC PT THER PROC EA 15 MIN 9/10/2019 Henrique Clements, PT GP 1    13613586194 HC GAIT TRAINING EA 15 MIN 9/10/2019 Henrique Clements, PT GP 1    29193820715 HC PT THER SUPP EA 15 MIN 9/10/2019 Henrique Clements, PT GP 2    01666827292 HC PT THER PROC EA 15 MIN 9/10/2019 Henrique Clements, PT GP 1    72011039772 HC GAIT TRAINING EA 15 MIN 9/10/2019 Henrique Clements, PT GP 1          PT G-Codes  Outcome Measure Options: AM-PAC 6 Clicks Basic Mobility (PT)  AM-PAC 6 Clicks Score (PT): 11    Henrique Clements PT  9/10/2019

## 2019-09-10 NOTE — PLAN OF CARE
Problem: Patient Care Overview  Goal: Plan of Care Review  Outcome: Ongoing (interventions implemented as appropriate)   09/10/19 0905   Plan of Care Review   Progress improving   OTHER   Outcome Summary Pt increased gait distance to 10 feet with MinAx2 and RW. Pt still with R hand numbness/weakness, will attempt use of platform walker this PM. Pt requiring assist of 2 for all functional mobility, continue to recommend rehab. Pt will need OT consult   Coping/Psychosocial   Plan of Care Reviewed With patient       Problem: Hip Arthroplasty (Total, Partial) (Adult)  Goal: Signs and Symptoms of Listed Potential Problems Will be Absent, Minimized or Managed (Hip Arthroplasty)  Outcome: Ongoing (interventions implemented as appropriate)   09/10/19 0905   Goal/Outcome Evaluation   Problems Assessed (Hip Arthroplasty) functional deficit;pain   Problems Present (Hip Arthroplasty) functional deficit;pain

## 2019-09-10 NOTE — PLAN OF CARE
Problem: Patient Care Overview  Goal: Plan of Care Review  Outcome: Ongoing (interventions implemented as appropriate)   09/10/19 2150   Plan of Care Review   Progress no change   OTHER   Outcome Summary POD #1. Pain controlled with PO meds. Requiring x2 assist for bed mobility and transfers, needs knee immobilizer. Complaints of new right arm weakness/numbness, neuro consult placed, CT and MRI of head. VSS. 2L NC when sleeping. Needs placement.    Coping/Psychosocial   Plan of Care Reviewed With patient

## 2019-09-10 NOTE — PLAN OF CARE
Problem: Patient Care Overview  Goal: Plan of Care Review  Outcome: Ongoing (interventions implemented as appropriate)   09/10/19 1500   Plan of Care Review   Progress improving   OTHER   Outcome Summary Pt presents with decreased balance, decreased AROM and strength RUE. Strong right lateral lean noted in static sitting and R knee buckling noted with transfer training. Recommend IPR.    Coping/Psychosocial   Plan of Care Reviewed With patient       Problem: Hip Arthroplasty (Total, Partial) (Adult)  Goal: Signs and Symptoms of Listed Potential Problems Will be Absent, Minimized or Managed (Hip Arthroplasty)  Outcome: Ongoing (interventions implemented as appropriate)   09/10/19 1500   Goal/Outcome Evaluation   Problems Assessed (Hip Arthroplasty) functional deficit   Problems Present (Hip Arthroplasty) functional deficit

## 2019-09-10 NOTE — THERAPY TREATMENT NOTE
Acute Care - Physical Therapy Treatment Note   [facility]     Patient Name: Akua Torres  : 1945  MRN: [id number]  Today's Date: 9/10/2019             Admit Date: 2019    Visit Dx:    ICD-10-CM ICD-9-CM   1. Impaired functional mobility, balance, gait, and endurance Z74.09 V49.89     Patient Active Problem List   Diagnosis   • Degenerative disc disease, lumbar   • Lumbar stenosis with neurogenic claudication   • History of lumbar fusion   • Spondylosis of lumbar region without myelopathy or radiculopathy   • Mild obesity   • Physical deconditioning   • Idiopathic gout   • Anxiety and depression   • Greater trochanteric bursitis of both hips   • Status post total bilateral knee replacement   • Back pain   • HTN (hypertension)   • HLD (hyperlipidemia)   • Prediabetes   • S/P lumbar spinal fusion   • Renal insufficiency   • Leukocytosis, likely reactive   • Altered mental status. Felt to be drug related (trazodone, opiates, benzodiazepine)   • Acute renal failure due to hypotension. Now resolved (ARF) (CMS/[facility])   • Depression   • Encounter for Medicare annual wellness exam   • Arthritis of right hip   • HO A-fib (CMS/[facility])   • YUNIOR treated with BiPAP   • Status post total replacement of right hip       Therapy Treatment    Rehabilitation Treatment Summary     Row Name 09/10/19 0905             Treatment Time/Intention    Discipline  physical therapist  -      Document Type  therapy note (daily note)  -      Subjective Information  complains of;pain  -MJ      Mode of Treatment  physical therapy  -MJ      Patient/Family Observations  Pt sitting [facility]  -      Care Plan Review  patient/other agree to care plan  -MJ      Patient Effort  good  -MJ      Existing Precautions/Restrictions  fall;right;hip, anterior;other (see comments)  (Significant)  R hand numbness/weakness  -      Recorded by [MJ] Henrique Clements, PT 09/10/19 5707      Row Name 09/10/19 0905             Cognitive Assessment/Intervention     Additional Documentation  Cognitive Assessment/Intervention (Group)  -MJ      Recorded by [MJ] Henrique Clements, PT 09/10/19 0937      Row Name 09/10/19 0905             Cognitive Assessment/Intervention- PT/OT    Affect/Mental Status (Cognitive)  anxious  -MJ      Orientation Status (Cognition)  oriented x 4  -MJ      Follows Commands (Cognition)  follows one step commands;over 90% accuracy;verbal cues/prompting required;repetition of directions required  -MJ      Recorded by [MJ] Henrique Clements, PT 09/10/19 0937      Row Name 09/10/19 0905             Safety Issues, Functional Mobility    Impairments Affecting Function (Mobility)  endurance/activity tolerance;pain;range of motion (ROM);strength  -MJ      Recorded by [MJ] Henrique Clements, PT 09/10/19 0937      Row Name 09/10/19 0905             Mobility Assessment/Intervention    Right Lower Extremity (Weight-bearing Status)  weight-bearing as tolerated (WBAT)  -MJ      Recorded by [MJ] Henrique Clements, PT 09/10/19 0937      Row Name 09/10/19 0905             Bed Mobility Assessment/Treatment    Bed Mobility Assessment/Treatment  sit-supine  -MJ      Supine-Sit [place] (Bed Mobility)  not tested Pt [facility]  -      Sit-Supine [place] (Bed Mobility)  moderate assist (50% patient effort);2 person assist;verbal cues  -MJ      Bed Mobility, Safety Issues  decreased use of legs for bridging/pushing;decreased use of arms for pushing/pulling  -      Comment (Bed Mobility)  Verbal cues for sequencing, assist with legs and trunk. Pt yells out during transition from sit to supine  -MJ      Recorded by [MJ] Henrique Clements, PT 09/10/19 0937      Row Name 09/10/19 0905             Transfer Assessment/Treatment    Transfer Assessment/Treatment  sit-stand transfer;stand-sit transfer;chair-bed transfer  -      Comment (Transfers)  Verbal cues for correct hand placement and to step R LE out prior to t/f. Performed x2 reps. Pt required assist to keep R hand on RW as inability to   has been present since surgery  -MJ      Recorded by [MJ] Henrique Clements, PT 09/10/19 0937      Row Name 09/10/19 0905             Chair-Bed Transfer    Chair-Bed [place] (Transfers)  minimum assist (75% patient effort);2 person assist;verbal cues  -MJ      Assistive Device (Chair-Bed Transfers)  walker, front-wheeled  -MJ      Recorded by [MJ] Henrique Clements, PT 09/10/19 0937      Row Name 09/10/19 0905             Sit-Stand Transfer    Sit-Stand [place] (Transfers)  minimum assist (75% patient effort);2 person assist;verbal cues  -MJ      Assistive Device (Sit-Stand Transfers)  walker, front-wheeled  -MJ      Recorded by [MJ] Henrique Clements, PT 09/10/19 0937      Row Name 09/10/19 0905             Stand-Sit Transfer    Stand-Sit [place] (Transfers)  minimum assist (75% patient effort);verbal cues  -MJ      Assistive Device (Stand-Sit Transfers)  walker, front-wheeled  -MJ      Recorded by [MJ] Henrique Clements, PT 09/10/19 0937      Row Name 09/10/19 0905             Gait/Stairs Assessment/Training    89758 - Gait Training Minutes   10  -MJ      [place] Level (Gait)  minimum assist (75% patient effort);2 person assist;verbal cues  -MJ      Assistive Device (Gait)  walker, front-wheeled  -MJ      Distance in Feet (Gait)  10  -MJ      Pattern (Gait)  step-to;step-through  -MJ      Deviations/Abnormal Patterns (Gait)  right sided deviations;antalgic;bilateral deviations;nixon decreased;stride length decreased  -MJ      Bilateral Gait Deviations  forward flexed posture;heel strike decreased;weight shift ability decreased  -MJ      Comment (Gait/Stairs)  Pt initially demo step to gait pattern, moments of progression to step through. Cues for upright posture and to stay in middle of RW. Pt requires MaxA to keep R hand on RW, unable to  at all. Pt reports R knee weakness with increased distance. Gait limited by pain and fatigue, chair brought up behind pt  -MJ      Recorded by [MJ] Henrique Clements, PT  09/10/19 0937      Row Name 09/10/19 0905             Therapeutic Exercise    55143 - PT Therapeutic Exercise Minutes  9  -MJ      02613 - PT Therapeutic Activity Minutes  4  -MJ      Recorded by [MJ] Henrique Clements, PT 09/10/19 0937      Row Name 09/10/19 0905             Therapeutic Exercise    Lower Extremity (Therapeutic Exercise)  gluteal sets;heel slides, right;LAQ (long arc quad), right;quad sets, right;SLR (straight leg raise), right  -MJ      Lower Extremity Range of Motion (Therapeutic Exercise)  hip abduction/adduction, right;ankle dorsiflexion/plantar flexion, right  -MJ      Exercise Type (Therapeutic Exercise)  AROM (active range of motion);isometric contraction, static  -MJ      Position (Therapeutic Exercise)  seated;supine  -MJ      Sets/Reps (Therapeutic Exercise)  15x each  -MJ      Comment (Therapeutic Exercise)  Cues for technique. ModA w/ hip abd, SLR  -MJ      Recorded by [MJ] Henrique Clements, PT 09/10/19 0937      Row Name 09/10/19 0905             Positioning and Restraints    Pre-Treatment Position  sitting in chair/recliner  -MJ      Post Treatment Position  bed  -MJ      In Bed  notified nsg;supine;call light within reach;encouraged to call for assist;exit alarm on;SCD pump applied  -MJ      Recorded by [MJ] Henrique Clements, PT 09/10/19 0937      Row Name 09/10/19 0905             Pain Scale: Numbers Pre/Post-Treatment    Pain Scale: Numbers, Pretreatment  3/10  -MJ      Pain Scale: Numbers, Post-Treatment  5/10  -MJ      Pain Location - Side  Right  -MJ      Pain Location  hip  -MJ      Pain Intervention(s)  Repositioned;Ambulation/increased activity;Cold pack  -MJ      Recorded by [MJ] Henrique Clements, PT 09/10/19 0937      Row Name                Wound 09/09/19 1352 Right hip Incision    Wound - Properties Group Date first assessed: 09/09/19 [KD] Time first assessed: 1352 [KD] Side: Right [KD] Location: hip [KD] Primary Wound Type: Incision [KD] Recorded by:  [KD] Blas Pizarro RN 09/09/19  1352    Row Name 09/10/19 0905             Plan of Care Review    Plan of Care Reviewed With  patient  -MJ      Recorded by [MJ] Starr Henrique, PT 09/10/19 0937      Row Name 09/10/19 0905             Outcome Summary/Treatment Plan (PT)    Daily Summary of Progress (PT)  progress toward functional goals is gradual  -MJ      Recorded by [MJ] Starr Henrique, PT 09/10/19 0937        User Key  (r) = Recorded By, (t) = Taken By, (c) = Cosigned By    Initials Name Effective Dates Discipline     Henrique Clements, PT 04/03/18 -  PT    Blas Gray RN 06/18/19 -  Nurse          Wound 09/09/19 1352 Right hip Incision (Active)   Dressing Appearance no drainage;intact;dry 9/10/2019  8:00 AM   Closure Liquid skin adhesive 9/9/2019  9:04 PM   Base dry;clean 9/9/2019  9:04 PM   Drainage Amount none 9/9/2019  9:04 PM   Dressing Care, Wound open to air 9/9/2019  4:35 PM           Physical Therapy Education     Title: PT OT SLP Therapies (Done)     Topic: Physical Therapy (Done)     Point: Mobility training (Done)     Learning Progress Summary           Patient Acceptance, E,D, VU,NR by CLAY at 9/10/2019  9:05 AM    Comment:  Reviewed anterior hip precautions, gait mechanics, benefits of mobility    Acceptance, E,D, VU,NR by REJI at 9/9/2019  5:05 PM    Comment:  Educated on anterior hip precautions, weight bearing status, correct supine to sit t/f technique, correct sit<->stand t/f technique, correct gait mechanics, and progression of POC.   Family Acceptance, E,D, VU,NR by REJI at 9/9/2019  5:05 PM    Comment:  Educated on anterior hip precautions, weight bearing status, correct supine to sit t/f technique, correct sit<->stand t/f technique, correct gait mechanics, and progression of POC.                   Point: Home exercise program (Done)     Learning Progress Summary           Patient Acceptance, E,D, VU,NR by CLAY at 9/10/2019  9:05 AM    Comment:  Reviewed anterior hip precautions, gait mechanics, benefits of mobility     Acceptance, E,D, VU,NR by LR at 9/9/2019  5:05 PM    Comment:  Educated on anterior hip precautions, weight bearing status, correct supine to sit t/f technique, correct sit<->stand t/f technique, correct gait mechanics, and progression of POC.   Family Acceptance, E,D, VU,NR by LR at 9/9/2019  5:05 PM    Comment:  Educated on anterior hip precautions, weight bearing status, correct supine to sit t/f technique, correct sit<->stand t/f technique, correct gait mechanics, and progression of POC.                   Point: Body mechanics (Done)     Learning Progress Summary           Patient Acceptance, E,D, VU,NR by MJ at 9/10/2019  9:05 AM    Comment:  Reviewed anterior hip precautions, gait mechanics, benefits of mobility    Acceptance, E,D, VU,NR by LR at 9/9/2019  5:05 PM    Comment:  Educated on anterior hip precautions, weight bearing status, correct supine to sit t/f technique, correct sit<->stand t/f technique, correct gait mechanics, and progression of POC.   Family Acceptance, E,D, VU,NR by LR at 9/9/2019  5:05 PM    Comment:  Educated on anterior hip precautions, weight bearing status, correct supine to sit t/f technique, correct sit<->stand t/f technique, correct gait mechanics, and progression of POC.                   Point: Precautions (Done)     Learning Progress Summary           Patient Acceptance, E,D, VU,NR by MJ at 9/10/2019  9:05 AM    Comment:  Reviewed anterior hip precautions, gait mechanics, benefits of mobility    Acceptance, E,D, VU,NR by LR at 9/9/2019  5:05 PM    Comment:  Educated on anterior hip precautions, weight bearing status, correct supine to sit t/f technique, correct sit<->stand t/f technique, correct gait mechanics, and progression of POC.   Family Acceptance, E,D, VU,NR by LR at 9/9/2019  5:05 PM    Comment:  Educated on anterior hip precautions, weight bearing status, correct supine to sit t/f technique, correct sit<->stand t/f technique, correct gait mechanics, and progression  of POC.                               User Key     Initials Effective Dates Name Provider Type Discipline    LR 06/19/15 -  Teresa Blackwell, PT Physical Therapist PT     04/03/18 -  Henrique Clements PT Physical Therapist PT                PT Recommendation and Plan     Outcome Summary/Treatment Plan (PT)  Daily Summary of Progress (PT): progress toward functional goals is gradual  Plan of Care Reviewed With: patient  Progress: improving  Outcome Summary: Pt increased gait distance to 10 feet with MinAx2 and RW. Pt still with R hand numbness/weakness, will attempt use of platform walker this PM. Pt requiring assist of 2 for all functional mobility, continue to recommend rehab. Pt will need OT consult     Time Calculation:   PT Charges     Row Name 09/10/19 0905             Time Calculation    Start Time  0905  -MJ      PT Received On  09/10/19  -      PT Goal Re-Cert Due Date  09/19/19  -         Time Calculation- PT    Total Timed Code Minutes- PT  23 minute(s)  -MJ         Timed Charges    00444 - PT Therapeutic Exercise Minutes  9  -MJ      75275 - Gait Training Minutes   10  -MJ      96546 - PT Therapeutic Activity Minutes  4  -MJ        User Key  (r) = Recorded By, (t) = Taken By, (c) = Cosigned By    Initials Name Provider Type    MJ Henrique Clements, PT Physical Therapist        Therapy Charges for Today     Code Description Service Date Service Provider Modifiers Qty    53441635273 HC PT THER PROC EA 15 MIN 9/10/2019 Henrique Clements, PT GP 1    96344363971 HC GAIT TRAINING EA 15 MIN 9/10/2019 Henrique Clements, PT GP 1    79621594935 HC PT THER SUPP EA 15 MIN 9/10/2019 Henrique Clements, PT GP 2          PT G-Codes  Outcome Measure Options: AM-PAC 6 Clicks Basic Mobility (PT)  AM-PAC 6 Clicks Score (PT): 13    Henrique Clements PT  9/10/2019

## 2019-09-10 NOTE — PROGRESS NOTES
"Akua MARADIAGA Waldoboro  [id number]  1945    /60   Pulse 83   Temp 97.7 °F (36.5 °C) (Oral)   Resp 16   Ht 162.6 cm (64\")   Wt 86.2 kg (190 lb)   SpO2 96%   BMI 32.61 kg/m²     Lab Results (last 24 hours)     Procedure Component Value Units Date/Time    Basic Metabolic Panel [[id number]]  (Abnormal) Collected:  09/10/19 0337    Specimen:  Blood Updated:  09/10/19 0528     Glucose 158 mg/dL      BUN 29 mg/dL      Creatinine 1.47 mg/dL      Sodium 141 mmol/L      Potassium 5.0 mmol/L      Chloride 103 mmol/L      CO2 27.0 mmol/L      Calcium 7.6 mg/dL      eGFR Non African Amer 35 mL/min/1.73      BUN/Creatinine Ratio 19.7     Anion Gap 11.0 mmol/L     Narrative:       GFR Normal >60  Chronic Kidney Disease <60  Kidney Failure <15    CBC & Differential [[id number]] Collected:  09/10/19 0336    Specimen:  Blood Updated:  09/10/19 0514    Narrative:       The following orders were created for panel order CBC & Differential.  Procedure                               Abnormality         Status                     ---------                               -----------         ------                     CBC Auto Differential[[id number]]        Abnormal            Final result                 Please view results for these tests on the individual orders.    CBC Auto Differential [[id number]]  (Abnormal) Collected:  09/10/19 0336    Specimen:  Blood Updated:  09/10/19 0514     WBC 12.20 10*3/mm3      RBC 3.57 10*6/mm3      Hemoglobin 10.2 g/dL      Hematocrit 33.9 %      MCV 95.0 fL      MCH 28.6 pg      MCHC 30.1 g/dL      RDW 15.0 %      RDW-SD 52.3 fl      MPV 9.9 fL      Platelets 172 10*3/mm3      Neutrophil % 76.5 %      Lymphocyte % 13.4 %      Monocyte % 9.3 %      Eosinophil % 0.1 %      Basophil % 0.1 %      Immature Grans % 0.6 %      Neutrophils, Absolute 9.35 10*3/mm3      Lymphocytes, Absolute 1.63 10*3/mm3      Monocytes, Absolute 1.13 10*3/mm3      Eosinophils, Absolute 0.01 10*3/mm3      Basophils, " Absolute 0.01 10*3/mm3      Immature Grans, Absolute 0.07 10*3/mm3      nRBC 0.0 /100 WBC     POC Glucose Once [[id number]]  (Abnormal) Collected:  09/09/19 2138    Specimen:  Blood Updated:  09/09/19 2139     Glucose 174 mg/dL           Patient Care Team:  Salty Hernandez MD as PCP - General (Family Medicine)  Leon Hein MD as Consulting Physician (Neurosurgery)  Perkins, Corinne E, PT as Physical Therapist (Physical Therapy)  Cricket Nettles MD as Consulting Physician (Pain Medicine)    SUBJECTIVE  Pain well controlled.  Good start in physical therapy.    PHYSICAL EXAM  Incision benign  Minimal thigh swelling  Neurovascularly intact to knees and toes       Status post total replacement of right hip    Anxiety and depression    HTN (hypertension)    Renal insufficiency    Arthritis of right hip    HO A-fib (CMS/HCC)    YUNIOR treated with BiPAP      PLAN / DISPOSITION:  Hemoglobin stable 10.2 today - no transfusion anticipated  Discharge home when cleared by physical therapy.    Darrin New MD  09/10/19  9:41 AM

## 2019-09-11 ENCOUNTER — APPOINTMENT (OUTPATIENT)
Dept: CARDIOLOGY | Facility: HOSPITAL | Age: 74
End: 2019-09-11

## 2019-09-11 ENCOUNTER — APPOINTMENT (OUTPATIENT)
Dept: MRI IMAGING | Facility: HOSPITAL | Age: 74
End: 2019-09-11

## 2019-09-11 LAB
ANION GAP SERPL CALCULATED.3IONS-SCNC: 6 MMOL/L (ref 5–15)
APTT PPP: 35.8 SECONDS (ref 85–120)
BASOPHILS # BLD AUTO: 0.03 10*3/MM3 (ref 0–0.2)
BASOPHILS NFR BLD AUTO: 0.2 % (ref 0–1.5)
BH CV ECHO MEAS - AO MAX PG (FULL): 3.7 MMHG
BH CV ECHO MEAS - AO MAX PG: 12.1 MMHG
BH CV ECHO MEAS - AO MEAN PG (FULL): 2 MMHG
BH CV ECHO MEAS - AO MEAN PG: 7 MMHG
BH CV ECHO MEAS - AO ROOT AREA: 5.3 CM^2
BH CV ECHO MEAS - AO ROOT DIAM: 2.6 CM
BH CV ECHO MEAS - AO V2 MAX: 174 CM/SEC
BH CV ECHO MEAS - AO V2 MEAN: 122 CM/SEC
BH CV ECHO MEAS - AO V2 VTI: 36.9 CM
BH CV ECHO MEAS - ASC AORTA: 2.8 CM
BH CV ECHO MEAS - AVA(I,A): 3.3 CM^2
BH CV ECHO MEAS - AVA(I,D): 3.3 CM^2
BH CV ECHO MEAS - AVA(V,A): 2.9 CM^2
BH CV ECHO MEAS - AVA(V,D): 2.9 CM^2
BH CV ECHO MEAS - BSA(HAYCOCK): 2 M^2
BH CV ECHO MEAS - BSA: 1.9 M^2
BH CV ECHO MEAS - BZI_BMI: 32.6 KILOGRAMS/M^2
BH CV ECHO MEAS - BZI_METRIC_HEIGHT: 162.6 CM
BH CV ECHO MEAS - BZI_METRIC_WEIGHT: 86.2 KG
BH CV ECHO MEAS - EDV(CUBED): 55.3 ML
BH CV ECHO MEAS - EDV(MOD-SP4): 61 ML
BH CV ECHO MEAS - EDV(TEICH): 62.3 ML
BH CV ECHO MEAS - EF(CUBED): 77.1 %
BH CV ECHO MEAS - EF(MOD-SP4): 67.2 %
BH CV ECHO MEAS - EF(TEICH): 70 %
BH CV ECHO MEAS - ESV(CUBED): 12.6 ML
BH CV ECHO MEAS - ESV(MOD-SP4): 20 ML
BH CV ECHO MEAS - ESV(TEICH): 18.7 ML
BH CV ECHO MEAS - FS: 38.8 %
BH CV ECHO MEAS - IVS/LVPW: 0.92
BH CV ECHO MEAS - IVSD: 0.9 CM
BH CV ECHO MEAS - LA DIMENSION: 3.7 CM
BH CV ECHO MEAS - LA/AO: 1.4
BH CV ECHO MEAS - LAT PEAK E' VEL: 10.1 CM/SEC
BH CV ECHO MEAS - LATERAL E/E' RATIO: 14.4
BH CV ECHO MEAS - LV MASS(C)D: 108.3 GRAMS
BH CV ECHO MEAS - LV MAX PG: 8.4 MMHG
BH CV ECHO MEAS - LV MEAN PG: 5 MMHG
BH CV ECHO MEAS - LV V1 MAX: 145 CM/SEC
BH CV ECHO MEAS - LV V1 MEAN: 96.4 CM/SEC
BH CV ECHO MEAS - LV V1 VTI: 35.2 CM
BH CV ECHO MEAS - LVIDD: 3.8 CM
BH CV ECHO MEAS - LVIDS: 2.3 CM
BH CV ECHO MEAS - LVLD AP4: 7.3 CM
BH CV ECHO MEAS - LVLS AP4: 5.5 CM
BH CV ECHO MEAS - LVOT AREA (M): 3.5 CM^2
BH CV ECHO MEAS - LVOT AREA: 3.5 CM^2
BH CV ECHO MEAS - LVOT DIAM: 2.1 CM
BH CV ECHO MEAS - LVPWD: 0.98 CM
BH CV ECHO MEAS - MED PEAK E' VEL: 9.1 CM/SEC
BH CV ECHO MEAS - MEDIAL E/E' RATIO: 15.9
BH CV ECHO MEAS - MV A MAX VEL: 99.4 CM/SEC
BH CV ECHO MEAS - MV DEC TIME: 0.17 SEC
BH CV ECHO MEAS - MV E MAX VEL: 145 CM/SEC
BH CV ECHO MEAS - MV E/A: 1.5
BH CV ECHO MEAS - PA MAX PG: 4.5 MMHG
BH CV ECHO MEAS - PA V2 MAX: 106 CM/SEC
BH CV ECHO MEAS - RVDD: 1.4 CM
BH CV ECHO MEAS - SV(AO): 195.9 ML
BH CV ECHO MEAS - SV(CUBED): 42.7 ML
BH CV ECHO MEAS - SV(LVOT): 121.9 ML
BH CV ECHO MEAS - SV(MOD-SP4): 41 ML
BH CV ECHO MEAS - SV(TEICH): 43.6 ML
BH CV ECHO MEAS - TV MAX PG: 3.1 MMHG
BH CV ECHO MEAS - TV V2 MAX: 88.3 CM/SEC
BH CV ECHO MEASUREMENTS AVERAGE E/E' RATIO: 15.1
BH CV XLRA - RV BASE: 3.4 CM
BH CV XLRA - RV LENGTH: 6.5 CM
BH CV XLRA - RV MID: 3.1 CM
BH CV XLRA MEAS LEFT CCA RATIO VEL: 73.9 CM/SEC
BH CV XLRA MEAS LEFT DIST CCA EDV: 15.4 CM/SEC
BH CV XLRA MEAS LEFT DIST CCA PSV: 57.9 CM/SEC
BH CV XLRA MEAS LEFT DIST ICA EDV: 29.5 CM/SEC
BH CV XLRA MEAS LEFT DIST ICA PSV: 91.7 CM/SEC
BH CV XLRA MEAS LEFT ICA RATIO VEL: 605 CM/SEC
BH CV XLRA MEAS LEFT ICA/CCA RATIO: 8.2
BH CV XLRA MEAS LEFT MID CCA EDV: 17.1 CM/SEC
BH CV XLRA MEAS LEFT MID CCA PSV: 73.9 CM/SEC
BH CV XLRA MEAS LEFT MID ICA EDV: 87 CM/SEC
BH CV XLRA MEAS LEFT MID ICA PSV: 314 CM/SEC
BH CV XLRA MEAS LEFT PROX CCA EDV: 14.3 CM/SEC
BH CV XLRA MEAS LEFT PROX CCA PSV: 65.6 CM/SEC
BH CV XLRA MEAS LEFT PROX ECA EDV: 13.1 CM/SEC
BH CV XLRA MEAS LEFT PROX ECA PSV: 81.2 CM/SEC
BH CV XLRA MEAS LEFT PROX ICA EDV: 216 CM/SEC
BH CV XLRA MEAS LEFT PROX ICA PSV: 600 CM/SEC
BH CV XLRA MEAS LEFT PROX SCLA PSV: 221 CM/SEC
BH CV XLRA MEAS LEFT VERTEBRAL A EDV: 25.3 CM/SEC
BH CV XLRA MEAS LEFT VERTEBRAL A PSV: 107 CM/SEC
BH CV XLRA MEAS RIGHT BULB EDV: 31.4 CM/SEC
BH CV XLRA MEAS RIGHT BULB PSV: 127 CM/SEC
BH CV XLRA MEAS RIGHT CCA RATIO VEL: 73.3 CM/SEC
BH CV XLRA MEAS RIGHT DIST CCA EDV: 17.6 CM/SEC
BH CV XLRA MEAS RIGHT DIST CCA PSV: 83.3 CM/SEC
BH CV XLRA MEAS RIGHT DIST ICA EDV: 41.2 CM/SEC
BH CV XLRA MEAS RIGHT DIST ICA PSV: 200 CM/SEC
BH CV XLRA MEAS RIGHT ICA RATIO VEL: 187 CM/SEC
BH CV XLRA MEAS RIGHT ICA/CCA RATIO: 2.6
BH CV XLRA MEAS RIGHT MID CCA EDV: 16 CM/SEC
BH CV XLRA MEAS RIGHT MID CCA PSV: 73.3 CM/SEC
BH CV XLRA MEAS RIGHT MID ICA EDV: 31.9 CM/SEC
BH CV XLRA MEAS RIGHT MID ICA PSV: 103 CM/SEC
BH CV XLRA MEAS RIGHT PROX CCA EDV: 21.8 CM/SEC
BH CV XLRA MEAS RIGHT PROX CCA PSV: 101 CM/SEC
BH CV XLRA MEAS RIGHT PROX ECA EDV: 2.8 CM/SEC
BH CV XLRA MEAS RIGHT PROX ECA PSV: 80.5 CM/SEC
BH CV XLRA MEAS RIGHT PROX ICA EDV: 46.2 CM/SEC
BH CV XLRA MEAS RIGHT PROX ICA PSV: 187 CM/SEC
BH CV XLRA MEAS RIGHT PROX SCLA PSV: 123 CM/SEC
BH CV XLRA MEAS RIGHT VERTEBRAL A EDV: 15.4 CM/SEC
BH CV XLRA MEAS RIGHT VERTEBRAL A PSV: 72.2 CM/SEC
BUN BLD-MCNC: 24 MG/DL (ref 8–23)
BUN/CREAT SERPL: 22 (ref 7–25)
CALCIUM SPEC-SCNC: 7.9 MG/DL (ref 8.6–10.5)
CHLORIDE SERPL-SCNC: 103 MMOL/L (ref 98–107)
CO2 SERPL-SCNC: 27 MMOL/L (ref 22–29)
CREAT BLD-MCNC: 1.09 MG/DL (ref 0.57–1)
DEPRECATED RDW RBC AUTO: 51.5 FL (ref 37–54)
EOSINOPHIL # BLD AUTO: 0.17 10*3/MM3 (ref 0–0.4)
EOSINOPHIL NFR BLD AUTO: 1.2 % (ref 0.3–6.2)
ERYTHROCYTE [DISTWIDTH] IN BLOOD BY AUTOMATED COUNT: 14.7 % (ref 12.3–15.4)
GFR SERPL CREATININE-BSD FRML MDRD: 49 ML/MIN/1.73
GLUCOSE BLD-MCNC: 144 MG/DL (ref 65–99)
HCT VFR BLD AUTO: 30.2 % (ref 34–46.6)
HGB BLD-MCNC: 9.4 G/DL (ref 12–15.9)
IMM GRANULOCYTES # BLD AUTO: 0.07 10*3/MM3 (ref 0–0.05)
IMM GRANULOCYTES NFR BLD AUTO: 0.5 % (ref 0–0.5)
INR PPP: 1.25 (ref 0.85–1.16)
LV EF 2D ECHO EST: 60 %
LYMPHOCYTES # BLD AUTO: 1.6 10*3/MM3 (ref 0.7–3.1)
LYMPHOCYTES NFR BLD AUTO: 11.5 % (ref 19.6–45.3)
MCH RBC QN AUTO: 29.6 PG (ref 26.6–33)
MCHC RBC AUTO-ENTMCNC: 31.1 G/DL (ref 31.5–35.7)
MCV RBC AUTO: 95 FL (ref 79–97)
MONOCYTES # BLD AUTO: 1.04 10*3/MM3 (ref 0.1–0.9)
MONOCYTES NFR BLD AUTO: 7.5 % (ref 5–12)
NEUTROPHILS # BLD AUTO: 11.04 10*3/MM3 (ref 1.7–7)
NEUTROPHILS NFR BLD AUTO: 79.1 % (ref 42.7–76)
NRBC BLD AUTO-RTO: 0 /100 WBC (ref 0–0.2)
PLATELET # BLD AUTO: 159 10*3/MM3 (ref 140–450)
PMV BLD AUTO: 10 FL (ref 6–12)
POTASSIUM BLD-SCNC: 4.4 MMOL/L (ref 3.5–5.2)
PROTHROMBIN TIME: 15.1 SECONDS (ref 11.2–14.3)
RBC # BLD AUTO: 3.18 10*6/MM3 (ref 3.77–5.28)
SODIUM BLD-SCNC: 136 MMOL/L (ref 136–145)
UFH PPP CHRO-ACNC: 0.1 IU/ML (ref 0.3–0.7)
WBC NRBC COR # BLD: 13.95 10*3/MM3 (ref 3.4–10.8)

## 2019-09-11 PROCEDURE — 99222 1ST HOSP IP/OBS MODERATE 55: CPT | Performed by: INTERNAL MEDICINE

## 2019-09-11 PROCEDURE — 85520 HEPARIN ASSAY: CPT | Performed by: INTERNAL MEDICINE

## 2019-09-11 PROCEDURE — 97530 THERAPEUTIC ACTIVITIES: CPT

## 2019-09-11 PROCEDURE — 85025 COMPLETE CBC W/AUTO DIFF WBC: CPT | Performed by: INTERNAL MEDICINE

## 2019-09-11 PROCEDURE — 25010000002 HYDROMORPHONE PER 4 MG: Performed by: ORTHOPAEDIC SURGERY

## 2019-09-11 PROCEDURE — 93306 TTE W/DOPPLER COMPLETE: CPT | Performed by: INTERNAL MEDICINE

## 2019-09-11 PROCEDURE — 92523 SPEECH SOUND LANG COMPREHEN: CPT

## 2019-09-11 PROCEDURE — 93880 EXTRACRANIAL BILAT STUDY: CPT | Performed by: INTERNAL MEDICINE

## 2019-09-11 PROCEDURE — 97110 THERAPEUTIC EXERCISES: CPT

## 2019-09-11 PROCEDURE — 97116 GAIT TRAINING THERAPY: CPT

## 2019-09-11 PROCEDURE — 25010000002 HEPARIN (PORCINE) IN NACL 25000-0.45 UT/250ML-% SOLUTION: Performed by: INTERNAL MEDICINE

## 2019-09-11 PROCEDURE — 85610 PROTHROMBIN TIME: CPT | Performed by: INTERNAL MEDICINE

## 2019-09-11 PROCEDURE — 85730 THROMBOPLASTIN TIME PARTIAL: CPT | Performed by: INTERNAL MEDICINE

## 2019-09-11 PROCEDURE — 97112 NEUROMUSCULAR REEDUCATION: CPT | Performed by: OCCUPATIONAL THERAPIST

## 2019-09-11 PROCEDURE — 80048 BASIC METABOLIC PNL TOTAL CA: CPT | Performed by: NURSE PRACTITIONER

## 2019-09-11 PROCEDURE — 99232 SBSQ HOSP IP/OBS MODERATE 35: CPT | Performed by: PSYCHIATRY & NEUROLOGY

## 2019-09-11 PROCEDURE — 93880 EXTRACRANIAL BILAT STUDY: CPT

## 2019-09-11 PROCEDURE — 93306 TTE W/DOPPLER COMPLETE: CPT

## 2019-09-11 RX ORDER — ASPIRIN 325 MG
325 TABLET ORAL DAILY
Status: DISCONTINUED | OUTPATIENT
Start: 2019-09-12 | End: 2019-09-11

## 2019-09-11 RX ORDER — SODIUM CHLORIDE 0.9 % (FLUSH) 0.9 %
10 SYRINGE (ML) INJECTION AS NEEDED
Status: DISCONTINUED | OUTPATIENT
Start: 2019-09-11 | End: 2019-09-18 | Stop reason: HOSPADM

## 2019-09-11 RX ORDER — ASPIRIN 300 MG/1
300 SUPPOSITORY RECTAL DAILY
Status: DISCONTINUED | OUTPATIENT
Start: 2019-09-12 | End: 2019-09-11

## 2019-09-11 RX ORDER — ATORVASTATIN CALCIUM 40 MG/1
80 TABLET, FILM COATED ORAL NIGHTLY
Status: DISCONTINUED | OUTPATIENT
Start: 2019-09-11 | End: 2019-09-18 | Stop reason: HOSPADM

## 2019-09-11 RX ORDER — SODIUM CHLORIDE 0.9 % (FLUSH) 0.9 %
10 SYRINGE (ML) INJECTION EVERY 12 HOURS SCHEDULED
Status: DISCONTINUED | OUTPATIENT
Start: 2019-09-11 | End: 2019-09-18 | Stop reason: HOSPADM

## 2019-09-11 RX ORDER — ASPIRIN 81 MG/1
81 TABLET ORAL DAILY
Status: DISCONTINUED | OUTPATIENT
Start: 2019-09-12 | End: 2019-09-18 | Stop reason: HOSPADM

## 2019-09-11 RX ADMIN — ATORVASTATIN CALCIUM 80 MG: 40 TABLET, FILM COATED ORAL at 20:48

## 2019-09-11 RX ADMIN — ATENOLOL 25 MG: 25 TABLET ORAL at 08:13

## 2019-09-11 RX ADMIN — HYDROCODONE BITARTRATE AND ACETAMINOPHEN 1 TABLET: 5; 325 TABLET ORAL at 15:44

## 2019-09-11 RX ADMIN — HYDROMORPHONE HYDROCHLORIDE 0.5 MG: 1 INJECTION, SOLUTION INTRAMUSCULAR; INTRAVENOUS; SUBCUTANEOUS at 21:29

## 2019-09-11 RX ADMIN — ATENOLOL 25 MG: 25 TABLET ORAL at 20:49

## 2019-09-11 RX ADMIN — HYDROCODONE BITARTRATE AND ACETAMINOPHEN 1 TABLET: 5; 325 TABLET ORAL at 20:48

## 2019-09-11 RX ADMIN — HEPARIN SODIUM 11.6 UNITS/KG/HR: 10000 INJECTION, SOLUTION INTRAVENOUS at 19:12

## 2019-09-11 RX ADMIN — MAGNESIUM HYDROXIDE 10 ML: 2400 SUSPENSION ORAL at 08:13

## 2019-09-11 RX ADMIN — MIRTAZAPINE 15 MG: 15 TABLET, FILM COATED ORAL at 20:49

## 2019-09-11 RX ADMIN — BISACODYL 10 MG: 5 TABLET, COATED ORAL at 08:13

## 2019-09-11 RX ADMIN — ASPIRIN 325 MG ORAL TABLET 325 MG: 325 PILL ORAL at 08:13

## 2019-09-11 RX ADMIN — DOCUSATE SODIUM 100 MG: 100 CAPSULE, LIQUID FILLED ORAL at 20:49

## 2019-09-11 RX ADMIN — DOCUSATE SODIUM 100 MG: 100 CAPSULE, LIQUID FILLED ORAL at 08:13

## 2019-09-11 RX ADMIN — ESCITALOPRAM OXALATE 20 MG: 20 TABLET ORAL at 08:13

## 2019-09-11 RX ADMIN — HYDROCODONE BITARTRATE AND ACETAMINOPHEN 1 TABLET: 5; 325 TABLET ORAL at 05:25

## 2019-09-11 NOTE — PLAN OF CARE
Problem: Patient Care Overview  Goal: Plan of Care Review  Outcome: Ongoing (interventions implemented as appropriate)   09/11/19 1357   OTHER   Outcome Summary Pt educated on and completed in-depth RUE strengthening/ROM/NMR HEP w/use of compression to joints, WB'ing and PROM, AAROM, and AROM as appropriate.    Coping/Psychosocial   Plan of Care Reviewed With patient       Problem: Hip Arthroplasty (Total, Partial) (Adult)  Goal: Signs and Symptoms of Listed Potential Problems Will be Absent, Minimized or Managed (Hip Arthroplasty)  Outcome: Ongoing (interventions implemented as appropriate)   09/11/19 1355   Goal/Outcome Evaluation   Problems Assessed (Hip Arthroplasty) functional deficit   Problems Present (Hip Arthroplasty) functional deficit

## 2019-09-11 NOTE — PROGRESS NOTES
Continued Stay Note  [facility]     Patient Name: Akua Torres  MRN: [id number]  Today's Date: 9/11/2019    Admit Date: 9/9/2019    Discharge Plan     Row Name 09/11/19 1045       Plan    Plan  SNF/STR- [facility]    Patient/Family in Agreement with Plan  yes    Plan Comments  Spoke with pt. at bedside. Plans are still for pt. to dc to STR at [facility] pending insurance approval. Pt. agreeable with this. Have updated rehab facility on pt's condition. Will cont. to follow.     Final Discharge Disposition Code  03 - skilled nursing facility (SNF)        Discharge Codes    No documentation.       Expected Discharge Date and Time     Expected Discharge Date Expected Discharge Time    Sep 12, 2019             Nikole Marino RN

## 2019-09-11 NOTE — CONSULTS
[facility] at [facility]  Cardiovascular Consultation Note    Reason for consultation: Patient with history of A. fib advice requested for need for anticoagulation    History of Present Illness:  This is 74-year-old female with a history of CKD hypertension hyperlipidemia.  She sees Dr. Xavier  for her cardiology care.  Reportedly there is a history of A. fib.  The patient came in and had an elective hip replacement.  The patient states that she went to [facility] after a stented cardiac rehab when she developed palpitations lightheadedness and weakness.  She also had intense chest pain during that time.  According to her she was taken to cardiac Cath Lab and had a negative cath.  She was told she had A. fib and she was in A. fib for about an hour apart.  Prior to that episode she had multiple episodes of palpitations especially when she was get in bed at night.  She was already on a beta-blocker during those episodes.  She is had none further since.  She has sleep apnea and has a BiPAP machine.  The patient does get shortness of breath with activity.  She is not having exertional tightness having squeezing pressure chest jaw throat arms.  She is never been a smoker but she does wheeze.  Following surgery the patient also developed significant right arm dysfunction.    Cardiac risk factors: #1 increased age #2 hypertension #3 hyperlipidemia    Past medical and surgical history, social and family history reviewed in EMR.    REVIEW OF SYSTEMS:     Past Medical History:   Diagnosis Date   • A-fib (CMS/[facility])    • Anxiety    • Anxiety and depression    • Arthritis    • Cataract    • Depression    • Elevated serum creatinine     SEES DR SMITH EVERY 6 MONTHS- NEPHROLOGIST    • Extremity pain    • GERD (gastroesophageal reflux disease)    • Gout    • H/O bladder infections     JUST FINISHED MACROBID ON 11-2-17 FOR RECENT UTI   • Hypercholesteremia    • Hypertension    • Joint pain    • Kidney  disease    • Kidney disease, chronic, stage III (GFR 30-59 ml/min) (CMS/[facility])    • Low back pain    • Neck pain    • Rheumatic fever    • Sleep apnea    • Urinary tract infection    • Wears glasses      Past Surgical History:   Procedure Laterality Date   • BACK SURGERY  2004    LUMBAR PER DR MAN    • CARPAL TUNNEL RELEASE Left    • COLONOSCOPY     • EYE SURGERY     • FINGER SURGERY Right     3RD FINGER   • HEEL SPUR EXCISION Bilateral    • KNEE ARTHROSCOPY Bilateral    • LUMBAR DISCECTOMY FUSION INSTRUMENTATION N/A 11/10/2017    Procedure: LUMBAR SPINAL FUSION ;  Surgeon: Gopi Man MD;  Location:  [facility];  Service:    • REPLACEMENT TOTAL KNEE BILATERAL Bilateral    • TOTAL HIP ARTHROPLASTY Right 9/9/2019    Procedure: TOTAL ANTERIOR RIGHT HIP ARTHROPLASTY;  Surgeon: Darrin New MD;  Location:  [facility];  Service: Orthopedics     Social History     Socioeconomic History   • Marital status:      Spouse name: Not on file   • Number of children: Not on file   • Years of education: Not on file   • Highest education level: Not on file   Tobacco Use   • Smoking status: Never Smoker   • Smokeless tobacco: Never Used   Substance and Sexual Activity   • Alcohol use: No   • Drug use: No   • Sexual activity: Defer     Family History   Problem Relation Age of Onset   • Cancer Mother    • Colon polyps Mother    • Hypertension Mother    • Cancer Father    • Hypertension Brother    • Hyperlipidemia Maternal Uncle    • Kidney disease Maternal Uncle        H&P ROS reviewed and pertinent CV ROS as noted in HPI.    Cardiac: History of chest pain associated with A. fib RVR.  Negative cardiac cath.  No exertional chest pain  Respiratory: Positive dyspnea on exertion, positive wheezing no hemoptysis no COPD never smoker  Lower Extremities: No lesions  GI: No nausea vomiting bright red blood per rectum or melena  Neuro: No prior history of stroke TIA or neurologic event.  Has had back surgery  Hematology: No  ecchymosis or petechiae, no hematologic malignancies  Musculoskeletal-the patient has had her knees replaced she is had back surgery and now had a hip replacement.  Is difficult for her to be active because of the above  Endocrine-she is not diabetic      Physical Exam: General very pleasant obese female in a recliner 75 degree angle in no acute distress.  She is a wide pulse pressure of 120/49.       HEENT: Patient has bilateral bruits, tongue is midline, no icterus.  No JVP       Respiratory: Equal bilateral symmetrical expansion and clear to auscultation bilaterally       Cardiovascular: Regular rate and rhythm with a grade 3-4 over systolic ejection murmur and no peripheral edema.  She does not have Quincke his pulse       GI: Obese positive bowel sounds nontender to palpation       Lower Extremities: No lesions       Neuro: Inspection of the face reveals symmetrical facial expressions.  However she has weakness of her right arm.  She has significant decreased ability to raise her right arm and she has  weakness       Skin: Warm and dry with no edema to palpation       Psych: Pleasant affect and oriented x3    Results Review: Telemetry shows sinus rhythm.  Mild CKD.  Postop anemia.  I reviewed the patient's telemetry and there is no atrial fibrillation.  There is no ventricular arrhythmias bradycardia or tachyarrhythmias           Vital Sign Min/Max for last 24 hours  Temp  Min: 98.1 °F (36.7 °C)  Max: 99.5 °F (37.5 °C)   BP  Min: 120/49  Max: 143/50   Pulse  Min: 73  Max: 94   Resp  Min: 16  Max: 22   SpO2  Min: 92 %  Max: 97 %   No Data Recorded      Intake/Output Summary (Last 24 hours) at 9/11/2019 1058  Last data filed at 9/11/2019 0900  Gross per 24 hour   Intake 120 ml   Output 200 ml   Net -80 ml             Current Facility-Administered Medications:   •  [START ON 9/12/2019] aspirin tablet 325 mg, 325 mg, Oral, Daily **OR** [START ON 9/12/2019] aspirin suppository 300 mg, 300 mg, Rectal, Daily,  Maribel East MD  •  atenolol (TENORMIN) tablet 25 mg, 25 mg, Oral, BID, Jennifer Dale, WILFREDO, 25 mg at 09/11/19 0813  •  atorvastatin (LIPITOR) tablet 80 mg, 80 mg, Oral, Nightly, Maribel East MD  •  bisacodyl (DULCOLAX) EC tablet 10 mg, 10 mg, Oral, Daily PRN, Darrin New MD, 10 mg at 09/11/19 0813  •  bisacodyl (DULCOLAX) suppository 10 mg, 10 mg, Rectal, Daily PRN, Darrin New MD  •  docusate sodium (COLACE) capsule 100 mg, 100 mg, Oral, BID, Darrin New MD, 100 mg at 09/11/19 0813  •  escitalopram (LEXAPRO) tablet 20 mg, 20 mg, Oral, Daily, Jennifer Dale, APRN, 20 mg at 09/11/19 0813  •  HYDROcodone-acetaminophen (NORCO) 5-325 MG per tablet 1 tablet, 1 tablet, Oral, Q4H PRN, Darrin New MD, 1 tablet at 09/11/19 0525  •  HYDROmorphone (DILAUDID) injection 0.5 mg, 0.5 mg, Intravenous, Q2H PRN, 0.5 mg at 09/10/19 2026 **AND** naloxone (NARCAN) injection 0.1 mg, 0.1 mg, Intravenous, Q5 Min PRN, Darrin New MD  •  hydrOXYzine (ATARAX) tablet 25 mg, 25 mg, Oral, BID PRN, Jennifer Dale, APRN, 25 mg at 09/10/19 1018  •  labetalol (NORMODYNE,TRANDATE) injection 10 mg, 10 mg, Intravenous, Q4H PRN, Jennifer Dale, APRSAURABH  •  magnesium hydroxide (MILK OF MAGNESIA) suspension 2400 mg/10mL 10 mL, 10 mL, Oral, Daily PRN, Darrin New MD, 10 mL at 09/11/19 0813  •  mirtazapine (REMERON) tablet 15 mg, 15 mg, Oral, Nightly, Jennifer Dale APRN, 15 mg at 09/10/19 2026  •  ondansetron (ZOFRAN) injection 4 mg, 4 mg, Intravenous, Q6H PRN, Jennifer Dale APRN  •  promethazine (PHENERGAN) tablet 12.5 mg, 12.5 mg, Oral, Q6H PRN, Darrin New MD  •  sodium chloride 0.9 % bolus 500 mL, 500 mL, Intravenous, TID PRN, Jennifer Dale APRN  •  sodium chloride 0.9 % flush 10 mL, 10 mL, Intravenous, Q12H, Maribel East MD  •  sodium chloride 0.9 % flush 10 mL, 10 mL, Intravenous, PRN, Maribel East MD  •  sodium chloride 0.9 % infusion, 100 mL/hr, Intravenous,  Continuous, Jennifer Dale, APRN, Last Rate: 100 mL/hr at 09/10/19 2036, 100 mL/hr at 09/10/19 2036  •  traMADol (ULTRAM) tablet 50 mg, 50 mg, Oral, Q6H PRN, Darrin New MD, 50 mg at 09/10/19 0413    Lab Review:   Results from last 7 days   Lab Units 09/10/19  0336   WBC 10*3/mm3 12.20*   HEMOGLOBIN g/dL 10.2*   PLATELETS 10*3/mm3 172     Results from last 7 days   Lab Units 09/11/19  0952 09/10/19  0337   SODIUM mmol/L 136 141   POTASSIUM mmol/L 4.4 5.0   CO2 mmol/L 27.0 27.0   BUN mg/dL 24* 29*   CREATININE mg/dL 1.09* 1.47*   GLUCOSE mg/dL 144* 158*     Estimated Creatinine Clearance: 47.4 mL/min (A) (by C-G formula based on SCr of 1.09 mg/dL (H)).        .lipd  Lab Results   Component Value Date    CHLPL 220 (H) 12/19/2018    TRIG 317 (H) 12/19/2018    HDL 52 12/19/2018    AST 14 08/29/2019    ALT 8 08/29/2019       Radiology Reports:  Imaging Results (last 72 hours)     Procedure Component Value Units Date/Time    MRI Brain Without Contrast [[id number]] Collected:  09/11/19 0819     Updated:  09/11/19 1036    Narrative:       EXAMINATION: MRI BRAIN WO CONTRAST- 09/10/2019     INDICATION: Focal neuro deficit, new, fixed or worsening, >6 hours;  Z74.09-Other reduced mobility     TECHNIQUE: Sagittal and axial T1 axial T2 FLAIR diffusion weighted  images of the brain     COMPARISON: Head CT scan 09/10/2019     FINDINGS: History indicates numbness, loss of right hand  strength.  Improving symptoms.     There is a well-defined area of restricted diffusion approximately 11 mm  in maximal diameter in the posterior left frontal lobe in what appears  to be the prefrontal gyrus, consistent with acute infarct. No definite  acute infarct is seen elsewhere. A couple of punctate areas of increased  T2 signal in the central right frontal white matter at approximately the  same level or at best equivocal for minute infarcts.     There is moderate, mostly nonconfluent central white matter change and  age-appropriate  generalized cerebral atrophy. There is no evidence of  mass, mass effect, hemorrhage, hydrocephalus or abnormal extra-axial  collection.       Impression:       1. 11 mm acute infarct in approximately the area of the left prefrontal  gyrus.  2. Few punctate areas of increased signal in the right frontal white  matter, equivocal for minute acute or recent infarct, possibly just  artifactual.  3. No acute intracranial disease is identified elsewhere.     D:  09/11/2019  E:  09/11/2019       FL C Arm During Surgery [[id number]] Collected:  09/09/19 1448     Updated:  09/10/19 1716    Narrative:       EXAMINATION: FL C ARM DURING SURGERY- 09/09/2019     INDICATION: Total Anterior Hip      TECHNIQUE: Intraoperative fluoroscopy for improved localization and  treatment planning     COMPARISON: NONE     FINDINGS: Intraoperative fluoroscopy with total fluoroscopic time usage  26 seconds and 5 representative images saved during right hip  arthroplasty.       Impression:       Intraoperative fluoroscopy utilized during right total hip  arthroplasty.     D:  09/09/2019  E:  09/09/2019         This report was finalized on 9/10/2019 5:13 PM by Dr. Benji Valera.       CT Head Without Contrast [[id number]] Collected:  09/10/19 1203     Updated:  09/10/19 1312    Narrative:       EXAMINATION: CT HEAD WO CONTRAST- 09/10/2019     INDICATION: Focal neuro deficit, new, fixed or worsening, >6 hours;  Z74.09-Other reduced mobility      TECHNIQUE: CT head without intravenous contrast     The radiation dose reduction device was turned on for each scan per the  ALARA (As Low as Reasonably Achievable) protocol.     COMPARISON: NONE     FINDINGS: Midline structures are symmetric without evidence of mass,  mass effect or midline shift. Ventricles and sulci within normal limits.  No intra-axial hemorrhage or extra-axial fluid collection. Globes and  orbits unremarkable. Visualized paranasal sinuses and mastoid air cells  are grossly clear and  well-pneumatized. Calvarium intact.       Impression:       No acute intracranial abnormality specifically no midline  shift or hydrocephalus. No intra-axial hemorrhage or extra-axial fluid  collection. MRI may be considered for further evaluation of  acute/subacute infarction if clinically warranted.     D:  09/10/2019  E:  09/10/2019       XR Hip With or Without Pelvis 2 - 3 View Right [[id number]] Collected:  09/09/19 1623     Updated:  09/10/19 0837    Narrative:       EXAMINATION: XR HIP W OR WO PELVIS 2-3 VIEW RIGHT- 09/09/2019     INDICATION: PROSTHETIC ARTHROPLASTY OF THE HIP      COMPARISON: NONE     FINDINGS: Imaging of the pelvis and 2 views of the right hip reveal  patient to be status post total right hip arthroplasty. Post surgical  changes seen in the soft tissues. No hardware complication or  malalignment identified of the prosthesis.   Degenerative changes seen  within the sacroiliac joints bilaterally and within the lower lumbar  spine.       Impression:       Patient status post total right hip arthroplasty. No  hardware complication or malalignment identified of the prosthesis. Post  surgical changes seen within the soft tissues.     D:  09/09/2019  E:  09/10/2019                Assessment/Plan: 1 history of A. Fib-telemetry here shows no A. Fib.  But she had multiple episodes prior to her presentation at [facility] in February.  In addition the patient has right arm weakness following her surgery.  She is going to be evaluated by neurology to see whether or not this is a stroke versus neuropathy.  With the patient's history of A. fib and the fact that her chads 2 Vasc score is 3 I recommend the patient be on Eliquis therapy.  2 second she has a heart murmur and wide pulse pressure.  Echo is pending to rule out aortic regurgitation  3 agree with carotid Dopplers to evaluate the bruits      Salty Cervantes MD  09/11/19  10:58 AM

## 2019-09-11 NOTE — THERAPY TREATMENT NOTE
Acute Care - Physical Therapy Treatment Note  [facility]     Patient Name: Akua Torres  : 1945  MRN: [id number]  Today's Date: 2019  Onset of Illness/Injury or Date of Surgery: 19     Referring Physician: WILFREDO Dale    Admit Date: 2019    Visit Dx:    ICD-10-CM ICD-9-CM   1. Impaired functional mobility, balance, gait, and endurance Z74.09 V49.89   2. Impaired mobility and ADLs Z74.09 799.89     Patient Active Problem List   Diagnosis   • Degenerative disc disease, lumbar   • Lumbar stenosis with neurogenic claudication   • History of lumbar fusion   • Spondylosis of lumbar region without myelopathy or radiculopathy   • Mild obesity   • Physical deconditioning   • Idiopathic gout   • Anxiety and depression   • Greater trochanteric bursitis of both hips   • Status post total bilateral knee replacement   • Back pain   • HTN (hypertension)   • HLD (hyperlipidemia)   • Prediabetes   • S/P lumbar spinal fusion   • Renal insufficiency   • Leukocytosis, likely reactive   • Altered mental status. Felt to be drug related (trazodone, opiates, benzodiazepine)   • Acute renal failure due to hypotension. Now resolved (ARF) (CMS/HCC)   • Depression   • Encounter for Medicare annual wellness exam   • Arthritis of right hip   • HO A-fib (CMS/HCC)   • YUNIOR treated with BiPAP   • Status post total replacement of right hip   • Acute blood loss anemia, mild, asymptomatic       Therapy Treatment    Rehabilitation Treatment Summary     Row Name 19 1517 19 1355 19 0959       Treatment Time/Intention    Discipline  physical therapist  -MJ  occupational therapist  -ST  physical therapist  -MJ    Document Type  therapy note (daily note)  -MJ  therapy note (daily note)  -ST  therapy note (daily note)  -MJ    Subjective Information  complains of;pain bathroom urgency  -MJ  no complaints  -ST  complains of;weakness  -MJ    Mode of Treatment  physical therapy  -MJ  individual  therapy;occupational therapy  -ST  physical therapy  -MJ    Patient/Family Observations  Pt supine in bed  -MJ  sitting UIC w/exit alarm intact  -ST  Pt supine in bed  -MJ    Care Plan Review  patient/other agree to care plan  -MJ  care plan/treatment goals reviewed;risks/benefits reviewed;current/potential barriers reviewed;patient/other agree to care plan  -ST  patient/other agree to care plan  -MJ    Care Plan Review, Other Participant(s)  --  --  spouse  -MJ    Therapy Frequency (OT Eval)  --  daily  -ST  --    Patient Effort  adequate  -MJ  good  -ST  good  -MJ    Existing Precautions/Restrictions  fall;right;hip, anterior;other (see comments)  (Significant)  R side weakness, acute CVA  -MJ  fall;hip, anterior;right;other (see comments)  (Significant)  acute R sided weakness from new CVA  -ST  fall;right;hip, anterior;other (see comments)  (Significant)  R sided weakness  -MJ    Treatment Considerations/Comments  --  use of platform rwx d/t R UE weakness  -ST  --    Patient Response to Treatment  --  --  Pt with improved R LE strength and knee buckling mostly resolved  (Significant)   -MJ    Recorded by [MJ] Henrique Clements, PT 09/11/19 1603 [ST] Claudia Vance, OTR 09/11/19 1428 [MJ] Henrique Clements, PT 09/11/19 1044    Row Name 09/11/19 1517 09/11/19 1355 09/11/19 0959       Cognitive Assessment/Intervention- PT/OT    Affect/Mental Status (Cognitive)  anxious  -MJ  WNL  -ST  WFL  -MJ    Orientation Status (Cognition)  oriented x 4  -MJ  oriented x 4  -ST  oriented x 4  -MJ    Follows Commands (Cognition)  follows one step commands;over 90% accuracy;verbal cues/prompting required;repetition of directions required  -MJ  WNL  -ST  follows one step commands;over 90% accuracy;verbal cues/prompting required;repetition of directions required  -MJ    Cognitive Function (Cognitive)  --  attention deficit  -ST  --    Attention Deficit (Cognitive)  --  mild deficit;distractible in quiet environment;requires  cues/redirection to task  -ST  --    Safety Deficit (Cognitive)  moderate deficit  -MJ  --  --    Recorded by [MJ] Henrique Clements, PT 09/11/19 1603 [ST] Claudia Vance, OTR 09/11/19 1428 [MJ] Henrique Clements, PT 09/11/19 1044    Row Name 09/11/19 0959             Safety Issues, Functional Mobility    Impairments Affecting Function (Mobility)  balance;coordination;endurance/activity tolerance;pain;range of motion (ROM);strength  -MJ      Recorded by [MJ] Henrique Clements, PT 09/11/19 1044      Row Name 09/11/19 1517 09/11/19 1355 09/11/19 0959       Mobility Assessment/Intervention    Extremity Weight-bearing Status  --  right lower extremity  -ST  --    Right Lower Extremity (Weight-bearing Status)  weight-bearing as tolerated (WBAT)  -MJ  weight-bearing as tolerated (WBAT)  -ST  weight-bearing as tolerated (WBAT)  -MJ    Recorded by [MJ] Henrique Clements, PT 09/11/19 1603 [ST] Claudia Vance, OTR 09/11/19 1428 [MJ] Henrique Clements, PT 09/11/19 1044    Row Name 09/11/19 1517 09/11/19 1355 09/11/19 0959       Bed Mobility Assessment/Treatment    Supine-Sit [place] (Bed Mobility)  maximum assist (25% patient effort);verbal cues  -MJ  --  maximum assist (25% patient effort);verbal cues  -MJ    Sit-Supine [place] (Bed Mobility)  maximum assist (25% patient effort);2 person assist;verbal cues  -MJ  --  not tested Pt UI  -MJ    Bed Mobility, Safety Issues  decreased use of arms for pushing/pulling;decreased use of legs for bridging/pushing  -MJ  --  decreased use of arms for pushing/pulling;decreased use of legs for bridging/pushing  -    Assistive Device (Bed Mobility)  bed rails;head of bed elevated  -MJ  --  bed rails;head of bed elevated  -    Comment (Bed Mobility)  Verbal cues for sequencing, assist with R LE off EOB and with trunk to come to upright sitting. Increased time and effort to perform. Pt with posterior lean and tries to unweight R hip due to pain. Assist to scoot hips to EOB  -  UIC at arrival    -ST  Verbal cues for sequencing, assist with R LE off EOB and with trunk to come to upright sitting  -MJ    Recorded by [MJ] Henrique Clements, PT 09/11/19 1603 [ST] Claudia Vance, OTR 09/11/19 1428 [MJ] Henrique Clements, PT 09/11/19 1044    Row Name 09/11/19 1517 09/11/19 0959          Transfer Assessment/Treatment    Comment (Transfers)  Verbal cues for correct hand placement. Assist to place R UE on platform. Assisted pt to BSC, Pt with difficutly advancing R foot and required assist to steer RW. Pt dependent with hygiene after toileting. Increased assist and cues to stand from BS  -  Verbal cues for correct hand placement and to step R LE out prior to t/f. Verbal cues and assist for R hand placement on platform walker. Performed x2 reps. Assisted pt to BSC, verbal cues reach back for BSC prior to t/f. Pt dependent with hygiene after toileting  -     Recorded by [MJ] Henrique Clements, PT 09/11/19 1603 [MJ] Henrique Clements, PT 09/11/19 1044     Row Name 09/11/19 1517 09/11/19 0959          Sit-Stand Transfer    Sit-Stand [place] (Transfers)  moderate assist (50% patient effort);2 person assist;verbal cues  -MJ  moderate assist (50% patient effort);2 person assist;verbal cues  -MJ     Assistive Device (Sit-Stand Transfers)  walker, rolling platform  -MJ  walker, rolling platform  -MJ     Recorded by [MJ] Henrique Clements, PT 09/11/19 1603 [MJ] Henrique Clements, PT 09/11/19 1044     Row Name 09/11/19 1517 09/11/19 0959          Stand-Sit Transfer    Stand-Sit [place] (Transfers)  moderate assist (50% patient effort);2 person assist;verbal cues  -MJ  moderate assist (50% patient effort);2 person assist;verbal cues  -MJ     Assistive Device (Stand-Sit Transfers)  walker, rolling platform  -MJ  walker, rolling platform  -MJ     Recorded by [MJ] Henrique Clements, PT 09/11/19 1603 [MJ] Henrique Clements, PT 09/11/19 1044     Row Name 09/11/19 1517 09/11/19 0959          Toilet Transfer    Type (Toilet Transfer)  sit-stand;stand-sit   -MJ  sit-stand;stand-sit  -MJ     [place] Level (Toilet Transfer)  maximum assist (25% patient effort);2 person assist;verbal cues  -MJ  maximum assist (25% patient effort);2 person assist;verbal cues  -MJ     Assistive Device (Toilet Transfer)  commode, bedside without drop arms;walker, rolling platform  -MJ  commode, bedside without drop arms;grab bars/safety frame;walker, rolling platform  -MJ     Recorded by [MJ] Henrique Clements, PT 09/11/19 1603 [MJ] Henrique Clements, PT 09/11/19 1044     Row Name 09/11/19 1517 09/11/19 0959          Gait/Stairs Assessment/Training    17890 - Gait Training Minutes   10  -MJ  14  -MJ     [place] Level (Gait)  maximum assist (25% patient effort);2 person assist;verbal cues  -MJ  moderate assist (50% patient effort);2 person assist;verbal cues  -MJ     Assistive Device (Gait)  walker, rolling platform  -MJ  walker, rolling platform  -MJ     Distance in Feet (Gait)  5  -MJ  20  -MJ     Pattern (Gait)  step-to  -MJ  step-to;step-through  -MJ     Deviations/Abnormal Patterns (Gait)  right sided deviations;antalgic;bilateral deviations;nixon decreased;stride length decreased  -MJ  right sided deviations;antalgic;bilateral deviations;nixon decreased;stride length decreased  -MJ     Bilateral Gait Deviations  forward flexed posture;heel strike decreased;weight shift ability decreased  -MJ  forward flexed posture;heel strike decreased;weight shift ability decreased  -MJ     Comment (Gait/Stairs)  Pt takes small steps from bed to BSC, then forward from BSC. Pt with more difficulty picking up R foot during swing. Pt also with strong R lateral lean, min improvement with cues for correction. Pt crying out in pain and due to deteriorating gait quality, bed brought up behind patient  -MJ  Pt initially demo step to gait pattern at slow pace, moments of progression to step through. Pt initially with difficulty picking up R foot during swing, improved with practice. Cues for upright  posture, pt with R lateral lean with worsening fatigue. Close chair follow. Knee buckling improved today. Gait limited by fatigue  -MJ     Recorded by [MJ] Henrique Clements, PT 09/11/19 1603 [MJ] Henrique Clements, PT 09/11/19 1044     Row Name 09/11/19 1355 09/11/19 0959          Motor Skills Assessment/Interventions    Additional Documentation  Balance (Group);Therapeutic Exercise (Group);Therapeutic Exercise Interventions (Group)  -ST  Balance (Group)  -MJ     Recorded by [ST] Claudia Vance, OTR 09/11/19 1428 [MJ] Henrique Clements, PT 09/11/19 1048     Row Name 09/11/19 1517 09/11/19 1355 09/11/19 0959       Therapeutic Exercise    53018 - PT Therapeutic Exercise Minutes  4  -MJ  --  9  -MJ    74658 - PT Therapeutic Activity Minutes  9  -MJ  --  8  -MJ    21832 -  OT Neuromuscular Reeducation Minutes  --  25  -ST  --    Recorded by [MJ] Henrique Clements, PT 09/11/19 1603 [ST] Claudia Vance, OTR 09/11/19 1428 [MJ] Henrique Clements, PT 09/11/19 1044    Row Name 09/11/19 1517 09/11/19 1355 09/11/19 0959       Therapeutic Exercise    Upper Extremity Range of Motion (Therapeutic Exercise)  --  wrist flexion/extension, right;forearm supination/pronation, right;elbow flexion/extension, right;shoulder internal/external rotation, right;shoulder abduction/adduction, right;shoulder flexion/extension, right  -ST  --    Hand (Therapeutic Exercise)  --  thumb finger opposition, right;finger flexion/extension, right  -ST  --    Lower Extremity (Therapeutic Exercise)  gluteal sets;quad sets, right  -MJ  --  gluteal sets;heel slides, right;quad sets, right;SLR (straight leg raise), right  -MJ    Lower Extremity Range of Motion (Therapeutic Exercise)  ankle dorsiflexion/plantar flexion, right  -MJ  --  ankle dorsiflexion/plantar flexion, right;hip abduction/adduction, right  -MJ    Exercise Type (Therapeutic Exercise)  AROM (active range of motion);isometric contraction, static  -MJ  AAROM (active assistive range of motion);AROM (active range  of motion);PROM (passive range of motion)  -ST  AAROM (active assistive range of motion)  -MJ    Position (Therapeutic Exercise)  supine  -MJ  seated  -ST  seated  -MJ    Sets/Reps (Therapeutic Exercise)  15x each  -MJ  1X10   -ST  15x each  -MJ    Comment (Therapeutic Exercise)  Further deferred after gait as pt crying in pain  -MJ  pt with only AROM in digits I and II and shoulder flexion/abd; AAROM/PROM in remaining joints; completed WB'ing and compression to all UE jts followed by ROM and practice with  and grasp patterns with each digit   -ST  Cues for technique. ModA w/ SLR  -MJ    Recorded by [MJ] Henrique Clements, PT 09/11/19 1603 [ST] Claudia Vance, OTR 09/11/19 1428 [MJ] Henrique Clements, PT 09/11/19 1044    Row Name 09/11/19 0959             Balance    Balance  static sitting balance;static standing balance  -MJ      Recorded by [MJ] Henrique Clements, PT 09/11/19 1048      Row Name 09/11/19 1517 09/11/19 1355 09/11/19 0959       Static Sitting Balance    Level of [place] (Unsupported Sitting, Static Balance)  moderate assist, 50 to 74% patient effort  -MJ  supervision  -ST  supervision  -    Sitting Position (Unsupported Sitting, Static Balance)  s[place] on edge of bed and [place] [facility] [place]  -  --  sitting on edge of bed  -    Comment (Unsupported Sitting, Static Balance)  Strong posterior lean sitting EOB, then R lateral lean sitting [place] [facility] [place]  -  --  --    Comment (Supported Sitting, Static Balance)  --  seated EOC while completing exercises, cuing for maintaining midline   -ST  --    Recorded by [MJ] Henrique Clements, PT 09/11/19 1603 [ST] Claudia Vance, OTR 09/11/19 1428 [MJ] Henrique Clements, PT 09/11/19 1048    Row Name 09/11/19 1517 09/11/19 0959          Static Standing Balance    Level of [place] (Supported Standing, Static Balance)  moderate assist, 50 to 74% patient effort;2 person assist  -  minimal assist, 75% patient effort;2 person assist  -     Assistive Device Utilized (Supported  Standing, Static Balance)  walker, rolling platform  -MJ  walker, rolling platform  -MJ     Comment (Supported Standing, Static Balance)  R lateral lean  -MJ  With fatigue, pt tends to lean to R  -MJ     Recorded by [MJ] Henrique Clements, PT 09/11/19 1603 [MJ] Henrique Clements, PT 09/11/19 1048     Row Name 09/11/19 1355             Fine Motor Testing & Training    Comment, Fine Motor Coordination  progressing toward opposition of digits I/II   -ST      Recorded by [ST] Claudia Vance, OTR 09/11/19 1428      Row Name 09/11/19 1517 09/11/19 1355 09/11/19 0959       Positioning and Restraints    Pre-Treatment Position  in bed  -MJ  sitting in chair/recliner  -ST  in bed  -MJ    Post Treatment Position  bed  -MJ  chair  -ST  chair  -MJ    In Bed  notified nsg;supine;call light within reach;encouraged to call for assist;exit alarm on  -MJ  --  --    In Chair  --  notified nsg;reclined;call light within reach;encouraged to call for assist;exit alarm on;with family/caregiver;RUE elevated  -ST  notified nsg;reclined;call light within reach;encouraged to call for assist;exit alarm on;with family/caregiver  -MJ    Recorded by [MJ] Henrique Clements, PT 09/11/19 1603 [ST] Claudia Vance, OTR 09/11/19 1428 [MJ] Henrique Clements, PT 09/11/19 1044    Row Name 09/11/19 1517 09/11/19 0959          Pain Scale: Numbers Pre/Post-Treatment    Pain Scale: Numbers, Pretreatment  4/10  -MJ  3/10  -MJ     Pain Scale: Numbers, Post-Treatment  10/10  -MJ  4/10  -MJ     Pain Location - Side  Right  -MJ  Right  -MJ     Pain Location  hip  -MJ  hip  -MJ     Pain Intervention(s)  Repositioned;Ambulation/increased activity;Cold pack  -MJ  Repositioned;Ambulation/increased activity;Cold pack  -MJ     Recorded by [MJ] Henrique Clements, PT 09/11/19 1603 [MJ] Henrique Clements, PT 09/11/19 1044     Row Name 09/11/19 1355             Pain Scale: FACES Pre/Post-Treatment    Pain: FACES Scale, Pretreatment  0-->no hurt  -ST      Pain: FACES Scale, Post-Treatment  0-->no  hurt  -ST      Recorded by [ST] Claudia Vance, OTR 09/11/19 1428      Row Name                Wound 09/09/19 1352 Right hip Incision    Wound - Properties Group Date first assessed: 09/09/19 [KD] Time first assessed: 1352 [KD] Side: Right [KD] Location: hip [KD] Primary Wound Type: Incision [KD] Recorded by:  [KD] Blas Pizarro RN 09/09/19 1352    Row Name 09/11/19 1517 09/11/19 0959          Plan of Care Review    Plan of Care Reviewed With  patient  -MJ  patient;spouse  -MJ     Recorded by [MJ] Henrique Clements, PT 09/11/19 1603 [MJ] Henrique Clements, PT 09/11/19 1044     Row Name 09/11/19 1517 09/11/19 0959          Outcome Summary/Treatment Plan (PT)    Daily Summary of Progress (PT)  progress towards functional goals is fair  -MJ  progress toward functional goals is good  -MJ     Recorded by [MJ] Henrique Clements, PT 09/11/19 1603 [MJ] Henrique Clements, PT 09/11/19 1044       User Key  (r) = Recorded By, (t) = Taken By, (c) = Cosigned By    Initials Name Effective Dates Discipline    ST Claudia Vance, OTR 06/10/18 -  OT    MJ Henrique Clements, PT 04/03/18 -  PT    Blas Gray RN 06/18/19 -  Nurse          Wound 09/09/19 1352 Right hip Incision (Active)   Dressing Appearance no drainage;intact;dry 9/11/2019  8:15 AM   Closure Liquid skin adhesive 9/10/2019  8:26 PM   Base dry;clean 9/10/2019  8:26 PM   Drainage Amount none 9/10/2019  8:26 PM       Rehab Goal Summary     Row Name 09/11/19 1517             Bed Mobility Goal 1 (PT)    Activity/Assistive Device (Bed Mobility Goal 1, PT)  sit to supine/supine to sit  -      [place] Level/Cues Needed (Bed Mobility Goal 1, PT)  conditional independence;minimum assist (75% or more patient effort)  -      Time Frame (Bed Mobility Goal 1, PT)  long term goal (LTG);3 days  -      Progress/Outcomes (Bed Mobility Goal 1, PT)  goal revised this date  -MJ         Transfer Goal 1 (PT)    Activity/Assistive Device (Transfer Goal 1, PT)   sit-to-stand/stand-to-sit;walker, rolling  -MJ      [place] Level/Cues Needed (Transfer Goal 1, PT)  minimum assist (75% or more patient effort)  -MJ      Time Frame (Transfer Goal 1, PT)  long term goal (LTG);3 days  -MJ      Progress/Outcome (Transfer Goal 1, PT)  goal revised this date  -         Gait Training Goal 1 (PT)    Activity/Assistive Device (Gait Training Goal 1, PT)  gait (walking locomotion);walker, rolling  -MJ      [place] Level (Gait Training Goal 1, PT)  minimum assist (75% or more patient effort)  -MJ      Distance (Gait Goal 1, PT)  50 feet  -MJ      Time Frame (Gait Training Goal 1, PT)  long term goal (LTG);3 days  -MJ      Progress/Outcome (Gait Training Goal 1, PT)  goal revised this date  -        User Key  (r) = Recorded By, (t) = Taken By, (c) = Cosigned By    Initials Name Provider Type Discipline    Henrique Alfaro PT Physical Therapist PT          Physical Therapy Education     Title: PT OT SLP Therapies (Done)     Topic: Physical Therapy (Done)     Point: Mobility training (Done)     Learning Progress Summary           Patient Acceptance, E,D, VU,NR by CLAY at 9/11/2019  9:59 AM    Comment:  Reviewed HEP, gait mechanics, benefits of mobility, safety with mobility    Acceptance, E,D, VU,NR by CLAY at 9/10/2019  3:07 PM    Comment:  Reviewed HEP, gait mechanics, benefits of mobility, safety with mobility    Acceptance, E,D, VU,NR by CLAY at 9/10/2019  9:05 AM    Comment:  Reviewed anterior hip precautions, gait mechanics, benefits of mobility    Acceptance, E,D, VU,NR by REJI at 9/9/2019  5:05 PM    Comment:  Educated on anterior hip precautions, weight bearing status, correct supine to sit t/f technique, correct sit<->stand t/f technique, correct gait mechanics, and progression of POC.   Family Acceptance, E,D, VU,NR by REJI at 9/9/2019  5:05 PM    Comment:  Educated on anterior hip precautions, weight bearing status, correct supine to sit t/f technique, correct sit<->stand t/f  technique, correct gait mechanics, and progression of POC.   Significant Other Acceptance, E,D, VU,NR by MJ at 9/11/2019  9:59 AM    Comment:  Reviewed HEP, gait mechanics, benefits of mobility, safety with mobility                   Point: Home exercise program (Done)     Learning Progress Summary           Patient Acceptance, E,D, VU,NR by MJ at 9/11/2019  9:59 AM    Comment:  Reviewed HEP, gait mechanics, benefits of mobility, safety with mobility    Acceptance, E,D, VU,NR by MJ at 9/10/2019  3:07 PM    Comment:  Reviewed HEP, gait mechanics, benefits of mobility, safety with mobility    Acceptance, E,D, VU,NR by MJ at 9/10/2019  9:05 AM    Comment:  Reviewed anterior hip precautions, gait mechanics, benefits of mobility    Acceptance, E,D, VU,NR by LR at 9/9/2019  5:05 PM    Comment:  Educated on anterior hip precautions, weight bearing status, correct supine to sit t/f technique, correct sit<->stand t/f technique, correct gait mechanics, and progression of POC.   Family Acceptance, E,D, VU,NR by LR at 9/9/2019  5:05 PM    Comment:  Educated on anterior hip precautions, weight bearing status, correct supine to sit t/f technique, correct sit<->stand t/f technique, correct gait mechanics, and progression of POC.   Significant Other Acceptance, E,D, VU,NR by CLAY at 9/11/2019  9:59 AM    Comment:  Reviewed HEP, gait mechanics, benefits of mobility, safety with mobility                   Point: Body mechanics (Done)     Learning Progress Summary           Patient Acceptance, E,D, VU,NR by CLAY at 9/11/2019  9:59 AM    Comment:  Reviewed HEP, gait mechanics, benefits of mobility, safety with mobility    Acceptance, E,D, VU,NR by CLAY at 9/10/2019  3:07 PM    Comment:  Reviewed HEP, gait mechanics, benefits of mobility, safety with mobility    Acceptance, E,D, VU,NR by CLAY at 9/10/2019  9:05 AM    Comment:  Reviewed anterior hip precautions, gait mechanics, benefits of mobility    Acceptance, E,D, VU,NR by LR at 9/9/2019   5:05 PM    Comment:  Educated on anterior hip precautions, weight bearing status, correct supine to sit t/f technique, correct sit<->stand t/f technique, correct gait mechanics, and progression of POC.   Family Acceptance, E,D, VU,NR by LR at 9/9/2019  5:05 PM    Comment:  Educated on anterior hip precautions, weight bearing status, correct supine to sit t/f technique, correct sit<->stand t/f technique, correct gait mechanics, and progression of POC.   Significant Other Acceptance, E,D, VU,NR by CLAY at 9/11/2019  9:59 AM    Comment:  Reviewed HEP, gait mechanics, benefits of mobility, safety with mobility                   Point: Precautions (Done)     Learning Progress Summary           Patient Acceptance, E,D, VU,NR by CLAY at 9/11/2019  9:59 AM    Comment:  Reviewed HEP, gait mechanics, benefits of mobility, safety with mobility    Acceptance, E,D, VU,NR by CLAY at 9/10/2019  3:07 PM    Comment:  Reviewed HEP, gait mechanics, benefits of mobility, safety with mobility    Acceptance, E,D, VU,NR by CLAY at 9/10/2019  9:05 AM    Comment:  Reviewed anterior hip precautions, gait mechanics, benefits of mobility    Acceptance, E,D, VU,NR by LR at 9/9/2019  5:05 PM    Comment:  Educated on anterior hip precautions, weight bearing status, correct supine to sit t/f technique, correct sit<->stand t/f technique, correct gait mechanics, and progression of POC.   Family Acceptance, E,D, VU,NR by LR at 9/9/2019  5:05 PM    Comment:  Educated on anterior hip precautions, weight bearing status, correct supine to sit t/f technique, correct sit<->stand t/f technique, correct gait mechanics, and progression of POC.   Significant Other Acceptance, E,D, VU,NR by  at 9/11/2019  9:59 AM    Comment:  Reviewed HEP, gait mechanics, benefits of mobility, safety with mobility                               User Key     Initials Effective Dates Name Provider Type Discipline    LR 06/19/15 -  Teresa Blackwell, PT Physical Therapist PT    CLAY  04/03/18 -  Henrique Clements, PT Physical Therapist PT                PT Recommendation and Plan     Outcome Summary/Treatment Plan (PT)  Daily Summary of Progress (PT): progress towards functional goals is fair  Plan of Care Reviewed With: patient  Progress: improving  Outcome Summary: Pt ambulated 5 feet with MaxAx2 and rolling platform walker. Pt in more pain this PM, required more assist with mobility, and impaired balance. Will continue to progress mobility as able  Outcome Measures     Row Name 09/11/19 1600 09/11/19 1517 09/11/19 1355       How much help from another person do you currently need...    Turning from your back to your side while in flat bed without using bedrails?  --  2  -MJ  --    Moving from lying on back to sitting on the side of a flat bed without bedrails?  --  2  -MJ  --    Moving to and from a bed to a chair (including a wheelchair)?  --  2  -MJ  --    Standing up from a chair using your arms (e.g., wheelchair, bedside chair)?  --  2  -MJ  --    Climbing 3-5 steps with a railing?  --  1  -MJ  --    To walk in hospital room?  --  2  -MJ  --    AM-PAC 6 Clicks Score (PT)  --  11  -MJ  --       How much help from another is currently needed...    Putting on and taking off regular lower body clothing?  --  --  2  -ST    Bathing (including washing, rinsing, and drying)  --  --  2  -ST    Toileting (which includes using toilet bed pan or urinal)  --  --  2  -ST    Putting on and taking off regular upper body clothing  --  --  2  -ST    Taking care of personal grooming (such as brushing teeth)  --  --  3  -ST    Eating meals  --  --  3  -ST    AM-PAC 6 Clicks Score (OT)  --  --  14  -ST       Modified [place]ham Scale    Modified [place]ham Scale  --  --  4 - Moderately severe disability.  Unable to walk without assistance, and unable to attend to own bodily needs without assistance.  -ST       Functional Assessment    Outcome Measure Options  --  -  AM-PAC 6 Clicks Basic Mobility (PT)  -  AM-PAC 6  Clicks Daily Activity (OT);Modified [place]  -ST    Row Name 09/11/19 0959 09/10/19 1507 09/10/19 1500       How much help from another person do you currently need...    Turning from your back to your side while in flat bed without using bedrails?  2  -MJ  2  -MJ  --    Moving from lying on back to sitting on the side of a flat bed without bedrails?  2  -MJ  2  -MJ  --    Moving to and from a bed to a chair (including a wheelchair)?  2  -MJ  2  -MJ  --    Standing up from a chair using your arms (e.g., wheelchair, bedside chair)?  2  -MJ  2  -MJ  --    Climbing 3-5 steps with a railing?  1  -MJ  1  -MJ  --    To walk in hospital room?  2  -MJ  2  -MJ  --    AM-PAC 6 Clicks Score (PT)  11  -MJ  11  -MJ  --       How much help from another is currently needed...    Putting on and taking off regular lower body clothing?  --  --  2  -AR    Bathing (including washing, rinsing, and drying)  --  --  2  -AR    Toileting (which includes using toilet bed pan or urinal)  --  --  2  -AR    Putting on and taking off regular upper body clothing  --  --  2  -AR    Taking care of personal grooming (such as brushing teeth)  --  --  3  -AR    Eating meals  --  --  3  -AR    AM-PAC 6 Clicks Score (OT)  --  --  14  -AR       Modified Reddy Scale    Modified Reddy Scale  4 - Moderately severe disability.  Unable to walk without assistance, and unable to attend to own bodily needs without assistance.  -MJ  --  --       Functional Assessment    Outcome Measure Options  AM-PAC 6 Clicks Basic Mobility (PT)  -MJ  AM-PAC 6 Clicks Basic Mobility (PT)  -MJ  AM-PAC 6 Clicks Daily Activity (OT)  -AR    Row Name 09/10/19 0905             How much help from another person do you currently need...    Turning from your back to your side while in flat bed without using bedrails?  2  -MJ      Moving from lying on back to sitting on the side of a flat bed without bedrails?  2  -MJ      Moving to and from a bed to a chair (including a wheelchair)?  2   -MJ      Standing up from a chair using your arms (e.g., wheelchair, bedside chair)?  2  -MJ      Climbing 3-5 steps with a railing?  1  -MJ      To walk in hospital room?  2  -MJ      AM-PAC 6 Clicks Score (PT)  11  -MJ         Functional Assessment    Outcome Measure Options  AM-PAC 6 Clicks Basic Mobility (PT)  -MJ        User Key  (r) = Recorded By, (t) = Taken By, (c) = Cosigned By    Initials Name Provider Type    Claudia Tsang, OTR Occupational Therapist    Jennifer Castillo, OT Occupational Therapist    Henrique Alfaro, PT Physical Therapist         Time Calculation:   PT Charges     Row Name 09/11/19 1517 09/11/19 0959          Time Calculation    Start Time  1517  -MJ  0959  -MJ     PT Received On  09/11/19  -MJ  09/11/19  -MJ     PT Goal Re-Cert Due Date  09/19/19  -MJ  09/19/19  -MJ        Time Calculation- PT    Total Timed Code Minutes- PT  23 minute(s)  -MJ  31 minute(s)  -MJ        Timed Charges    20269 - PT Therapeutic Exercise Minutes  4  -MJ  9  -MJ     19788 - Gait Training Minutes   10  -MJ  14  -MJ     51865 - PT Therapeutic Activity Minutes  9  -MJ  8  -MJ       User Key  (r) = Recorded By, (t) = Taken By, (c) = Cosigned By    Initials Name Provider Type    Henrique Alfaro, PT Physical Therapist        Therapy Charges for Today     Code Description Service Date Service Provider Modifiers Qty    75014663444 HC PT THER PROC EA 15 MIN 9/10/2019 Henrique Clements, PT GP 1    97669652870 HC GAIT TRAINING EA 15 MIN 9/10/2019 Henrique Clements, PT GP 1    10037317120 HC PT THER SUPP EA 15 MIN 9/10/2019 Henrique Clements, PT GP 2    13275884851 HC PT THER PROC EA 15 MIN 9/10/2019 Henrique Clements, PT GP 1    49061267907 HC GAIT TRAINING EA 15 MIN 9/10/2019 Henrique Clements, PT GP 1    67923279643 HC PT THER PROC EA 15 MIN 9/11/2019 Henrique Clements, PT GP 1    38419466962 HC GAIT TRAINING EA 15 MIN 9/11/2019 Henrique Clements, PT GP 1    84098764109 HC PT THER SUPP EA 15 MIN 9/11/2019 Henrique Clements, PT GP 2     [id number]  GAIT TRAINING EA 15 MIN 9/11/2019 Henrique Clements, PT GP 1    95025916390 HC PT THERAPEUTIC ACT EA 15 MIN 9/11/2019 Henrique Clements, PT GP 1    99272661248 HC PT THER SUPP EA 15 MIN 9/11/2019 Henrique Clements, PT GP 2          PT G-Codes  Outcome Measure Options: AM-PAC 6 Clicks Basic Mobility (PT)  AM-PAC 6 Clicks Score (PT): 11  AM-PAC 6 Clicks Score (OT): 14  Modified Reddy Scale: 4 - Moderately severe disability.  Unable to walk without assistance, and unable to attend to own bodily needs without assistance.    Henrique Clements, PT  9/11/2019

## 2019-09-11 NOTE — PROGRESS NOTES
"IM progress note      Akua Torres  [id number]  1945     LOS: 2 days     Attending: Darrin New MD    Primary Care Provider: Salty Hernandez MD      Chief Complaint/Reason for visit:  Right hip pain    Subjective   Doing fairly well. Better pain control today. Symptoms of numbness and weakness of the right hand/upper extremity persist. Denies f/c/n/v/sob/cp.  ( noted/ agree. In much better spirits today)wy    Objective     Vital Signs  Visit Vitals  /58 (BP Location: Right arm, Patient Position: Lying)   Pulse 80   Temp 98.1 °F (36.7 °C) (Oral)   Resp 17   Ht 162.6 cm (64\")   Wt 86.2 kg (190 lb)   SpO2 92%   BMI 32.61 kg/m²     Temp (24hrs), Av.7 °F (37.1 °C), Min:98.1 °F (36.7 °C), Max:99.5 °F (37.5 °C)      Nutrition: PO    Respiratory: RA    Physical Therapy: Pt increased gait distance to 10 feet with MinAx2 and RW. Pt still with R hand numbness/weakness, will attempt use of platform walker this PM. Pt requiring assist of 2 for all functional mobility, continue to recommend rehab. Pt will need OT consult    Physical Exam:     General Appearance:    Alert, cooperative, in no acute distress   Head:    Normocephalic, without obvious abnormality, atraumatic    Lungs:     Normal effort, symmetric chest rise, no crepitus, clear to      auscultation bilaterally             Heart:    Regular rhythm and normal rate, normal S1 and S2   Abdomen:     Normal bowel sounds, no masses, no organomegaly, soft        non-tender, non-distended, no guarding, no rebound                tenderness   Extremities:   Right hip Prineo CDI.     Pulses:   Pulses palpable and equal bilaterally   Skin:   No bleeding, bruising or rash   Neurologic:   Cranial nerves 2 - 12 grossly intact. Weak  and numbness right hand, some drift.      Results Review:     I reviewed the patient's new clinical results.   Results from last 7 days   Lab Units 09/10/19  0336   WBC 10*3/mm3 12.20*   HEMOGLOBIN g/dL 10.2* "   HEMATOCRIT % 33.9*   PLATELETS 10*3/mm3 172     Results for ERYN STEVE (MRN [id number]) as of 9/10/2019 10:22   Ref. Range 9/15/2018 01:26   Hemoglobin Latest Ref Range: 11.5 - 15.5 g/dL 12.1   Hematocrit Latest Ref Range: 34.5 - 44.0 % 38.1     Results from last 7 days   Lab Units 09/11/19  0952 09/10/19  0337   SODIUM mmol/L 136 141   POTASSIUM mmol/L 4.4 5.0   CHLORIDE mmol/L 103 103   CO2 mmol/L 27.0 27.0   BUN mg/dL 24* 29*   CREATININE mg/dL 1.09* 1.47*   CALCIUM mg/dL 7.9* 7.6*   GLUCOSE mg/dL 144* 158*     Study Result     EXAMINATION: CT HEAD WO CONTRAST- 09/10/2019     INDICATION: Focal neuro deficit, new, fixed or worsening, >6 hours;  Z74.09-Other reduced mobility      TECHNIQUE: CT head without intravenous contrast     The radiation dose reduction device was turned on for each scan per the  ALARA (As Low as Reasonably Achievable) protocol.     COMPARISON: NONE     FINDINGS: Midline structures are symmetric without evidence of mass,  mass effect or midline shift. Ventricles and sulci within normal limits.  No intra-axial hemorrhage or extra-axial fluid collection. Globes and  orbits unremarkable. Visualized paranasal sinuses and mastoid air cells  are grossly clear and well-pneumatized. Calvarium intact.     IMPRESSION:  No acute intracranial abnormality specifically no midline  shift or hydrocephalus. No intra-axial hemorrhage or extra-axial fluid  collection. MRI may be considered for further evaluation of  acute/subacute infarction if clinically warranted.     D:  09/10/2019  E:  09/10/2019     Study Result     EXAMINATION: MRI BRAIN WO CONTRAST- 09/10/2019     INDICATION: Focal neuro deficit, new, fixed or worsening, >6 hours;  Z74.09-Other reduced mobility     TECHNIQUE: Sagittal and axial T1 axial T2 FLAIR diffusion weighted  images of the brain     COMPARISON: Head CT scan 09/10/2019     FINDINGS: History indicates numbness, loss of right hand  strength.  Improving symptoms.      There is a well-defined area of restricted diffusion approximately 11 mm  in maximal diameter in the posterior left frontal lobe in what appears  to be the prefrontal gyrus, consistent with acute infarct. No definite  acute infarct is seen elsewhere. A couple of punctate areas of increased  T2 signal in the central right frontal white matter at approximately the  same level or at best equivocal for minute infarcts.     There is moderate, mostly nonconfluent central white matter change and  age-appropriate generalized cerebral atrophy. There is no evidence of  mass, mass effect, hemorrhage, hydrocephalus or abnormal extra-axial  collection.     IMPRESSION:  1. 11 mm acute infarct in approximately the area of the left prefrontal  gyrus.  2. Few punctate areas of increased signal in the right frontal white  matter, equivocal for minute acute or recent infarct, possibly just  artifactual.  3. No acute intracranial disease is identified elsewhere.     D:  09/11/2019  E:  09/11/2019       I reviewed the patient's new imaging including images and reports.    All medications reviewed.     [START ON 9/12/2019] aspirin 325 mg Oral Daily   Or      [START ON 9/12/2019] aspirin 300 mg Rectal Daily   atenolol 25 mg Oral BID   atorvastatin 80 mg Oral Nightly   docusate sodium 100 mg Oral BID   escitalopram 20 mg Oral Daily   mirtazapine 15 mg Oral Nightly   sodium chloride 10 mL Intravenous Q12H       Assessment/Plan     Status post total replacement of right hip    Arthritis of right hip    Anxiety and depression    HTN (hypertension)    Renal insufficiency    Leukocytosis, likely reactive    HO A-fib (CMS/HCC)    YUNIOR treated with BiPAP    Acute blood loss anemia, mild, asymptomatic    RUE weakness/numbness    Left prefrontal gyrus stroke    Plan  1. PT/OT- WBAT RLE  2. Pain control-prns   3. IS-encouraged  4. DVT proph- mechs/ASA  5. Bowel regimen  6. Monitor post-op labs  7. DC planning for home    Stroke, RUE weakness (  stroke protocol per )wy  - neuro following  - stat CT head negative, followed by MRI showing left prefrontal gyrus stroke  - continue tele monitor  - cardiology consult  - MRA brain  - doppler u/s to evaluate extracranial vessels    Cardiology  - Eliquis therapy  - ECHO  - carotid dopplers    RI, improving  - DC lisinopril  - continue IVF    HTN, aifb  - Continue home atenolol   - Monitor BP   - Holding parameters for BP meds  - Labetalol PRN for SBP>170     YUNIOR  - bipap at night      WILFREDO Kramer  09/11/19  11:46 AM     I have personally performed the evaluation on this patient. My history is consistent  with HPI obtained. My exam findings are listed above. I have personally reviewed and discussed the above formulated treatment plan with APRN.  Discussed with pt and with neurology. Discussed with  who is in agreement with Eliquis recommended by Cardiology.wy    Addendum:  After discussing further with . Carotid duplex suggests stenosis in left carotid that could be high grade. Needs further imaging.   We decided to go with heparin gtt initially instead of Eliquis. Add low dose asa for now.wy

## 2019-09-11 NOTE — PLAN OF CARE
Problem: Patient Care Overview  Goal: Plan of Care Review  Outcome: Ongoing (interventions implemented as appropriate)   09/11/19 6848   Plan of Care Review   Progress improving   OTHER   Outcome Summary POD #2. Pain well controlled with PO meds. Ambulation improving, still requiring x2 assist. MRI results positive for stroke. NIH: 2 for RUE weakness and decreased sensation, able to wiggle fingers slightly. VSS. 2L NC PRN. Voiding well, stress incontinence @ times. Needs BM, meds given. Will cont to monitor.      Coping/Psychosocial   Plan of Care Reviewed With patient

## 2019-09-11 NOTE — PROGRESS NOTES
Continued Stay Note  [facility]     Patient Name: Akua Torres  MRN: [id number]  Today's Date: 9/11/2019    Admit Date: 9/9/2019    Discharge Plan     Row Name 09/11/19 1346       Plan    Plan  SNF/STR- [facility]    Patient/Family in Agreement with Plan  yes    Plan Comments  Recieved confirmation that insurance has approved SNF for STR. Approval is good for 3 days. If not medically ready to transfer by Sat. then precert will have to be restarted. Will cont. to follow and update.     Final Discharge Disposition Code  03 - skilled nursing facility (SNF)        Discharge Codes    No documentation.       Expected Discharge Date and Time     Expected Discharge Date Expected Discharge Time    Sep 12, 2019             Nikole Marino RN

## 2019-09-11 NOTE — PLAN OF CARE
Problem: Patient Care Overview  Goal: Plan of Care Review  Outcome: Ongoing (interventions implemented as appropriate)   09/11/19 1517   Plan of Care Review   Progress improving   OTHER   Outcome Summary Pt ambulated 5 feet with MaxAx2 and rolling platform walker. Pt in more pain this PM, required more assist with mobility, and impaired balance. Will continue to progress mobility as able   Coping/Psychosocial   Plan of Care Reviewed With patient       Problem: Hip Arthroplasty (Total, Partial) (Adult)  Goal: Signs and Symptoms of Listed Potential Problems Will be Absent, Minimized or Managed (Hip Arthroplasty)  Outcome: Ongoing (interventions implemented as appropriate)   09/11/19 1606   Goal/Outcome Evaluation   Problems Assessed (Hip Arthroplasty) pain;functional deficit   Problems Present (Hip Arthroplasty) functional deficit;pain

## 2019-09-11 NOTE — PLAN OF CARE
Problem: Patient Care Overview  Goal: Plan of Care Review  Outcome: Ongoing (interventions implemented as appropriate)   09/11/19 6412   Coping/Psychosocial   Plan of Care Reviewed With patient;spouse   SLP evaluation completed. Will sign-off as speech, language and cognition are WNL. Please see note for further details and recommendations.

## 2019-09-11 NOTE — THERAPY DISCHARGE NOTE
Acute Care - Speech Language Pathology Initial Eval/Discharge  [facility]     Patient Name: Akua Torres  : 1945  MRN: [id number]  Today's Date: 2019  Onset of Illness/Injury or Date of Surgery: 19     Referring Physician: WILFREDO Dale      Admit Date: 2019     Visit Dx:    ICD-10-CM ICD-9-CM   1. Impaired functional mobility, balance, gait, and endurance Z74.09 V49.89   2. Impaired mobility and ADLs Z74.09 799.89     Patient Active Problem List   Diagnosis   • Degenerative disc disease, lumbar   • Lumbar stenosis with neurogenic claudication   • History of lumbar fusion   • Spondylosis of lumbar region without myelopathy or radiculopathy   • Mild obesity   • Physical deconditioning   • Idiopathic gout   • Anxiety and depression   • Greater trochanteric bursitis of both hips   • Status post total bilateral knee replacement   • Back pain   • HTN (hypertension)   • HLD (hyperlipidemia)   • Prediabetes   • S/P lumbar spinal fusion   • Renal insufficiency   • Leukocytosis, likely reactive   • Altered mental status. Felt to be drug related (trazodone, opiates, benzodiazepine)   • Acute renal failure due to hypotension. Now resolved (ARF) (CMS/[facility])   • Depression   • Encounter for Medicare annual wellness exam   • Arthritis of right hip   • HO A-fib (CMS/HCC)   • YUNIOR treated with BiPAP   • Status post total replacement of right hip   • Acute blood loss anemia, mild, asymptomatic     Past Medical History:   Diagnosis Date   • A-fib (CMS/HCC)    • Anxiety    • Anxiety and depression    • Arthritis    • Cataract    • Depression    • Elevated serum creatinine     SEES DR SMITH EVERY 6 MONTHS- NEPHROLOGIST    • Extremity pain    • GERD (gastroesophageal reflux disease)    • Gout    • H/O bladder infections     JUST FINISHED MACROBID ON 17 FOR RECENT UTI   • Hypercholesteremia    • Hypertension    • Joint pain    • Kidney disease    • Kidney disease, chronic, stage III (GFR 30-59  ml/min) (CMS/HCC)    • Low back pain    • Neck pain    • Rheumatic fever    • Sleep apnea    • Urinary tract infection    • Wears glasses      Past Surgical History:   Procedure Laterality Date   • BACK SURGERY  2004    LUMBAR PER DR MAN    • CARPAL TUNNEL RELEASE Left    • COLONOSCOPY     • EYE SURGERY     • FINGER SURGERY Right     3RD FINGER   • HEEL SPUR EXCISION Bilateral    • KNEE ARTHROSCOPY Bilateral    • LUMBAR DISCECTOMY FUSION INSTRUMENTATION N/A 11/10/2017    Procedure: LUMBAR SPINAL FUSION ;  Surgeon: Gopi Man MD;  Location:  [facility];  Service:    • REPLACEMENT TOTAL KNEE BILATERAL Bilateral    • TOTAL HIP ARTHROPLASTY Right 9/9/2019    Procedure: TOTAL ANTERIOR RIGHT HIP ARTHROPLASTY;  Surgeon: Darrin New MD;  Location:  [facility];  Service: Orthopedics          SLP EVALUATION (last 72 hours)      SLP SLC Evaluation     Row Name 09/11/19 1200                   Communication Assessment/Intervention    Document Type  evaluation  -        Subjective Information  no complaints  -        Patient Observations  alert;cooperative;agree to therapy  -        Patient/Family Observations  Pt upright in chair for noon meal. spouse present  -CH        Patient Effort  good  -           General Information    Patient Profile Reviewed  yes  -        Pertinent History Of Current Problem  Patient with numbness and weakness noted in R upper extremity. Patient on stroke pathway. SLC evaluation completed per stroke protocol.   -        Precautions/Limitations, Vision  WFL;for purposes of eval  -CH        Precautions/Limitations, Hearing  WFL;for purposes of eval  -CH        Prior Level of Function-Communication  WFL  -        Plans/Goals Discussed with  patient;spouse/S.O.;agreed upon  -        Barriers to Rehab  none identified  -        Patient's Goals for Discharge  return to home  -           Pain Assessment    Additional Documentation  Pain Scale: FACES Pre/Post-Treatment (Group)   "-           Pain Scale: FACES Pre/Post-Treatment    Pain: FACES Scale, Pretreatment  0-->no hurt  -        Pain: FACES Scale, Post-Treatment  0-->no hurt  -CH           Comprehension Assessment/Intervention    Comprehension Assessment/Intervention  Auditory Comprehension;Reading Comprehension  -           Auditory Comprehension Assessment/Intervention    Auditory Comprehension (Communication)  WNL  -        Able to Follow Commands (Communication)  1-step;2-step;3-step;WFL  -        Narrative Discourse  conversational level;WFL  -           Reading Comprehension Assessment/Intervention    Reading Comprehension (Communication)  WNL  -        Functional Reading Tasks  WNL  -           Expression Assessment/Intervention    Expression Assessment/Intervention  verbal expression  -           Verbal Expression Assessment/Intervention    Verbal Expression  WNL  -        Automatic Speech (Communication)  response to greeting;months of year  -        Repetition  sentences;WNL  -CH        Phrase Completion  automatic/predictable;unpredictable;WNL  -CH        Responsive Naming  simple;complex;WNL  -        Confrontational Naming  high frequency;low frequency;WNL  -CH        Sentence Formulation  WNL  -        Conversational Discourse/Fluency  WNL  -           Oral Motor Structure and Function    Oral Motor Structure and Function  WNL  -           Oral Musculature and Cranial Nerve Assessment    Oral Motor General Assessment  WFL  -        Vocal Impairment, Detail. Cranial Nerve X (Vagus)  vocal quality abnormality (see comments);other (see comments) hoarseness post surgery  -        Oral Motor, Comment  Patient reported hoarseness post-op . Stated that she was \"screaming from the pain\".   -           Motor Speech Assessment/Intervention    Motor Speech Function  WNL  -           Cursory Voice Assessment/Intervention    Quality and Resonance (Voice)  hoarse  -        Voice, Comment  Patient " "reported hoarseness post-op . Stated that she was \"literally screaming from the pain\". Patient instructed to consult an ENT if voice continues to remain hoarse  -           Cognitive Assessment Intervention- SLP    Cognitive Function (Cognition)  WNL  -        Orientation Status (Cognition)  awareness of basic personal information;person;place;time;situation;WNL  -CH        Memory (Cognitive)  simple;functional;short-term;long-term;delayed;mental manipulation;WNL  -CH        Attention (Cognitive)  selective;sustained;alternating;divided;attention to detail;WNL  -CH        Thought Organization (Cognitive)  concrete divergent;abstract divergent;concrete convergent;abstract convergent;verbal sequencing;WNL  -        Reasoning (Cognitive)  simple;complex;WNL  -        Problem Solving (Cognitive)  simple;divergent;multifactorial;WNL  -        Functional Math (Cognitive)  simple;word problems;WNL  -        Executive Function (Cognition)  WN  -           SLP Clinical Impressions    SLP Diagnosis  Speech, language and cognition are all WNL with the exception of hoarse vocal quality patient reported occurred post-op (see note above). Patient to f/u with ENT if hoarse vocal quality continues.   -[facility] Criteria for Skilled Therapy Interventions Met  no problems identified which require skilled intervention  -        Functional Impact  no impact on function  -           Recommendations    Therapy Frequency ([facility])  evaluation only  -CH        Anticipated Dischage Disposition  unknown  -        Demonstrates Need for Referral to Another Service  otolaryngology (ENT);other (see comments) if vocal quality remains hoarse  -           SLP Discharge Summary    Discharge Destination  unknown  -        Discharge Diagnostic Statement  Speech, language and cognition are all WNL with the exception of hoarse vocal quality patient reported occurred post-op (see note above). Patient to f/u with ENT if hoarse " vocal quality does not improve.   -        Progress Toward Achieving Short/long Term Goals  discharge on same date as initial evaluation  -        Reason for Discharge  all goals and outcomes met, no further needs identified  -          User Key  (r) = Recorded By, (t) = Taken By, (c) = Cosigned By    Initials Name Effective Dates    Monisha Sharp MS CCC-SLP 02/14/19 -            EDUCATION  The patient has been educated in the following areas:   Cognitive Impairment Communication Impairment.      SLP Recommendation and Plan  SLP Diagnosis: Speech, language and cognition are all WNL with the exception of hoarse vocal quality patient reported occurred post-op (see note above). Patient to f/u with ENT if hoarse vocal quality continues.      SLC Criteria for Skilled Therapy Interventions Met: no problems identified which require skilled intervention  Anticipated Dischage Disposition: unknown                         Time Calculation:   Time Calculation- SLP     Row Name 09/11/19 1328             Time Calculation- SLP    SLP Start Time  1200  -      SLP Received On  09/11/19  -        User Key  (r) = Recorded By, (t) = Taken By, (c) = Cosigned By    Initials Name Provider Type    Monisha Sharp MS CCC-SLP Speech and Language Pathologist          Therapy Charges for Today     Code Description Service Date Service Provider Modifiers Qty    [id number] HC ST EVAL SPEECH AND PROD W LANG  3 9/11/2019 Monisha Ji MS CCC-SLP GN 1                   SLP Discharge Summary  Anticipated Dischage Disposition: unknown  Reason for Discharge: all goals and outcomes met, no further needs identified  Progress Toward Achieving Short/long Term Goals: discharge on same date as initial evaluation  Discharge Destination: unknown    Monisha Ji MS CCC-SLP  9/11/2019

## 2019-09-11 NOTE — PLAN OF CARE
Problem: Patient Care Overview  Goal: Plan of Care Review  Outcome: Ongoing (interventions implemented as appropriate)   09/11/19 0984   Plan of Care Review   Progress improving   OTHER   Outcome Summary Pt increased gait distance to 20 feet with ModAx2 and rolling platform walker. Pt still with R side weakness, but R LE strength improving and no knee buckling noted today. Will continue to progress mobility as able.    Coping/Psychosocial   Plan of Care Reviewed With patient;spouse       Problem: Hip Arthroplasty (Total, Partial) (Adult)  Goal: Signs and Symptoms of Listed Potential Problems Will be Absent, Minimized or Managed (Hip Arthroplasty)  Outcome: Ongoing (interventions implemented as appropriate)   09/11/19 0959   Goal/Outcome Evaluation   Problems Assessed (Hip Arthroplasty) functional deficit;pain   Problems Present (Hip Arthroplasty) functional deficit;pain

## 2019-09-11 NOTE — PROGRESS NOTES
"Neurology       Patient Care Team:  Salty Hernandez MD as PCP - General (Family Medicine)  Leon Hein MD as Consulting Physician (Neurosurgery)  Perkins, Corinne E, PT as Physical Therapist (Physical Therapy)  Cricket Nettles MD as Consulting Physician (Pain Medicine)    Chief complaint right upper extremity weakness      Subjective .     History: Right upper extremity strength about the same, although she can move her thumb a bit more.  No new weakness or numbness.  No headache or vision changes.  In far less pain today.  Reports history of aneurysm followed by Dr. Hein in the past but not recently monitored.  Reports it was stable in size as long as it was followed.  Reports she \"should\" be on anticoagulation.    ROS: No fever or chest pain    Objective     Vital Signs   Blood pressure 120/49, pulse 73, temperature 98.1 °F (36.7 °C), temperature source Oral, resp. rate 17, height 162.6 cm (64\"), weight 86.2 kg (190 lb), SpO2 97 %, not currently breastfeeding.    Physical Exam:              Neuro: Elderly white woman in no acute distress in bedside chair.  She is alert and appears quite comfortable and cheerful today.  Speech is fluent and non-aphasic.  No dysarthria.  EOMI face symmetric TML  Right upper extremity weakness overall unchanged versus 9/10 with inability to raise the arm but when right upper extremities passively lifted she can maintain position against gravity.  Right arm flexion 2+, extension 2+, slight movement of first 3 digits of right hand except thumb moves well.  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  Discussed with patient,  and Dr. DICK  Will plan on MRA of brain given elevated creatinine.  Doppler ultrasound to evaluate extracranial vessels.  Agree " with cardiology consultation.  Currently on aspirin and stroke order set initiated.    Addendum: Received phone call from Dr. Mcdowell-left carotid ultrasound consistent with high-grade stenosis.  Discussed with Dr. Galo.  Will avoid hypotension, change the Eliquis he had planned to heparin for now and add aspirin.  Low risk of hemorrhagic conversion given small size of stroke and now third day post event.  Creatinine improved today and okay for gadolinium by radiology protocol per tech, so will get MRA of neck as well.  Will ask Dr. Albright to see patient in the morning.    I discussed the patients findings and my recommendations with patient, family and primary care team    Maribel East MD  09/11/19  11:16 AM

## 2019-09-11 NOTE — THERAPY TREATMENT NOTE
Acute Care - Occupational Therapy Treatment Note  [facility]     Patient Name: Akua Torres  : 1945  MRN: [id number]  Today's Date: 2019  Onset of Illness/Injury or Date of Surgery: 19  Date of Referral to OT: 09/10/19  Referring Physician: WILFREDO Dale    Admit Date: 2019       ICD-10-CM ICD-9-CM   1. Impaired functional mobility, balance, gait, and endurance Z74.09 V49.89   2. Impaired mobility and ADLs Z74.09 799.89     Patient Active Problem List   Diagnosis   • Degenerative disc disease, lumbar   • Lumbar stenosis with neurogenic claudication   • History of lumbar fusion   • Spondylosis of lumbar region without myelopathy or radiculopathy   • Mild obesity   • Physical deconditioning   • Idiopathic gout   • Anxiety and depression   • Greater trochanteric bursitis of both hips   • Status post total bilateral knee replacement   • Back pain   • HTN (hypertension)   • HLD (hyperlipidemia)   • Prediabetes   • S/P lumbar spinal fusion   • Renal insufficiency   • Leukocytosis, likely reactive   • Altered mental status. Felt to be drug related (trazodone, opiates, benzodiazepine)   • Acute renal failure due to hypotension. Now resolved (ARF) (CMS/[facility])   • Depression   • Encounter for Medicare annual wellness exam   • Arthritis of right hip   • HO A-fib (CMS/HCC)   • YUNIOR treated with BiPAP   • Status post total replacement of right hip   • Acute blood loss anemia, mild, asymptomatic     Past Medical History:   Diagnosis Date   • A-fib (CMS/[facility])    • Anxiety    • Anxiety and depression    • Arthritis    • Cataract    • Depression    • Elevated serum creatinine     SEES DR SMITH EVERY 6 MONTHS- NEPHROLOGIST    • Extremity pain    • GERD (gastroesophageal reflux disease)    • Gout    • H/O bladder infections     JUST FINISHED MACROBID ON 17 FOR RECENT UTI   • Hypercholesteremia    • Hypertension    • Joint pain    • Kidney disease    • Kidney disease, chronic, stage III (GFR 30-59  ml/min) (CMS/HCC)    • Low back pain    • Neck pain    • Rheumatic fever    • Sleep apnea    • Urinary tract infection    • Wears glasses      Past Surgical History:   Procedure Laterality Date   • BACK SURGERY  2004    LUMBAR PER DR MAN    • CARPAL TUNNEL RELEASE Left    • COLONOSCOPY     • EYE SURGERY     • FINGER SURGERY Right     3RD FINGER   • HEEL SPUR EXCISION Bilateral    • KNEE ARTHROSCOPY Bilateral    • LUMBAR DISCECTOMY FUSION INSTRUMENTATION N/A 11/10/2017    Procedure: LUMBAR SPINAL FUSION ;  Surgeon: Gopi Man MD;  Location:  [facility];  Service:    • REPLACEMENT TOTAL KNEE BILATERAL Bilateral    • TOTAL HIP ARTHROPLASTY Right 9/9/2019    Procedure: TOTAL ANTERIOR RIGHT HIP ARTHROPLASTY;  Surgeon: Darrin New MD;  Location:  [facility];  Service: Orthopedics       Therapy Treatment    Rehabilitation Treatment Summary     Row Name 09/11/19 1355 09/11/19 0959          Treatment Time/Intention    Discipline  occupational therapist  -ST  physical therapist  -     Document Type  therapy note (daily note)  -ST  therapy note (daily note)  -     Subjective Information  no complaints  -ST  complains of;weakness  -MJ     Mode of Treatment  individual therapy;occupational therapy  -ST  physical therapy  -MJ     Patient/Family Observations  sitting [facility]IC w/exit alarm intact  -ST  Pt supine in bed  -     Care Plan Review  care plan/treatment goals reviewed;risks/benefits reviewed;current/potential barriers reviewed;patient/other agree to care plan  -ST  patient/other agree to care plan  -     Care Plan Review, Other Participant(s)  --  spouse  -MJ     Therapy Frequency (OT Eval)  daily  -ST  --     Patient Effort  good  -ST  good  -MJ     Existing Precautions/Restrictions  fall;hip, anterior;right;other (see comments)  (Significant)  acute R sided weakness from new CVA  -ST  fall;right;hip, anterior;other (see comments)  (Significant)  R sided weakness  -MJ     Treatment Considerations/Comments   use of platform rwx d/t R UE weakness  -ST  --     Patient Response to Treatment  --  Pt with improved R LE strength and knee buckling mostly resolved  (Significant)   -MJ     Recorded by [ST] Claud[facility] Vance, OTR 09/11/19 1428 [MJ] Henrique Clements, PT 09/11/19 1044     Row Name 09/11/19 1355 09/11/19 0959          Cognitive Assessment/Intervention- PT/OT    Affect/Mental Status (Cognitive)  WNL  -ST  WFL  -MJ     Orientation Status (Cognition)  oriented x 4  -ST  oriented x 4  -MJ     Follows Commands (Cognition)  WNL  -ST  follows one step commands;over 90% accuracy;verbal cues/prompting required;repetition of directions required  -MJ     Cognitive Function (Cognitive)  attention deficit  -ST  --     Attention Deficit (Cognitive)  mild deficit;distractible in quiet environment;requires cues/redirection to task  -ST  --     Recorded by [ST] Claudia Vance, OTR 09/11/19 1428 [MJ] Henrique Clements, PT 09/11/19 1044     Row Name 09/11/19 0959             Safety Issues, Functional Mobility    Impairments Affecting Function (Mobility)  balance;coordination;endurance/activity tolerance;pain;range of motion (ROM);strength  -MJ      Recorded by [MJ] Henrique Clements, PT 09/11/19 1044      Row Name 09/11/19 1355 09/11/19 0959          Mobility Assessment/Intervention    Extremity Weight-bearing Status  right lower extremity  -ST  --     Right Lower Extremity (Weight-bearing Status)  weight-bearing as tolerated (WBAT)  -ST  weight-bearing as tolerated (WBAT)  -MJ     Recorded by [ST] [facility]ia Vance, OTR 09/11/19 1428 [MJ] Henrique Clements, PT 09/11/19 1044     Row Name 09/11/19 1355 09/11/19 0959          Bed Mobility Assessment/Treatment    Supine-Sit [place] (Bed Mobility)  --  maximum assist (25% patient effort);verbal cues  -     Sit-Supine [place] (Bed Mobility)  --  not tested Pt UI  -MJ     Bed Mobility, Safety Issues  --  decreased use of arms for pushing/pulling;decreased use of legs for bridging/pushing  -      Assistive Device (Bed Mobility)  --  bed rails;head of bed elevated  -MJ     Comment (Bed Mobility)  [facility] at arrival   -ST  Verbal cues for sequencing, assist with R LE off EOB and with trunk to come to upright sitting  -MJ     Recorded by [ST] [facility] Vance, OTR 09/11/19 1428 [MJ] Henrique Clements, PT 09/11/19 1044     Row Name 09/11/19 0959             Transfer Assessment/Treatment    Comment (Transfers)  Verbal cues for correct hand placement and to step R LE out prior to t/f. Verbal cues and assist for R hand placement on platform walker. Performed x2 reps. Assisted pt to BSC, verbal cues reach back for BSC prior to t/f. Pt dependent with hygiene after toileting  -MJ      Recorded by [MJ] Henrique Clements, PT 09/11/19 1044      Row Name 09/11/19 0959             Sit-Stand Transfer    Sit-Stand [place] (Transfers)  moderate assist (50% patient effort);2 person assist;verbal cues  -MJ      Assistive Device (Sit-Stand Transfers)  walker, rolling platform  -MJ      Recorded by [MJ] Henrique Clements, PT 09/11/19 1044      Row Name 09/11/19 0959             Stand-Sit Transfer    Stand-Sit [place] (Transfers)  moderate assist (50% patient effort);2 person assist;verbal cues  -MJ      Assistive Device (Stand-Sit Transfers)  walker, rolling platform  -MJ      Recorded by [MJ] Henrique Clements, PT 09/11/19 1044      Row Name 09/11/19 0959             Toilet Transfer    Type (Toilet Transfer)  sit-stand;stand-sit  -MJ      [place] Level (Toilet Transfer)  maximum assist (25% patient effort);2 person assist;verbal cues  -MJ      Assistive Device (Toilet Transfer)  commode, bedside without drop arms;grab bars/safety frame;walker, rolling platform  -MJ      Recorded by [MJ] Henrique Clements, PT 09/11/19 1044      Row Name 09/11/19 0959             Gait/Stairs Assessment/Training    39184 - Gait Training Minutes   14  -MJ      [place] Level (Gait)  moderate assist (50% patient effort);2 person assist;verbal cues  -MJ       Assistive Device (Gait)  walker, rolling platform  -MJ      Distance in Feet (Gait)  20  -MJ      Pattern (Gait)  step-to;step-through  -MJ      Deviations/Abnormal Patterns (Gait)  right sided deviations;antalgic;bilateral deviations;nixon decreased;stride length decreased  -MJ      Bilateral Gait Deviations  forward flexed posture;heel strike decreased;weight shift ability decreased  -MJ      Comment (Gait/Stairs)  Pt initially demo step to gait pattern at slow pace, moments of progression to step through. Pt initially with difficulty picking up R foot during swing, improved with practice. Cues for upright posture, pt with R lateral lean with worsening fatigue. Close chair follow. Knee buckling improved today. Gait limited by fatigue  -MJ      Recorded by [MJ] Henrique Clements, PT 09/11/19 1044      Row Name 09/11/19 1355 09/11/19 0959          Motor Skills Assessment/Interventions    Additional Documentation  Balance (Group);Therapeutic Exercise (Group);Therapeutic Exercise Interventions (Group)  -ST  Balance (Group)  -MJ     Recorded by [ST] [facility], OTR 09/11/19 1428 [MJ] Henrique Clements, PT 09/11/19 1048     Row Name 09/11/19 1355 09/11/19 0959          Therapeutic Exercise    61945 - PT Therapeutic Exercise Minutes  --  9  -MJ     42509 - PT Therapeutic Activity Minutes  --  8  -MJ     66226 -  OT Neuromuscular Reeducation Minutes  25  -ST  --     Recorded by [ST] [facility] Vance, OTR 09/11/19 1428 [MJ] Henrique Clements, PT 09/11/19 1044     Row Name 09/11/19 1355 09/11/19 0959          Therapeutic Exercise    Upper Extremity Range of Motion (Therapeutic Exercise)  wrist flexion/extension, right;forearm supination/pronation, right;elbow flexion/extension, right;shoulder internal/external rotation, right;shoulder abduction/adduction, right;shoulder flexion/extension, right  -ST  --     Hand (Therapeutic Exercise)  thumb finger opposition, right;finger flexion/extension, right  -ST  --     Lower Extremity  (Therapeutic Exercise)  --  gluteal sets;heel slides, right;quad sets, right;SLR (straight leg raise), right  -     Lower Extremity Range of Motion (Therapeutic Exercise)  --  ankle dorsiflexion/plantar flexion, right;hip abduction/adduction, right  -     Exercise Type (Therapeutic Exercise)  AAROM (active assistive range of motion);AROM (active range of motion);PROM (passive range of motion)  -ST  AAROM (active assistive range of motion)  -MJ     Position (Therapeutic Exercise)  seated  -ST  seated  -MJ     Sets/Reps (Therapeutic Exercise)  1X10   -ST  15x each  -MJ     Comment (Therapeutic Exercise)  pt with only AROM in digits I and II and shoulder flexion/abd; AAROM/PROM in remaining joints; completed WB'ing and compression to all UE jts followed by ROM and practice with  and grasp patterns with each digit   -ST  Cues for technique. ModA w/ SLR  -MJ     Recorded by [ST] [facility]ia Vance, OTR 09/11/19 1428 [MJ] Henrique Clements, PT 09/11/19 1044     Row Name 09/11/19 0959             Balance    Balance  static sitting balance;static standing balance  -MJ      Recorded by [MJ] Henrique Clements, PT 09/11/19 1048      Row Name 09/11/19 1355 09/11/19 0959          Static Sitting Balance    Level of [place] (Unsupported Sitting, Static Balance)  supervision  -ST  supervision  -MJ     Sitting Position (Unsupported Sitting, Static Balance)  --  sitting on edge of bed  -     Comment (Supported Sitting, Static Balance)  seated EOC while completing exercises, cuing for maintaining midline   -ST  --     Recorded by [ST] [facility] Vance, OTR 09/11/19 1428 [MJ] Henrique Clements, PT 09/11/19 1048     Row Name 09/11/19 0959             Static Standing Balance    Level of [place] (Supported Standing, Static Balance)  minimal assist, 75% patient effort;2 person assist  -      Assistive Device Utilized (Supported Standing, Static Balance)  walker, rolling platform  -MJ      Comment (Supported Standing, Static Balance)   With fatigue, pt tends to lean to R  -MJ      Recorded by [MJ] Henrique Clements, PT 09/11/19 1048      Row Name 09/11/19 1355             Fine Motor Testing & Training    Comment, Fine Motor Coordination  progressing toward opposition of digits I/II   -ST      Recorded by [ST] [facility]ance, OTR 09/11/19 1428      Row Name 09/11/19 1355 09/11/19 0959          Positioning and Restraints    Pre-Treatment Position  sitting in chair/recliner  -ST  in bed  -MJ     Post Treatment Position  chair  -ST  chair  -MJ     In Chair  notified nsg;reclined;call light within reach;encouraged to call for assist;exit alarm on;with family/caregiver;RUE elevated  -ST  notified nsg;reclined;call light within reach;encouraged to call for assist;exit alarm on;with family/caregiver  -MJ     Recorded by [ST] [facility] Vance, OTR 09/11/19 1428 [MJ] Henrique Clements, PT 09/11/19 1044     Row Name 09/11/19 0959             Pain Scale: Numbers Pre/Post-Treatment    Pain Scale: Numbers, Pretreatment  3/10  -MJ      Pain Scale: Numbers, Post-Treatment  4/10  -MJ      Pain Location - Side  Right  -MJ      Pain Location  hip  -MJ      Pain Intervention(s)  Repositioned;Ambulation/increased activity;Cold pack  -MJ      Recorded by [MJ] Henrique Clements, PT 09/11/19 1044      Row Name 09/11/19 1355             Pain Scale: FACES Pre/Post-Treatment    Pain: FACES Scale, Pretreatment  0-->no hurt  -ST      Pain: FACES Scale, Post-Treatment  0-->no hurt  -ST      Recorded by [ST] [facility]ia Vance, OTR 09/11/19 1428      Row Name                Wound 09/09/19 1352 Right hip Incision    Wound - Properties Group Date first assessed: 09/09/19 [KD] Time first assessed: 1352 [KD] Side: Right [KD] Location: hip [KD] Primary Wound Type: Incision [KD] Recorded by:  [KD] Blas Pizarro RN 09/09/19 1352    Row Name 09/11/19 0959             Plan of Care Review    Plan of Care Reviewed With  patient;spouse  -MJ      Recorded by [MJ] Henrique Clements, PT 09/11/19 9158       Row Name 09/11/19 0959             Outcome Summary/Treatment Plan (PT)    Daily Summary of Progress (PT)  progress toward functional goals is good  -MJ      Recorded by [CLAY] Henrique Clements, PT 09/11/19 1044        User Key  (r) = Recorded By, (t) = Taken By, (c) = Cosigned By    Initials Name Effective Dates Discipline    ST Claudia Vance, OTR 06/10/18 -  OT    Henrique Alfaro, PT 04/03/18 -  PT    Blas Gray RN 06/18/19 -  Nurse        Wound 09/09/19 1352 Right hip Incision (Active)   Dressing Appearance no drainage;intact;dry 9/11/2019  8:15 AM   Closure Liquid skin adhesive 9/10/2019  8:26 PM   Base dry;clean 9/10/2019  8:26 PM   Drainage Amount none 9/10/2019  8:26 PM       Occupational Therapy Education     Title: PT OT SLP Therapies (Done)     Topic: Occupational Therapy (Done)     Point: ADL training (Done)     Description: Instruct learner(s) on proper safety adaptation and remediation techniques during self care or transfers.   Instruct in proper use of assistive devices.    Learning Progress Summary           Patient Acceptance, E,TB,D, VU,DU by ST at 9/11/2019  1:55 PM    Comment:  see flow sheet    Eager, E,TB,D, VU,NR by AR at 9/10/2019  3:00 PM                   Point: Home exercise program (Done)     Description: Instruct learner(s) on appropriate technique for monitoring, assisting and/or progressing therapeutic exercises/activities.    Learning Progress Summary           Patient Acceptance, E,TB,D, VU,DU by ST at 9/11/2019  1:55 PM    Comment:  see flow sheet    Eager, E,TB,D, VU,NR by AR at 9/10/2019  3:00 PM                   Point: Precautions (Done)     Description: Instruct learner(s) on prescribed precautions during self-care and functional transfers.    Learning Progress Summary           Patient Acceptance, E,TB,D, VU,DU by ST at 9/11/2019  1:55 PM    Comment:  see flow sheet    Eager, E,TB,D, VU,NR by AR at 9/10/2019  3:00 PM                   Point: Body mechanics (Done)      Description: Instruct learner(s) on proper positioning and spine alignment during self-care, functional mobility activities and/or exercises.    Learning Progress Summary           Patient ESVIN Jason,ELVIE,ARPITA, SAVANNAH,DU by  at 9/11/2019  1:55 PM    Comment:  see flow sheet    ESVIN Patel,ELVIE,ARPITA, SAVANNAH,NR by AR at 9/10/2019  3:00 PM                               User Key     Initials Effective Dates Name Provider Type Discipline     06/10/18 -  Claudia Vance OTR Occupational Therapist OT    AR 06/22/15 -  Jennifer Odonnell, OT Occupational Therapist OT                OT Recommendation and Plan  Therapy Frequency (OT Eval): daily  Plan of Care Review  Plan of Care Reviewed With: patient  Plan of Care Reviewed With: patient  Outcome Summary: Pt educated on and completed in-depth RUE strengthening/ROM/NMR HEP w/use of compression to joints, WB'ing and PROM, AAROM, and AROM as appropriate.   Outcome Measures     Row Name 09/11/19 1355 09/11/19 0959 09/10/19 1507       How much help from another person do you currently need...    Turning from your back to your side while in flat bed without using bedrails?  --  2  -MJ  2  -MJ    Moving from lying on back to sitting on the side of a flat bed without bedrails?  --  2  -MJ  2  -MJ    Moving to and from a bed to a chair (including a wheelchair)?  --  2  -MJ  2  -MJ    Standing up from a chair using your arms (e.g., wheelchair, bedside chair)?  --  2  -MJ  2  -MJ    Climbing 3-5 steps with a railing?  --  1  -MJ  1  -MJ    To walk in hospital room?  --  2  -MJ  2  -MJ    AM-PAC 6 Clicks Score (PT)  --  11  -MJ  11  -MJ       How much help from another is currently needed...    Putting on and taking off regular lower body clothing?  2  -ST  --  --    Bathing (including washing, rinsing, and drying)  2  -ST  --  --    Toileting (which includes using toilet bed pan or urinal)  2  -ST  --  --    Putting on and taking off regular upper body clothing  2  -ST  --  --    Taking care  of personal grooming (such as brushing teeth)  3  -ST  --  --    Eating meals  3  -ST  --  --    AM-PAC 6 Clicks Score (OT)  14  -ST  --  --       Modified Reddy Scale    Modified Reddy Scale  4 - Moderately severe disability.  Unable to walk without assistance, and unable to attend to own bodily needs without assistance.  -ST  --  --       Functional Assessment    Outcome Measure Options  AM-PAC 6 Clicks Daily Activity (OT);Modified [place]  -ST  AM-PAC 6 Clicks Basic Mobility (PT)  -MJ  AM-PAC 6 Clicks Basic Mobility (PT)  -MJ    Row Name 09/10/19 1500 09/10/19 0905          How much help from another person do you currently need...    Turning from your back to your side while in flat bed without using bedrails?  --  2  -MJ     Moving from lying on back to sitting on the side of a flat bed without bedrails?  --  2  -MJ     Moving to and from a bed to a chair (including a wheelchair)?  --  2  -MJ     Standing up from a chair using your arms (e.g., wheelchair, bedside chair)?  --  2  -MJ     Climbing 3-5 steps with a railing?  --  1  -MJ     To walk in hospital room?  --  2  -MJ     AM-PAC 6 Clicks Score (PT)  --  11  -MJ        How much help from another is currently needed...    Putting on and taking off regular lower body clothing?  2  -AR  --     Bathing (including washing, rinsing, and drying)  2  -AR  --     Toileting (which includes using toilet bed pan or urinal)  2  -AR  --     Putting on and taking off regular upper body clothing  2  -AR  --     Taking care of personal grooming (such as brushing teeth)  3  -AR  --     Eating meals  3  -AR  --     AM-PAC 6 Clicks Score (OT)  14  -AR  --        Functional Assessment    Outcome Measure Options  AM-PAC 6 Clicks Daily Activity (OT)  -AR  AM-PAC 6 Clicks Basic Mobility (PT)  -MJ       User Key  (r) = Recorded By, (t) = Taken By, (c) = Cosigned By    Initials Name Provider Type    Claudia Tsang OTR Occupational Therapist    Jennifer Castillo, OT  Occupational Therapist    Henrique Alfaro, PT Physical Therapist           Time Calculation:   Time Calculation- OT     Row Name 09/11/19 1355 09/11/19 0959          Time Calculation- OT    OT Start Time  1355  -ST  --     OT Received On  09/11/19 -ST  --     OT Goal Re-Cert Due Date  09/20/19 -ST  --        Timed Charges    21721 -  OT Neuromuscular Reeducation Minutes  25  -ST  --     06854 - Gait Training Minutes   --  14  -MJ       User Key  (r) = Recorded By, (t) = Taken By, (c) = Cosigned By    Initials Name Provider Type    ST [facility] Vance, OTYRIS Occupational Therapist    Henrique Alfaro, PT Physical Therapist        Therapy Charges for Today     Code Description Service Date Service Provider Modifiers Qty    [id number]  OT NEUROMUSC RE EDUCATION EA 15 MIN 9/11/2019 Claudia Vance OTR GO 2               PHIL Guardado  9/11/2019

## 2019-09-11 NOTE — PROGRESS NOTES
"Akua Nieveston  [id number]  1945    /49 (BP Location: Right arm, Patient Position: Lying)   Pulse 73   Temp 98.1 °F (36.7 °C) (Oral)   Resp 17   Ht 162.6 cm (64\")   Wt 86.2 kg (190 lb)   SpO2 97%   BMI 32.61 kg/m²     Lab Results (last 24 hours)     ** No results found for the last 24 hours. **          Patient Care Team:  Salty Hernandez MD as PCP - General (Family Medicine)  Leon Hein MD as Consulting Physician (Neurosurgery)  Perkins, Corinne E, PT as Physical Therapist (Physical Therapy)  Cricket Nettles MD as Consulting Physician (Pain Medicine)    SUBJECTIVE  Pain well controlled at hip  Still has weakness complaints in the right hand with   Only short ambulation with assistance and physical therapy    PHYSICAL EXAM  No major changes to neurovascular status right lower extremity  Good sensation in median and ulnar distribution right hand  Active EPL and finger flexors but unable to abduct the fingers  (Hand exam is essentially unchanged from yesterday)      Status post total replacement of right hip    Anxiety and depression    HTN (hypertension)    Renal insufficiency    Leukocytosis, likely reactive    Arthritis of right hip    HO A-fib (CMS/HCC)    YUNIOR treated with BiPAP    Acute blood loss anemia, mild, asymptomatic      PLAN / DISPOSITION:  Possible ulnar neuropathy from intraoperative positioning versus acute stroke symptoms  Right hip incision is well-healing.  She may transfer to rehab when bed available and arrangements otherwise complete    Darrin New MD  09/11/19  10:01 AM  "

## 2019-09-11 NOTE — PLAN OF CARE
Problem: Patient Care Overview  Goal: Plan of Care Review  Outcome: Ongoing (interventions implemented as appropriate)   09/11/19 9766   Plan of Care Review   Progress no change   OTHER   Outcome Summary received report from previous shift. pt initially off floor to MRI. Pt continues with decreased movement of fingers 1,2 & 3 of right hand. some movement but no strength. patient requested pain medication x1 when returned from MRI due to multiple movements because of equipment. pain was controlled and patient was able to sleep/rest >6hrs this shift. will continue to monitor patient for changes.   Coping/Psychosocial   Plan of Care Reviewed With patient       Problem: Skin Injury Risk (Adult)  Goal: Identify Related Risk Factors and Signs and Symptoms  Outcome: Ongoing (interventions implemented as appropriate)    Goal: Skin Health and Integrity  Outcome: Ongoing (interventions implemented as appropriate)      Problem: Fall Risk (Adult)  Goal: Identify Related Risk Factors and Signs and Symptoms  Outcome: Ongoing (interventions implemented as appropriate)    Goal: Absence of Fall  Outcome: Ongoing (interventions implemented as appropriate)

## 2019-09-11 NOTE — THERAPY TREATMENT NOTE
Acute Care - Physical Therapy Treatment Note  [facility]     Patient Name: Akua Torres  : 1945  MRN: [id number]  Today's Date: 2019  Onset of Illness/Injury or Date of Surgery: 19     Referring Physician: WILFREDO Dale    Admit Date: 2019    Visit Dx:    ICD-10-CM ICD-9-CM   1. Impaired functional mobility, balance, gait, and endurance Z74.09 V49.89   2. Impaired mobility and ADLs Z74.09 799.89     Patient Active Problem List   Diagnosis   • Degenerative disc disease, lumbar   • Lumbar stenosis with neurogenic claudication   • History of lumbar fusion   • Spondylosis of lumbar region without myelopathy or radiculopathy   • Mild obesity   • Physical deconditioning   • Idiopathic gout   • Anxiety and depression   • Greater trochanteric bursitis of both hips   • Status post total bilateral knee replacement   • Back pain   • HTN (hypertension)   • HLD (hyperlipidemia)   • Prediabetes   • S/P lumbar spinal fusion   • Renal insufficiency   • Leukocytosis, likely reactive   • Altered mental status. Felt to be drug related (trazodone, opiates, benzodiazepine)   • Acute renal failure due to hypotension. Now resolved (ARF) (CMS/HCC)   • Depression   • Encounter for Medicare annual wellness exam   • Arthritis of right hip   • HO A-fib (CMS/HCC)   • YUNIOR treated with BiPAP   • Status post total replacement of right hip   • Acute blood loss anemia, mild, asymptomatic       Therapy Treatment    Rehabilitation Treatment Summary     Row Name 19 0959             Treatment Time/Intention    Discipline  physical therapist  -MJ      Document Type  therapy note (daily note)  -MJ      Subjective Information  complains of;weakness  -MJ      Mode of Treatment  physical therapy  -MJ      Patient/Family Observations  Pt supine in bed  -      Care Plan Review  patient/other agree to care plan  -MJ      Care Plan Review, Other Participant(s)  spouse  -MJ      Patient Effort  good  -MJ      Existing  Precautions/Restrictions  fall;right;hip, anterior;other (see comments)  (Significant)  R sided weakness  -MJ      Patient Response to Treatment  Pt with improved R LE strength and knee buckling mostly resolved  (Significant)   -MJ      Recorded by [MJ] Henrique Clements, PT 09/11/19 1044      Row Name 09/11/19 0959             Cognitive Assessment/Intervention- PT/OT    Affect/Mental Status (Cognitive)  WFL  -      Orientation Status (Cognition)  oriented x 4  -MJ      Follows Commands (Cognition)  follows one step commands;over 90% accuracy;verbal cues/prompting required;repetition of directions required  -MJ      Recorded by [MJ] Henrique Clements, PT 09/11/19 1044      Row Name 09/11/19 0959             Safety Issues, Functional Mobility    Impairments Affecting Function (Mobility)  balance;coordination;endurance/activity tolerance;pain;range of motion (ROM);strength  -MJ      Recorded by [MJ] Henrique Clements, PT 09/11/19 1044      Row Name 09/11/19 0959             Mobility Assessment/Intervention    Right Lower Extremity (Weight-bearing Status)  weight-bearing as tolerated (WBAT)  -MJ      Recorded by [MJ] Henrique Clements, PT 09/11/19 1044      Row Name 09/11/19 0959             Bed Mobility Assessment/Treatment    Supine-Sit [place] (Bed Mobility)  maximum assist (25% patient effort);verbal cues  -MJ      Sit-Supine [place] (Bed Mobility)  not tested Pt UIC  -MJ      Bed Mobility, Safety Issues  decreased use of arms for pushing/pulling;decreased use of legs for bridging/pushing  -      Assistive Device (Bed Mobility)  bed rails;head of bed elevated  -      Comment (Bed Mobility)  Verbal cues for sequencing, assist with R LE off EOB and with trunk to come to upright sitting  -MJ      Recorded by [MJ] Henrique Clements, PT 09/11/19 1044      Row Name 09/11/19 0959             Transfer Assessment/Treatment    Comment (Transfers)  Verbal cues for correct hand placement and to step R LE out prior to t/f. Verbal cues  and assist for R hand placement on platform walker. Performed x2 reps. Assisted pt to BSC, verbal cues reach back for BSC prior to t/f. Pt dependent with hygiene after toileting  -MJ      Recorded by [MJ] Henrique Clements, PT 09/11/19 1044      Row Name 09/11/19 0959             Sit-Stand Transfer    Sit-Stand [place] (Transfers)  moderate assist (50% patient effort);2 person assist;verbal cues  -MJ      Assistive Device (Sit-Stand Transfers)  walker, rolling platform  -MJ      Recorded by [MJ] Henrique Clements, PT 09/11/19 1044      Row Name 09/11/19 0959             Stand-Sit Transfer    Stand-Sit [place] (Transfers)  moderate assist (50% patient effort);2 person assist;verbal cues  -MJ      Assistive Device (Stand-Sit Transfers)  walker, rolling platform  -MJ      Recorded by [MJ] Henrique Clements, PT 09/11/19 1044      Row Name 09/11/19 0959             Toilet Transfer    Type (Toilet Transfer)  sit-stand;stand-sit  -MJ      [place] Level (Toilet Transfer)  maximum assist (25% patient effort);2 person assist;verbal cues  -MJ      Assistive Device (Toilet Transfer)  commode, bedside without drop arms;grab bars/safety frame;walker, rolling platform  -MJ      Recorded by [MJ] Henrique Clements, PT 09/11/19 1044      Row Name 09/11/19 0959             Gait/Stairs Assessment/Training    88236 - Gait Training Minutes   14  -MJ      [place] Level (Gait)  moderate assist (50% patient effort);2 person assist;verbal cues  -MJ      Assistive Device (Gait)  walker, rolling platform  -MJ      Distance in Feet (Gait)  20  -MJ      Pattern (Gait)  step-to;step-through  -MJ      Deviations/Abnormal Patterns (Gait)  right sided deviations;antalgic;bilateral deviations;nixon decreased;stride length decreased  -MJ      Bilateral Gait Deviations  forward flexed posture;heel strike decreased;weight shift ability decreased  -MJ      Comment (Gait/Stairs)  Pt initially demo step to gait pattern at slow pace, moments of  progression to step through. Pt initially with difficulty picking up R foot during swing, improved with practice. Cues for upright posture, pt with R lateral lean with worsening fatigue. Close chair follow. Knee buckling improved today. Gait limited by fatigue  -MJ      Recorded by [MJ] Henrique Clements, PT 09/11/19 1044      Row Name 09/11/19 0959             Motor Skills Assessment/Interventions    Additional Documentation  Balance (Group)  -MJ      Recorded by [MJ] Henrique Clements, PT 09/11/19 1048      Row Name 09/11/19 0959             Therapeutic Exercise    63061 - PT Therapeutic Exercise Minutes  9  -MJ      58362 - PT Therapeutic Activity Minutes  8  -MJ      Recorded by [MJ] Henrique Clements, PT 09/11/19 1044      Row Name 09/11/19 0959             Therapeutic Exercise    Lower Extremity (Therapeutic Exercise)  gluteal sets;heel slides, right;quad sets, right;SLR (straight leg raise), right  -MJ      Lower Extremity Range of Motion (Therapeutic Exercise)  ankle dorsiflexion/plantar flexion, right;hip abduction/adduction, right  -MJ      Exercise Type (Therapeutic Exercise)  AAROM (active assistive range of motion)  -MJ      Position (Therapeutic Exercise)  seated  -MJ      Sets/Reps (Therapeutic Exercise)  15x each  -MJ      Comment (Therapeutic Exercise)  Cues for technique. ModA w/ SLR  -MJ      Recorded by [MJ] Henrique Clements, PT 09/11/19 1044      Row Name 09/11/19 0959             Balance    Balance  static sitting balance;static standing balance  -MJ      Recorded by [MJ] Henrique Clements, PT 09/11/19 1048      Row Name 09/11/19 0959             Static Sitting Balance    Level of [place] (Unsupported Sitting, Static Balance)  supervision  -MJ      Sitting Position (Unsupported Sitting, Static Balance)  sitting on edge of bed  -MJ      Recorded by [MJ] Henrique Clements, PT 09/11/19 1048      Row Name 09/11/19 0959             Static Standing Balance    Level of [place] (Supported Standing, Static Balance)   minimal assist, 75% patient effort;2 person assist  -MJ      Assistive Device Utilized (Supported Standing, Static Balance)  walker, rolling platform  -MJ      Comment (Supported Standing, Static Balance)  With fatigue, pt tends to lean to R  -MJ      Recorded by [MJ] Henrique Clements, PT 09/11/19 1048      Row Name 09/11/19 0959             Positioning and Restraints    Pre-Treatment Position  in bed  -MJ      Post Treatment Position  chair  -MJ      In Chair  notified nsg;reclined;call light within reach;encouraged to call for assist;exit alarm on;with family/caregiver  -MJ      Recorded by [MJ] Henrique Clements, PT 09/11/19 1044      Row Name 09/11/19 0959             Pain Scale: Numbers Pre/Post-Treatment    Pain Scale: Numbers, Pretreatment  3/10  -MJ      Pain Scale: Numbers, Post-Treatment  4/10  -MJ      Pain Location - Side  Right  -MJ      Pain Location  hip  -MJ      Pain Intervention(s)  Repositioned;Ambulation/increased activity;Cold pack  -MJ      Recorded by [MJ] Henrique Clements, PT 09/11/19 1044      Row Name                Wound 09/09/19 1352 Right hip Incision    Wound - Properties Group Date first assessed: 09/09/19 [KD] Time first assessed: 1352 [KD] Side: Right [KD] Location: hip [KD] Primary Wound Type: Incision [KD] Recorded by:  [KD] Blas Pizarro RN 09/09/19 1352    Row Name 09/11/19 0959             Plan of Care Review    Plan of Care Reviewed With  patient;spouse  -MJ      Recorded by [MJ] Henrique Clements, PT 09/11/19 1044      Row Name 09/11/19 0959             Outcome Summary/Treatment Plan (PT)    Daily Summary of Progress (PT)  progress toward functional goals is good  -MJ      Recorded by [MJ] Henrique Clements, PT 09/11/19 1044        User Key  (r) = Recorded By, (t) = Taken By, (c) = Cosigned By    Initials Name Effective Dates Discipline    Henrique Alfaro, PT 04/03/18 -  PT    Blas Gray RN 06/18/19 -  Nurse          Wound 09/09/19 1352 Right hip Incision (Active)   Dressing  Appearance no drainage;intact;dry 9/11/2019  8:15 AM   Closure Liquid skin adhesive 9/10/2019  8:26 PM   Base dry;clean 9/10/2019  8:26 PM   Drainage Amount none 9/10/2019  8:26 PM           Physical Therapy Education     Title: PT OT SLP Therapies (Done)     Topic: Physical Therapy (Done)     Point: Mobility training (Done)     Learning Progress Summary           Patient Acceptance, E,D, VU,NR by CLAY at 9/11/2019  9:59 AM    Comment:  Reviewed HEP, gait mechanics, benefits of mobility, safety with mobility    Acceptance, E,D, VU,NR by MJ at 9/10/2019  3:07 PM    Comment:  Reviewed HEP, gait mechanics, benefits of mobility, safety with mobility    Acceptance, E,D, VU,NR by CLAY at 9/10/2019  9:05 AM    Comment:  Reviewed anterior hip precautions, gait mechanics, benefits of mobility    Acceptance, E,D, VU,NR by REJI at 9/9/2019  5:05 PM    Comment:  Educated on anterior hip precautions, weight bearing status, correct supine to sit t/f technique, correct sit<->stand t/f technique, correct gait mechanics, and progression of POC.   Family Acceptance, E,D, VU,NR by REJI at 9/9/2019  5:05 PM    Comment:  Educated on anterior hip precautions, weight bearing status, correct supine to sit t/f technique, correct sit<->stand t/f technique, correct gait mechanics, and progression of POC.   Significant Other Acceptance, E,D, VU,NR by CLAY at 9/11/2019  9:59 AM    Comment:  Reviewed HEP, gait mechanics, benefits of mobility, safety with mobility                   Point: Home exercise program (Done)     Learning Progress Summary           Patient Acceptance, E,D, VU,NR by CLAY at 9/11/2019  9:59 AM    Comment:  Reviewed HEP, gait mechanics, benefits of mobility, safety with mobility    Acceptance, E,D, VU,NR by CLAY at 9/10/2019  3:07 PM    Comment:  Reviewed HEP, gait mechanics, benefits of mobility, safety with mobility    Acceptance, E,D, VU,NR by CLAY at 9/10/2019  9:05 AM    Comment:  Reviewed anterior hip precautions, gait mechanics,  benefits of mobility    Acceptance, E,D, VU,NR by LR at 9/9/2019  5:05 PM    Comment:  Educated on anterior hip precautions, weight bearing status, correct supine to sit t/f technique, correct sit<->stand t/f technique, correct gait mechanics, and progression of POC.   Family Acceptance, E,D, VU,NR by LR at 9/9/2019  5:05 PM    Comment:  Educated on anterior hip precautions, weight bearing status, correct supine to sit t/f technique, correct sit<->stand t/f technique, correct gait mechanics, and progression of POC.   Significant Other Acceptance, E,D, VU,NR by CLAY at 9/11/2019  9:59 AM    Comment:  Reviewed HEP, gait mechanics, benefits of mobility, safety with mobility                   Point: Body mechanics (Done)     Learning Progress Summary           Patient Acceptance, E,D, VU,NR by CLAY at 9/11/2019  9:59 AM    Comment:  Reviewed HEP, gait mechanics, benefits of mobility, safety with mobility    Acceptance, E,D, VU,NR by CLAY at 9/10/2019  3:07 PM    Comment:  Reviewed HEP, gait mechanics, benefits of mobility, safety with mobility    Acceptance, E,D, VU,NR by CLAY at 9/10/2019  9:05 AM    Comment:  Reviewed anterior hip precautions, gait mechanics, benefits of mobility    Acceptance, E,D, VU,NR by LR at 9/9/2019  5:05 PM    Comment:  Educated on anterior hip precautions, weight bearing status, correct supine to sit t/f technique, correct sit<->stand t/f technique, correct gait mechanics, and progression of POC.   Family Acceptance, E,D, VU,NR by LR at 9/9/2019  5:05 PM    Comment:  Educated on anterior hip precautions, weight bearing status, correct supine to sit t/f technique, correct sit<->stand t/f technique, correct gait mechanics, and progression of POC.   Significant Other Acceptance, E,D, VU,NR by CLAY at 9/11/2019  9:59 AM    Comment:  Reviewed HEP, gait mechanics, benefits of mobility, safety with mobility                   Point: Precautions (Done)     Learning Progress Summary           Patient Acceptance,  E,D, VU,NR by  at 9/11/2019  9:59 AM    Comment:  Reviewed HEP, gait mechanics, benefits of mobility, safety with mobility    Acceptance, E,D, VU,NR by  at 9/10/2019  3:07 PM    Comment:  Reviewed HEP, gait mechanics, benefits of mobility, safety with mobility    Acceptance, E,D, VU,NR by  at 9/10/2019  9:05 AM    Comment:  Reviewed anterior hip precautions, gait mechanics, benefits of mobility    Acceptance, E,D, VU,NR by  at 9/9/2019  5:05 PM    Comment:  Educated on anterior hip precautions, weight bearing status, correct supine to sit t/f technique, correct sit<->stand t/f technique, correct gait mechanics, and progression of POC.   Family Acceptance, E,D, VU,NR by  at 9/9/2019  5:05 PM    Comment:  Educated on anterior hip precautions, weight bearing status, correct supine to sit t/f technique, correct sit<->stand t/f technique, correct gait mechanics, and progression of POC.   Significant Other Acceptance, E,D, VU,NR by  at 9/11/2019  9:59 AM    Comment:  Reviewed HEP, gait mechanics, benefits of mobility, safety with mobility                               User Key     Initials Effective Dates Name Provider Type Discipline     06/19/15 -  Teresa Blackwell, PT Physical Therapist PT     04/03/18 -  Henrique Clements, PT Physical Therapist PT                PT Recommendation and Plan     Outcome Summary/Treatment Plan (PT)  Daily Summary of Progress (PT): progress toward functional goals is good  Plan of Care Reviewed With: patient, spouse  Progress: improving  Outcome Summary: Pt increased gait distance to 20 feet with ModAx2 and rolling platform walker. Pt still with R side weakness, but R LE strength improving and no knee buckling noted today. Will continue to progress mobility as able.   Outcome Measures     Row Name 09/11/19 0959 09/10/19 1507 09/10/19 1500       How much help from another person do you currently need...    Turning from your back to your side while in flat bed without using  bedrails?  2  -MJ  2  -MJ  --    Moving from lying on back to sitting on the side of a flat bed without bedrails?  2  -MJ  2  -MJ  --    Moving to and from a bed to a chair (including a wheelchair)?  2  -MJ  2  -MJ  --    Standing up from a chair using your arms (e.g., wheelchair, bedside chair)?  2  -MJ  2  -MJ  --    Climbing 3-5 steps with a railing?  1  -MJ  1  -MJ  --    To walk in hospital room?  2  -MJ  2  -MJ  --    AM-PAC 6 Clicks Score (PT)  11  -MJ  11  -MJ  --       How much help from another is currently needed...    Putting on and taking off regular lower body clothing?  --  --  2  -AR    Bathing (including washing, rinsing, and drying)  --  --  2  -AR    Toileting (which includes using toilet bed pan or urinal)  --  --  2  -AR    Putting on and taking off regular upper body clothing  --  --  2  -AR    Taking care of personal grooming (such as brushing teeth)  --  --  3  -AR    Eating meals  --  --  3  -AR    AM-PAC 6 Clicks Score (OT)  --  --  14  -AR       Functional Assessment    Outcome Measure Options  AM-PAC 6 Clicks Basic Mobility (PT)  -MJ  AM-PAC 6 Clicks Basic Mobility (PT)  -MJ  AM-PAC 6 Clicks Daily Activity (OT)  -AR    Row Name 09/10/19 0905             How much help from another person do you currently need...    Turning from your back to your side while in flat bed without using bedrails?  2  -MJ      Moving from lying on back to sitting on the side of a flat bed without bedrails?  2  -MJ      Moving to and from a bed to a chair (including a wheelchair)?  2  -MJ      Standing up from a chair using your arms (e.g., wheelchair, bedside chair)?  2  -MJ      Climbing 3-5 steps with a railing?  1  -MJ      To walk in hospital room?  2  -MJ      AM-PAC 6 Clicks Score (PT)  11  -MJ         Functional Assessment    Outcome Measure Options  AM-PAC 6 Clicks Basic Mobility (PT)  -MJ        User Key  (r) = Recorded By, (t) = Taken By, (c) = Cosigned By    Initials Name Provider Type    YARITZA Odonnell  Jennifer Pederson, OT Occupational Therapist    Henrique Alfaro, PT Physical Therapist         Time Calculation:   PT Charges     Row Name 09/11/19 0959             Time Calculation    Start Time  0959  -MJ      PT Received On  09/11/19  -MJ      PT Goal Re-Cert Due Date  09/19/19  -MJ         Time Calculation- PT    Total Timed Code Minutes- PT  31 minute(s)  -MJ         Timed Charges    11155 - PT Therapeutic Exercise Minutes  9  -MJ      50119 - Gait Training Minutes   14  -MJ      11572 - PT Therapeutic Activity Minutes  8  -MJ        User Key  (r) = Recorded By, (t) = Taken By, (c) = Cosigned By    Initials Name Provider Type    Henrique Alfaro, PT Physical Therapist        Therapy Charges for Today     Code Description Service Date Service Provider Modifiers Qty    28678719747 HC PT THER PROC EA 15 MIN 9/10/2019 Henrique Clements, PT GP 1    25061406281 HC GAIT TRAINING EA 15 MIN 9/10/2019 Henrique Clements, PT GP 1    74226576321 HC PT THER SUPP EA 15 MIN 9/10/2019 Henrique Clements, PT GP 2    55416123115 HC PT THER PROC EA 15 MIN 9/10/2019 Henrique Clements, PT GP 1    90442743070 HC GAIT TRAINING EA 15 MIN 9/10/2019 Henrique Clements, PT GP 1    24013807556 HC PT THER PROC EA 15 MIN 9/11/2019 Henrique Clements, PT GP 1    50045234248 HC GAIT TRAINING EA 15 MIN 9/11/2019 Henrique Clements, PT GP 1    72748395746 HC PT THER SUPP EA 15 MIN 9/11/2019 Henrique Clements, PT GP 2          PT G-Codes  Outcome Measure Options: AM-PAC 6 Clicks Basic Mobility (PT)  AM-PAC 6 Clicks Score (PT): 11  AM-PAC 6 Clicks Score (OT): 14    Henrique Clements PT  9/11/2019

## 2019-09-12 ENCOUNTER — APPOINTMENT (OUTPATIENT)
Dept: MRI IMAGING | Facility: HOSPITAL | Age: 74
End: 2019-09-12

## 2019-09-12 ENCOUNTER — APPOINTMENT (OUTPATIENT)
Dept: CT IMAGING | Facility: HOSPITAL | Age: 74
End: 2019-09-12

## 2019-09-12 LAB
CHOLEST SERPL-MCNC: 132 MG/DL (ref 0–200)
CREAT BLD-MCNC: 1.02 MG/DL (ref 0.57–1)
GFR SERPL CREATININE-BSD FRML MDRD: 53 ML/MIN/1.73
GLUCOSE BLDC GLUCOMTR-MCNC: 128 MG/DL (ref 70–130)
HBA1C MFR BLD: 5.2 % (ref 4.8–5.6)
HDLC SERPL-MCNC: 34 MG/DL (ref 40–60)
LDLC SERPL CALC-MCNC: 51 MG/DL (ref 0–100)
LDLC/HDLC SERPL: 1.5 {RATIO}
TRIGL SERPL-MCNC: 235 MG/DL (ref 0–150)
UFH PPP CHRO-ACNC: 0.1 IU/ML (ref 0.3–0.7)
UFH PPP CHRO-ACNC: 0.1 IU/ML (ref 0.3–0.7)
UFH PPP CHRO-ACNC: 0.2 IU/ML (ref 0.3–0.7)
VLDLC SERPL-MCNC: 47 MG/DL

## 2019-09-12 PROCEDURE — 82565 ASSAY OF CREATININE: CPT | Performed by: PSYCHIATRY & NEUROLOGY

## 2019-09-12 PROCEDURE — 99232 SBSQ HOSP IP/OBS MODERATE 35: CPT | Performed by: PSYCHIATRY & NEUROLOGY

## 2019-09-12 PROCEDURE — 0 GADOBENATE DIMEGLUMINE 529 MG/ML SOLUTION: Performed by: PSYCHIATRY & NEUROLOGY

## 2019-09-12 PROCEDURE — 82962 GLUCOSE BLOOD TEST: CPT

## 2019-09-12 PROCEDURE — 83036 HEMOGLOBIN GLYCOSYLATED A1C: CPT | Performed by: PSYCHIATRY & NEUROLOGY

## 2019-09-12 PROCEDURE — 80061 LIPID PANEL: CPT | Performed by: PSYCHIATRY & NEUROLOGY

## 2019-09-12 PROCEDURE — 85520 HEPARIN ASSAY: CPT

## 2019-09-12 PROCEDURE — 70496 CT ANGIOGRAPHY HEAD: CPT

## 2019-09-12 PROCEDURE — 97110 THERAPEUTIC EXERCISES: CPT

## 2019-09-12 PROCEDURE — 70498 CT ANGIOGRAPHY NECK: CPT

## 2019-09-12 PROCEDURE — 70548 MR ANGIOGRAPHY NECK W/DYE: CPT

## 2019-09-12 PROCEDURE — 99221 1ST HOSP IP/OBS SF/LOW 40: CPT | Performed by: PHYSICIAN ASSISTANT

## 2019-09-12 PROCEDURE — 0 IOPAMIDOL PER 1 ML: Performed by: ORTHOPAEDIC SURGERY

## 2019-09-12 PROCEDURE — 70544 MR ANGIOGRAPHY HEAD W/O DYE: CPT

## 2019-09-12 PROCEDURE — 0042T HC CT CEREBRAL PERFUSION W/WO CONTRAST: CPT

## 2019-09-12 PROCEDURE — 25010000002 HEPARIN (PORCINE) PER 1000 UNITS

## 2019-09-12 PROCEDURE — A9577 INJ MULTIHANCE: HCPCS | Performed by: PSYCHIATRY & NEUROLOGY

## 2019-09-12 PROCEDURE — 97530 THERAPEUTIC ACTIVITIES: CPT

## 2019-09-12 PROCEDURE — 99232 SBSQ HOSP IP/OBS MODERATE 35: CPT | Performed by: INTERNAL MEDICINE

## 2019-09-12 PROCEDURE — 97116 GAIT TRAINING THERAPY: CPT

## 2019-09-12 RX ORDER — HEPARIN SODIUM 1000 [USP'U]/ML
5000 INJECTION, SOLUTION INTRAVENOUS; SUBCUTANEOUS ONCE
Status: COMPLETED | OUTPATIENT
Start: 2019-09-12 | End: 2019-09-12

## 2019-09-12 RX ORDER — CLOPIDOGREL BISULFATE 75 MG/1
300 TABLET ORAL ONCE
Status: COMPLETED | OUTPATIENT
Start: 2019-09-12 | End: 2019-09-12

## 2019-09-12 RX ORDER — CLOPIDOGREL BISULFATE 75 MG/1
75 TABLET ORAL DAILY
Status: DISCONTINUED | OUTPATIENT
Start: 2019-09-13 | End: 2019-09-18 | Stop reason: HOSPADM

## 2019-09-12 RX ADMIN — Medication 10 MG: at 17:07

## 2019-09-12 RX ADMIN — DOCUSATE SODIUM 100 MG: 100 CAPSULE, LIQUID FILLED ORAL at 21:28

## 2019-09-12 RX ADMIN — MIRTAZAPINE 15 MG: 15 TABLET, FILM COATED ORAL at 21:27

## 2019-09-12 RX ADMIN — CLOPIDOGREL BISULFATE 300 MG: 75 TABLET ORAL at 12:35

## 2019-09-12 RX ADMIN — GADOBENATE DIMEGLUMINE 18 ML: 529 INJECTION, SOLUTION INTRAVENOUS at 22:00

## 2019-09-12 RX ADMIN — ESCITALOPRAM OXALATE 20 MG: 20 TABLET ORAL at 08:14

## 2019-09-12 RX ADMIN — DOCUSATE SODIUM 100 MG: 100 CAPSULE, LIQUID FILLED ORAL at 08:14

## 2019-09-12 RX ADMIN — HYDROXYZINE HYDROCHLORIDE 25 MG: 25 TABLET, FILM COATED ORAL at 21:27

## 2019-09-12 RX ADMIN — ATENOLOL 25 MG: 25 TABLET ORAL at 21:27

## 2019-09-12 RX ADMIN — ASPIRIN 81 MG: 81 TABLET, COATED ORAL at 08:14

## 2019-09-12 RX ADMIN — ATORVASTATIN CALCIUM 80 MG: 40 TABLET, FILM COATED ORAL at 21:28

## 2019-09-12 RX ADMIN — HYDROCODONE BITARTRATE AND ACETAMINOPHEN 1 TABLET: 5; 325 TABLET ORAL at 17:06

## 2019-09-12 RX ADMIN — HYDROCODONE BITARTRATE AND ACETAMINOPHEN 1 TABLET: 5; 325 TABLET ORAL at 20:12

## 2019-09-12 RX ADMIN — IOPAMIDOL 115 ML: 755 INJECTION, SOLUTION INTRAVENOUS at 13:15

## 2019-09-12 RX ADMIN — HEPARIN SODIUM 5000 UNITS: 1000 INJECTION, SOLUTION INTRAVENOUS; SUBCUTANEOUS at 04:12

## 2019-09-12 RX ADMIN — HYDROCODONE BITARTRATE AND ACETAMINOPHEN 1 TABLET: 5; 325 TABLET ORAL at 12:35

## 2019-09-12 RX ADMIN — ATENOLOL 25 MG: 25 TABLET ORAL at 08:14

## 2019-09-12 RX ADMIN — SODIUM CHLORIDE, PRESERVATIVE FREE 10 ML: 5 INJECTION INTRAVENOUS at 21:30

## 2019-09-12 NOTE — PLAN OF CARE
Problem: Patient Care Overview  Goal: Plan of Care Review  Outcome: Ongoing (interventions implemented as appropriate)   09/12/19 1142   Plan of Care Review   Progress improving   OTHER   Outcome Summary Pt perform bed mobiltiy max A of 2. STS min A of 2 with platform walker and A for RUE placement. Pt ambulated 5 feet with platformwalker and max A of 2 with pt fatigue quickly and chair brought to pt. Pt require max A to advance RLE with knee buckle noted. SPT max A of 2. HEP reviewed with pt and . Pain 5/10 with mobiltiy, 0/10 at rest. Recommend DC to SNF for rehab when appropriate   Coping/Psychosocial   Plan of Care Reviewed With patient;spouse

## 2019-09-12 NOTE — PLAN OF CARE
Problem: Patient Care Overview  Goal: Plan of Care Review   09/12/19 7709   OTHER   Outcome Summary Pt has been calm, AxO, RUE weakness, prineo dressing to rt hip dry and intact. NIH 3. Pt continues on heparin GTT, has been up to chair. VSS

## 2019-09-12 NOTE — CONSULTS
"Inpatient Neurointerventionalist Consult  Consult performed by: Marie Vargas PA-C  Consult ordered by: Maribel East MD          Referring Provider: Maribel East MD    Patient Care Team:  Salty Hernandez MD as PCP - General (Family Medicine)  Leon Hein MD as Consulting Physician (Neurosurgery)  Perkins, Corinne E, PT as Physical Therapist (Physical Therapy)  Cricket Nettles MD as Consulting Physician (Pain Medicine)    Chief Complaint: Right upper extremity weakness    Subjective .     History of present illness:       This is a 74-year-old woman with a past medical history significant for atrial fibrillation, HTN, HLD, CKD who underwent an elective right total hip arthroplasty on 9/9/2019.  Evening following the patient's surgery, she noticed weakness in her right hand .  Patient did not notice any distal right lower extremity weakness.  Patient hesitant to move right lower extremity secondary to recent hip surgery.  MRI of the brain demonstrated a small infarct in the left prefrontal gyrus.  Carotid duplex demonstrated high-grade left carotid artery stenosis and neuro interventional consult was requested.    Patient denies any history of strokes.  She is not on any anticoagulation despite atrial fibrillation, states she was \"told she does not need to be on this\".  Denies any recent numbness, weakness prior to hospitalization.  Denies any headaches, vision changes, amaurosis fugax, speech changes, or facial droop.  Patient is not a smoker.  There has been no atrial fibrillation during hospital admission per    Patient states she has a history of an aneurysm for which she was followed by Dr. Hein, states she has not had routine imaging for this in several years.      Review of Systems   Constitutional: Positive for activity change. Negative for chills and fever.   Respiratory: Positive for shortness of breath (Exertional).    Cardiovascular: Negative for chest pain. "   Neurological: Positive for weakness. Negative for dizziness, syncope, facial asymmetry, speech difficulty and headaches.   All other systems reviewed and are negative.      History  Past Medical History:   Diagnosis Date   • A-fib (CMS/[facility])    • Anxiety    • Anxiety and depression    • Arthritis    • Cataract    • Depression    • Elevated serum creatinine     SEES DR SMITH EVERY 6 MONTHS- NEPHROLOGIST    • Extremity pain    • GERD (gastroesophageal reflux disease)    • Gout    • H/O bladder infections     JUST FINISHED MACROBID ON 11-2-17 FOR RECENT UTI   • Hypercholesteremia    • Hypertension    • Joint pain    • Kidney disease    • Kidney disease, chronic, stage III (GFR 30-59 ml/min) (CMS/[facility])    • Low back pain    • Neck pain    • Rheumatic fever    • Sleep apnea    • Urinary tract infection    • Wears glasses    ,   Past Surgical History:   Procedure Laterality Date   • BACK SURGERY  2004    LUMBAR PER DR THORNTON    • CARPAL TUNNEL RELEASE Left    • COLONOSCOPY     • EYE SURGERY     • FINGER SURGERY Right     3RD FINGER   • HEEL SPUR EXCISION Bilateral    • KNEE ARTHROSCOPY Bilateral    • LUMBAR DISCECTOMY FUSION INSTRUMENTATION N/A 11/10/2017    Procedure: LUMBAR SPINAL FUSION ;  Surgeon: Gopi Thornton MD;  Location:  [facility];  Service:    • REPLACEMENT TOTAL KNEE BILATERAL Bilateral    • TOTAL HIP ARTHROPLASTY Right 9/9/2019    Procedure: TOTAL ANTERIOR RIGHT HIP ARTHROPLASTY;  Surgeon: Darrin New MD;  Location:  [facility];  Service: Orthopedics   ,   Family History   Problem Relation Age of Onset   • Cancer Mother    • Colon polyps Mother    • Hypertension Mother    • Cancer Father    • Hypertension Brother    • Hyperlipidemia Maternal Uncle    • Kidney disease Maternal Uncle    ,   Social History     Tobacco Use   • Smoking status: Never Smoker   • Smokeless tobacco: Never Used   Substance Use Topics   • Alcohol use: No   • Drug use: No   ,   Medications Prior to Admission   Medication Sig  Dispense Refill Last Dose   • acetaminophen (TYLENOL) 325 MG tablet Take 650 mg by mouth Every 6 (Six) Hours As Needed for Mild Pain .   9/8/2019 at Unknown time   • atenolol (TENORMIN) 25 MG tablet Take 1 tablet by mouth 2 (Two) Times a Day. Take 25 mg by mouth 2 (Two) Times a Day. 60 tablet 5 9/9/2019 at 0800   • escitalopram (LEXAPRO) 20 MG tablet Take 1 tablet by mouth Daily. 30 tablet 3 9/9/2019 at 0800   • hydrOXYzine (ATARAX) 25 MG tablet Take 25 mg by mouth 2 (Two) Times a Day As Needed for Itching.   9/9/2019 at 0800   • lisinopril (PRINIVIL,ZESTRIL) 20 MG tablet Take 1 tablet by mouth Daily. Take 20 mg by mouth Daily. 30 tablet 5 9/8/2019 at Unknown time   • mirtazapine (REMERON) 15 MG tablet Take 15 mg by mouth Every Night. TK 1/2 T PO HS FOR 1 WK THEN TK 1 T PO HS  0 9/7/2019 at 2100   , Scheduled Meds:    aspirin 81 mg Oral Daily   atenolol 25 mg Oral BID   atorvastatin 80 mg Oral Nightly   docusate sodium 100 mg Oral BID   escitalopram 20 mg Oral Daily   mirtazapine 15 mg Oral Nightly   sodium chloride 10 mL Intravenous Q12H   , Continuous Infusions:    heparin 15 Units/kg/hr Last Rate: 15 Units/kg/hr (09/12/19 0411)   Pharmacy to Dose Heparin     sodium chloride 100 mL/hr Last Rate: 100 mL/hr (09/10/19 2036)   , PRN Meds:  benzocaine-menthol  •  bisacodyl  •  bisacodyl  •  HYDROcodone-acetaminophen  •  HYDROmorphone **AND** naloxone  •  hydrOXYzine  •  magnesium hydroxide  •  ondansetron  •  Pharmacy to Dose Heparin  •  promethazine  •  sodium chloride  •  sodium chloride  •  traMADol and Allergies:  Nsaids; Quinidine; Nitrofuran derivatives; Bactrim [sulfamethoxazole-trimethoprim]; and Penicillins   SMOKING STATUS: Never a smoker  Objective     Vital Signs   Temp:  [98.1 °F (36.7 °C)-98.9 °F (37.2 °C)] 98.2 °F (36.8 °C)  Heart Rate:  [69-85] 69  Resp:  [16-17] 16  BP: (139-167)/(54-75) 139/54  Body mass index is 32.61 kg/m².    Physical Exam:  NEUROLOGICAL:  - A&Ox3.  Patient is conversant, answers  questions appropriately.  Speech is intact without dysarthria.  - Attention span, language function, and cognition are intact.  - Strength testing demonstrates full strength in left upper and lower extremity.  - 2/5 proximal strength in right upper extremity. Patient is unable to lift RUE off the bed, is able to give some effort against resistance with elbow flexion and extension. 0/5  strength. Patient has slight movement in thumb and pointer finger   - Right lower extremity ankle plantar and dorsiflexion with 4/5, pain limited   - Patient unable to lift RLE. Unable to perform knee or hip flexion or extension secondary to pain.   - Muscle tone is normal throughout with the exception of decreased tone in RUE.   - Station and gait are not examined secondary to pain and fall risk.   - Sensation is intact to light touch testing throughout.  - Deep tendon reflexes are decreased and symmetric throughout.   - Sapphire's Sign is negative bilaterally.  - No clonus is elicited.  - Coordination is intact with finger to nose testing in LUE.   CRANIAL NERVES:  Cranial Nerve II: Visual fields are full to confrontation.  Cranial Nerve III, IV, and VI: PERRLADC. Extraocular movements are intact.  Nystagmus is not present.  Cranial Nerve V: Facial sensation is intact to light touch.  Cranial Nerve VII: Muscles of facial expression demonstate no weakness or asymmetry.  Cranial Nerve VIII: Hearing is intact to finger rub bilaterally.  Cranial Nerve IX and X: Palate elevates symmetrically.  Cranial Nerve XI: Shoulder shrug is intact bilaterally, slightly decreased on the right as compared to left.  Cranial Nerve XII: Tongue is midline without evidence of atrophy or fasciculation      Results Review:   I reviewed the patient's new imaging results and agree with the interpretation.  Discussed with Dr. Guerra       Assessment/Plan       Status post total replacement of right hip    Anxiety and depression    HTN (hypertension)     Renal insufficiency    Leukocytosis, likely reactive    Arthritis of right hip    HO A-fib (CMS/HCC)    YUNIOR treated with BiPAP    Acute blood loss anemia, mild, asymptomatic    Right MCA aneurysm   - Known history of aneurysm. Previously followed by Dr. Hein. 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 Patient is high risk surgical candidate given medical comorbidities, obesity and obstructive sleep apnea.   - Emergent intervention not indicated. Would like the patient to follow up as an outpatient in our office in the next couple of weeks after rehab to discuss carotid stenting.   - Obtain CTA Head/Neck         Marie Vargas PA-C  09/12/19  9:52 AM

## 2019-09-12 NOTE — PROGRESS NOTES
"Neurology       Patient Care Team:  Salty Hernandez MD as PCP - General (Family Medicine)  Leon Hein MD as Consulting Physician (Neurosurgery)  Perkins, Corinne E, PT as Physical Therapist (Physical Therapy)  Cricket Nettles MD as Consulting Physician (Pain Medicine)    Chief complaint right side weakness      Subjective .     History:  This morning feels that right leg is weak \"less pain and more weak.\" Arm about the same.  Speech difficulty.  No vision changes.    ROS: No fever or chest pain    Objective     Vital Signs   Blood pressure 139/54, pulse 69, temperature 98.2 °F (36.8 °C), temperature source Oral, resp. rate 16, height 162.6 cm (64\"), weight 86.2 kg (190 lb), SpO2 97 %, not currently breastfeeding.    Physical Exam:              Neuro: Elderly white woman sitting up in hospital bed in no acute distress, alert, fluent and appropriate.  No dysarthria.  EOMI face symmetric TML  Motor: Right upper extremity now 1-2/5, distal right lower extremity 4/5 but hip flexion is 1-2/5 with good effort, left side intact    Results Review:              Carotid u/s with LICA marked stenosis    Assessment/Plan     LMCA territory stroke with probable worsening of right hemiparesis (unclear given patient's recent right hip surgery, previous limited effort with pain) -- MRA head and neck not done overnight. Will get stat CTA/perfusion now. D/w Dr Galo, risk/benefit favors pursuing CT now despite impaired renal function. Will d/w neurointerventional on call.    I discussed the patients findings and my recommendations with patient, family and primary care team    Maribel East MD  09/12/19  10:42 AM    "

## 2019-09-12 NOTE — PLAN OF CARE
Problem: Patient Care Overview  Goal: Plan of Care Review  Outcome: Ongoing (interventions implemented as appropriate)   09/12/19 4424   Plan of Care Review   Progress improving   OTHER   Outcome Summary Upon entering room, pt demonstrates R shoulder and elbow AROM partial range. KI donned prior to sitting EOB. Pt perform supine to sit mod A of 2 with pt able to A with RUE. Mod A progressed to CGA sitting EOB. STS min A of 2. SPT mod A of 2. Platform walker utilized and KI. Pt able to place RUE on platform with min A. Pt ambulated 3 feet and 9 feet with platform walker and min A of 2. Recommend DC to SNF for rehab when appropriate   Coping/Psychosocial   Plan of Care Reviewed With patient;spouse

## 2019-09-12 NOTE — PROGRESS NOTES
HEPARIN INFUSION  Akua Torres is a  74 y.o. female receiving heparin infusion.           Therapy for (VTE/Cardiac):   cardiac  Patient Weight: 86.2 kg  Initial Bolus (Y/N):   no  Any Bolus (Y/N):   yes        Signs or Symptoms of Bleeding: no      Cardiac or Other (Not VTE)   Initial Bolus: 60 units/kg (Max 4,000 units)  Initial rate: 12 units/kg/hr (Max 1,000 units/hr)   Anti-Xa (IU/mL) Bolus Dose Stop Infusion Rate Change Repeat Anti-Xa      ?0.19 60 units/kg 0 hrs Increase rate by   4 units/kg/hr 6 hrs       0.2 - 0.29  30 units/kg 0 hrs Increase rate by 2 units/kg/hr 6 hrs    0.3 - 0.7 0 0 hrs No change 6 hrs      0.71 - 0.99 0  0 hrs Decrease rate by 2 units/kg/hr 6 hrs            ?1 0 Hold 1 hr Decrease rate by 3 units/kg/hr 6 hrs            Results from last 7 days   Lab Units 09/11/19  1839 09/10/19  0336   INR  1.25*  --    HEMOGLOBIN g/dL 9.4* 10.2*   HEMATOCRIT % 30.2* 33.9*   PLATELETS 10*3/mm3 159 172          Date   Time   Anti-Xa Current Rate (Unit/kg/hr) Bolus   (Units) Rate Change   (Unit/kg/hr) New Rate (Unit/kg/hr) Next   Anti-Xa Comments  Pump Check Daily   09/11 18:45 0.1 new No initial -- 11.6 12 MN D/W Berta    0912 0251 0.1 11.6 5000 +3.4 15 0900 Prince VARELA   9/12 1038 0.2 15 -- +2 17 1800  Maribel. Pump verified       --           --           --           --           --           --           --           --           --           --           --           --           --           --           --           --                               Bob Muse [facility]  9/12/2019  2:02 PM

## 2019-09-12 NOTE — PROGRESS NOTES
"Akua Nieveston  [id number]  1945    /54 (BP Location: Right arm, Patient Position: Lying)   Pulse 69   Temp 98.2 °F (36.8 °C) (Oral)   Resp 16   Ht 162.6 cm (64\")   Wt 86.2 kg (190 lb)   SpO2 97%   BMI 32.61 kg/m²     Lab Results (last 24 hours)     Procedure Component Value Units Date/Time    Hemoglobin A1c [[id number]]  (Normal) Collected:  09/12/19 0158    Specimen:  Blood Updated:  09/12/19 0304     Hemoglobin A1C 5.20 %     Narrative:       Hemoglobin A1C Ranges:    Increased Risk for Diabetes  5.7% to 6.4%  Diabetes                     >= 6.5%  Diabetic Goal                < 7.0%    Lipid Panel [[id number]]  (Abnormal) Collected:  09/12/19 0158    Specimen:  Blood Updated:  09/12/19 0256     Total Cholesterol 132 mg/dL      Triglycerides 235 mg/dL      HDL Cholesterol 34 mg/dL      LDL Cholesterol  51 mg/dL      VLDL Cholesterol 47 mg/dL      LDL/HDL Ratio 1.50    Narrative:       Cholesterol Reference Ranges  (U.S. Department of Health and Human Services ATP III Classifications)    Desirable          <200 mg/dL  Borderline High    200-239 mg/dL  High Risk          >240 mg/dL      Triglyceride Reference Ranges  (U.S. Department of Health and Human Services ATP III Classifications)    Normal           <150 mg/dL  Borderline High  150-199 mg/dL  High             200-499 mg/dL  Very High        >500 mg/dL    HDL Reference Ranges  (U.S. Department of Health and Human Services ATP III Classifcations)    Low     <40 mg/dl (major risk factor for CHD)  High    >60 mg/dl ('negative' risk factor for CHD)        LDL Reference Ranges  (U.S. Department of Health and Human Services ATP III Classifcations)    Optimal          <100 mg/dL  Near Optimal     100-129 mg/dL  Borderline High  130-159 mg/dL  High             160-189 mg/dL  Very High        >189 mg/dL    Heparin Anti-Xa [[id number]]  (Abnormal) Collected:  09/12/19 0158    Specimen:  Blood Updated:  09/12/19 0250     Heparin Anti-Xa (UFH) 0.10 " IU/ml     Heparin Anti-Xa [[id number]]  (Abnormal) Collected:  09/11/19 1839    Specimen:  Blood Updated:  09/11/19 1926     Heparin Anti-Xa (UFH) 0.10 IU/ml     Protime-INR [[id number]]  (Abnormal) Collected:  09/11/19 1839    Specimen:  Blood Updated:  09/11/19 1925     Protime 15.1 Seconds      INR 1.25    aPTT [[id number]]  (Abnormal) Collected:  09/11/19 1839    Specimen:  Blood Updated:  09/11/19 1925     PTT 35.8 seconds     Narrative:       PTT = The equivalent PTT values for the therapeutic range of heparin levels at 0.3 to 0.5 U/ml are 55 to 70 seconds.    CBC & Differential [[id number]] Collected:  09/11/19 1839    Specimen:  Blood Updated:  09/11/19 1859    Narrative:       The following orders were created for panel order CBC & Differential.  Procedure                               Abnormality         Status                     ---------                               -----------         ------                     CBC Auto Differential[[id number]]        Abnormal            Final result                 Please view results for these tests on the individual orders.    CBC Auto Differential [[id number]]  (Abnormal) Collected:  09/11/19 1839    Specimen:  Blood Updated:  09/11/19 1859     WBC 13.95 10*3/mm3      RBC 3.18 10*6/mm3      Hemoglobin 9.4 g/dL      Hematocrit 30.2 %      MCV 95.0 fL      MCH 29.6 pg      MCHC 31.1 g/dL      RDW 14.7 %      RDW-SD 51.5 fl      MPV 10.0 fL      Platelets 159 10*3/mm3      Neutrophil % 79.1 %      Lymphocyte % 11.5 %      Monocyte % 7.5 %      Eosinophil % 1.2 %      Basophil % 0.2 %      Immature Grans % 0.5 %      Neutrophils, Absolute 11.04 10*3/mm3      Lymphocytes, Absolute 1.60 10*3/mm3      Monocytes, Absolute 1.04 10*3/mm3      Eosinophils, Absolute 0.17 10*3/mm3      Basophils, Absolute 0.03 10*3/mm3      Immature Grans, Absolute 0.07 10*3/mm3      nRBC 0.0 /100 WBC     Basic Metabolic Panel [[id number]]  (Abnormal) Collected:  09/11/19 0952    Specimen:  Blood  Updated:  09/11/19 1050     Glucose 144 mg/dL      BUN 24 mg/dL      Creatinine 1.09 mg/dL      Sodium 136 mmol/L      Potassium 4.4 mmol/L      Chloride 103 mmol/L      CO2 27.0 mmol/L      Calcium 7.9 mg/dL      eGFR Non African Amer 49 mL/min/1.73      BUN/Creatinine Ratio 22.0     Anion Gap 6.0 mmol/L     Narrative:       GFR Normal >60  Chronic Kidney Disease <60  Kidney Failure <15          Patient Care Team:  Salty Hernandez MD as PCP - General (Family Medicine)  Leon Hein MD as Consulting Physician (Neurosurgery)  Perkins, Corinne E, PT as Physical Therapist (Physical Therapy)  Cricket Nettles MD as Consulting Physician (Pain Medicine)    SUBJECTIVE  Pain well controlled.  Wants to begin more aggressive physical therapy when possible    PHYSICAL EXAM  Incision right hip clean and dry  Minimal thigh and leg swelling  Neurovascularly intact to knee and toes      Status post total replacement of right hip    Anxiety and depression    HTN (hypertension)    Renal insufficiency    Leukocytosis, likely reactive    Arthritis of right hip    HO A-fib (CMS/HCC)    YUNIOR treated with BiPAP    Acute blood loss anemia, mild, asymptomatic      PLAN / DISPOSITION:  No change in right hand function  Being considered for intervention for left carotid stenosis  Discharge to SNF when arrangements complete and medically stable    Darrin New MD  09/12/19  8:41 AM

## 2019-09-12 NOTE — THERAPY TREATMENT NOTE
Patient Name: Akua Torres  : 1945    MRN: [id number]                              Today's Date: 2019       Admit Date: 2019    Visit Dx:     ICD-10-CM ICD-9-CM   1. Impaired functional mobility, balance, gait, and endurance Z74.09 V49.89   2. Impaired mobility and ADLs Z74.09 799.89     Patient Active Problem List   Diagnosis   • Degenerative disc disease, lumbar   • Lumbar stenosis with neurogenic claudication   • History of lumbar fusion   • Spondylosis of lumbar region without myelopathy or radiculopathy   • Mild obesity   • Physical deconditioning   • Idiopathic gout   • Anxiety and depression   • Greater trochanteric bursitis of both hips   • Status post total bilateral knee replacement   • Back pain   • HTN (hypertension)   • HLD (hyperlipidemia)   • Prediabetes   • S/P lumbar spinal fusion   • Renal insufficiency   • Leukocytosis, likely reactive   • Altered mental status. Felt to be drug related (trazodone, opiates, benzodiazepine)   • Acute renal failure due to hypotension. Now resolved (ARF) (CMS/[facility])   • Depression   • Encounter for Medicare annual wellness exam   • Arthritis of right hip   • HO A-fib (CMS/[facility])   • YUNIOR treated with BiPAP   • Status post total replacement of right hip   • Acute blood loss anemia, mild, asymptomatic     Past Medical History:   Diagnosis Date   • A-fib (CMS/[facility])    • Anxiety    • Anxiety and depression    • Arthritis    • Cataract    • Depression    • Elevated serum creatinine     SEES DR SMITH EVERY 6 MONTHS- NEPHROLOGIST    • Extremity pain    • GERD (gastroesophageal reflux disease)    • Gout    • H/O bladder infections     JUST FINISHED MACROBID ON 17 FOR RECENT UTI   • Hypercholesteremia    • Hypertension    • Joint pain    • Kidney disease    • Kidney disease, chronic, stage III (GFR 30-59 ml/min) (CMS/HCC)    • Low back pain    • Neck pain    • Rheumatic fever    • Sleep apnea    • Urinary tract infection    • Wears glasses      Past  Surgical History:   Procedure Laterality Date   • BACK SURGERY  2004    LUMBAR PER DR MAN    • CARPAL TUNNEL RELEASE Left    • COLONOSCOPY     • EYE SURGERY     • FINGER SURGERY Right     3RD FINGER   • HEEL SPUR EXCISION Bilateral    • KNEE ARTHROSCOPY Bilateral    • LUMBAR DISCECTOMY FUSION INSTRUMENTATION N/A 11/10/2017    Procedure: LUMBAR SPINAL FUSION ;  Surgeon: Gopi Man MD;  Location:  [facility];  Service:    • REPLACEMENT TOTAL KNEE BILATERAL Bilateral    • TOTAL HIP ARTHROPLASTY Right 9/9/2019    Procedure: TOTAL ANTERIOR RIGHT HIP ARTHROPLASTY;  Surgeon: Darrin New MD;  Location:  [facility];  Service: Orthopedics     General Information     Row Name 09/12/19 1133          PT Evaluation Time/Intention    Document Type  therapy note (daily note)  -AA     Mode of Treatment  physical therapy  -AA     Row Name 09/12/19 1133          General Information    Patient Profile Reviewed?  yes  -AA     Existing Precautions/Restrictions  fall;right;hip, anterior;other (see comments) R side weakness, acute CVA  -AA     Row Name 09/12/19 1133          Cognitive Assessment/Intervention- PT/OT    Orientation Status (Cognition)  oriented x 4  -AA     Row Name 09/12/19 1133          Safety Issues, Functional Mobility    Safety Issues Affecting Function (Mobility)  friction/shear risk;insight into deficits/self awareness;judgment;positioning of assistive device;safety precautions follow-through/compliance;problem solving;sequencing abilities;steps too close to assistive device  -AA     Impairments Affecting Function (Mobility)  balance;coordination;endurance/activity tolerance;pain;range of motion (ROM);strength;grasp  -AA       User Key  (r) = Recorded By, (t) = Taken By, (c) = Cosigned By    Initials Name Provider Type    AA Vanessa Milligan PT Physical Therapist        Mobility     Row Name 09/12/19 1135          Bed Mobility Assessment/Treatment    Bed Mobility Assessment/Treatment   scooting/bridging;supine-sit;sit-supine  -AA     Scooting/Bridging [place] (Bed Mobility)  maximum assist (25% patient effort);2 person assist;verbal cues;nonverbal cues (demo/gesture)  -AA     Supine-Sit [place] (Bed Mobility)  maximum assist (25% patient effort);2 person assist;verbal cues;nonverbal cues (demo/gesture)  -AA     Sit-Supine [place] (Bed Mobility)  maximum assist (25% patient effort);2 person assist;verbal cues  -     Assistive Device (Bed Mobility)  bed rails;head of bed elevated;draw sheet  -     Comment (Bed Mobility)  VC for deep breathing and not hold breath with mobility; VC for sequencing; A with RUE and RLE with pt able to A minimally.  Increased time to perform.  No dizziness upon sitting.  Pt max A for initial sit balance and progress to CGA with B foot on floor  -     Row Name 09/12/19 1135          Transfer Assessment/Treatment    Comment (Transfers)  VC for push off with LUE; max A R hand placement on platform.  Pt able to advance RLE back during SPT turning with intermittent min A and max A RLE to advance forward with R knee buckle noted.  -     Row Name 09/12/19 1135          Bed-Chair Transfer    Bed-Chair [place] (Transfers)  maximum assist (25% patient effort);2 person assist;verbal cues;nonverbal cues (demo/gesture)  -     Assistive Device (Bed-Chair Transfers)  walker, rolling platform  -AA     Row Name 09/12/19 1135          Sit-Stand Transfer    Sit-Stand [place] (Transfers)  2 person assist;verbal cues;nonverbal cues (demo/gesture);minimum assist (75% patient effort)  -     Assistive Device (Sit-Stand Transfers)  walker, rolling platform  -     Row Name 09/12/19 1135          Gait/Stairs Assessment/Training    Gait/Stairs Assessment/Training  gait/ambulation independence;distance ambulated;gait pattern  -     [place] Level (Gait)  maximum assist (25% patient effort);2 person assist;verbal cues  -     Assistive Device (Gait)   walker, rolling platform  -AA     Distance in Feet (Gait)  5  -AA     Pattern (Gait)  step-to  -AA     Deviations/Abnormal Patterns (Gait)  right sided deviations;antalgic;bilateral deviations;nixon decreased;stride length decreased  -AA     Bilateral Gait Deviations  forward flexed posture;heel strike decreased;weight shift ability decreased  -AA     Right Sided Gait Deviations  knee buckling, right side  -AA     Comment (Gait/Stairs)  Pt require max A to advance RLE forward and knee block due to buckling.  Cues for sequencing.  Min A intermittently for R hand .  Cues for posture with pt lean forward.  Pt reports 5/10 pain.  Pt fatigue easily and chair brought quickly to patient.    -AA     Row Name 09/12/19 1135          Mobility Assessment/Intervention    Extremity Weight-bearing Status  right lower extremity  -AA     Right Lower Extremity (Weight-bearing Status)  weight-bearing as tolerated (WBAT)  -AA       User Key  (r) = Recorded By, (t) = Taken By, (c) = Cosigned By    Initials Name Provider Type    AA Vanessa Milligan, PT Physical Therapist        Obj/Interventions     Row Name 09/12/19 1133          Therapeutic Exercise    Lower Extremity (Therapeutic Exercise)  gluteal sets;heel slides, bilateral;quad sets, bilateral;SAQ (short arc quad), bilateral;SLR (straight leg raise), bilateral  -AA     Lower Extremity Range of Motion (Therapeutic Exercise)  hip abduction/adduction, bilateral;ankle dorsiflexion/plantar flexion, bilateral  -AA     Exercise Type (Therapeutic Exercise)  AROM (active range of motion);AAROM (active assistive range of motion);PROM (passive range of motion)  -AA     Position (Therapeutic Exercise)  supine  -AA     Sets/Reps (Therapeutic Exercise)  10  -AA     Comment (Therapeutic Exercise)  pt reports 2/10 pain with ther-ex, reports decreased stiffness.   educated on HEP to perform outside of therapy.  Will continue to educated  -AA     Row Name 09/12/19 1133          Static  Sitting Balance    Level of [place] (Unsupported Sitting, Static Balance)  minimal assist, 75% patient effort;2 person assist  -AA     Sitting Position (Unsupported Sitting, Static Balance)  sitting on edge of bed  -AA     Time Able to Maintain Position (Unsupported Sitting, Static Balance)  more than 5 minutes  -AA     Comment (Unsupported Sitting, Static Balance)  cues for posture  -AA     Row Name 09/12/19 1139          Static Standing Balance    Level of [place] (Supported Standing, Static Balance)  moderate assist, 50 to 74% patient effort;2 person assist  -AA     Time Able to Maintain Position (Supported Standing, Static Balance)  3 to 4 minutes  -AA     Assistive Device Utilized (Supported Standing, Static Balance)  walker, rolling platform  -AA     Row Name 09/12/19 1139          Dynamic Standing Balance    Level of [place], Reaches Outside Midline (Standing, Dynamic Balance)  maximal assist, 25 to 49% patient effort;2 person assist  -AA     Time Able to Maintain Position, Reaches Outside Midline (Standing, Dynamic Balance)  1 to 2 minutes  -AA     Assistive Device Utilized (Supported Standing, Dynamic Balance)  walker, rolling platform  -AA       User Key  (r) = Recorded By, (t) = Taken By, (c) = Cosigned By    Initials Name Provider Type    Vanessa Braden PT Physical Therapist        Goals/Plan    No documentation.       Clinical Impression     Row Name 09/12/19 1141          Pain Assessment    Additional Documentation  Pain Scale: Numbers Pre/Post-Treatment (Group)  -     Row Name 09/12/19 1141          Pain Scale: Numbers Pre/Post-Treatment    Pain Scale: Numbers, Pretreatment  0/10 - no pain  -     Pain Scale: Numbers, Post-Treatment  4/10  -AA     Pain Location - Side  Right  -AA     Pain Location  hip  -AA     Pain Intervention(s)  Repositioned;Ambulation/increased activity  -AA     Row Name 09/12/19 1141          Plan of Care Review    Plan of Care Reviewed With  patient;spouse   -     Row Name 09/12/19 1141          Positioning and Restraints    Pre-Treatment Position  in bed  -AA     Post Treatment Position  bed  -AA     In Bed  notified nsg;supine;call light within reach;encouraged to call for assist;exit alarm on;with family/caregiver;RUE elevated  -       User Key  (r) = Recorded By, (t) = Taken By, (c) = Cosigned By    Initials Name Provider Type    Vanessa Braden PT Physical Therapist        Outcome Measures     Row Name 09/12/19 1144          How much help from another person do you currently need...    Turning from your back to your side while in flat bed without using bedrails?  2  -AA     Moving from lying on back to sitting on the side of a flat bed without bedrails?  2  -AA     Moving to and from a bed to a chair (including a wheelchair)?  2  -AA     Standing up from a chair using your arms (e.g., wheelchair, bedside chair)?  2  -AA     Climbing 3-5 steps with a railing?  1  -AA     To walk in hospital room?  2  -AA     AM-[facility] 6 Clicks Score (PT)  11  -     Row Name 09/12/19 1144          Modified [place] Scale    Modified [place] Scale  4 - Moderately severe disability.  Unable to walk without assistance, and unable to attend to own bodily needs without assistance.  -     Row Name 09/12/19 1144          Functional Assessment    Outcome Measure Options  AM-PAC 6 Clicks Basic Mobility (PT);Modified Reddy  -       User Key  (r) = Recorded By, (t) = Taken By, (c) = Cosigned By    Initials Name Provider Type    Vanessa Braden PT Physical Therapist        Physical Therapy Education     Title: PT OT SLP Therapies (In Progress)     Topic: Physical Therapy (In Progress)     Point: Mobility training (In Progress)     Learning Progress Summary           Patient Acceptance, E,D, NR by MYRIAM at 9/12/2019 11:42 AM    Acceptance, E,D, VU,NR by CLAY at 9/11/2019  9:59 AM    Comment:  Reviewed HEP, gait mechanics, benefits of mobility, safety with mobility    Acceptance, E,D, VU,NR  by CLAY at 9/10/2019  3:07 PM    Comment:  Reviewed HEP, gait mechanics, benefits of mobility, safety with mobility    Acceptance, E,D, VU,NR by CLAY at 9/10/2019  9:05 AM    Comment:  Reviewed anterior hip precautions, gait mechanics, benefits of mobility    Acceptance, E,D, VU,NR by LR at 9/9/2019  5:05 PM    Comment:  Educated on anterior hip precautions, weight bearing status, correct supine to sit t/f technique, correct sit<->stand t/f technique, correct gait mechanics, and progression of POC.   Family Acceptance, E,D, NR by AA at 9/12/2019 11:42 AM    Acceptance, E,D, VU,NR by LR at 9/9/2019  5:05 PM    Comment:  Educated on anterior hip precautions, weight bearing status, correct supine to sit t/f technique, correct sit<->stand t/f technique, correct gait mechanics, and progression of POC.   Significant Other Acceptance, E,D, VU,NR by CLAY at 9/11/2019  9:59 AM    Comment:  Reviewed HEP, gait mechanics, benefits of mobility, safety with mobility                   Point: Home exercise program (In Progress)     Learning Progress Summary           Patient Acceptance, E,D, NR by AA at 9/12/2019 11:42 AM    Acceptance, E,D, VU,NR by CLAY at 9/11/2019  9:59 AM    Comment:  Reviewed HEP, gait mechanics, benefits of mobility, safety with mobility    Acceptance, E,D, VU,NR by CLAY at 9/10/2019  3:07 PM    Comment:  Reviewed HEP, gait mechanics, benefits of mobility, safety with mobility    Acceptance, E,D, VU,NR by CLAY at 9/10/2019  9:05 AM    Comment:  Reviewed anterior hip precautions, gait mechanics, benefits of mobility    Acceptance, E,D, VU,NR by LR at 9/9/2019  5:05 PM    Comment:  Educated on anterior hip precautions, weight bearing status, correct supine to sit t/f technique, correct sit<->stand t/f technique, correct gait mechanics, and progression of POC.   Family Acceptance, E,D, NR by AA at 9/12/2019 11:42 AM    Acceptance, E,D, VU,NR by RJEI at 9/9/2019  5:05 PM    Comment:  Educated on anterior hip precautions,  weight bearing status, correct supine to sit t/f technique, correct sit<->stand t/f technique, correct gait mechanics, and progression of POC.   Significant Other Acceptance, E,D, VU,NR by CLAY at 9/11/2019  9:59 AM    Comment:  Reviewed HEP, gait mechanics, benefits of mobility, safety with mobility                   Point: Body mechanics (In Progress)     Learning Progress Summary           Patient Acceptance, E,D, NR by AA at 9/12/2019 11:42 AM    Acceptance, E,D, VU,NR by CLAY at 9/11/2019  9:59 AM    Comment:  Reviewed HEP, gait mechanics, benefits of mobility, safety with mobility    Acceptance, E,D, VU,NR by CLAY at 9/10/2019  3:07 PM    Comment:  Reviewed HEP, gait mechanics, benefits of mobility, safety with mobility    Acceptance, E,D, VU,NR by CLAY at 9/10/2019  9:05 AM    Comment:  Reviewed anterior hip precautions, gait mechanics, benefits of mobility    Acceptance, E,D, VU,NR by REJI at 9/9/2019  5:05 PM    Comment:  Educated on anterior hip precautions, weight bearing status, correct supine to sit t/f technique, correct sit<->stand t/f technique, correct gait mechanics, and progression of POC.   Family Acceptance, E,D, NR by AA at 9/12/2019 11:42 AM    Acceptance, E,D, VU,NR by REJI at 9/9/2019  5:05 PM    Comment:  Educated on anterior hip precautions, weight bearing status, correct supine to sit t/f technique, correct sit<->stand t/f technique, correct gait mechanics, and progression of POC.   Significant Other Acceptance, E,D, VU,NR by CLAY at 9/11/2019  9:59 AM    Comment:  Reviewed HEP, gait mechanics, benefits of mobility, safety with mobility                   Point: Precautions (In Progress)     Learning Progress Summary           Patient Acceptance, E,D, NR by AA at 9/12/2019 11:42 AM    Acceptance, E,D, VU,NR by CLAY at 9/11/2019  9:59 AM    Comment:  Reviewed HEP, gait mechanics, benefits of mobility, safety with mobility    Acceptance, E,D, VU,NR by CLAY at 9/10/2019  3:07 PM    Comment:  Reviewed HEP, gait  mechanics, benefits of mobility, safety with mobility    Acceptance, E,D, VU,NR by CLAY at 9/10/2019  9:05 AM    Comment:  Reviewed anterior hip precautions, gait mechanics, benefits of mobility    Acceptance, E,D, VU,NR by LR at 9/9/2019  5:05 PM    Comment:  Educated on anterior hip precautions, weight bearing status, correct supine to sit t/f technique, correct sit<->stand t/f technique, correct gait mechanics, and progression of POC.   Family Acceptance, E,D, NR by AA at 9/12/2019 11:42 AM    Acceptance, E,D, VU,NR by REJI at 9/9/2019  5:05 PM    Comment:  Educated on anterior hip precautions, weight bearing status, correct supine to sit t/f technique, correct sit<->stand t/f technique, correct gait mechanics, and progression of POC.   Significant Other Acceptance, E,D, VU,NR by  at 9/11/2019  9:59 AM    Comment:  Reviewed HEP, gait mechanics, benefits of mobility, safety with mobility                               User Key     Initials Effective Dates Name Provider Type Discipline     06/19/15 -  Teresa Blackwell, PT Physical Therapist PT     04/03/18 -  Henrique Clements, PT Physical Therapist PT     04/02/18 -  Vanessa Milligan PT Physical Therapist PT              PT Recommendation and Plan     Outcome Summary/Treatment Plan (PT)  Anticipated Discharge Disposition (PT): skilled nursing facility  Plan of Care Reviewed With: patient, spouse  Progress: improving  Outcome Summary: Pt perform bed mobiltiy max A of 2.  STS min A of 2 with platform walker and A for RUE placement.  Pt ambulated 5 feet with platformwalker and max A of 2 with pt fatigue quickly and chair brought to pt.  Pt require max A to advance RLE with knee buckle noted.  SPT max A of 2.  HEP reviewed with pt and .  Pain 5/10 with mobiltiy, 0/10 at rest.   Recommend DC to SNF for rehab when appropriate     Time Calculation:   PT Charges     Row Name 09/12/19 4590             Time Calculation    Start Time  1051  -AA      PT Received On   09/12/19  -      PT Goal Re-Cert Due Date  09/19/19  -         Time Calculation- PT    Total Timed Code Minutes- PT  38 minute(s)  -AA         Timed Charges    27537 - PT Therapeutic Exercise Minutes  10  -AA      40950 - Gait Training Minutes   12  -AA      28786 - PT Therapeutic Activity Minutes  16  -AA        User Key  (r) = Recorded By, (t) = Taken By, (c) = Cosigned By    Initials Name Provider Type    AA Vanessa Milligan, PT Physical Therapist        Therapy Charges for Today     Code Description Service Date Service Provider Modifiers Qty    48006820413 HC PT THER PROC EA 15 MIN 9/12/2019 Vanessa Milligan, PT GP 1    71862403556 HC GAIT TRAINING EA 15 MIN 9/12/2019 Vanessa Milligan, PT GP 1    09286949217 HC PT THERAPEUTIC ACT EA 15 MIN 9/12/2019 Vanessa Milligan, PT GP 1    65359963496 HC PT THER SUPP EA 15 MIN 9/12/2019 Vanessa Milligan, PT GP 2          PT G-Codes  Outcome Measure Options: AM-PAC 6 Clicks Basic Mobility (PT), Modified Reddy  AM-PAC 6 Clicks Score (PT): 11  AM-PAC 6 Clicks Score (OT): 14  Modified [place]dy Scale: 4 - Moderately severe disability.  Unable to walk without assistance, and unable to attend to own bodily needs without assistance.    Vanessa Milligan PT  9/12/2019

## 2019-09-12 NOTE — PLAN OF CARE
"Problem: Patient Care Overview  Goal: Plan of Care Review  Outcome: Ongoing (interventions implemented as appropriate)   09/11/19 1621 09/12/19 0304   Plan of Care Review   Progress improving --    OTHER   Outcome Summary --  received report from previous shift. Pt AAO, VSS and in mild distress. Pt weepy and stating \"no body cares i am in pain or taking care of me\" spoke with patient and calmed her and improved outlook. no longer weepy.. pt on a heparing drip at 10cc/hr. attempted to get patient to MRI but unable r/t running IV per MRI. will get patient to MRI later when MRI caught up.. patient resting comfortably without complaint at this time. pt able to rest/sleep >6hrs this shift. will continue to monitor for changes   Coping/Psychosocial   Plan of Care Reviewed With --  patient;spouse       Problem: Skin Injury Risk (Adult)  Goal: Identify Related Risk Factors and Signs and Symptoms  Outcome: Ongoing (interventions implemented as appropriate)    Goal: Skin Health and Integrity  Outcome: Ongoing (interventions implemented as appropriate)      Problem: Fall Risk (Adult)  Goal: Identify Related Risk Factors and Signs and Symptoms  Outcome: Ongoing (interventions implemented as appropriate)    Goal: Absence of Fall  Outcome: Ongoing (interventions implemented as appropriate)        "

## 2019-09-12 NOTE — PROGRESS NOTES
"IM progress note      Lincolnemiliano Torres  [id number]  1945     LOS: 3 days     Attending: Darrin New MD    Primary Care Provider: Salty Hernandez MD      Chief Complaint/Reason for visit:  Right hip pain    Subjective   Feels ok. No f/c/n/vom/sob. Still with right upper extremity weakness. No HA, vision change.    Objective     Vital Signs  Visit Vitals  /54 (BP Location: Right arm, Patient Position: Lying)   Pulse 69   Temp 98.2 °F (36.8 °C) (Oral)   Resp 16   Ht 162.6 cm (64\")   Wt 86.2 kg (190 lb)   SpO2 97%   BMI 32.61 kg/m²     Temp (24hrs), Av.5 °F (36.9 °C), Min:98.1 °F (36.7 °C), Max:98.9 °F (37.2 °C)      Nutrition: PO    Respiratory: RA    Physical Therapy: Pt increased gait distance to 10 feet with MinAx2 and RW. Pt still with R hand numbness/weakness, will attempt use of platform walker this PM. Pt requiring assist of 2 for all functional mobility, continue to recommend rehab. Pt will need OT consult    Physical Exam:     General Appearance:    Alert, cooperative, in no acute distress   Head:    Normocephalic, without obvious abnormality, atraumatic    Lungs:     Normal effort, symmetric chest rise, no crepitus, clear to      auscultation bilaterally             Heart:    Regular rhythm and normal rate, normal S1 and S2   Abdomen:     Normal bowel sounds, no masses, no organomegaly, soft        non-tender, non-distended, no guarding, no rebound                tenderness   Extremities:   Right hip Prineo CDI.     Pulses:   Pulses palpable and equal bilaterally   Skin:   No bleeding, bruising or rash   Neurologic:   Cranial nerves 2 - 12 grossly intact. Weak  and numbness right hand.     Results Review:     I reviewed the patient's new clinical results.   Results from last 7 days   Lab Units 19  1839 09/10/19  0336   WBC 10*3/mm3 13.95* 12.20*   HEMOGLOBIN g/dL 9.4* 10.2*   HEMATOCRIT % 30.2* 33.9*   PLATELETS 10*3/mm3 159 172     Results for JACKI TORRESULAEMILIANO MARADIAGA " (MRN [id number]) as of 9/10/2019 10:22   Ref. Range 9/15/2018 01:26   Hemoglobin Latest Ref Range: 11.5 - 15.5 g/dL 12.1   Hematocrit Latest Ref Range: 34.5 - 44.0 % 38.1     Results from last 7 days   Lab Units 09/11/19  0952 09/10/19  0337   SODIUM mmol/L 136 141   POTASSIUM mmol/L 4.4 5.0   CHLORIDE mmol/L 103 103   CO2 mmol/L 27.0 27.0   BUN mg/dL 24* 29*   CREATININE mg/dL 1.09* 1.47*   CALCIUM mg/dL 7.9* 7.6*   GLUCOSE mg/dL 144* 158*     Results for ERYN STEVE (MRN [id number]) as of 9/12/2019 09:18   Ref. Range 9/12/2019 01:58   Hemoglobin A1C Latest Ref Range: 4.80 - 5.60 % 5.20   Total Cholesterol Latest Ref Range: 0 - 200 mg/dL 132   HDL Cholesterol Latest Ref Range: 40 - 60 mg/dL 34 (L)   LDL Cholesterol  Latest Ref Range: 0 - 100 mg/dL 51   VLDL Cholesterol Latest Units: mg/dL 47   Triglycerides Latest Ref Range: 0 - 150 mg/dL 235 (H)   LDL/HDL Ratio Unknown 1.50   Heparin Anti-Xa Latest Ref Range: 0.30 - 0.70 IU/ml 0.10 (L)       Study Result     EXAMINATION: CT HEAD WO CONTRAST- 09/10/2019     INDICATION: Focal neuro deficit, new, fixed or worsening, >6 hours;  Z74.09-Other reduced mobility      TECHNIQUE: CT head without intravenous contrast     The radiation dose reduction device was turned on for each scan per the  ALARA (As Low as Reasonably Achievable) protocol.     COMPARISON: NONE     FINDINGS: Midline structures are symmetric without evidence of mass,  mass effect or midline shift. Ventricles and sulci within normal limits.  No intra-axial hemorrhage or extra-axial fluid collection. Globes and  orbits unremarkable. Visualized paranasal sinuses and mastoid air cells  are grossly clear and well-pneumatized. Calvarium intact.     IMPRESSION:  No acute intracranial abnormality specifically no midline  shift or hydrocephalus. No intra-axial hemorrhage or extra-axial fluid  collection. MRI may be considered for further evaluation of  acute/subacute infarction if clinically warranted.     D:   09/10/2019  E:  09/10/2019     Study Result     EXAMINATION: MRI BRAIN WO CONTRAST- 09/10/2019     INDICATION: Focal neuro deficit, new, fixed or worsening, >6 hours;  Z74.09-Other reduced mobility     TECHNIQUE: Sagittal and axial T1 axial T2 FLAIR diffusion weighted  images of the brain     COMPARISON: Head CT scan 09/10/2019     FINDINGS: History indicates numbness, loss of right hand  strength.  Improving symptoms.     There is a well-defined area of restricted diffusion approximately 11 mm  in maximal diameter in the posterior left frontal lobe in what appears  to be the prefrontal gyrus, consistent with acute infarct. No definite  acute infarct is seen elsewhere. A couple of punctate areas of increased  T2 signal in the central right frontal white matter at approximately the  same level or at best equivocal for minute infarcts.     There is moderate, mostly nonconfluent central white matter change and  age-appropriate generalized cerebral atrophy. There is no evidence of  mass, mass effect, hemorrhage, hydrocephalus or abnormal extra-axial  collection.     IMPRESSION:  1. 11 mm acute infarct in approximately the area of the left prefrontal  gyrus.  2. Few punctate areas of increased signal in the right frontal white  matter, equivocal for minute acute or recent infarct, possibly just  artifactual.  3. No acute intracranial disease is identified elsewhere.     D:  09/11/2019  E:  09/11/2019     Carotid duplex:  Interpretation Summary     · High-grade left internal carotid artery stenosis. Greather than 70% stenosis. Dense calcified plaque within proximal left internal carotid artery  · Right internal carotid artery stenosis of 50-69%.  · Vertebral artery flow antegrade bilaterally with increase in left sided peak systolic velocities suugesting possible proximal stenosis     2D echo:    Interpretation Summary     · Left ventricular systolic function is normal. Estimated EF = 60%.  · There were no  significant valvular abnormalities.            I reviewed the patient's new imaging including images and reports.    All medications reviewed.     aspirin 81 mg Oral Daily   atenolol 25 mg Oral BID   atorvastatin 80 mg Oral Nightly   docusate sodium 100 mg Oral BID   escitalopram 20 mg Oral Daily   mirtazapine 15 mg Oral Nightly   sodium chloride 10 mL Intravenous Q12H       Assessment/Plan        Left prefrontal gyrus stroke    Status post total replacement of right hip    Anxiety and depression    HTN (hypertension)    Renal insufficiency    Leukocytosis, likely reactive    Arthritis of right hip    HO A-fib     YUNIOR treated with BiPAP    Acute blood loss anemia, mild, asymptomatic    RUE weakness/numbness        Plan  1. PT/OT- WBAT RLE  2. Pain control-prns   3. IS-encouraged  4. DVT proph- mechs/ASA  5. Bowel regimen  6. Monitor post-op labs  7. DC planning for home    Stroke, RUE weakness ( stroke protocol per )[facility]  - neuro following  - stat CT head negative, followed by MRI showing left prefrontal gyrus stroke  - continue tele monitor  - cardiology consult  - Further eval of possible high grade LICA stenosis.    Cardiology  - Anticoagulation recommended.   - ECHO, noted.       RI, improving  - DC lisinopril  - continue IVF    HTN, aifb  - Continue home atenolol   - Monitor BP   - Allow some degree of BP elevation, post stroke.     YUNIOR  - bipap at night    Discussed with patient.       Brit Galo MD  09/12/19  9:30 AM     Addendum:  Noted the recommendations of neurosurgery with loading dose of Plavix while continuing aspirin.  With dual antiplatelet therapy recommended as above I discussed with Dr. Baldwin with cardiology regarding the need for further anticoagulation with a Duac or heparin drip.  In light of the patient having had no palpitation episodes no evidence of atrial fibrillation during her hospital stay, and with work-up showing left ICA tight stenosis it is felt that full  anticoagulation does not need to be started at this point.  Discontinuing heparin currently, Dr. Cervantes is considering outpatient monitor to detect any further episodes of atrial fibrillation.

## 2019-09-12 NOTE — PROGRESS NOTES
[facility] at [facility]  IP Progress Note      Chief Complaint/Reason for service: History of A. fib    Subjective   Subjective: The patient was sleeping but awakens easily.  She states her right arm is worse today than yesterday.  She denies chest pain nausea or vomiting.  I discussed the results of her findings including the MRI revealing a stroke in the high-grade left ICA stenosis.    Past medical, surgical, social and family history reviewed in the patient's electronic medical record.    Objective     Vital Sign Min/Max for last 24 hours  Temp  Min: 98.1 °F (36.7 °C)  Max: 98.9 °F (37.2 °C)   BP  Min: 139/54  Max: 167/75   Pulse  Min: 69  Max: 85   Resp  Min: 16  Max: 17   SpO2  Min: 92 %  Max: 97 %   Flow (L/min)  Min: 2  Max: 2      Intake/Output Summary (Last 24 hours) at 9/12/2019 0755  Last data filed at 9/11/2019 1700  Gross per 24 hour   Intake 600 ml   Output --   Net 600 ml             Current Facility-Administered Medications:   •  aspirin EC tablet 81 mg, 81 mg, Oral, Daily, Brit Galo MD  •  atenolol (TENORMIN) tablet 25 mg, 25 mg, Oral, BID, Brit Galo MD, 25 mg at 09/11/19 2049  •  atorvastatin (LIPITOR) tablet 80 mg, 80 mg, Oral, Nightly, Maribel East MD, 80 mg at 09/11/19 2048  •  benzocaine-menthol (CEPACOL) lozenge 1 lozenge, 1 lozenge, Mouth/Throat, Q2H PRN, Jennifer Dale, WILFREDO  •  bisacodyl (DULCOLAX) EC tablet 10 mg, 10 mg, Oral, Daily PRN, Darrin New MD, 10 mg at 09/11/19 0813  •  bisacodyl (DULCOLAX) suppository 10 mg, 10 mg, Rectal, Daily PRN, Darrin New MD  •  docusate sodium (COLACE) capsule 100 mg, 100 mg, Oral, BID, Darrin New MD, 100 mg at 09/11/19 2049  •  escitalopram (LEXAPRO) tablet 20 mg, 20 mg, Oral, Daily, Jennifer Dale APRN, 20 mg at 09/11/19 0813  •  heparin 51323 units/250 mL (100 units/mL) in 0.45 % NaCl infusion, 15 Units/kg/hr, Intravenous, Titrated, Leon Tobin IV, [facility], Last Rate:  12.93 mL/hr at 09/12/19 0411, 15 Units/kg/hr at 09/12/19 0411  •  HYDROcodone-acetaminophen (NORCO) 5-325 MG per tablet 1 tablet, 1 tablet, Oral, Q4H PRN, Darrin New MD, 1 tablet at 09/11/19 2048  •  HYDROmorphone (DILAUDID) injection 0.5 mg, 0.5 mg, Intravenous, Q2H PRN, 0.5 mg at 09/11/19 2129 **AND** naloxone (NARCAN) injection 0.1 mg, 0.1 mg, Intravenous, Q5 Min PRN, Darrin New MD  •  hydrOXYzine (ATARAX) tablet 25 mg, 25 mg, Oral, BID PRN, Jennifer Dale APRN, 25 mg at 09/10/19 1018  •  magnesium hydroxide (MILK OF MAGNESIA) suspension 2400 mg/10mL 10 mL, 10 mL, Oral, Daily PRN, Darrin New MD, 10 mL at 09/11/19 0813  •  mirtazapine (REMERON) tablet 15 mg, 15 mg, Oral, Nightly, Jennifer Dale APRN, 15 mg at 09/11/19 2049  •  ondansetron (ZOFRAN) injection 4 mg, 4 mg, Intravenous, Q6H PRN, Jennifer Dale APRN  •  Pharmacy to Dose Heparin, , Does not apply, Continuous PRN, Brit Galo MD  •  promethazine (PHENERGAN) tablet 12.5 mg, 12.5 mg, Oral, Q6H PRN, Darrin New MD  •  sodium chloride 0.9 % bolus 500 mL, 500 mL, Intravenous, TID PRN, Jennifer Dale APRN  •  sodium chloride 0.9 % flush 10 mL, 10 mL, Intravenous, Q12H, Maribel East MD  •  sodium chloride 0.9 % flush 10 mL, 10 mL, Intravenous, PRN, Maribel East MD  •  sodium chloride 0.9 % infusion, 100 mL/hr, Intravenous, Continuous, Jennifer Dale APRN, Last Rate: 100 mL/hr at 09/10/19 2036, 100 mL/hr at 09/10/19 2036  •  traMADol (ULTRAM) tablet 50 mg, 50 mg, Oral, Q6H PRN, Darrin New MD, 50 mg at 09/10/19 0414    Physical Exam: General the patient awakens easily.  She is speaking more slowly today but I can understand what she is saying.        HEENT: No JVP       Respiratory: Clear anteriorly       Cardiovascular: Regular rate and rhythm with a grade 3 over systolic ejection murmur and no peripheral edema       GI:        Lower Extremities: No lesions       Neuro: The patient's right arm  is now flaccid.  She cannot raise her right leg.  She has very minimal plantar movement of the right foot       Skin: Warm and dry with no edema palpation       Psych: Answers questions but not as near awake and alert as yesterday    Results Review: I reviewed the results of the carotid Doppler which shows high-grade left ICA stenosis with peak velocity of 600.  Reviewed the results of the MRI which showed an acute 11 mm stroke.  Blood pressures running from 1 20-1 59 systolic heart rates are in the 80s.  Hemoglobin 9.4    Radiology Results:  Imaging Results (last 72 hours)     Procedure Component Value Units Date/Time    XR Hip With or Without Pelvis 2 - 3 View Right [[id number]] Collected:  09/09/19 1623     Updated:  09/11/19 1722    Narrative:       EXAMINATION: XR HIP W OR WO PELVIS 2-3 VIEW RIGHT- 09/09/2019     INDICATION: PROSTHETIC ARTHROPLASTY OF THE HIP      COMPARISON: NONE     FINDINGS: Imaging of the pelvis and 2 views of the right hip reveal  patient to be status post total right hip arthroplasty. Post surgical  changes seen in the soft tissues. No hardware complication or  malalignment identified of the prosthesis.   Degenerative changes seen  within the sacroiliac joints bilaterally and within the lower lumbar  spine.       Impression:       Patient status post total right hip arthroplasty. No  hardware complication or malalignment identified of the prosthesis. Post  surgical changes seen within the soft tissues.     D:  09/09/2019  E:  09/10/2019     This report was finalized on 9/11/2019 5:19 PM by Dr. Rylee Sherman MD.       MRI Brain Without Contrast [[id number]] Collected:  09/11/19 0819     Updated:  09/11/19 1036    Narrative:       EXAMINATION: MRI BRAIN WO CONTRAST- 09/10/2019     INDICATION: Focal neuro deficit, new, fixed or worsening, >6 hours;  Z74.09-Other reduced mobility     TECHNIQUE: Sagittal and axial T1 axial T2 FLAIR diffusion weighted  images of the brain     COMPARISON:  Head CT scan 09/10/2019     FINDINGS: History indicates numbness, loss of right hand  strength.  Improving symptoms.     There is a well-defined area of restricted diffusion approximately 11 mm  in maximal diameter in the posterior left frontal lobe in what appears  to be the prefrontal gyrus, consistent with acute infarct. No definite  acute infarct is seen elsewhere. A couple of punctate areas of increased  T2 signal in the central right frontal white matter at approximately the  same level or at best equivocal for minute infarcts.     There is moderate, mostly nonconfluent central white matter change and  age-appropriate generalized cerebral atrophy. There is no evidence of  mass, mass effect, hemorrhage, hydrocephalus or abnormal extra-axial  collection.       Impression:       1. 11 mm acute infarct in approximately the area of the left prefrontal  gyrus.  2. Few punctate areas of increased signal in the right frontal white  matter, equivocal for minute acute or recent infarct, possibly just  artifactual.  3. No acute intracranial disease is identified elsewhere.     D:  09/11/2019  E:  09/11/2019       FL C Arm During Surgery [[id number]] Collected:  09/09/19 1448     Updated:  09/10/19 1716    Narrative:       EXAMINATION: FL C ARM DURING SURGERY- 09/09/2019     INDICATION: Total Anterior Hip      TECHNIQUE: Intraoperative fluoroscopy for improved localization and  treatment planning     COMPARISON: NONE     FINDINGS: Intraoperative fluoroscopy with total fluoroscopic time usage  26 seconds and 5 representative images saved during right hip  arthroplasty.       Impression:       Intraoperative fluoroscopy utilized during right total hip  arthroplasty.     D:  09/09/2019  E:  09/09/2019         This report was finalized on 9/10/2019 5:13 PM by Dr. Benji Valera.       CT Head Without Contrast [[id number]] Collected:  09/10/19 1203     Updated:  09/10/19 1312    Narrative:       EXAMINATION: CT HEAD WO  CONTRAST- 09/10/2019     INDICATION: Focal neuro deficit, new, fixed or worsening, >6 hours;  Z74.09-Other reduced mobility      TECHNIQUE: CT head without intravenous contrast     The radiation dose reduction device was turned on for each scan per the  ALARA (As Low as Reasonably Achievable) protocol.     COMPARISON: NONE     FINDINGS: Midline structures are symmetric without evidence of mass,  mass effect or midline shift. Ventricles and sulci within normal limits.  No intra-axial hemorrhage or extra-axial fluid collection. Globes and  orbits unremarkable. Visualized paranasal sinuses and mastoid air cells  are grossly clear and well-pneumatized. Calvarium intact.       Impression:       No acute intracranial abnormality specifically no midline  shift or hydrocephalus. No intra-axial hemorrhage or extra-axial fluid  collection. MRI may be considered for further evaluation of  acute/subacute infarction if clinically warranted.     D:  09/10/2019  E:  09/10/2019             EKG: Sinus rhythm    ECHO: Normal EF    Tele: No A. fib    Assessment   Assessment/Plan: 1 this patient has a history of atrial fibrillation.  The patient had a postop left-sided stroke and has now right upper extremity paralysis and decreased movement of the left leg.  There is been no atrial fibrillation documented here.  However she was found to have a high-grade ICA stenosis.  The patient is on heparin and aspirin and need Plavix as well.  Awaiting Dr. Rm given to see the patient for evaluation of this tight LICA stenosis  2 permissive hypertension the patient is status post postop CVA    Salty Cervantes MD  09/12/19  7:55 AM

## 2019-09-12 NOTE — THERAPY TREATMENT NOTE
Patient Name: Akua Torres  : 1945    MRN: [id number]                              Today's Date: 2019       Admit Date: 2019    Visit Dx:     ICD-10-CM ICD-9-CM   1. Impaired functional mobility, balance, gait, and endurance Z74.09 V49.89   2. Impaired mobility and ADLs Z74.09 799.89     Patient Active Problem List   Diagnosis   • Degenerative disc disease, lumbar   • Lumbar stenosis with neurogenic claudication   • History of lumbar fusion   • Spondylosis of lumbar region without myelopathy or radiculopathy   • Mild obesity   • Physical deconditioning   • Idiopathic gout   • Anxiety and depression   • Greater trochanteric bursitis of both hips   • Status post total bilateral knee replacement   • Back pain   • HTN (hypertension)   • HLD (hyperlipidemia)   • Prediabetes   • S/P lumbar spinal fusion   • Renal insufficiency   • Leukocytosis, likely reactive   • Altered mental status. Felt to be drug related (trazodone, opiates, benzodiazepine)   • Acute renal failure due to hypotension. Now resolved (ARF) (CMS/[facility])   • Depression   • Encounter for Medicare annual wellness exam   • Arthritis of right hip   • HO A-fib (CMS/[facility])   • YUNIOR treated with BiPAP   • Status post total replacement of right hip   • Acute blood loss anemia, mild, asymptomatic     Past Medical History:   Diagnosis Date   • A-fib (CMS/[facility])    • Anxiety    • Anxiety and depression    • Arthritis    • Cataract    • Depression    • Elevated serum creatinine     SEES DR SMITH EVERY 6 MONTHS- NEPHROLOGIST    • Extremity pain    • GERD (gastroesophageal reflux disease)    • Gout    • H/O bladder infections     JUST FINISHED MACROBID ON 17 FOR RECENT UTI   • Hypercholesteremia    • Hypertension    • Joint pain    • Kidney disease    • Kidney disease, chronic, stage III (GFR 30-59 ml/min) (CMS/HCC)    • Low back pain    • Neck pain    • Rheumatic fever    • Sleep apnea    • Urinary tract infection    • Wears glasses      Past  Surgical History:   Procedure Laterality Date   • BACK SURGERY  2004    LUMBAR PER DR MAN    • CARPAL TUNNEL RELEASE Left    • COLONOSCOPY     • EYE SURGERY     • FINGER SURGERY Right     3RD FINGER   • HEEL SPUR EXCISION Bilateral    • KNEE ARTHROSCOPY Bilateral    • LUMBAR DISCECTOMY FUSION INSTRUMENTATION N/A 11/10/2017    Procedure: LUMBAR SPINAL FUSION ;  Surgeon: Gopi Man MD;  Location:  [facility];  Service:    • REPLACEMENT TOTAL KNEE BILATERAL Bilateral    • TOTAL HIP ARTHROPLASTY Right 9/9/2019    Procedure: TOTAL ANTERIOR RIGHT HIP ARTHROPLASTY;  Surgeon: Darrin New MD;  Location:  [facility];  Service: Orthopedics     General Information     Row Name 09/12/19 1602 09/12/19 1133       PT Evaluation Time/Intention    Document Type  therapy note (daily note)  -AA  therapy note (daily note)  -AA    Mode of Treatment  physical therapy  -AA  physical therapy  -AA    Row Name 09/12/19 1602 09/12/19 1133       General Information    Patient Profile Reviewed?  yes  -AA  yes  -AA    Existing Precautions/Restrictions  fall;right;hip, anterior;other (see comments) R side weakness, acute CVA  -AA  fall;right;hip, anterior;other (see comments) R side weakness, acute CVA  -AA    Row Name 09/12/19 1602 09/12/19 1133       Cognitive Assessment/Intervention- PT/OT    Orientation Status (Cognition)  oriented x 4  -AA  oriented x 4  -AA    Row Name 09/12/19 1602 09/12/19 1133       Safety Issues, Functional Mobility    Safety Issues Affecting Function (Mobility)  friction/shear risk;positioning of assistive device;problem solving;safety precautions follow-through/compliance;sequencing abilities;steps too close to assistive device  -AA  friction/shear risk;insight into deficits/self awareness;judgment;positioning of assistive device;safety precautions follow-through/compliance;problem solving;sequencing abilities;steps too close to assistive device  -AA    Impairments Affecting Function (Mobility)   balance;coordination;endurance/activity tolerance;pain;range of motion (ROM);strength;grasp  -AA  balance;coordination;endurance/activity tolerance;pain;range of motion (ROM);strength;grasp  -AA      User Key  (r) = Recorded By, (t) = Taken By, (c) = Cosigned By    Initials Name Provider Type    Vanessa Braden, PT Physical Therapist        Mobility     Row Name 09/12/19 1602 09/12/19 2189       Bed Mobility Assessment/Treatment    Bed Mobility Assessment/Treatment  scooting/bridging;supine-sit  -AA  scooting/bridging;supine-sit;sit-supine  -AA    Scooting/Bridging [place] (Bed Mobility)  maximum assist (25% patient effort);2 person assist;verbal cues;nonverbal cues (demo/gesture)  -AA  maximum assist (25% patient effort);2 person assist;verbal cues;nonverbal cues (demo/gesture)  -AA    Supine-Sit [place] (Bed Mobility)  moderate assist (50% patient effort);2 person assist;verbal cues;nonverbal cues (demo/gesture)  -AA  maximum assist (25% patient effort);2 person assist;verbal cues;nonverbal cues (demo/gesture)  -AA    Sit-Supine [place] (Bed Mobility)  --  maximum assist (25% patient effort);2 person assist;verbal cues  -AA    Assistive Device (Bed Mobility)  bed rails;head of bed elevated;draw sheet  -AA  bed rails;head of bed elevated;draw sheet  -AA    Comment (Bed Mobility)  VC for deep breathing.  Pt presents with R shoulder and elbow ROM actively and able to A with RUE.  KI donned prior to sitting; max to mod A to advance RLE to EOB  -AA  VC for deep breathing and not hold breath with mobility; VC for sequencing; A with RUE and RLE with pt able to A minimally.  Increased time to perform.  No dizziness upon sitting.  Pt max A for initial sit balance and progress to CGA with B foot on floor  -AA    Row Name 09/12/19 1602 09/12/19 8268       Transfer Assessment/Treatment    Comment (Transfers)  VC for placing R hand on platform with pt able to complete 75% activity without assistance.  VC for  posture.  VC for RLe placement with KI donned  -AA  VC for push off with LUE; max A R hand placement on platform.  Pt able to advance RLE back during SPT turning with intermittent min A and max A RLE to advance forward with R knee buckle noted.  -AA    Row Name 09/12/19 1602 09/12/19 1135       Bed-Chair Transfer    Bed-Chair [place] (Transfers)  moderate assist (50% patient effort);2 person assist;verbal cues;nonverbal cues (demo/gesture)  -AA  maximum assist (25% patient effort);2 person assist;verbal cues;nonverbal cues (demo/gesture)  -AA    Assistive Device (Bed-Chair Transfers)  walker, rolling platform  -AA  walker, rolling platform  -AA    Row Name 09/12/19 1602 09/12/19 1135       Sit-Stand Transfer    Sit-Stand [place] (Transfers)  2 person assist;verbal cues;nonverbal cues (demo/gesture);minimum assist (75% patient effort)  -AA  2 person assist;verbal cues;nonverbal cues (demo/gesture);minimum assist (75% patient effort)  -AA    Assistive Device (Sit-Stand Transfers)  walker, rolling platform  -AA  walker, rolling platform  -AA    Row Name 09/12/19 1602 09/12/19 1135       Gait/Stairs Assessment/Training    Gait/Stairs Assessment/Training  gait/ambulation independence;distance ambulated;gait pattern  -AA  gait/ambulation independence;distance ambulated;gait pattern  -AA    [place] Level (Gait)  minimum assist (75% patient effort);2 person assist;verbal cues  -AA  maximum assist (25% patient effort);2 person assist;verbal cues  -AA    Assistive Device (Gait)  walker, rolling platform  -AA  walker, rolling platform  -AA    Distance in Feet (Gait)  3, 9  -AA  5  -AA    Pattern (Gait)  step-to;step-through  -AA  step-to  -AA    Deviations/Abnormal Patterns (Gait)  right sided deviations;antalgic;bilateral deviations;nixon decreased;stride length decreased  -AA  right sided deviations;antalgic;bilateral deviations;nixon decreased;stride length decreased  -AA    Bilateral Gait Deviations   forward flexed posture;heel strike decreased;weight shift ability decreased  -AA  forward flexed posture;heel strike decreased;weight shift ability decreased  -AA    Right Sided Gait Deviations  --  knee buckling, right side  -AA    Comment (Gait/Stairs)  Pt ambulate with KI donned RLE  Pt able to advance forward RLE without A.  Cues for posture.  Pt able to  R hand 25% on platform.  Decreased fatigue and pain with KI donned per pt report  -AA  Pt require max A to advance RLE forward and knee block due to buckling.  Cues for sequencing.  Min A intermittently for R hand .  Cues for posture with pt lean forward.  Pt reports 5/10 pain.  Pt fatigue easily and chair brought quickly to patient.    -AA    Row Name 09/12/19 1602 09/12/19 1135       Mobility Assessment/Intervention    Extremity Weight-bearing Status  right lower extremity  -AA  right lower extremity  -AA    Right Lower Extremity (Weight-bearing Status)  weight-bearing as tolerated (WBAT)  -AA  weight-bearing as tolerated (WBAT)  -AA      User Key  (r) = Recorded By, (t) = Taken By, (c) = Cosigned By    Initials Name Provider Type    AA Vanessa Milligan, PT Physical Therapist        Obj/Interventions     Row Name 09/12/19 1139          Therapeutic Exercise    Lower Extremity (Therapeutic Exercise)  gluteal sets;heel slides, bilateral;quad sets, bilateral;SAQ (short arc quad), bilateral;SLR (straight leg raise), bilateral  -AA     Lower Extremity Range of Motion (Therapeutic Exercise)  hip abduction/adduction, bilateral;ankle dorsiflexion/plantar flexion, bilateral  -AA     Exercise Type (Therapeutic Exercise)  AROM (active range of motion);AAROM (active assistive range of motion);PROM (passive range of motion)  -AA     Position (Therapeutic Exercise)  supine  -AA     Sets/Reps (Therapeutic Exercise)  10  -AA     Comment (Therapeutic Exercise)  pt reports 2/10 pain with ther-ex, reports decreased stiffness.   educated on HEP to perform outside of  therapy.  Will continue to educated  -AA     Row Name 09/12/19 1606 09/12/19 1139       Static Sitting Balance    Level of [place] (Unsupported Sitting, Static Balance)  contact guard assist;moderate assist, 50 to 74% patient effort  -AA  minimal assist, 75% patient effort;2 person assist  -AA    Sitting Position (Unsupported Sitting, Static Balance)  sitting on edge of bed  -AA  sitting on edge of bed  -AA    Time Able to Maintain Position (Unsupported Sitting, Static Balance)  more than 5 minutes  -AA  more than 5 minutes  -AA    Comment (Unsupported Sitting, Static Balance)  mod progressed to CGA, cues for posture  -AA  cues for posture  -AA    Row Name 09/12/19 1606 09/12/19 1139       Static Standing Balance    Level of [place] (Supported Standing, Static Balance)  minimal assist, 75% patient effort;2 person assist  -AA  moderate assist, 50 to 74% patient effort;2 person assist  -AA    Time Able to Maintain Position (Supported Standing, Static Balance)  more than 5 minutes  -AA  3 to 4 minutes  -AA    Assistive Device Utilized (Supported Standing, Static Balance)  walker, rolling platform  -AA  walker, rolling platform  -AA    Comment (Supported Standing, Static Balance)  VC for posture  -AA  --    Row Name 09/12/19 1606 09/12/19 1139       Dynamic Standing Balance    Level of [place], Reaches Outside Midline (Standing, Dynamic Balance)  moderate assist, 50 to 74% patient effort;2 person assist  -AA  maximal assist, 25 to 49% patient effort;2 person assist  -AA    Time Able to Maintain Position, Reaches Outside Midline (Standing, Dynamic Balance)  2 to 3 minutes  -AA  1 to 2 minutes  -AA    Assistive Device Utilized (Supported Standing, Dynamic Balance)  walker, rolling platform  -AA  walker, rolling platform  -AA    Comment, Reaches Outside Midline (Standing, Dynamic Balance)  Ki donned improved balance  -AA  --      User Key  (r) = Recorded By, (t) = Taken By, (c) = Cosigned By    Initials Name  Provider Type    Vanessa Braden PT Physical Therapist        Goals/Plan    No documentation.       Clinical Impression     Row Name 09/12/19 1607 09/12/19 1141       Pain Assessment    Additional Documentation  Pain Scale: Numbers Pre/Post-Treatment (Group)  -AA  Pain Scale: Numbers Pre/Post-Treatment (Group)  -AA    Row Name 09/12/19 1607 09/12/19 1141       Pain Scale: Numbers Pre/Post-Treatment    Pain Scale: Numbers, Pretreatment  0/10 - no pain  -AA  0/10 - no pain  -AA    Pain Scale: Numbers, Post-Treatment  2/10  -AA  4/10  -AA    Pain Location - Side  Right  -AA  Right  -AA    Pain Location  hip  -AA  hip  -AA    Pain Intervention(s)  Repositioned;Ambulation/increased activity  -AA  Repositioned;Ambulation/increased activity  -AA    Row Name 09/12/19 1607 09/12/19 1141       Plan of Care Review    Plan of Care Reviewed With  patient;spouse  -AA  patient;spouse  -AA    Row Name 09/12/19 1607 09/12/19 1141       Positioning and Restraints    Pre-Treatment Position  in bed  -AA  in bed  -AA    Post Treatment Position  chair  -AA  bed  -AA    In Bed  --  notified nsg;supine;call light within reach;encouraged to call for assist;exit alarm on;with family/caregiver;RUE elevated  -AA    In Chair  notified nsg;exit alarm on;with nsg;waffle cushion;reclined;with family/caregiver;call light within reach;encouraged to call for assist;on mechanical lift sling;heels elevated  -AA  --      User Key  (r) = Recorded By, (t) = Taken By, (c) = Cosigned By    Initials Name Provider Type    Vanessa Braden PT Physical Therapist        Outcome Measures     Row Name 09/12/19 1610 09/12/19 1144       How much help from another person do you currently need...    Turning from your back to your side while in flat bed without using bedrails?  2  -AA  2  -AA    Moving from lying on back to sitting on the side of a flat bed without bedrails?  2  -AA  2  -AA    Moving to and from a bed to a chair (including a wheelchair)?  2  -AA   2  -AA    Standing up from a chair using your arms (e.g., wheelchair, bedside chair)?  3  -AA  2  -AA    Climbing 3-5 steps with a railing?  1  -AA  1  -AA    To walk in hospital room?  2  -AA  2  -AA    AM-PAC 6 Clicks Score (PT)  12  -AA  11  -AA    Row Name 09/12/19 1610 09/12/19 1144       Modified Reddy Scale    Modified [place]da Scale  4 - Moderately severe disability.  Unable to walk without assistance, and unable to attend to own bodily needs without assistance.  -AA  4 - Moderately severe disability.  Unable to walk without assistance, and unable to attend to own bodily needs without assistance.  -AA    Row Name 09/12/19 1610 09/12/19 1144       Functional Assessment    Outcome Measure Options  AM-PAC 6 Clicks Basic Mobility (PT)  -AA  AM-PAC 6 Clicks Basic Mobility (PT);Modified [place]  -AA      User Key  (r) = Recorded By, (t) = Taken By, (c) = Cosigned By    Initials Name Provider Type    Vanessa Braden, PT Physical Therapist        Physical Therapy Education     Title: PT OT SLP Therapies (In Progress)     Topic: Physical Therapy (In Progress)     Point: Mobility training (Done)     Learning Progress Summary           Patient Acceptance, E,D, VU,NR by MYRIAM at 9/12/2019  4:08 PM    Acceptance, E,D, NR by MYRIAM at 9/12/2019 11:42 AM    Acceptance, E,D, VU,NR by CLAY at 9/11/2019  9:59 AM    Comment:  Reviewed HEP, gait mechanics, benefits of mobility, safety with mobility    Acceptance, E,D, VU,NR by CLAY at 9/10/2019  3:07 PM    Comment:  Reviewed HEP, gait mechanics, benefits of mobility, safety with mobility    Acceptance, E,D, VU,NR by MJ at 9/10/2019  9:05 AM    Comment:  Reviewed anterior hip precautions, gait mechanics, benefits of mobility    Acceptance, E,D, VU,NR by REJI at 9/9/2019  5:05 PM    Comment:  Educated on anterior hip precautions, weight bearing status, correct supine to sit t/f technique, correct sit<->stand t/f technique, correct gait mechanics, and progression of POC.   Family Acceptance,  E,D, VU,NR by AA at 9/12/2019  4:08 PM    Acceptance, E,D, NR by AA at 9/12/2019 11:42 AM    Acceptance, E,D, VU,NR by REJI at 9/9/2019  5:05 PM    Comment:  Educated on anterior hip precautions, weight bearing status, correct supine to sit t/f technique, correct sit<->stand t/f technique, correct gait mechanics, and progression of POC.   Significant Other Acceptance, E,D, VU,NR by MJ at 9/11/2019  9:59 AM    Comment:  Reviewed HEP, gait mechanics, benefits of mobility, safety with mobility                   Point: Home exercise program (In Progress)     Learning Progress Summary           Patient Acceptance, E,D, NR by AA at 9/12/2019 11:42 AM    Acceptance, E,D, VU,NR by CLAY at 9/11/2019  9:59 AM    Comment:  Reviewed HEP, gait mechanics, benefits of mobility, safety with mobility    Acceptance, E,D, VU,NR by CLAY at 9/10/2019  3:07 PM    Comment:  Reviewed HEP, gait mechanics, benefits of mobility, safety with mobility    Acceptance, E,D, VU,NR by CLAY at 9/10/2019  9:05 AM    Comment:  Reviewed anterior hip precautions, gait mechanics, benefits of mobility    Acceptance, E,D, VU,NR by REJI at 9/9/2019  5:05 PM    Comment:  Educated on anterior hip precautions, weight bearing status, correct supine to sit t/f technique, correct sit<->stand t/f technique, correct gait mechanics, and progression of POC.   Family Acceptance, E,D, NR by AA at 9/12/2019 11:42 AM    Acceptance, E,D, VU,NR by REJI at 9/9/2019  5:05 PM    Comment:  Educated on anterior hip precautions, weight bearing status, correct supine to sit t/f technique, correct sit<->stand t/f technique, correct gait mechanics, and progression of POC.   Significant Other Acceptance, E,D, VU,NR by CLAY at 9/11/2019  9:59 AM    Comment:  Reviewed HEP, gait mechanics, benefits of mobility, safety with mobility                   Point: Body mechanics (Done)     Learning Progress Summary           Patient Acceptance, E,D, VU,NR by AA at 9/12/2019  4:08 PM    Acceptance, E,D, NR by  MYRIAM at 9/12/2019 11:42 AM    Acceptance, E,D, VU,NR by CLAY at 9/11/2019  9:59 AM    Comment:  Reviewed HEP, gait mechanics, benefits of mobility, safety with mobility    Acceptance, E,D, VU,NR by CLAY at 9/10/2019  3:07 PM    Comment:  Reviewed HEP, gait mechanics, benefits of mobility, safety with mobility    Acceptance, E,D, VU,NR by CLAY at 9/10/2019  9:05 AM    Comment:  Reviewed anterior hip precautions, gait mechanics, benefits of mobility    Acceptance, E,D, VU,NR by LR at 9/9/2019  5:05 PM    Comment:  Educated on anterior hip precautions, weight bearing status, correct supine to sit t/f technique, correct sit<->stand t/f technique, correct gait mechanics, and progression of POC.   Family Acceptance, E,D, VU,NR by AA at 9/12/2019  4:08 PM    Acceptance, E,D, NR by AA at 9/12/2019 11:42 AM    Acceptance, E,D, VU,NR by REJI at 9/9/2019  5:05 PM    Comment:  Educated on anterior hip precautions, weight bearing status, correct supine to sit t/f technique, correct sit<->stand t/f technique, correct gait mechanics, and progression of POC.   Significant Other Acceptance, E,D, VU,NR by CLAY at 9/11/2019  9:59 AM    Comment:  Reviewed HEP, gait mechanics, benefits of mobility, safety with mobility                   Point: Precautions (Done)     Learning Progress Summary           Patient Acceptance, E,D, VU,NR by AA at 9/12/2019  4:08 PM    Acceptance, E,D, NR by AA at 9/12/2019 11:42 AM    Acceptance, E,D, VU,NR by CLAY at 9/11/2019  9:59 AM    Comment:  Reviewed HEP, gait mechanics, benefits of mobility, safety with mobility    Acceptance, E,D, VU,NR by CLAY at 9/10/2019  3:07 PM    Comment:  Reviewed HEP, gait mechanics, benefits of mobility, safety with mobility    Acceptance, E,D, VU,NR by CLAY at 9/10/2019  9:05 AM    Comment:  Reviewed anterior hip precautions, gait mechanics, benefits of mobility    Acceptance, E,D, VU,NR by REJI at 9/9/2019  5:05 PM    Comment:  Educated on anterior hip precautions, weight bearing status,  correct supine to sit t/f technique, correct sit<->stand t/f technique, correct gait mechanics, and progression of POC.   Family Acceptance, E,D, VU,NR by AA at 9/12/2019  4:08 PM    Acceptance, E,D, NR by AA at 9/12/2019 11:42 AM    Acceptance, E,D, VU,NR by LR at 9/9/2019  5:05 PM    Comment:  Educated on anterior hip precautions, weight bearing status, correct supine to sit t/f technique, correct sit<->stand t/f technique, correct gait mechanics, and progression of POC.   Significant Other Acceptance, E,D, VU,NR by CLAY at 9/11/2019  9:59 AM    Comment:  Reviewed HEP, gait mechanics, benefits of mobility, safety with mobility                               User Key     Initials Effective Dates Name Provider Type Discipline     06/19/15 -  Teresa Blackwell, PT Physical Therapist PT     04/03/18 -  Henrique Clements, PT Physical Therapist PT     04/02/18 -  Vanessa Milligan, PT Physical Therapist PT              PT Recommendation and Plan     Outcome Summary/Treatment Plan (PT)  Anticipated Discharge Disposition (PT): skilled nursing facility  Plan of Care Reviewed With: patient, spouse  Progress: improving  Outcome Summary: Upon entering room, pt demonstrates R shoulder and elbow AROM partial range.  KI donned prior to sitting EOB.  Pt perform supine to sit mod A of 2 with pt able to A with RUE.  Mod A progressed to CGA sitting EOB.  STS min A of 2.  SPT mod A of 2.  Platform walker utilized and KI.  Pt able to place RUE on platform with min A.  Pt ambulated 3 feet and 9 feet with platform walker and min A of 2.  Recommend DC to SNF for rehab when appropriate     Time Calculation:   PT Charges     Row Name 09/12/19 1611 09/12/19 1144          Time Calculation    Start Time  1530  -AA  1051  -AA     PT Received On  09/12/19  -AA  09/12/19  -AA     PT Goal Re-Cert Due Date  09/19/19  -AA  09/19/19  -AA        Time Calculation- PT    Total Timed Code Minutes- PT  23 minute(s)  -AA  38 minute(s)  -AA        Timed  Charges    37028 - PT Therapeutic Exercise Minutes  --  10  -AA     86235 - Gait Training Minutes   10  -AA  12  -AA     78898 - PT Therapeutic Activity Minutes  13 -AA  16  -AA       User Key  (r) = Recorded By, (t) = Taken By, (c) = Cosigned By    Initials Name Provider Type    Vanessa Braden, PT Physical Therapist        Therapy Charges for Today     Code Description Service Date Service Provider Modifiers Qty    46533980883 HC PT THER PROC EA 15 MIN 9/12/2019 Srini April L, PT GP 1    50512042316 HC GAIT TRAINING EA 15 MIN 9/12/2019 Srini April L, PT GP 1    05872776490 HC PT THERAPEUTIC ACT EA 15 MIN 9/12/2019 Srini April L, PT GP 1    35946922023 HC PT THER SUPP EA 15 MIN 9/12/2019 Srini April L, PT GP 2    94911760424 HC GAIT TRAINING EA 15 MIN 9/12/2019 Srini April L, PT GP 2    52037074530 HC PT THERAPEUTIC ACT EA 15 MIN 9/12/2019 Srini April L, PT GP 1    20539989369 HC PT THER SUPP EA 15 MIN 9/12/2019 Srini April L, PT GP 1          PT G-Codes  Outcome Measure Options: AM-PAC 6 Clicks Basic Mobility (PT)  AM-PAC 6 Clicks Score (PT): 12  AM-PAC 6 Clicks Score (OT): 14  Modified Reddy Scale: 4 - Moderately severe disability.  Unable to walk without assistance, and unable to attend to own bodily needs without assistance.    Vanessa Milligan PT  9/12/2019

## 2019-09-13 PROBLEM — I63.232 STENOSIS OF LEFT INTERNAL CAROTID ARTERY WITH CEREBRAL INFARCTION (HCC): Status: ACTIVE | Noted: 2019-07-18

## 2019-09-13 LAB
ABO + RH BLD: NORMAL
ABO + RH BLD: NORMAL
APTT PPP: 56.9 SECONDS (ref 85–120)
BH BB BLOOD EXPIRATION DATE: NORMAL
BH BB BLOOD EXPIRATION DATE: NORMAL
BH BB BLOOD TYPE BARCODE: 5100
BH BB BLOOD TYPE BARCODE: 5100
BH BB DISPENSE STATUS: NORMAL
BH BB DISPENSE STATUS: NORMAL
BH BB PRODUCT CODE: NORMAL
BH BB PRODUCT CODE: NORMAL
BH BB UNIT NUMBER: NORMAL
BH BB UNIT NUMBER: NORMAL
INR PPP: 1.15 (ref 0.85–1.16)
PROTHROMBIN TIME: 14.1 SECONDS (ref 11.2–14.3)
UFH PPP CHRO-ACNC: 0.14 IU/ML (ref 0.3–0.7)
UFH PPP CHRO-ACNC: 0.16 IU/ML (ref 0.3–0.7)
UNIT  ABO: NORMAL
UNIT  ABO: NORMAL
UNIT  RH: NORMAL
UNIT  RH: NORMAL

## 2019-09-13 PROCEDURE — 85520 HEPARIN ASSAY: CPT | Performed by: INTERNAL MEDICINE

## 2019-09-13 PROCEDURE — 97112 NEUROMUSCULAR REEDUCATION: CPT

## 2019-09-13 PROCEDURE — 97110 THERAPEUTIC EXERCISES: CPT

## 2019-09-13 PROCEDURE — 85610 PROTHROMBIN TIME: CPT | Performed by: INTERNAL MEDICINE

## 2019-09-13 PROCEDURE — 85520 HEPARIN ASSAY: CPT

## 2019-09-13 PROCEDURE — 99232 SBSQ HOSP IP/OBS MODERATE 35: CPT | Performed by: PSYCHIATRY & NEUROLOGY

## 2019-09-13 PROCEDURE — 99232 SBSQ HOSP IP/OBS MODERATE 35: CPT | Performed by: INTERNAL MEDICINE

## 2019-09-13 PROCEDURE — 25010000002 HEPARIN (PORCINE) PER 1000 UNITS: Performed by: INTERNAL MEDICINE

## 2019-09-13 PROCEDURE — 85730 THROMBOPLASTIN TIME PARTIAL: CPT | Performed by: INTERNAL MEDICINE

## 2019-09-13 PROCEDURE — 97535 SELF CARE MNGMENT TRAINING: CPT

## 2019-09-13 PROCEDURE — 99231 SBSQ HOSP IP/OBS SF/LOW 25: CPT | Performed by: PHYSICIAN ASSISTANT

## 2019-09-13 PROCEDURE — 97116 GAIT TRAINING THERAPY: CPT

## 2019-09-13 PROCEDURE — 25010000002 HEPARIN (PORCINE) IN NACL 25000-0.45 UT/250ML-% SOLUTION

## 2019-09-13 RX ORDER — HEPARIN SODIUM 1000 [USP'U]/ML
60 INJECTION, SOLUTION INTRAVENOUS; SUBCUTANEOUS AS NEEDED
Status: DISCONTINUED | OUTPATIENT
Start: 2019-09-13 | End: 2019-09-14

## 2019-09-13 RX ORDER — HEPARIN SODIUM 1000 [USP'U]/ML
30 INJECTION, SOLUTION INTRAVENOUS; SUBCUTANEOUS AS NEEDED
Status: DISCONTINUED | OUTPATIENT
Start: 2019-09-13 | End: 2019-09-14

## 2019-09-13 RX ORDER — HEPARIN SODIUM 1000 [USP'U]/ML
4000 INJECTION, SOLUTION INTRAVENOUS; SUBCUTANEOUS ONCE
Status: COMPLETED | OUTPATIENT
Start: 2019-09-13 | End: 2019-09-13

## 2019-09-13 RX ADMIN — HYDROXYZINE HYDROCHLORIDE 25 MG: 25 TABLET, FILM COATED ORAL at 20:28

## 2019-09-13 RX ADMIN — HYDROCODONE BITARTRATE AND ACETAMINOPHEN 1 TABLET: 5; 325 TABLET ORAL at 12:06

## 2019-09-13 RX ADMIN — ATENOLOL 25 MG: 25 TABLET ORAL at 08:06

## 2019-09-13 RX ADMIN — HYDROCODONE BITARTRATE AND ACETAMINOPHEN 1 TABLET: 5; 325 TABLET ORAL at 08:06

## 2019-09-13 RX ADMIN — HEPARIN SODIUM 17 UNITS/KG/HR: 10000 INJECTION, SOLUTION INTRAVENOUS at 13:57

## 2019-09-13 RX ADMIN — MIRTAZAPINE 15 MG: 15 TABLET, FILM COATED ORAL at 20:28

## 2019-09-13 RX ADMIN — ATENOLOL 25 MG: 25 TABLET ORAL at 20:30

## 2019-09-13 RX ADMIN — ASPIRIN 81 MG: 81 TABLET, COATED ORAL at 08:06

## 2019-09-13 RX ADMIN — SODIUM CHLORIDE, PRESERVATIVE FREE 10 ML: 5 INJECTION INTRAVENOUS at 08:07

## 2019-09-13 RX ADMIN — DOCUSATE SODIUM 100 MG: 100 CAPSULE, LIQUID FILLED ORAL at 20:28

## 2019-09-13 RX ADMIN — HEPARIN SODIUM 4000 UNITS: 1000 INJECTION, SOLUTION INTRAVENOUS; SUBCUTANEOUS at 09:30

## 2019-09-13 RX ADMIN — ESCITALOPRAM OXALATE 20 MG: 20 TABLET ORAL at 08:06

## 2019-09-13 RX ADMIN — CLOPIDOGREL BISULFATE 75 MG: 75 TABLET ORAL at 08:06

## 2019-09-13 RX ADMIN — DOCUSATE SODIUM 100 MG: 100 CAPSULE, LIQUID FILLED ORAL at 08:06

## 2019-09-13 RX ADMIN — MAGNESIUM HYDROXIDE 10 ML: 2400 SUSPENSION ORAL at 20:28

## 2019-09-13 RX ADMIN — SODIUM CHLORIDE, PRESERVATIVE FREE 10 ML: 5 INJECTION INTRAVENOUS at 20:28

## 2019-09-13 RX ADMIN — ATORVASTATIN CALCIUM 80 MG: 40 TABLET, FILM COATED ORAL at 20:28

## 2019-09-13 RX ADMIN — BISACODYL 10 MG: 5 TABLET, COATED ORAL at 20:28

## 2019-09-13 RX ADMIN — HYDROCODONE BITARTRATE AND ACETAMINOPHEN 1 TABLET: 5; 325 TABLET ORAL at 20:28

## 2019-09-13 RX ADMIN — HYDROCODONE BITARTRATE AND ACETAMINOPHEN 1 TABLET: 5; 325 TABLET ORAL at 04:30

## 2019-09-13 NOTE — PLAN OF CARE
Problem: Patient Care Overview  Goal: Plan of Care Review  Outcome: Ongoing (interventions implemented as appropriate)   09/13/19 6208   Plan of Care Review   Progress improving   OTHER   Outcome Summary received report from previous shift. Pt AAO, VSS and in no apparent distress. patient mobility of RUE has improved. able to grasp with all fingers, not strong but improved movement. Had MRI completed this evening. NIH was 2. patient rested/slept >5hrs this shift. pain controlled with PO medication. will continue to monitor for changes   Coping/Psychosocial   Plan of Care Reviewed With patient       Problem: Pain, Chronic (Adult)  Goal: Identify Related Risk Factors and Signs and Symptoms  Outcome: Ongoing (interventions implemented as appropriate)      Problem: Skin Injury Risk (Adult)  Goal: Identify Related Risk Factors and Signs and Symptoms  Outcome: Ongoing (interventions implemented as appropriate)    Goal: Skin Health and Integrity  Outcome: Ongoing (interventions implemented as appropriate)      Problem: Fall Risk (Adult)  Goal: Identify Related Risk Factors and Signs and Symptoms  Outcome: Ongoing (interventions implemented as appropriate)    Goal: Absence of Fall  Outcome: Ongoing (interventions implemented as appropriate)

## 2019-09-13 NOTE — PLAN OF CARE
Problem: Hip Arthroplasty (Total, Partial) (Adult)  Goal: Signs and Symptoms of Listed Potential Problems Will be Absent, Minimized or Managed (Hip Arthroplasty)  Outcome: Ongoing (interventions implemented as appropriate)   09/13/19 3043   Goal/Outcome Evaluation   Problems Assessed (Hip Arthroplasty) functional deficit   Problems Present (Hip Arthroplasty) functional deficit

## 2019-09-13 NOTE — PROGRESS NOTES
"[facility] at [facility] Progress Note     LOS: 4 days   Patient Care Team:  Salty Hernandez MD as PCP - General (Family Medicine)  Leon Hein MD as Consulting Physician (Neurosurgery)  Perkins, Corinne E, PT as Physical Therapist (Physical Therapy)  Cricket Nettles MD as Consulting Physician (Pain Medicine)  PCP:  Salty Hernandez MD    Chief Complaint: Follow-up stroke, carotid stenosis    Subjective: Patient states she has improving right hand weakness.  Able to using it to help eat breakfast.  States her hip is healing.  No other new complaints today      Review of Systems:   All systems have been reviewed and are negative with the exception of those mentioned above.      Objective:    Vital Sign Min/Max for last 24 hours  Temp  Min: 97.6 °F (36.4 °C)  Max: 99.2 °F (37.3 °C)   BP  Min: 127/56  Max: 165/62   Pulse  Min: 64  Max: 77   Resp  Min: 16  Max: 18   SpO2  Min: 94 %  Max: 97 %   No Data Recorded   No Data Recorded     Flowsheet Rows      First Filed Value   Admission Height  162.6 cm (64\") Documented at 09/09/2019 1008   Admission Weight  86.2 kg (190 lb) Documented at 09/09/2019 1008          Telemetry: Sinus rhythm, no atrial fibrillation detected      Intake/Output Summary (Last 24 hours) at 9/13/2019 0912  Last data filed at 9/12/2019 1700  Gross per 24 hour   Intake 240 ml   Output --   Net 240 ml     Intake & Output (last 3 days)       09/10 0701 - 09/11 0700 09/11 0701 - 09/12 0700 09/12 0701 - 09/13 0700 09/13 0701 - 09/14 0700    P.O.  600 480     I.V. (mL/kg) 950 (11)       Total Intake(mL/kg) 950 (11) 600 (7) 480 (5.6)     Urine (mL/kg/hr) 200 (0.1)       Blood        Total Output 200       Net +750 +600 +480             Urine Unmeasured Occurrence 2 x 7 x 3 x            Physical Exam:  Physical Exam   Constitutional: She appears well-developed and well-nourished.   HENT:   Head: Normocephalic and atraumatic.   Neck:   Bilateral " carotid bruits, left greater than right   Cardiovascular: Normal rate, regular rhythm and intact distal pulses.   Murmur (2/6 systolic murmur) heard.  Pulmonary/Chest: Effort normal and breath sounds normal.   Musculoskeletal: She exhibits no edema.   Neurological:   Improving right hand weakness.        LABS/DIAGNOSTIC DATA:  Results from last 7 days   Lab Units 09/11/19  1839 09/10/19  0336   WBC 10*3/mm3 13.95* 12.20*   HEMOGLOBIN g/dL 9.4* 10.2*   HEMATOCRIT % 30.2* 33.9*   PLATELETS 10*3/mm3 159 172     No results found for: TROPONINT  Results from last 7 days   Lab Units 09/11/19 1839   INR  1.25*   APTT seconds 35.8*     Results from last 7 days   Lab Units 09/12/19 0158 09/11/19 0952 09/10/19  0337   SODIUM mmol/L  --  136 141   POTASSIUM mmol/L  --  4.4 5.0   CHLORIDE mmol/L  --  103 103   CO2 mmol/L  --  27.0 27.0   BUN mg/dL  --  24* 29*   CREATININE mg/dL 1.02* 1.09* 1.47*   CALCIUM mg/dL  --  7.9* 7.6*   GLUCOSE mg/dL  --  144* 158*     Results from last 7 days   Lab Units 09/12/19  0158   HEMOGLOBIN A1C % 5.20     Results from last 7 days   Lab Units 09/12/19  0158   CHOLESTEROL mg/dL 132   TRIGLYCERIDES mg/dL 235*   HDL CHOL mg/dL 34*   LDL CHOL mg/dL 51               Medication Review:     aspirin 81 mg Oral Daily   atenolol 25 mg Oral BID   atorvastatin 80 mg Oral Nightly   clopidogrel 75 mg Oral Daily   docusate sodium 100 mg Oral BID   escitalopram 20 mg Oral Daily   heparin (porcine) 4,000 Units Intravenous Once   mirtazapine 15 mg Oral Nightly   sodium chloride 10 mL Intravenous Q12H        heparin 17 Units/kg/hr Last Rate: 17 Units/kg/hr (09/12/19 1301)   Pharmacy to Dose Heparin     sodium chloride 100 mL/hr Last Rate: 100 mL/hr (09/10/19 2036)          Status post total replacement of right hip    Anxiety and depression    HTN (hypertension)    Renal insufficiency    Leukocytosis, likely reactive    Arthritis of right hip    HO A-fib (CMS/HCC)    YUNIOR treated with BiPAP    Acute blood loss  anemia, mild, asymptomatic      Assessment/Plan:    1.  Acute left precentral gyrus stroke, high-grade left carotid stenosis with near occlusion    -Mechanism for stroke appears to be a high-grade carotid stenosis.  Patient has not had any atrial fibrillation documented during this admission  -Discussed with Dr. MARY, and neuro interventional team.  At this time we will continue aspirin and Plavix and will resume IV heparin while she awaits carotid stenting on Tuesday  -Going forward we will plan to be on dual antiplatelet therapy on discharge without oral anticoagulant given no A. fib shown at this time  -We will give her a Holter monitor to continue to screen for arrhythmias as an outpatient  -Continue atorvastatin, LDL 51  -Right carotid shows moderate stenosis  -Echocardiogram 60% and no significant valve disease  -requested records of heart catheterization from February 2019, was reported to be normal    2.  HTN  -Continue current management  -Avoid hypotension    3.  Right hip placement  -Stable    Cardiology will be available if questions arise over the weekend.  Otherwise we will plan to revisit Monday.  Continue on aspirin, Plavix, heparin drip until carotid angiogram and stenting Tuesday          Geovany Mcdowell MD   09/13/19  9:12 AM

## 2019-09-13 NOTE — PLAN OF CARE
Problem: Patient Care Overview  Goal: Plan of Care Review  Outcome: Ongoing (interventions implemented as appropriate)   09/13/19 1018   Plan of Care Review   Progress improving   OTHER   Outcome Summary WORKS WELL WITH PHYSICAL THERAPY. AMBULATED 40 FEET WITH MECHANICAL GAIT AID. NO BUCKLING NOTED. ABLE TO ADVANCE R LEG. CONTINUED TO RECOMMEND INPATIENT REHAB   Coping/Psychosocial   Plan of Care Reviewed With patient

## 2019-09-13 NOTE — PROGRESS NOTES
"IM progress note      Akua Torres  [id number]  1945     LOS: 4 days     Attending: Darrin New MD    Primary Care Provider: Salty Hernandez MD      Chief Complaint/Reason for visit:  Right hip pain    Subjective   Doing better today.  Improving strength in right upper extremity.  Improving performance with physical therapy.  No nausea or vomiting, no shortness of breath.  Objective     Visit Vitals  BP (!) 182/66 (BP Location: Right arm, Patient Position: Sitting)   Pulse 78   Temp 98 °F (36.7 °C) (Oral)   Resp 16   Ht 162.6 cm (64\")   Wt 86.2 kg (190 lb)   SpO2 96%   BMI 32.61 kg/m²     Temp (24hrs), Av.2 °F (36.8 °C), Min:97.6 °F (36.4 °C), Max:99.2 °F (37.3 °C)      Nutrition: PO    Respiratory: RA    Physical Therapy: WORKS WELL WITH PHYSICAL THERAPY. AMBULATED 40 FEET WITH MECHANICAL GAIT AID. NO BUCKLING NOTED. ABLE TO ADVANCE R LEG. CONTINUED TO RECOMMEND INPATIENT REHAB  Physical Exam:     General Appearance:    Alert, cooperative, in no acute distress   Head:    Normocephalic, without obvious abnormality, atraumatic    Lungs:     Normal effort, symmetric chest rise, no crepitus, clear to      auscultation bilaterally             Heart:    Regular rhythm and normal rate, normal S1 and S2   Abdomen:     Normal bowel sounds, no masses, no organomegaly, soft        non-tender, non-distended, no guarding, no rebound                tenderness   Extremities:   Right hip Prineo CDI.     Pulses:   Pulses palpable and equal bilaterally   Skin:   No bleeding, bruising or rash   Neurologic:   Cranial nerves 2 - 12 grossly intact. Weak  and numbness right hand.(Able to raise her right arm against gravity.  Has better )wy     Results Review:     I reviewed the patient's new clinical results.   Results from last 7 days   Lab Units 19  1839 09/10/19  0336   WBC 10*3/mm3 13.95* 12.20*   HEMOGLOBIN g/dL 9.4* 10.2*   HEMATOCRIT % 30.2* 33.9*   PLATELETS 10*3/mm3 159 172 "     Results for ERYN STEVE (MRN [id number]) as of 9/10/2019 10:22   Ref. Range 9/15/2018 01:26   Hemoglobin Latest Ref Range: 11.5 - 15.5 g/dL 12.1   Hematocrit Latest Ref Range: 34.5 - 44.0 % 38.1     Results from last 7 days   Lab Units 09/12/19  0158 09/11/19  0952 09/10/19  0337   SODIUM mmol/L  --  136 141   POTASSIUM mmol/L  --  4.4 5.0   CHLORIDE mmol/L  --  103 103   CO2 mmol/L  --  27.0 27.0   BUN mg/dL  --  24* 29*   CREATININE mg/dL 1.02* 1.09* 1.47*   CALCIUM mg/dL  --  7.9* 7.6*   GLUCOSE mg/dL  --  144* 158*     Results for ERYN STEVE (MRN [id number]) as of 9/12/2019 09:18   Ref. Range 9/12/2019 01:58   Hemoglobin A1C Latest Ref Range: 4.80 - 5.60 % 5.20   Total Cholesterol Latest Ref Range: 0 - 200 mg/dL 132   HDL Cholesterol Latest Ref Range: 40 - 60 mg/dL 34 (L)   LDL Cholesterol  Latest Ref Range: 0 - 100 mg/dL 51   VLDL Cholesterol Latest Units: mg/dL 47   Triglycerides Latest Ref Range: 0 - 150 mg/dL 235 (H)   LDL/HDL Ratio Unknown 1.50   Heparin Anti-Xa Latest Ref Range: 0.30 - 0.70 IU/ml 0.10 (L)       Study Result     EXAMINATION: CT HEAD WO CONTRAST- 09/10/2019     INDICATION: Focal neuro deficit, new, fixed or worsening, >6 hours;  Z74.09-Other reduced mobility      TECHNIQUE: CT head without intravenous contrast     The radiation dose reduction device was turned on for each scan per the  ALARA (As Low as Reasonably Achievable) protocol.     COMPARISON: NONE     FINDINGS: Midline structures are symmetric without evidence of mass,  mass effect or midline shift. Ventricles and sulci within normal limits.  No intra-axial hemorrhage or extra-axial fluid collection. Globes and  orbits unremarkable. Visualized paranasal sinuses and mastoid air cells  are grossly clear and well-pneumatized. Calvarium intact.     IMPRESSION:  No acute intracranial abnormality specifically no midline  shift or hydrocephalus. No intra-axial hemorrhage or extra-axial fluid  collection. MRI may be  considered for further evaluation of  acute/subacute infarction if clinically warranted.     D:  09/10/2019  E:  09/10/2019     Study Result     EXAMINATION: MRI BRAIN WO CONTRAST- 09/10/2019     INDICATION: Focal neuro deficit, new, fixed or worsening, >6 hours;  Z74.09-Other reduced mobility     TECHNIQUE: Sagittal and axial T1 axial T2 FLAIR diffusion weighted  images of the brain     COMPARISON: Head CT scan 09/10/2019     FINDINGS: History indicates numbness, loss of right hand  strength.  Improving symptoms.     There is a well-defined area of restricted diffusion approximately 11 mm  in maximal diameter in the posterior left frontal lobe in what appears  to be the prefrontal gyrus, consistent with acute infarct. No definite  acute infarct is seen elsewhere. A couple of punctate areas of increased  T2 signal in the central right frontal white matter at approximately the  same level or at best equivocal for minute infarcts.     There is moderate, mostly nonconfluent central white matter change and  age-appropriate generalized cerebral atrophy. There is no evidence of  mass, mass effect, hemorrhage, hydrocephalus or abnormal extra-axial  collection.     IMPRESSION:  1. 11 mm acute infarct in approximately the area of the left prefrontal  gyrus.  2. Few punctate areas of increased signal in the right frontal white  matter, equivocal for minute acute or recent infarct, possibly just  artifactual.  3. No acute intracranial disease is identified elsewhere.     D:  09/11/2019  E:  09/11/2019     Carotid duplex:  Interpretation Summary     · High-grade left internal carotid artery stenosis. Greather than 70% stenosis. Dense calcified plaque within proximal left internal carotid artery  · Right internal carotid artery stenosis of 50-69%.  · Vertebral artery flow antegrade bilaterally with increase in left sided peak systolic velocities suugesting possible proximal stenosis     2D echo:    Interpretation Summary      · Left ventricular systolic function is normal. Estimated EF = 60%.  · There were no significant valvular abnormalities.            I reviewed the patient's new imaging including images and reports.    All medications reviewed.     aspirin 81 mg Oral Daily   atenolol 25 mg Oral BID   atorvastatin 80 mg Oral Nightly   clopidogrel 75 mg Oral Daily   docusate sodium 100 mg Oral BID   escitalopram 20 mg Oral Daily   mirtazapine 15 mg Oral Nightly   sodium chloride 10 mL Intravenous Q12H       Assessment/Plan        Left prefrontal gyrus stroke  Severe left internal carotid artery stenosis.    Status post total replacement of right hip    Anxiety and depression    HTN (hypertension)    Renal insufficiency    Leukocytosis, likely reactive    Arthritis of right hip    HO A-fib     YUNIOR treated with BiPAP    Acute blood loss anemia, mild, asymptomatic    RUE weakness/numbness        Plan  1. PT/OT- WBAT RLE  2. Pain control-prns   3. IS-encouraged  4. DVT proph- mechs/ASA  5. Bowel regimen  6. Monitor post-op labs  7. DC planning for home    Stroke, RUE weakness ( stroke protocol per )wy  - neuro following  - stat CT head negative, followed by MRI showing left prefrontal gyrus stroke  - continue tele monitor  - cardiology consult  - Further eval of possible high grade LICA stenosis.  Upon further discussion with neurosurgery and cardiology, continue dual antiplatelet therapy and heparin GTT until intervention on left carotid artery with stenting this coming Tuesday ( 9/13)    Cardiology  - Anticoagulation recommended.   - ECHO, noted.       RI, improving  - DC lisinopril       HTN, aifb  - Continue home atenolol   - Monitor BP   - Allow some degree of BP elevation, post stroke.     YUNIOR  - bipap at night    Discussed with patient.       Brit Galo MD  09/13/19  11:18 AM

## 2019-09-13 NOTE — THERAPY TREATMENT NOTE
Patient Name: Akua Torres  : 1945    MRN: [id number]                              Today's Date: 2019       Admit Date: 2019    Visit Dx:     ICD-10-CM ICD-9-CM   1. Impaired functional mobility, balance, gait, and endurance Z74.09 V49.89   2. Impaired mobility and ADLs Z74.09 799.89   3. 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 (CMS/[facility]) I63.232 433.11     Patient Active Problem List   Diagnosis   • Degenerative disc disease, lumbar   • Lumbar stenosis with neurogenic claudication   • History of lumbar fusion   • Spondylosis of lumbar region without myelopathy or radiculopathy   • Mild obesity   • Physical deconditioning   • Idiopathic gout   • Anxiety and depression   • Greater trochanteric bursitis of both hips   • Status post total bilateral knee replacement   • Back pain   • HTN (hypertension)   • HLD (hyperlipidemia)   • Prediabetes   • S/P lumbar spinal fusion   • Renal insufficiency   • Leukocytosis, likely reactive   • Altered mental status. Felt to be drug related (trazodone, opiates, benzodiazepine)   • Acute renal failure due to hypotension. Now resolved (ARF) (CMS/[facility])   • Depression   • Encounter for Medicare annual wellness exam   • Arthritis of right hip   • HO A-fib (CMS/[facility])   • YUNIOR treated with BiPAP   • Status post total replacement of right hip   • Acute blood loss anemia, mild, asymptomatic   • 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 (CMS/[facility])     Past Medical History:   Diagnosis Date   • A-fib (CMS/[facility])    • Anxiety    • Anxiety and depression    • Arthritis    • Cataract    • Depression    • Elevated serum creatinine     SEES DR SMITH EVERY 6 MONTHS- NEPHROLOGIST    • Extremity pain    • GERD (gastroesophageal reflux disease)    • Gout    • H/O bladder infections     JUST FINISHED MACROBID ON 17 FOR RECENT UTI   • Hypercholesteremia    • Hypertension    • Joint pain    • Kidney disease    • Kidney disease,  chronic, stage III (GFR 30-59 ml/min) (CMS/HCC)    • Low back pain    • Neck pain    • Rheumatic fever    • Sleep apnea    • Urinary tract infection    • Wears glasses      Past Surgical History:   Procedure Laterality Date   • BACK SURGERY  2004    LUMBAR PER DR MAN    • CARPAL TUNNEL RELEASE Left    • COLONOSCOPY     • EYE SURGERY     • FINGER SURGERY Right     3RD FINGER   • HEEL SPUR EXCISION Bilateral    • KNEE ARTHROSCOPY Bilateral    • LUMBAR DISCECTOMY FUSION INSTRUMENTATION N/A 11/10/2017    Procedure: LUMBAR SPINAL FUSION ;  Surgeon: Gopi Man MD;  Location:  [facility];  Service:    • REPLACEMENT TOTAL KNEE BILATERAL Bilateral    • TOTAL HIP ARTHROPLASTY Right 9/9/2019    Procedure: TOTAL ANTERIOR RIGHT HIP ARTHROPLASTY;  Surgeon: Darrin New MD;  Location:  [facility];  Service: Orthopedics     General Information     Row Name 09/13/19 1534 09/13/19 1007       PT Evaluation Time/Intention    Document Type  therapy note (daily note)  -SC  therapy note (daily note)  -SC    Mode of Treatment  physical therapy  -SC  physical therapy  -SC    Row Name 09/13/19 1534 09/13/19 1007       General Information    Patient Profile Reviewed?  yes  -SC  yes  -SC    Existing Precautions/Restrictions  fall;right;hip, anterior;other (see comments) R LENA FROM POST OP CVA  -SC  fall;right;hip, anterior;other (see comments) R LENA 2 TO POST OP CVA  -SC    Row Name 09/13/19 1534 09/13/19 1007       Cognitive Assessment/Intervention- PT/OT    Orientation Status (Cognition)  oriented x 4  -SC  oriented x 4  -SC    Cognitive Assessment/Intervention Comment  FOLLOWS COMMANDS  -SC  ALERT, FOLLOWS COMMANDS  -SC    Row Name 09/13/19 1534 09/13/19 1007       Safety Issues, Functional Mobility    Safety Issues Affecting Function (Mobility)  insight into deficits/self awareness;judgment;sequencing abilities  -SC  judgment;insight into deficits/self awareness  -SC    Impairments Affecting Function (Mobility)   balance;coordination;endurance/activity tolerance;pain;range of motion (ROM);strength;grasp  -SC  balance;coordination;endurance/activity tolerance;pain;range of motion (ROM);strength;grasp  -SC    Comment, Safety Issues/Impairments (Mobility)  --  CUES FOR SAFETY NEEDED  -SC      User Key  (r) = Recorded By, (t) = Taken By, (c) = Cosigned By    Initials Name Provider Type    SC Kimi Castillo PT Physical Therapist        Mobility     Row Name 09/13/19 1536 09/13/19 1009       Bed Mobility Assessment/Treatment    Bed Mobility Assessment/Treatment  scooting/bridging;supine-sit  -SC  scooting/bridging;supine-sit  -SC    Scooting/Bridging [place] (Bed Mobility)  verbal cues;moderate assist (50% patient effort);1 person assist  -SC  verbal cues;moderate assist (50% patient effort);1 person assist  -SC    Supine-Sit [place] (Bed Mobility)  moderate assist (50% patient effort);verbal cues;nonverbal cues (demo/gesture);1 person assist  -SC  moderate assist (50% patient effort);verbal cues;nonverbal cues (demo/gesture);1 person assist  -SC    Assistive Device (Bed Mobility)  bed rails;draw sheet  -SC  bed rails;draw sheet  -SC    Comment (Bed Mobility)  UP TO EDGE OF BED WITH ASSISTANCE GETTING R LEG OUT. VC FOR SEQUENCING AND USING RAILS ON L SIDE. GOOD EFFORT  -SC  UP TO EDGE OF BED WITH ASSIST GETTING R LEG OUT.  VC FOR SEQUENCING AND USING RAILS ON LEFT SIDE. GOOD EFFORT  -SC    Row Name 09/13/19 1536 09/13/19 1009       Transfer Assessment/Treatment    Comment (Transfers)  STS WITH ARJO WALKER. ABLE TO PULL UP WITH CUES  -SC  STS WITH ARJO, VC TO PULL UP ON RAIL. GOOD EFFORT  -SC    Row Name 09/13/19 1536 09/13/19 1009       Sit-Stand Transfer    Sit-Stand [place] (Transfers)  contact guard;verbal cues;1 person to manage equipment  -SC  contact guard;verbal cues;1 person to manage equipment  -SC    Assistive Device (Sit-Stand Transfers)  mechanical lift/aid  -SC  mechanical lift/aid  -SC    Row Name  09/13/19 1536 09/13/19 1016       Gait/Stairs Assessment/Training    19590 - Gait Training Minutes   20  -SC  --    Gait/Stairs Assessment/Training  gait/ambulation independence  -SC  --    [place] Level (Gait)  1 person to manage equipment;1 person assist;minimum assist (75% patient effort)  -SC  1 person to manage equipment;1 person assist;minimum assist (75% patient effort)  -SC    Assistive Device (Gait)  -- ARJO  -SC  --    Distance in Feet (Gait)  60  -SC  --    Pattern (Gait)  swing-through  -SC  --    Deviations/Abnormal Patterns (Gait)  right sided deviations;bilateral deviations;nixon decreased;stride length decreased;antalgic  -SC  --    Bilateral Gait Deviations  forward flexed posture  -SC  --    Right Sided Gait Deviations  heel strike decreased;weight shift ability decreased  -SC  --    Comment (Gait/Stairs)  VC FOR R HEEL STRIKE AND IMPROVED UPRIGHT POSTURE--IMPROVED WITH PRACTISE  -SC  --    Row Name 09/13/19 1009          Gait/Stairs Assessment/Training    15183 - Gait Training Minutes   15  -SC     Gait/Stairs Assessment/Training  gait/ambulation independence  -SC     [place] Level (Gait)  contact guard;1 person to manage equipment;1 person assist  -SC     Assistive Device (Gait)  -- ARJO WALKER  -SC     Distance in Feet (Gait)  40  -SC     Pattern (Gait)  step-through  -SC     Deviations/Abnormal Patterns (Gait)  right sided deviations;bilateral deviations;nixon decreased;stride length decreased;antalgic  -SC     Bilateral Gait Deviations  forward flexed posture  -SC     Right Sided Gait Deviations  heel strike decreased;weight shift ability decreased STEP LENGHT DECREASED  -SC     Comment (Gait/Stairs)  VC FOR SEQUENCING, OCCATIONAL ASSIST TO ADVANCE R LEG. NO BUCKLING. CUES TO RAISE FOOT AND HEEL STRIKE. PATIENT FOLLOWING COMMANDS WITH HER BEST EFFORT DISPITE WEAKNESS  -SC     Row Name 09/13/19 1536 09/13/19 1009       Mobility Assessment/Intervention    Extremity Weight-bearing  Status  right lower extremity  -SC  right lower extremity  -SC    Right Lower Extremity (Weight-bearing Status)  weight-bearing as tolerated (WBAT)  -SC  weight-bearing as tolerated (WBAT)  -SC      User Key  (r) = Recorded By, (t) = Taken By, (c) = Cosigned By    Initials Name Provider Type    SC Kimi Castillo PT Physical Therapist        Obj/Interventions     Row Name 09/13/19 1539 09/13/19 1013       Therapeutic Exercise    Lower Extremity (Therapeutic Exercise)  gluteal sets;heel slides, right;LAQ (long arc quad), right;quad sets, bilateral;SAQ (short arc quad), right  -SC  heel slides, right;gastroc stretch, bilateral;LAQ (long arc quad), bilateral;quad sets, right  -SC    Lower Extremity Range of Motion (Therapeutic Exercise)  hip abduction/adduction, right  -SC  hip abduction/adduction, right  -SC    Exercise Type (Therapeutic Exercise)  AAROM (active assistive range of motion);AROM (active range of motion);isometric contraction, static  -SC  AAROM (active assistive range of motion);isometric contraction, static  -SC    Position (Therapeutic Exercise)  seated  -SC  seated;supine  -SC    Sets/Reps (Therapeutic Exercise)  10  -SC  10  -SC    Row Name 09/13/19 1539 09/13/19 1013       Dynamic Standing Balance    Level of [place], Reaches Outside Midline (Standing, Dynamic Balance)  1 person assist;1 person to manage equipment  -SC  1 person to manage equipment;1 person assist;minimal assist, 75% patient effort  -SC    Time Able to Maintain Position, Reaches Outside Midline (Standing, Dynamic Balance)  more than 5 minutes  -SC  more than 5 minutes  -SC    Assistive Device Utilized (Supported Standing, Dynamic Balance)  -- ARJO  -SC  -- ARJO  -SC    Comment, Reaches Outside Midline (Standing, Dynamic Balance)  NO KI NEEDED  -SC  --      User Key  (r) = Recorded By, (t) = Taken By, (c) = Cosigned By    Initials Name Provider Type    SC Kimi Castillo PT Physical Therapist        Goals/Plan    No  documentation.       Clinical Impression     Row Name 09/13/19 1540 09/13/19 1016       Pain Assessment    Additional Documentation  Pain Scale: FACES Pre/Post-Treatment (Group)  -SC  Pain Scale: FACES Pre/Post-Treatment (Group)  -SC    Row Name 09/13/19 1540 09/13/19 1016       Pain Scale: Numbers Pre/Post-Treatment    Pain Location - Side  Right  -SC  Right  -SC    Pain Location  hip  -SC  hip  -SC    Pain Intervention(s)  Repositioned;Cold applied  -SC  Repositioned;Cold applied  -SC    Row Name 09/13/19 1540 09/13/19 1016       Pain Scale: FACES Pre/Post-Treatment    Pain: FACES Scale, Pretreatment  2-->hurts little bit  -SC  2-->hurts little bit  -SC    Pain: FACES Scale, Post-Treatment  4-->hurts little more  -SC  2-->hurts little bit  -SC    Row Name 09/13/19 1540 09/13/19 1016       Plan of Care Review    Plan of Care Reviewed With  patient  -SC  patient  -SC    Row Name 09/13/19 1540 09/13/19 1016       Physical Therapy Clinical Impression    Criteria for Skilled Interventions Met (PT Clinical Impression)  yes;treatment indicated  -SC  yes;treatment indicated  -SC    Rehab Potential (PT Clinical Summary)  good, to achieve stated therapy goals  -SC  good, to achieve stated therapy goals  -SC    Row Name 09/13/19 1540 09/13/19 1016       Positioning and Restraints    Pre-Treatment Position  in bed  -SC  in bed  -SC    Post Treatment Position  chair  -SC  chair  -SC    In Chair  notified nsg;reclined;sitting;call light within reach;encouraged to call for assist;exit alarm on  -SC  reclined;sitting;notified nsg;exit alarm on;call light within reach  -SC      User Key  (r) = Recorded By, (t) = Taken By, (c) = Cosigned By    Initials Name Provider Type    SC Kimi Castillo, PT Physical Therapist        Outcome Measures     Row Name 09/13/19 1543 09/13/19 1019       How much help from another person do you currently need...    Turning from your back to your side while in flat bed without using bedrails?  2  -SC   2  -SC    Moving from lying on back to sitting on the side of a flat bed without bedrails?  2  -SC  2  -SC    Moving to and from a bed to a chair (including a wheelchair)?  2  -SC  2  -SC    Standing up from a chair using your arms (e.g., wheelchair, bedside chair)?  3  -SC  2  -SC    Climbing 3-5 steps with a railing?  1  -SC  1  -SC    To walk in hospital room?  2  -SC  2  -SC    AM-PAC 6 Clicks Score (PT)  12  -SC  11  -SC    Row Name 09/13/19 1543 09/13/19 1019       Functional Assessment    Outcome Measure Options  AM-PAC 6 Clicks Basic Mobility (PT)  -SC  AM-PAC 6 Clicks Basic Mobility (PT)  -SC      User Key  (r) = Recorded By, (t) = Taken By, (c) = Cosigned By    Initials Name Provider Type    Kimi Alonzo, PT Physical Therapist        Physical Therapy Education     Title: PT OT SLP Therapies (In Progress)     Topic: Physical Therapy (In Progress)     Point: Mobility training (Done)     Learning Progress Summary           Patient Eager, E, VU,NR by SC at 9/13/2019  3:41 PM    Comment:  REVIEWED BENEFITS OF ACTIVITY    Acceptance, E, VU by SC at 9/13/2019 10:17 AM    Comment:  REVIEWED BENEFITS OF ACTIVITY    Acceptance, E,D, VU,NR by AA at 9/12/2019  4:08 PM    Acceptance, E,D, NR by AA at 9/12/2019 11:42 AM    Acceptance, E,D, VU,NR by MJ at 9/11/2019  9:59 AM    Comment:  Reviewed HEP, gait mechanics, benefits of mobility, safety with mobility    Acceptance, E,D, VU,NR by MJ at 9/10/2019  3:07 PM    Comment:  Reviewed HEP, gait mechanics, benefits of mobility, safety with mobility    Acceptance, E,D, VU,NR by MJ at 9/10/2019  9:05 AM    Comment:  Reviewed anterior hip precautions, gait mechanics, benefits of mobility    Acceptance, E,D, VU,NR by REJI at 9/9/2019  5:05 PM    Comment:  Educated on anterior hip precautions, weight bearing status, correct supine to sit t/f technique, correct sit<->stand t/f technique, correct gait mechanics, and progression of POC.   Family Acceptance, E,D, VU,NR by AA at  9/12/2019  4:08 PM    Acceptance, E,D, NR by AA at 9/12/2019 11:42 AM    Acceptance, E,D, VU,NR by LR at 9/9/2019  5:05 PM    Comment:  Educated on anterior hip precautions, weight bearing status, correct supine to sit t/f technique, correct sit<->stand t/f technique, correct gait mechanics, and progression of POC.   Significant Other Acceptance, E,D, VU,NR by CLAY at 9/11/2019  9:59 AM    Comment:  Reviewed HEP, gait mechanics, benefits of mobility, safety with mobility                   Point: Home exercise program (In Progress)     Learning Progress Summary           Patient Eager, E, VU,NR by SC at 9/13/2019  3:41 PM    Comment:  REVIEWED BENEFITS OF ACTIVITY    Acceptance, E, VU by SC at 9/13/2019 10:17 AM    Comment:  REVIEWED BENEFITS OF ACTIVITY    Acceptance, E,D, NR by MYRIAM at 9/12/2019 11:42 AM    Acceptance, E,D, VU,NR by CLAY at 9/11/2019  9:59 AM    Comment:  Reviewed HEP, gait mechanics, benefits of mobility, safety with mobility    Acceptance, E,D, VU,NR by CLAY at 9/10/2019  3:07 PM    Comment:  Reviewed HEP, gait mechanics, benefits of mobility, safety with mobility    Acceptance, E,D, VU,NR by CLAY at 9/10/2019  9:05 AM    Comment:  Reviewed anterior hip precautions, gait mechanics, benefits of mobility    Acceptance, E,D, VU,NR by REJI at 9/9/2019  5:05 PM    Comment:  Educated on anterior hip precautions, weight bearing status, correct supine to sit t/f technique, correct sit<->stand t/f technique, correct gait mechanics, and progression of POC.   Family Acceptance, E,D, NR by AA at 9/12/2019 11:42 AM    Acceptance, E,D, VU,NR by REJI at 9/9/2019  5:05 PM    Comment:  Educated on anterior hip precautions, weight bearing status, correct supine to sit t/f technique, correct sit<->stand t/f technique, correct gait mechanics, and progression of POC.   Significant Other Acceptance, E,D, VU,NR by CLAY at 9/11/2019  9:59 AM    Comment:  Reviewed HEP, gait mechanics, benefits of mobility, safety with mobility                    Point: Body mechanics (Done)     Learning Progress Summary           Patient Eager, E, VU,NR by SC at 9/13/2019  3:41 PM    Comment:  REVIEWED BENEFITS OF ACTIVITY    Acceptance, E, VU by SC at 9/13/2019 10:17 AM    Comment:  REVIEWED BENEFITS OF ACTIVITY    Acceptance, E,D, VU,NR by AA at 9/12/2019  4:08 PM    Acceptance, E,D, NR by AA at 9/12/2019 11:42 AM    Acceptance, E,D, VU,NR by  at 9/11/2019  9:59 AM    Comment:  Reviewed HEP, gait mechanics, benefits of mobility, safety with mobility    Acceptance, E,D, VU,NR by MJ at 9/10/2019  3:07 PM    Comment:  Reviewed HEP, gait mechanics, benefits of mobility, safety with mobility    Acceptance, E,D, VU,NR by  at 9/10/2019  9:05 AM    Comment:  Reviewed anterior hip precautions, gait mechanics, benefits of mobility    Acceptance, E,D, VU,NR by  at 9/9/2019  5:05 PM    Comment:  Educated on anterior hip precautions, weight bearing status, correct supine to sit t/f technique, correct sit<->stand t/f technique, correct gait mechanics, and progression of POC.   Family Acceptance, E,D, VU,NR by AA at 9/12/2019  4:08 PM    Acceptance, E,D, NR by AA at 9/12/2019 11:42 AM    Acceptance, E,D, VU,NR by REJI at 9/9/2019  5:05 PM    Comment:  Educated on anterior hip precautions, weight bearing status, correct supine to sit t/f technique, correct sit<->stand t/f technique, correct gait mechanics, and progression of POC.   Significant Other Acceptance, E,D, VU,NR by  at 9/11/2019  9:59 AM    Comment:  Reviewed HEP, gait mechanics, benefits of mobility, safety with mobility                   Point: Precautions (Done)     Learning Progress Summary           Patient Eager, E, VU,NR by SC at 9/13/2019  3:41 PM    Comment:  REVIEWED BENEFITS OF ACTIVITY    Acceptance, E, VU by SC at 9/13/2019 10:17 AM    Comment:  REVIEWED BENEFITS OF ACTIVITY    Acceptance, E,D, VU,NR by AA at 9/12/2019  4:08 PM    Acceptance, E,D, NR by AA at 9/12/2019 11:42 AM    Acceptance, ARPITA MCALLISTER,  VU,NR by CLAY at 9/11/2019  9:59 AM    Comment:  Reviewed HEP, gait mechanics, benefits of mobility, safety with mobility    Acceptance, E,D, VU,NR by CLAY at 9/10/2019  3:07 PM    Comment:  Reviewed HEP, gait mechanics, benefits of mobility, safety with mobility    Acceptance, E,D, VU,NR by MJ at 9/10/2019  9:05 AM    Comment:  Reviewed anterior hip precautions, gait mechanics, benefits of mobility    Acceptance, E,D, VU,NR by LR at 9/9/2019  5:05 PM    Comment:  Educated on anterior hip precautions, weight bearing status, correct supine to sit t/f technique, correct sit<->stand t/f technique, correct gait mechanics, and progression of POC.   Family Acceptance, E,D, VU,NR by AA at 9/12/2019  4:08 PM    Acceptance, E,D, NR by AA at 9/12/2019 11:42 AM    Acceptance, E,D, VU,NR by REJI at 9/9/2019  5:05 PM    Comment:  Educated on anterior hip precautions, weight bearing status, correct supine to sit t/f technique, correct sit<->stand t/f technique, correct gait mechanics, and progression of POC.   Significant Other Acceptance, E,D, VU,NR by  at 9/11/2019  9:59 AM    Comment:  Reviewed HEP, gait mechanics, benefits of mobility, safety with mobility                               User Key     Initials Effective Dates Name Provider Type Discipline    SC 06/19/15 -  Kimi Castillo, PT Physical Therapist PT     06/19/15 -  Teresa Blackwell, PT Physical Therapist PT     04/03/18 -  Henrique Clements, PT Physical Therapist PT     04/02/18 -  Vanessa Milligan, PT Physical Therapist PT              PT Recommendation and Plan     Plan of Care Reviewed With: patient  Progress: improving  Outcome Summary: AMBULATION IMPROVING . WALKING 60 FEET WITH MECHANICAL GAIT AID     Time Calculation:   PT Charges     Row Name 09/13/19 1536 09/13/19 1430 09/13/19 1020       Time Calculation    Start Time  --  0430  -SC  --    PT Received On  --  09/13/19  -SC  --    PT Goal Re-Cert Due Date  --  09/19/19  -SC  --       Time Calculation- PT     Total Timed Code Minutes- PT  --  30 minute(s)  -SC  --       Timed Charges    41522 - PT Therapeutic Exercise Minutes  --  8  -SC  10  -SC    19059 - Gait Training Minutes   20  -SC  --  --    38056 - PT Therapeutic Activity Minutes  --  2  -SC  5  -SC    Row Name 09/13/19 1009 09/13/19 0937          Time Calculation    Start Time  --  0937  -SC     PT Received On  --  09/13/19  -SC     PT Goal Re-Cert Due Date  --  09/19/19  -SC        Time Calculation- PT    Total Timed Code Minutes- PT  --  30 minute(s)  -SC        Timed Charges    86245 - Gait Training Minutes   15  -SC  --       User Key  (r) = Recorded By, (t) = Taken By, (c) = Cosigned By    Initials Name Provider Type    SC Kimi Castillo, SUSNA Physical Therapist        Therapy Charges for Today     Code Description Service Date Service Provider Modifiers Qty    09161778416 HC GAIT TRAINING EA 15 MIN 9/13/2019 Kimi Castillo, PT GP 1    82611566531 HC PT THER SUPP EA 15 MIN 9/13/2019 Kimi Castillo, PT GP 2    05144239608 HC PT THER PROC EA 15 MIN 9/13/2019 Kimi Castillo, PT GP 1    37604034348 HC PT THER PROC EA 15 MIN 9/13/2019 Kimi Castillo, PT GP 1    44667269157 HC GAIT TRAINING EA 15 MIN 9/13/2019 Kimi Castillo, PT GP 1    68230569130 HC PT THER SUPP EA 15 MIN 9/13/2019 Kimi Castillo, PT GP 2          PT G-Codes  Outcome Measure Options: AM-PAC 6 Clicks Basic Mobility (PT)  AM-PAC 6 Clicks Score (PT): 12  AM-PAC 6 Clicks Score (OT): 14  Modified Luna Scale: 4 - Moderately severe disability.  Unable to walk without assistance, and unable to attend to own bodily needs without assistance.    Kimi Castillo PT  9/13/2019

## 2019-09-13 NOTE — PROGRESS NOTES
"Akua Anthonynington  [id number]  1945    /56 (BP Location: Right arm, Patient Position: Lying)   Pulse 65   Temp 97.6 °F (36.4 °C) (Oral)   Resp 16   Ht 162.6 cm (64\")   Wt 86.2 kg (190 lb)   SpO2 94%   BMI 32.61 kg/m²     Lab Results (last 24 hours)     Procedure Component Value Units Date/Time    POC Glucose Once [[id number]]  (Normal) Collected:  09/12/19 2121    Specimen:  Blood Updated:  09/12/19 2136     Glucose 128 mg/dL     Heparin Anti-Xa [[id number]]  (Abnormal) Collected:  09/12/19 1748    Specimen:  Blood Updated:  09/12/19 1839     Heparin Anti-Xa (UFH) 0.10 IU/ml     Heparin Anti-Xa [[id number]]  (Abnormal) Collected:  09/12/19 1038    Specimen:  Blood Updated:  09/12/19 1222     Heparin Anti-Xa (UFH) 0.20 IU/ml     Creatinine, Serum [[id number]]  (Abnormal) Collected:  09/12/19 0158    Specimen:  Blood Updated:  09/12/19 1103     Creatinine 1.02 mg/dL      eGFR Non African Amer 53 mL/min/1.73     Narrative:       GFR Normal >60  Chronic Kidney Disease <60  Kidney Failure <15          Patient Care Team:  Salty Hernandez MD as PCP - General (Family Medicine)  Leon Hein MD as Consulting Physician (Neurosurgery)  Perkins, Corinne E, PT as Physical Therapist (Physical Therapy)  Cricket Nettles MD as Consulting Physician (Pain Medicine)    SUBJECTIVE  Pain well controlled.    PHYSICAL EXAM  Incision benign  Minimal thigh swelling  Neurovascularly intact to knees and toes   Minor improvement in hand function but still no active ulnar nerve motor function      Status post total replacement of right hip    Anxiety and depression    HTN (hypertension)    Renal insufficiency    Leukocytosis, likely reactive    Arthritis of right hip    HO A-fib (CMS/HCC)    YUNIOR treated with BiPAP    Acute blood loss anemia, mild, asymptomatic      PLAN / DISPOSITION:  Ongoing stroke evaluation and management.  She could transfer to skilled nursing or acute rehab when medically cleared.  Right " hip appears to be making satisfactory progress.      Darrin New MD  09/13/19  8:56 AM

## 2019-09-13 NOTE — PROGRESS NOTES
Continued Stay Note  [facility]     Patient Name: Akua Torres  MRN: [id number]  Today's Date: 9/13/2019    Admit Date: 9/9/2019    Discharge Plan     Row Name 09/13/19 0928       Plan    Plan  STR    Patient/Family in Agreement with Plan  yes    Plan Comments  Plan remains for patient to receive short term rehab when medically ready. Per APRN patient not medically ready for dc at this time and is likely to be here until at least Wednesday pending continued medical care and procedures. Marquise Kc w/ [facility]. Once patient closer to being medically ready CM will ressume SNF rehab and precert. Patient in agreement w/ plan.      Final Discharge Disposition Code  03 - skilled nursing facility (SNF)        Discharge Codes    No documentation.       Expected Discharge Date and Time     Expected Discharge Date Expected Discharge Time    Sep 12, 2019             Antonia Hendrix RN

## 2019-09-13 NOTE — PROGRESS NOTES
"Neurology       Patient Care Team:  Salty Hernandez MD as PCP - General (Family Medicine)  Leon Hein MD as Consulting Physician (Neurosurgery)  Perkins, Corinne E, PT as Physical Therapist (Physical Therapy)  Cricket Nettles MD as Consulting Physician (Pain Medicine)    Chief complaint right upper extremity weakness/stroke      Subjective .     History:  Right arm quite a bit better today, can lift it up and move all fingers. Was able to walk with a walker and support, and although difficult, could lift right foot. No new w/n/vision changes    ROS: no fever or chest pain    Objective     Vital Signs   Blood pressure (!) 182/66, pulse 78, temperature 98 °F (36.7 °C), temperature source Oral, resp. rate 16, height 162.6 cm (64\"), weight 86.2 kg (190 lb), SpO2 96 %, not currently breastfeeding.    Physical Exam:              Neuro: wd elderly ww in nad, alert, fluent, appropriate. Oriented and cooperative with exam.  No dysarthria  eomi face symm   Motor: RUE prox now 3+, distal 3/5, RLE prox  2+-3/5, distal 4/5    Results Review:              Labs reviewed, INR 1.15 PTT 56.9  A1c 5.2  Total cholesterol 132 triglycerides 235 HDL 34 LDL 51  Creatinine 1.02  CT perfusion images reviewed, subtle delayed transit in the small focus of left posterior parietal region.  CTA head neck images reviewed, agree high-grade stenosis, near complete occlusion of the proximal left internal carotid artery at the bifurcation with densely calcified plaque.,  Moderate stenosis of right ICA, no hemodynamically significant intracranial stenoses.    Assessment/Plan     Left MCA territory stroke with right hemiparesis, improving.  Presumably on the basis of left ICA severe stenosis.  Clinically stable/improved on heparin and dual antiplatelet therapy.  Note neuro interventional plan for angiogram and possible stent on Tuesday.  Patient had questions regarding bleeding risk, discussed this versus risk of total occlusion.  " Discussed permissive hypertension.    I discussed the patients findings and my recommendations with patient    Maribel East MD  09/13/19  2:51 PM

## 2019-09-13 NOTE — THERAPY TREATMENT NOTE
Patient Name: Akua Torres  : 1945    MRN: [id number]                              Today's Date: 2019       Admit Date: 2019    Visit Dx:     ICD-10-CM ICD-9-CM   1. Impaired functional mobility, balance, gait, and endurance Z74.09 V49.89   2. Impaired mobility and ADLs Z74.09 799.89     Patient Active Problem List   Diagnosis   • Degenerative disc disease, lumbar   • Lumbar stenosis with neurogenic claudication   • History of lumbar fusion   • Spondylosis of lumbar region without myelopathy or radiculopathy   • Mild obesity   • Physical deconditioning   • Idiopathic gout   • Anxiety and depression   • Greater trochanteric bursitis of both hips   • Status post total bilateral knee replacement   • Back pain   • HTN (hypertension)   • HLD (hyperlipidemia)   • Prediabetes   • S/P lumbar spinal fusion   • Renal insufficiency   • Leukocytosis, likely reactive   • Altered mental status. Felt to be drug related (trazodone, opiates, benzodiazepine)   • Acute renal failure due to hypotension. Now resolved (ARF) (CMS/[facility])   • Depression   • Encounter for Medicare annual wellness exam   • Arthritis of right hip   • HO A-fib (CMS/[facility])   • YUNIOR treated with BiPAP   • Status post total replacement of right hip   • Acute blood loss anemia, mild, asymptomatic     Past Medical History:   Diagnosis Date   • A-fib (CMS/[facility])    • Anxiety    • Anxiety and depression    • Arthritis    • Cataract    • Depression    • Elevated serum creatinine     SEES DR SMITH EVERY 6 MONTHS- NEPHROLOGIST    • Extremity pain    • GERD (gastroesophageal reflux disease)    • Gout    • H/O bladder infections     JUST FINISHED MACROBID ON 17 FOR RECENT UTI   • Hypercholesteremia    • Hypertension    • Joint pain    • Kidney disease    • Kidney disease, chronic, stage III (GFR 30-59 ml/min) (CMS/HCC)    • Low back pain    • Neck pain    • Rheumatic fever    • Sleep apnea    • Urinary tract infection    • Wears glasses      Past  Surgical History:   Procedure Laterality Date   • BACK SURGERY  2004    LUMBAR PER DR MAN    • CARPAL TUNNEL RELEASE Left    • COLONOSCOPY     • EYE SURGERY     • FINGER SURGERY Right     3RD FINGER   • HEEL SPUR EXCISION Bilateral    • KNEE ARTHROSCOPY Bilateral    • LUMBAR DISCECTOMY FUSION INSTRUMENTATION N/A 11/10/2017    Procedure: LUMBAR SPINAL FUSION ;  Surgeon: Gopi Man MD;  Location:  [facility];  Service:    • REPLACEMENT TOTAL KNEE BILATERAL Bilateral    • TOTAL HIP ARTHROPLASTY Right 9/9/2019    Procedure: TOTAL ANTERIOR RIGHT HIP ARTHROPLASTY;  Surgeon: Darrin New MD;  Location:  [facility];  Service: Orthopedics     General Information     Row Name 09/13/19 1007          PT Evaluation Time/Intention    Document Type  therapy note (daily note)  -SC     Mode of Treatment  physical therapy  -SC     Row Name 09/13/19 1007          General Information    Patient Profile Reviewed?  yes  -SC     Existing Precautions/Restrictions  fall;right;hip, anterior;other (see comments) R LENA 2 TO POST OP CVA  -[facility] Name 09/13/19 1007          Cognitive Assessment/Intervention- PT/OT    Orientation Status (Cognition)  oriented x 4  -SC     Cognitive Assessment/Intervention Comment  ALERT, FOLLOWS COMMANDS  -[facility] Name 09/13/19 1007          Safety Issues, Functional Mobility    Safety Issues Affecting Function (Mobility)  judgment;insight into deficits/self awareness  -SC     Impairments Affecting Function (Mobility)  balance;coordination;endurance/activity tolerance;pain;range of motion (ROM);strength;grasp  -SC     Comment, Safety Issues/Impairments (Mobility)  CUES FOR SAFETY NEEDED  -SC       User Key  (r) = Recorded By, (t) = Taken By, (c) = Cosigned By    Initials Name Provider Type    SC Kimi Castillo, PT Physical Therapist        Mobility     Row Name 09/13/19 1009          Bed Mobility Assessment/Treatment    Bed Mobility Assessment/Treatment  scooting/bridging;supine-sit  -SC      Scooting/Bridging [place] (Bed Mobility)  verbal cues;moderate assist (50% patient effort);1 person assist  -SC     Supine-Sit [place] (Bed Mobility)  moderate assist (50% patient effort);verbal cues;nonverbal cues (demo/gesture);1 person assist  -SC     Assistive Device (Bed Mobility)  bed rails;draw sheet  -SC     Comment (Bed Mobility)  UP TO EDGE OF BED WITH ASSIST GETTING R LEG OUT.  VC FOR SEQUENCING AND USING RAILS ON LEFT SIDE. GOOD EFFORT  -SC     Row Name 09/13/19 1009          Transfer Assessment/Treatment    Comment (Transfers)  STS WITH ARJO, VC TO PULL UP ON RAIL. GOOD EFFORT  -SC     Row Name 09/13/19 1009          Sit-Stand Transfer    Sit-Stand [place] (Transfers)  contact guard;verbal cues;1 person to manage equipment  -SC     Assistive Device (Sit-Stand Transfers)  mechanical lift/aid  -SC     Row Name 09/13/19 1016 09/13/19 1009       Gait/Stairs Assessment/Training    23407 - Gait Training Minutes   --  15  -SC    Gait/Stairs Assessment/Training  --  gait/ambulation independence  -SC    [place] Level (Gait)  1 person to manage equipment;1 person assist;minimum assist (75% patient effort)  -SC  contact guard;1 person to manage equipment;1 person assist  -SC    Assistive Device (Gait)  --  -- ARJO WALKER  -SC    Distance in Feet (Gait)  --  40  -SC    Pattern (Gait)  --  step-through  -SC    Deviations/Abnormal Patterns (Gait)  --  right sided deviations;bilateral deviations;nixon decreased;stride length decreased;antalgic  -SC    Bilateral Gait Deviations  --  forward flexed posture  -SC    Right Sided Gait Deviations  --  heel strike decreased;weight shift ability decreased STEP LENGHT DECREASED  -SC    Comment (Gait/Stairs)  --  VC FOR SEQUENCING, OCCATIONAL ASSIST TO ADVANCE R LEG. NO BUCKLING. CUES TO RAISE FOOT AND HEEL STRIKE. PATIENT FOLLOWING COMMANDS WITH HER BEST EFFORT DISPITE WEAKNESS  -SC    Row Name 09/13/19 1009          Mobility Assessment/Intervention     Extremity Weight-bearing Status  right lower extremity  -SC     Right Lower Extremity (Weight-bearing Status)  weight-bearing as tolerated (WBAT)  -SC       User Key  (r) = Recorded By, (t) = Taken By, (c) = Cosigned By    Initials Name Provider Type    Kimi Alonzo, PT Physical Therapist        Obj/Interventions     Row Name 09/13/19 1013          Therapeutic Exercise    Lower Extremity (Therapeutic Exercise)  heel slides, right;gastroc stretch, bilateral;LAQ (long arc quad), bilateral;quad sets, right  -SC     Lower Extremity Range of Motion (Therapeutic Exercise)  hip abduction/adduction, right  -SC     Exercise Type (Therapeutic Exercise)  AAROM (active assistive range of motion);isometric contraction, static  -SC     Position (Therapeutic Exercise)  seated;supine  -SC     Sets/Reps (Therapeutic Exercise)  10  -SC     Row Name 09/13/19 1013          Dynamic Standing Balance    Level of [place], Reaches Outside Midline (Standing, Dynamic Balance)  1 person to manage equipment;1 person assist;minimal assist, 75% patient effort  -SC     Time Able to Maintain Position, Reaches Outside Midline (Standing, Dynamic Balance)  more than 5 minutes  -SC     Assistive Device Utilized (Supported Standing, Dynamic Balance)  -- ARJO  -SC       User Key  (r) = Recorded By, (t) = Taken By, (c) = Cosigned By    Initials Name Provider Type    Kimi Alonzo PT Physical Therapist        Goals/Plan    No documentation.       Clinical Impression     Row Name 09/13/19 1016          Pain Assessment    Additional Documentation  Pain Scale: FACES Pre/Post-Treatment (Group)  -SC     Row Name 09/13/19 1016          Pain Scale: Numbers Pre/Post-Treatment    Pain Location - Side  Right  -SC     Pain Location  hip  -SC     Pain Intervention(s)  Repositioned;Cold applied  -[facility] 09/13/19 1016          Pain Scale: FACES Pre/Post-Treatment    Pain: FACES Scale, Pretreatment  2-->hurts little bit  -SC     Pain: FACES  Scale, Post-Treatment  2-->hurts little bit  -SC     Row Name 09/13/19 1016          Plan of Care Review    Plan of Care Reviewed With  patient  -[facility] Name 09/13/19 1016          Physical Therapy Clinical Impression    Criteria for Skilled Interventions Met (PT Clinical Impression)  yes;treatment indicated  -SC     Rehab Potential (PT Clinical Summary)  good, to achieve stated therapy goals  -[facility] Name 09/13/19 1016          Positioning and Restraints    Pre-Treatment Position  in bed  -SC     Post Treatment Position  chair  -SC     In Chair  reclined;sitting;notified nsg;exit alarm on;call light within reach  -SC       User Key  (r) = Recorded By, (t) = Taken By, (c) = Cosigned By    Initials Name Provider Type    Kimi Alonzo PT Physical Therapist        Outcome Measures     [facility] Name 09/13/19 1019          How much help from another person do you currently need...    Turning from your back to your side while in flat bed without using bedrails?  2  -SC     Moving from lying on back to sitting on the side of a flat bed without bedrails?  2  -SC     Moving to and from a bed to a chair (including a wheelchair)?  2  -SC     Standing up from a chair using your arms (e.g., wheelchair, bedside chair)?  2  -SC     Climbing 3-5 steps with a railing?  1  -SC     To walk in hospital room?  2  -SC     AM-PAC 6 Clicks Score (PT)  11  -[facility] Name 09/13/19 1019          Functional Assessment    Outcome Measure Options  AM-PAC 6 Clicks Basic Mobility (PT)  -SC       User Key  (r) = Recorded By, (t) = Taken By, (c) = Cosigned By    Initials Name Provider Type    Kimi Alonzo PT Physical Therapist        Physical Therapy Education     Title: PT OT SLP Therapies (In Progress)     Topic: Physical Therapy (In Progress)     Point: Mobility training (Done)     Learning Progress Summary           Patient AcceptanceESVIN VU by SC at 9/13/2019 10:17 AM    Comment:  REVIEWED BENEFITS OF ACTIVITY    Acceptance,  E,D, VU,NR by AA at 9/12/2019  4:08 PM    Acceptance, E,D, NR by AA at 9/12/2019 11:42 AM    Acceptance, E,D, VU,NR by MJ at 9/11/2019  9:59 AM    Comment:  Reviewed HEP, gait mechanics, benefits of mobility, safety with mobility    Acceptance, E,D, VU,NR by MJ at 9/10/2019  3:07 PM    Comment:  Reviewed HEP, gait mechanics, benefits of mobility, safety with mobility    Acceptance, E,D, VU,NR by MJ at 9/10/2019  9:05 AM    Comment:  Reviewed anterior hip precautions, gait mechanics, benefits of mobility    Acceptance, E,D, VU,NR by LR at 9/9/2019  5:05 PM    Comment:  Educated on anterior hip precautions, weight bearing status, correct supine to sit t/f technique, correct sit<->stand t/f technique, correct gait mechanics, and progression of POC.   Family Acceptance, E,D, VU,NR by AA at 9/12/2019  4:08 PM    Acceptance, E,D, NR by AA at 9/12/2019 11:42 AM    Acceptance, E,D, VU,NR by REJI at 9/9/2019  5:05 PM    Comment:  Educated on anterior hip precautions, weight bearing status, correct supine to sit t/f technique, correct sit<->stand t/f technique, correct gait mechanics, and progression of POC.   Significant Other Acceptance, E,D, VU,NR by  at 9/11/2019  9:59 AM    Comment:  Reviewed HEP, gait mechanics, benefits of mobility, safety with mobility                   Point: Home exercise program (In Progress)     Learning Progress Summary           Patient Acceptance, E, VU by SC at 9/13/2019 10:17 AM    Comment:  REVIEWED BENEFITS OF ACTIVITY    Acceptance, E,D, NR by AA at 9/12/2019 11:42 AM    Acceptance, E,D, VU,NR by MJ at 9/11/2019  9:59 AM    Comment:  Reviewed HEP, gait mechanics, benefits of mobility, safety with mobility    Acceptance, E,D, VU,NR by MJ at 9/10/2019  3:07 PM    Comment:  Reviewed HEP, gait mechanics, benefits of mobility, safety with mobility    Acceptance, E,D, VU,NR by MJ at 9/10/2019  9:05 AM    Comment:  Reviewed anterior hip precautions, gait mechanics, benefits of mobility     Acceptance, E,D, VU,NR by LR at 9/9/2019  5:05 PM    Comment:  Educated on anterior hip precautions, weight bearing status, correct supine to sit t/f technique, correct sit<->stand t/f technique, correct gait mechanics, and progression of POC.   Family Acceptance, E,D, NR by AA at 9/12/2019 11:42 AM    Acceptance, E,D, VU,NR by LR at 9/9/2019  5:05 PM    Comment:  Educated on anterior hip precautions, weight bearing status, correct supine to sit t/f technique, correct sit<->stand t/f technique, correct gait mechanics, and progression of POC.   Significant Other Acceptance, E,D, VU,NR by MJ at 9/11/2019  9:59 AM    Comment:  Reviewed HEP, gait mechanics, benefits of mobility, safety with mobility                   Point: Body mechanics (Done)     Learning Progress Summary           Patient Acceptance, E, VU by SC at 9/13/2019 10:17 AM    Comment:  REVIEWED BENEFITS OF ACTIVITY    Acceptance, E,D, VU,NR by AA at 9/12/2019  4:08 PM    Acceptance, E,D, NR by AA at 9/12/2019 11:42 AM    Acceptance, E,D, VU,NR by MJ at 9/11/2019  9:59 AM    Comment:  Reviewed HEP, gait mechanics, benefits of mobility, safety with mobility    Acceptance, E,D, VU,NR by CLAY at 9/10/2019  3:07 PM    Comment:  Reviewed HEP, gait mechanics, benefits of mobility, safety with mobility    Acceptance, E,D, VU,NR by MJ at 9/10/2019  9:05 AM    Comment:  Reviewed anterior hip precautions, gait mechanics, benefits of mobility    Acceptance, E,D, VU,NR by LR at 9/9/2019  5:05 PM    Comment:  Educated on anterior hip precautions, weight bearing status, correct supine to sit t/f technique, correct sit<->stand t/f technique, correct gait mechanics, and progression of POC.   Family Acceptance, E,D, VU,NR by AA at 9/12/2019  4:08 PM    Acceptance, E,D, NR by AA at 9/12/2019 11:42 AM    Acceptance, E,D, VU,NR by LR at 9/9/2019  5:05 PM    Comment:  Educated on anterior hip precautions, weight bearing status, correct supine to sit t/f technique, correct  sit<->stand t/f technique, correct gait mechanics, and progression of POC.   Significant Other Acceptance, E,D, VU,NR by  at 9/11/2019  9:59 AM    Comment:  Reviewed HEP, gait mechanics, benefits of mobility, safety with mobility                   Point: Precautions (Done)     Learning Progress Summary           Patient Acceptance, E, VU by SC at 9/13/2019 10:17 AM    Comment:  REVIEWED BENEFITS OF ACTIVITY    Acceptance, E,D, VU,NR by AA at 9/12/2019  4:08 PM    Acceptance, E,D, NR by AA at 9/12/2019 11:42 AM    Acceptance, E,D, VU,NR by  at 9/11/2019  9:59 AM    Comment:  Reviewed HEP, gait mechanics, benefits of mobility, safety with mobility    Acceptance, E,D, VU,NR by  at 9/10/2019  3:07 PM    Comment:  Reviewed HEP, gait mechanics, benefits of mobility, safety with mobility    Acceptance, E,D, VU,NR by  at 9/10/2019  9:05 AM    Comment:  Reviewed anterior hip precautions, gait mechanics, benefits of mobility    Acceptance, E,D, VU,NR by  at 9/9/2019  5:05 PM    Comment:  Educated on anterior hip precautions, weight bearing status, correct supine to sit t/f technique, correct sit<->stand t/f technique, correct gait mechanics, and progression of POC.   Family Acceptance, E,D, VU,NR by AA at 9/12/2019  4:08 PM    Acceptance, E,D, NR by AA at 9/12/2019 11:42 AM    Acceptance, E,D, VU,NR by  at 9/9/2019  5:05 PM    Comment:  Educated on anterior hip precautions, weight bearing status, correct supine to sit t/f technique, correct sit<->stand t/f technique, correct gait mechanics, and progression of POC.   Significant Other Acceptance, E,D, VU,NR by  at 9/11/2019  9:59 AM    Comment:  Reviewed HEP, gait mechanics, benefits of mobility, safety with mobility                               User Key     Initials Effective Dates Name Provider Type Discipline    SC 06/19/15 -  Kimi Castillo, PT Physical Therapist PT    LR 06/19/15 -  Teresa Blackwell, PT Physical Therapist PT     04/03/18 -  Starr  Henrique, PT Physical Therapist PT    AA 04/02/18 -  Vanessa Milligan PT Physical Therapist PT              PT Recommendation and Plan     Plan of Care Reviewed With: patient  Progress: improving  Outcome Summary: WORKS WELL WITH PHYSICAL THERAPY. AMBULATIED 40 FEET WITH MECHANICAL GAIT AID. NO BUCKLIG NOTED. ABLE TO ADVANCE R LEG. CONTINUED TO RECOMMEND INPATIENT REHAB     Time Calculation:   PT Charges     Row Name 09/13/19 1020 09/13/19 1009 09/13/19 0937       Time Calculation    Start Time  --  --  0937  -SC    PT Received On  --  --  09/13/19  -SC    PT Goal Re-Cert Due Date  --  --  09/19/19  -SC       Time Calculation- PT    Total Timed Code Minutes- PT  --  --  30 minute(s)  -SC       Timed Charges    05309 - PT Therapeutic Exercise Minutes  10  -SC  --  --    46220 - Gait Training Minutes   --  15  -SC  --    59340 - PT Therapeutic Activity Minutes  5  -SC  --  --      User Key  (r) = Recorded By, (t) = Taken By, (c) = Cosigned By    Initials Name Provider Type    SC Kimi Castillo PT Physical Therapist        Therapy Charges for Today     Code Description Service Date Service Provider Modifiers Qty    20050953269 HC GAIT TRAINING EA 15 MIN 9/13/2019 Kimi Castillo, PT GP 1    46907420962 HC PT THER SUPP EA 15 MIN 9/13/2019 Kimi Castillo, PT GP 2    05214596216 HC PT THER PROC EA 15 MIN 9/13/2019 Kimi Castillo, PT GP 1          PT G-Codes  Outcome Measure Options: AM-PAC 6 Clicks Basic Mobility (PT)  AM-PAC 6 Clicks Score (PT): 11  AM-PAC 6 Clicks Score (OT): 14  Modified Harlan Scale: 4 - Moderately severe disability.  Unable to walk without assistance, and unable to attend to own bodily needs without assistance.    Kimi Castillo PT  9/13/2019

## 2019-09-13 NOTE — PLAN OF CARE
Problem: Patient Care Overview  Goal: Plan of Care Review   09/13/19 1620   OTHER   Outcome Summary Pt alert and oriented, cooperative. Has been up to chair, Rt hip dressing CDI, NIH 2. RUE movement is much improved. Heparin gtt restarted, cerebral angiogram/stenting scheduled for Tuesday.

## 2019-09-13 NOTE — PLAN OF CARE
Problem: Patient Care Overview  Goal: Plan of Care Review  Outcome: Ongoing (interventions implemented as appropriate)   09/13/19 0776   OTHER   Outcome Summary Pt. educated on rajesh techniques to use RUE as a gross assist for ADL participation. Educated and completed SROM therex's to improve ROM and functional use of RUE. Recommend IPR at discharge.    Coping/Psychosocial   Plan of Care Reviewed With patient

## 2019-09-13 NOTE — THERAPY TREATMENT NOTE
Acute Care - Occupational Therapy Treatment Note  [facility]     Patient Name: Akua Torres  : 1945  MRN: [id number]  Today's Date: 2019  Onset of Illness/Injury or Date of Surgery: 19  Date of Referral to OT: 09/10/19  Referring Physician: WILFREDO Dale    Admit Date: 2019       ICD-10-CM ICD-9-CM   1. Impaired functional mobility, balance, gait, and endurance Z74.09 V49.89   2. Impaired mobility and ADLs Z74.09 799.89   3. 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 (CMS/[facility]) I63.232 433.11     Patient Active Problem List   Diagnosis   • Degenerative disc disease, lumbar   • Lumbar stenosis with neurogenic claudication   • History of lumbar fusion   • Spondylosis of lumbar region without myelopathy or radiculopathy   • Mild obesity   • Physical deconditioning   • Idiopathic gout   • Anxiety and depression   • Greater trochanteric bursitis of both hips   • Status post total bilateral knee replacement   • Back pain   • HTN (hypertension)   • HLD (hyperlipidemia)   • Prediabetes   • S/P lumbar spinal fusion   • Renal insufficiency   • Leukocytosis, likely reactive   • Altered mental status. Felt to be drug related (trazodone, opiates, benzodiazepine)   • Acute renal failure due to hypotension. Now resolved (ARF) (CMS/[facility])   • Depression   • Encounter for Medicare annual wellness exam   • Arthritis of right hip   • HO A-fib (CMS/[facility])   • YUNIOR treated with BiPAP   • Status post total replacement of right hip   • Acute blood loss anemia, mild, asymptomatic     Past Medical History:   Diagnosis Date   • A-fib (CMS/[facility])    • Anxiety    • Anxiety and depression    • Arthritis    • Cataract    • Depression    • Elevated serum creatinine     SEES DR SMITH EVERY 6 MONTHS- NEPHROLOGIST    • Extremity pain    • GERD (gastroesophageal reflux disease)    • Gout    • H/O bladder infections     JUST FINISHED MACROBID ON 17 FOR RECENT UTI   • Hypercholesteremia    •  Hypertension    • Joint pain    • Kidney disease    • Kidney disease, chronic, stage III (GFR 30-59 ml/min) (CMS/[facility])    • Low back pain    • Neck pain    • Rheumatic fever    • Sleep apnea    • Urinary tract infection    • Wears glasses      Past Surgical History:   Procedure Laterality Date   • BACK SURGERY  2004    LUMBAR PER DR MAN    • CARPAL TUNNEL RELEASE Left    • COLONOSCOPY     • EYE SURGERY     • FINGER SURGERY Right     3RD FINGER   • HEEL SPUR EXCISION Bilateral    • KNEE ARTHROSCOPY Bilateral    • LUMBAR DISCECTOMY FUSION INSTRUMENTATION N/A 11/10/2017    Procedure: LUMBAR SPINAL FUSION ;  Surgeon: Gopi Man MD;  Location:  [facility];  Service:    • REPLACEMENT TOTAL KNEE BILATERAL Bilateral    • TOTAL HIP ARTHROPLASTY Right 9/9/2019    Procedure: TOTAL ANTERIOR RIGHT HIP ARTHROPLASTY;  Surgeon: Darrin New MD;  Location:  [facility];  Service: Orthopedics       Therapy Treatment    Rehabilitation Treatment Summary     Row Name 09/13/19 0854             Treatment Time/Intention    Discipline  occupational therapist  -      Document Type  therapy note (daily note)  -LC      Subjective Information  no complaints  -LC      Mode of Treatment  individual therapy;occupational therapy  -      Patient/Family Observations  Pt. supine in bed, IV intact. Stated she needed help setting up her breakfast  -      Care Plan Review  patient/other agree to care plan  -LC      Patient Effort  good  -LC      Existing Precautions/Restrictions  fall;right;hip, anterior;other (see comments) R sided weakness, acute CVA  -LC      Recorded by [LC] Teresa Adhikari, OT 09/13/19 1135      Row Name 09/13/19 0854             Cognitive Assessment/Intervention- PT/OT    Orientation Status (Cognition)  oriented x 4  -LC      Follows Commands (Cognition)  follows one step commands;over 90% accuracy;repetition of directions required;verbal cues/prompting required  -LC      Recorded by [LC] Teresa Adhikari OT 09/13/19 1135       Row Name 09/13/19 0854             ADL Assessment/Intervention    88831 - OT Self Care/Mgmt Minutes  9  -LC      BADL Assessment/Intervention  grooming;feeding  -LC      Recorded by [LC] Teresa Adhikari, OT 09/13/19 1135      Row Name 09/13/19 0854             Grooming Assessment/Training    [place] Level (Grooming)  set up;oral care regimen;wash face, hands  -LC      Grooming Position  sitting up in bed  -LC      Comment (Grooming)  Educated pt. of use or RUE as a gross assist. Pt. stabilized toothbrush on tray with RUE and used LUE to squeeze toothpaste onto brush. Pt. completed washing face and brushing teeth with LUE.    -LC      Recorded by [LC] Teresa Adhikari, OT 09/13/19 1135      Row Name 09/13/19 0854             Self-Feeding Assessment/Training    [place]yman Level (Feeding)  prepare tray/open items;maximum assist (25% patient effort)  -LC      Position (Self-Feeding)  sitting up in bed  -LC      Comment (Feeding)  Ecucated and encouraged pt. to try opening items one handed, and using RUE to stabilize container while LUE opens. Reinforcement needed.   -LC      Recorded by [LC] Teresa Adhikari, OT 09/13/19 1135      Row Name 09/13/19 0854             Motor Skills Assessment/Interventions    Additional Documentation  Therapeutic Exercise (Group)  -LC      Recorded by [LC] Teresa Adhikari OT 09/13/19 1135      Row Name 09/13/19 0854             Therapeutic Exercise    87862 -  OT Neuromuscular Reeducation Minutes  15  -LC      Recorded by [LC] Teresa Adhikari OT 09/13/19 1135      Row Name 09/13/19 0854             Therapeutic Exercise    Upper Extremity Range of Motion (Therapeutic Exercise)  elbow flexion/extension, right;forearm supination/pronation, right;shoulder flexion/extension, right;wrist flexion/extension, right;other (see comments) Shoulder elevation and shoulder retraction  -LC      Hand (Therapeutic Exercise)  thumb finger opposition, right;finger flexion/extension, right;hand ,  right  -LC      Exercise Type (Therapeutic Exercise)  AAROM (active assistive range of motion);AROM (active range of motion)  -LC      Position (Therapeutic Exercise)  seated  -LC      Sets/Reps (Therapeutic Exercise)  2/10  -LC      Recorded by [LC] Teresa Adhikari OT 09/13/19 1135      Row Name 09/13/19 0854             Positioning and Restraints    Pre-Treatment Position  in bed  -LC      Post Treatment Position  bed  -LC      In Bed  call light within reach;encouraged to call for assist;exit alarm on;fowlers  -LC      Recorded by [LC] Teresa Adhikari, OT 09/13/19 1135      Row Name 09/13/19 0854             Pain Assessment    Additional Documentation  Pain Scale: Numbers Pre/Post-Treatment (Group)  -LC      Recorded by [MYRANDA] Teresa Adhikari OT 09/13/19 1135      Row Name 09/13/19 0854             Pain Scale: Numbers Pre/Post-Treatment    Pain Scale: Numbers, Pretreatment  0/10 - no pain  -LC      Pain Scale: Numbers, Post-Treatment  0/10 - no pain  -LC      Recorded by [LC] Teresa Adhikari OT 09/13/19 1135      Row Name                Wound 09/09/19 1352 Right hip Incision    Wound - Properties Group Date first assessed: 09/09/19 [KD] Time first assessed: 1352 [KD] Side: Right [KD] Location: hip [KD] Primary Wound Type: Incision [KD] Recorded by:  [KD] Blas Pizarro RN 09/09/19 1352    Row Name 09/13/19 0854             Outcome Summary/Treatment Plan (OT)    Anticipated Discharge Disposition (OT)  inpatient rehabilitation facility  -      Recorded by [LC] Teresa Adhikari OT 09/13/19 1135        User Key  (r) = Recorded By, (t) = Taken By, (c) = Cosigned By    Initials Name Effective Dates Discipline    KD Blas Pizarro RN 06/18/19 -  Nurse    Teresa Oconnor OT 07/18/19 -  OT        Wound 09/09/19 1352 Right hip Incision (Active)   Dressing Appearance dry;intact;no drainage 9/13/2019 10:12 AM   Closure Liquid skin adhesive 9/13/2019 10:12 AM   Base dry;clean 9/13/2019 10:12 AM   Drainage Amount  scant 9/13/2019 10:12 AM       Occupational Therapy Education     Title: PT OT SLP Therapies (In Progress)     Topic: Occupational Therapy (Done)     Point: ADL training (Done)     Description: Instruct learner(s) on proper safety adaptation and remediation techniques during self care or transfers.   Instruct in proper use of assistive devices.    Learning Progress Summary           Patient Acceptance, E, VU,NR by  at 9/13/2019  8:54 AM    Acceptance, E,TB,D, VU,DU by ST at 9/11/2019  1:55 PM    Comment:  see flow sheet    Eager, E,TB,D, VU,NR by AR at 9/10/2019  3:00 PM                   Point: Home exercise program (Done)     Description: Instruct learner(s) on appropriate technique for monitoring, assisting and/or progressing therapeutic exercises/activities.    Learning Progress Summary           Patient Acceptance, E, VU,NR by  at 9/13/2019  8:54 AM    Acceptance, E,TB,D, VU,DU by ST at 9/11/2019  1:55 PM    Comment:  see flow sheet    Eager, E,TB,D, VU,NR by AR at 9/10/2019  3:00 PM                   Point: Precautions (Done)     Description: Instruct learner(s) on prescribed precautions during self-care and functional transfers.    Learning Progress Summary           Patient Acceptance, E,TB,D, VU,DU by  at 9/11/2019  1:55 PM    Comment:  see flow sheet    Eager, E,TB,D, VU,NR by AR at 9/10/2019  3:00 PM                   Point: Body mechanics (Done)     Description: Instruct learner(s) on proper positioning and spine alignment during self-care, functional mobility activities and/or exercises.    Learning Progress Summary           Patient Acceptance, E,TB,D, VU,DU by  at 9/11/2019  1:55 PM    Comment:  see flow sheet    Eager, E,TB,D, VU,NR by AR at 9/10/2019  3:00 PM                               User Key     Initials Effective Dates Name Provider Type Discipline     06/10/18 -  Claudia Vance OTR Occupational Therapist OT    AR 06/22/15 -  Jennifer Odonnell OT Occupational Therapist OT     MYRANDA 07/18/19 -  Teresa Adhikari, OT Occupational Therapist OT                OT Recommendation and Plan  Outcome Summary/Treatment Plan (OT)  Anticipated Discharge Disposition (OT): inpatient rehabilitation facility  Plan of Care Review  Plan of Care Reviewed With: patient  Plan of Care Reviewed With: patient  Outcome Summary: Pt. educated on rajesh techniques to use RUE as a gross assist for ADL participation. Educated and completed SROM therex's to improve ROM and functional use of RUE. Recommend IPR at discharge.   Outcome Measures     Row Name 09/13/19 1030 09/11/19 1600 09/11/19 1517       How much help from another person do you currently need...    Turning from your back to your side while in flat bed without using bedrails?  --  --  2  -MJ    Moving from lying on back to sitting on the side of a flat bed without bedrails?  --  --  2  -MJ    Moving to and from a bed to a chair (including a wheelchair)?  --  --  2  -MJ    Standing up from a chair using your arms (e.g., wheelchair, bedside chair)?  --  --  2  -MJ    Climbing 3-5 steps with a railing?  --  --  1  -MJ    To walk in hospital room?  --  --  2  -MJ    AM-PAC 6 Clicks Score (PT)  --  --  11  -MJ       How much help from another is currently needed...    Putting on and taking off regular lower body clothing?  2  -LC  --  --    Bathing (including washing, rinsing, and drying)  2  -LC  --  --    Toileting (which includes using toilet bed pan or urinal)  2  -LC  --  --    Putting on and taking off regular upper body clothing  2  -LC  --  --    Taking care of personal grooming (such as brushing teeth)  3  -LC  --  --    Eating meals  3  -LC  --  --    AM-PAC 6 Clicks Score (OT)  14  -LC  --  --       Functional Assessment    Outcome Measure Options  --  --  -MJ  AM-PAC 6 Clicks Basic Mobility (PT)  -MJ    Row Name 09/11/19 1355 09/11/19 0959 09/10/19 1507       How much help from another person do you currently need...    Turning from your back to your side  while in flat bed without using bedrails?  --  2  -MJ  2  -MJ    Moving from lying on back to sitting on the side of a flat bed without bedrails?  --  2  -MJ  2  -MJ    Moving to and from a bed to a chair (including a wheelchair)?  --  2  -MJ  2  -MJ    Standing up from a chair using your arms (e.g., wheelchair, bedside chair)?  --  2  -MJ  2  -MJ    Climbing 3-5 steps with a railing?  --  1  -MJ  1  -MJ    To walk in hospital room?  --  2  -MJ  2  -MJ    AM-PAC 6 Clicks Score (PT)  --  11  -MJ  11  -MJ       How much help from another is currently needed...    Putting on and taking off regular lower body clothing?  2  -ST  --  --    Bathing (including washing, rinsing, and drying)  2  -ST  --  --    Toileting (which includes using toilet bed pan or urinal)  2  -ST  --  --    Putting on and taking off regular upper body clothing  2  -ST  --  --    Taking care of personal grooming (such as brushing teeth)  3  -ST  --  --    Eating meals  3  -ST  --  --    AM-PAC 6 Clicks Score (OT)  14  -ST  --  --       Modified Reddy Scale    Modified Reddy Scale  4 - Moderately severe disability.  Unable to walk without assistance, and unable to attend to own bodily needs without assistance.  -ST  4 - Moderately severe disability.  Unable to walk without assistance, and unable to attend to own bodily needs without assistance.  -MJ  --       Functional Assessment    Outcome Measure Options  AM-PAC 6 Clicks Daily Activity (OT);Modified [place]  -ST  AM-PAC 6 Clicks Basic Mobility (PT)  -MJ  AM-PAC 6 Clicks Basic Mobility (PT)  -MJ    Row Name 09/10/19 1500             How much help from another is currently needed...    Putting on and taking off regular lower body clothing?  2  -AR      Bathing (including washing, rinsing, and drying)  2  -AR      Toileting (which includes using toilet bed pan or urinal)  2  -AR      Putting on and taking off regular upper body clothing  2  -AR      Taking care of personal grooming (such as  brushing teeth)  3  -AR      Eating meals  3  -AR      AM-PAC 6 Clicks Score (OT)  14  -AR         Functional Assessment    Outcome Measure Options  AM-PAC 6 Clicks Daily Activity (OT)  -AR        User Key  (r) = Recorded By, (t) = Taken By, (c) = Cosigned By    Initials Name Provider Type     Pinky Claudia J, OTR Occupational Therapist    Jennifer Castillo, OT Occupational Therapist    Henrique Alfaro, PT Physical Therapist    Teresa Oconnor, OT Occupational Therapist           Time Calculation:   Time Calculation- OT     Row Name 09/13/19 1009 09/13/19 0854          Time Calculation- OT    OT Start Time  --  0854  -     OT Received On  --  09/13/19  -     OT Goal Re-Cert Due Date  --  09/20/19  -        Timed Charges    23591 -  OT Neuromuscular Reeducation Minutes  --  15  -     85634 - Gait Training Minutes   15  -SC  --     53935 - OT Self Care/Mgmt Minutes  --  9  -       User Key  (r) = Recorded By, (t) = Taken By, (c) = Cosigned By    Initials Name Provider Type    SC Kimi Castillo, PT Physical Therapist    LC Teresa Adhikari, OT Occupational Therapist        Therapy Charges for Today     Code Description Service Date Service Provider Modifiers Qty    [id number]  OT NEUROMUSC RE EDUCATION EA 15 MIN 9/13/2019 Teresa Adhikari OT GO 1    [id number]  OT SELF CARE/MGMT/TRAIN EA 15 MIN 9/13/2019 Teresa Adhikari OT GO 1               Teresa Adhikari OT  9/13/2019

## 2019-09-13 NOTE — PROGRESS NOTES
HOD# : 4    No events last night  Patient's right upper extremity weakness is improving.  Patient starting to gain some dexterity in the right upper extremity as well.  Patient is having rapid recovery of function.    I discussed plan of care with the patient and she understands will likely be doing a left carotid artery stenting/angiogram on her on Tuesday.  Patient should remain on dual antiplatelet therapy and anticoagulation until then.  Patient is currently being heparinized with dual antiplatelet therapy.    Spoke with Dr. Mcdowell and cardiology/Dr. Cervantes do not see any signs of A. fib at the time and anticoagulation is questionable.  Heparin would be sufficient.      Status post total replacement of right hip    Anxiety and depression    HTN (hypertension)    Renal insufficiency    Leukocytosis, likely reactive    Arthritis of right hip    HO A-fib (CMS/HCC)    YUNIOR treated with BiPAP    Acute blood loss anemia, mild, asymptomatic      Temp:  [97.6 °F (36.4 °C)-99.2 °F (37.3 °C)] 97.6 °F (36.4 °C)  Heart Rate:  [64-77] 65  Resp:  [16-18] 16  BP: (127-165)/(51-64) 127/56  I/O last 3 completed shifts:  In: 480 [P.O.:480]  Out: -   No intake/output data recorded.  Vital signs were reviewed and documented in the chart      EXAM   Patient appeared in good neurologic function with normal comprehension   CN grossly intact  Moves all extremities to command  Right upper extremity dyskinesia but gaining some power in the proximal shoulder.   strength is 3 out of 5.    DIAGNOSIS  Right anterior hip replacement  Postoperative CVA  High-grade left carotid artery stenosis   Will likely undergo stenting on Tuesday    PLAN  Patient continues to improve and is showing signs of regaining function.  Patient's continuing to remain stable and improving neurologic function we will likely proceed with carotid stenting on Tuesday.

## 2019-09-13 NOTE — PLAN OF CARE
Problem: Patient Care Overview  Goal: Plan of Care Review  Outcome: Ongoing (interventions implemented as appropriate)   09/13/19 4633   Plan of Care Review   Progress improving   OTHER   Outcome Summary AMBULATION IMPROVING . WALKING 60 FEET WITH MECHANICAL GAIT AID   Coping/Psychosocial   Plan of Care Reviewed With patient

## 2019-09-14 LAB
BASOPHILS # BLD AUTO: 0.03 10*3/MM3 (ref 0–0.2)
BASOPHILS NFR BLD AUTO: 0.3 % (ref 0–1.5)
DEPRECATED RDW RBC AUTO: 48.5 FL (ref 37–54)
EOSINOPHIL # BLD AUTO: 0.27 10*3/MM3 (ref 0–0.4)
EOSINOPHIL NFR BLD AUTO: 2.6 % (ref 0.3–6.2)
ERYTHROCYTE [DISTWIDTH] IN BLOOD BY AUTOMATED COUNT: 14.2 % (ref 12.3–15.4)
HCT VFR BLD AUTO: 30.1 % (ref 34–46.6)
HGB BLD-MCNC: 9.3 G/DL (ref 12–15.9)
IMM GRANULOCYTES # BLD AUTO: 0.1 10*3/MM3 (ref 0–0.05)
IMM GRANULOCYTES NFR BLD AUTO: 1 % (ref 0–0.5)
LYMPHOCYTES # BLD AUTO: 1.76 10*3/MM3 (ref 0.7–3.1)
LYMPHOCYTES NFR BLD AUTO: 16.9 % (ref 19.6–45.3)
MCH RBC QN AUTO: 28.8 PG (ref 26.6–33)
MCHC RBC AUTO-ENTMCNC: 30.9 G/DL (ref 31.5–35.7)
MCV RBC AUTO: 93.2 FL (ref 79–97)
MONOCYTES # BLD AUTO: 0.76 10*3/MM3 (ref 0.1–0.9)
MONOCYTES NFR BLD AUTO: 7.3 % (ref 5–12)
NEUTROPHILS # BLD AUTO: 7.47 10*3/MM3 (ref 1.7–7)
NEUTROPHILS NFR BLD AUTO: 71.9 % (ref 42.7–76)
NRBC BLD AUTO-RTO: 0 /100 WBC (ref 0–0.2)
PLATELET # BLD AUTO: 233 10*3/MM3 (ref 140–450)
PMV BLD AUTO: 9.9 FL (ref 6–12)
RBC # BLD AUTO: 3.23 10*6/MM3 (ref 3.77–5.28)
UFH PPP CHRO-ACNC: 0.33 IU/ML (ref 0.3–0.7)
UFH PPP CHRO-ACNC: 0.36 IU/ML (ref 0.3–0.7)
UFH PPP CHRO-ACNC: 0.49 IU/ML (ref 0.3–0.7)
WBC NRBC COR # BLD: 10.39 10*3/MM3 (ref 3.4–10.8)

## 2019-09-14 PROCEDURE — 85520 HEPARIN ASSAY: CPT | Performed by: INTERNAL MEDICINE

## 2019-09-14 PROCEDURE — 85520 HEPARIN ASSAY: CPT

## 2019-09-14 PROCEDURE — 97116 GAIT TRAINING THERAPY: CPT

## 2019-09-14 PROCEDURE — 25010000002 HEPARIN (PORCINE) IN NACL 25000-0.45 UT/250ML-% SOLUTION: Performed by: INTERNAL MEDICINE

## 2019-09-14 PROCEDURE — 85025 COMPLETE CBC W/AUTO DIFF WBC: CPT | Performed by: INTERNAL MEDICINE

## 2019-09-14 RX ADMIN — ASPIRIN 81 MG: 81 TABLET, COATED ORAL at 07:57

## 2019-09-14 RX ADMIN — MIRTAZAPINE 15 MG: 15 TABLET, FILM COATED ORAL at 20:16

## 2019-09-14 RX ADMIN — HEPARIN SODIUM 20 UNITS/KG/HR: 10000 INJECTION, SOLUTION INTRAVENOUS at 20:22

## 2019-09-14 RX ADMIN — DOCUSATE SODIUM 100 MG: 100 CAPSULE, LIQUID FILLED ORAL at 07:56

## 2019-09-14 RX ADMIN — DOCUSATE SODIUM 100 MG: 100 CAPSULE, LIQUID FILLED ORAL at 20:16

## 2019-09-14 RX ADMIN — SODIUM CHLORIDE, PRESERVATIVE FREE 10 ML: 5 INJECTION INTRAVENOUS at 20:16

## 2019-09-14 RX ADMIN — ATENOLOL 25 MG: 25 TABLET ORAL at 07:57

## 2019-09-14 RX ADMIN — ESCITALOPRAM OXALATE 20 MG: 20 TABLET ORAL at 07:56

## 2019-09-14 RX ADMIN — BISACODYL 10 MG: 5 TABLET, COATED ORAL at 20:16

## 2019-09-14 RX ADMIN — HYDROXYZINE HYDROCHLORIDE 25 MG: 25 TABLET, FILM COATED ORAL at 20:16

## 2019-09-14 RX ADMIN — HYDROXYZINE HYDROCHLORIDE 25 MG: 25 TABLET, FILM COATED ORAL at 07:57

## 2019-09-14 RX ADMIN — HEPARIN SODIUM 20 UNITS/KG/HR: 10000 INJECTION, SOLUTION INTRAVENOUS at 05:36

## 2019-09-14 RX ADMIN — CLOPIDOGREL BISULFATE 75 MG: 75 TABLET ORAL at 07:57

## 2019-09-14 RX ADMIN — ATORVASTATIN CALCIUM 80 MG: 40 TABLET, FILM COATED ORAL at 20:16

## 2019-09-14 RX ADMIN — SODIUM CHLORIDE, PRESERVATIVE FREE 10 ML: 5 INJECTION INTRAVENOUS at 07:59

## 2019-09-14 RX ADMIN — HYDROCODONE BITARTRATE AND ACETAMINOPHEN 1 TABLET: 5; 325 TABLET ORAL at 07:56

## 2019-09-14 NOTE — PLAN OF CARE
Problem: Patient Care Overview  Goal: Plan of Care Review  Outcome: Ongoing (interventions implemented as appropriate)   09/14/19 6157   Plan of Care Review   Progress improving   OTHER   Outcome Summary 100 ft gait with Platform RW, improved UE use able to grasp walker   Coping/Psychosocial   Plan of Care Reviewed With patient

## 2019-09-14 NOTE — PROGRESS NOTES
"          Orthopaedic Surgery Progress Note      LOS: 5 days   Patient Care Team:  Salty Hernandez MD as PCP - General (Family Medicine)  Leon Hein MD as Consulting Physician (Neurosurgery)  Perkins, Corinne E, PT as Physical Therapist (Physical Therapy)  Cricket Nettles MD as Consulting Physician (Pain Medicine)    POD 5    Subjective     Interval History:   Patient complaining of mild right hip pain.  Mild headache as well.  Denies chest pain or shortness of breath.    Objective     Vital Signs:  Temp (24hrs), Av.3 °F (36.8 °C), Min:97.5 °F (36.4 °C), Max:98.7 °F (37.1 °C)    /73 (BP Location: Left arm, Patient Position: Lying)   Pulse 68   Temp 98.7 °F (37.1 °C) (Oral)   Resp 18   Ht 162.6 cm (64\")   Wt 86.2 kg (190 lb)   SpO2 97%   BMI 32.61 kg/m²     Labs:  Lab Results (last 24 hours)     Procedure Component Value Units Date/Time    Heparin Anti-Xa [[id number]]  (Normal) Collected:  19 1007    Specimen:  Blood Updated:  19 1121     Heparin Anti-Xa (UFH) 0.36 IU/ml     Heparin Anti-Xa [[id number]]  (Normal) Collected:  19 0350    Specimen:  Blood Updated:  19 0600     Heparin Anti-Xa (UFH) 0.33 IU/ml     Heparin Anti-Xa [[id number]]  (Abnormal) Collected:  19    Specimen:  Blood Updated:  19 2121     Heparin Anti-Xa (UFH) 0.14 IU/ml           Physical Exam:  Calf is soft and nontender  No pain with passive range of motion right hip    Assessment/Plan   Postoperative day #5 status post right total hip arthroplasty with Dr. New  Patient awaiting cerebral angiogram and possible carotid stent  Patient has [facility] when medically ready      Abelardo Lazcano MD  19  2:30 PM      "

## 2019-09-14 NOTE — PROGRESS NOTES
"IM progress note      Eryn Steve  [id number]  1945     LOS: 5 days     Attending: Darrin New MD    Primary Care Provider: Salty Hernandez MD      Chief Complaint/Reason for visit:  Right hip pain    Subjective   Doing ok  Improving strength in right upper extremity.    Improving performance with physical therapy.  No nausea or vomiting, no shortness of breath.    Objective     Visit Vitals  /70   Pulse 73   Temp 97.5 °F (36.4 °C) (Oral)   Resp 16   Ht 162.6 cm (64\")   Wt 86.2 kg (190 lb)   SpO2 97%   BMI 32.61 kg/m²     Temp (24hrs), Av.2 °F (36.8 °C), Min:97.5 °F (36.4 °C), Max:98.6 °F (37 °C)      Nutrition: PO    Respiratory: RA    Physical Therapy:  Pending today    Physical Exam:     General Appearance:    Alert, cooperative, in no acute distress   Head:    Normocephalic, without obvious abnormality, atraumatic    Lungs:     Normal effort, symmetric chest rise, no crepitus, clear to      auscultation bilaterally             Heart:    Regular rhythm and normal rate, normal S1 and S2   Abdomen:     Normal bowel sounds, no masses, no organomegaly, soft        non-tender, non-distended, no guarding, no rebound                tenderness   Extremities:   Right hip Prineo CDI.     Pulses:   Pulses palpable and equal bilaterally   Skin:   No bleeding, bruising or rash   Neurologic:   Cranial nerves 2 - 12 grossly intact. Weak ( improving )   and numbness right hand.(Able to raise her right arm against gravity.  Has better )wy     Results Review:     I reviewed the patient's new clinical results.   Results from last 7 days   Lab Units 19  1839 09/10/19  0336   WBC 10*3/mm3 13.95* 12.20*   HEMOGLOBIN g/dL 9.4* 10.2*   HEMATOCRIT % 30.2* 33.9*   PLATELETS 10*3/mm3 159 172     Results for ERYN STEVE (MRN [id number]) as of 9/10/2019 10:22   Ref. Range 9/15/2018 01:26   Hemoglobin Latest Ref Range: 11.5 - 15.5 g/dL 12.1   Hematocrit Latest Ref Range: 34.5 - " 44.0 % 38.1     Results from last 7 days   Lab Units 09/12/19  0158 09/11/19  0952 09/10/19  0337   SODIUM mmol/L  --  136 141   POTASSIUM mmol/L  --  4.4 5.0   CHLORIDE mmol/L  --  103 103   CO2 mmol/L  --  27.0 27.0   BUN mg/dL  --  24* 29*   CREATININE mg/dL 1.02* 1.09* 1.47*   CALCIUM mg/dL  --  7.9* 7.6*   GLUCOSE mg/dL  --  144* 158*     Results for ERYN STEVE (MRN [id number]) as of 9/12/2019 09:18   Ref. Range 9/12/2019 01:58   Hemoglobin A1C Latest Ref Range: 4.80 - 5.60 % 5.20   Total Cholesterol Latest Ref Range: 0 - 200 mg/dL 132   HDL Cholesterol Latest Ref Range: 40 - 60 mg/dL 34 (L)   LDL Cholesterol  Latest Ref Range: 0 - 100 mg/dL 51   VLDL Cholesterol Latest Units: mg/dL 47   Triglycerides Latest Ref Range: 0 - 150 mg/dL 235 (H)   LDL/HDL Ratio Unknown 1.50   Heparin Anti-Xa Latest Ref Range: 0.30 - 0.70 IU/ml 0.10 (L)       Study Result     EXAMINATION: CT HEAD WO CONTRAST- 09/10/2019     INDICATION: Focal neuro deficit, new, fixed or worsening, >6 hours;  Z74.09-Other reduced mobility      TECHNIQUE: CT head without intravenous contrast     The radiation dose reduction device was turned on for each scan per the  ALARA (As Low as Reasonably Achievable) protocol.     COMPARISON: NONE     FINDINGS: Midline structures are symmetric without evidence of mass,  mass effect or midline shift. Ventricles and sulci within normal limits.  No intra-axial hemorrhage or extra-axial fluid collection. Globes and  orbits unremarkable. Visualized paranasal sinuses and mastoid air cells  are grossly clear and well-pneumatized. Calvarium intact.     IMPRESSION:  No acute intracranial abnormality specifically no midline  shift or hydrocephalus. No intra-axial hemorrhage or extra-axial fluid  collection. MRI may be considered for further evaluation of  acute/subacute infarction if clinically warranted.     D:  09/10/2019  E:  09/10/2019     Study Result     EXAMINATION: MRI BRAIN WO CONTRAST- 09/10/2019      INDICATION: Focal neuro deficit, new, fixed or worsening, >6 hours;  Z74.09-Other reduced mobility     TECHNIQUE: Sagittal and axial T1 axial T2 FLAIR diffusion weighted  images of the brain     COMPARISON: Head CT scan 09/10/2019     FINDINGS: History indicates numbness, loss of right hand  strength.  Improving symptoms.     There is a well-defined area of restricted diffusion approximately 11 mm  in maximal diameter in the posterior left frontal lobe in what appears  to be the prefrontal gyrus, consistent with acute infarct. No definite  acute infarct is seen elsewhere. A couple of punctate areas of increased  T2 signal in the central right frontal white matter at approximately the  same level or at best equivocal for minute infarcts.     There is moderate, mostly nonconfluent central white matter change and  age-appropriate generalized cerebral atrophy. There is no evidence of  mass, mass effect, hemorrhage, hydrocephalus or abnormal extra-axial  collection.     IMPRESSION:  1. 11 mm acute infarct in approximately the area of the left prefrontal  gyrus.  2. Few punctate areas of increased signal in the right frontal white  matter, equivocal for minute acute or recent infarct, possibly just  artifactual.  3. No acute intracranial disease is identified elsewhere.     D:  09/11/2019  E:  09/11/2019     Carotid duplex:  Interpretation Summary     · High-grade left internal carotid artery stenosis. Greather than 70% stenosis. Dense calcified plaque within proximal left internal carotid artery  · Right internal carotid artery stenosis of 50-69%.  · Vertebral artery flow antegrade bilaterally with increase in left sided peak systolic velocities suugesting possible proximal stenosis     2D echo:    Interpretation Summary     · Left ventricular systolic function is normal. Estimated EF = 60%.  · There were no significant valvular abnormalities.            I reviewed the patient's new imaging including images and  reports.    All medications reviewed.     aspirin 81 mg Oral Daily   atenolol 25 mg Oral BID   atorvastatin 80 mg Oral Nightly   clopidogrel 75 mg Oral Daily   docusate sodium 100 mg Oral BID   escitalopram 20 mg Oral Daily   mirtazapine 15 mg Oral Nightly   sodium chloride 10 mL Intravenous Q12H       Assessment/Plan        Left prefrontal gyrus stroke  Severe left internal carotid artery stenosis.    Status post total replacement of right hip    Anxiety and depression    HTN (hypertension)    Renal insufficiency    Leukocytosis, likely reactive    Arthritis of right hip    HO A-fib     YUNIOR treated with BiPAP    Acute blood loss anemia, mild, asymptomatic    RUE weakness/numbness        Plan  1. PT/OT- WBAT RLE  2. Pain control-prns   3. IS-encouraged  4. DVT proph- mechs/ASA  5. Bowel regimen  6. Monitor post-op labs  7. DC planning for home    Stroke, RUE weakness ( stroke protocol per )wy  - neuro following  - stat CT head negative, followed by MRI showing left prefrontal gyrus stroke  - continue tele monitor  - cardiology consult  - Further eval of possible high grade LICA stenosis.  Upon further discussion with neurosurgery and cardiology, continue dual antiplatelet therapy and heparin GTT until intervention on left carotid artery with stenting this coming Tuesday ( 9/13)    Cardiology  - Anticoagulation recommended.   - ECHO, noted.       RI, improving  - DC lisinopril       HTN, aifb  - Continue home atenolol   - Monitor BP   - Allow some degree of BP elevation, post stroke.     YUNIOR  - bipap at night    Discussed with patient.       Brit Galo MD  09/14/19  10:02 AM

## 2019-09-14 NOTE — THERAPY TREATMENT NOTE
Patient Name: Akua Torres  : 1945    MRN: [id number]                              Today's Date: 2019       Admit Date: 2019    Visit Dx:     ICD-10-CM ICD-9-CM   1. Impaired functional mobility, balance, gait, and endurance Z74.09 V49.89   2. Impaired mobility and ADLs Z74.09 799.89   3. 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 (CMS/Formerly [facility]) I63.232 433.11     Patient Active Problem List   Diagnosis   • Degenerative disc disease, lumbar   • Lumbar stenosis with neurogenic claudication   • History of lumbar fusion   • Spondylosis of lumbar region without myelopathy or radiculopathy   • Mild obesity   • Physical deconditioning   • Idiopathic gout   • Anxiety and depression   • Greater trochanteric bursitis of both hips   • Status post total bilateral knee replacement   • Back pain   • HTN (hypertension)   • HLD (hyperlipidemia)   • Prediabetes   • S/P lumbar spinal fusion   • Renal insufficiency   • Leukocytosis, likely reactive   • Altered mental status. Felt to be drug related (trazodone, opiates, benzodiazepine)   • Acute renal failure due to hypotension. Now resolved (ARF) (CMS/Formerly [facility])   • Depression   • Encounter for Medicare annual wellness exam   • Arthritis of right hip   • HO A-fib (CMS/Formerly [facility])   • YUNIOR treated with BiPAP   • Status post total replacement of right hip   • Acute blood loss anemia, mild, asymptomatic   • 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 (CMS/Formerly [facility])     Past Medical History:   Diagnosis Date   • A-fib (CMS/Formerly [facility])    • Anxiety    • Anxiety and depression    • Arthritis    • Cataract    • Depression    • Elevated serum creatinine     SEES DR SMITH EVERY 6 MONTHS- NEPHROLOGIST    • Extremity pain    • GERD (gastroesophageal reflux disease)    • Gout    • H/O bladder infections     JUST FINISHED MACROBID ON 17 FOR RECENT UTI   • Hypercholesteremia    • Hypertension    • Joint pain    • Kidney disease    • Kidney disease,  chronic, stage III (GFR 30-59 ml/min) (CMS/HCC)    • Low back pain    • Neck pain    • Rheumatic fever    • Sleep apnea    • Urinary tract infection    • Wears glasses      Past Surgical History:   Procedure Laterality Date   • BACK SURGERY  2004    LUMBAR PER DR MAN    • CARPAL TUNNEL RELEASE Left    • COLONOSCOPY     • EYE SURGERY     • FINGER SURGERY Right     3RD FINGER   • HEEL SPUR EXCISION Bilateral    • KNEE ARTHROSCOPY Bilateral    • LUMBAR DISCECTOMY FUSION INSTRUMENTATION N/A 11/10/2017    Procedure: LUMBAR SPINAL FUSION ;  Surgeon: Gopi Man MD;  Location:  [facility];  Service:    • REPLACEMENT TOTAL KNEE BILATERAL Bilateral    • TOTAL HIP ARTHROPLASTY Right 9/9/2019    Procedure: TOTAL ANTERIOR RIGHT HIP ARTHROPLASTY;  Surgeon: Darrin New MD;  Location:  [facility];  Service: Orthopedics     General Information     Row Name 09/14/19 1613 09/14/19 1325       PT Evaluation Time/Intention    Document Type  therapy note (daily note)  -CT  therapy note (daily note)  -CT    Mode of Treatment  physical therapy  -CT  physical therapy  -CT    Row Name 09/14/19 1613 09/14/19 1325       General Information    Patient Profile Reviewed?  yes  -CT  yes  -CT    Existing Precautions/Restrictions  hip  -CT  hip  -CT    Barriers to Rehab  --  -- left UE  and ability to WB and mobilize LE  -CT    Row Name 09/14/19 1325          Cognitive Assessment/Intervention- PT/OT    Orientation Status (Cognition)  oriented x 3  -CT     Row Name 09/14/19 1325          Safety Issues, Functional Mobility    Safety Issues Affecting Function (Mobility)  sequencing abilities  -CT     Impairments Affecting Function (Mobility)  grasp;balance;motor planning;strength  -CT       User Key  (r) = Recorded By, (t) = Taken By, (c) = Cosigned By    Initials Name Provider Type    CT Easton Kumar, PT Physical Therapist        Mobility     Row Name 09/14/19 1614 09/14/19 1329       Bed Mobility Assessment/Treatment    Bed  Mobility Assessment/Treatment  bed mobility (all) activities  -CT  supine-sit  -CT    [place] Level (Bed Mobility)  minimum assist (75% patient effort)  -CT  --    Scooting/Bridging [place] (Bed Mobility)  --  moderate assist (50% patient effort)  -CT    Supine-Sit [place] (Bed Mobility)  --  moderate assist (50% patient effort)  -CT    Sit-Supine [place] (Bed Mobility)  minimum assist (75% patient effort)  -CT  --    Row Name 09/14/19 1614 09/14/19 1329       Bed-Chair Transfer    Bed-Chair [place] (Transfers)  minimum assist (75% patient effort)  -CT  minimum assist (75% patient effort)  -CT    Assistive Device (Bed-Chair Transfers)  walker, front-wheeled  -CT  walker, rolling platform  -CT    Row Name 09/14/19 1614 09/14/19 1329       Sit-Stand Transfer    Sit-Stand [place] (Transfers)  supervision  -CT  minimum assist (75% patient effort)  -CT    Assistive Device (Sit-Stand Transfers)  walker, front-wheeled  -CT  walker, rolling platform  -CT    Row Name 09/14/19 1614 09/14/19 1329       Gait/Stairs Assessment/Training    Gait/Stairs Assessment/Training  gait/ambulation independence  -CT  gait/ambulation independence;distance ambulated;gait pattern  -CT    [place] Level (Gait)  supervision;verbal cues  -CT  supervision  -CT    Distance in Feet (Gait)  100  -CT  100'  -CT    Pattern (Gait)  step-to  -CT  step-to;3-point  -CT    Row Name 09/14/19 1614          Mobility Assessment/Intervention    Extremity Weight-bearing Status  right lower extremity  -CT     Right Lower Extremity (Weight-bearing Status)  weight-bearing as tolerated (WBAT)  -CT       User Key  (r) = Recorded By, (t) = Taken By, (c) = Cosigned By    Initials Name Provider Type    CT Easton Kumar PT Physical Therapist        Obj/Interventions     Row Name 09/14/19 1615 09/14/19 1331       Static Sitting Balance    Level of [place] (Unsupported Sitting, Static Balance)  supervision  -CT  minimal  assist, 75% patient effort  -CT    Sitting Position (Unsupported Sitting, Static Balance)  --  sitting on edge of bed  -CT    Time Able to Maintain Position (Unsupported Sitting, Static Balance)  --  2 to 3 minutes  -CT    Row Name 09/14/19 1615          Dynamic Sitting Balance    Level of [place], Reaches Outside Midline (Sitting, Dynamic Balance)  supervision  -CT     Row Name 09/14/19 1331          Static Standing Balance    Level of [place] (Supported Standing, Static Balance)  contact guard assist  -CT     Time Able to Maintain Position (Supported Standing, Static Balance)  3 to 4 minutes  -CT     Assistive Device Utilized (Supported Standing, Static Balance)  walker, rolling platform  -CT     Row Name 09/14/19 1615 09/14/19 1331       Dynamic Standing Balance    Level of [place], Reaches Outside Midline (Standing, Dynamic Balance)  supervision  -CT  contact guard assist;minimal assist, 75% patient effort  -CT    Time Able to Maintain Position, Reaches Outside Midline (Standing, Dynamic Balance)  3 to 4 minutes  -CT  more than 5 minutes  -CT    Assistive Device Utilized (Supported Standing, Dynamic Balance)  walker, rolling  -CT  walker, rolling platform  -CT      User Key  (r) = Recorded By, (t) = Taken By, (c) = Cosigned By    Initials Name Provider Type    CT Easton Kumar, PT Physical Therapist        Goals/Plan    No documentation.       Clinical Impression     Row Name 09/14/19 1616 09/14/19 1330       Pain Scale: Numbers Pre/Post-Treatment    Pain Scale: Numbers, Pretreatment  7/10  -CT  7/10  -CT    Pain Scale: Numbers, Post-Treatment  3/10  -CT  4/10  -CT    Pain Location - Side  Right  -CT  Right  -CT    Pain Location  hip  -CT  hip  -CT    Pain Intervention(s)  Ambulation/increased activity  -CT  Repositioned;Ambulation/increased activity  -CT    Row Name 09/14/19 1332          Plan of Care Review    Plan of Care Reviewed With  patient  -CT     Row Name 09/14/19 1616 09/14/19  1332       Physical Therapy Clinical Impression    Criteria for Skilled Interventions Met (PT Clinical Impression)  yes  -CT  yes  -CT    Rehab Potential (PT Clinical Summary)  good, to achieve stated therapy goals  -CT  good, to achieve stated therapy goals  -CT    Row Name 09/14/19 1616          Vital Signs    Pre Patient Position  Supine  -CT     Post Patient Position  Supine  -CT     Row Name 09/14/19 1616          Positioning and Restraints    In Bed  notified nsg;supine;exit alarm on;with family/caregiver  -CT       User Key  (r) = Recorded By, (t) = Taken By, (c) = Cosigned By    Initials Name Provider Type    CT Easton Kumar, SUSAN Physical Therapist        Outcome Measures     Row Name 09/14/19 1618 09/14/19 1334       How much help from another person do you currently need...    Turning from your back to your side while in flat bed without using bedrails?  3  -CT  3  -CT    Moving from lying on back to sitting on the side of a flat bed without bedrails?  3  -CT  3  -CT    Moving to and from a bed to a chair (including a wheelchair)?  3  -CT  3  -CT    Standing up from a chair using your arms (e.g., wheelchair, bedside chair)?  3  -CT  3  -CT    Climbing 3-5 steps with a railing?  2  -CT  2  -CT    To walk in hospital room?  4  -CT  3  -CT    AM-PAC 6 Clicks Score (PT)  18  -CT  17  -CT    Row Name 09/14/19 1618 09/14/19 1334       Functional Assessment    Outcome Measure Options  AM-PAC 6 Clicks Basic Mobility (PT)  -CT  AM-PAC 6 Clicks Basic Mobility (PT)  -CT      User Key  (r) = Recorded By, (t) = Taken By, (c) = Cosigned By    Initials Name Provider Type    CT Easton Kumar, SUSAN Physical Therapist        Physical Therapy Education     Title: PT OT SLP Therapies (In Progress)     Topic: Physical Therapy (In Progress)     Point: Mobility training (In Progress)     Learning Progress Summary           Patient Eager, E,D, NR by CT at 9/14/2019  4:17 PM    Acceptance, E,D, VU,NR,DU by CT at  9/14/2019  1:33 PM    Eager, E, VU,NR by SC at 9/13/2019  3:41 PM    Comment:  REVIEWED BENEFITS OF ACTIVITY    Acceptance, E, VU by SC at 9/13/2019 10:17 AM    Comment:  REVIEWED BENEFITS OF ACTIVITY    Acceptance, E,D, VU,NR by AA at 9/12/2019  4:08 PM    Acceptance, E,D, NR by AA at 9/12/2019 11:42 AM    Acceptance, E,D, VU,NR by  at 9/11/2019  9:59 AM    Comment:  Reviewed HEP, gait mechanics, benefits of mobility, safety with mobility    Acceptance, E,D, VU,NR by MJ at 9/10/2019  3:07 PM    Comment:  Reviewed HEP, gait mechanics, benefits of mobility, safety with mobility    Acceptance, E,D, VU,NR by MJ at 9/10/2019  9:05 AM    Comment:  Reviewed anterior hip precautions, gait mechanics, benefits of mobility    Acceptance, E,D, VU,NR by LR at 9/9/2019  5:05 PM    Comment:  Educated on anterior hip precautions, weight bearing status, correct supine to sit t/f technique, correct sit<->stand t/f technique, correct gait mechanics, and progression of POC.   Family Acceptance, E,D, VU,NR by AA at 9/12/2019  4:08 PM    Acceptance, E,D, NR by AA at 9/12/2019 11:42 AM    Acceptance, E,D, VU,NR by LR at 9/9/2019  5:05 PM    Comment:  Educated on anterior hip precautions, weight bearing status, correct supine to sit t/f technique, correct sit<->stand t/f technique, correct gait mechanics, and progression of POC.   Significant Other Acceptance, E,D, VU,NR by  at 9/11/2019  9:59 AM    Comment:  Reviewed HEP, gait mechanics, benefits of mobility, safety with mobility   Other Eager, E,D, NR by CT at 9/14/2019  4:17 PM                   Point: Home exercise program (In Progress)     Learning Progress Summary           Patient Eager, E,D, NR by CT at 9/14/2019  4:17 PM    Acceptance, E,D, VU,NR,DU by CT at 9/14/2019  1:33 PM    Eager, E, VU,NR by SC at 9/13/2019  3:41 PM    Comment:  REVIEWED BENEFITS OF ACTIVITY    ESVIN Jason VU by SC at 9/13/2019 10:17 AM    Comment:  REVIEWED BENEFITS OF ACTIVITY    ESVIN Jason D  NR by AA at 9/12/2019 11:42 AM    Acceptance, E,D, VU,NR by MJ at 9/11/2019  9:59 AM    Comment:  Reviewed HEP, gait mechanics, benefits of mobility, safety with mobility    Acceptance, E,D, VU,NR by CLAY at 9/10/2019  3:07 PM    Comment:  Reviewed HEP, gait mechanics, benefits of mobility, safety with mobility    Acceptance, E,D, VU,NR by MJ at 9/10/2019  9:05 AM    Comment:  Reviewed anterior hip precautions, gait mechanics, benefits of mobility    Acceptance, E,D, VU,NR by LR at 9/9/2019  5:05 PM    Comment:  Educated on anterior hip precautions, weight bearing status, correct supine to sit t/f technique, correct sit<->stand t/f technique, correct gait mechanics, and progression of POC.   Family Acceptance, E,D, NR by AA at 9/12/2019 11:42 AM    Acceptance, E,D, VU,NR by REJI at 9/9/2019  5:05 PM    Comment:  Educated on anterior hip precautions, weight bearing status, correct supine to sit t/f technique, correct sit<->stand t/f technique, correct gait mechanics, and progression of POC.   Significant Other Acceptance, E,D, VU,NR by  at 9/11/2019  9:59 AM    Comment:  Reviewed HEP, gait mechanics, benefits of mobility, safety with mobility   Other Eager, E,D, NR by CT at 9/14/2019  4:17 PM                   Point: Body mechanics (In Progress)     Learning Progress Summary           Patient Eager, E,D, NR by CT at 9/14/2019  4:17 PM    Acceptance, E,D, VU,NR,DU by CT at 9/14/2019  1:33 PM    Eager, E, VU,NR by SC at 9/13/2019  3:41 PM    Comment:  REVIEWED BENEFITS OF ACTIVITY    Acceptance, E, VU by SC at 9/13/2019 10:17 AM    Comment:  REVIEWED BENEFITS OF ACTIVITY    Acceptance, E,D, VU,NR by AA at 9/12/2019  4:08 PM    Acceptance, E,D, NR by AA at 9/12/2019 11:42 AM    Acceptance, E,D, VU,NR by CLAY at 9/11/2019  9:59 AM    Comment:  Reviewed HEP, gait mechanics, benefits of mobility, safety with mobility    Acceptance, E,D, VU,NR by CLAY at 9/10/2019  3:07 PM    Comment:  Reviewed HEP, gait mechanics, benefits of  mobility, safety with mobility    Acceptance, E,D, VU,NR by MJ at 9/10/2019  9:05 AM    Comment:  Reviewed anterior hip precautions, gait mechanics, benefits of mobility    Acceptance, E,D, VU,NR by LR at 9/9/2019  5:05 PM    Comment:  Educated on anterior hip precautions, weight bearing status, correct supine to sit t/f technique, correct sit<->stand t/f technique, correct gait mechanics, and progression of POC.   Family Acceptance, E,D, VU,NR by AA at 9/12/2019  4:08 PM    Acceptance, E,D, NR by AA at 9/12/2019 11:42 AM    Acceptance, E,D, VU,NR by LR at 9/9/2019  5:05 PM    Comment:  Educated on anterior hip precautions, weight bearing status, correct supine to sit t/f technique, correct sit<->stand t/f technique, correct gait mechanics, and progression of POC.   Significant Other Acceptance, E,D, VU,NR by  at 9/11/2019  9:59 AM    Comment:  Reviewed HEP, gait mechanics, benefits of mobility, safety with mobility   Other Eager, E,D, NR by CT at 9/14/2019  4:17 PM                   Point: Precautions (In Progress)     Learning Progress Summary           Patient Eager, E,D, NR by CT at 9/14/2019  4:17 PM    Acceptance, E,D, VU,NR,DU by CT at 9/14/2019  1:33 PM    Eager, E, VU,NR by SC at 9/13/2019  3:41 PM    Comment:  REVIEWED BENEFITS OF ACTIVITY    Acceptance, E, VU by SC at 9/13/2019 10:17 AM    Comment:  REVIEWED BENEFITS OF ACTIVITY    Acceptance, E,D, VU,NR by AA at 9/12/2019  4:08 PM    Acceptance, E,D, NR by AA at 9/12/2019 11:42 AM    Acceptance, E,D, VU,NR by MJ at 9/11/2019  9:59 AM    Comment:  Reviewed HEP, gait mechanics, benefits of mobility, safety with mobility    Acceptance, E,D, VU,NR by  at 9/10/2019  3:07 PM    Comment:  Reviewed HEP, gait mechanics, benefits of mobility, safety with mobility    Acceptance, E,D, VU,NR by CLAY at 9/10/2019  9:05 AM    Comment:  Reviewed anterior hip precautions, gait mechanics, benefits of mobility    Acceptance, E,D, VU,NR by LR at 9/9/2019  5:05 PM     Comment:  Educated on anterior hip precautions, weight bearing status, correct supine to sit t/f technique, correct sit<->stand t/f technique, correct gait mechanics, and progression of POC.   Family Acceptance, E,D, VU,NR by AA at 9/12/2019  4:08 PM    Acceptance, E,D, NR by AA at 9/12/2019 11:42 AM    Acceptance, ESVIN,ARPITA, VU,NR by LR at 9/9/2019  5:05 PM    Comment:  Educated on anterior hip precautions, weight bearing status, correct supine to sit t/f technique, correct sit<->stand t/f technique, correct gait mechanics, and progression of POC.   Significant Other Acceptance, E,D, VU,NR by  at 9/11/2019  9:59 AM    Comment:  Reviewed HEP, gait mechanics, benefits of mobility, safety with mobility   Other Eager, ARPITA MCALLISTER, NR by CT at 9/14/2019  4:17 PM                               User Key     Initials Effective Dates Name Provider Type Discipline    SC 06/19/15 -  Kimi Castillo, PT Physical Therapist PT    LR 06/19/15 -  Teresa Blackwell, PT Physical Therapist PT    CT 06/19/15 -  Easton Kumar, PT Physical Therapist PT     04/03/18 -  Henrique Clements, PT Physical Therapist PT     04/02/18 -  Vanessa Milligan, PT Physical Therapist PT              PT Recommendation and Plan     Plan of Care Reviewed With: patient, friend  Progress: improving  Outcome Summary: 100  ft gait cues for tur ns     Time Calculation:   PT Charges     Row Name 09/14/19 1618 09/14/19 1335          Time Calculation    Start Time  1450  -CT  1300  -CT     PT Received On  09/14/19  -CT  09/14/19  -CT        Time Calculation- PT    Total Timed Code Minutes- PT  30 minute(s)  -CT  35 minute(s)  -CT       User Key  (r) = Recorded By, (t) = Taken By, (c) = Cosigned By    Initials Name Provider Type    CT Easton Kumar, PT Physical Therapist        Therapy Charges for Today     Code Description Service Date Service Provider Modifiers Qty    [id number] HC GAIT TRAINING EA 15 MIN 9/14/2019 Easton Kumar, PT GP 2     [id number]  GAIT TRAINING EA 15 MIN 9/14/2019 Easton Kumar, PT GP 2          PT G-Codes  Outcome Measure Options: AM-PAC 6 Clicks Basic Mobility (PT)  AM-PAC 6 Clicks Score (PT): 18  AM-PAC 6 Clicks Score (OT): 14  Modified [place] Scale: 4 - Moderately severe disability.  Unable to walk without assistance, and unable to attend to own bodily needs without assistance.    Easton Kumar, PT  9/14/2019

## 2019-09-14 NOTE — PLAN OF CARE
Problem: Patient Care Overview  Goal: Plan of Care Review  Outcome: Ongoing (interventions implemented as appropriate)   09/14/19 3512   Plan of Care Review   Progress improving   OTHER   Outcome Summary VSS. Hypertension controlled with current medication regimen. Alert and oriented. Scored 5 on NIH. Pt unable to hold RLE up and RUE weakness. Heparin drip adjusted to 20 Units/kg/hr per pharmacy. Redraw sceduled for 0400.    Coping/Psychosocial   Plan of Care Reviewed With patient       Problem: Hip Arthroplasty (Total, Partial) (Adult)  Goal: Signs and Symptoms of Listed Potential Problems Will be Absent, Minimized or Managed (Hip Arthroplasty)  Outcome: Ongoing (interventions implemented as appropriate)      Problem: Fall Risk (Adult)  Goal: Identify Related Risk Factors and Signs and Symptoms  Outcome: Ongoing (interventions implemented as appropriate)

## 2019-09-14 NOTE — PLAN OF CARE
Problem: Patient Care Overview  Goal: Plan of Care Review   09/14/19 1618   OTHER   Outcome Summary 100 ft gait cues for tur ns   Coping/Psychosocial   Plan of Care Reviewed With patient;friend

## 2019-09-14 NOTE — THERAPY TREATMENT NOTE
Patient Name: Akua Torres  : 1945    MRN: [id number]                              Today's Date: 2019       Admit Date: 2019    Visit Dx:     ICD-10-CM ICD-9-CM   1. Impaired functional mobility, balance, gait, and endurance Z74.09 V49.89   2. Impaired mobility and ADLs Z74.09 799.89   3. 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 (CMS/[facility]) I63.232 433.11     Patient Active Problem List   Diagnosis   • Degenerative disc disease, lumbar   • Lumbar stenosis with neurogenic claudication   • History of lumbar fusion   • Spondylosis of lumbar region without myelopathy or radiculopathy   • Mild obesity   • Physical deconditioning   • Idiopathic gout   • Anxiety and depression   • Greater trochanteric bursitis of both hips   • Status post total bilateral knee replacement   • Back pain   • HTN (hypertension)   • HLD (hyperlipidemia)   • Prediabetes   • S/P lumbar spinal fusion   • Renal insufficiency   • Leukocytosis, likely reactive   • Altered mental status. Felt to be drug related (trazodone, opiates, benzodiazepine)   • Acute renal failure due to hypotension. Now resolved (ARF) (CMS/[facility])   • Depression   • Encounter for Medicare annual wellness exam   • Arthritis of right hip   • HO A-fib (CMS/[facility])   • YUNIOR treated with BiPAP   • Status post total replacement of right hip   • Acute blood loss anemia, mild, asymptomatic   • 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 (CMS/[facility])     Past Medical History:   Diagnosis Date   • A-fib (CMS/[facility])    • Anxiety    • Anxiety and depression    • Arthritis    • Cataract    • Depression    • Elevated serum creatinine     SEES DR SMITH EVERY 6 MONTHS- NEPHROLOGIST    • Extremity pain    • GERD (gastroesophageal reflux disease)    • Gout    • H/O bladder infections     JUST FINISHED MACROBID ON 17 FOR RECENT UTI   • Hypercholesteremia    • Hypertension    • Joint pain    • Kidney disease    • Kidney disease,  chronic, stage III (GFR 30-59 ml/min) (CMS/HCC)    • Low back pain    • Neck pain    • Rheumatic fever    • Sleep apnea    • Urinary tract infection    • Wears glasses      Past Surgical History:   Procedure Laterality Date   • BACK SURGERY  2004    LUMBAR PER DR MAN    • CARPAL TUNNEL RELEASE Left    • COLONOSCOPY     • EYE SURGERY     • FINGER SURGERY Right     3RD FINGER   • HEEL SPUR EXCISION Bilateral    • KNEE ARTHROSCOPY Bilateral    • LUMBAR DISCECTOMY FUSION INSTRUMENTATION N/A 11/10/2017    Procedure: LUMBAR SPINAL FUSION ;  Surgeon: Gopi Man MD;  Location:  [facility];  Service:    • REPLACEMENT TOTAL KNEE BILATERAL Bilateral    • TOTAL HIP ARTHROPLASTY Right 9/9/2019    Procedure: TOTAL ANTERIOR RIGHT HIP ARTHROPLASTY;  Surgeon: Drarin New MD;  Location:  [facility];  Service: Orthopedics     General Information     Row Name 09/14/19 1325          PT Evaluation Time/Intention    Document Type  therapy note (daily note)  -CT     Mode of Treatment  physical therapy  -CT     Row Name 09/14/19 1325          General Information    Patient Profile Reviewed?  yes  -CT     Existing Precautions/Restrictions  hip  -CT     Barriers to Rehab  -- left UE  and ability to WB and mobilize LE  -CT     Row Name 09/14/19 1320          Cognitive Assessment/Intervention- PT/OT    Orientation Status (Cognition)  oriented x 3  -CT     Row Name 09/14/19 1326          Safety Issues, Functional Mobility    Safety Issues Affecting Function (Mobility)  sequencing abilities  -CT     Impairments Affecting Function (Mobility)  grasp;balance;motor planning;strength  -CT       User Key  (r) = Recorded By, (t) = Taken By, (c) = Cosigned By    Initials Name Provider Type    CT Easton Kumar, PT Physical Therapist        Mobility     Row Name 09/14/19 1322          Bed Mobility Assessment/Treatment    Bed Mobility Assessment/Treatment  supine-sit  -CT     Scooting/Bridging [place] (Bed Mobility)  moderate  assist (50% patient effort)  -CT     Supine-Sit [place] (Bed Mobility)  moderate assist (50% patient effort)  -CT     Row Name 09/14/19 1329          Bed-Chair Transfer    Bed-Chair [place] (Transfers)  minimum assist (75% patient effort)  -CT     Assistive Device (Bed-Chair Transfers)  walker, rolling platform  -CT     Row Name 09/14/19 1329          Sit-Stand Transfer    Sit-Stand [place] (Transfers)  minimum assist (75% patient effort)  -CT     Assistive Device (Sit-Stand Transfers)  walker, rolling platform  -CT     Row Name 09/14/19 1329          Gait/Stairs Assessment/Training    Gait/Stairs Assessment/Training  gait/ambulation independence;distance ambulated;gait pattern  -CT     [place] Level (Gait)  supervision  -CT     Distance in Feet (Gait)  100'  -CT     Pattern (Gait)  step-to;3-point  -CT       User Key  (r) = Recorded By, (t) = Taken By, (c) = Cosigned By    Initials Name Provider Type    CT Easton Kumar, PT Physical Therapist        Obj/Interventions     Row Name 09/14/19 1331          Static Sitting Balance    Level of [place] (Unsupported Sitting, Static Balance)  minimal assist, 75% patient effort  -CT     Sitting Position (Unsupported Sitting, Static Balance)  sitting on edge of bed  -CT     Time Able to Maintain Position (Unsupported Sitting, Static Balance)  2 to 3 minutes  -CT     Row Name 09/14/19 1331          Static Standing Balance    Level of [place] (Supported Standing, Static Balance)  contact guard assist  -CT     Time Able to Maintain Position (Supported Standing, Static Balance)  3 to 4 minutes  -CT     Assistive Device Utilized (Supported Standing, Static Balance)  walker, rolling platform  -CT     Row Name 09/14/19 1331          Dynamic Standing Balance    Level of [place], Reaches Outside Midline (Standing, Dynamic Balance)  contact guard assist;minimal assist, 75% patient effort  -CT     Time Able to Maintain Position, Reaches  Outside Midline (Standing, Dynamic Balance)  more than 5 minutes  -CT     Assistive Device Utilized (Supported Standing, Dynamic Balance)  walker, rolling platform  -CT       User Key  (r) = Recorded By, (t) = Taken By, (c) = Cosigned By    Initials Name Provider Type    CT Easton Kumar, PT Physical Therapist        Goals/Plan    No documentation.       Clinical Impression     Row Name 09/14/19 1332          Pain Scale: Numbers Pre/Post-Treatment    Pain Scale: Numbers, Pretreatment  7/10  -CT     Pain Scale: Numbers, Post-Treatment  4/10  -CT     Pain Location - Side  Right  -CT     Pain Location  hip  -CT     Pain Intervention(s)  Repositioned;Ambulation/increased activity  -CT     Row Name 09/14/19 1332          Plan of Care Review    Plan of Care Reviewed With  patient  -CT     Row Name 09/14/19 1332          Physical Therapy Clinical Impression    Criteria for Skilled Interventions Met (PT Clinical Impression)  yes  -CT     Rehab Potential (PT Clinical Summary)  good, to achieve stated therapy goals  -CT       User Key  (r) = Recorded By, (t) = Taken By, (c) = Cosigned By    Initials Name Provider Type    CT Easton Kumar, PT Physical Therapist        Outcome Measures     Row Name 09/14/19 1333          How much help from another person do you currently need...    Turning from your back to your side while in flat bed without using bedrails?  3  -CT     Moving from lying on back to sitting on the side of a flat bed without bedrails?  3  -CT     Moving to and from a bed to a chair (including a wheelchair)?  3  -CT     Standing up from a chair using your arms (e.g., wheelchair, bedside chair)?  3  -CT     Climbing 3-5 steps with a railing?  2  -CT     To walk in hospital room?  3  -CT     AM-PAC 6 Clicks Score (PT)  17  -CT     Row Name 09/14/19 4249          Functional Assessment    Outcome Measure Options  AM-PAC 6 Clicks Basic Mobility (PT)  -CT       User Key  (r) = Recorded By, (t) =  Taken By, (c) = Cosigned By    Initials Name Provider Type    Easton Mckeon, SUSAN Physical Therapist        Physical Therapy Education     Title: PT OT SLP Therapies (In Progress)     Topic: Physical Therapy (In Progress)     Point: Mobility training (Done)     Learning Progress Summary           Patient Acceptance, E,D, VU,NR,DU by CT at 9/14/2019  1:33 PM    Eager, E, VU,NR by SC at 9/13/2019  3:41 PM    Comment:  REVIEWED BENEFITS OF ACTIVITY    Acceptance, E, VU by SC at 9/13/2019 10:17 AM    Comment:  REVIEWED BENEFITS OF ACTIVITY    Acceptance, E,D, VU,NR by AA at 9/12/2019  4:08 PM    Acceptance, E,D, NR by AA at 9/12/2019 11:42 AM    Acceptance, E,D, VU,NR by  at 9/11/2019  9:59 AM    Comment:  Reviewed HEP, gait mechanics, benefits of mobility, safety with mobility    Acceptance, E,D, VU,NR by  at 9/10/2019  3:07 PM    Comment:  Reviewed HEP, gait mechanics, benefits of mobility, safety with mobility    Acceptance, E,D, VU,NR by  at 9/10/2019  9:05 AM    Comment:  Reviewed anterior hip precautions, gait mechanics, benefits of mobility    Acceptance, E,D, VU,NR by  at 9/9/2019  5:05 PM    Comment:  Educated on anterior hip precautions, weight bearing status, correct supine to sit t/f technique, correct sit<->stand t/f technique, correct gait mechanics, and progression of POC.   Family Acceptance, E,D, VU,NR by AA at 9/12/2019  4:08 PM    Acceptance, E,D, NR by AA at 9/12/2019 11:42 AM    Acceptance, E,D, VU,NR by REJI at 9/9/2019  5:05 PM    Comment:  Educated on anterior hip precautions, weight bearing status, correct supine to sit t/f technique, correct sit<->stand t/f technique, correct gait mechanics, and progression of POC.   Significant Other Acceptance, E,D, VU,NR by  at 9/11/2019  9:59 AM    Comment:  Reviewed HEP, gait mechanics, benefits of mobility, safety with mobility                   Point: Home exercise program (In Progress)     Learning Progress Summary           Patient  Acceptance, E,D, VU,NR,DU by CT at 9/14/2019  1:33 PM    Eager, E, VU,NR by SC at 9/13/2019  3:41 PM    Comment:  REVIEWED BENEFITS OF ACTIVITY    Acceptance, E, VU by SC at 9/13/2019 10:17 AM    Comment:  REVIEWED BENEFITS OF ACTIVITY    Acceptance, E,D, NR by AA at 9/12/2019 11:42 AM    Acceptance, E,D, VU,NR by CLAY at 9/11/2019  9:59 AM    Comment:  Reviewed HEP, gait mechanics, benefits of mobility, safety with mobility    Acceptance, E,D, VU,NR by MJ at 9/10/2019  3:07 PM    Comment:  Reviewed HEP, gait mechanics, benefits of mobility, safety with mobility    Acceptance, E,D, VU,NR by CLAY at 9/10/2019  9:05 AM    Comment:  Reviewed anterior hip precautions, gait mechanics, benefits of mobility    Acceptance, E,D, VU,NR by REJI at 9/9/2019  5:05 PM    Comment:  Educated on anterior hip precautions, weight bearing status, correct supine to sit t/f technique, correct sit<->stand t/f technique, correct gait mechanics, and progression of POC.   Family Acceptance, E,D, NR by AA at 9/12/2019 11:42 AM    Acceptance, E,D, VU,NR by REJI at 9/9/2019  5:05 PM    Comment:  Educated on anterior hip precautions, weight bearing status, correct supine to sit t/f technique, correct sit<->stand t/f technique, correct gait mechanics, and progression of POC.   Significant Other Acceptance, E,D, VU,NR by  at 9/11/2019  9:59 AM    Comment:  Reviewed HEP, gait mechanics, benefits of mobility, safety with mobility                   Point: Body mechanics (Done)     Learning Progress Summary           Patient Acceptance, E,D, VU,NR,DU by CT at 9/14/2019  1:33 PM    Eager, E, VU,NR by SC at 9/13/2019  3:41 PM    Comment:  REVIEWED BENEFITS OF ACTIVITY    Acceptance, E, VU by SC at 9/13/2019 10:17 AM    Comment:  REVIEWED BENEFITS OF ACTIVITY    Acceptance, E,D, VU,NR by AA at 9/12/2019  4:08 PM    Acceptance, E,D, NR by AA at 9/12/2019 11:42 AM    Acceptance, E,D, VU,NR by CLAY at 9/11/2019  9:59 AM    Comment:  Reviewed HEP, gait mechanics,  benefits of mobility, safety with mobility    Acceptance, E,D, VU,NR by MJ at 9/10/2019  3:07 PM    Comment:  Reviewed HEP, gait mechanics, benefits of mobility, safety with mobility    Acceptance, E,D, VU,NR by MJ at 9/10/2019  9:05 AM    Comment:  Reviewed anterior hip precautions, gait mechanics, benefits of mobility    Acceptance, E,D, VU,NR by LR at 9/9/2019  5:05 PM    Comment:  Educated on anterior hip precautions, weight bearing status, correct supine to sit t/f technique, correct sit<->stand t/f technique, correct gait mechanics, and progression of POC.   Family Acceptance, E,D, VU,NR by AA at 9/12/2019  4:08 PM    Acceptance, E,D, NR by AA at 9/12/2019 11:42 AM    Acceptance, E,D, VU,NR by REJI at 9/9/2019  5:05 PM    Comment:  Educated on anterior hip precautions, weight bearing status, correct supine to sit t/f technique, correct sit<->stand t/f technique, correct gait mechanics, and progression of POC.   Significant Other Acceptance, E,D, VU,NR by CLAY at 9/11/2019  9:59 AM    Comment:  Reviewed HEP, gait mechanics, benefits of mobility, safety with mobility                   Point: Precautions (Done)     Learning Progress Summary           Patient Acceptance, E,D, VU,NR,DU by CT at 9/14/2019  1:33 PM    Eager, E, VU,NR by SC at 9/13/2019  3:41 PM    Comment:  REVIEWED BENEFITS OF ACTIVITY    Acceptance, E, VU by SC at 9/13/2019 10:17 AM    Comment:  REVIEWED BENEFITS OF ACTIVITY    Acceptance, E,D, VU,NR by AA at 9/12/2019  4:08 PM    Acceptance, E,D, NR by AA at 9/12/2019 11:42 AM    Acceptance, E,D, VU,NR by CLAY at 9/11/2019  9:59 AM    Comment:  Reviewed HEP, gait mechanics, benefits of mobility, safety with mobility    Acceptance, E,D, VU,NR by CLAY at 9/10/2019  3:07 PM    Comment:  Reviewed HEP, gait mechanics, benefits of mobility, safety with mobility    Acceptance, E,D, VU,NR by MJ at 9/10/2019  9:05 AM    Comment:  Reviewed anterior hip precautions, gait mechanics, benefits of mobility     Acceptance, E,D, VU,NR by LR at 9/9/2019  5:05 PM    Comment:  Educated on anterior hip precautions, weight bearing status, correct supine to sit t/f technique, correct sit<->stand t/f technique, correct gait mechanics, and progression of POC.   Family Acceptance, E,D, VU,NR by AA at 9/12/2019  4:08 PM    Acceptance, E,D, NR by AA at 9/12/2019 11:42 AM    Acceptance, E,D, VU,NR by LR at 9/9/2019  5:05 PM    Comment:  Educated on anterior hip precautions, weight bearing status, correct supine to sit t/f technique, correct sit<->stand t/f technique, correct gait mechanics, and progression of POC.   Significant Other Acceptance, E,D, VU,NR by  at 9/11/2019  9:59 AM    Comment:  Reviewed HEP, gait mechanics, benefits of mobility, safety with mobility                               User Key     Initials Effective Dates Name Provider Type Discipline    SC 06/19/15 -  Kimi Castillo, PT Physical Therapist PT     06/19/15 -  Teresa Blackwell, PT Physical Therapist PT    CT 06/19/15 -  Easton Kumar, PT Physical Therapist PT     04/03/18 -  Henrique Clements, PT Physical Therapist PT     04/02/18 -  Vanessa Milligan, PT Physical Therapist PT              PT Recommendation and Plan     Plan of Care Reviewed With: patient  Progress: improving  Outcome Summary: 100 ft gait with Platform RW, improved UE use able to grasp walker     Time Calculation:   PT Charges     Row Name 09/14/19 1335             Time Calculation    Start Time  1300  -CT      PT Received On  09/14/19  -CT         Time Calculation- PT    Total Timed Code Minutes- PT  35 minute(s)  -CT        User Key  (r) = Recorded By, (t) = Taken By, (c) = Cosigned By    Initials Name Provider Type    CT Easton Kumar, PT Physical Therapist        Therapy Charges for Today     Code Description Service Date Service Provider Modifiers Qty    [id number] HC GAIT TRAINING EA 15 MIN 9/14/2019 Easton Kumar, PT GP 2          PT  G-Codes  Outcome Measure Options: AM-PAC 6 Clicks Basic Mobility (PT)  AM-PAC 6 Clicks Score (PT): 17  AM-PAC 6 Clicks Score (OT): 14  Modified [place] Scale: 4 - Moderately severe disability.  Unable to walk without assistance, and unable to attend to own bodily needs without assistance.    Easton Kumar, PT  9/14/2019

## 2019-09-15 LAB
ANION GAP SERPL CALCULATED.3IONS-SCNC: 12 MMOL/L (ref 5–15)
BUN BLD-MCNC: 18 MG/DL (ref 8–23)
BUN/CREAT SERPL: 19.6 (ref 7–25)
CALCIUM SPEC-SCNC: 8.5 MG/DL (ref 8.6–10.5)
CHLORIDE SERPL-SCNC: 102 MMOL/L (ref 98–107)
CO2 SERPL-SCNC: 25 MMOL/L (ref 22–29)
CREAT BLD-MCNC: 0.92 MG/DL (ref 0.57–1)
DEPRECATED RDW RBC AUTO: 48.1 FL (ref 37–54)
ERYTHROCYTE [DISTWIDTH] IN BLOOD BY AUTOMATED COUNT: 14.1 % (ref 12.3–15.4)
GFR SERPL CREATININE-BSD FRML MDRD: 60 ML/MIN/1.73
GLUCOSE BLD-MCNC: 121 MG/DL (ref 65–99)
HCT VFR BLD AUTO: 29.9 % (ref 34–46.6)
HGB BLD-MCNC: 9.4 G/DL (ref 12–15.9)
MCH RBC QN AUTO: 29.1 PG (ref 26.6–33)
MCHC RBC AUTO-ENTMCNC: 31.4 G/DL (ref 31.5–35.7)
MCV RBC AUTO: 92.6 FL (ref 79–97)
PLATELET # BLD AUTO: 232 10*3/MM3 (ref 140–450)
PMV BLD AUTO: 10 FL (ref 6–12)
POTASSIUM BLD-SCNC: 4.3 MMOL/L (ref 3.5–5.2)
RBC # BLD AUTO: 3.23 10*6/MM3 (ref 3.77–5.28)
SODIUM BLD-SCNC: 139 MMOL/L (ref 136–145)
UFH PPP CHRO-ACNC: 0.42 IU/ML (ref 0.3–0.7)
WBC NRBC COR # BLD: 9.29 10*3/MM3 (ref 3.4–10.8)

## 2019-09-15 PROCEDURE — 93005 ELECTROCARDIOGRAM TRACING: CPT | Performed by: INTERNAL MEDICINE

## 2019-09-15 PROCEDURE — 97116 GAIT TRAINING THERAPY: CPT

## 2019-09-15 PROCEDURE — 80048 BASIC METABOLIC PNL TOTAL CA: CPT | Performed by: INTERNAL MEDICINE

## 2019-09-15 PROCEDURE — 97110 THERAPEUTIC EXERCISES: CPT

## 2019-09-15 PROCEDURE — 85027 COMPLETE CBC AUTOMATED: CPT | Performed by: INTERNAL MEDICINE

## 2019-09-15 PROCEDURE — 93010 ELECTROCARDIOGRAM REPORT: CPT | Performed by: INTERNAL MEDICINE

## 2019-09-15 PROCEDURE — 25010000002 HEPARIN (PORCINE) IN NACL 25000-0.45 UT/250ML-% SOLUTION: Performed by: INTERNAL MEDICINE

## 2019-09-15 PROCEDURE — 85520 HEPARIN ASSAY: CPT

## 2019-09-15 RX ADMIN — ATENOLOL 25 MG: 25 TABLET ORAL at 08:30

## 2019-09-15 RX ADMIN — ATORVASTATIN CALCIUM 80 MG: 40 TABLET, FILM COATED ORAL at 20:24

## 2019-09-15 RX ADMIN — MIRTAZAPINE 15 MG: 15 TABLET, FILM COATED ORAL at 20:24

## 2019-09-15 RX ADMIN — HYDROCODONE BITARTRATE AND ACETAMINOPHEN 1 TABLET: 5; 325 TABLET ORAL at 20:23

## 2019-09-15 RX ADMIN — ESCITALOPRAM OXALATE 20 MG: 20 TABLET ORAL at 08:28

## 2019-09-15 RX ADMIN — HEPARIN SODIUM 20 UNITS/KG/HR: 10000 INJECTION, SOLUTION INTRAVENOUS at 12:16

## 2019-09-15 RX ADMIN — ASPIRIN 81 MG: 81 TABLET, COATED ORAL at 08:27

## 2019-09-15 RX ADMIN — ATENOLOL 25 MG: 25 TABLET ORAL at 20:24

## 2019-09-15 RX ADMIN — CLOPIDOGREL BISULFATE 75 MG: 75 TABLET ORAL at 08:27

## 2019-09-15 NOTE — PLAN OF CARE
Problem: Patient Care Overview  Goal: Plan of Care Review  Outcome: Ongoing (interventions implemented as appropriate)   09/15/19 0411   Plan of Care Review   Progress improving   OTHER   Outcome Summary NIH 2, which is an improvement from previous night. VSS. Resting well .    Coping/Psychosocial   Plan of Care Reviewed With patient       Problem: Pain, Chronic (Adult)  Goal: Identify Related Risk Factors and Signs and Symptoms  Outcome: Ongoing (interventions implemented as appropriate)      Problem: Hip Arthroplasty (Total, Partial) (Adult)  Goal: Signs and Symptoms of Listed Potential Problems Will be Absent, Minimized or Managed (Hip Arthroplasty)  Outcome: Ongoing (interventions implemented as appropriate)      Problem: Skin Injury Risk (Adult)  Goal: Identify Related Risk Factors and Signs and Symptoms  Outcome: Ongoing (interventions implemented as appropriate)      Problem: Fall Risk (Adult)  Goal: Identify Related Risk Factors and Signs and Symptoms  Outcome: Ongoing (interventions implemented as appropriate)

## 2019-09-15 NOTE — THERAPY TREATMENT NOTE
Patient Name: Akua Torres  : 1945    MRN: [id number]                              Today's Date: 9/15/2019       Admit Date: 2019    Visit Dx:     ICD-10-CM ICD-9-CM   1. Impaired functional mobility, balance, gait, and endurance Z74.09 V49.89   2. Impaired mobility and ADLs Z74.09 799.89   3. 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 (CMS/[facility]) I63.232 433.11     Patient Active Problem List   Diagnosis   • Degenerative disc disease, lumbar   • Lumbar stenosis with neurogenic claudication   • History of lumbar fusion   • Spondylosis of lumbar region without myelopathy or radiculopathy   • Mild obesity   • Physical deconditioning   • Idiopathic gout   • Anxiety and depression   • Greater trochanteric bursitis of both hips   • Status post total bilateral knee replacement   • Back pain   • HTN (hypertension)   • HLD (hyperlipidemia)   • Prediabetes   • S/P lumbar spinal fusion   • Renal insufficiency   • Leukocytosis, likely reactive   • Altered mental status. Felt to be drug related (trazodone, opiates, benzodiazepine)   • Acute renal failure due to hypotension. Now resolved (ARF) (CMS/[facility])   • Depression   • Encounter for Medicare annual wellness exam   • Arthritis of right hip   • HO A-fib (CMS/[facility])   • YUNIOR treated with BiPAP   • Status post total replacement of right hip   • Acute blood loss anemia, mild, asymptomatic   • 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 (CMS/[facility])     Past Medical History:   Diagnosis Date   • A-fib (CMS/[facility])    • Anxiety    • Anxiety and depression    • Arthritis    • Cataract    • Depression    • Elevated serum creatinine     SEES DR SMITH EVERY 6 MONTHS- NEPHROLOGIST    • Extremity pain    • GERD (gastroesophageal reflux disease)    • Gout    • H/O bladder infections     JUST FINISHED MACROBID ON 17 FOR RECENT UTI   • Hypercholesteremia    • Hypertension    • Joint pain    • Kidney disease    • Kidney disease,  chronic, stage III (GFR 30-59 ml/min) (CMS/HCC)    • Low back pain    • Neck pain    • Rheumatic fever    • Sleep apnea    • Urinary tract infection    • Wears glasses      Past Surgical History:   Procedure Laterality Date   • BACK SURGERY  2004    LUMBAR PER DR MAN    • CARPAL TUNNEL RELEASE Left    • COLONOSCOPY     • EYE SURGERY     • FINGER SURGERY Right     3RD FINGER   • HEEL SPUR EXCISION Bilateral    • KNEE ARTHROSCOPY Bilateral    • LUMBAR DISCECTOMY FUSION INSTRUMENTATION N/A 11/10/2017    Procedure: LUMBAR SPINAL FUSION ;  Surgeon: Gopi Man MD;  Location:  [facility];  Service:    • REPLACEMENT TOTAL KNEE BILATERAL Bilateral    • TOTAL HIP ARTHROPLASTY Right 9/9/2019    Procedure: TOTAL ANTERIOR RIGHT HIP ARTHROPLASTY;  Surgeon: Darrin New MD;  Location:  [facility];  Service: Orthopedics     General Information     Row Name 09/15/19 0936          PT Evaluation Time/Intention    Document Type  therapy note (daily note)  -CT     Mode of Treatment  physical therapy  -CT     Row Name 09/15/19 0936          General Information    Patient Profile Reviewed?  yes  -CT     Existing Precautions/Restrictions  hip  -CT     Barriers to Rehab  impaired sensation;physical barrier R wrist drop and finger extension impaired digits 3-5  -CT       User Key  (r) = Recorded By, (t) = Taken By, (c) = Cosigned By    Initials Name Provider Type    CT Easton Kumar, PT Physical Therapist        Mobility     Row Name 09/15/19 0937          Bed Mobility Assessment/Treatment    Bed Mobility Assessment/Treatment  bed mobility (all) activities  -CT     [place] Level (Bed Mobility)  minimum assist (75% patient effort)  -CT     Scooting/Bridging [place] (Bed Mobility)  independent  -CT     Sit-Supine [place] (Bed Mobility)  supervision  -CT     Row Name 09/15/19 0937          Bed-Chair Transfer    Bed-Chair [place] (Transfers)  supervision  -CT     Assistive Device (Bed-Chair Transfers)   walker, front-wheeled  -CT     Row Name 09/15/19 0937          Sit-Stand Transfer    Sit-Stand [place] (Transfers)  independent;verbal cues  -CT     Assistive Device (Sit-Stand Transfers)  walker, front-wheeled  -CT     Row Name 09/15/19 0937          Gait/Stairs Assessment/Training    Gait/Stairs Assessment/Training  gait/ambulation independence;distance ambulated  -CT     [place] Level (Gait)  supervision;verbal cues  -CT     Assistive Device (Gait)  walker, front-wheeled  -CT     Distance in Feet (Gait)  150  -CT     Pattern (Gait)  step-through  -CT     Deviations/Abnormal Patterns (Gait)  stride length decreased  -CT     Row Name 09/15/19 0937          Mobility Assessment/Intervention    Extremity Weight-bearing Status  right lower extremity  -CT     Right Lower Extremity (Weight-bearing Status)  weight-bearing as tolerated (WBAT)  -CT       User Key  (r) = Recorded By, (t) = Taken By, (c) = Cosigned By    Initials Name Provider Type    CT Easton Kumar, PT Physical Therapist        Obj/Interventions     Row Name 09/15/19 0939          General ROM    GENERAL ROM COMMENTS  R hip flex to 90, knee flexion 100 full ext  -CT     Row Name 09/15/19 0939          Therapeutic Exercise    Upper Extremity Range of Motion (Therapeutic Exercise)  wrist flexion/extension, right  -CT     Lower Extremity Range of Motion (Therapeutic Exercise)  hip flexion/extension, left  -CT     Row Name 09/15/19 0939          Static Sitting Balance    Level of [place] (Unsupported Sitting, Static Balance)  independent  -CT     Row Name 09/15/19 0939          Dynamic Sitting Balance    Level of [place], Reaches Outside Midline (Sitting, Dynamic Balance)  independent  -CT     Comment, Reaches Outside Midline (Sitting, Dynamic Balance)  decreased left wrist ext and finger ext and flex  -CT     Row Name 09/15/19 0939          Static Standing Balance    Level of [place] (Supported Standing, Static Balance)   independent  -CT     Time Able to Maintain Position (Supported Standing, Static Balance)  3 to 4 minutes  -CT     Assistive Device Utilized (Supported Standing, Static Balance)  walker, rolling platform  -CT     [facility] Name 09/15/19 0939          Dynamic Standing Balance    Time Able to Maintain Position, Reaches Outside Midline (Standing, Dynamic Balance)  more than 5 minutes  -CT     Assistive Device Utilized (Supported Standing, Dynamic Balance)  walker, rolling platform  -CT       User Key  (r) = Recorded By, (t) = Taken By, (c) = Cosigned By    Initials Name Provider Type    CT Easton Kumar, PT Physical Therapist        Goals/Plan    No documentation.       Clinical Impression     Row Name 09/15/19 0942          Pain Assessment    Additional Documentation  Pain Scale: Numbers Pre/Post-Treatment (Group)  -CT     Row Name 09/15/19 0942          Pain Scale: Numbers Pre/Post-Treatment    Pain Scale: Numbers, Pretreatment  2/10  -CT     Pain Scale: Numbers, Post-Treatment  2/10  -CT     Pain Location - Side  Right  -CT     Pain Location  hip  -CT     Pain Intervention(s)  Ambulation/increased activity;Emotional support;MD notified (Comment)  -CT     Row Name 09/15/19 0942          Plan of Care Review    Plan of Care Reviewed With  patient  -CT     Row Name 09/15/19 0942          Physical Therapy Clinical Impression    Criteria for Skilled Interventions Met (PT Clinical Impression)  yes  -CT     Rehab Potential (PT Clinical Summary)  good, to achieve stated therapy goals  -CT     Row Name 09/15/19 0942          Positioning and Restraints    Pre-Treatment Position  in bed  -CT     Post Treatment Position  chair  -CT     In Chair  sitting;reclined;notified nsg;exit alarm on;with nsg;legs elevated  -CT       User Key  (r) = Recorded By, (t) = Taken By, (c) = Cosigned By    Initials Name Provider Type    CT Easton Kumar, PT Physical Therapist        Outcome Measures     Row Name 09/15/19 0951           How much help from another person do you currently need...    Turning from your back to your side while in flat bed without using bedrails?  4  -CT     Moving from lying on back to sitting on the side of a flat bed without bedrails?  4  -CT     Moving to and from a bed to a chair (including a wheelchair)?  4  -CT     Standing up from a chair using your arms (e.g., wheelchair, bedside chair)?  4  -CT     Climbing 3-5 steps with a railing?  2  -CT     To walk in hospital room?  4  -CT     AM-PAC 6 Clicks Score (PT)  22  -CT     Row Name 09/15/19 0946          Functional Assessment    Outcome Measure Options  AM-PAC 6 Clicks Basic Mobility (PT)  -CT       User Key  (r) = Recorded By, (t) = Taken By, (c) = Cosigned By    Initials Name Provider Type    CT Easton Kumar, SUSAN Physical Therapist        Physical Therapy Education     Title: PT OT SLP Therapies (In Progress)     Topic: Physical Therapy (In Progress)     Point: Mobility training (In Progress)     Learning Progress Summary           Patient Acceptance, E,D, VU,DU,NR by CT at 9/15/2019  9:44 AM    Eager, E,D, NR by CT at 9/14/2019  4:17 PM    Acceptance, E,D, VU,NR,DU by CT at 9/14/2019  1:33 PM    Eager, E, VU,NR by SC at 9/13/2019  3:41 PM    Comment:  REVIEWED BENEFITS OF ACTIVITY    Acceptance, E, VU by SC at 9/13/2019 10:17 AM    Comment:  REVIEWED BENEFITS OF ACTIVITY    Acceptance, E,D, VU,NR by AA at 9/12/2019  4:08 PM    Acceptance, E,D, NR by AA at 9/12/2019 11:42 AM    Acceptance, E,D, VU,NR by  at 9/11/2019  9:59 AM    Comment:  Reviewed HEP, gait mechanics, benefits of mobility, safety with mobility    Acceptance, E,D, VU,NR by CLAY at 9/10/2019  3:07 PM    Comment:  Reviewed HEP, gait mechanics, benefits of mobility, safety with mobility    Acceptance, E,D, VU,NR by MJ at 9/10/2019  9:05 AM    Comment:  Reviewed anterior hip precautions, gait mechanics, benefits of mobility    Acceptance, E,D, VU,NR by REJI at 9/9/2019  5:05 PM    Comment:   Educated on anterior hip precautions, weight bearing status, correct supine to sit t/f technique, correct sit<->stand t/f technique, correct gait mechanics, and progression of POC.   Family Acceptance, E,D, VU,NR by AA at 9/12/2019  4:08 PM    Acceptance, E,D, NR by AA at 9/12/2019 11:42 AM    Acceptance, E,D, VU,NR by LR at 9/9/2019  5:05 PM    Comment:  Educated on anterior hip precautions, weight bearing status, correct supine to sit t/f technique, correct sit<->stand t/f technique, correct gait mechanics, and progression of POC.   Significant Other Acceptance, E,D, VU,NR by  at 9/11/2019  9:59 AM    Comment:  Reviewed HEP, gait mechanics, benefits of mobility, safety with mobility   Other Eager, E,D, NR by CT at 9/14/2019  4:17 PM                   Point: Home exercise program (In Progress)     Learning Progress Summary           Patient Acceptance, E,D, VU,DU,NR by CT at 9/15/2019  9:44 AM    Eager, E,D, NR by CT at 9/14/2019  4:17 PM    Acceptance, E,D, VU,NR,DU by CT at 9/14/2019  1:33 PM    Eager, E, VU,NR by SC at 9/13/2019  3:41 PM    Comment:  REVIEWED BENEFITS OF ACTIVITY    Acceptance, E, VU by SC at 9/13/2019 10:17 AM    Comment:  REVIEWED BENEFITS OF ACTIVITY    Acceptance, E,D, NR by AA at 9/12/2019 11:42 AM    Acceptance, E,D, VU,NR by  at 9/11/2019  9:59 AM    Comment:  Reviewed HEP, gait mechanics, benefits of mobility, safety with mobility    Acceptance, E,D, VU,NR by MJ at 9/10/2019  3:07 PM    Comment:  Reviewed HEP, gait mechanics, benefits of mobility, safety with mobility    Acceptance, E,D, VU,NR by MJ at 9/10/2019  9:05 AM    Comment:  Reviewed anterior hip precautions, gait mechanics, benefits of mobility    Acceptance, E,D, VU,NR by LR at 9/9/2019  5:05 PM    Comment:  Educated on anterior hip precautions, weight bearing status, correct supine to sit t/f technique, correct sit<->stand t/f technique, correct gait mechanics, and progression of POC.   Family Acceptance, E,D, NR by AA at  9/12/2019 11:42 AM    Acceptance, E,D, VU,NR by LR at 9/9/2019  5:05 PM    Comment:  Educated on anterior hip precautions, weight bearing status, correct supine to sit t/f technique, correct sit<->stand t/f technique, correct gait mechanics, and progression of POC.   Significant Other Acceptance, E,D, VU,NR by MJ at 9/11/2019  9:59 AM    Comment:  Reviewed HEP, gait mechanics, benefits of mobility, safety with mobility   Other Eager, E,D, NR by CT at 9/14/2019  4:17 PM                   Point: Body mechanics (In Progress)     Learning Progress Summary           Patient Acceptance, E,D, VU,DU,NR by CT at 9/15/2019  9:44 AM    Eager, E,D, NR by CT at 9/14/2019  4:17 PM    Acceptance, E,D, VU,NR,DU by CT at 9/14/2019  1:33 PM    Eager, E, VU,NR by SC at 9/13/2019  3:41 PM    Comment:  REVIEWED BENEFITS OF ACTIVITY    Acceptance, E, VU by SC at 9/13/2019 10:17 AM    Comment:  REVIEWED BENEFITS OF ACTIVITY    Acceptance, E,D, VU,NR by AA at 9/12/2019  4:08 PM    Acceptance, E,D, NR by AA at 9/12/2019 11:42 AM    Acceptance, E,D, VU,NR by MJ at 9/11/2019  9:59 AM    Comment:  Reviewed HEP, gait mechanics, benefits of mobility, safety with mobility    Acceptance, E,D, VU,NR by CLAY at 9/10/2019  3:07 PM    Comment:  Reviewed HEP, gait mechanics, benefits of mobility, safety with mobility    Acceptance, E,D, VU,NR by MJ at 9/10/2019  9:05 AM    Comment:  Reviewed anterior hip precautions, gait mechanics, benefits of mobility    Acceptance, E,D, VU,NR by LR at 9/9/2019  5:05 PM    Comment:  Educated on anterior hip precautions, weight bearing status, correct supine to sit t/f technique, correct sit<->stand t/f technique, correct gait mechanics, and progression of POC.   Family Acceptance, E,D, VU,NR by AA at 9/12/2019  4:08 PM    Acceptance, E,D, NR by AA at 9/12/2019 11:42 AM    Acceptance, E,D, SAVANNAH,NR by LR at 9/9/2019  5:05 PM    Comment:  Educated on anterior hip precautions, weight bearing status, correct supine to sit t/f  technique, correct sit<->stand t/f technique, correct gait mechanics, and progression of POC.   Significant Other Acceptance, E,D, VU,NR by MJ at 9/11/2019  9:59 AM    Comment:  Reviewed HEP, gait mechanics, benefits of mobility, safety with mobility   Other Eager, E,D, NR by CT at 9/14/2019  4:17 PM                   Point: Precautions (In Progress)     Learning Progress Summary           Patient Acceptance, E,D, VU,DU,NR by CT at 9/15/2019  9:44 AM    Eager, E,D, NR by CT at 9/14/2019  4:17 PM    Acceptance, E,D, VU,NR,DU by CT at 9/14/2019  1:33 PM    Eager, E, VU,NR by SC at 9/13/2019  3:41 PM    Comment:  REVIEWED BENEFITS OF ACTIVITY    Acceptance, E, VU by SC at 9/13/2019 10:17 AM    Comment:  REVIEWED BENEFITS OF ACTIVITY    Acceptance, E,D, VU,NR by AA at 9/12/2019  4:08 PM    Acceptance, E,D, NR by AA at 9/12/2019 11:42 AM    Acceptance, E,D, VU,NR by MJ at 9/11/2019  9:59 AM    Comment:  Reviewed HEP, gait mechanics, benefits of mobility, safety with mobility    Acceptance, E,D, VU,NR by CLAY at 9/10/2019  3:07 PM    Comment:  Reviewed HEP, gait mechanics, benefits of mobility, safety with mobility    Acceptance, E,D, VU,NR by CLAY at 9/10/2019  9:05 AM    Comment:  Reviewed anterior hip precautions, gait mechanics, benefits of mobility    Acceptance, E,D, VU,NR by REJI at 9/9/2019  5:05 PM    Comment:  Educated on anterior hip precautions, weight bearing status, correct supine to sit t/f technique, correct sit<->stand t/f technique, correct gait mechanics, and progression of POC.   Family Acceptance, E,D, VU,NR by AA at 9/12/2019  4:08 PM    Acceptance, E,D, NR by AA at 9/12/2019 11:42 AM    Acceptance, E,D, VU,NR by REJI at 9/9/2019  5:05 PM    Comment:  Educated on anterior hip precautions, weight bearing status, correct supine to sit t/f technique, correct sit<->stand t/f technique, correct gait mechanics, and progression of POC.   Significant Other Acceptance, E,D, VU,NR by MJ at 9/11/2019  9:59 AM     Comment:  Reviewed HEP, gait mechanics, benefits of mobility, safety with mobility   Other ESVIN Patel D, NR by CT at 9/14/2019  4:17 PM                               User Key     Initials Effective Dates Name Provider Type Discipline    SC 06/19/15 -  Kimi Castillo, PT Physical Therapist PT    LR 06/19/15 -  Teresa Blackwell, PT Physical Therapist PT    CT 06/19/15 -  Easton Kumar, PT Physical Therapist PT    MJ 04/03/18 -  Henrique Clements, PT Physical Therapist PT    AA 04/02/18 -  Vanessa Milligan, PT Physical Therapist PT              PT Recommendation and Plan     Plan of Care Reviewed With: patient  Progress: improving  Outcome Summary: 150 ft gait supervision transfers increased ind w acitvity     Time Calculation:   PT Charges     Row Name 09/15/19 0947             Time Calculation    Start Time  0910  -CT      PT Received On  09/15/19  -CT         Time Calculation- PT    Total Timed Code Minutes- PT  45 minute(s)  -CT         Timed Charges    34226 - PT Therapeutic Exercise Minutes  25  -CT      64537 - Gait Training Minutes   15  -CT        User Key  (r) = Recorded By, (t) = Taken By, (c) = Cosigned By    Initials Name Provider Type    CT Easton Kumar, PT Physical Therapist        Therapy Charges for Today     Code Description Service Date Service Provider Modifiers Qty    96876493473 HC GAIT TRAINING EA 15 MIN 9/14/2019 Easton Kumar, PT GP 2    21748118330 HC GAIT TRAINING EA 15 MIN 9/14/2019 Easton Kumar, PT GP 2    06703409538 HC PT THER PROC EA 15 MIN 9/15/2019 Easton Kumar, PT GP 1    71349629537 HC GAIT TRAINING EA 15 MIN 9/15/2019 Easton Kumar, PT GP 2          PT G-Codes  Outcome Measure Options: AM-PAC 6 Clicks Basic Mobility (PT)  AM-PAC 6 Clicks Score (PT): 22  AM-PAC 6 Clicks Score (OT): 14  Modified [place] Scale: 4 - Moderately severe disability.  Unable to walk without assistance, and unable to attend to own bodily needs  without assistance.    Easton Kumar, PT  9/15/2019

## 2019-09-15 NOTE — PLAN OF CARE
Problem: Patient Care Overview  Goal: Plan of Care Review   09/15/19 4234   OTHER   Outcome Summary Pt has been calm and pleasant. Rt hip drsg CDI, RUE and RLE remain weak, NIH 2, NSR on monitor, pt has been up to chair, plans for angiogram on Tuesday, Heparin gtt remains running, VSS

## 2019-09-15 NOTE — PROGRESS NOTES
"IM progress note      Eryn Steve  [id number]  1945     LOS: 6 days     Attending: Darrin New MD    Primary Care Provider: Salty Hernandez MD      Chief Complaint/Reason for visit:  Right hip pain    Subjective   Continues to do well.  No nausea vomiting or shortness of breath.  Good pain control.  Had multiple bowel movements since yesterday.  Per dissipating with PT.    Objective     Visit Vitals  /94   Pulse 69   Temp 98.2 °F (36.8 °C) (Oral)   Resp 18   Ht 162.6 cm (64\")   Wt 86.2 kg (190 lb)   SpO2 97%   BMI 32.61 kg/m²     Temp (24hrs), Av.5 °F (36.9 °C), Min:98 °F (36.7 °C), Max:99.2 °F (37.3 °C)      Nutrition: PO    Respiratory: RA    Physical Therapy:   150 ft gait supervision transfers increased ind w acitvity    Physical Exam:     General Appearance:    Alert, cooperative, in no acute distress   Head:    Normocephalic, without obvious abnormality, atraumatic    Lungs:     Normal effort, symmetric chest rise, no crepitus, clear to      auscultation bilaterally             Heart:    Regular rhythm and normal rate, normal S1 and S2   Abdomen:     Normal bowel sounds, no masses, no organomegaly, soft        non-tender, non-distended, no guarding, no rebound                tenderness   Extremities:   Right hip Prineo CDI.     Pulses:   Pulses palpable and equal bilaterally   Skin:   No bleeding, bruising or rash   Neurologic:   Cranial nerves 2 - 12 grossly intact.  Improvement in right upper extremity strength including  strength.     Results Review:     I reviewed the patient's new clinical results.   Results from last 7 days   Lab Units 09/15/19  0736 19  1830 19  1839   WBC 10*3/mm3 9.29 10.39 13.95*   HEMOGLOBIN g/dL 9.4* 9.3* 9.4*   HEMATOCRIT % 29.9* 30.1* 30.2*   PLATELETS 10*3/mm3 232 233 159     Results for ERYN STEVE (MRN [id number]) as of 9/10/2019 10:22   Ref. Range 9/15/2018 01:26   Hemoglobin Latest Ref Range: 11.5 - 15.5 g/dL " 12.1   Hematocrit Latest Ref Range: 34.5 - 44.0 % 38.1     Results from last 7 days   Lab Units 09/15/19  0736 09/12/19  0158 09/11/19  0952 09/10/19  0337   SODIUM mmol/L 139  --  136 141   POTASSIUM mmol/L 4.3  --  4.4 5.0   CHLORIDE mmol/L 102  --  103 103   CO2 mmol/L 25.0  --  27.0 27.0   BUN mg/dL 18  --  24* 29*   CREATININE mg/dL 0.92 1.02* 1.09* 1.47*   CALCIUM mg/dL 8.5*  --  7.9* 7.6*   GLUCOSE mg/dL 121*  --  144* 158*     Results for ERYN STEVE (MRN [id number]) as of 9/12/2019 09:18   Ref. Range 9/12/2019 01:58   Hemoglobin A1C Latest Ref Range: 4.80 - 5.60 % 5.20   Total Cholesterol Latest Ref Range: 0 - 200 mg/dL 132   HDL Cholesterol Latest Ref Range: 40 - 60 mg/dL 34 (L)   LDL Cholesterol  Latest Ref Range: 0 - 100 mg/dL 51   VLDL Cholesterol Latest Units: mg/dL 47   Triglycerides Latest Ref Range: 0 - 150 mg/dL 235 (H)   LDL/HDL Ratio Unknown 1.50   Heparin Anti-Xa Latest Ref Range: 0.30 - 0.70 IU/ml 0.10 (L)       Study Result     EXAMINATION: CT HEAD WO CONTRAST- 09/10/2019     INDICATION: Focal neuro deficit, new, fixed or worsening, >6 hours;  Z74.09-Other reduced mobility      TECHNIQUE: CT head without intravenous contrast     The radiation dose reduction device was turned on for each scan per the  ALARA (As Low as Reasonably Achievable) protocol.     COMPARISON: NONE     FINDINGS: Midline structures are symmetric without evidence of mass,  mass effect or midline shift. Ventricles and sulci within normal limits.  No intra-axial hemorrhage or extra-axial fluid collection. Globes and  orbits unremarkable. Visualized paranasal sinuses and mastoid air cells  are grossly clear and well-pneumatized. Calvarium intact.     IMPRESSION:  No acute intracranial abnormality specifically no midline  shift or hydrocephalus. No intra-axial hemorrhage or extra-axial fluid  collection. MRI may be considered for further evaluation of  acute/subacute infarction if clinically warranted.     D:   09/10/2019  E:  09/10/2019     Study Result     EXAMINATION: MRI BRAIN WO CONTRAST- 09/10/2019     INDICATION: Focal neuro deficit, new, fixed or worsening, >6 hours;  Z74.09-Other reduced mobility     TECHNIQUE: Sagittal and axial T1 axial T2 FLAIR diffusion weighted  images of the brain     COMPARISON: Head CT scan 09/10/2019     FINDINGS: History indicates numbness, loss of right hand  strength.  Improving symptoms.     There is a well-defined area of restricted diffusion approximately 11 mm  in maximal diameter in the posterior left frontal lobe in what appears  to be the prefrontal gyrus, consistent with acute infarct. No definite  acute infarct is seen elsewhere. A couple of punctate areas of increased  T2 signal in the central right frontal white matter at approximately the  same level or at best equivocal for minute infarcts.     There is moderate, mostly nonconfluent central white matter change and  age-appropriate generalized cerebral atrophy. There is no evidence of  mass, mass effect, hemorrhage, hydrocephalus or abnormal extra-axial  collection.     IMPRESSION:  1. 11 mm acute infarct in approximately the area of the left prefrontal  gyrus.  2. Few punctate areas of increased signal in the right frontal white  matter, equivocal for minute acute or recent infarct, possibly just  artifactual.  3. No acute intracranial disease is identified elsewhere.     D:  09/11/2019  E:  09/11/2019     Carotid duplex:  Interpretation Summary     · High-grade left internal carotid artery stenosis. Greather than 70% stenosis. Dense calcified plaque within proximal left internal carotid artery  · Right internal carotid artery stenosis of 50-69%.  · Vertebral artery flow antegrade bilaterally with increase in left sided peak systolic velocities suugesting possible proximal stenosis     2D echo:    Interpretation Summary     · Left ventricular systolic function is normal. Estimated EF = 60%.  · There were no significant  valvular abnormalities.            I reviewed the patient's new imaging including images and reports.    All medications reviewed.     aspirin 81 mg Oral Daily   atenolol 25 mg Oral BID   atorvastatin 80 mg Oral Nightly   clopidogrel 75 mg Oral Daily   docusate sodium 100 mg Oral BID   escitalopram 20 mg Oral Daily   mirtazapine 15 mg Oral Nightly   sodium chloride 10 mL Intravenous Q12H       Assessment/Plan        Left prefrontal gyrus stroke  Severe left internal carotid artery stenosis.    Status post total replacement of right hip    Anxiety and depression    HTN (hypertension)    Renal insufficiency    Leukocytosis, likely reactive    Arthritis of right hip    HO A-fib     YUNIOR treated with BiPAP    Acute blood loss anemia, mild, asymptomatic    RUE weakness/numbness        Plan  1. PT/OT- WBAT RLE  2. Pain control-prns   3. IS-encouraged  4. DVT proph- mechs/ASA  5. Bowel regimen  6. Monitor post-op labs  7. DC planning.  For rehab this week when ready    Stroke, RUE weakness ( stroke protocol per )wy  - neuro following  - stat CT head negative, followed by MRI showing left prefrontal gyrus stroke  - continue tele monitor  - cardiology consult  - Further eval of possible high grade LICA stenosis.  ( 9/13) Upon further discussion with neurosurgery and cardiology, continue dual antiplatelet therapy and heparin GTT until intervention on left carotid artery with stenting this coming Tuesday     Cardiology  - Anticoagulation recommended.   - ECHO, noted.       RI,  -Resolved       HTN, aifb  - Continue home atenolol   - Monitor BP   - Allow some degree of BP elevation, post stroke.     YUNIOR  - bipap at night    Discussed with patient.       Brit Galo MD  09/15/19  10:13 AM

## 2019-09-15 NOTE — PROGRESS NOTES
HEPARIN INFUSION  Akua Torres is a  74 y.o. female receiving heparin infusion.     Therapy for (VTE/Cardiac):   cardiac  Patient Weight: 86.2 kg  Initial Bolus (Y/N):   no  Any Bolus (Y/N):   yes        Signs or Symptoms of Bleeding: no      Cardiac or Other (Not VTE)   Initial Bolus: 60 units/kg (Max 4,000 units)  Initial rate: 12 units/kg/hr (Max 1,000 units/hr)   Anti-Xa (IU/mL) Bolus Dose Stop Infusion Rate Change Repeat Anti-Xa      ?0.19 60 units/kg 0 hrs Increase rate by   4 units/kg/hr 6 hrs       0.2 - 0.29  30 units/kg 0 hrs Increase rate by 2 units/kg/hr 6 hrs    0.3 - 0.7 0 0 hrs No change 6 hrs      0.71 - 0.99 0  0 hrs Decrease rate by 2 units/kg/hr 6 hrs            ?1 0 Hold 1 hr Decrease rate by 3 units/kg/hr 6 hrs            Results from last 7 days   Lab Units 09/13/19  1047 09/11/19  1839 09/10/19  0336   INR  1.15 1.25*  --    HEMOGLOBIN g/dL  --  9.4* 10.2*   HEMATOCRIT %  --  30.2* 33.9*   PLATELETS 10*3/mm3  --  159 172          Date   Time   Anti-Xa Current Rate (Unit/kg/hr) Bolus   (Units) Rate Change   (Unit/kg/hr) New Rate (Unit/kg/hr) Next   Anti-Xa Comments  Pump Check Daily   09/11 18:45 0.1 new No initial -- 11.6 12 MN D/W Berta    0912 0251 0.1 11.6 5000 +3.4 15 0900  NICHOLE   9/12 1038 0.2 15 -- +2 17 1800 DW Maribel. Pump verified   9/13 2008 0.14 17 -- +3 20 0400 Restarted at 1400 AUSTIN VARELA   9/14/19 03:50 0.33 20 -- -- 20 10:00 Discussed w/ nurse   9/14 1000 0.36 20 -- -- 20 1500 AUSTIN Avila    9/14 1434 0.49 20 -- -- 20 AM labs  AUSTIN Avila    9/15 0736 0.42 20 -- -- 20 AM   labs  Nichole Taylor; pump weight and rate verified       --           --           --           --           --           --           --           --           --           --           --                             Thank you,    Kwasi KimD, [facility]  Pharmacy Resident  9/15/2019  8:52 AM

## 2019-09-15 NOTE — PROGRESS NOTES
"          Orthopaedic Surgery Progress Note      LOS: 6 days   Patient Care Team:  Salty Hernandez MD as PCP - General (Family Medicine)  Leon Hein MD as Consulting Physician (Neurosurgery)  Perkins, Corinne E, PT as Physical Therapist (Physical Therapy)  Cricket Nettles MD as Consulting Physician (Pain Medicine)    POD 6    Subjective     Interval History:   Patient's pain is better controlled this afternoon.  Patient not complaining of any headaches this afternoon.    Objective     Vital Signs:  Temp (24hrs), Av.4 °F (36.9 °C), Min:98 °F (36.7 °C), Max:99.2 °F (37.3 °C)    /94   Pulse 69   Temp 98.2 °F (36.8 °C) (Oral)   Resp 18   Ht 162.6 cm (64\")   Wt 86.2 kg (190 lb)   SpO2 97%   BMI 32.61 kg/m²     Labs:  Lab Results (last 24 hours)     Procedure Component Value Units Date/Time    Basic Metabolic Panel [[id number]]  (Abnormal) Collected:  09/15/19 0736    Specimen:  Blood Updated:  09/15/19 0850     Glucose 121 mg/dL      BUN 18 mg/dL      Creatinine 0.92 mg/dL      Sodium 139 mmol/L      Potassium 4.3 mmol/L      Chloride 102 mmol/L      CO2 25.0 mmol/L      Calcium 8.5 mg/dL      eGFR Non African Amer 60 mL/min/1.73      BUN/Creatinine Ratio 19.6     Anion Gap 12.0 mmol/L     Narrative:       GFR Normal >60  Chronic Kidney Disease <60  Kidney Failure <15    Heparin Anti-Xa [[id number]]  (Normal) Collected:  09/15/19 0736    Specimen:  Blood Updated:  09/15/19 0836     Heparin Anti-Xa (UFH) 0.42 IU/ml     CBC (No Diff) [[id number]]  (Abnormal) Collected:  09/15/19 0736    Specimen:  Blood Updated:  09/15/19 0820     WBC 9.29 10*3/mm3      RBC 3.23 10*6/mm3      Hemoglobin 9.4 g/dL      Hematocrit 29.9 %      MCV 92.6 fL      MCH 29.1 pg      MCHC 31.4 g/dL      RDW 14.1 %      RDW-SD 48.1 fl      MPV 10.0 fL      Platelets 232 10*3/mm3     CBC & Differential [[id number]] Collected:  19    Specimen:  Blood Updated:  19    Narrative:       The following " orders were created for panel order CBC & Differential.  Procedure                               Abnormality         Status                     ---------                               -----------         ------                     CBC Auto Differential[[id number]]        Abnormal            Final result                 Please view results for these tests on the individual orders.    CBC Auto Differential [[id number]]  (Abnormal) Collected:  09/14/19 1830    Specimen:  Blood Updated:  09/14/19 1851     WBC 10.39 10*3/mm3      RBC 3.23 10*6/mm3      Hemoglobin 9.3 g/dL      Hematocrit 30.1 %      MCV 93.2 fL      MCH 28.8 pg      MCHC 30.9 g/dL      RDW 14.2 %      RDW-SD 48.5 fl      MPV 9.9 fL      Platelets 233 10*3/mm3      Neutrophil % 71.9 %      Lymphocyte % 16.9 %      Monocyte % 7.3 %      Eosinophil % 2.6 %      Basophil % 0.3 %      Immature Grans % 1.0 %      Neutrophils, Absolute 7.47 10*3/mm3      Lymphocytes, Absolute 1.76 10*3/mm3      Monocytes, Absolute 0.76 10*3/mm3      Eosinophils, Absolute 0.27 10*3/mm3      Basophils, Absolute 0.03 10*3/mm3      Immature Grans, Absolute 0.10 10*3/mm3      nRBC 0.0 /100 WBC     Heparin Anti-Xa [[id number]]  (Normal) Collected:  09/14/19 1434    Specimen:  Blood Updated:  09/14/19 1508     Heparin Anti-Xa (UFH) 0.49 IU/ml           Physical Exam:  Stable and unchanged from yesterday    Assessment/Plan   Postoperative day #6 status post right total hip arthroplasty with Dr. New  Patient awaiting cerebral angiogram and possible carotid stent  Patient has bed available at [facility] when medically ready    Abelardo Lazcano MD  09/15/19  2:18 PM

## 2019-09-15 NOTE — PLAN OF CARE
Problem: Patient Care Overview  Goal: Plan of Care Review  Outcome: Ongoing (interventions implemented as appropriate)   09/15/19 0944   Plan of Care Review   Progress improving   OTHER   Outcome Summary 150 ft gait supervision transfers increased ind w acitvity   Coping/Psychosocial   Plan of Care Reviewed With patient

## 2019-09-16 ENCOUNTER — APPOINTMENT (OUTPATIENT)
Dept: CT IMAGING | Facility: HOSPITAL | Age: 74
End: 2019-09-16

## 2019-09-16 PROBLEM — I47.10 SUPRAVENTRICULAR TACHYCARDIA: Status: ACTIVE | Noted: 2019-09-16

## 2019-09-16 PROBLEM — E78.5 HLD (HYPERLIPIDEMIA): Status: RESOLVED | Noted: 2017-11-10 | Resolved: 2019-09-16

## 2019-09-16 PROBLEM — N17.9 ACUTE RENAL FAILURE (ARF) (HCC): Status: RESOLVED | Noted: 2018-09-10 | Resolved: 2019-09-16

## 2019-09-16 PROBLEM — R41.82 ALTERED MENTAL STATUS: Status: RESOLVED | Noted: 2018-09-10 | Resolved: 2019-09-16

## 2019-09-16 PROBLEM — N28.9 RENAL INSUFFICIENCY: Status: RESOLVED | Noted: 2017-11-10 | Resolved: 2019-09-16

## 2019-09-16 PROBLEM — I48.0 PAROXYSMAL ATRIAL FIBRILLATION: Status: ACTIVE | Noted: 2019-09-09

## 2019-09-16 PROBLEM — I47.1 SUPRAVENTRICULAR TACHYCARDIA: Status: ACTIVE | Noted: 2019-09-16

## 2019-09-16 PROBLEM — Z00.00 ENCOUNTER FOR MEDICARE ANNUAL WELLNESS EXAM: Status: RESOLVED | Noted: 2019-03-19 | Resolved: 2019-09-16

## 2019-09-16 LAB
MAGNESIUM SERPL-MCNC: 2.2 MG/DL (ref 1.6–2.4)
NT-PROBNP SERPL-MCNC: 463.5 PG/ML (ref 5–1800)
UFH PPP CHRO-ACNC: 0.45 IU/ML (ref 0.3–0.7)

## 2019-09-16 PROCEDURE — 94660 CPAP INITIATION&MGMT: CPT

## 2019-09-16 PROCEDURE — 25010000002 HEPARIN (PORCINE) IN NACL 25000-0.45 UT/250ML-% SOLUTION: Performed by: INTERNAL MEDICINE

## 2019-09-16 PROCEDURE — 97116 GAIT TRAINING THERAPY: CPT

## 2019-09-16 PROCEDURE — 83735 ASSAY OF MAGNESIUM: CPT | Performed by: INTERNAL MEDICINE

## 2019-09-16 PROCEDURE — 85520 HEPARIN ASSAY: CPT

## 2019-09-16 PROCEDURE — 94799 UNLISTED PULMONARY SVC/PX: CPT

## 2019-09-16 PROCEDURE — 83880 ASSAY OF NATRIURETIC PEPTIDE: CPT | Performed by: INTERNAL MEDICINE

## 2019-09-16 PROCEDURE — 99231 SBSQ HOSP IP/OBS SF/LOW 25: CPT | Performed by: PSYCHIATRY & NEUROLOGY

## 2019-09-16 PROCEDURE — 97535 SELF CARE MNGMENT TRAINING: CPT

## 2019-09-16 PROCEDURE — 70450 CT HEAD/BRAIN W/O DYE: CPT

## 2019-09-16 PROCEDURE — 97110 THERAPEUTIC EXERCISES: CPT

## 2019-09-16 PROCEDURE — 99231 SBSQ HOSP IP/OBS SF/LOW 25: CPT | Performed by: NURSE PRACTITIONER

## 2019-09-16 RX ORDER — METOPROLOL TARTRATE 5 MG/5ML
INJECTION INTRAVENOUS
Status: DISPENSED
Start: 2019-09-16 | End: 2019-09-16

## 2019-09-16 RX ORDER — METOPROLOL TARTRATE 5 MG/5ML
5 INJECTION INTRAVENOUS ONCE
Status: COMPLETED | OUTPATIENT
Start: 2019-09-16 | End: 2019-09-16

## 2019-09-16 RX ADMIN — ASPIRIN 81 MG: 81 TABLET, COATED ORAL at 08:30

## 2019-09-16 RX ADMIN — METOPROLOL TARTRATE 5 MG: 1 INJECTION, SOLUTION INTRAVENOUS at 00:13

## 2019-09-16 RX ADMIN — HYDROCODONE BITARTRATE AND ACETAMINOPHEN 1 TABLET: 5; 325 TABLET ORAL at 15:08

## 2019-09-16 RX ADMIN — HEPARIN SODIUM 20 UNITS/KG/HR: 10000 INJECTION, SOLUTION INTRAVENOUS at 04:16

## 2019-09-16 RX ADMIN — DOCUSATE SODIUM 100 MG: 100 CAPSULE, LIQUID FILLED ORAL at 08:23

## 2019-09-16 RX ADMIN — METOPROLOL TARTRATE 5 MG: 5 INJECTION INTRAVENOUS at 00:05

## 2019-09-16 RX ADMIN — MIRTAZAPINE 15 MG: 15 TABLET, FILM COATED ORAL at 20:51

## 2019-09-16 RX ADMIN — HEPARIN SODIUM 20 UNITS/KG/HR: 10000 INJECTION, SOLUTION INTRAVENOUS at 18:42

## 2019-09-16 RX ADMIN — CLOPIDOGREL BISULFATE 75 MG: 75 TABLET ORAL at 08:23

## 2019-09-16 RX ADMIN — ATENOLOL 25 MG: 25 TABLET ORAL at 08:23

## 2019-09-16 RX ADMIN — ESCITALOPRAM OXALATE 20 MG: 20 TABLET ORAL at 08:23

## 2019-09-16 RX ADMIN — ATORVASTATIN CALCIUM 80 MG: 40 TABLET, FILM COATED ORAL at 20:51

## 2019-09-16 RX ADMIN — ATENOLOL 25 MG: 25 TABLET ORAL at 20:51

## 2019-09-16 RX ADMIN — DOCUSATE SODIUM 100 MG: 100 CAPSULE, LIQUID FILLED ORAL at 20:51

## 2019-09-16 NOTE — PROGRESS NOTES
Continued Stay Note  [facility]     Patient Name: Akua Torres  MRN: [id number]  Today's Date: 9/16/2019    Admit Date: 9/9/2019    Discharge Plan     Row Name 09/16/19 0937       Plan    Plan  SNF/STR    Patient/Family in Agreement with Plan  yes    Plan Comments  Spoke with pt. at bedside. Plans are for pt. to dc to SNF for STR. Spoke with Yamini from [facility] and pt. has a bed offer. With pt's sched. carotid intervention tomm. probably won't be ready for dc until Wed. at earliest. Discussed this with Yamini in regards to bed offer. CM will cont. to monitor and update as needed.     Final Discharge Disposition Code  03 - skilled nursing facility (SNF)        Discharge Codes    No documentation.       Expected Discharge Date and Time     Expected Discharge Date Expected Discharge Time    Sep 12, 2019             Nikole Marino RN

## 2019-09-16 NOTE — THERAPY TREATMENT NOTE
Acute Care - Occupational Therapy Treatment Note  [facility]     Patient Name: Akua Torres  : 1945  MRN: [id number]  Today's Date: 2019  Onset of Illness/Injury or Date of Surgery: 19  Date of Referral to OT: 09/10/19  Referring Physician: WILFREDO Dale    Admit Date: 2019       ICD-10-CM ICD-9-CM   1. Impaired functional mobility, balance, gait, and endurance Z74.09 V49.89   2. Impaired mobility and ADLs Z74.09 799.89   3. 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 (CMS/HCC) I63.232 433.11     Patient Active Problem List   Diagnosis   • Degenerative disc disease, lumbar   • Lumbar stenosis with neurogenic claudication   • History of lumbar fusion   • Spondylosis of lumbar region without myelopathy or radiculopathy   • Mild obesity   • Physical deconditioning   • Idiopathic gout   • Anxiety and depression   • Greater trochanteric bursitis of both hips   • Status post total bilateral knee replacement   • Back pain   • Essential hypertension   • Prediabetes   • S/P lumbar spinal fusion   • Leukocytosis, likely reactive   • Depression   • Arthritis of right hip   • Paroxysmal atrial fibrillation (CMS/HCC)   • YUNIOR treated with BiPAP   • Status post total replacement of right hip   • Acute blood loss anemia, mild, asymptomatic   • 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 (CMS/HCC)   • Supraventricular tachycardia (CMS/HCC)     Past Medical History:   Diagnosis Date   • A-fib (CMS/HCC)    • Anxiety    • Anxiety and depression    • Arthritis    • Cataract    • Depression    • Elevated serum creatinine     SEES DR SMITH EVERY 6 MONTHS- NEPHROLOGIST    • Extremity pain    • GERD (gastroesophageal reflux disease)    • Gout    • H/O bladder infections     JUST FINISHED MACROBID ON 17 FOR RECENT UTI   • Hypercholesteremia    • Hypertension    • Joint pain    • Kidney disease    • Kidney disease, chronic, stage III (GFR 30-59 ml/min)  (CMS/[facility])    • Low back pain    • Neck pain    • Rheumatic fever    • Sleep apnea    • Urinary tract infection    • Wears glasses      Past Surgical History:   Procedure Laterality Date   • BACK SURGERY  2004    LUMBAR PER DR MAN    • CARPAL TUNNEL RELEASE Left    • COLONOSCOPY     • EYE SURGERY     • FINGER SURGERY Right     3RD FINGER   • HEEL SPUR EXCISION Bilateral    • KNEE ARTHROSCOPY Bilateral    • LUMBAR DISCECTOMY FUSION INSTRUMENTATION N/A 11/10/2017    Procedure: LUMBAR SPINAL FUSION ;  Surgeon: Gopi Man MD;  Location:  [facility];  Service:    • REPLACEMENT TOTAL KNEE BILATERAL Bilateral    • TOTAL HIP ARTHROPLASTY Right 9/9/2019    Procedure: TOTAL ANTERIOR RIGHT HIP ARTHROPLASTY;  Surgeon: Darrin New MD;  Location:  [facility];  Service: Orthopedics       Therapy Treatment    Rehabilitation Treatment Summary     Row Name 09/16/19 0836             Treatment Time/Intention    Discipline  occupational therapist  -      Document Type  therapy note (daily note)  -LC      Subjective Information  no complaints  -LC      Mode of Treatment  individual therapy;occupational therapy  -LC      Patient/Family Observations  Pt. supine in bed, IV intact   -      Care Plan Review  patient/other agree to care plan  -LC      Patient Effort  good  -LC      Existing Precautions/Restrictions  fall;right;hip, anterior  -LC      Recorded by [LC] Teresa Adhikari, OT 09/16/19 0928      Row Name 09/16/19 0836             Vital Signs    Pre Patient Position  Supine  -LC      Intra Patient Position  Sitting  -LC      Post Patient Position  Sitting  -LC      Recorded by [LC] Teresa Adhikari OT 09/16/19 0928      Row Name 09/16/19 0836             Cognitive Assessment/Intervention    Additional Documentation  Cognitive Assessment/Intervention (Group)  -LC      Recorded by [LC] Teresa Adhikari OT 09/16/19 0928      Row Name 09/16/19 0836             Cognitive Assessment/Intervention- PT/OT    Affect/Mental Status  (Cognitive)  WFL  -      Orientation Status (Cognition)  oriented x 4  -LC      Follows Commands (Cognition)  follows one step commands;over 90% accuracy;verbal cues/prompting required  -LC      Recorded by [LC] Teresa Adhikari OT 09/16/19 0928      Row Name 09/16/19 0836             Safety Issues, Functional Mobility    Safety Issues Affecting Function (Mobility)  safety precaution awareness  -LC      Impairments Affecting Function (Mobility)  endurance/activity tolerance;range of motion (ROM);strength  -LC      Recorded by [LC] Teresa Adhikari OT 09/16/19 0928      Row Name 09/16/19 0836             Mobility Assessment/Intervention    Extremity Weight-bearing Status  right lower extremity  -LC      Right Lower Extremity (Weight-bearing Status)  weight-bearing as tolerated (WBAT)  -LC      Recorded by [LC] Teresa Adhikari OT 09/16/19 0928      Row Name 09/16/19 0836             Bed Mobility Assessment/Treatment    Bed Mobility Assessment/Treatment  rolling left;rolling right;supine-sit  -LC      [place] Level (Bed Mobility)  minimum assist (75% patient effort);2 person assist  -LC      Rolling Left [place] (Bed Mobility)  minimum assist (75% patient effort);2 person assist  -LC      Rolling Right [place] (Bed Mobility)  minimum assist (75% patient effort)  -LC      Supine-Sit [place] (Bed Mobility)  moderate assist (50% patient effort);2 person assist  -LC      Bed Mobility, Safety Issues  decreased use of arms for pushing/pulling;decreased use of legs for bridging/pushing  -      Assistive Device (Bed Mobility)  bed rails;head of bed elevated  -      Comment (Bed Mobility)  vc's and assist to push up from bed to raise trunk into sitting. Extra time needed to sequence.   -LC      Recorded by [LC] Teresa Adhikari OT 09/16/19 0928      Row Name 09/16/19 0836             Transfer Assessment/Treatment    Transfer Assessment/Treatment  bed-chair transfer;sit-stand transfer;stand-sit transfer   -LC      Comment (Transfers)  vc's to use LUE to push up from surface to stand with platform rwx. Vc's to reach back with BUE to lower to sit. cues to step RLE out to improve comfort with txf.   -LC      Recorded by [LC] Teresa Adhikari, OT 09/16/19 0928      Row Name 09/16/19 0836             Bed-Chair Transfer    Bed-Chair [place] (Transfers)  contact guard;verbal cues;2 person assist  -LC      Assistive Device (Bed-Chair Transfers)  walker, rolling platform  -LC      Recorded by [LC] Teresa Adhikari, OT 09/16/19 0928      Row Name 09/16/19 0836             Sit-Stand Transfer    Sit-Stand [place] (Transfers)  contact guard;verbal cues  -LC      Assistive Device (Sit-Stand Transfers)  walker, rolling platform  -LC      Recorded by [LC] Teresa Adhikari, OT 09/16/19 0928      Row Name 09/16/19 0836             Stand-Sit Transfer    Stand-Sit [place] (Transfers)  contact guard;verbal cues  -LC      Assistive Device (Stand-Sit Transfers)  walker, rolling platform  -LC      Recorded by [LC] Teresa Adhikari, OT 09/16/19 0928      Row Name 09/16/19 0836             ADL Assessment/Intervention    52725 - OT Self Care/Mgmt Minutes  29  -LC      BADL Assessment/Intervention  grooming;toileting;lower body dressing  -LC      Recorded by [LC] Teresa Adhikari, OT 09/16/19 0928      Row Name 09/16/19 0836             Lower Body Dressing Assessment/Training    Lower Body Dressing [place] Level  don;socks;dependent (less than 25% patient effort)  -LC      Lower Body Dressing Position  supine  -LC      Recorded by [LC] Teresa Adhikari, OT 09/16/19 0928      Row Name 09/16/19 0836             Grooming Assessment/Training    [place] Level (Grooming)  set up;hair care, combing/brushing;wash face, hands  -LC      Grooming Position  sitting up in bed  -LC      Recorded by [LC] Teresa Adhikari, OT 09/16/19 0928      Row Name 09/16/19 0836             Toileting Assessment/Training    [place] Level (Toileting)   perform perineal hygiene;dependent (less than 25% patient effort);two person assist required  -LC      Toileting Position  supine  -LC      Comment (Toileting)  Pt. DEP for BM clean up bed lvl. Extra time needed to complete task. Assist of 2 to roll left.     -LC      Recorded by [LC] Teresa Adhikari, OT 09/16/19 0928      Row Name 09/16/19 0836             BADL Safety/Performance    Impairments, BADL Safety/Performance  balance;endurance/activity tolerance;coordination;grasp/prehension;range of motion;trunk/postural control  -LC      Recorded by [LC] Teresa Adhikari, OT 09/16/19 0928      Row Name 09/16/19 0836             Positioning and Restraints    Pre-Treatment Position  in bed  -LC      Post Treatment Position  chair  -LC      In Chair  notified nsg;reclined;call light within reach;encouraged to call for assist;exit alarm on;with OT;legs elevated  -LC      Recorded by [LC] Teresa Adhikari, OT 09/16/19 0928      Row Name 09/16/19 0836             Pain Assessment    Additional Documentation  Pain Scale: Word Pre/Post-Treatment (Group)  -LC      Recorded by [LC] Teresa Adhikari, OT 09/16/19 0928      Row Name 09/16/19 0836             Pain Scale: Numbers Pre/Post-Treatment    Pain Scale: Numbers, Pretreatment  0/10 - no pain  -LC      Pain Scale: Numbers, Post-Treatment  0/10 - no pain  -LC      Recorded by [LC] Teresa Adhikari, OT 09/16/19 0928      Row Name                Wound 09/09/19 1352 Right hip Incision    Wound - Properties Group Date first assessed: 09/09/19 [KD] Time first assessed: 1352 [KD] Side: Right [KD] Location: hip [KD] Primary Wound Type: Incision [KD] Recorded by:  [KD] Blas Pizarro RN 09/09/19 1352    Row Name 09/16/19 0836             Outcome Summary/Treatment Plan (OT)    Daily Summary of Progress (OT)  progress toward functional goals is good  -LC      Anticipated Discharge Disposition (OT)  inpatient rehabilitation facility  -LC      Recorded by [LC] Teresa Adhikari, OT 09/16/19 0928         User Key  (r) = Recorded By, (t) = Taken By, (c) = Cosigned By    Initials Name Effective Dates Discipline    KD Blas Pizarro, RN 06/18/19 -  Nurse    Teresa Oconnor OT 07/18/19 -  OT        Wound 09/09/19 1352 Right hip Incision (Active)   Dressing Appearance dry;intact;no drainage 9/16/2019  8:05 AM   Closure Liquid skin adhesive 9/16/2019  8:05 AM   Base dry;clean 9/16/2019  8:05 AM   Drainage Amount scant 9/16/2019  8:05 AM   Dressing Care, Wound open to air 9/16/2019  8:05 AM       Occupational Therapy Education     Title: PT OT SLP Therapies (In Progress)     Topic: Occupational Therapy (Done)     Point: ADL training (Done)     Description: Instruct learner(s) on proper safety adaptation and remediation techniques during self care or transfers.   Instruct in proper use of assistive devices.    Learning Progress Summary           Patient Acceptance, E, VU by MYRANDA at 9/16/2019  8:36 AM    Acceptance, E, VU,NR by MYRANDA at 9/13/2019  8:54 AM    Acceptance, E,TB,D, VU,DU by ST at 9/11/2019  1:55 PM    Comment:  see flow sheet    Eager, E,TB,D, VU,NR by AR at 9/10/2019  3:00 PM                   Point: Home exercise program (Done)     Description: Instruct learner(s) on appropriate technique for monitoring, assisting and/or progressing therapeutic exercises/activities.    Learning Progress Summary           Patient Acceptance, E, VU,NR by MYRANDA at 9/13/2019  8:54 AM    Acceptance, E,TB,D, VU,DU by ST at 9/11/2019  1:55 PM    Comment:  see flow sheet    Eager, E,TB,D, VU,NR by AR at 9/10/2019  3:00 PM                   Point: Precautions (Done)     Description: Instruct learner(s) on prescribed precautions during self-care and functional transfers.    Learning Progress Summary           Patient Acceptance, E, VU by MYRANDA at 9/16/2019  8:36 AM    Acceptance, E,TB,D, VU,DU by ST at 9/11/2019  1:55 PM    Comment:  see flow sheet    Eager, E,TB,D, VU,NR by AR at 9/10/2019  3:00 PM                   Point: Body  mechanics (Done)     Description: Instruct learner(s) on proper positioning and spine alignment during self-care, functional mobility activities and/or exercises.    Learning Progress Summary           Patient Acceptance, E, VU by  at 9/16/2019  8:36 AM    Acceptance, E,TB,D, VU,DU by  at 9/11/2019  1:55 PM    Comment:  see flow sheet    ESVIN Patel,ELVIE,D, VU,NR by AR at 9/10/2019  3:00 PM                               User Key     Initials Effective Dates Name Provider Type Discipline     06/10/18 -  Claudia Vance, OTR Occupational Therapist OT    AR 06/22/15 -  Jennifer Odonnell, OT Occupational Therapist OT     07/18/19 -  Teresa Adhikari, CHARLES Occupational Therapist OT                OT Recommendation and Plan  Outcome Summary/Treatment Plan (OT)  Daily Summary of Progress (OT): progress toward functional goals is good  Anticipated Discharge Disposition (OT): inpatient rehabilitation facility  Daily Summary of Progress (OT): progress toward functional goals is good  Plan of Care Review  Plan of Care Reviewed With: patient  Plan of Care Reviewed With: patient  Outcome Summary: Pt. dep for toileting tasks. Min-Mod A of 2 for bed mobility. VC's and Min A for bed <> chair txfs.    Outcome Measures     Row Name 09/16/19 0836 09/13/19 1030          How much help from another is currently needed...    Putting on and taking off regular lower body clothing?  2  -LC  2  -LC     Bathing (including washing, rinsing, and drying)  2  -LC  2  -LC     Toileting (which includes using toilet bed pan or urinal)  2  -LC  2  -LC     Putting on and taking off regular upper body clothing  2  -LC  2  -LC     Taking care of personal grooming (such as brushing teeth)  3  -LC  3  -LC     Eating meals  3  -LC  3  -LC     AM-PAC 6 Clicks Score (OT)  14  -LC  14  -LC       User Key  (r) = Recorded By, (t) = Taken By, (c) = Cosigned By    Initials Name Provider Type    Teresa Oconnor, CHARLES Occupational Therapist           Time  Calculation:   Time Calculation- OT     Row Name 09/16/19 0836             Time Calculation- OT    OT Start Time  0836  -      OT Received On  09/16/19  -      OT Goal Re-Cert Due Date  09/20/19  -         Timed Charges    85443 - OT Self Care/Mgmt Minutes  29  -        User Key  (r) = Recorded By, (t) = Taken By, (c) = Cosigned By    Initials Name Provider Type     Teresa Adhikari OT Occupational Therapist        Therapy Charges for Today     Code Description Service Date Service Provider Modifiers Qty    21798527174 HC OT SELF CARE/MGMT/TRAIN EA 15 MIN 9/16/2019 Teresa Adhikari OT GO 2               Teresa Adhikari OT  9/16/2019

## 2019-09-16 NOTE — PLAN OF CARE
Problem: Patient Care Overview  Goal: Plan of Care Review  Outcome: Ongoing (interventions implemented as appropriate)   09/16/19 1020   Plan of Care Review   Progress improving   OTHER   Outcome Summary Pt increased gait distance to 170 feet with CGA and platform walker. Pt with improved strength on R side and demo good particpation with ther ex. Will continue to progress mobility as able.    Coping/Psychosocial   Plan of Care Reviewed With patient

## 2019-09-16 NOTE — PROGRESS NOTES
"Neurology       Patient Care Team:  Salty Hernandez MD as PCP - General (Family Medicine)  Leon Hein MD as Consulting Physician (Neurosurgery)  Perkins, Corinne E, PT as Physical Therapist (Physical Therapy)  Cricket Nettles MD as Consulting Physician (Pain Medicine)    Chief complaint right-sided weakness      Subjective .     History: Notes continued improvement in right-sided weakness and working on it on her own.  Can lift right leg off the chair and move right proximal arm well.  Working on fingers.  No new weakness or numbness        Objective     Vital Signs   Blood pressure 144/57, pulse 78, temperature 98.6 °F (37 °C), temperature source Oral, resp. rate 16, height 162.6 cm (64\"), weight 86.2 kg (190 lb), SpO2 96 %, not currently breastfeeding.    Physical Exam:              Neuro: Well-developed elderly white woman in no acute distress in bedside chair, alert, fluent and appropriate with no dysarthria  EOMI face symmetric  Mild proximal right upper and lower extremity weakness, moderate right hand weakness; stable or improved over the weekend        Assessment/Plan     Left hemisphere stroke in patient with left ICA stenosis, recent hip surgery.  Note   Plan for carotid stent tomorrow.  Reviewed risk/benefit, with risk of recurrent stroke versus complications of the procedure.  On aspirin, Plavix and high-dose statin.  Discussed with patient and answered questions.    I discussed the patients findings and my recommendations with patient    Maribel East MD  09/16/19  2:30 PM      "

## 2019-09-16 NOTE — PLAN OF CARE
Problem: Patient Care Overview  Goal: Plan of Care Review  Outcome: Ongoing (interventions implemented as appropriate)   09/16/19 1720   Plan of Care Review   Progress no change   OTHER   Outcome Summary Pt VSS, no episodes on heart monitor today, NSR on tele. On RA, A&Ox4. NIH 1, for RLE drift- RUE is slightly weaker  then LUE. Pt is incontinent of bowel/bladder, primafit in place. Plan for angio tomorrow with Dr. Guerra. F/U CT head without contrast obtained today. Pt remains on Heparin drip. Prineo to right hip CDI BEAR. Sat up in chair for a few hours today. Minimal c/o pain this shift, ice being used. [facility]   Coping/Psychosocial   Plan of Care Reviewed With patient

## 2019-09-16 NOTE — PLAN OF CARE
Problem: Patient Care Overview  Goal: Plan of Care Review  Outcome: Ongoing (interventions implemented as appropriate)   09/16/19 2482   OTHER   Outcome Summary Pt. dep for toileting tasks. Min-Mod A of 2 for bed mobility. VC's and Min A for bed <> chair txfs. S/U for grooming. Recommend IPR at discharge.    Coping/Psychosocial   Plan of Care Reviewed With patient

## 2019-09-16 NOTE — THERAPY TREATMENT NOTE
Acute Care - Occupational Therapy Treatment Note  [facility]     Patient Name: Akua Torres  : 1945  MRN: [id number]  Today's Date: 2019  Onset of Illness/Injury or Date of Surgery: 19  Date of Referral to OT: 09/10/19  Referring Physician: WILFREDO Dale    Admit Date: 2019       ICD-10-CM ICD-9-CM   1. Impaired functional mobility, balance, gait, and endurance Z74.09 V49.89   2. Impaired mobility and ADLs Z74.09 799.89   3. 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 (CMS/[facility]) I63.232 433.11     Patient Active Problem List   Diagnosis   • Degenerative disc disease, lumbar   • Lumbar stenosis with neurogenic claudication   • History of lumbar fusion   • Spondylosis of lumbar region without myelopathy or radiculopathy   • Mild obesity   • Physical deconditioning   • Idiopathic gout   • Anxiety and depression   • Greater trochanteric bursitis of both hips   • Status post total bilateral knee replacement   • Back pain   • Essential hypertension   • Prediabetes   • S/P lumbar spinal fusion   • Leukocytosis, likely reactive   • Depression   • Arthritis of right hip   • Paroxysmal atrial fibrillation (CMS/HCC)   • YUNIOR treated with BiPAP   • Status post total replacement of right hip   • Acute blood loss anemia, mild, asymptomatic   • 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 (CMS/HCC)     Past Medical History:   Diagnosis Date   • A-fib (CMS/[facility])    • Anxiety    • Anxiety and depression    • Arthritis    • Cataract    • Depression    • Elevated serum creatinine     SEES DR SMITH EVERY 6 MONTHS- NEPHROLOGIST    • Extremity pain    • GERD (gastroesophageal reflux disease)    • Gout    • H/O bladder infections     JUST FINISHED MACROBID ON 17 FOR RECENT UTI   • Hypercholesteremia    • Hypertension    • Joint pain    • Kidney disease    • Kidney disease, chronic, stage III (GFR 30-59 ml/min) (CMS/HCC)    • Low back pain    • Neck pain    •  Rheumatic fever    • Sleep apnea    • Urinary tract infection    • Wears glasses      Past Surgical History:   Procedure Laterality Date   • BACK SURGERY  2004    LUMBAR PER DR MAN    • CARPAL TUNNEL RELEASE Left    • COLONOSCOPY     • EYE SURGERY     • FINGER SURGERY Right     3RD FINGER   • HEEL SPUR EXCISION Bilateral    • KNEE ARTHROSCOPY Bilateral    • LUMBAR DISCECTOMY FUSION INSTRUMENTATION N/A 11/10/2017    Procedure: LUMBAR SPINAL FUSION ;  Surgeon: Gopi Man MD;  Location:  [facility];  Service:    • REPLACEMENT TOTAL KNEE BILATERAL Bilateral    • TOTAL HIP ARTHROPLASTY Right 9/9/2019    Procedure: TOTAL ANTERIOR RIGHT HIP ARTHROPLASTY;  Surgeon: Darrin New MD;  Location:  [facility];  Service: Orthopedics       Therapy Treatment    Rehabilitation Treatment Summary     Row Name 09/16/19 0836             Treatment Time/Intention    Discipline  occupational therapist  -      Document Type  therapy note (daily note)  -      Subjective Information  no complaints  -      Mode of Treatment  individual therapy;occupational therapy  -      Patient/Family Observations  Pt. supine in bed, IV intact   -LC      Care Plan Review  patient/other agree to care plan  -      Patient Effort  good  -LC      Existing Precautions/Restrictions  fall;right;hip, anterior  -LC      Recorded by [LC] Teresa Adhikari OT 09/16/19 0928      Row Name 09/16/19 0836             Vital Signs    Pre Patient Position  Supine  -LC      Intra Patient Position  Sitting  -LC      Post Patient Position  Sitting  -LC      Recorded by [LC] Teresa Adhikari OT 09/16/19 0928      Row Name 09/16/19 0836             Cognitive Assessment/Intervention    Additional Documentation  Cognitive Assessment/Intervention (Group)  -LC      Recorded by [LC] Teresa Adhikari OT 09/16/19 0928      Row Name 09/16/19 0836             Cognitive Assessment/Intervention- PT/OT    Affect/Mental Status (Cognitive)  WFL  -      Orientation Status  (Cognition)  oriented x 4  -LC      Follows Commands (Cognition)  follows one step commands;over 90% accuracy;verbal cues/prompting required  -LC      Recorded by [LC] Teresa Adhikari OT 09/16/19 0928      Row Name 09/16/19 0836             Safety Issues, Functional Mobility    Safety Issues Affecting Function (Mobility)  safety precaution awareness  -LC      Impairments Affecting Function (Mobility)  endurance/activity tolerance;range of motion (ROM);strength  -LC      Recorded by [LC] Teresa Adhikari OT 09/16/19 0928      Row Name 09/16/19 0836             Mobility Assessment/Intervention    Extremity Weight-bearing Status  right lower extremity  -LC      Right Lower Extremity (Weight-bearing Status)  weight-bearing as tolerated (WBAT)  -LC      Recorded by [LC] Teresa Adhikari OT 09/16/19 0928      Row Name 09/16/19 0836             Bed Mobility Assessment/Treatment    Bed Mobility Assessment/Treatment  rolling left;rolling right;supine-sit  -LC      [place] Level (Bed Mobility)  minimum assist (75% patient effort);2 person assist  -LC      Rolling Left [place] (Bed Mobility)  minimum assist (75% patient effort);2 person assist  -LC      Rolling Right [place] (Bed Mobility)  minimum assist (75% patient effort)  -LC      Supine-Sit [place] (Bed Mobility)  moderate assist (50% patient effort);2 person assist  -LC      Bed Mobility, Safety Issues  decreased use of arms for pushing/pulling;decreased use of legs for bridging/pushing  -      Assistive Device (Bed Mobility)  bed rails;head of bed elevated  -      Comment (Bed Mobility)  vc's and assist to push up from bed to raise trunk into sitting. Extra time needed to sequence.   -LC      Recorded by [LC] Teresa Adhikari OT 09/16/19 0928      Row Name 09/16/19 0836             Transfer Assessment/Treatment    Transfer Assessment/Treatment  bed-chair transfer;sit-stand transfer;stand-sit transfer  -      Comment (Transfers)  vc's to use LUE  to push up from surface to stand with platform rwx. Vc's to reach back with BUE to lower to sit. cues to step RLE out to improve comfort with txf.   -LC      Recorded by [LC] Teresa Adhikari, OT 09/16/19 0928      Row Name 09/16/19 0836             Bed-Chair Transfer    Bed-Chair [place] (Transfers)  contact guard;verbal cues;2 person assist  -LC      Assistive Device (Bed-Chair Transfers)  walker, rolling platform  -LC      Recorded by [LC] Teresa Adhikari, OT 09/16/19 0928      Row Name 09/16/19 0836             Sit-Stand Transfer    Sit-Stand [place] (Transfers)  contact guard;verbal cues  -LC      Assistive Device (Sit-Stand Transfers)  walker, rolling platform  -LC      Recorded by [LC] Teresa Adhikari, OT 09/16/19 0928      Row Name 09/16/19 0836             Stand-Sit Transfer    Stand-Sit [place] (Transfers)  contact guard;verbal cues  -LC      Assistive Device (Stand-Sit Transfers)  walker, rolling platform  -LC      Recorded by [LC] Teresa Adhikari, OT 09/16/19 0928      Row Name 09/16/19 0836             ADL Assessment/Intervention    56211 - OT Self Care/Mgmt Minutes  29  -LC      BADL Assessment/Intervention  grooming;toileting;lower body dressing  -LC      Recorded by [LC] Teresa Adhikari, OT 09/16/19 0928      Row Name 09/16/19 0836             Lower Body Dressing Assessment/Training    Lower Body Dressing [place] Level  don;socks;dependent (less than 25% patient effort)  -LC      Lower Body Dressing Position  supine  -LC      Recorded by [LC] Teresa Adhikari, OT 09/16/19 0928      Row Name 09/16/19 0836             Grooming Assessment/Training    [place] Level (Grooming)  set up;hair care, combing/brushing;wash face, hands  -      Grooming Position  sitting up in bed  -LC      Recorded by [] Teresa Adhikari OT 09/16/19 0928      Row Name 09/16/19 0836             Toileting Assessment/Training    [place] Level (Toileting)  perform perineal hygiene;dependent (less than 25%  patient effort);two person assist required  -LC      Toileting Position  supine  -LC      Comment (Toileting)  Pt. DEP for BM clean up bed lvl. Extra time needed to complete task. Assist of 2 to roll left.     -LC      Recorded by [LC] Teresa Adhikari, OT 09/16/19 0928      Row Name 09/16/19 0836             BADL Safety/Performance    Impairments, BADL Safety/Performance  balance;endurance/activity tolerance;coordination;grasp/prehension;range of motion;trunk/postural control  -LC      Recorded by [LC] Teresa Adhikari, OT 09/16/19 0928      Row Name 09/16/19 0836             Positioning and Restraints    Pre-Treatment Position  in bed  -LC      Post Treatment Position  chair  -LC      In Chair  notified nsg;reclined;call light within reach;encouraged to call for assist;exit alarm on;with OT;legs elevated  -LC      Recorded by [LC] Teresa Adhikari, OT 09/16/19 0928      Row Name 09/16/19 0836             Pain Assessment    Additional Documentation  Pain Scale: Word Pre/Post-Treatment (Group)  -LC      Recorded by [LC] Teresa Adhikari, OT 09/16/19 0928      Row Name 09/16/19 0836             Pain Scale: Numbers Pre/Post-Treatment    Pain Scale: Numbers, Pretreatment  0/10 - no pain  -LC      Pain Scale: Numbers, Post-Treatment  0/10 - no pain  -LC      Recorded by [LC] Teresa Adhikari, OT 09/16/19 0928      Row Name                Wound 09/09/19 1352 Right hip Incision    Wound - Properties Group Date first assessed: 09/09/19 [KD] Time first assessed: 1352 [KD] Side: Right [KD] Location: hip [KD] Primary Wound Type: Incision [KD] Recorded by:  [KD] Blas Pizarro RN 09/09/19 1352    Row Name 09/16/19 0836             Outcome Summary/Treatment Plan (OT)    Daily Summary of Progress (OT)  progress toward functional goals is good  -LC      Anticipated Discharge Disposition (OT)  inpatient rehabilitation facility  -LC      Recorded by [LC] Teresa Adhikari, CHARLES 09/16/19 0928        User Key  (r) = Recorded By, (t) = Taken  By, (c) = Cosigned By    Initials Name Effective Dates Discipline    Blas Gray, RN 06/18/19 -  Nurse    Teresa Oconnor OT 07/18/19 -  OT        Wound 09/09/19 1352 Right hip Incision (Active)   Dressing Appearance dry;intact;no drainage 9/16/2019  8:05 AM   Closure Liquid skin adhesive 9/16/2019  8:05 AM   Base dry;clean 9/16/2019  8:05 AM   Drainage Amount scant 9/16/2019  8:05 AM   Dressing Care, Wound open to air 9/16/2019  8:05 AM       Occupational Therapy Education     Title: PT OT SLP Therapies (In Progress)     Topic: Occupational Therapy (Done)     Point: ADL training (Done)     Description: Instruct learner(s) on proper safety adaptation and remediation techniques during self care or transfers.   Instruct in proper use of assistive devices.    Learning Progress Summary           Patient Acceptance, E, VU by MYRANDA at 9/16/2019  8:36 AM    Acceptance, E, VU,NR by MYRANDA at 9/13/2019  8:54 AM    Acceptance, E,TB,D, VU,DU by ST at 9/11/2019  1:55 PM    Comment:  see flow sheet    Eager, E,TB,D, VU,NR by AR at 9/10/2019  3:00 PM                   Point: Home exercise program (Done)     Description: Instruct learner(s) on appropriate technique for monitoring, assisting and/or progressing therapeutic exercises/activities.    Learning Progress Summary           Patient Acceptance, E, VU,NR by MYRANDA at 9/13/2019  8:54 AM    Acceptance, E,TB,D, VU,DU by ST at 9/11/2019  1:55 PM    Comment:  see flow sheet    Eager, E,TB,D, VU,NR by AR at 9/10/2019  3:00 PM                   Point: Precautions (Done)     Description: Instruct learner(s) on prescribed precautions during self-care and functional transfers.    Learning Progress Summary           Patient Acceptance, E, VU by MYRANDA at 9/16/2019  8:36 AM    Acceptance, E,TB,D, VU,DU by ST at 9/11/2019  1:55 PM    Comment:  see flow sheet    Eager, E,TB,D, VU,NR by AR at 9/10/2019  3:00 PM                   Point: Body mechanics (Done)     Description: Instruct  learner(s) on proper positioning and spine alignment during self-care, functional mobility activities and/or exercises.    Learning Progress Summary           Patient Acceptance, E, VU by  at 9/16/2019  8:36 AM    Acceptance, E,TB,D, VU,DU by  at 9/11/2019  1:55 PM    Comment:  see flow sheet    Eager, ESVIN,TB,D, VU,NR by AR at 9/10/2019  3:00 PM                               User Key     Initials Effective Dates Name Provider Type Discipline     06/10/18 -  Claudia Vance OTR Occupational Therapist OT    AR 06/22/15 -  Jennifer Odonnell, OT Occupational Therapist OT     07/18/19 -  Teresa Adhikari, OT Occupational Therapist OT                OT Recommendation and Plan  Outcome Summary/Treatment Plan (OT)  Daily Summary of Progress (OT): progress toward functional goals is good  Anticipated Discharge Disposition (OT): inpatient rehabilitation facility  Daily Summary of Progress (OT): progress toward functional goals is good  Plan of Care Review  Plan of Care Reviewed With: patient  Plan of Care Reviewed With: patient  Outcome Summary: Pt. dep for toileting tasks. Min-Mod A of 2 for bed mobility. VC's and Min A for bed <> chair txfs.    Outcome Measures     Row Name 09/16/19 0836 09/13/19 1030          How much help from another is currently needed...    Putting on and taking off regular lower body clothing?  2  -LC  2  -LC     Bathing (including washing, rinsing, and drying)  2  -LC  2  -LC     Toileting (which includes using toilet bed pan or urinal)  2  -LC  2  -LC     Putting on and taking off regular upper body clothing  2  -LC  2  -LC     Taking care of personal grooming (such as brushing teeth)  3  -LC  3  -LC     Eating meals  3  -LC  3  -LC     AM-PAC 6 Clicks Score (OT)  14  -LC  14  -LC       User Key  (r) = Recorded By, (t) = Taken By, (c) = Cosigned By    Initials Name Provider Type    Teresa Oconnor, OT Occupational Therapist           Time Calculation:   Time Calculation- OT     Row Name  09/16/19 0836             Time Calculation- OT    OT Start Time  0836  -      OT Received On  09/16/19  -      OT Goal Re-Cert Due Date  09/20/19  -         Timed Charges    96951 - OT Self Care/Mgmt Minutes  29  -        User Key  (r) = Recorded By, (t) = Taken By, (c) = Cosigned By    Initials Name Provider Type     Teresa Adhikari, CHARLES Occupational Therapist        Therapy Charges for Today     Code Description Service Date Service Provider Modifiers Qty    [id number]  OT SELF CARE/MGMT/TRAIN EA 15 MIN 9/16/2019 Teresa Adhikari OT GO 2               Teresa Adhikari OT  9/16/2019

## 2019-09-16 NOTE — PROGRESS NOTES
[facility] at [facility]  Cardiology Progress Note      Chief Complaint/Reason for service:    · Paroxysmal atrial fibrillation  · Supraventricular tachycardia         Apparently late last night the patient had an episode of supraventricular tachycardia confirmed by EKG that converted with IV metoprolol.  The patient reports she was woken up by the nursing staff.  She fell asleep without her BiPAP.  She still has some right-sided weakness from her stroke.    Past medical, surgical, social and family history reviewed in the patient's electronic medical record.         Vital Sign Min/Max for last 24 hours  Temp  Min: 98.1 °F (36.7 °C)  Max: 98.9 °F (37.2 °C)   BP  Min: 126/50  Max: 150/66   Pulse  Min: 60  Max: 145   Resp  Min: 14  Max: 20   SpO2  Min: 94 %  Max: 99 %   Flow (L/min)  Min: 2  Max: 2      Intake/Output Summary (Last 24 hours) at 9/16/2019 0949  Last data filed at 9/16/2019 0900  Gross per 24 hour   Intake 300 ml   Output 1100 ml   Net -800 ml           Physical Exam   Constitutional: She is oriented to person, place, and time. She appears well-developed and well-nourished.   HENT:   Head: Normocephalic.   Neck: No JVD present. Carotid bruit is not present.   Cardiovascular: Normal rate, regular rhythm and intact distal pulses. Exam reveals no gallop and no friction rub.   Murmur heard.  Pulmonary/Chest: Effort normal and breath sounds normal.   Abdominal: Soft.   Musculoskeletal: She exhibits no edema.   Neurological: She is alert and oriented to person, place, and time.   Skin: Skin is warm and dry. No cyanosis. Nails show no clubbing.   Psychiatric: She has a normal mood and affect. Her behavior is normal.       Tele: Normal sinus rhythm    Results Review (reviewed the patient's recent labs in the electronic medical record):     EKG: Supraventricular tachycardia 143 bpm 9/15/2019      Results from last 7 days   Lab Units 09/16/19  0004 09/15/19  0736 09/12/19  0158  09/11/19  0952 09/10/19  0337   SODIUM mmol/L  --  139  --  136 141   POTASSIUM mmol/L  --  4.3  --  4.4 5.0   CHLORIDE mmol/L  --  102  --  103 103   BUN mg/dL  --  18  --  24* 29*   CREATININE mg/dL  --  0.92 1.02* 1.09* 1.47*   MAGNESIUM mg/dL 2.2  --   --   --   --          Results from last 7 days   Lab Units 09/15/19  0736 09/14/19  1830 09/11/19  1839   WBC 10*3/mm3 9.29 10.39 13.95*   HEMOGLOBIN g/dL 9.4* 9.3* 9.4*   HEMATOCRIT % 29.9* 30.1* 30.2*   PLATELETS 10*3/mm3 232 233 159       Lab Results   Component Value Date    HGBA1C 5.20 09/12/2019       Lab Results   Component Value Date    CHOL 132 09/12/2019    CHLPL 220 (H) 12/19/2018    TRIG 235 (H) 09/12/2019    HDL 34 (L) 09/12/2019    LDL 51 09/12/2019              Active Hospital Problems    Diagnosis POA   • **Status post total replacement of right hip [Z96.641] Not Applicable     Priority: High   • Supraventricular tachycardia (CMS/HCC) [I47.1] Yes     Priority: High     · Noted on EKG 9/15/2019 in setting of post total hip replacement  ·      • Acute blood loss anemia, mild, asymptomatic [D62] No     Priority: High   • Paroxysmal atrial fibrillation (CMS/HCC) [I48.0] Yes     Priority: High     · [id number] =6 (age >75, female, CVA, HTN)  · Echo (9/11/2019): LVEF 60%.  No significant valvular abnormality     • 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 (CMS/HCC) [I63.232] Yes     Priority: High     CT angiogram (9/10/2019): High-grade (near-occlusion) LICA stenosis and moderate AUSTIN stenosis     • Arthritis of right hip [M16.11] Yes     Priority: Medium   • Leukocytosis, likely reactive [D72.829] Yes     Priority: Medium   • Essential hypertension [I10] Yes     Priority: Medium   • YUNIOR treated with BiPAP [G47.33] Yes     Priority: Low   • Anxiety and depression [F41.9, F32.9] Yes     Priority: Low              · Defer anticoagulation due to no episodes of atrial fibrillation during her stay  · Due to high-grade left carotid  stenosis with planned stent placement.  Will be on DAPT at discharge  · Follow up with Dr Pearl 4 weeks after discharge  · Please call with any further questions     WILFREDO Verduzco  9/16/2019

## 2019-09-16 NOTE — THERAPY TREATMENT NOTE
Acute Care - Physical Therapy Treatment Note  [facility]     Patient Name: Akua Torres  : 1945  MRN: [id number]  Today's Date: 2019  Onset of Illness/Injury or Date of Surgery: 19     Referring Physician: WILFREDO Dale    Admit Date: 2019    Visit Dx:    ICD-10-CM ICD-9-CM   1. Impaired functional mobility, balance, gait, and endurance Z74.09 V49.89   2. Impaired mobility and ADLs Z74.09 799.89   3. 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 (CMS/HCC) I63.232 433.11     Patient Active Problem List   Diagnosis   • Degenerative disc disease, lumbar   • Lumbar stenosis with neurogenic claudication   • History of lumbar fusion   • Spondylosis of lumbar region without myelopathy or radiculopathy   • Mild obesity   • Physical deconditioning   • Idiopathic gout   • Anxiety and depression   • Greater trochanteric bursitis of both hips   • Status post total bilateral knee replacement   • Back pain   • Essential hypertension   • Prediabetes   • S/P lumbar spinal fusion   • Leukocytosis, likely reactive   • Depression   • Arthritis of right hip   • Paroxysmal atrial fibrillation (CMS/HCC)   • YUNIOR treated with BiPAP   • Status post total replacement of right hip   • Acute blood loss anemia, mild, asymptomatic   • 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 supine in bed, IV intact   -LC     Care Plan Review   patient/other agree to care plan  -MJ  patient/other agree to care plan  -LC     Patient Effort  excellent  -MJ  good  -LC     Existing Precautions/Restrictions  fall;right;hip, anterior;other (see comments) R sided weakness acute CVA  -MJ  fall;right;hip, anterior  -LC     Recorded by [MJ] Henrique Clements, PT 09/16/19 1053 [LC] Teresa Adhikari, OT 09/16/19 0928     Row Name 09/16/19 0836             Vital Signs    Pre Patient Position  Supine  -LC      Intra Patient Position  Sitting  -LC      Post Patient Position  Sitting  -LC      Recorded by [LC] Teresa Adhikari, OT 09/16/19 0928      Row Name 09/16/19 0836             Cognitive Assessment/Intervention    Additional Documentation  Cognitive Assessment/Intervention (Group)  -LC      Recorded by [LC] Teresa Adhikari, OT 09/16/19 0928      Row Name 09/16/19 1020 09/16/19 0836          Cognitive Assessment/Intervention- PT/OT    Affect/Mental Status (Cognitive)  WFL  -MJ  WFL  -LC     Orientation Status (Cognition)  oriented x 4  -MJ  oriented x 4  -LC     Follows Commands (Cognition)  WFL  -MJ  follows one step commands;over 90% accuracy;verbal cues/prompting required  -LC     Recorded by [MJ] Henrique Clements, PT 09/16/19 1053 [LC] Teresa Adhikari, OT 09/16/19 0928     Row Name 09/16/19 0836             Safety Issues, Functional Mobility    Safety Issues Affecting Function (Mobility)  safety precaution awareness  -LC      Impairments Affecting Function (Mobility)  endurance/activity tolerance;range of motion (ROM);strength  -LC      Recorded by [LC] Teresa Adhikari, OT 09/16/19 0928      Row Name 09/16/19 0836             Mobility Assessment/Intervention    Extremity Weight-bearing Status  right lower extremity  -LC      Right Lower Extremity (Weight-bearing Status)  weight-bearing as tolerated (WBAT)  -LC      Recorded by [LC] Teresa Adhikari OT 09/16/19 0928      Row Name 09/16/19 1020 09/16/19 0836          Bed Mobility Assessment/Treatment    Bed Mobility  Assessment/Treatment  --  rolling left;rolling right;supine-sit  -LC     [place] Level (Bed Mobility)  --  minimum assist (75% patient effort);2 person assist  -LC     Rolling Left [place] (Bed Mobility)  --  minimum assist (75% patient effort);2 person assist  -LC     Rolling Right [place] (Bed Mobility)  --  minimum assist (75% patient effort)  -     Supine-Sit [place] (Bed Mobility)  --  moderate assist (50% patient effort);2 person assist  -LC     Bed Mobility, Safety Issues  --  decreased use of arms for pushing/pulling;decreased use of legs for bridging/pushing  -     Assistive Device (Bed Mobility)  --  bed rails;head of bed elevated  -     Comment (Bed Mobility)  NT- pt UIC  -  vc's and assist to push up from bed to raise trunk into sitting. Extra time needed to sequence.   -LC     Recorded by [MJ] Henrique Clements, PT 09/16/19 1053 [LC] Teresa Adhikari, OT 09/16/19 0928     Row Name 09/16/19 1020 09/16/19 0836          Transfer Assessment/Treatment    Transfer Assessment/Treatment  --  bed-chair transfer;sit-stand transfer;stand-sit transfer  -     Comment (Transfers)  Verbal cues for correct hand placement. Pt able to place R UE on platform without assist and  improved. Donned brief in standing prior to gait  -  vc's to use LUE to push up from surface to stand with platform rwx. Vc's to reach back with BUE to lower to sit. cues to step RLE out to improve comfort with txf.   -LC     Recorded by [MJ] Henrique Clements, PT 09/16/19 1053 [LC] Teresa Adhikari, OT 09/16/19 0928     Row Name 09/16/19 0836             Bed-Chair Transfer    Bed-Chair [place] (Transfers)  contact guard;verbal cues;2 person assist  -      Assistive Device (Bed-Chair Transfers)  walker, rolling platform  -LC      Recorded by [LC] Teresa Adhikari, OT 09/16/19 0928      Row Name 09/16/19 1020 09/16/19 0836          Sit-Stand Transfer    Sit-Stand [place] (Transfers)  contact guard;verbal cues  -   contact guard;verbal cues  -LC     Assistive Device (Sit-Stand Transfers)  walker, rolling platform  -MJ  walker, rolling platform  -LC     Recorded by [MJ] Henrique Clements, PT 09/16/19 1053 [LC] Teresa Adhikari, OT 09/16/19 0928     Row Name 09/16/19 1020 09/16/19 0836          Stand-Sit Transfer    Stand-Sit [place] (Transfers)  contact guard;verbal cues  -MJ  contact guard;verbal cues  -LC     Assistive Device (Stand-Sit Transfers)  walker, rolling platform  -MJ  walker, rolling platform  -LC     Recorded by [MJ] Henrique Clements, PT 09/16/19 1053 [LC] Teresa Adhikari, OT 09/16/19 0928     Row Name 09/16/19 1020             Gait/Stairs Assessment/Training    40705 - Gait Training Minutes   12  -MJ      [place] Level (Gait)  contact guard;verbal cues  -MJ      Assistive Device (Gait)  walker, rolling platform  -MJ      Distance in Feet (Gait)  170  -MJ      Pattern (Gait)  step-through  -MJ      Deviations/Abnormal Patterns (Gait)  right sided deviations;antalgic;bilateral deviations;nixon decreased;stride length decreased  -MJ      Bilateral Gait Deviations  forward flexed posture;heel strike decreased  -MJ      Comment (Gait/Stairs)  Pt demo step through gait pattern at slow pace. Verbal cues for upright posture and to stay in middle of RW. As pt fatigues requires cues to bend knee during swing to clear foot. No knee buckling noted. Gait limited by fatigue  -MJ      Recorded by [MJ] Henrique Clements, PT 09/16/19 1053      Row Name 09/16/19 0836             ADL Assessment/Intervention    90778 - OT Self Care/Mgmt Minutes  29  -LC      BADL Assessment/Intervention  grooming;toileting;lower body dressing  -LC      Recorded by [LC] Teresa Adhikari, OT 09/16/19 0928      Row Name 09/16/19 0836             Lower Body Dressing Assessment/Training    Lower Body Dressing [place] Level  don;socks;dependent (less than 25% patient effort)  -      Lower Body Dressing Position  supine  -LC      Recorded by [LC] Onofre  Teresa, OT 09/16/19 0928      Row Name 09/16/19 0836             Grooming Assessment/Training    [place] Level (Grooming)  set up;hair care, combing/brushing;wash face, hands  -      Grooming Position  sitting up in bed  -LC      Recorded by [LC] Teresa Adhikari, OT 09/16/19 0928      Row Name 09/16/19 0836             Toileting Assessment/Training    [place] Level (Toileting)  perform perineal hygiene;dependent (less than 25% patient effort);two person assist required  -LC      Toileting Position  supine  -LC      Comment (Toileting)  Pt. DEP for BM clean up bed lvl. Extra time needed to complete task. Assist of 2 to roll left.     -LC      Recorded by [LC] Teresa Adhikari, CHARLES 09/16/19 0928      Row Name 09/16/19 0836             BADL Safety/Performance    Impairments, BADL Safety/Performance  balance;endurance/activity tolerance;coordination;grasp/prehension;range of motion;trunk/postural control  -LC      Recorded by [LC] Teresa Adhikari, OT 09/16/19 0928      Row Name 09/16/19 1020             Therapeutic Exercise    64548 - PT Therapeutic Exercise Minutes  11  -MJ      54400 - PT Therapeutic Activity Minutes  2  -MJ      Recorded by [MJ] Henrique Clements, PT 09/16/19 1053      Row Name 09/16/19 1020             Therapeutic Exercise    Lower Extremity (Therapeutic Exercise)  gluteal sets;heel slides, right;LAQ (long arc quad), right;quad sets, right;SLR (straight leg raise), right  -MJ      Lower Extremity Range of Motion (Therapeutic Exercise)  hip flexion/extension, right;hip abduction/adduction, right;ankle dorsiflexion/plantar flexion, right  -MJ      Exercise Type (Therapeutic Exercise)  AAROM (active assistive range of motion)  -MJ      Position (Therapeutic Exercise)  seated  -MJ      Sets/Reps (Therapeutic Exercise)  15x each  -MJ      Comment (Therapeutic Exercise)  Cues for technique. Vinay w/ SLR  -MJ      Recorded by [MJ] Henrique Clements, PT 09/16/19 1053      Row Name 09/16/19 1020 09/16/19 0836           Positioning and Restraints    Pre-Treatment Position  sitting in chair/recliner  -MJ  in bed  -LC     Post Treatment Position  chair  -MJ  chair  -LC     In Chair  notified nsg;reclined;call light within reach;encouraged to call for assist;exit alarm on;on mechanical lift sling  -MJ  notified nsg;reclined;call light within reach;encouraged to call for assist;exit alarm on;with OT;legs elevated  -LC     Recorded by [MJ] Henrique Clements, PT 09/16/19 1053 [LC] Teresa Adhikari, OT 09/16/19 0928     Row Name 09/16/19 0836             Pain Assessment    Additional Documentation  Pain Scale: Word Pre/Post-Treatment (Group)  -LC      Recorded by [LC] Teresa Adhikari OT 09/16/19 0928      Row Name 09/16/19 1020 09/16/19 0836          Pain Scale: Numbers Pre/Post-Treatment    Pain Scale: Numbers, Pretreatment  2/10  -MJ  0/10 - no pain  -LC     Pain Scale: Numbers, Post-Treatment  3/10  -MJ  0/10 - no pain  -LC     Pain Location - Side  Right  -MJ  --     Pain Location  hip  -MJ  --     Pain Intervention(s)  Repositioned;Ambulation/increased activity;Cold pack  -MJ  --     Recorded by [MJ] Henrique Clements, PT 09/16/19 1053 [LC] Teresa Adhikari, OT 09/16/19 0928     Row Name                Wound 09/09/19 1352 Right hip Incision    Wound - Properties Group Date first assessed: 09/09/19 [KD] Time first assessed: 1352 [KD] Side: Right [KD] Location: hip [KD] Primary Wound Type: Incision [KD] Recorded by:  [KD] Blas Pizarro RN 09/09/19 1352    Row Name 09/16/19 1020             Plan of Care Review    Plan of Care Reviewed With  patient  -MJ      Recorded by [MJ] Henrique Clements, PT 09/16/19 1053      Row Name 09/16/19 0836             Outcome Summary/Treatment Plan (OT)    Daily Summary of Progress (OT)  progress toward functional goals is good  -LC      Anticipated Discharge Disposition (OT)  inpatient rehabilitation facility  -LC      Recorded by [LC] Teresa Adhikari OT 09/16/19 0928      Row Name 09/16/19 1020              Outcome Summary/Treatment Plan (PT)    Daily Summary of Progress (PT)  progress toward functional goals is good  -      Recorded by [CLAY] JimboHenrique vaz, PT 09/16/19 1053        User Key  (r) = Recorded By, (t) = Taken By, (c) = Cosigned By    Initials Name Effective Dates Discipline     JimboHenrique vaz, PT 04/03/18 -  PT    Blas Gray, RN 06/18/19 -  Nurse    Teresa Oconnor, OT 07/18/19 -  OT          Wound 09/09/19 1352 Right hip Incision (Active)   Dressing Appearance dry;intact;no drainage 9/16/2019  8:05 AM   Closure Liquid skin adhesive 9/16/2019  8:05 AM   Base dry;clean 9/16/2019  8:05 AM   Drainage Amount scant 9/16/2019  8:05 AM   Dressing Care, Wound open to air 9/16/2019  8:05 AM           Physical Therapy Education     Title: PT OT SLP Therapies (In Progress)     Topic: Physical Therapy (In Progress)     Point: Mobility training (In Progress)     Learning Progress Summary           Patient Acceptance, E,D, VU,NR by  at 9/16/2019 10:20 AM    Comment:  Reviewed HEP, gait mechanics, benefits of mobility    Acceptance, E,D, VU,DU,NR by CT at 9/15/2019  9:44 AM    Eager, E,D, NR by CT at 9/14/2019  4:17 PM    Acceptance, E,D, VU,NR,DU by CT at 9/14/2019  1:33 PM    Eager, E, VU,NR by SC at 9/13/2019  3:41 PM    Comment:  REVIEWED BENEFITS OF ACTIVITY    Acceptance, E, VU by SC at 9/13/2019 10:17 AM    Comment:  REVIEWED BENEFITS OF ACTIVITY    Acceptance, E,D, VU,NR by  at 9/12/2019  4:08 PM    Acceptance, E,D, NR by AA at 9/12/2019 11:42 AM    Acceptance, E,D, VU,NR by  at 9/11/2019  9:59 AM    Comment:  Reviewed HEP, gait mechanics, benefits of mobility, safety with mobility    Acceptance, E,D, VU,NR by  at 9/10/2019  3:07 PM    Comment:  Reviewed HEP, gait mechanics, benefits of mobility, safety with mobility    Acceptance, E,D, VU,NR by  at 9/10/2019  9:05 AM    Comment:  Reviewed anterior hip precautions, gait mechanics, benefits of mobility    Acceptance, E,D, VU,NR by REJI at  9/9/2019  5:05 PM    Comment:  Educated on anterior hip precautions, weight bearing status, correct supine to sit t/f technique, correct sit<->stand t/f technique, correct gait mechanics, and progression of POC.   Family Acceptance, E,D, VU,NR by AA at 9/12/2019  4:08 PM    Acceptance, E,D, NR by AA at 9/12/2019 11:42 AM    Acceptance, E,D, VU,NR by LR at 9/9/2019  5:05 PM    Comment:  Educated on anterior hip precautions, weight bearing status, correct supine to sit t/f technique, correct sit<->stand t/f technique, correct gait mechanics, and progression of POC.   Significant Other Acceptance, E,D, VU,NR by  at 9/11/2019  9:59 AM    Comment:  Reviewed HEP, gait mechanics, benefits of mobility, safety with mobility   Other Eager, E,D, NR by CT at 9/14/2019  4:17 PM                   Point: Home exercise program (In Progress)     Learning Progress Summary           Patient Acceptance, E,D, VU,NR by  at 9/16/2019 10:20 AM    Comment:  Reviewed HEP, gait mechanics, benefits of mobility    Acceptance, E,D, VU,DU,NR by CT at 9/15/2019  9:44 AM    Eager, E,D, NR by CT at 9/14/2019  4:17 PM    Acceptance, E,D, VU,NR,DU by CT at 9/14/2019  1:33 PM    Eager, E, VU,NR by SC at 9/13/2019  3:41 PM    Comment:  REVIEWED BENEFITS OF ACTIVITY    Acceptance, E, VU by SC at 9/13/2019 10:17 AM    Comment:  REVIEWED BENEFITS OF ACTIVITY    Acceptance, E,D, NR by AA at 9/12/2019 11:42 AM    Acceptance, E,D, VU,NR by MJ at 9/11/2019  9:59 AM    Comment:  Reviewed HEP, gait mechanics, benefits of mobility, safety with mobility    Acceptance, E,D, VU,NR by MJ at 9/10/2019  3:07 PM    Comment:  Reviewed HEP, gait mechanics, benefits of mobility, safety with mobility    Acceptance, E,D, VU,NR by MJ at 9/10/2019  9:05 AM    Comment:  Reviewed anterior hip precautions, gait mechanics, benefits of mobility    Acceptance, E,D, VU,NR by LR at 9/9/2019  5:05 PM    Comment:  Educated on anterior hip precautions, weight bearing status, correct  supine to sit t/f technique, correct sit<->stand t/f technique, correct gait mechanics, and progression of POC.   Family Acceptance, E,D, NR by AA at 9/12/2019 11:42 AM    Acceptance, E,D, VU,NR by LR at 9/9/2019  5:05 PM    Comment:  Educated on anterior hip precautions, weight bearing status, correct supine to sit t/f technique, correct sit<->stand t/f technique, correct gait mechanics, and progression of POC.   Significant Other Acceptance, E,D, VU,NR by MJ at 9/11/2019  9:59 AM    Comment:  Reviewed HEP, gait mechanics, benefits of mobility, safety with mobility   Other Eager, E,D, NR by CT at 9/14/2019  4:17 PM                   Point: Body mechanics (In Progress)     Learning Progress Summary           Patient Acceptance, E,D, VU,NR by  at 9/16/2019 10:20 AM    Comment:  Reviewed HEP, gait mechanics, benefits of mobility    Acceptance, E,D, VU,DU,NR by CT at 9/15/2019  9:44 AM    Eager, E,D, NR by CT at 9/14/2019  4:17 PM    Acceptance, E,D, VU,NR,DU by CT at 9/14/2019  1:33 PM    Eager, E, VU,NR by SC at 9/13/2019  3:41 PM    Comment:  REVIEWED BENEFITS OF ACTIVITY    Acceptance, E, VU by SC at 9/13/2019 10:17 AM    Comment:  REVIEWED BENEFITS OF ACTIVITY    Acceptance, E,D, VU,NR by AA at 9/12/2019  4:08 PM    Acceptance, E,D, NR by AA at 9/12/2019 11:42 AM    Acceptance, E,D, VU,NR by  at 9/11/2019  9:59 AM    Comment:  Reviewed HEP, gait mechanics, benefits of mobility, safety with mobility    Acceptance, E,D, VU,NR by CLAY at 9/10/2019  3:07 PM    Comment:  Reviewed HEP, gait mechanics, benefits of mobility, safety with mobility    Acceptance, E,D, VU,NR by MJ at 9/10/2019  9:05 AM    Comment:  Reviewed anterior hip precautions, gait mechanics, benefits of mobility    Acceptance, E,D, VU,NR by REJI at 9/9/2019  5:05 PM    Comment:  Educated on anterior hip precautions, weight bearing status, correct supine to sit t/f technique, correct sit<->stand t/f technique, correct gait mechanics, and progression of  POC.   Family Acceptance, E,D, VU,NR by AA at 9/12/2019  4:08 PM    Acceptance, E,D, NR by AA at 9/12/2019 11:42 AM    Acceptance, E,D, VU,NR by LR at 9/9/2019  5:05 PM    Comment:  Educated on anterior hip precautions, weight bearing status, correct supine to sit t/f technique, correct sit<->stand t/f technique, correct gait mechanics, and progression of POC.   Significant Other Acceptance, E,D, VU,NR by MJ at 9/11/2019  9:59 AM    Comment:  Reviewed HEP, gait mechanics, benefits of mobility, safety with mobility   Other Eager, E,D, NR by CT at 9/14/2019  4:17 PM                   Point: Precautions (In Progress)     Learning Progress Summary           Patient Acceptance, E,D, VU,NR by  at 9/16/2019 10:20 AM    Comment:  Reviewed HEP, gait mechanics, benefits of mobility    Acceptance, E,D, VU,DU,NR by CT at 9/15/2019  9:44 AM    Eager, E,D, NR by CT at 9/14/2019  4:17 PM    Acceptance, E,D, VU,NR,DU by CT at 9/14/2019  1:33 PM    Eager, E, VU,NR by SC at 9/13/2019  3:41 PM    Comment:  REVIEWED BENEFITS OF ACTIVITY    Acceptance, E, VU by SC at 9/13/2019 10:17 AM    Comment:  REVIEWED BENEFITS OF ACTIVITY    Acceptance, E,D, VU,NR by AA at 9/12/2019  4:08 PM    Acceptance, E,D, NR by AA at 9/12/2019 11:42 AM    Acceptance, E,D, VU,NR by  at 9/11/2019  9:59 AM    Comment:  Reviewed HEP, gait mechanics, benefits of mobility, safety with mobility    Acceptance, E,D, VU,NR by MJ at 9/10/2019  3:07 PM    Comment:  Reviewed HEP, gait mechanics, benefits of mobility, safety with mobility    Acceptance, E,D, VU,NR by MJ at 9/10/2019  9:05 AM    Comment:  Reviewed anterior hip precautions, gait mechanics, benefits of mobility    Acceptance, E,D, VU,NR by LR at 9/9/2019  5:05 PM    Comment:  Educated on anterior hip precautions, weight bearing status, correct supine to sit t/f technique, correct sit<->stand t/f technique, correct gait mechanics, and progression of POC.   Family Acceptance, E,D, VU,NR by AA at 9/12/2019   4:08 PM    Acceptance, E,D, NR by AA at 9/12/2019 11:42 AM    Acceptance, E,D, VU,NR by LR at 9/9/2019  5:05 PM    Comment:  Educated on anterior hip precautions, weight bearing status, correct supine to sit t/f technique, correct sit<->stand t/f technique, correct gait mechanics, and progression of POC.   Significant Other Acceptance, E,D, VU,NR by  at 9/11/2019  9:59 AM    Comment:  Reviewed HEP, gait mechanics, benefits of mobility, safety with mobility   Other Eager, E,D, NR by CT at 9/14/2019  4:17 PM                               User Key     Initials Effective Dates Name Provider Type Discipline    SC 06/19/15 -  Kimi Castillo, PT Physical Therapist PT    LR 06/19/15 -  Teresa Blackwell, PT Physical Therapist PT    CT 06/19/15 -  Easton Kumar, PT Physical Therapist PT     04/03/18 -  Henrique Clements, PT Physical Therapist PT    AA 04/02/18 -  Vanessa Milligan, PT Physical Therapist PT                PT Recommendation and Plan     Outcome Summary/Treatment Plan (PT)  Daily Summary of Progress (PT): progress toward functional goals is good  Plan of Care Reviewed With: patient  Progress: improving  Outcome Summary: Pt increased gait distance to 170 feet with CGA and platform walker. Pt with improved strength on R side and demo good particpation with ther ex. Will continue to progress mobility as able.   Outcome Measures     Row Name 09/16/19 1020 09/16/19 0836          How much help from another person do you currently need...    Turning from your back to your side while in flat bed without using bedrails?  3  -MJ  --     Moving from lying on back to sitting on the side of a flat bed without bedrails?  3  -MJ  --     Moving to and from a bed to a chair (including a wheelchair)?  3  -MJ  --     Standing up from a chair using your arms (e.g., wheelchair, bedside chair)?  3  -MJ  --     Climbing 3-5 steps with a railing?  2  -MJ  --     To walk in hospital room?  3  -MJ  --     AM-PAC 6 Clicks  Score (PT)  17  -MJ  --        How much help from another is currently needed...    Putting on and taking off regular lower body clothing?  --  2  -LC     Bathing (including washing, rinsing, and drying)  --  2  -LC     Toileting (which includes using toilet bed pan or urinal)  --  2  -LC     Putting on and taking off regular upper body clothing  --  2  -LC     Taking care of personal grooming (such as brushing teeth)  --  3  -LC     Eating meals  --  3  -LC     AM-PAC 6 Clicks Score (OT)  --  14  -LC        Functional Assessment    Outcome Measure Options  AM-PAC 6 Clicks Basic Mobility (PT)  -  --       User Key  (r) = Recorded By, (t) = Taken By, (c) = Cosigned By    Initials Name Provider Type    Henrique Alfaro, PT Physical Therapist    LC Teresa Adhikari, OT Occupational Therapist         Time Calculation:   PT Charges     Row Name 09/16/19 1020             Time Calculation    Start Time  1020  -MJ      PT Received On  09/16/19  -      PT Goal Re-Cert Due Date  09/19/19  -         Time Calculation- PT    Total Timed Code Minutes- PT  25 minute(s)  -MJ         Timed Charges    48262 - PT Therapeutic Exercise Minutes  11  -MJ      44351 - Gait Training Minutes   12  -MJ      49203 - PT Therapeutic Activity Minutes  2  -MJ        User Key  (r) = Recorded By, (t) = Taken By, (c) = Cosigned By    Initials Name Provider Type    Henrique Alfaro, PT Physical Therapist        Therapy Charges for Today     Code Description Service Date Service Provider Modifiers Qty    [id number]  PT THER PROC EA 15 MIN 9/16/2019 Henrique Clements, PT GP 1    [id number] HC GAIT TRAINING EA 15 MIN 9/16/2019 Henrique Clements, PT GP 1          PT G-Codes  Outcome Measure Options: AM-PAC 6 Clicks Basic Mobility (PT)  AM-PAC 6 Clicks Score (PT): 17  AM-PAC 6 Clicks Score (OT): 14  Modified [place] Scale: 4 - Moderately severe disability.  Unable to walk without assistance, and unable to attend to own bodily needs without assistance.    Henrique  Starr, PT  9/16/2019

## 2019-09-16 NOTE — THERAPY TREATMENT NOTE
Acute Care - Physical Therapy Treatment Note  [facility]     Patient Name: Akua Torres  : 1945  MRN: [id number]  Today's Date: 2019  Onset of Illness/Injury or Date of Surgery: 19     Referring Physician: WILFREDO Dale    Admit Date: 2019    Visit Dx:    ICD-10-CM ICD-9-CM   1. Impaired functional mobility, balance, gait, and endurance Z74.09 V49.89   2. Impaired mobility and ADLs Z74.09 799.89   3. 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 (CMS/HCC) I63.232 433.11     Patient Active Problem List   Diagnosis   • Degenerative disc disease, lumbar   • Lumbar stenosis with neurogenic claudication   • History of lumbar fusion   • Spondylosis of lumbar region without myelopathy or radiculopathy   • Mild obesity   • Physical deconditioning   • Idiopathic gout   • Anxiety and depression   • Greater trochanteric bursitis of both hips   • Status post total bilateral knee replacement   • Back pain   • Essential hypertension   • Prediabetes   • S/P lumbar spinal fusion   • Leukocytosis, likely reactive   • Depression   • Arthritis of right hip   • Paroxysmal atrial fibrillation (CMS/HCC)   • YUNIOR treated with BiPAP   • Status post total replacement of right hip   • Acute blood loss anemia, mild, asymptomatic   • 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 (CMS/HCC)   • Supraventricular tachycardia (CMS/HCC)       Therapy Treatment    Rehabilitation Treatment Summary     Row Name 19 1410 19 1020 19 0836       Treatment Time/Intention    Discipline  physical therapist  -MJ  physical therapist  -MJ  occupational therapist  -LC    Document Type  therapy note (daily note)  -MJ  therapy note (daily note)  -MJ  therapy note (daily note)  -LC    Subjective Information  complains of;fatigue  -MJ  no complaints  -MJ  no complaints  -LC    Mode of Treatment  physical therapy  -MJ  physical therapy  -MJ  individual therapy;occupational therapy   -LC    Patient/Family Observations  Pt sitting UIC  -MJ  Pt sitting UIC  -MJ  Pt. supine in bed, IV intact   -LC    Care Plan Review  patient/other agree to care plan  -MJ  patient/other agree to care plan  -MJ  patient/other agree to care plan  -LC    Patient Effort  excellent  -MJ  excellent  -MJ  good  -LC    Existing Precautions/Restrictions  fall;right;hip, anterior;other (see comments) R sided weakness acute CVA  -MJ  fall;right;hip, anterior;other (see comments) R sided weakness acute CVA  -MJ  fall;right;hip, anterior  -LC    Recorded by [MJ] Henrique Clements, PT 09/16/19 1448 [MJ] Henrique Clements, PT 09/16/19 1053 [LC] Teresa Adhikari, OT 09/16/19 0928    Row Name 09/16/19 0836             Vital Signs    Pre Patient Position  Supine  -LC      Intra Patient Position  Sitting  -LC      Post Patient Position  Sitting  -LC      Recorded by [LC] Teresa Adhikari, OT 09/16/19 0928      Row Name 09/16/19 0836             Cognitive Assessment/Intervention    Additional Documentation  Cognitive Assessment/Intervention (Group)  -LC      Recorded by [LC] Teresa Adhikari, OT 09/16/19 0928      Row Name 09/16/19 1410 09/16/19 1020 09/16/19 0836       Cognitive Assessment/Intervention- PT/OT    Affect/Mental Status (Cognitive)  WFL  -MJ  WFL  -MJ  WFL  -LC    Orientation Status (Cognition)  oriented x 4  -MJ  oriented x 4  -MJ  oriented x 4  -LC    Follows Commands (Cognition)  WFL  -MJ  WFL  -MJ  follows one step commands;over 90% accuracy;verbal cues/prompting required  -LC    Recorded by [MJ] Henrique Clements, PT 09/16/19 1448 [MJ] Henrique Clements, PT 09/16/19 1053 [LC] Teresa Adhikari, OT 09/16/19 0928    Row Name 09/16/19 1410 09/16/19 0836          Safety Issues, Functional Mobility    Safety Issues Affecting Function (Mobility)  --  safety precaution awareness  -LC     Impairments Affecting Function (Mobility)  pain;range of motion (ROM);strength  -MJ  endurance/activity tolerance;range of motion (ROM);strength  -LC     Recorded by  [MJ] Henrique Clements, PT 09/16/19 1448 [LC] Teresa Adhikari, OT 09/16/19 0928     Row Name 09/16/19 1410 09/16/19 0836          Mobility Assessment/Intervention    Extremity Weight-bearing Status  --  right lower extremity  -LC     Right Lower Extremity (Weight-bearing Status)  weight-bearing as tolerated (WBAT)  -MJ  weight-bearing as tolerated (WBAT)  -LC     Recorded by [MJ] Henrique Clements, PT 09/16/19 1448 [LC] Teresa Adhikari, OT 09/16/19 0928     Row Name 09/16/19 1410 09/16/19 1020 09/16/19 0836       Bed Mobility Assessment/Treatment    Bed Mobility Assessment/Treatment  --  --  rolling left;rolling right;supine-sit  -    [place] Level (Bed Mobility)  --  --  minimum assist (75% patient effort);2 person assist  -LC    Rolling Left [place] (Bed Mobility)  --  --  minimum assist (75% patient effort);2 person assist  -LC    Rolling Right [place] (Bed Mobility)  --  --  minimum assist (75% patient effort)  -    Supine-Sit [place] (Bed Mobility)  not tested Pt UI  -MJ  --  moderate assist (50% patient effort);2 person assist  -LC    Sit-Supine [place] (Bed Mobility)  minimum assist (75% patient effort);verbal cues  -  --  --    Bed Mobility, Safety Issues  decreased use of legs for bridging/pushing  -MJ  --  decreased use of arms for pushing/pulling;decreased use of legs for bridging/pushing  -    Assistive Device (Bed Mobility)  bed rails;head of bed elevated  -  --  bed rails;head of bed elevated  -    Comment (Bed Mobility)  Verbal cues for sequencing, assist with legs into bed  -MJ  NT- pt UI  -MJ  vc's and assist to push up from bed to raise trunk into sitting. Extra time needed to sequence.   -LC    Recorded by [MJ] Henrique Clements, PT 09/16/19 1448 [MJ] Henrique Clements, PT 09/16/19 1053 [LC] eTresa Adhikari, OT 09/16/19 0928    Row Name 09/16/19 1410 09/16/19 1020 09/16/19 0836       Transfer Assessment/Treatment    Transfer Assessment/Treatment  --  --  bed-chair  transfer;sit-stand transfer;stand-sit transfer  -LC    Comment (Transfers)  Verbal cues for correct hand placement and to step R LE out prior to t/f. Brief donned upon standing prior to gait  -MJ  Verbal cues for correct hand placement. Pt able to place R UE on platform without assist and  improved. Donned brief in standing prior to gait  -MJ  vc's to use LUE to push up from surface to stand with platform rwx. Vc's to reach back with BUE to lower to sit. cues to step RLE out to improve comfort with txf.   -LC    Recorded by [MJ] Henrique Clements, PT 09/16/19 1448 [MJ] Henrique Clements, PT 09/16/19 1053 [LC] Teresa Adhikari, OT 09/16/19 0928    Row Name 09/16/19 0836             Bed-Chair Transfer    Bed-Chair [place] (Transfers)  contact guard;verbal cues;2 person assist  -LC      Assistive Device (Bed-Chair Transfers)  walker, rolling platform  -LC      Recorded by [LC] Teresa Adhikari, OT 09/16/19 0928      Row Name 09/16/19 1410 09/16/19 1020 09/16/19 0836       Sit-Stand Transfer    Sit-Stand [place] (Transfers)  contact guard;verbal cues  -MJ  contact guard;verbal cues  -MJ  contact guard;verbal cues  -LC    Assistive Device (Sit-Stand Transfers)  walker, rolling platform  -MJ  walker, rolling platform  -MJ  walker, rolling platform  -LC    Recorded by [MJ] Henrique Clements, PT 09/16/19 1448 [MJ] Henrique Clements, PT 09/16/19 1053 [LC] Teresa Adhikari, OT 09/16/19 0928    Row Name 09/16/19 1410 09/16/19 1020 09/16/19 0836       Stand-Sit Transfer    Stand-Sit [place] (Transfers)  contact guard;verbal cues  -MJ  contact guard;verbal cues  -MJ  contact guard;verbal cues  -LC    Assistive Device (Stand-Sit Transfers)  walker, rolling platform  -MJ  walker, rolling platform  -MJ  walker, rolling platform  -LC    Recorded by [MJ] Henrique Clements, PT 09/16/19 1448 [MJ] Henrique Clements, PT 09/16/19 1053 [LC] Teresa Adhikari, OT 09/16/19 0928    Row Name 09/16/19 1410 09/16/19 1020          Gait/Stairs Assessment/Training     50232 - Gait Training Minutes   12  -MJ  12  -MJ     [place] Level (Gait)  contact guard;verbal cues  -MJ  contact guard;verbal cues  -MJ     Assistive Device (Gait)  walker, rolling platform  -MJ  walker, rolling platform  -MJ     Distance in Feet (Gait)  210  -MJ  170  -MJ     Pattern (Gait)  step-through  -MJ  step-through  -MJ     Deviations/Abnormal Patterns (Gait)  right sided deviations;antalgic;bilateral deviations;nixon decreased;stride length decreased  -MJ  right sided deviations;antalgic;bilateral deviations;nixon decreased;stride length decreased  -MJ     Bilateral Gait Deviations  forward flexed posture;heel strike decreased;weight shift ability decreased  -MJ  forward flexed posture;heel strike decreased  -MJ     Comment (Gait/Stairs)  Pt demo step through gait pattern at slow pace. Verbal cues to keep L hand on RW, pt with decreased safety removing hand during gait. Cues for upright posture and to stay in middle of RW. Gait limited by pain and fatigue  -MJ  Pt demo step through gait pattern at slow pace. Verbal cues for upright posture and to stay in middle of RW. As pt fatigues requires cues to bend knee during swing to clear foot. No knee buckling noted. Gait limited by fatigue  -MJ     Recorded by [MJ] Henrique Clements, PT 09/16/19 1448 [MJ] Henrique Clements, PT 09/16/19 1053     Row Name 09/16/19 0836             ADL Assessment/Intervention    62681 - OT Self Care/Mgmt Minutes  29  -LC      BADL Assessment/Intervention  grooming;toileting;lower body dressing  -LC      Recorded by [LC] Teresa Adhikari, OT 09/16/19 0928      Row Name 09/16/19 0836             Lower Body Dressing Assessment/Training    Lower Body Dressing [place] Level  don;socks;dependent (less than 25% patient effort)  -LC      Lower Body Dressing Position  supine  -LC      Recorded by [LC] Teresa Adhikari, OT 09/16/19 0928      Row Name 09/16/19 0836             Grooming Assessment/Training    [place] Level (Grooming)   set up;hair care, combing/brushing;wash face, hands  -LC      Grooming Position  sitting up in bed  -LC      Recorded by [LC] Teresa Adhikari, OT 09/16/19 0928      Row Name 09/16/19 0836             Toileting Assessment/Training    [place] Level (Toileting)  perform perineal hygiene;dependent (less than 25% patient effort);two person assist required  -LC      Toileting Position  supine  -LC      Comment (Toileting)  Pt. DEP for BM clean up bed lvl. Extra time needed to complete task. Assist of 2 to roll left.     -LC      Recorded by [LC] Teresa Adhikari, OT 09/16/19 0928      Row Name 09/16/19 0836             BADL Safety/Performance    Impairments, BADL Safety/Performance  balance;endurance/activity tolerance;coordination;grasp/prehension;range of motion;trunk/postural control  -LC      Recorded by [LC] Teresa Adhikari, OT 09/16/19 0928      Row Name 09/16/19 1410 09/16/19 1020          Therapeutic Exercise    37938 - PT Therapeutic Exercise Minutes  10  -MJ  11  -MJ     00948 - PT Therapeutic Activity Minutes  4  -MJ  2  -MJ     Recorded by [MJ] Henrique Clements, PT 09/16/19 1448 [MJ] Henrique Clements, PT 09/16/19 1053     Row Name 09/16/19 1410 09/16/19 1020          Therapeutic Exercise    Lower Extremity (Therapeutic Exercise)  gluteal sets;heel slides, right;LAQ (long arc quad), right;quad sets, right;SLR (straight leg raise), right  -MJ  gluteal sets;heel slides, right;LAQ (long arc quad), right;quad sets, right;SLR (straight leg raise), right  -MJ     Lower Extremity Range of Motion (Therapeutic Exercise)  hip flexion/extension, right;hip abduction/adduction, right;ankle dorsiflexion/plantar flexion, right  -MJ  hip flexion/extension, right;hip abduction/adduction, right;ankle dorsiflexion/plantar flexion, right  -MJ     Exercise Type (Therapeutic Exercise)  AAROM (active assistive range of motion)  -MJ  AAROM (active assistive range of motion)  -MJ     Position (Therapeutic Exercise)  seated  -MJ  seated  -MJ      Sets/Reps (Therapeutic Exercise)  15x each  -MJ  15x each  -MJ     Comment (Therapeutic Exercise)  Cues for technique. Vinay w/ SLR  -MJ  Cues for technique. Vinay w/ SLR  -MJ     Recorded by [MJ] Henrqiue Clements, PT 09/16/19 1448 [MJ] Henrique Clements, PT 09/16/19 1053     Row Name 09/16/19 1410 09/16/19 1020 09/16/19 0836       Positioning and Restraints    Pre-Treatment Position  sitting in chair/recliner  -MJ  sitting in chair/recliner  -MJ  in bed  -LC    Post Treatment Position  bed  -MJ  chair  -MJ  chair  -LC    In Bed  notified nsg;supine;call light within reach;encouraged to call for assist;exit alarm on  -MJ  --  --    In Chair  --  notified nsg;reclined;call light within reach;encouraged to call for assist;exit alarm on;on mechanical lift sling  -MJ  notified nsg;reclined;call light within reach;encouraged to call for assist;exit alarm on;with OT;legs elevated  -LC    Recorded by [MJ] Henrique Clements, PT 09/16/19 1448 [MJ] Henrique Clements, PT 09/16/19 1053 [LC] Teresa Adhikari, OT 09/16/19 0928    Row Name 09/16/19 0836             Pain Assessment    Additional Documentation  Pain Scale: Word Pre/Post-Treatment (Group)  -LC      Recorded by [LC] Teresa Adhikari, OT 09/16/19 0928      Row Name 09/16/19 1410 09/16/19 1020 09/16/19 0836       Pain Scale: Numbers Pre/Post-Treatment    Pain Scale: Numbers, Pretreatment  3/10  -MJ  2/10  -MJ  0/10 - no pain  -    Pain Scale: Numbers, Post-Treatment  5/10  -MJ  3/10  -MJ  0/10 - no pain  -LC    Pain Location - Side  Right  -MJ  Right  -MJ  --    Pain Location  hip  -MJ  hip  -MJ  --    Pain Intervention(s)  Repositioned;Ambulation/increased activity  -MJ  Repositioned;Ambulation/increased activity;Cold pack  -MJ  --    Recorded by [MJ] Henrique Clements, PT 09/16/19 1448 [MJ] Henrique Clements, PT 09/16/19 1053 [LC] Teresa Adhikari, CHARLES 09/16/19 0928    Row Name                Wound 09/09/19 1352 Right hip Incision    Wound - Properties Group Date first assessed: 09/09/19 [KD] Time  first assessed: 1352 [KD] Side: Right [KD] Location: hip [KD] Primary Wound Type: Incision [KD] Recorded by:  [BRITT] Blas Pizarro RN 09/09/19 1352    Row Name 09/16/19 1410 09/16/19 1020          Plan of Care Review    Plan of Care Reviewed With  patient  -MJ  patient  -MJ     Recorded by [MJ] Henrique Clements, PT 09/16/19 1448 [MJ] Henrique Clements, PT 09/16/19 1053     Row Name 09/16/19 0836             Outcome Summary/Treatment Plan (OT)    Daily Summary of Progress (OT)  progress toward functional goals is good  -LC      Anticipated Discharge Disposition (OT)  inpatient rehabilitation facility  -      Recorded by [MYRANDA] Teresa Adhikari, OT 09/16/19 0928      Row Name 09/16/19 1410 09/16/19 1020          Outcome Summary/Treatment Plan (PT)    Daily Summary of Progress (PT)  progress toward functional goals is good  -MJ  progress toward functional goals is good  -MJ     Recorded by [MJ] Henrique Clements, PT 09/16/19 1448 [MJ] Henrique Clements, PT 09/16/19 1053       User Key  (r) = Recorded By, (t) = Taken By, (c) = Cosigned By    Initials Name Effective Dates Discipline    MJ Henrique Clements, PT 04/03/18 -  PT    Blas Gray RN 06/18/19 -  Nurse    Teresa Oconnor, OT 07/18/19 -  OT          Wound 09/09/19 1352 Right hip Incision (Active)   Dressing Appearance dry;intact;no drainage 9/16/2019  8:05 AM   Closure Liquid skin adhesive 9/16/2019  8:05 AM   Base dry;clean 9/16/2019  8:05 AM   Drainage Amount scant 9/16/2019  8:05 AM   Dressing Care, Wound open to air 9/16/2019  8:05 AM           Physical Therapy Education     Title: PT OT SLP Therapies (In Progress)     Topic: Physical Therapy (In Progress)     Point: Mobility training (In Progress)     Learning Progress Summary           Patient Acceptance, E,D, VU,NR by CLAY at 9/16/2019  2:10 PM    Comment:  Reviewed anterior hip precautions, HEP, gait mechanics, benefits of mobility, safety with mobility    Acceptance, E,D, VU,NR by CLAY at 9/16/2019 10:20 AM     Comment:  Reviewed HEP, gait mechanics, benefits of mobility    Acceptance, E,D, VU,DU,NR by CT at 9/15/2019  9:44 AM    Eager, E,D, NR by CT at 9/14/2019  4:17 PM    Acceptance, E,D, VU,NR,DU by CT at 9/14/2019  1:33 PM    Eager, E, VU,NR by SC at 9/13/2019  3:41 PM    Comment:  REVIEWED BENEFITS OF ACTIVITY    Acceptance, E, VU by SC at 9/13/2019 10:17 AM    Comment:  REVIEWED BENEFITS OF ACTIVITY    Acceptance, E,D, VU,NR by AA at 9/12/2019  4:08 PM    Acceptance, E,D, NR by AA at 9/12/2019 11:42 AM    Acceptance, E,D, VU,NR by  at 9/11/2019  9:59 AM    Comment:  Reviewed HEP, gait mechanics, benefits of mobility, safety with mobility    Acceptance, E,D, VU,NR by MJ at 9/10/2019  3:07 PM    Comment:  Reviewed HEP, gait mechanics, benefits of mobility, safety with mobility    Acceptance, E,D, VU,NR by  at 9/10/2019  9:05 AM    Comment:  Reviewed anterior hip precautions, gait mechanics, benefits of mobility    Acceptance, E,D, VU,NR by LR at 9/9/2019  5:05 PM    Comment:  Educated on anterior hip precautions, weight bearing status, correct supine to sit t/f technique, correct sit<->stand t/f technique, correct gait mechanics, and progression of POC.   Family Acceptance, E,D, VU,NR by AA at 9/12/2019  4:08 PM    Acceptance, E,D, NR by AA at 9/12/2019 11:42 AM    Acceptance, E,D, VU,NR by LR at 9/9/2019  5:05 PM    Comment:  Educated on anterior hip precautions, weight bearing status, correct supine to sit t/f technique, correct sit<->stand t/f technique, correct gait mechanics, and progression of POC.   Significant Other Acceptance, E,D, VU,NR by  at 9/11/2019  9:59 AM    Comment:  Reviewed HEP, gait mechanics, benefits of mobility, safety with mobility   Other Eager, E,D, NR by CT at 9/14/2019  4:17 PM                   Point: Home exercise program (In Progress)     Learning Progress Summary           Patient Acceptance, ARPITA MCALLISTER, SAVANNAH,NR by CLAY at 9/16/2019  2:10 PM    Comment:  Reviewed anterior hip precautions,  HEP, gait mechanics, benefits of mobility, safety with mobility    Acceptance, E,D, VU,NR by MJ at 9/16/2019 10:20 AM    Comment:  Reviewed HEP, gait mechanics, benefits of mobility    Acceptance, E,D, VU,DU,NR by CT at 9/15/2019  9:44 AM    Eager, E,D, NR by CT at 9/14/2019  4:17 PM    Acceptance, E,D, VU,NR,DU by CT at 9/14/2019  1:33 PM    Eager, E, VU,NR by SC at 9/13/2019  3:41 PM    Comment:  REVIEWED BENEFITS OF ACTIVITY    Acceptance, E, VU by SC at 9/13/2019 10:17 AM    Comment:  REVIEWED BENEFITS OF ACTIVITY    Acceptance, E,D, NR by AA at 9/12/2019 11:42 AM    Acceptance, E,D, VU,NR by  at 9/11/2019  9:59 AM    Comment:  Reviewed HEP, gait mechanics, benefits of mobility, safety with mobility    Acceptance, E,D, VU,NR by CLAY at 9/10/2019  3:07 PM    Comment:  Reviewed HEP, gait mechanics, benefits of mobility, safety with mobility    Acceptance, E,D, VU,NR by  at 9/10/2019  9:05 AM    Comment:  Reviewed anterior hip precautions, gait mechanics, benefits of mobility    Acceptance, E,D, VU,NR by REJI at 9/9/2019  5:05 PM    Comment:  Educated on anterior hip precautions, weight bearing status, correct supine to sit t/f technique, correct sit<->stand t/f technique, correct gait mechanics, and progression of POC.   Family Acceptance, E,D, NR by MYRIAM at 9/12/2019 11:42 AM    Acceptance, E,D, VU,NR by REJI at 9/9/2019  5:05 PM    Comment:  Educated on anterior hip precautions, weight bearing status, correct supine to sit t/f technique, correct sit<->stand t/f technique, correct gait mechanics, and progression of POC.   Significant Other Acceptance, E,D, VU,NR by  at 9/11/2019  9:59 AM    Comment:  Reviewed HEP, gait mechanics, benefits of mobility, safety with mobility   Other Eager, E,D, NR by CT at 9/14/2019  4:17 PM                   Point: Body mechanics (In Progress)     Learning Progress Summary           Patient Acceptance, E,D, VU,NR by  at 9/16/2019  2:10 PM    Comment:  Reviewed anterior hip  precautions, HEP, gait mechanics, benefits of mobility, safety with mobility    Acceptance, E,D, VU,NR by MJ at 9/16/2019 10:20 AM    Comment:  Reviewed HEP, gait mechanics, benefits of mobility    Acceptance, E,D, VU,DU,NR by CT at 9/15/2019  9:44 AM    Eager, E,D, NR by CT at 9/14/2019  4:17 PM    Acceptance, E,D, VU,NR,DU by CT at 9/14/2019  1:33 PM    Eager, E, VU,NR by SC at 9/13/2019  3:41 PM    Comment:  REVIEWED BENEFITS OF ACTIVITY    Acceptance, E, VU by SC at 9/13/2019 10:17 AM    Comment:  REVIEWED BENEFITS OF ACTIVITY    Acceptance, E,D, VU,NR by AA at 9/12/2019  4:08 PM    Acceptance, E,D, NR by AA at 9/12/2019 11:42 AM    Acceptance, E,D, VU,NR by  at 9/11/2019  9:59 AM    Comment:  Reviewed HEP, gait mechanics, benefits of mobility, safety with mobility    Acceptance, E,D, VU,NR by MJ at 9/10/2019  3:07 PM    Comment:  Reviewed HEP, gait mechanics, benefits of mobility, safety with mobility    Acceptance, E,D, VU,NR by  at 9/10/2019  9:05 AM    Comment:  Reviewed anterior hip precautions, gait mechanics, benefits of mobility    Acceptance, E,D, VU,NR by REJI at 9/9/2019  5:05 PM    Comment:  Educated on anterior hip precautions, weight bearing status, correct supine to sit t/f technique, correct sit<->stand t/f technique, correct gait mechanics, and progression of POC.   Family Acceptance, E,D, VU,NR by AA at 9/12/2019  4:08 PM    Acceptance, E,D, NR by AA at 9/12/2019 11:42 AM    Acceptance, E,D, VU,NR by REJI at 9/9/2019  5:05 PM    Comment:  Educated on anterior hip precautions, weight bearing status, correct supine to sit t/f technique, correct sit<->stand t/f technique, correct gait mechanics, and progression of POC.   Significant Other Acceptance, E,D, VU,NR by  at 9/11/2019  9:59 AM    Comment:  Reviewed HEP, gait mechanics, benefits of mobility, safety with mobility   Other [place], E,D, NR by CT at 9/14/2019  4:17 PM                   Point: Precautions (In Progress)     Learning Progress  Summary           Patient Acceptance, E,D, VU,NR by  at 9/16/2019  2:10 PM    Comment:  Reviewed anterior hip precautions, HEP, gait mechanics, benefits of mobility, safety with mobility    Acceptance, E,D, VU,NR by MJ at 9/16/2019 10:20 AM    Comment:  Reviewed HEP, gait mechanics, benefits of mobility    Acceptance, E,D, VU,DU,NR by CT at 9/15/2019  9:44 AM    Eager, E,D, NR by CT at 9/14/2019  4:17 PM    Acceptance, E,D, VU,NR,DU by CT at 9/14/2019  1:33 PM    Eager, E, VU,NR by SC at 9/13/2019  3:41 PM    Comment:  REVIEWED BENEFITS OF ACTIVITY    Acceptance, E, VU by SC at 9/13/2019 10:17 AM    Comment:  REVIEWED BENEFITS OF ACTIVITY    Acceptance, E,D, VU,NR by AA at 9/12/2019  4:08 PM    Acceptance, E,D, NR by AA at 9/12/2019 11:42 AM    Acceptance, E,D, VU,NR by  at 9/11/2019  9:59 AM    Comment:  Reviewed HEP, gait mechanics, benefits of mobility, safety with mobility    Acceptance, E,D, VU,NR by  at 9/10/2019  3:07 PM    Comment:  Reviewed HEP, gait mechanics, benefits of mobility, safety with mobility    Acceptance, E,D, VU,NR by  at 9/10/2019  9:05 AM    Comment:  Reviewed anterior hip precautions, gait mechanics, benefits of mobility    Acceptance, E,D, VU,NR by REJI at 9/9/2019  5:05 PM    Comment:  Educated on anterior hip precautions, weight bearing status, correct supine to sit t/f technique, correct sit<->stand t/f technique, correct gait mechanics, and progression of POC.   Family Acceptance, E,D, VU,NR by AA at 9/12/2019  4:08 PM    Acceptance, E,D, NR by AA at 9/12/2019 11:42 AM    Acceptance, E,D, VU,NR by REJI at 9/9/2019  5:05 PM    Comment:  Educated on anterior hip precautions, weight bearing status, correct supine to sit t/f technique, correct sit<->stand t/f technique, correct gait mechanics, and progression of POC.   Significant Other Acceptance, E,D, VU,NR by CLAY at 9/11/2019  9:59 AM    Comment:  Reviewed HEP, gait mechanics, benefits of mobility, safety with mobility   Other Eager,  E,D, NR by CT at 9/14/2019  4:17 PM                               User Key     Initials Effective Dates Name Provider Type Discipline    SC 06/19/15 -  Kimi Castillo, PT Physical Therapist PT    LR 06/19/15 -  Teresa Blackwell, PT Physical Therapist PT    CT 06/19/15 -  Easton Kumar, PT Physical Therapist PT    MJ 04/03/18 -  Henrique Clements, PT Physical Therapist PT    AA 04/02/18 -  Vanessa Milligan, PT Physical Therapist PT                PT Recommendation and Plan     Outcome Summary/Treatment Plan (PT)  Daily Summary of Progress (PT): progress toward functional goals is good  Plan of Care Reviewed With: patient  Progress: improving  Outcome Summary: Pt increased gait distance to 210 feet with CGA and platform RW. Pt's R sided weakness continues to improve. Pt still required Vinay with bed mobility. Will continue to progress mobility as able.   Outcome Measures     Row Name 09/16/19 1410 09/16/19 1020 09/16/19 0836       How much help from another person do you currently need...    Turning from your back to your side while in flat bed without using bedrails?  3  -MJ  3  -MJ  --    Moving from lying on back to sitting on the side of a flat bed without bedrails?  3  -MJ  3  -MJ  --    Moving to and from a bed to a chair (including a wheelchair)?  3  -MJ  3  -MJ  --    Standing up from a chair using your arms (e.g., wheelchair, bedside chair)?  3  -MJ  3  -MJ  --    Climbing 3-5 steps with a railing?  2  -MJ  2  -MJ  --    To walk in hospital room?  3  -MJ  3  -MJ  --    AM-PAC 6 Clicks Score (PT)  17  -MJ  17  -MJ  --       How much help from another is currently needed...    Putting on and taking off regular lower body clothing?  --  --  2  -LC    Bathing (including washing, rinsing, and drying)  --  --  2  -LC    Toileting (which includes using toilet bed pan or urinal)  --  --  2  -LC    Putting on and taking off regular upper body clothing  --  --  2  -LC    Taking care of personal grooming (such  as brushing teeth)  --  --  3  -LC    Eating meals  --  --  3  -LC    AM-PAC 6 Clicks Score (OT)  --  --  14  -LC       Functional Assessment    Outcome Measure Options  AM-PAC 6 Clicks Basic Mobility (PT)  -MJ  AM-PAC 6 Clicks Basic Mobility (PT)  -MJ  --      User Key  (r) = Recorded By, (t) = Taken By, (c) = Cosigned By    Initials Name Provider Type    Henrique Alfaro, PT Physical Therapist    Teresa Oconnor, OT Occupational Therapist         Time Calculation:   PT Charges     Row Name 09/16/19 1410 09/16/19 1020          Time Calculation    Start Time  1410  -MJ  1020  -MJ     PT Received On  09/16/19  -MJ  09/16/19  -MJ     PT Goal Re-Cert Due Date  09/19/19  -MJ  09/19/19  -MJ        Time Calculation- PT    Total Timed Code Minutes- PT  26 minute(s)  -MJ  25 minute(s)  -MJ        Timed Charges    19577 - PT Therapeutic Exercise Minutes  10  -MJ  11  -MJ     27983 - Gait Training Minutes   12  -MJ  12  -MJ     13485 - PT Therapeutic Activity Minutes  4  -MJ  2  -MJ       User Key  (r) = Recorded By, (t) = Taken By, (c) = Cosigned By    Initials Name Provider Type    Henrique Alfaro, PT Physical Therapist        Therapy Charges for Today     Code Description Service Date Service Provider Modifiers Qty    17567851459 HC PT THER PROC EA 15 MIN 9/16/2019 Henrique Clements, PT GP 1    40955759277 HC GAIT TRAINING EA 15 MIN 9/16/2019 Henrique Clements, PT GP 1    84534052936 HC PT THER PROC EA 15 MIN 9/16/2019 Henrique Clements, PT GP 1    44402016015 HC GAIT TRAINING EA 15 MIN 9/16/2019 Henrique Clements, PT GP 1          PT G-Codes  Outcome Measure Options: AM-PAC 6 Clicks Basic Mobility (PT)  AM-PAC 6 Clicks Score (PT): 17  AM-PAC 6 Clicks Score (OT): 14  Modified [place] Scale: 4 - Moderately severe disability.  Unable to walk without assistance, and unable to attend to own bodily needs without assistance.    Henrique Clements PT  9/16/2019

## 2019-09-16 NOTE — PROGRESS NOTES
"IM progress note      Akua Torres  [id number]  1945     LOS: 7 days     Attending: Darrin New MD    Primary Care Provider: Salty Hernandez MD      Chief Complaint/Reason for visit:  Right hip pain    Subjective   Doing well. Good pain control. Increasing strength and  right hand. Denies f/c/n/v/sob/cp.  ( episode of tachycardia, ? SVT, responded to Lopressor IV, no further episodes)wy  Objective     Visit Vitals  /57 (BP Location: Right arm, Patient Position: Sitting)   Pulse 78   Temp 98.6 °F (37 °C) (Oral)   Resp 16   Ht 162.6 cm (64\")   Wt 86.2 kg (190 lb)   SpO2 96%   BMI 32.61 kg/m²     Temp (24hrs), Av.5 °F (36.9 °C), Min:98.1 °F (36.7 °C), Max:98.9 °F (37.2 °C)      Nutrition: PO    Respiratory: RA    Physical Therapy:   Pt increased gait distance to 170 feet with CGA and platform walker. Pt with improved strength on R side and demo good particpation with ther ex. Will continue to progress mobility as able.     Physical Exam:     General Appearance:    Alert, cooperative, in no acute distress   Head:    Normocephalic, without obvious abnormality, atraumatic    Lungs:     Normal effort, symmetric chest rise, no crepitus, clear to      auscultation bilaterally             Heart:    Regular rhythm and normal rate, normal S1 and S2   Abdomen:     Normal bowel sounds, no masses, no organomegaly, soft        non-tender, non-distended, no guarding, no rebound                tenderness   Extremities:   Right hip Prineo CDI.     Pulses:   Pulses palpable and equal bilaterally   Skin:   No bleeding, bruising or rash   Neurologic:   Cranial nerves 2 - 12 grossly intact.  Improvement in right upper extremity strength including  strength.     Results Review:     I reviewed the patient's new clinical results.   Results from last 7 days   Lab Units 09/15/19  0736 19  1830 19  1839   WBC 10*3/mm3 9.29 10.39 13.95*   HEMOGLOBIN g/dL 9.4* 9.3* 9.4*   HEMATOCRIT % " 29.9* 30.1* 30.2*   PLATELETS 10*3/mm3 232 233 159     Results for ERYN STEVE (MRN [id number]) as of 9/10/2019 10:22   Ref. Range 9/15/2018 01:26   Hemoglobin Latest Ref Range: 11.5 - 15.5 g/dL 12.1   Hematocrit Latest Ref Range: 34.5 - 44.0 % 38.1     Results from last 7 days   Lab Units 09/15/19  0736 09/12/19  0158 09/11/19  0952 09/10/19  0337   SODIUM mmol/L 139  --  136 141   POTASSIUM mmol/L 4.3  --  4.4 5.0   CHLORIDE mmol/L 102  --  103 103   CO2 mmol/L 25.0  --  27.0 27.0   BUN mg/dL 18  --  24* 29*   CREATININE mg/dL 0.92 1.02* 1.09* 1.47*   CALCIUM mg/dL 8.5*  --  7.9* 7.6*   GLUCOSE mg/dL 121*  --  144* 158*     Results for ERYN STEVE (MRN [id number]) as of 9/12/2019 09:18   Ref. Range 9/12/2019 01:58   Hemoglobin A1C Latest Ref Range: 4.80 - 5.60 % 5.20   Total Cholesterol Latest Ref Range: 0 - 200 mg/dL 132   HDL Cholesterol Latest Ref Range: 40 - 60 mg/dL 34 (L)   LDL Cholesterol  Latest Ref Range: 0 - 100 mg/dL 51   VLDL Cholesterol Latest Units: mg/dL 47   Triglycerides Latest Ref Range: 0 - 150 mg/dL 235 (H)   LDL/HDL Ratio Unknown 1.50   Heparin Anti-Xa Latest Ref Range: 0.30 - 0.70 IU/ml 0.10 (L)       Study Result     EXAMINATION: CT HEAD WO CONTRAST- 09/10/2019     INDICATION: Focal neuro deficit, new, fixed or worsening, >6 hours;  Z74.09-Other reduced mobility      TECHNIQUE: CT head without intravenous contrast     The radiation dose reduction device was turned on for each scan per the  ALARA (As Low as Reasonably Achievable) protocol.     COMPARISON: NONE     FINDINGS: Midline structures are symmetric without evidence of mass,  mass effect or midline shift. Ventricles and sulci within normal limits.  No intra-axial hemorrhage or extra-axial fluid collection. Globes and  orbits unremarkable. Visualized paranasal sinuses and mastoid air cells  are grossly clear and well-pneumatized. Calvarium intact.     IMPRESSION:  No acute intracranial abnormality specifically no  midline  shift or hydrocephalus. No intra-axial hemorrhage or extra-axial fluid  collection. MRI may be considered for further evaluation of  acute/subacute infarction if clinically warranted.     D:  09/10/2019  E:  09/10/2019     Study Result     EXAMINATION: MRI BRAIN WO CONTRAST- 09/10/2019     INDICATION: Focal neuro deficit, new, fixed or worsening, >6 hours;  Z74.09-Other reduced mobility     TECHNIQUE: Sagittal and axial T1 axial T2 FLAIR diffusion weighted  images of the brain     COMPARISON: Head CT scan 09/10/2019     FINDINGS: History indicates numbness, loss of right hand  strength.  Improving symptoms.     There is a well-defined area of restricted diffusion approximately 11 mm  in maximal diameter in the posterior left frontal lobe in what appears  to be the prefrontal gyrus, consistent with acute infarct. No definite  acute infarct is seen elsewhere. A couple of punctate areas of increased  T2 signal in the central right frontal white matter at approximately the  same level or at best equivocal for minute infarcts.     There is moderate, mostly nonconfluent central white matter change and  age-appropriate generalized cerebral atrophy. There is no evidence of  mass, mass effect, hemorrhage, hydrocephalus or abnormal extra-axial  collection.     IMPRESSION:  1. 11 mm acute infarct in approximately the area of the left prefrontal  gyrus.  2. Few punctate areas of increased signal in the right frontal white  matter, equivocal for minute acute or recent infarct, possibly just  artifactual.  3. No acute intracranial disease is identified elsewhere.     D:  09/11/2019  E:  09/11/2019     Carotid duplex:  Interpretation Summary     · High-grade left internal carotid artery stenosis. Greather than 70% stenosis. Dense calcified plaque within proximal left internal carotid artery  · Right internal carotid artery stenosis of 50-69%.  · Vertebral artery flow antegrade bilaterally with increase in left sided  peak systolic velocities suugesting possible proximal stenosis     2D echo:    Interpretation Summary     · Left ventricular systolic function is normal. Estimated EF = 60%.  · There were no significant valvular abnormalities.          I reviewed the patient's new imaging including images and reports.    All medications reviewed.     aspirin 81 mg Oral Daily   atenolol 25 mg Oral BID   atorvastatin 80 mg Oral Nightly   clopidogrel 75 mg Oral Daily   docusate sodium 100 mg Oral BID   escitalopram 20 mg Oral Daily   mirtazapine 15 mg Oral Nightly   sodium chloride 10 mL Intravenous Q12H       Assessment/Plan        Left prefrontal gyrus stroke  Severe left internal carotid artery stenosis.    Status post total replacement of right hip    Anxiety and depression    HTN (hypertension)    Renal insufficiency    Leukocytosis, likely reactive    Arthritis of right hip    HO A-fib     YUNIOR treated with BiPAP    Acute blood loss anemia, mild, asymptomatic    RUE weakness/numbness    SVT episode, treated( 9/15)    Plan  1. PT/OT- WBAT RLE  2. Pain control-prns   3. IS-encouraged  4. DVT proph- mechs/ASA  5. Bowel regimen  6. Monitor post-op labs  7. DC planning for [place], possibly Wednesday? CM following    Stroke, RUE weakness ( stroke protocol per )wy  - neuro following  - stat CT head negative, followed by MRI showing left prefrontal gyrus stroke  - continue tele monitor  - cardiology following  - Further eval of possible high grade LICA stenosis, intervention scheduled 9/17  ( 9/13) Upon further discussion with neurosurgery and cardiology, continue dual antiplatelet therapy and heparin GTT until intervention on left carotid artery with stenting this coming Tuesday     Cardiology  - Anticoagulation recommended.   - ECHO, noted.  - follow-up with Dr. Pearl 4 weeks after discharge     RI, resolved     HTN, aifb  - Continue home atenolol   - Monitor BP   - Allow some degree of BP elevation, post stroke.       YUNIOR  - bipap at night      WILFREDO Kramer  09/16/19  12:34 PM     I have personally performed the evaluation on this patient. My history is consistent  with HPI obtained. My exam findings are listed above. I have personally reviewed and discussed the above formulated treatment plan with pt and AH. Diego.

## 2019-09-16 NOTE — PLAN OF CARE
Problem: Hip Arthroplasty (Total, Partial) (Adult)  Goal: Signs and Symptoms of Listed Potential Problems Will be Absent, Minimized or Managed (Hip Arthroplasty)  Outcome: Ongoing (interventions implemented as appropriate)   09/16/19 4118   Goal/Outcome Evaluation   Problems Assessed (Hip Arthroplasty) functional deficit   Problems Present (Hip Arthroplasty) functional deficit

## 2019-09-16 NOTE — PROGRESS NOTES
"                  Clinical Nutrition     Nutrition Assessment  Reason for Visit:   SANDRA    Patient Name: Akua Torres  YOB: 1945  MRN: [id number]  Date of Encounter: 09/16/19 1:31 PM  Admission date: 9/9/2019    Nutrition Assessment   Applicable diagnosis, conditions, procedures    Status post total replacement of right hip    Acute blood loss anemia, mild, asymptomatic    Stroke, RUE weakness - post op     Applicable PMH/ PSxH    Anxiety and depression    Essential hypertension    Leukocytosis, likely reactive    Arthritis of right hip    Paroxysmal atrial fibrillation (CMS/HCC)    YUNIOR treated with BiPAP    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 (CMS/HCC)    Supraventricular tachycardia (CMS/HCC)    CKD- stage III       Reported/Observed/Food/Nutrition Related History:     Patient reports good appetite, eating as much as normally eats @ home.  Not much of a breakfast person.  Denies any chewing or swallowing issues.  No food allergies.  Anticipates discharge soon.     Anthropometrics     Height: 162.6 cm (64\")  Last filed wt: Weight: 86.2 kg (190 lb) (09/09/19 1008)  Weight Method: (CONFIRMED WITH PT)    BMI: BMI (Calculated): 32.6  Obese Class I: 30-34.9kg/m2    Ideal Body Weight (IBW) (kg): 55    Labs reviewed     Results from last 7 days   Lab Units 09/15/19  0736   SODIUM mmol/L 139   POTASSIUM mmol/L 4.3   CHLORIDE mmol/L 102   CO2 mmol/L 25.0   BUN mg/dL 18   CREATININE mg/dL 0.92   CALCIUM mg/dL 8.5*   GLUCOSE mg/dL 121*       Results from last 7 days   Lab Units 09/12/19  2121 09/09/19  2138   GLUCOSE mg/dL 128 174*     Lab Results   Lab Value Date/Time    HGBA1C 5.20 09/12/2019 0158    HGBA1C 5.60 08/29/2019 1058     Medications reviewed   Pertinent: colace, plavix, remeron    Current Nutrition Prescription     PO: Diet Regular  No active supplement orders    Intake:  45% x 5 meals (patient reports this is normal for her)     Nutrition Diagnosis "   9/16  Problem No nutrition diagnosis at this time   Etiology Nutrition assessment (PO Intake)    Signs/Symptoms Patient reports good appetite      Nutrition Intervention     Interventions Goal    General: Maintain nutrition    Nutrition Interventions   1.  Follow treatment progress, Care plan reviewed, Menu provided    Monitor/ Evaluation    Per protocol, PO intake      Will Continue to follow per protocol      Herlinda Butler RD  Time Spent: 20min

## 2019-09-16 NOTE — PLAN OF CARE
Problem: Patient Care Overview  Goal: Plan of Care Review  Outcome: Ongoing (interventions implemented as appropriate)   09/16/19 5182   Plan of Care Review   Progress no change   OTHER   Outcome Summary pt went into SVT around midnight and required IV interventions to convert her back to NSR, otherwise VSS, voids well, NIH 2, will continue to monitor for changes.    Coping/Psychosocial   Plan of Care Reviewed With patient       Problem: Pain, Chronic (Adult)  Goal: Identify Related Risk Factors and Signs and Symptoms  Outcome: Ongoing (interventions implemented as appropriate)      Problem: Hip Arthroplasty (Total, Partial) (Adult)  Goal: Signs and Symptoms of Listed Potential Problems Will be Absent, Minimized or Managed (Hip Arthroplasty)  Outcome: Ongoing (interventions implemented as appropriate)      Problem: Skin Injury Risk (Adult)  Goal: Identify Related Risk Factors and Signs and Symptoms  Outcome: Ongoing (interventions implemented as appropriate)      Problem: Fall Risk (Adult)  Goal: Identify Related Risk Factors and Signs and Symptoms  Outcome: Ongoing (interventions implemented as appropriate)

## 2019-09-16 NOTE — PROGRESS NOTES
HEPARIN INFUSION  Akua Torres is a  74 y.o. female receiving heparin infusion.     Therapy for (VTE/Cardiac):   cardiac  Patient Weight: 86.2 kg  Initial Bolus (Y/N):   no  Any Bolus (Y/N):   yes        Signs or Symptoms of Bleeding: no      Cardiac or Other (Not VTE)   Initial Bolus: 60 units/kg (Max 4,000 units)  Initial rate: 12 units/kg/hr (Max 1,000 units/hr)   Anti-Xa (IU/mL) Bolus Dose Stop Infusion Rate Change Repeat Anti-Xa      ?0.19 60 units/kg 0 hrs Increase rate by   4 units/kg/hr 6 hrs       0.2 - 0.29  30 units/kg 0 hrs Increase rate by 2 units/kg/hr 6 hrs    0.3 - 0.7 0 0 hrs No change 6 hrs      0.71 - 0.99 0  0 hrs Decrease rate by 2 units/kg/hr 6 hrs            ?1 0 Hold 1 hr Decrease rate by 3 units/kg/hr 6 hrs            Results from last 7 days   Lab Units 09/15/19  0736 09/14/19  1830 09/13/19  1047 09/11/19  1839   INR   --   --  1.15 1.25*   HEMOGLOBIN g/dL 9.4* 9.3*  --  9.4*   HEMATOCRIT % 29.9* 30.1*  --  30.2*   PLATELETS 10*3/mm3 232 233  --  159          Date   Time   Anti-Xa Current Rate (Unit/kg/hr) Bolus   (Units) Rate Change   (Unit/kg/hr) New Rate (Unit/kg/hr) Next   Anti-Xa Comments  Pump Check Daily   09/11 18:45 0.1 new No initial -- 11.6 12 MN D/W Berta    0912 0251 0.1 11.6 5000 +3.4 15 0900  RN   9/12 1038 0.2 15 -- +2 17 1800 DW Maribel. Pump verified   9/13 2008 0.14 17 -- +3 20 0400 Restarted at 1400 DW RN   9/14/19 03:50 0.33 20 -- -- 20 10:00 Discussed w/ nurse   9/14 1000 0.36 20 -- -- 20 1500 DW NICHOLE Avila    9/14 1434 0.49 20 -- -- 20 AM labs   NICHOLE Avila    9/15 0736 0.42 20 -- -- 20 AM   labs AUSTIN Taylor; pump weight and rate verified   9/16 0248 0.45 20 -- -- 20 AM labs AUSTIN Jasmine, verified pump weight and rate.       --           --           --           --           --           --           --           --           --           --                                 Radha Echols, PharmD  Pharmacy Resident  9/16/2019  10:56 AM

## 2019-09-16 NOTE — PLAN OF CARE
Problem: Patient Care Overview  Goal: Plan of Care Review  Outcome: Ongoing (interventions implemented as appropriate)   09/16/19 1410   Plan of Care Review   Progress improving   OTHER   Outcome Summary Pt increased gait distance to 210 feet with CGA and platform RW. Pt's R sided weakness continues to improve. Pt still required Vinay with bed mobility. Will continue to progress mobility as able.    Coping/Psychosocial   Plan of Care Reviewed With patient       Problem: Hip Arthroplasty (Total, Partial) (Adult)  Goal: Signs and Symptoms of Listed Potential Problems Will be Absent, Minimized or Managed (Hip Arthroplasty)  Outcome: Ongoing (interventions implemented as appropriate)   09/16/19 1410   Goal/Outcome Evaluation   Problems Assessed (Hip Arthroplasty) functional deficit;pain   Problems Present (Hip Arthroplasty) functional deficit;pain

## 2019-09-16 NOTE — PROGRESS NOTES
"Akua Anthonynington  [id number]  1945    /63 (BP Location: Right arm, Patient Position: Lying)   Pulse 72   Temp 98.1 °F (36.7 °C) (Oral)   Resp 16   Ht 162.6 cm (64\")   Wt 86.2 kg (190 lb)   SpO2 96%   BMI 32.61 kg/m²     Lab Results (last 24 hours)     Procedure Component Value Units Date/Time    Heparin Anti-Xa [[id number]]  (Normal) Collected:  09/16/19 0248    Specimen:  Blood Updated:  09/16/19 0348     Heparin Anti-Xa (UFH) 0.45 IU/ml     BNP [[id number]]  (Normal) Collected:  09/16/19 0004    Specimen:  Blood Updated:  09/16/19 0103     proBNP 463.5 pg/mL     Narrative:       Among patients with dyspnea, NT-proBNP is highly sensitive for the detection of acute congestive heart failure. In addition NT-proBNP of <300 pg/ml effectively rules out acute congestive heart failure with 99% negative predictive value.    Magnesium [[id number]]  (Normal) Collected:  09/16/19 0004    Specimen:  Blood Updated:  09/16/19 0102     Magnesium 2.2 mg/dL           Patient Care Team:  Salty Hernandez MD as PCP - General (Family Medicine)  Leon Hein MD as Consulting Physician (Neurosurgery)  Perkins, Corinne E, PT as Physical Therapist (Physical Therapy)  Cricket Nettles MD as Consulting Physician (Pain Medicine)    SUBJECTIVE  Pain well controlled  Good progress with physical therapy    PHYSICAL EXAM  No major changes to neurovascular status  Much improved right hand active function radial median and ulnar motor groups      Status post total replacement of right hip    Anxiety and depression    Essential hypertension    Leukocytosis, likely reactive    Arthritis of right hip    Paroxysmal atrial fibrillation (CMS/HCC)    YUNIOR treated with BiPAP    Acute blood loss anemia, mild, asymptomatic    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 (CMS/HCC)    Supraventricular tachycardia (CMS/HCC)      PLAN / DISPOSITION:  Overall good improvement after right total hip " replacement 7 days ago.  Good early recovery of motor deficits right hand secondary to left frontal stroke  Carotid artery intervention scheduled for tomorrow.  Probable transfer to [facility] for acute rehab in 2 days    Darrin New MD  09/16/19  10:47 AM

## 2019-09-17 PROBLEM — I63.9 ACUTE CVA (CEREBROVASCULAR ACCIDENT): Status: ACTIVE | Noted: 2019-09-17

## 2019-09-17 LAB
ACT BLD: 169 SECONDS (ref 82–152)
ANION GAP SERPL CALCULATED.3IONS-SCNC: 14 MMOL/L (ref 5–15)
BUN BLD-MCNC: 17 MG/DL (ref 8–23)
BUN/CREAT SERPL: 18.9 (ref 7–25)
CALCIUM SPEC-SCNC: 8.3 MG/DL (ref 8.6–10.5)
CHLORIDE SERPL-SCNC: 102 MMOL/L (ref 98–107)
CO2 SERPL-SCNC: 25 MMOL/L (ref 22–29)
CREAT BLD-MCNC: 0.9 MG/DL (ref 0.57–1)
DEPRECATED RDW RBC AUTO: 48.8 FL (ref 37–54)
ERYTHROCYTE [DISTWIDTH] IN BLOOD BY AUTOMATED COUNT: 14.2 % (ref 12.3–15.4)
GFR SERPL CREATININE-BSD FRML MDRD: 61 ML/MIN/1.73
GLUCOSE BLD-MCNC: 86 MG/DL (ref 65–99)
HCT VFR BLD AUTO: 30.9 % (ref 34–46.6)
HGB BLD-MCNC: 9.3 G/DL (ref 12–15.9)
MCH RBC QN AUTO: 28.4 PG (ref 26.6–33)
MCHC RBC AUTO-ENTMCNC: 30.1 G/DL (ref 31.5–35.7)
MCV RBC AUTO: 94.2 FL (ref 79–97)
NT-PROBNP SERPL-MCNC: 265.1 PG/ML (ref 5–1800)
PLATELET # BLD AUTO: 335 10*3/MM3 (ref 140–450)
PMV BLD AUTO: 9.5 FL (ref 6–12)
POTASSIUM BLD-SCNC: 4 MMOL/L (ref 3.5–5.2)
RBC # BLD AUTO: 3.28 10*6/MM3 (ref 3.77–5.28)
SODIUM BLD-SCNC: 141 MMOL/L (ref 136–145)
UFH PPP CHRO-ACNC: 0.61 IU/ML (ref 0.3–0.7)
WBC NRBC COR # BLD: 10.58 10*3/MM3 (ref 3.4–10.8)

## 2019-09-17 PROCEDURE — 94799 UNLISTED PULMONARY SVC/PX: CPT

## 2019-09-17 PROCEDURE — 25010000002 PHENYLEPHRINE PER 1 ML: Performed by: NEUROLOGICAL SURGERY

## 2019-09-17 PROCEDURE — B3181ZZ FLUOROSCOPY OF BILATERAL INTERNAL CAROTID ARTERIES USING LOW OSMOLAR CONTRAST: ICD-10-PCS | Performed by: NEUROLOGICAL SURGERY

## 2019-09-17 PROCEDURE — 25010000002 HEPARIN (PORCINE) IN NACL 25000-0.45 UT/250ML-% SOLUTION: Performed by: INTERNAL MEDICINE

## 2019-09-17 PROCEDURE — 0 IODIXANOL PER 1 ML: Performed by: NEUROLOGICAL SURGERY

## 2019-09-17 PROCEDURE — 37215 TRANSCATH STENT CCA W/EPS: CPT | Performed by: NEUROLOGICAL SURGERY

## 2019-09-17 PROCEDURE — C1876 STENT, NON-COA/NON-COV W/DEL: HCPCS | Performed by: NEUROLOGICAL SURGERY

## 2019-09-17 PROCEDURE — C1769 GUIDE WIRE: HCPCS | Performed by: NEUROLOGICAL SURGERY

## 2019-09-17 PROCEDURE — C1894 INTRO/SHEATH, NON-LASER: HCPCS | Performed by: NEUROLOGICAL SURGERY

## 2019-09-17 PROCEDURE — 85520 HEPARIN ASSAY: CPT

## 2019-09-17 PROCEDURE — 85347 COAGULATION TIME ACTIVATED: CPT

## 2019-09-17 PROCEDURE — 80048 BASIC METABOLIC PNL TOTAL CA: CPT | Performed by: PHYSICIAN ASSISTANT

## 2019-09-17 PROCEDURE — 99231 SBSQ HOSP IP/OBS SF/LOW 25: CPT | Performed by: NEUROLOGICAL SURGERY

## 2019-09-17 PROCEDURE — 85027 COMPLETE CBC AUTOMATED: CPT | Performed by: NURSE PRACTITIONER

## 2019-09-17 PROCEDURE — C1760 CLOSURE DEV, VASC: HCPCS | Performed by: NEUROLOGICAL SURGERY

## 2019-09-17 PROCEDURE — 97110 THERAPEUTIC EXERCISES: CPT

## 2019-09-17 PROCEDURE — 94660 CPAP INITIATION&MGMT: CPT

## 2019-09-17 PROCEDURE — 36223 PLACE CATH CAROTID/INOM ART: CPT | Performed by: NEUROLOGICAL SURGERY

## 2019-09-17 PROCEDURE — 99232 SBSQ HOSP IP/OBS MODERATE 35: CPT | Performed by: INTERNAL MEDICINE

## 2019-09-17 PROCEDURE — C1884 EMBOLIZATION PROTECT SYST: HCPCS | Performed by: NEUROLOGICAL SURGERY

## 2019-09-17 PROCEDURE — 97116 GAIT TRAINING THERAPY: CPT

## 2019-09-17 PROCEDURE — 25010000002 FENTANYL CITRATE (PF) 100 MCG/2ML SOLUTION: Performed by: NEUROLOGICAL SURGERY

## 2019-09-17 PROCEDURE — 037L3DZ DILATION OF LEFT INTERNAL CAROTID ARTERY WITH INTRALUMINAL DEVICE, PERCUTANEOUS APPROACH: ICD-10-PCS | Performed by: NEUROLOGICAL SURGERY

## 2019-09-17 PROCEDURE — 83880 ASSAY OF NATRIURETIC PEPTIDE: CPT | Performed by: NURSE PRACTITIONER

## 2019-09-17 PROCEDURE — C1725 CATH, TRANSLUMIN NON-LASER: HCPCS | Performed by: NEUROLOGICAL SURGERY

## 2019-09-17 PROCEDURE — 25010000002 HEPARIN (PORCINE) PER 1000 UNITS: Performed by: NEUROLOGICAL SURGERY

## 2019-09-17 DEVICE — XACT CAROTID STENT SYSTEM 10.0 MM X 40 MM TAPERED
Type: IMPLANTABLE DEVICE | Status: FUNCTIONAL
Brand: XACT

## 2019-09-17 RX ORDER — LIDOCAINE HYDROCHLORIDE 10 MG/ML
INJECTION, SOLUTION EPIDURAL; INFILTRATION; INTRACAUDAL; PERINEURAL AS NEEDED
Status: DISCONTINUED | OUTPATIENT
Start: 2019-09-17 | End: 2019-09-17 | Stop reason: HOSPADM

## 2019-09-17 RX ORDER — ONDANSETRON 4 MG/1
4 TABLET, FILM COATED ORAL EVERY 6 HOURS PRN
Status: DISCONTINUED | OUTPATIENT
Start: 2019-09-17 | End: 2019-09-18 | Stop reason: HOSPADM

## 2019-09-17 RX ORDER — PHENYLEPHRINE HCL IN 0.9% NACL 0.5 MG/5ML
.5-3 SYRINGE (ML) INTRAVENOUS
Status: DISCONTINUED | OUTPATIENT
Start: 2019-09-17 | End: 2019-09-18 | Stop reason: HOSPADM

## 2019-09-17 RX ORDER — IODIXANOL 320 MG/ML
INJECTION, SOLUTION INTRAVASCULAR AS NEEDED
Status: DISCONTINUED | OUTPATIENT
Start: 2019-09-17 | End: 2019-09-17 | Stop reason: HOSPADM

## 2019-09-17 RX ORDER — SODIUM CHLORIDE 0.9 % (FLUSH) 0.9 %
10 SYRINGE (ML) INJECTION AS NEEDED
Status: DISCONTINUED | OUTPATIENT
Start: 2019-09-17 | End: 2019-09-17

## 2019-09-17 RX ORDER — ONDANSETRON 2 MG/ML
4 INJECTION INTRAMUSCULAR; INTRAVENOUS EVERY 6 HOURS PRN
Status: DISCONTINUED | OUTPATIENT
Start: 2019-09-17 | End: 2019-09-18 | Stop reason: HOSPADM

## 2019-09-17 RX ORDER — HEPARIN SODIUM 1000 [USP'U]/ML
INJECTION, SOLUTION INTRAVENOUS; SUBCUTANEOUS AS NEEDED
Status: DISCONTINUED | OUTPATIENT
Start: 2019-09-17 | End: 2019-09-17 | Stop reason: HOSPADM

## 2019-09-17 RX ORDER — SODIUM CHLORIDE 0.9 % (FLUSH) 0.9 %
3 SYRINGE (ML) INJECTION EVERY 12 HOURS SCHEDULED
Status: DISCONTINUED | OUTPATIENT
Start: 2019-09-17 | End: 2019-09-17

## 2019-09-17 RX ORDER — FENTANYL CITRATE 50 UG/ML
INJECTION, SOLUTION INTRAMUSCULAR; INTRAVENOUS AS NEEDED
Status: DISCONTINUED | OUTPATIENT
Start: 2019-09-17 | End: 2019-09-17 | Stop reason: HOSPADM

## 2019-09-17 RX ORDER — SODIUM CHLORIDE 0.9 % (FLUSH) 0.9 %
3 SYRINGE (ML) INJECTION EVERY 12 HOURS SCHEDULED
Status: DISCONTINUED | OUTPATIENT
Start: 2019-09-17 | End: 2019-09-18 | Stop reason: HOSPADM

## 2019-09-17 RX ADMIN — SODIUM CHLORIDE, PRESERVATIVE FREE 3 ML: 5 INJECTION INTRAVENOUS at 20:28

## 2019-09-17 RX ADMIN — HYDROCODONE BITARTRATE AND ACETAMINOPHEN 1 TABLET: 5; 325 TABLET ORAL at 14:53

## 2019-09-17 RX ADMIN — HEPARIN SODIUM 20 UNITS/KG/HR: 10000 INJECTION, SOLUTION INTRAVENOUS at 09:17

## 2019-09-17 RX ADMIN — CLOPIDOGREL BISULFATE 75 MG: 75 TABLET ORAL at 09:19

## 2019-09-17 RX ADMIN — MIRTAZAPINE 15 MG: 15 TABLET, FILM COATED ORAL at 20:23

## 2019-09-17 RX ADMIN — ATENOLOL 25 MG: 25 TABLET ORAL at 09:20

## 2019-09-17 RX ADMIN — ASPIRIN 81 MG: 81 TABLET, COATED ORAL at 09:19

## 2019-09-17 RX ADMIN — ATENOLOL 25 MG: 25 TABLET ORAL at 20:23

## 2019-09-17 RX ADMIN — HYDROCODONE BITARTRATE AND ACETAMINOPHEN 1 TABLET: 5; 325 TABLET ORAL at 09:20

## 2019-09-17 RX ADMIN — ATORVASTATIN CALCIUM 80 MG: 40 TABLET, FILM COATED ORAL at 20:23

## 2019-09-17 RX ADMIN — HYDROCODONE BITARTRATE AND ACETAMINOPHEN 1 TABLET: 5; 325 TABLET ORAL at 20:24

## 2019-09-17 NOTE — PROGRESS NOTES
"Akua Anthonynington  [id number]  1945    /77   Pulse 62   Temp 98.2 °F (36.8 °C) (Oral)   Resp 18   Ht 162.6 cm (64\")   Wt 86.2 kg (190 lb)   SpO2 98%   BMI 32.61 kg/m²     Lab Results (last 24 hours)     Procedure Component Value Units Date/Time    Heparin Anti-Xa [[id number]]  (Normal) Collected:  09/17/19 0629    Specimen:  Blood Updated:  09/17/19 0720     Heparin Anti-Xa (UFH) 0.61 IU/ml           Patient Care Team:  Salty Hernandez MD as PCP - General (Family Medicine)  Leon Hein MD as Consulting Physician (Neurosurgery)  Perkins, Corinne E, PT as Physical Therapist (Physical Therapy)  Cricket Nettles MD as Consulting Physician (Pain Medicine)    SUBJECTIVE  Pain well controlled    PHYSICAL EXAM  No major changes to neurovascular status right hip  Ambulated in hallway this morning      Status post total replacement of right hip    Anxiety and depression    Essential hypertension    Leukocytosis, likely reactive    Arthritis of right hip    Paroxysmal atrial fibrillation (CMS/HCC)    YUNIOR treated with BiPAP    Acute blood loss anemia, mild, asymptomatic    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 (CMS/HCC)    Supraventricular tachycardia (CMS/HCC)      PLAN / DISPOSITION:  Carotid stenting planned for today.  [facility]    Darrin New MD  09/17/19  8:43 AM  "

## 2019-09-17 NOTE — PROGRESS NOTES
HOD# : 8    No events last night  Saw patient late last night this note is representative of that care    Status post total replacement of right hip    Anxiety and depression    Essential hypertension    Leukocytosis, likely reactive    Arthritis of right hip    Paroxysmal atrial fibrillation (CMS/HCC)    YUNIOR treated with BiPAP    Acute blood loss anemia, mild, asymptomatic    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 (CMS/HCC)    Supraventricular tachycardia (CMS/HCC)      Temp:  [98.2 °F (36.8 °C)-98.7 °F (37.1 °C)] 98.2 °F (36.8 °C)  Heart Rate:  [62-80] 62  Resp:  [16-22] 18  BP: (128-151)/(58-77) 149/63  I/O last 3 completed shifts:  In: 500 [P.O.:500]  Out: 1750 [Urine:1750]  No intake/output data recorded.  Vital signs were reviewed and documented in the chart      EXAM   Patient appeared in good neurologic function with normal comprehension   CN grossly intact  Moves all extremities to command  She still has right-sided weakness in her arm with dysmetria small amount of slurred speech    CT scan shows no evidence of large evolving stroke        PLAN   Status post hip replacement, postprocedural stroke high-grade incidental left-sided carotid symptomatic stenosis with high surgical risk features including thick neck and recent stroke.  Major comorbidities.  Plan will be for diagnostic angiogram explained risk benefits and expected outcome possible stenting of lesion is possible

## 2019-09-17 NOTE — PLAN OF CARE
Problem: Patient Care Overview  Goal: Plan of Care Review  Outcome: Ongoing (interventions implemented as appropriate)   09/17/19 0446   Plan of Care Review   Progress no change   OTHER   Outcome Summary NIH 1, VSS, voids well, tele NSR, HR remains stable tonight, will continue to monitor for changes.    Coping/Psychosocial   Plan of Care Reviewed With patient       Problem: Pain, Chronic (Adult)  Goal: Identify Related Risk Factors and Signs and Symptoms  Outcome: Ongoing (interventions implemented as appropriate)      Problem: Hip Arthroplasty (Total, Partial) (Adult)  Goal: Signs and Symptoms of Listed Potential Problems Will be Absent, Minimized or Managed (Hip Arthroplasty)  Outcome: Ongoing (interventions implemented as appropriate)      Problem: Skin Injury Risk (Adult)  Goal: Identify Related Risk Factors and Signs and Symptoms  Outcome: Ongoing (interventions implemented as appropriate)      Problem: Fall Risk (Adult)  Goal: Identify Related Risk Factors and Signs and Symptoms  Outcome: Ongoing (interventions implemented as appropriate)

## 2019-09-17 NOTE — PROGRESS NOTES
"IM progress note      Eryn Steve  [id number]  1945     LOS: 8 days     Attending: Darrin New MD    Primary Care Provider: Salty Hernandez MD      Chief Complaint/Reason for visit:  Right hip pain    Subjective   Doing ok. Good pain control. Denies f/c/n/v/sob/cp.    Awaiting procedure when seen. RUE strength feels back to normal     Objective     Visit Vitals  /63   Pulse 62   Temp 98.2 °F (36.8 °C) (Oral)   Resp 18   Ht 162.6 cm (64\")   Wt 86.2 kg (190 lb)   SpO2 98%   BMI 32.61 kg/m²     Temp (24hrs), Av.4 °F (36.9 °C), Min:98.2 °F (36.8 °C), Max:98.7 °F (37.1 °C)      Nutrition: PO    Respiratory: RA    Physical Therapy:   Pt increased gait distance to 250 feet with CGA and rolling platform walker. Pt's pain well controlled today. Will continue to progress mobility as able.     Physical Exam:     General Appearance:    Alert, cooperative, in no acute distress   Head:    Normocephalic, without obvious abnormality, atraumatic    Lungs:     Normal effort, symmetric chest rise, no crepitus, clear to      auscultation bilaterally             Heart:    Regular rhythm and normal rate, normal S1 and S2   Abdomen:     Normal bowel sounds, no masses, no organomegaly, soft        non-tender, non-distended, no guarding, no rebound                tenderness   Extremities:   Right hip Prineo CDI.     Pulses:   Pulses palpable and equal bilaterally   Skin:   No bleeding, bruising or rash   Neurologic:   Cranial nerves 2 - 12 grossly intact.  Improvement in right upper extremity strength including  strength.     Results Review:     I reviewed the patient's new clinical results.   Results from last 7 days   Lab Units 09/15/19  0736 19  1830 19  1839   WBC 10*3/mm3 9.29 10.39 13.95*   HEMOGLOBIN g/dL 9.4* 9.3* 9.4*   HEMATOCRIT % 29.9* 30.1* 30.2*   PLATELETS 10*3/mm3 232 233 159     Results for ERYN STEVE (MRN [id number]) as of 9/10/2019 10:22   Ref. Range " 9/15/2018 01:26   Hemoglobin Latest Ref Range: 11.5 - 15.5 g/dL 12.1   Hematocrit Latest Ref Range: 34.5 - 44.0 % 38.1     Results from last 7 days   Lab Units 09/15/19  0736 09/12/19  0158 09/11/19  0952   SODIUM mmol/L 139  --  136   POTASSIUM mmol/L 4.3  --  4.4   CHLORIDE mmol/L 102  --  103   CO2 mmol/L 25.0  --  27.0   BUN mg/dL 18  --  24*   CREATININE mg/dL 0.92 1.02* 1.09*   CALCIUM mg/dL 8.5*  --  7.9*   GLUCOSE mg/dL 121*  --  144*     Results for ERYN STEVE (MRN [id number]) as of 9/12/2019 09:18   Ref. Range 9/12/2019 01:58   Hemoglobin A1C Latest Ref Range: 4.80 - 5.60 % 5.20   Total Cholesterol Latest Ref Range: 0 - 200 mg/dL 132   HDL Cholesterol Latest Ref Range: 40 - 60 mg/dL 34 (L)   LDL Cholesterol  Latest Ref Range: 0 - 100 mg/dL 51   VLDL Cholesterol Latest Units: mg/dL 47   Triglycerides Latest Ref Range: 0 - 150 mg/dL 235 (H)   LDL/HDL Ratio Unknown 1.50   Heparin Anti-Xa Latest Ref Range: 0.30 - 0.70 IU/ml 0.10 (L)       Study Result     EXAMINATION: CT HEAD WO CONTRAST- 09/10/2019     INDICATION: Focal neuro deficit, new, fixed or worsening, >6 hours;  Z74.09-Other reduced mobility      TECHNIQUE: CT head without intravenous contrast     The radiation dose reduction device was turned on for each scan per the  ALARA (As Low as Reasonably Achievable) protocol.     COMPARISON: NONE     FINDINGS: Midline structures are symmetric without evidence of mass,  mass effect or midline shift. Ventricles and sulci within normal limits.  No intra-axial hemorrhage or extra-axial fluid collection. Globes and  orbits unremarkable. Visualized paranasal sinuses and mastoid air cells  are grossly clear and well-pneumatized. Calvarium intact.     IMPRESSION:  No acute intracranial abnormality specifically no midline  shift or hydrocephalus. No intra-axial hemorrhage or extra-axial fluid  collection. MRI may be considered for further evaluation of  acute/subacute infarction if clinically warranted.      D:  09/10/2019  E:  09/10/2019     Study Result     EXAMINATION: MRI BRAIN WO CONTRAST- 09/10/2019     INDICATION: Focal neuro deficit, new, fixed or worsening, >6 hours;  Z74.09-Other reduced mobility     TECHNIQUE: Sagittal and axial T1 axial T2 FLAIR diffusion weighted  images of the brain     COMPARISON: Head CT scan 09/10/2019     FINDINGS: History indicates numbness, loss of right hand  strength.  Improving symptoms.     There is a well-defined area of restricted diffusion approximately 11 mm  in maximal diameter in the posterior left frontal lobe in what appears  to be the prefrontal gyrus, consistent with acute infarct. No definite  acute infarct is seen elsewhere. A couple of punctate areas of increased  T2 signal in the central right frontal white matter at approximately the  same level or at best equivocal for minute infarcts.     There is moderate, mostly nonconfluent central white matter change and  age-appropriate generalized cerebral atrophy. There is no evidence of  mass, mass effect, hemorrhage, hydrocephalus or abnormal extra-axial  collection.     IMPRESSION:  1. 11 mm acute infarct in approximately the area of the left prefrontal  gyrus.  2. Few punctate areas of increased signal in the right frontal white  matter, equivocal for minute acute or recent infarct, possibly just  artifactual.  3. No acute intracranial disease is identified elsewhere.     D:  09/11/2019  E:  09/11/2019     Carotid duplex:  Interpretation Summary     · High-grade left internal carotid artery stenosis. Greather than 70% stenosis. Dense calcified plaque within proximal left internal carotid artery  · Right internal carotid artery stenosis of 50-69%.  · Vertebral artery flow antegrade bilaterally with increase in left sided peak systolic velocities suugesting possible proximal stenosis     2D echo:    Interpretation Summary     · Left ventricular systolic function is normal. Estimated EF = 60%.  · There were no  significant valvular abnormalities.        Study Result     EXAMINATION: CT HEAD WO CONTRAST- 09/16/2019     INDICATION: Stroke; Z74.09-Other reduced mobility; I63.232-Cerebral  infarction due to unspecified occlusion or stenosis of left carotid  arteries; generalized weakness     TECHNIQUE: Multiple axial CT imaging was obtained of the head from skull  base to skull vertex without the administration of intravenous contrast.     The radiation dose reduction device was turned on for each scan per the  ALARA (As Low as Reasonably Achievable) protocol.     COMPARISON: 09/12/2019     FINDINGS: There is minimal low-density area seen within the left  parietal lobe near the vertex. Remainder of the brain parenchyma is  grossly unremarkable. No hemorrhage or hydrocephalus. No mass, mass  effect, or midline shift.     IMPRESSION:  Evolving area of infarction seen near the left vertex in the  parietal lobe. Findings have slightly evolved in the interval. No  hemorrhage. There are no other new findings.     D:  09/16/2019  E:  09/17/2019       I reviewed the patient's new imaging including images and reports.    All medications reviewed.     aspirin 81 mg Oral Daily   atenolol 25 mg Oral BID   atorvastatin 80 mg Oral Nightly   clopidogrel 75 mg Oral Daily   docusate sodium 100 mg Oral BID   escitalopram 20 mg Oral Daily   mirtazapine 15 mg Oral Nightly   sodium chloride 10 mL Intravenous Q12H       Assessment/Plan        Left prefrontal gyrus stroke  Severe left internal carotid artery stenosis.    Status post total replacement of right hip    Anxiety and depression    HTN (hypertension)    Renal insufficiency    Leukocytosis, likely reactive    Arthritis of right hip    HO A-fib     YUNIOR treated with BiPAP    Acute blood loss anemia, mild, asymptomatic    RUE weakness/numbness    SVT episode, treated( 9/15)    Plan  1. PT/OT- WBAT RLE  2. Pain control-prns   3. IS-encouraged  4. DVT proph- mechs/ASA  5. Bowel regimen  6.  Monitor post-op labs  7. [facility] CM following    Stroke, RUE weakness ( stroke protocol per )[facility]  - neuro following  - stat CT head negative, followed by MRI showing left prefrontal gyrus stroke  - continue tele monitor  - Further eval of possible high grade LICA stenosis, intervention scheduled 9/17  ( 9/13) Upon further discussion with neurosurgery and cardiology, continue dual antiplatelet therapy and heparin GTT until intervention on left carotid artery with stenting this coming Tuesday     Neurosurgery- carotid stenting today    Cardiology  - ECHO, noted.  - follow-up with Dr. Pearl 4 weeks after discharge     RI, resolved     HTN, aifb  - Continue home atenolol   - Monitor BP   - Allow some degree of BP elevation, post stroke.      YUNIOR  - bipap at night      Scribed for Dr. Galo by WILFREDO Kramer. 9/17/2019  11:32 AM    WILFREDO Kramer  09/17/19  11:28 AM     IBrit MD, personally performed the services described in this documentation as scribed by WILFREDO Kramer ,and it is both accurate and complete. wy.

## 2019-09-17 NOTE — THERAPY TREATMENT NOTE
Acute Care - Physical Therapy Treatment Note  [facility]     Patient Name: Akua Torres  : 1945  MRN: [id number]  Today's Date: 2019  Onset of Illness/Injury or Date of Surgery: 19     Referring Physician: WILFREDO Dale    Admit Date: 2019    Visit Dx:    ICD-10-CM ICD-9-CM   1. Impaired functional mobility, balance, gait, and endurance Z74.09 V49.89   2. Impaired mobility and ADLs Z74.09 799.89   3. 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 (CMS/HCC) I63.232 433.11     Patient Active Problem List   Diagnosis   • Degenerative disc disease, lumbar   • Lumbar stenosis with neurogenic claudication   • History of lumbar fusion   • Spondylosis of lumbar region without myelopathy or radiculopathy   • Mild obesity   • Physical deconditioning   • Idiopathic gout   • Anxiety and depression   • Greater trochanteric bursitis of both hips   • Status post total bilateral knee replacement   • Back pain   • Essential hypertension   • Prediabetes   • S/P lumbar spinal fusion   • Leukocytosis, likely reactive   • Depression   • Arthritis of right hip   • Paroxysmal atrial fibrillation (CMS/HCC)   • YUNIOR treated with BiPAP   • Status post total replacement of right hip   • Acute blood loss anemia, mild, asymptomatic   • 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 (CMS/HCC)   • Supraventricular tachycardia (CMS/HCC)       Therapy Treatment    Rehabilitation Treatment Summary     Row Name 19 0824             Treatment Time/Intention    Discipline  physical therapist  -MJ      Document Type  therapy note (daily note)  -MJ      Subjective Information  complains of;fatigue  -MJ      Mode of Treatment  physical therapy  -MJ      Patient/Family Observations  Pt supine in bed  -MJ      Care Plan Review  patient/other agree to care plan  -MJ      Patient Effort  excellent  -      Existing Precautions/Restrictions  fall;right;hip, anterior;other (see  comments) R sided weakness acute CVA  -MJ      Recorded by [MJ] Henrique Clements, PT 09/17/19 0919      Row Name 09/17/19 0824             Cognitive Assessment/Intervention- PT/OT    Affect/Mental Status (Cognitive)  WFL  -MJ      Orientation Status (Cognition)  oriented x 4  -MJ      Follows Commands (Cognition)  WFL  -MJ      Recorded by [MJ] Henrique Clements, PT 09/17/19 0919      Row Name 09/17/19 0824             Safety Issues, Functional Mobility    Impairments Affecting Function (Mobility)  pain;strength;range of motion (ROM)  -MJ      Recorded by [MJ] Henrique Clements, PT 09/17/19 0919      Row Name 09/17/19 0824             Mobility Assessment/Intervention    Right Lower Extremity (Weight-bearing Status)  weight-bearing as tolerated (WBAT)  -MJ      Recorded by [MJ] Henrique Clements, PT 09/17/19 0919      Row Name 09/17/19 0824             Bed Mobility Assessment/Treatment    Rolling Left [place] (Bed Mobility)  minimum assist (75% patient effort);verbal cues  -MJ      Rolling Right [place] (Bed Mobility)  contact guard;verbal cues  -MJ      Supine-Sit [place] (Bed Mobility)  minimum assist (75% patient effort);verbal cues  -MJ      Sit-Supine [place] (Bed Mobility)  minimum assist (75% patient effort);verbal cues  -MJ      Bed Mobility, Safety Issues  decreased use of arms for pushing/pulling;decreased use of legs for bridging/pushing  -MJ      Assistive Device (Bed Mobility)  bed rails;head of bed elevated  -MJ      Comment (Bed Mobility)  Pt rolled to have brief donned prior to mobility. Verbal cues for sequencing. Pt used L LE under R LE to move legs off EOB. Pt required UE support to bring trunk to sitting. Assist with legs into bed  -MJ      Recorded by [MJ] Henrique Clements, PT 09/17/19 0919      Row Name 09/17/19 0824             Transfer Assessment/Treatment    Comment (Transfers)  Verbal cues for correct hand placement.   -MJ      Recorded by [MJ] Henrique Clements, PT 09/17/19 0919      Row Name  09/17/19 0824             Sit-Stand Transfer    Sit-Stand Mobile (Transfers)  contact guard;verbal cues  -MJ      Assistive Device (Sit-Stand Transfers)  walker, rolling platform  -MJ      Recorded by [MJ] Henrique Clements, PT 09/17/19 0919      Row Name 09/17/19 0824             Stand-Sit Transfer    Stand-Sit Mobile (Transfers)  contact guard;verbal cues  -MJ      Assistive Device (Stand-Sit Transfers)  walker, rolling platform  -MJ      Recorded by [MJ] Henrique Clements, PT 09/17/19 0919      Row Name 09/17/19 0824             Gait/Stairs Assessment/Training    17466 - Gait Training Minutes   10  -MJ      Mobile Level (Gait)  contact guard;verbal cues  -MJ      Assistive Device (Gait)  walker, rolling platform  -MJ      Distance in Feet (Gait)  250  -MJ      Pattern (Gait)  step-through  -MJ      Deviations/Abnormal Patterns (Gait)  right sided deviations;antalgic;bilateral deviations;nixon decreased;stride length decreased  -MJ      Bilateral Gait Deviations  forward flexed posture;heel strike decreased;weight shift ability decreased  -MJ      Comment (Gait/Stairs)  Pt demo step through gait pattern at slow pace. Verbal cues for increased LE weight bearing, improvement noted. Gait limited by fatigue  -MJ      Recorded by [MJ] Henrique Clements, PT 09/17/19 0919      Row Name 09/17/19 0824             Therapeutic Exercise    97369 - PT Therapeutic Exercise Minutes  10  -MJ      28210 - PT Therapeutic Activity Minutes  3  -MJ      Recorded by [MJ] Henrique Clements, PT 09/17/19 0919      Row Name 09/17/19 0824             Therapeutic Exercise    Lower Extremity (Therapeutic Exercise)  gluteal sets;heel slides, right;LAQ (long arc quad), right;quad sets, right;SLR (straight leg raise), right  -MJ      Lower Extremity Range of Motion (Therapeutic Exercise)  hip abduction/adduction, right;ankle dorsiflexion/plantar flexion, right  -MJ      Exercise Type (Therapeutic Exercise)  AAROM (active assistive range of  motion)  -MJ      Position (Therapeutic Exercise)  supine  -MJ      Sets/Reps (Therapeutic Exercise)  15x each  -MJ      Comment (Therapeutic Exercise)  Cues for technique. Vinay w/ SLR  -MJ      Recorded by [MJ] Henrique Clements, PT 09/17/19 0919      Row Name 09/17/19 0824             Positioning and Restraints    Pre-Treatment Position  in bed  -MJ      Post Treatment Position  bed  -MJ      In Bed  notified nsg;supine;call light within reach;encouraged to call for assist;exit alarm on  -MJ      Recorded by [MJ] Henrique Clements, PT 09/17/19 0919      Row Name 09/17/19 0824             Pain Scale: Numbers Pre/Post-Treatment    Pain Scale: Numbers, Pretreatment  3/10  -MJ      Pain Scale: Numbers, Post-Treatment  2/10  -MJ      Pain Location - Side  Right  -MJ      Pain Location  hip  -MJ      Pain Intervention(s)  Repositioned;Ambulation/increased activity  -MJ      Recorded by [MJ] Henrique Clements, PT 09/17/19 0919      Row Name                Wound 09/09/19 1352 Right hip Incision    Wound - Properties Group Date first assessed: 09/09/19 [KD] Time first assessed: 1352 [KD] Side: Right [KD] Location: hip [KD] Primary Wound Type: Incision [KD] Recorded by:  [KD] Blas Pizarro RN 09/09/19 1352    Row Name 09/17/19 0824             Plan of Care Review    Plan of Care Reviewed With  patient  -MJ      Recorded by [MJ] Henrique Clements, PT 09/17/19 0919      Row Name 09/17/19 0824             Outcome Summary/Treatment Plan (PT)    Daily Summary of Progress (PT)  progress toward functional goals is good  -MJ      Recorded by [MJ] Henrique Clements, PT 09/17/19 0919        User Key  (r) = Recorded By, (t) = Taken By, (c) = Cosigned By    Initials Name Effective Dates Discipline    Henrique Alfaro, PT 04/03/18 -  PT    Blas Gray RN 06/18/19 -  Nurse          Wound 09/09/19 1352 Right hip Incision (Active)   Dressing Appearance dry;intact;no drainage 9/17/2019  4:00 AM   Closure Liquid skin adhesive 9/17/2019  4:00 AM    Base dry;clean 9/17/2019  4:00 AM   Drainage Amount scant 9/16/2019  8:00 PM           Physical Therapy Education     Title: PT OT SLP Therapies (In Progress)     Topic: Physical Therapy (In Progress)     Point: Mobility training (In Progress)     Learning Progress Summary           Patient Acceptance, E,D, VU,NR by  at 9/17/2019  8:24 AM    Comment:  Reviewed HEP, gait mechanics, benefits of mobility    Acceptance, E,D, VU,NR by MJ at 9/16/2019  2:10 PM    Comment:  Reviewed anterior hip precautions, HEP, gait mechanics, benefits of mobility, safety with mobility    Acceptance, E,D, VU,NR by MJ at 9/16/2019 10:20 AM    Comment:  Reviewed HEP, gait mechanics, benefits of mobility    Acceptance, E,D, VU,DU,NR by CT at 9/15/2019  9:44 AM    Eager, E,D, NR by CT at 9/14/2019  4:17 PM    Acceptance, E,D, VU,NR,DU by CT at 9/14/2019  1:33 PM    Eager, E, VU,NR by SC at 9/13/2019  3:41 PM    Comment:  REVIEWED BENEFITS OF ACTIVITY    Acceptance, E, VU by SC at 9/13/2019 10:17 AM    Comment:  REVIEWED BENEFITS OF ACTIVITY    Acceptance, E,D, VU,NR by AA at 9/12/2019  4:08 PM    Acceptance, E,D, NR by AA at 9/12/2019 11:42 AM    Acceptance, E,D, VU,NR by MJ at 9/11/2019  9:59 AM    Comment:  Reviewed HEP, gait mechanics, benefits of mobility, safety with mobility    Acceptance, E,D, VU,NR by MJ at 9/10/2019  3:07 PM    Comment:  Reviewed HEP, gait mechanics, benefits of mobility, safety with mobility    Acceptance, E,D, VU,NR by  at 9/10/2019  9:05 AM    Comment:  Reviewed anterior hip precautions, gait mechanics, benefits of mobility    Acceptance, E,D, VU,NR by  at 9/9/2019  5:05 PM    Comment:  Educated on anterior hip precautions, weight bearing status, correct supine to sit t/f technique, correct sit<->stand t/f technique, correct gait mechanics, and progression of POC.   Family Acceptance, E,D, VU,NR by AA at 9/12/2019  4:08 PM    Acceptance, E,D, NR by AA at 9/12/2019 11:42 AM    Acceptance, ARPITA MCALLISTER, SAVANNAH,NR by LR  at 9/9/2019  5:05 PM    Comment:  Educated on anterior hip precautions, weight bearing status, correct supine to sit t/f technique, correct sit<->stand t/f technique, correct gait mechanics, and progression of POC.   Significant Other Acceptance, E,D, VU,NR by  at 9/11/2019  9:59 AM    Comment:  Reviewed HEP, gait mechanics, benefits of mobility, safety with mobility   Other Eager, E,D, NR by CT at 9/14/2019  4:17 PM                   Point: Home exercise program (In Progress)     Learning Progress Summary           Patient Acceptance, E,D, VU,NR by  at 9/17/2019  8:24 AM    Comment:  Reviewed HEP, gait mechanics, benefits of mobility    Acceptance, E,D, VU,NR by  at 9/16/2019  2:10 PM    Comment:  Reviewed anterior hip precautions, HEP, gait mechanics, benefits of mobility, safety with mobility    Acceptance, E,D, VU,NR by  at 9/16/2019 10:20 AM    Comment:  Reviewed HEP, gait mechanics, benefits of mobility    Acceptance, E,D, VU,DU,NR by CT at 9/15/2019  9:44 AM    Eager, E,D, NR by CT at 9/14/2019  4:17 PM    Acceptance, E,D, VU,NR,DU by CT at 9/14/2019  1:33 PM    Eager, E, VU,NR by SC at 9/13/2019  3:41 PM    Comment:  REVIEWED BENEFITS OF ACTIVITY    Acceptance, E, VU by SC at 9/13/2019 10:17 AM    Comment:  REVIEWED BENEFITS OF ACTIVITY    Acceptance, E,D, NR by  at 9/12/2019 11:42 AM    Acceptance, E,D, VU,NR by  at 9/11/2019  9:59 AM    Comment:  Reviewed HEP, gait mechanics, benefits of mobility, safety with mobility    Acceptance, E,D, VU,NR by  at 9/10/2019  3:07 PM    Comment:  Reviewed HEP, gait mechanics, benefits of mobility, safety with mobility    Acceptance, E,D, VU,NR by  at 9/10/2019  9:05 AM    Comment:  Reviewed anterior hip precautions, gait mechanics, benefits of mobility    Acceptance, E,D, VU,NR by  at 9/9/2019  5:05 PM    Comment:  Educated on anterior hip precautions, weight bearing status, correct supine to sit t/f technique, correct sit<->stand t/f technique, correct  gait mechanics, and progression of POC.   Family Acceptance, E,D, NR by AA at 9/12/2019 11:42 AM    Acceptance, E,D, VU,NR by LR at 9/9/2019  5:05 PM    Comment:  Educated on anterior hip precautions, weight bearing status, correct supine to sit t/f technique, correct sit<->stand t/f technique, correct gait mechanics, and progression of POC.   Significant Other Acceptance, E,D, VU,NR by  at 9/11/2019  9:59 AM    Comment:  Reviewed HEP, gait mechanics, benefits of mobility, safety with mobility   Other Eager, E,D, NR by CT at 9/14/2019  4:17 PM                   Point: Body mechanics (In Progress)     Learning Progress Summary           Patient Acceptance, E,D, VU,NR by  at 9/17/2019  8:24 AM    Comment:  Reviewed HEP, gait mechanics, benefits of mobility    Acceptance, E,D, VU,NR by  at 9/16/2019  2:10 PM    Comment:  Reviewed anterior hip precautions, HEP, gait mechanics, benefits of mobility, safety with mobility    Acceptance, E,D, VU,NR by  at 9/16/2019 10:20 AM    Comment:  Reviewed HEP, gait mechanics, benefits of mobility    Acceptance, E,D, VU,DU,NR by CT at 9/15/2019  9:44 AM    Eager, E,D, NR by CT at 9/14/2019  4:17 PM    Acceptance, E,D, VU,NR,DU by CT at 9/14/2019  1:33 PM    Eager, E, VU,NR by SC at 9/13/2019  3:41 PM    Comment:  REVIEWED BENEFITS OF ACTIVITY    Acceptance, E, VU by SC at 9/13/2019 10:17 AM    Comment:  REVIEWED BENEFITS OF ACTIVITY    Acceptance, E,D, VU,NR by AA at 9/12/2019  4:08 PM    Acceptance, E,D, NR by AA at 9/12/2019 11:42 AM    Acceptance, E,D, VU,NR by  at 9/11/2019  9:59 AM    Comment:  Reviewed HEP, gait mechanics, benefits of mobility, safety with mobility    Acceptance, E,D, VU,NR by  at 9/10/2019  3:07 PM    Comment:  Reviewed HEP, gait mechanics, benefits of mobility, safety with mobility    Acceptance, E,D, VU,NR by MJ at 9/10/2019  9:05 AM    Comment:  Reviewed anterior hip precautions, gait mechanics, benefits of mobility    Acceptance, E,D, VU,NR by  LR at 9/9/2019  5:05 PM    Comment:  Educated on anterior hip precautions, weight bearing status, correct supine to sit t/f technique, correct sit<->stand t/f technique, correct gait mechanics, and progression of POC.   Family Acceptance, E,D, VU,NR by AA at 9/12/2019  4:08 PM    Acceptance, E,D, NR by AA at 9/12/2019 11:42 AM    Acceptance, E,D, VU,NR by LR at 9/9/2019  5:05 PM    Comment:  Educated on anterior hip precautions, weight bearing status, correct supine to sit t/f technique, correct sit<->stand t/f technique, correct gait mechanics, and progression of POC.   Significant Other Acceptance, E,D, VU,NR by  at 9/11/2019  9:59 AM    Comment:  Reviewed HEP, gait mechanics, benefits of mobility, safety with mobility   Other Eager, E,D, NR by CT at 9/14/2019  4:17 PM                   Point: Precautions (In Progress)     Learning Progress Summary           Patient Acceptance, E,D, VU,NR by  at 9/17/2019  8:24 AM    Comment:  Reviewed HEP, gait mechanics, benefits of mobility    Acceptance, E,D, VU,NR by  at 9/16/2019  2:10 PM    Comment:  Reviewed anterior hip precautions, HEP, gait mechanics, benefits of mobility, safety with mobility    Acceptance, E,D, VU,NR by  at 9/16/2019 10:20 AM    Comment:  Reviewed HEP, gait mechanics, benefits of mobility    Acceptance, E,D, VU,DU,NR by CT at 9/15/2019  9:44 AM    Eager, E,D, NR by CT at 9/14/2019  4:17 PM    Acceptance, E,D, VU,NR,DU by CT at 9/14/2019  1:33 PM    Eager, E, VU,NR by SC at 9/13/2019  3:41 PM    Comment:  REVIEWED BENEFITS OF ACTIVITY    Acceptance, E, VU by SC at 9/13/2019 10:17 AM    Comment:  REVIEWED BENEFITS OF ACTIVITY    Acceptance, E,D, VU,NR by AA at 9/12/2019  4:08 PM    Acceptance, E,D, NR by AA at 9/12/2019 11:42 AM    Acceptance, E,D, VU,NR by MJ at 9/11/2019  9:59 AM    Comment:  Reviewed HEP, gait mechanics, benefits of mobility, safety with mobility    Acceptance, ARPITA MCALLISTER, VU,NR by MJ at 9/10/2019  3:07 PM    Comment:  Reviewed HEP,  gait mechanics, benefits of mobility, safety with mobility    Acceptance, E,D, VU,NR by MJ at 9/10/2019  9:05 AM    Comment:  Reviewed anterior hip precautions, gait mechanics, benefits of mobility    Acceptance, E,D, VU,NR by LR at 9/9/2019  5:05 PM    Comment:  Educated on anterior hip precautions, weight bearing status, correct supine to sit t/f technique, correct sit<->stand t/f technique, correct gait mechanics, and progression of POC.   Family Acceptance, E,D, VU,NR by AA at 9/12/2019  4:08 PM    Acceptance, E,D, NR by AA at 9/12/2019 11:42 AM    Acceptance, E,D, VU,NR by LR at 9/9/2019  5:05 PM    Comment:  Educated on anterior hip precautions, weight bearing status, correct supine to sit t/f technique, correct sit<->stand t/f technique, correct gait mechanics, and progression of POC.   Significant Other Acceptance, E,D, VU,NR by  at 9/11/2019  9:59 AM    Comment:  Reviewed HEP, gait mechanics, benefits of mobility, safety with mobility   Other Eager, E,D, NR by CT at 9/14/2019  4:17 PM                               User Key     Initials Effective Dates Name Provider Type Discipline    SC 06/19/15 -  Kimi Castillo, PT Physical Therapist PT    LR 06/19/15 -  Teresa Blackwell, PT Physical Therapist PT    CT 06/19/15 -  Easton Kumar, PT Physical Therapist PT     04/03/18 -  Henrique Clements, PT Physical Therapist PT    AA 04/02/18 -  Vanessa Milligan, PT Physical Therapist PT                PT Recommendation and Plan     Outcome Summary/Treatment Plan (PT)  Daily Summary of Progress (PT): progress toward functional goals is good  Plan of Care Reviewed With: patient  Progress: improving  Outcome Summary: Pt increased gait distance to 250 feet with CGA and rolling platform walker. Pt's pain well controlled today. Will continue to progress mobility as able.   Outcome Measures     Row Name 09/17/19 0824 09/16/19 1410 09/16/19 1020       How much help from another person do you currently need...     Turning from your back to your side while in flat bed without using bedrails?  3  -MJ  3  -MJ  3  -MJ    Moving from lying on back to sitting on the side of a flat bed without bedrails?  3  -MJ  3  -MJ  3  -MJ    Moving to and from a bed to a chair (including a wheelchair)?  3  -MJ  3  -MJ  3  -MJ    Standing up from a chair using your arms (e.g., wheelchair, bedside chair)?  3  -MJ  3  -MJ  3  -MJ    Climbing 3-5 steps with a railing?  2  -MJ  2  -MJ  2  -MJ    To walk in hospital room?  3  -MJ  3  -MJ  3  -MJ    AM-PAC 6 Clicks Score (PT)  17  -MJ  17  -MJ  17  -MJ       Functional Assessment    Outcome Measure Options  AM-PAC 6 Clicks Basic Mobility (PT)  -MJ  AM-PAC 6 Clicks Basic Mobility (PT)  -MJ  AM-PAC 6 Clicks Basic Mobility (PT)  -MJ    Row Name 09/16/19 0836             How much help from another is currently needed...    Putting on and taking off regular lower body clothing?  2  -LC      Bathing (including washing, rinsing, and drying)  2  -LC      Toileting (which includes using toilet bed pan or urinal)  2  -LC      Putting on and taking off regular upper body clothing  2  -LC      Taking care of personal grooming (such as brushing teeth)  3  -LC      Eating meals  3  -LC      AM-PAC 6 Clicks Score (OT)  14  -LC        User Key  (r) = Recorded By, (t) = Taken By, (c) = Cosigned By    Initials Name Provider Type    Henrique Alfaro, PT Physical Therapist    Teresa Oconnor, OT Occupational Therapist         Time Calculation:   PT Charges     Row Name 09/17/19 0824             Time Calculation    Start Time  0824  -MJ      PT Received On  09/17/19  -MJ      PT Goal Re-Cert Due Date  09/19/19  -MJ         Time Calculation- PT    Total Timed Code Minutes- PT  23 minute(s)  -MJ         Timed Charges    31524 - PT Therapeutic Exercise Minutes  10  -MJ      24596 - Gait Training Minutes   10  -MJ      17747 - PT Therapeutic Activity Minutes  3  -MJ        User Key  (r) = Recorded By, (t) = Taken By, (c) =  Cosigned By    Initials Name Provider Type    Henrique Alfaro, PT Physical Therapist        Therapy Charges for Today     Code Description Service Date Service Provider Modifiers Qty    31541503810 HC PT THER PROC EA 15 MIN 9/16/2019 Henrique Clements, PT GP 1    39616467481 HC GAIT TRAINING EA 15 MIN 9/16/2019 Henrique Clements, PT GP 1    52689538165 HC PT THER PROC EA 15 MIN 9/16/2019 Henrique Clements, PT GP 1    62413370298 HC GAIT TRAINING EA 15 MIN 9/16/2019 Henrique Clements, PT GP 1    39370316934 HC PT THER PROC EA 15 MIN 9/17/2019 Henrique Clements, PT GP 1    82054575192 HC GAIT TRAINING EA 15 MIN 9/17/2019 Henrique Clements, PT GP 1          PT G-Codes  Outcome Measure Options: AM-PAC 6 Clicks Basic Mobility (PT)  AM-PAC 6 Clicks Score (PT): 17  AM-PAC 6 Clicks Score (OT): 14  Modified Superior Scale: 4 - Moderately severe disability.  Unable to walk without assistance, and unable to attend to own bodily needs without assistance.    Henrique Clements PT  9/17/2019

## 2019-09-17 NOTE — PROGRESS NOTES
Continued Stay Note  [facility]     Patient Name: Akua Torres  MRN: [id number]  Today's Date: 9/17/2019    Admit Date: 9/9/2019    Discharge Plan     Row Name 09/17/19 1010       Plan    Plan  discharge plan    Patient/Family in Agreement with Plan  yes    Plan Comments  Spoke with Yamini with [facility].  Pt has insurance approval and they can accept pt when medically ready for discharge, possibly tomorrow. Yamini aware pt having carotid stenting today.   Pt may need transportation and CM will cont to follow.         Discharge Codes    No documentation.       Expected Discharge Date and Time     Expected Discharge Date Expected Discharge Time    Sep 18, 2019             Nayely Montague RN

## 2019-09-17 NOTE — PROGRESS NOTES
Intensive Care Follow-up     Hospital:  LOS: 8 days   Ms. Akua Torres, 74 y.o. female is followed for:   Acute CVA (cerebrovascular accident) (CMS/HCC)            History of present illness:  Akua Torres is a 74 y.o. female who was admitted on 9/9/19 for an elective Rt Total Hip Replacement by Dr. New.  Post operatively, she had c/o RUE numbness and weakness.  CTH was unremarkable.  MRI revealed left frontal CVA.  Cardiology was consulted for PAF.  She was placed on heparin drip, ASA/plavix.  ECHO had negative bubble study and EF 60-65%. Bilateral carotid duplex revealed high grade stenosis of LICA. She had worsening right sided weakness and underwent CTP that revealed subtle evidence of delayed transit in a small focus in the left posterior parietal region.  She developed SVT 9/15/19 and converted with IV metroprolol. They deferred anticoagulation due to no witnessed episodes of A fib during admission.  NI was consulted and she underwent a LICA stent placement today by Dr. Guerra.      Subjective   Interval History:  She was transferred to the ICU post operatively.  She has c/o mild headache and feeling cold.  She reports that she uses a home bipap at bedtime.  She brought her mask from home to use here.  She is not diabetic.  She reports that she has been working with PT, but still needs more rehabilitation.       The patient's past medical, surgical and social history were reviewed and updated in Epic as appropriate.       Objective     Infusions:    heparin 20 Units/kg/hr Last Rate: Stopped (09/17/19 1313)   niCARdipine 5-15 mg/hr    Pharmacy to Dose Heparin     phenylephrine 0.5-3 mcg/kg/min    sodium chloride 100 mL/hr Last Rate: 100 mL/hr (09/10/19 2036)     Medications:    [MAR Hold] aspirin 81 mg Oral Daily   atenolol 25 mg Oral BID   [MAR Hold] atorvastatin 80 mg Oral Nightly   [MAR Hold] clopidogrel 75 mg Oral Daily   [MAR Hold] docusate sodium 100 mg Oral BID   [MAR Hold] escitalopram 20  mg Oral Daily   [MAR Hold] mirtazapine 15 mg Oral Nightly   [MAR Hold] sodium chloride 10 mL Intravenous Q12H   sodium chloride 3 mL Intravenous Q12H     I reviewed the patient's medications.    Vital Sign Min/Max for last 24 hours  Temp  Min: 97.9 °F (36.6 °C)  Max: 98.7 °F (37.1 °C)   BP  Min: 70/39  Max: 197/81   Pulse  Min: 60  Max: 74   Resp  Min: 16  Max: 22   SpO2  Min: 94 %  Max: 99 %   No Data Recorded       Input/Output for last 24 hour shift  09/16 0701 - 09/17 0700  In: 500 [P.O.:500]  Out: 650 [Urine:650]      GENERAL : NAD, conversant  RESPIRATORY/THORAX : normal respiratory effort and no intercostal retractions, CTA  CARDIOVASCULAR : Normal S1/S2, RRR. 1+ lower ext edema.  GASTROINTESTINAL : Soft, NT/ND. BS x 4 normoactive. No hepatosplenomegaly.  MUSCULOSKELETAL : No cyanosis, clubbing, or ischemia  NEUROLOGICAL: alert and oriented to person, place and time  PSYCHOLOGICAL : Appropriate affect    Results from last 7 days   Lab Units 09/17/19  1514 09/15/19  0736 09/14/19  1830   WBC 10*3/mm3 10.58 9.29 10.39   HEMOGLOBIN g/dL 9.3* 9.4* 9.3*   PLATELETS 10*3/mm3 335 232 233     Results from last 7 days   Lab Units 09/16/19  0004 09/15/19  0736 09/12/19  0158 09/11/19  0952   SODIUM mmol/L  --  139  --  136   POTASSIUM mmol/L  --  4.3  --  4.4   CO2 mmol/L  --  25.0  --  27.0   BUN mg/dL  --  18  --  24*   CREATININE mg/dL  --  0.92 1.02* 1.09*   MAGNESIUM mg/dL 2.2  --   --   --    GLUCOSE mg/dL  --  121*  --  144*     Estimated Creatinine Clearance: 56.1 mL/min (by C-G formula based on SCr of 0.92 mg/dL).          I reviewed the patient's new clinical results.  I reviewed the patient's new imaging results/reports including actual images and agree with reports.         Imaging Results (last 24 hours)     Procedure Component Value Units Date/Time    CT Head Without Contrast [[id number]] Collected:  09/16/19 1642     Updated:  09/17/19 0938    Narrative:       Impression:       Evolving area of infarction  seen near the left vertex in the  parietal lobe. Findings have slightly evolved in the interval. No  hemorrhage. There are no other new findings.     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   I discussed the patient's findings and my recommendations with patient and nursing staff       Diogenes Crenshaw,   Pulmonary, Critical care and Sleep Medicine

## 2019-09-17 NOTE — PLAN OF CARE
Problem: Patient Care Overview  Goal: Plan of Care Review  Outcome: Ongoing (interventions implemented as appropriate)   09/17/19 0824   Plan of Care Review   Progress improving   OTHER   Outcome Summary Pt increased gait distance to 250 feet with CGA and rolling platform walker. Pt's pain well controlled today. Will continue to progress mobility as able.    Coping/Psychosocial   Plan of Care Reviewed With patient       Problem: Hip Arthroplasty (Total, Partial) (Adult)  Goal: Signs and Symptoms of Listed Potential Problems Will be Absent, Minimized or Managed (Hip Arthroplasty)  Outcome: Ongoing (interventions implemented as appropriate)   09/17/19 0824   Goal/Outcome Evaluation   Problems Assessed (Hip Arthroplasty) functional deficit;pain   Problems Present (Hip Arthroplasty) functional deficit;pain

## 2019-09-18 ENCOUNTER — TELEPHONE (OUTPATIENT)
Dept: NEUROSURGERY | Facility: CLINIC | Age: 74
End: 2019-09-18

## 2019-09-18 ENCOUNTER — APPOINTMENT (OUTPATIENT)
Dept: CARDIOLOGY | Facility: HOSPITAL | Age: 74
End: 2019-09-18

## 2019-09-18 VITALS
DIASTOLIC BLOOD PRESSURE: 52 MMHG | OXYGEN SATURATION: 99 % | RESPIRATION RATE: 18 BRPM | TEMPERATURE: 101 F | WEIGHT: 215 LBS | SYSTOLIC BLOOD PRESSURE: 103 MMHG | HEIGHT: 64 IN | BODY MASS INDEX: 36.7 KG/M2 | HEART RATE: 65 BPM

## 2019-09-18 DIAGNOSIS — I65.23 BILATERAL CAROTID ARTERY STENOSIS: Primary | ICD-10-CM

## 2019-09-18 LAB
ANION GAP SERPL CALCULATED.3IONS-SCNC: 10 MMOL/L (ref 5–15)
BASOPHILS # BLD AUTO: 0.02 10*3/MM3 (ref 0–0.2)
BASOPHILS NFR BLD AUTO: 0.3 % (ref 0–1.5)
BH CV DISTAL LEFT ICA HIDDEN LRR: 1 CM
BH CV ECHO MEAS - BSA(HAYCOCK): 2.1 M^2
BH CV ECHO MEAS - BSA: 2 M^2
BH CV ECHO MEAS - BZI_BMI: 36.9 KILOGRAMS/M^2
BH CV ECHO MEAS - BZI_METRIC_HEIGHT: 162.6 CM
BH CV ECHO MEAS - BZI_METRIC_WEIGHT: 97.5 KG
BH CV LEFT CCA HIDDEN LRR: 1 CM/S
BH CV XLRA MEAS LEFT CCA RATIO VEL: 94.3 CM/SEC
BH CV XLRA MEAS LEFT DIST CCA EDV: 24.4 CM/SEC
BH CV XLRA MEAS LEFT DIST CCA PSV: 95 CM/SEC
BH CV XLRA MEAS LEFT DIST ICA EDV: 57.5 CM/SEC
BH CV XLRA MEAS LEFT DIST ICA PSV: 220.3 CM/SEC
BH CV XLRA MEAS LEFT ICA RATIO VEL: 194 CM/SEC
BH CV XLRA MEAS LEFT ICA/CCA RATIO: 2.1
BH CV XLRA MEAS LEFT MID CCA EDV: 19.6 CM/SEC
BH CV XLRA MEAS LEFT MID CCA PSV: 78.2 CM/SEC
BH CV XLRA MEAS LEFT MID ICA EDV: 51.9 CM/SEC
BH CV XLRA MEAS LEFT MID ICA PSV: 195 CM/SEC
BH CV XLRA MEAS LEFT PROX CCA EDV: 23 CM/SEC
BH CV XLRA MEAS LEFT PROX CCA PSV: 93.6 CM/SEC
BH CV XLRA MEAS LEFT PROX ECA PSV: 178.2 CM/SEC
BH CV XLRA MEAS LEFT PROX ICA EDV: 43.5 CM/SEC
BH CV XLRA MEAS LEFT PROX ICA PSV: 167 CM/SEC
BH CV XLRA MEAS LEFT PROX SCLA PSV: 207.7 CM/SEC
BH CV XLRA MEAS LEFT VERTEBRAL A EDV: 13.5 CM/SEC
BH CV XLRA MEAS LEFT VERTEBRAL A PSV: 70.3 CM/SEC
BH CV XLRA MEAS RIGHT CCA RATIO VEL: 90.4 CM/SEC
BH CV XLRA MEAS RIGHT DIST CCA EDV: 20.6 CM/SEC
BH CV XLRA MEAS RIGHT DIST CCA PSV: 91.3 CM/SEC
BH CV XLRA MEAS RIGHT DIST ICA EDV: 30.4 CM/SEC
BH CV XLRA MEAS RIGHT DIST ICA PSV: 127.7 CM/SEC
BH CV XLRA MEAS RIGHT ICA RATIO VEL: 148 CM/SEC
BH CV XLRA MEAS RIGHT ICA/CCA RATIO: 1.6
BH CV XLRA MEAS RIGHT MID CCA EDV: 18.7 CM/SEC
BH CV XLRA MEAS RIGHT MID CCA PSV: 97.2 CM/SEC
BH CV XLRA MEAS RIGHT MID ICA EDV: 24.6 CM/SEC
BH CV XLRA MEAS RIGHT MID ICA PSV: 83 CM/SEC
BH CV XLRA MEAS RIGHT PROX CCA EDV: 23.6 CM/SEC
BH CV XLRA MEAS RIGHT PROX CCA PSV: 106.1 CM/SEC
BH CV XLRA MEAS RIGHT PROX ECA PSV: 78.3 CM/SEC
BH CV XLRA MEAS RIGHT PROX ICA EDV: 42.2 CM/SEC
BH CV XLRA MEAS RIGHT PROX ICA PSV: 149.3 CM/SEC
BH CV XLRA MEAS RIGHT PROX SCLA PSV: 227.9 CM/SEC
BH CV XLRA MEAS RIGHT VERTEBRAL A EDV: 21.2 CM/SEC
BH CV XLRA MEAS RIGHT VERTEBRAL A PSV: 98.2 CM/SEC
BH CVPROX LEFT ICA HIDDEN LRR: 1 CM
BUN BLD-MCNC: 19 MG/DL (ref 8–23)
BUN/CREAT SERPL: 19 (ref 7–25)
CALCIUM SPEC-SCNC: 7.8 MG/DL (ref 8.6–10.5)
CHLORIDE SERPL-SCNC: 104 MMOL/L (ref 98–107)
CO2 SERPL-SCNC: 26 MMOL/L (ref 22–29)
CREAT BLD-MCNC: 1 MG/DL (ref 0.57–1)
DEPRECATED RDW RBC AUTO: 48.3 FL (ref 37–54)
EOSINOPHIL # BLD AUTO: 0.18 10*3/MM3 (ref 0–0.4)
EOSINOPHIL NFR BLD AUTO: 2.7 % (ref 0.3–6.2)
ERYTHROCYTE [DISTWIDTH] IN BLOOD BY AUTOMATED COUNT: 14.3 % (ref 12.3–15.4)
GFR SERPL CREATININE-BSD FRML MDRD: 54 ML/MIN/1.73
GLUCOSE BLD-MCNC: 118 MG/DL (ref 65–99)
HCT VFR BLD AUTO: 24.4 % (ref 34–46.6)
HGB BLD-MCNC: 7.4 G/DL (ref 12–15.9)
IMM GRANULOCYTES # BLD AUTO: 0.1 10*3/MM3 (ref 0–0.05)
IMM GRANULOCYTES NFR BLD AUTO: 1.5 % (ref 0–0.5)
LYMPHOCYTES # BLD AUTO: 1.05 10*3/MM3 (ref 0.7–3.1)
LYMPHOCYTES NFR BLD AUTO: 15.8 % (ref 19.6–45.3)
MCH RBC QN AUTO: 28.2 PG (ref 26.6–33)
MCHC RBC AUTO-ENTMCNC: 30.3 G/DL (ref 31.5–35.7)
MCV RBC AUTO: 93.1 FL (ref 79–97)
MONOCYTES # BLD AUTO: 0.51 10*3/MM3 (ref 0.1–0.9)
MONOCYTES NFR BLD AUTO: 7.7 % (ref 5–12)
NEUTROPHILS # BLD AUTO: 4.77 10*3/MM3 (ref 1.7–7)
NEUTROPHILS NFR BLD AUTO: 72 % (ref 42.7–76)
NRBC BLD AUTO-RTO: 0 /100 WBC (ref 0–0.2)
PLATELET # BLD AUTO: 271 10*3/MM3 (ref 140–450)
PMV BLD AUTO: 9.7 FL (ref 6–12)
POTASSIUM BLD-SCNC: 3.8 MMOL/L (ref 3.5–5.2)
RBC # BLD AUTO: 2.62 10*6/MM3 (ref 3.77–5.28)
SODIUM BLD-SCNC: 140 MMOL/L (ref 136–145)
WBC NRBC COR # BLD: 6.63 10*3/MM3 (ref 3.4–10.8)

## 2019-09-18 PROCEDURE — 80048 BASIC METABOLIC PNL TOTAL CA: CPT | Performed by: NEUROLOGICAL SURGERY

## 2019-09-18 PROCEDURE — 94799 UNLISTED PULMONARY SVC/PX: CPT

## 2019-09-18 PROCEDURE — 97116 GAIT TRAINING THERAPY: CPT

## 2019-09-18 PROCEDURE — 97110 THERAPEUTIC EXERCISES: CPT

## 2019-09-18 PROCEDURE — 93880 EXTRACRANIAL BILAT STUDY: CPT | Performed by: INTERNAL MEDICINE

## 2019-09-18 PROCEDURE — 97164 PT RE-EVAL EST PLAN CARE: CPT

## 2019-09-18 PROCEDURE — 85025 COMPLETE CBC W/AUTO DIFF WBC: CPT | Performed by: NEUROLOGICAL SURGERY

## 2019-09-18 PROCEDURE — 97530 THERAPEUTIC ACTIVITIES: CPT

## 2019-09-18 PROCEDURE — 99024 POSTOP FOLLOW-UP VISIT: CPT | Performed by: PHYSICIAN ASSISTANT

## 2019-09-18 PROCEDURE — 99232 SBSQ HOSP IP/OBS MODERATE 35: CPT | Performed by: INTERNAL MEDICINE

## 2019-09-18 PROCEDURE — 99231 SBSQ HOSP IP/OBS SF/LOW 25: CPT | Performed by: PSYCHIATRY & NEUROLOGY

## 2019-09-18 PROCEDURE — 97168 OT RE-EVAL EST PLAN CARE: CPT

## 2019-09-18 PROCEDURE — 93880 EXTRACRANIAL BILAT STUDY: CPT

## 2019-09-18 PROCEDURE — 94660 CPAP INITIATION&MGMT: CPT

## 2019-09-18 RX ORDER — PSEUDOEPHEDRINE HCL 30 MG
100 TABLET ORAL 2 TIMES DAILY
Start: 2019-09-18 | End: 2019-09-25

## 2019-09-18 RX ORDER — HYDROCODONE BITARTRATE AND ACETAMINOPHEN 5; 325 MG/1; MG/1
1 TABLET ORAL EVERY 4 HOURS PRN
Qty: 20 TABLET | Refills: 0 | Status: SHIPPED | OUTPATIENT
Start: 2019-09-18 | End: 2019-09-19

## 2019-09-18 RX ORDER — CLOPIDOGREL BISULFATE 75 MG/1
75 TABLET ORAL DAILY
Qty: 30 TABLET
Start: 2019-09-19 | End: 2019-09-20 | Stop reason: SDUPTHER

## 2019-09-18 RX ORDER — ASPIRIN 81 MG/1
81 TABLET ORAL DAILY
Start: 2019-09-19 | End: 2019-09-25 | Stop reason: SDUPTHER

## 2019-09-18 RX ORDER — ATORVASTATIN CALCIUM 80 MG/1
80 TABLET, FILM COATED ORAL NIGHTLY
Start: 2019-09-18 | End: 2019-09-20 | Stop reason: SDUPTHER

## 2019-09-18 RX ADMIN — SODIUM CHLORIDE 100 ML/HR: 9 INJECTION, SOLUTION INTRAVENOUS at 00:09

## 2019-09-18 RX ADMIN — ASPIRIN 81 MG: 81 TABLET, COATED ORAL at 08:45

## 2019-09-18 RX ADMIN — CLOPIDOGREL BISULFATE 75 MG: 75 TABLET ORAL at 08:45

## 2019-09-18 RX ADMIN — ESCITALOPRAM OXALATE 20 MG: 20 TABLET ORAL at 08:45

## 2019-09-18 NOTE — PROGRESS NOTES
HOD# : 9    No events last night  Patient continuing to improve with right upper extremity clumsiness.  Patient is almost back to normal other than fourth and fifth digits.  Patient does remark about some right lower extremity weakness but it is at the proximal hip where the surgery was done.  Patient's dorsiflexion plantar flexion are equal bilaterally.    From neurosurgical standpoint proc patient probably does not need to be in ICU and can transfer out.  If patient goes to rehab that would be sufficient as well.      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  Vital signs were reviewed and documented in the chart      EXAM   Patient appeared in good neurologic function with normal comprehension   CN grossly intact  Moves all extremities to command   strength equal bilaterally dorsiflexion plantar flexion equal bilaterally.    Groin is without major bruising or hematoma.    DIAGNOSIS  1. Impaired functional mobility, balance, gait, and endurance    2. Impaired mobility and ADLs    3. 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  Patient to follow-up in a month or 2 with duplex ultrasound of the carotid.

## 2019-09-18 NOTE — PLAN OF CARE
Problem: Patient Care Overview  Goal: Plan of Care Review  Outcome: Ongoing (interventions implemented as appropriate)   09/18/19 1004   Plan of Care Review   Progress improving   OTHER   Outcome Summary OT Re-eval complete s/p angiogram. Pt CGA for t/fs and Mod A-Dep for ADL performance, though demo excellent effort w/ treatment. Recommend cont skilled IPOT POC. Recommend pt DC to IP rehab.    Coping/Psychosocial   Plan of Care Reviewed With patient

## 2019-09-18 NOTE — PROGRESS NOTES
Case Management Discharge Note    Final Note: SNF    Destination      No service has been selected for the patient.      Durable Medical Equipment      No service has been selected for the patient.      Dialysis/Infusion      No service has been selected for the patient.      Home Medical Care      No service has been selected for the patient.      Therapy      No service has been selected for the patient.      Community Resources      No service has been selected for the patient.             Final Discharge Disposition Code: 03 - skilled nursing facility (SNF)

## 2019-09-18 NOTE — PLAN OF CARE
Problem: Patient Care Overview  Goal: Plan of Care Review  Outcome: Ongoing (interventions implemented as appropriate)   09/18/19 1215   OTHER   Outcome Summary PT re-evaluation (s/p L ICA stent and transfer to ICU on 9/17). Pt continues to demonstrate post-op RLE weakness and decreased indep re: functional mobility, warranting further skilled PT services to promote PLOF. Goals revised to reflect current functional status. Recommend SNF at d/c based upon current level of function.    Coping/Psychosocial   Plan of Care Reviewed With patient

## 2019-09-18 NOTE — PROGRESS NOTES
Intensive Care Follow-up     Hospital:  LOS: 9 days   Ms. Akua Torres, 74 y.o. female is followed for:   Acute CVA (cerebrovascular accident) (CMS/HCC)            History of present illness:   74 y.o. female who was admitted on 9/9/19 for an elective Rt Total Hip Replacement by Dr. New.  Post operatively, she had c/o RUE numbness and weakness.  CTH was unremarkable.  MRI revealed left frontal CVA.  Cardiology was consulted for PAF.  She was placed on heparin drip, ASA/plavix.  ECHO had negative bubble study and EF 60-65%. Bilateral carotid duplex revealed high grade stenosis of LICA. She had worsening right sided weakness and underwent CTP that revealed subtle evidence of delayed transit in a small focus in the left posterior parietal region.  She developed SVT 9/15/19 and converted with IV metroprolol. They deferred anticoagulation due to no witnessed episodes of A fib during admission.  NS was consulted and she underwent a LICA stent placement 9/17/2019 by Dr. Guerra.        Subjective   Interval History:  No acute events this morning.  She has no complaints.  She denies any chest pain, shortness of breath, fever, or chills.  He is excited about the opportunity potentially be discharged to rehab.  She denies any headaches.         The patient's past medical, surgical and social history were reviewed and updated in Epic as appropriate.       Objective     Infusions:    heparin 20 Units/kg/hr Last Rate: Stopped (09/17/19 1313)   niCARdipine 5-15 mg/hr    Pharmacy to Dose Heparin     phenylephrine 0.5-3 mcg/kg/min    sodium chloride 100 mL/hr Last Rate: 100 mL/hr (09/18/19 0009)     Medications:    aspirin 81 mg Oral Daily   atenolol 25 mg Oral BID   atorvastatin 80 mg Oral Nightly   clopidogrel 75 mg Oral Daily   docusate sodium 100 mg Oral BID   escitalopram 20 mg Oral Daily   mirtazapine 15 mg Oral Nightly   sodium chloride 10 mL Intravenous Q12H   sodium chloride 3 mL Intravenous Q12H     I reviewed the  patient's medications.    Vital Sign Min/Max for last 24 hours  Temp  Min: 97.7 °F (36.5 °C)  Max: 98.6 °F (37 °C)   BP  Min: 70/39  Max: 197/81   Pulse  Min: 57  Max: 78   Resp  Min: 16  Max: 18   SpO2  Min: 74 %  Max: 100 %   No Data Recorded       Input/Output for last 24 hour shift  09/17 0701 - 09/18 0700  In: 1385 [I.V.:1385]  Out: -       GENERAL : NAD, conversant  RESPIRATORY/THORAX : normal respiratory effort and no intercostal retractions, CTA  CARDIOVASCULAR : Normal S1/S2, RRR. no lower ext edema.  GASTROINTESTINAL : Soft, NT/ND. BS x 4 normoactive. No hepatosplenomegaly.  MUSCULOSKELETAL : No cyanosis, clubbing, or ischemia  NEUROLOGICAL: alert and oriented to person, place and time  PSYCHOLOGICAL : Appropriate affect    Results from last 7 days   Lab Units 09/18/19  0903 09/17/19  1514 09/15/19  0736   WBC 10*3/mm3 6.63 10.58 9.29   HEMOGLOBIN g/dL 7.4* 9.3* 9.4*   PLATELETS 10*3/mm3 271 335 232     Results from last 7 days   Lab Units 09/18/19  0903 09/17/19  1514 09/16/19  0004 09/15/19  0736   SODIUM mmol/L 140 141  --  139   POTASSIUM mmol/L 3.8 4.0  --  4.3   CO2 mmol/L 26.0 25.0  --  25.0   BUN mg/dL 19 17  --  18   CREATININE mg/dL 1.00 0.90  --  0.92   MAGNESIUM mg/dL  --   --  2.2  --    GLUCOSE mg/dL 118* 86  --  121*     Estimated Creatinine Clearance: 55.2 mL/min (by C-G formula based on SCr of 1 mg/dL).          I reviewed the patient's new clinical results.  I reviewed the patient's new imaging results/reports including actual images and agree with reports.                  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  No signs of active bleeding, patient is heme dynamically stable as well.  Bipap HS  Continue PT  Plan for discharge today to rehab, patient is medically stable.    Plan of care and goals reviewed with mulitdisciplinary/antibiotic stewardship team during rounds.   I discussed the patient's findings and my recommendations with patient and nursing staff       Diogenes Crenshaw, DO  Pulmonary, Critical care and Sleep Medicine

## 2019-09-18 NOTE — PROGRESS NOTES
Clinical Nutrition     Multidisciplinary Rounds      Patient Name: Akua Torres  Date of Encounter: 09/18/19 10:05 AM  MRN: [id number]  Admission date: 9/9/2019    CORAZON RODRIGUEZ to continue to follow per protocol.     Current diet: Diet Regular  No active supplement orders    Intervention:  Follow treatment plan  Care plan reviewed    Follow up:   Per protocol      Veena Rivera RD  10:05 AM  Time: 10min

## 2019-09-18 NOTE — DISCHARGE SUMMARY
Patient Name: Akua Torres  MRN: [id number]  : 1945  DOS: 2019    Attending: Darrin New MD    Primary Care Provider: Salty Hernandez MD    Date of Admission:.2019  9:38 AM    Date of Discharge:  2019    Discharge Diagnosis:       Left prefrontal gyrus stroke  Severe left internal carotid artery stenosis.    Status post total replacement of right hip    Anxiety and depression    HTN (hypertension)    Renal insufficiency, resolved     Leukocytosis, likely reactive    Arthritis of right hip    HO A-fib     YUNIOR treated with BiPAP    Acute blood loss anemia, mild, asymptomatic    RUE weakness/numbness    SVT episode, treated( 9/15)    ABLA, asymptomatic    Acute postop pain        Hospital Course  Patient is a 74 y.o. female presented for right total hip arthoplasty by Dr. New. She tolerated surgery well and was admitted for further medical management. Her hip has been painful for about 2 years. She uses a walker for ambulation. She denies recent falls.     She is known to us from lumbar fusion in 2017; which she recovered well.    Postoperatively, she developed numbness and lack of  to right hand. Neurology was consulted. She underwent CT head and MRI head. She was found to have a left prefrontal gyrus stroke. She was started in heparin drip and aspirin. She has slowly improved in function of right hand throughout admission. She had ultrasound of carotids and was found to have high-grade stenosis/near complete occlusion of the proximal left internal carotid artery at the bifurcation with densely calcified plaque. Neurosurgery was consulted. She remained on heparin drip, plavix and aspirin until she underwent stenting of the carotid. Final op report still pending this date.    She has history of afib and is followed by cardiology, Dr. Pearl. She was diagnosed and treated for sleep apnea and did have further occurrence of afib. Due to stroke postop will cardiology was  consulted and she was placed on telemetry. There was no evidence of afib while inpatient. She will be need Holter monitor and will follow-up with cardiology in a month. She did have a single episode of SVT while inpatient that responded to an additional dose of beta blocker.    The patient has done well postop. The patient has been able to ambulate with PT.    The patient has had good pain control with PO pain medications.  Adjustments were made to pain medications to optimize postop pain management. Risks and benefits of opiate medications discussed with patient.    The patient was placed on DVT prophylaxis including aspirin and plavix. The patient was encouraged to use IS for atelectasis prophylaxis.   The patient was placed on a bowel regimen to prevent constipation while on pain medication.   The patient's H/H was monitored and she did have a decrease this morning. However, now that heparin drip is discontinued and she is asymptomatic, this should stabilize. Repeat CBC in 3 days.    It is felt by all involved that the patient can discharge to [facility] at this time, and the patient has no further questions      Consultants:  Dr. Cervantes, cardiology  Dr. East, neurology  Dr. Guerra, neurosurgery    Procedures Performed  9/17/19  Procedure(s):  Carotid and Cerebral Angiogram/ Possible Stent  Dr. Guerra    9/9/2019     Pre-op Diagnosis:   Osteoarthritis right hip       Post-Op Diagnosis Codes:  Osteoarthritis right hip     Procedure/CPT® Codes:  95234     Procedure(s):  TOTAL ANTERIOR RIGHT HIP ARTHROPLASTY     Surgeon(s):  Darrin New MD    Pertinent Test Results:    I reviewed the patient's new clinical results.   Results from last 7 days   Lab Units 09/18/19 0903 09/17/19  1514 09/15/19  0736   WBC 10*3/mm3 6.63 10.58 9.29   HEMOGLOBIN g/dL 7.4* 9.3* 9.4*   HEMATOCRIT % 24.4* 30.9* 29.9*   PLATELETS 10*3/mm3 271 335 232     Results from last 7 days   Lab Units 09/18/19  0903 09/17/19  1514  09/15/19  0736   SODIUM mmol/L 140 141 139   POTASSIUM mmol/L 3.8 4.0 4.3   CHLORIDE mmol/L 104 102 102   CO2 mmol/L 26.0 25.0 25.0   BUN mg/dL 19 17 18   CREATININE mg/dL 1.00 0.90 0.92   CALCIUM mg/dL 7.8* 8.3* 8.5*   GLUCOSE mg/dL 118* 86 121*     Study Result     EXAMINATION: CT HEAD WO CONTRAST- 09/10/2019     INDICATION: Focal neuro deficit, new, fixed or worsening, >6 hours;  Z74.09-Other reduced mobility      TECHNIQUE: CT head without intravenous contrast     The radiation dose reduction device was turned on for each scan per the  ALARA (As Low as Reasonably Achievable) protocol.     COMPARISON: NONE     FINDINGS: Midline structures are symmetric without evidence of mass,  mass effect or midline shift. Ventricles and sulci within normal limits.  No intra-axial hemorrhage or extra-axial fluid collection. Globes and  orbits unremarkable. Visualized paranasal sinuses and mastoid air cells  are grossly clear and well-pneumatized. Calvarium intact.     IMPRESSION:  No acute intracranial abnormality specifically no midline  shift or hydrocephalus. No intra-axial hemorrhage or extra-axial fluid  collection. MRI may be considered for further evaluation of  acute/subacute infarction if clinically warranted.     D:  09/10/2019  E:  09/10/2019     Study Result     CTA Head, CTA Neck     HISTORY:  74-year-old female with right hand weakness since 9/10/2019 following right total hip arthroplasty 9/9/2019.     TECHNIQUE:  CT angiogram of the head and neck with IV contrast. 3-D reconstructions were obtained and reviewed. Evaluation for a significant carotid arterial stenosis is based on NASCET criteria. Radiation dose reduction techniques included automated exposure  control. Radiation audit for known CT and nuclear cardiology exams in the last 12 months: 1.     COMPARISON:  MRA head and neck 9/11/2019.     CTA NECK:  Normal aortic arch branching pattern with no proximal great vessel stenosis. The vertebral arteries are  widely patent and codominant. There is high-grade stenosis/near complete occlusion of the proximal left internal carotid artery at the bifurcation  with densely calcified plaque. There is also moderate plaque at the right carotid bifurcation with moderate stenosis of the right internal carotid artery. Tortuous course of the left internal carotid artery. Retropharyngeal course of the right internal  carotid artery.     Cervical soft tissues are unremarkable. No acute osseous abnormality.     CTA HEAD:  Intracranially, there is symmetric distal vascular contrast distribution in the anterior, middle and posterior cerebral artery territories. No evidence of intracranial arterial flow limiting stenosis. No intracranial aneurysm or vascular malformation is  identified. The dural venous sinuses appear normal. No intracranial mass or abnormal contrast enhancement.     IMPRESSION:     1. High-grade stenosis/near complete occlusion of the proximal left internal carotid artery at the bifurcation with densely calcified plaque. Tortuous left internal carotid artery course.  2. Moderate stenosis of the right internal carotid artery at the bifurcation. Retropharyngeal course of the right internal carotid artery.  3. No hemodynamically significant intracranial arterial stenosis. No aneurysm.        Study Result     EXAMINATION: CT CEREBRAL PERFUSION W WO CONTRAST- 09/12/2019     INDICATION: Focal neuro deficit, new, fixed or worsening, >6 hours;  Z74.09-Other reduced mobility     TECHNIQUE: CT perfusion imaging of the brain was performed with data  acquisition regarding blood flow, blood volume and transit time.     The radiation dose reduction device was turned on for each scan per the  ALARA (As Low as Reasonably Achievable) protocol.     COMPARISON:     FINDINGS: There is no blood flow abnormality. The blood volume is normal  as well. Time to maximum perfusion is normal down to the most sensitive  measurements which show minimal  increase in transit time and a small  portion of the left parieto-occipital region.     IMPRESSION:  There is the most subtle evidence of delayed transit in a  small focus in the left posterior parietal region. There is no perfusion  or blood volume abnormality indicating that there is no evidence of core  infarct or reversible ischemia.     D:  09/13/2019  E:  09/13/2019     EXAMINATION: CT HEAD WO CONTRAST- 09/16/2019     INDICATION: Stroke; Z74.09-Other reduced mobility; I63.232-Cerebral  infarction due to unspecified occlusion or stenosis of left carotid  arteries; generalized weakness     TECHNIQUE: Multiple axial CT imaging was obtained of the head from skull  base to skull vertex without the administration of intravenous contrast.     The radiation dose reduction device was turned on for each scan per the  ALARA (As Low as Reasonably Achievable) protocol.     COMPARISON: 09/12/2019     FINDINGS: There is minimal low-density area seen within the left  parietal lobe near the vertex. Remainder of the brain parenchyma is  grossly unremarkable. No hemorrhage or hydrocephalus. No mass, mass  effect, or midline shift.     IMPRESSION:  Evolving area of infarction seen near the left vertex in the  parietal lobe. Findings have slightly evolved in the interval. No  hemorrhage. There are no other new findings.     D:  09/16/2019  E:  09/17/2019    ECHO  Interpretation Summary     · Left ventricular systolic function is normal. Estimated EF = 60%.  · There were no significant valvular abnormalities       I reviewed the patient's new imaging including images and reports.      Physical therapy: PT re-evaluation (s/p L ICA stent and transfer to ICU on 9/17). Pt continues to demonstrate post-op RLE weakness and decreased indep re: functional mobility, warranting further skilled PT services to promote PLOF. Goals revised to reflect current functional status. Recommend SNF at d/c based upon current level of function.  "    Discharge Assessment:    Vital Signs  Visit Vitals  BP 94/44   Pulse 68   Temp 98.5 °F (36.9 °C) (Oral)   Resp 16   Ht 162.6 cm (64\")   Wt 97.8 kg (215 lb 9.8 oz)   SpO2 93%   BMI 37.01 kg/m²     Temp (24hrs), Av.1 °F (36.7 °C), Min:97.7 °F (36.5 °C), Max:98.6 °F (37 °C)      General Appearance:    Alert, cooperative, in no acute distress   Lungs:     Clear to auscultation,respirations regular, even and                   unlabored    Heart:    Regular rhythm and normal rate, normal S1 and S2   Abdomen:     Normal bowel sounds, no masses, no organomegaly, soft        non-tender, non-distended, no guarding, no rebound                 tenderness   Extremities:   Moves all extremities well, no edema, no cyanosis, no              Redness. Right hip Prineo CDI   Pulses:   Pulses palpable and equal bilaterally   Skin:   No bleeding, bruising or rash   Neurologic:   Cranial nerves 2 - 12 grossly intact, sensation intact. Flexion and dorsiflexion intact bilateral feet.       Discharge Disposition: [facility]    Discharge Medications     Discharge Medications      New Medications      Instructions Start Date   aspirin 81 MG EC tablet   81 mg, Oral, Daily   Start Date:  2019     atorvastatin 80 MG tablet  Commonly known as:  LIPITOR   80 mg, Oral, Nightly      clopidogrel 75 MG tablet  Commonly known as:  PLAVIX   75 mg, Oral, Daily   Start Date:  2019     docusate sodium 100 MG capsule   100 mg, Oral, 2 Times Daily      HYDROcodone-acetaminophen 5-325 MG per tablet  Commonly known as:  NORCO   1 tablet, Oral, Every 4 Hours PRN         Continue These Medications      Instructions Start Date   acetaminophen 325 MG tablet  Commonly known as:  TYLENOL   650 mg, Oral, Every 6 Hours PRN      atenolol 25 MG tablet  Commonly known as:  TENORMIN   25 mg, Oral, 2 Times Daily, Take 25 mg by mouth 2 (Two) Times a Day.      escitalopram 20 MG tablet  Commonly known as:  LEXAPRO   20 mg, Oral, Daily      hydrOXYzine 25 " MG tablet  Commonly known as:  ATARAX   25 mg, Oral, 2 Times Daily PRN      lisinopril 20 MG tablet  Commonly known as:  PRINIVIL,ZESTRIL   20 mg, Oral, Daily, Take 20 mg by mouth Daily.      mirtazapine 15 MG tablet  Commonly known as:  REMERON   15 mg, Oral, Nightly, TK 1/2 T PO HS FOR 1 WK THEN TK 1 T PO HS             Discharge Diet: Cardiac diet    Activity at Discharge: WBAT RLE    Follow-up Appointments  Dr. New per his orders  Dr. Guerra in 1 month for duplex US of carotid  Dr. Pearl in 1 month  Recheck CBC in 3 days  PCP after DC from rehab    Discharge took over 30 min.    WILFREDO Kramer  09/18/19  12:09 PM

## 2019-09-18 NOTE — PLAN OF CARE
Problem: Patient Care Overview  Goal: Plan of Care Review  Outcome: Ongoing (interventions implemented as appropriate)   09/18/19 0511   Plan of Care Review   Progress no change   OTHER   Outcome Summary mild hip pain. mild headache relieved by norco. no neuro changes. VSS. Afebrile.   Coping/Psychosocial   Plan of Care Reviewed With patient       Problem: Pain, Chronic (Adult)  Goal: Identify Related Risk Factors and Signs and Symptoms  Outcome: Outcome(s) achieved Date Met: 09/18/19    Goal: Acceptable Pain/Comfort Level and Functional Ability  Outcome: Ongoing (interventions implemented as appropriate)      Problem: Hip Arthroplasty (Total, Partial) (Adult)  Goal: Signs and Symptoms of Listed Potential Problems Will be Absent, Minimized or Managed (Hip Arthroplasty)  Outcome: Ongoing (interventions implemented as appropriate)    Goal: Anesthesia/Sedation Recovery  Outcome: Ongoing (interventions implemented as appropriate)      Problem: Skin Injury Risk (Adult)  Goal: Identify Related Risk Factors and Signs and Symptoms  Outcome: Outcome(s) achieved Date Met: 09/18/19    Goal: Skin Health and Integrity  Outcome: Ongoing (interventions implemented as appropriate)      Problem: Fall Risk (Adult)  Goal: Identify Related Risk Factors and Signs and Symptoms  Outcome: Outcome(s) achieved Date Met: 09/18/19    Goal: Absence of Fall  Outcome: Ongoing (interventions implemented as appropriate)

## 2019-09-18 NOTE — PROGRESS NOTES
Continued Stay Note  [facility]     Patient Name: Akua Torres  MRN: [id number]  Today's Date: 9/18/2019    Admit Date: 9/9/2019    Discharge Plan     Row Name 09/18/19 1135       Plan    Plan  SNF    Plan Comments  She has a bed and insurance has approved her to transfer to [facility] today.  Call to Dr. New to get discharge summary.          Discharge Codes    No documentation.       Expected Discharge Date and Time     Expected Discharge Date Expected Discharge Time    Sep 18, 2019             Gemini Varner RN

## 2019-09-18 NOTE — PLAN OF CARE
Problem: Patient Care Overview  Goal: Plan of Care Review  Outcome: Ongoing (interventions implemented as appropriate)   09/18/19 1649   Plan of Care Review   Progress improving   OTHER   Outcome Summary Patient neurologically intact other than RLE drift most likely related to hip replacement. NIH 2. Tolerating RA. Bipap mask found and sent to rehab with patient. HR 50s-70s SB/NSR. SBP 80s-110s. Held atenolol this am. No eliquis ordered. ASA and plavix administered. Tolerating regualr diet. BM x1. Refused colace this am. Incontinent with urine. Brief changed prn. Right hip incision BEAR and C/D/I. Left femoral site C/D/I with gauze and tegarderm in place. No prn pain meds needed. Worked with PT/OT. Carotid duplex compelted as ordered before dishcarge. MD aware of labs. Discharged to [facility]. See nursing note,    Coping/Psychosocial   Plan of Care Reviewed With patient       Problem: Pain, Chronic (Adult)  Goal: Acceptable Pain/Comfort Level and Functional Ability  Outcome: Ongoing (interventions implemented as appropriate)   09/18/19 1649   Pain, Chronic (Adult)   Acceptable Pain/Comfort Level and Functional Ability making progress toward outcome       Problem: Hip Arthroplasty (Total, Partial) (Adult)  Goal: Signs and Symptoms of Listed Potential Problems Will be Absent, Minimized or Managed (Hip Arthroplasty)  Outcome: Ongoing (interventions implemented as appropriate)   09/18/19 1649   Goal/Outcome Evaluation   Problems Assessed (Hip Arthroplasty) all   Problems Present (Hip Arthroplasty) functional deficit;pain       Problem: Skin Injury Risk (Adult)  Goal: Skin Health and Integrity  Outcome: Ongoing (interventions implemented as appropriate)   09/18/19 1649   Skin Injury Risk (Adult)   Skin Health and Integrity making progress toward outcome       Problem: Fall Risk (Adult)  Goal: Absence of Fall  Outcome: Ongoing (interventions implemented as appropriate)   09/18/19 1649   Fall Risk (Adult)   Absence of  Fall making progress toward outcome

## 2019-09-18 NOTE — PROGRESS NOTES
"Neurology       Patient Care Team:  Salty Hernandez MD as PCP - General (Family Medicine)  Leon Hein MD as Consulting Physician (Neurosurgery)  Perkins, Corinne E, PT as Physical Therapist (Physical Therapy)  Cricket Nettles MD as Consulting Physician (Pain Medicine)    Chief complaint right-sided weakness/stroke      Subjective .     History: Strength gradually improving.  Had angiogram yesterday and ?carotid stent, and had some neck pain yesterday but that has resolved.  Also had headache yesterday that has resolved.  No new weakness, no vision changes.  Looking forward to going to rehab and then home.    ROS: No fever or chest pain    Objective     Vital Signs   Blood pressure 95/42, pulse 64, temperature 98.5 °F (36.9 °C), temperature source Oral, resp. rate 18, height 162.6 cm (64\"), weight 97.8 kg (215 lb 9.8 oz), SpO2 95 %, not currently breastfeeding.    Physical Exam:              Neuro:  elderly white woman in no acute distress sitting in bedside chair, alert, fluent and appropriate, pleasant and cooperative with exam.  No dysarthria.  EOMI face symmetric TML  Motor: Mild right upper more than proximal lower extremity weakness-continues to improve  Coordination commensurate with strength    Results Review:              Glucose 118 calcium 7.8 GFR 54  H&H 7.4 and 24, CBC otherwise unremarkable  Angiogram report pending    Assessment/Plan     Left prefrontal gyrus stroke with right hemiparesis noted following hip surgery.  Clinically continues to improve.  Underwent angiogram and coronary stent yesterday, doing well.  On aspirin, Plavix and high dose statin.  Plan is for follow-up in interventional clinic per patient.  Will sign off.    I discussed the patients findings and my recommendations with patient    Maribel East MD  09/18/19  11:33 AM      "

## 2019-09-18 NOTE — NURSING NOTE
Pt stated that she brought her own personal bipap mask from home and had been using it while on her previous unit 3G/H. After transferring to  the patients personal bipap mask could not be located. I called the charge RN on 3G/H but she was unable to locate the mask and stated she would continue to search for it. House supervisor, Kwasi Epps was notified who said he would look for it as well. HS said to involve patient relations during dayshift to resolve the issue. Will pass this along to dayshift RN.

## 2019-09-18 NOTE — PROGRESS NOTES
Continued Stay Note  [facility]     Patient Name: Akua Torres  MRN: [id number]  Today's Date: 9/18/2019    Admit Date: 9/9/2019    Discharge Plan     Row Name 09/18/19 1232       Plan    Plan Comments  Transporting today to [facility] via Medical service van at 1500.  Call report to  # [phone].      Row Name 09/18/19 1135       Plan    Plan  SNF    Plan Comments  She has a bed and insurance has approved her to transfer to [facility] today.  Call to Dr. New to get discharge summary.          Discharge Codes    No documentation.       Expected Discharge Date and Time     Expected Discharge Date Expected Discharge Time    Sep 18, 2019             Gemini Varner RN

## 2019-09-18 NOTE — THERAPY RE-EVALUATION
Acute Care - Occupational Therapy Re-Evaluation  [facility]     Patient Name: Akau Torres  : 1945  MRN: [id number]  Today's Date: 2019  Onset of Illness/Injury or Date of Surgery: 19  Date of Referral to OT: 19  Referring Physician: MD Charlie    Admit Date: 2019       ICD-10-CM ICD-9-CM   1. Impaired functional mobility, balance, gait, and endurance Z74.09 V49.89   2. Impaired mobility and ADLs Z74.09 799.89   3. 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 (CMS/HCC) I63.232 433.11     Patient Active Problem List   Diagnosis   • Degenerative disc disease, lumbar   • Lumbar stenosis with neurogenic claudication   • History of lumbar fusion   • Spondylosis of lumbar region without myelopathy or radiculopathy   • Mild obesity   • Physical deconditioning   • Idiopathic gout   • Anxiety and depression   • Greater trochanteric bursitis of both hips   • Status post total bilateral knee replacement   • Back pain   • Essential hypertension   • Prediabetes   • S/P lumbar spinal fusion   • Leukocytosis, likely reactive   • Depression   • Arthritis of right hip   • Paroxysmal atrial fibrillation (CMS/HCC)   • YUNIOR treated with BiPAP   • 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   • Acute blood loss anemia, mild, asymptomatic   • 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 (CMS/HCC)   • Supraventricular tachycardia (CMS/HCC)   • Left prefrontal gyrus stroke     Past Medical History:   Diagnosis Date   • A-fib (CMS/HCC)    • Anxiety    • Anxiety and depression    • Arthritis    • Cataract    • Depression    • Elevated serum creatinine     SEES DR SMITH EVERY 6 MONTHS- NEPHROLOGIST    • Extremity pain    • GERD (gastroesophageal reflux disease)    • Gout    • H/O bladder infections     JUST FINISHED MACROBID ON 17 FOR RECENT UTI   • Hypercholesteremia    • Hypertension    • Joint pain    • Kidney disease    • Kidney disease, chronic,  stage III (GFR 30-59 ml/min) (CMS/HCC)    • Low back pain    • Neck pain    • Rheumatic fever    • Sleep apnea    • Urinary tract infection    • Wears glasses      Past Surgical History:   Procedure Laterality Date   • BACK SURGERY  2004    LUMBAR PER DR MAN    • CARPAL TUNNEL RELEASE Left    • COLONOSCOPY     • EYE SURGERY     • FINGER SURGERY Right     3RD FINGER   • HEEL SPUR EXCISION Bilateral    • KNEE ARTHROSCOPY Bilateral    • LUMBAR DISCECTOMY FUSION INSTRUMENTATION N/A 11/10/2017    Procedure: LUMBAR SPINAL FUSION ;  Surgeon: Gopi Man MD;  Location:  [facility];  Service:    • REPLACEMENT TOTAL KNEE BILATERAL Bilateral    • TOTAL HIP ARTHROPLASTY Right 9/9/2019    Procedure: TOTAL ANTERIOR RIGHT HIP ARTHROPLASTY;  Surgeon: Darrin New MD;  Location:  [facility];  Service: Orthopedics          OT ASSESSMENT FLOWSHEET (last 12 hours)      Occupational Therapy Evaluation     Row Name 09/18/19 1004                   OT Evaluation Time/Intention    Subjective Information  no complaints  -CL        Document Type  re-evaluation  -CL        Mode of Treatment  occupational therapy  -CL        Patient Effort  excellent  -CL           General Information    Patient Profile Reviewed?  yes  -CL        Onset of Illness/Injury or Date of Surgery  09/09/19  -CL        Referring Physician  MD Charlie  -CL        Prior Level of Function  -- Please see IE.  -CL        Equipment Currently Used at Home  -- Please see IE.  -CL        Existing Precautions/Restrictions  fall;right;hip, anterior slight R weakness  -CL        Risks Reviewed  patient:;LOB;nausea/vomiting;dizziness;increased discomfort;change in vital signs;lines disloged  -CL        Benefits Reviewed  patient:;improve function;increase independence;increase strength;increase balance;decrease pain;increase knowledge  -CL        Barriers to Rehab  medically complex  -CL           Relationship/Environment    Lives With  -- Please see IE.  -CL            Cognitive Assessment/Intervention- PT/OT    Affect/Mental Status (Cognitive)  WFL  -CL        Orientation Status (Cognition)  oriented x 4  -CL        Follows Commands (Cognition)  WFL  -CL        Cognitive Function (Cognitive)  WFL  -CL           Bed Mobility Assessment/Treatment    Bed Mobility Assessment/Treatment  supine-sit  -CL        Supine-Sit [place] (Bed Mobility)  moderate assist (50% patient effort);verbal cues  -CL        Assistive Device (Bed Mobility)  bed rails;draw sheet;head of bed elevated  -CL           Functional Mobility    Functional Mobility- Ind. Level  contact guard assist;1 person + 1 person to manage equipment;verbal cues required  -CL        Functional Mobility- Device  rolling walker  -CL        Functional Mobility-Distance (Feet)  10  -CL        Functional Mobility- Comment  Transitioned to PT treatment.   -CL           Transfer Assessment/Treatment    Transfer Assessment/Treatment  sit-stand transfer;stand-sit transfer;toilet transfer  -CL           Bed-Chair Transfer    Bed-Chair [place] (Transfers)  contact guard;verbal cues  -CL        Assistive Device (Bed-Chair Transfers)  walker, front-wheeled  -CL           Sit-Stand Transfer    Sit-Stand [place] (Transfers)  contact guard;verbal cues  -CL        Assistive Device (Sit-Stand Transfers)  walker, front-wheeled  -CL           Stand-Sit Transfer    Stand-Sit [place] (Transfers)  contact guard;verbal cues  -CL        Assistive Device (Stand-Sit Transfers)  walker, front-wheeled  -CL           Toilet Transfer    Type (Toilet Transfer)  stand pivot/stand step  -CL        [place] Level (Toilet Transfer)  contact guard;verbal cues  -CL        Assistive Device (Toilet Transfer)  commode, bedside without drop arms;walker, front-wheeled  -CL           Lower Body Dressing Assessment/Training    Lower Body Dressing [place] Level  don;undergarment;maximum assist (25% patient effort);verbal cues  -CL        Lower  Body Dressing Position  supported sitting;supported standing  -CL           Toileting Assessment/Training    [place] Level (Toileting)  adjust/manage clothing;moderate assist (50% patient effort);perform perineal hygiene;dependent (less than 25% patient effort)  -CL        Assistive Devices (Toileting)  commode, bedside without drop arms  -CL        Toileting Position  supported sitting;supported standing  -CL           General ROM    GENERAL ROM COMMENTS  BUE grossly WFL.   -CL           MMT (Manual Muscle Testing)    General MMT Comments  RUE grossly 3+/5, LUE grossly 4/5.   -CL           Therapeutic Exercise    43318 - OT Therapeutic Activity Minutes  23  -CL           Static Sitting Balance    Level of [place] (Unsupported Sitting, Static Balance)  supervision  -CL        Sitting Position (Unsupported Sitting, Static Balance)  sitting in chair  -CL           Static Standing Balance    Level of [place] (Supported Standing, Static Balance)  contact guard assist  -CL        Assistive Device Utilized (Supported Standing, Static Balance)  walker, rolling  -CL           Sensory Assessment/Intervention    Sensory General Assessment  no sensation deficits identified  -CL           Vision Assessment/Intervention    Visual Impairment/Limitations  WFL  -CL           Positioning and Restraints    Pre-Treatment Position  in bed  -CL        Post Treatment Position  other  -CL        Other Position  with other staff w/ PT  -CL           Pain Scale: Numbers Pre/Post-Treatment    Pain Scale: Numbers, Pretreatment  0/10 - no pain  -CL        Pain Scale: Numbers, Post-Treatment  0/10 - no pain  -CL           Wound 09/09/19 1352 Right hip Incision    Wound - Properties Group Date first assessed: 09/09/19  -KD Time first assessed: 1352  -KD Side: Right  -KD Location: hip  -KD Primary Wound Type: Incision  -KD       Clinical Impression (OT)    Date of Referral to OT  09/18/19  -CL        OT Diagnosis  Decreased  independence in ADLs.   -CL        Patient/Family Goals Statement (OT Eval)  Return to PLOF.   -CL        Criteria for Skilled Therapeutic Interventions Met (OT Eval)  yes;treatment indicated  -CL        Rehab Potential (OT Eval)  good, to achieve stated therapy goals  -CL        Therapy Frequency (OT Eval)  daily  -CL        Anticipated Equipment Needs at Discharge (OT)  -- TBA further  -CL        Anticipated Discharge Disposition (OT)  inpatient rehabilitation facility  -CL           Vital Signs    Pre Systolic BP Rehab  -- VSS, w/ PT  -CL           Transfer Goal 1 (OT)    Activity/Assistive Device (Transfer Goal 1, OT)  sit-to-stand/stand-to-sit;toilet  -CL        [place] Level/Cues Needed (Transfer Goal 1, OT)  supervision required;verbal cues required  -CL        Time Frame (Transfer Goal 1, OT)  by discharge;long term goal (LTG)  -CL        Progress/Outcome (Transfer Goal 1, OT)  goal revised this date  -CL           Dressing Goal 1 (OT)    Progress/Outcome (Dressing Goal 1, OT)  goal ongoing  -CL           Strength Goal 1 (OT)    Progress/Outcome (Strength Goal 1, OT)  goal ongoing  -CL           Balance Goal 1 (OT)    Progress/Outcomes (Balance Goal 1, OT)  goal met  -CL          User Key  (r) = Recorded By, (t) = Taken By, (c) = Cosigned By    Initials Name Effective Dates    CL Drea Phillip, OT 04/03/18 -     Blas Gray RN 06/18/19 -          Occupational Therapy Education     Title: PT OT SLP Therapies (In Progress)     Topic: Occupational Therapy (In Progress)     Point: ADL training (In Progress)     Description: Instruct learner(s) on proper safety adaptation and remediation techniques during self care or transfers.   Instruct in proper use of assistive devices.    Learning Progress Summary           Patient Acceptance, E, NR by ANISA at 9/18/2019 10:04 AM    Acceptance, E, VU by MYRANDA at 9/16/2019  8:36 AM    Acceptance, E, VU,NR by MYRANDA at 9/13/2019  8:54 AM    Acceptance, E,TB,D, VU,DU by  ST at 9/11/2019  1:55 PM    Comment:  see flow sheet    Eager, E,TB,D, VU,NR by AR at 9/10/2019  3:00 PM                   Point: Home exercise program (In Progress)     Description: Instruct learner(s) on appropriate technique for monitoring, assisting and/or progressing therapeutic exercises/activities.    Learning Progress Summary           Patient Acceptance, E, NR by CL at 9/18/2019 10:04 AM    Acceptance, E, VU,NR by LC at 9/13/2019  8:54 AM    Acceptance, E,TB,D, VU,DU by ST at 9/11/2019  1:55 PM    Comment:  see flow sheet    Eager, E,TB,D, VU,NR by AR at 9/10/2019  3:00 PM                   Point: Precautions (In Progress)     Description: Instruct learner(s) on prescribed precautions during self-care and functional transfers.    Learning Progress Summary           Patient Acceptance, E, NR by CL at 9/18/2019 10:04 AM    Acceptance, E, VU by LC at 9/16/2019  8:36 AM    Acceptance, E,TB,D, VU,DU by ST at 9/11/2019  1:55 PM    Comment:  see flow sheet    Eager, E,TB,D, VU,NR by AR at 9/10/2019  3:00 PM                   Point: Body mechanics (In Progress)     Description: Instruct learner(s) on proper positioning and spine alignment during self-care, functional mobility activities and/or exercises.    Learning Progress Summary           Patient Acceptance, E, NR by CL at 9/18/2019 10:04 AM    Acceptance, E, VU by LC at 9/16/2019  8:36 AM    Acceptance, E,TB,D, VU,DU by ST at 9/11/2019  1:55 PM    Comment:  see flow sheet    Eager, E,TB,D, VU,NR by AR at 9/10/2019  3:00 PM                               User Key     Initials Effective Dates Name Provider Type Discipline     06/10/18 -  Claudia Vance OTR Occupational Therapist OT    AR 06/22/15 -  Jennifer Odonnell, OT Occupational Therapist OT    CL 04/03/18 -  Drea Phillip, OT Occupational Therapist OT    LC 07/18/19 -  Teresa Adhikari, OT Occupational Therapist OT                  OT Recommendation and Plan  Outcome Summary/Treatment Plan  (OT)  Anticipated Equipment Needs at Discharge (OT): (TBA further)  Anticipated Discharge Disposition (OT): inpatient rehabilitation facility  Therapy Frequency (OT Eval): daily  Plan of Care Review  Plan of Care Reviewed With: patient  Plan of Care Reviewed With: patient  Outcome Summary: OT Re-eval complete s/p angiogram. Pt CGA for t/fs and Mod A-Dep for ADL performance, though demo excellent effort w/ treatment. Recommend cont skilled IPOT POC. Recommend pt DC to IP rehab.     Outcome Measures     Row Name 09/18/19 1004 09/17/19 0824 09/16/19 1410       How much help from another person do you currently need...    Turning from your back to your side while in flat bed without using bedrails?  --  3  -MJ  3  -MJ    Moving from lying on back to sitting on the side of a flat bed without bedrails?  --  3  -MJ  3  -MJ    Moving to and from a bed to a chair (including a wheelchair)?  --  3  -MJ  3  -MJ    Standing up from a chair using your arms (e.g., wheelchair, bedside chair)?  --  3  -MJ  3  -MJ    Climbing 3-5 steps with a railing?  --  2  -MJ  2  -MJ    To walk in hospital room?  --  3  -MJ  3  -MJ    AM-PAC 6 Clicks Score (PT)  --  17  -MJ  17  -MJ       How much help from another is currently needed...    Putting on and taking off regular lower body clothing?  2  -CL  --  --    Bathing (including washing, rinsing, and drying)  2  -CL  --  --    Toileting (which includes using toilet bed pan or urinal)  2  -CL  --  --    Putting on and taking off regular upper body clothing  3  -CL  --  --    Taking care of personal grooming (such as brushing teeth)  3  -CL  --  --    Eating meals  4  -CL  --  --    AM-PAC 6 Clicks Score (OT)  16  -CL  --  --       Modified Reddy Scale    Pre-Stroke Modified George Scale  0 - No Symptoms at all.  -CL  --  --    Modified Reddy Scale  3 - Moderate disability.  Requiring some help, but able to walk without assistance.  -CL  --  --       Functional Assessment    Outcome Measure  Options  AM-[facility] 6 Clicks Daily Activity (OT)  -CL  AM-[facility] 6 Clicks Basic Mobility (PT)  -  AM-[facility] 6 Clicks Basic Mobility (PT)  -    Row Name 09/16/19 1020 09/16/19 0836          How much help from another person do you currently need...    Turning from your back to your side while in flat bed without using bedrails?  3  -MJ  --     Moving from lying on back to sitting on the side of a flat bed without bedrails?  3  -MJ  --     Moving to and from a bed to a chair (including a wheelchair)?  3  -MJ  --     Standing up from a chair using your arms (e.g., wheelchair, bedside chair)?  3  -MJ  --     Climbing 3-5 steps with a railing?  2  -MJ  --     To walk in hospital room?  3  -MJ  --     AM-[facility] 6 Clicks Score (PT)  17  -MJ  --        How much help from another is currently needed...    Putting on and taking off regular lower body clothing?  --  2  -LC     Bathing (including washing, rinsing, and drying)  --  2  -LC     Toileting (which includes using toilet bed pan or urinal)  --  2  -LC     Putting on and taking off regular upper body clothing  --  2  -LC     Taking care of personal grooming (such as brushing teeth)  --  3  -LC     Eating meals  --  3  -LC     AM-[facility] 6 Clicks Score (OT)  --  14  -LC        Functional Assessment    Outcome Measure Options  -[facility] 6 Clicks Basic Mobility (PT)  -  --       User Key  (r) = Recorded By, (t) = Taken By, (c) = Cosigned By    Initials Name Provider Type    MJ Henrique Clements, PT Physical Therapist    CL Drea Phillip, OT Occupational Therapist    LC Teresa Adhikari, OT Occupational Therapist          Time Calculation:   Time Calculation- OT     Row Name 09/18/19 1015 09/18/19 1004          Time Calculation- OT    OT Start Time  --  1004  -CL     OT Received On  --  09/18/19  -     OT Goal Re-Cert Due Date  --  09/20/19  -        Timed Charges    11327 - Gait Training Minutes   11  -LS  --     70380 - OT Therapeutic Activity Minutes  --  23  -CL       User Key  (r) =  Recorded By, (t) = Taken By, (c) = Cosigned By    Initials Name Provider Type    Shira Carlton, PT Physical Therapist    CL Drea Phillip OT Occupational Therapist        Therapy Charges for Today     Code Description Service Date Service Provider Modifiers Qty    76609786798 HC OT THERAPEUTIC ACT EA 15 MIN 9/18/2019 Drea Phillip OT GO 2    72392684011 HC OT RE-EVAL 2 9/18/2019 Drea Phillip OT GO 1    59483644244 HC OT THER SUPP EA 15 MIN 9/18/2019 Drea Phillip OT GO 2               Drea Phillip OT  9/18/2019

## 2019-09-18 NOTE — TELEPHONE ENCOUNTER
Please pend order for duplex ultrasound of the carotid.  She will be getting this done in a month for her follow up with Dr. Guerra.

## 2019-09-18 NOTE — THERAPY RE-EVALUATION
Patient Name: Akua Torres  : 1945    MRN: [id number]                              Today's Date: 2019       Admit Date: 2019    Visit Dx:     ICD-10-CM ICD-9-CM   1. Impaired functional mobility, balance, gait, and endurance Z74.09 V49.89   2. Impaired mobility and ADLs Z74.09 799.89   3. 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 (CMS/[facility]) I63.232 433.11     Patient Active Problem List   Diagnosis   • Degenerative disc disease, lumbar   • Lumbar stenosis with neurogenic claudication   • History of lumbar fusion   • Spondylosis of lumbar region without myelopathy or radiculopathy   • Mild obesity   • Physical deconditioning   • Idiopathic gout   • Anxiety and depression   • Greater trochanteric bursitis of both hips   • Status post total bilateral knee replacement   • Back pain   • Essential hypertension   • Prediabetes   • S/P lumbar spinal fusion   • Leukocytosis, likely reactive   • Depression   • Arthritis of right hip   • Paroxysmal atrial fibrillation (CMS/[facility])   • YUNIOR treated with BiPAP   • 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   • Acute blood loss anemia, mild, asymptomatic   • 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 (CMS/HCC)   • Supraventricular tachycardia (CMS/[facility])   • Left prefrontal gyrus stroke     Past Medical History:   Diagnosis Date   • A-fib (CMS/[facility])    • Anxiety    • Anxiety and depression    • Arthritis    • Cataract    • Depression    • Elevated serum creatinine     SEES DR SMITH EVERY 6 MONTHS- NEPHROLOGIST    • Extremity pain    • GERD (gastroesophageal reflux disease)    • Gout    • H/O bladder infections     JUST FINISHED MACROBID ON 17 FOR RECENT UTI   • Hypercholesteremia    • Hypertension    • Joint pain    • Kidney disease    • Kidney disease, chronic, stage III (GFR 30-59 ml/min) (CMS/[facility])    • Low back pain    • Neck pain    • Rheumatic fever    • Sleep apnea    • Urinary tract infection     • Wears glasses      Past Surgical History:   Procedure Laterality Date   • BACK SURGERY  2004    LUMBAR PER DR MAN    • CARPAL TUNNEL RELEASE Left    • COLONOSCOPY     • EYE SURGERY     • FINGER SURGERY Right     3RD FINGER   • HEEL SPUR EXCISION Bilateral    • KNEE ARTHROSCOPY Bilateral    • LUMBAR DISCECTOMY FUSION INSTRUMENTATION N/A 11/10/2017    Procedure: LUMBAR SPINAL FUSION ;  Surgeon: Gopi Man MD;  Location:  [facility];  Service:    • REPLACEMENT TOTAL KNEE BILATERAL Bilateral    • TOTAL HIP ARTHROPLASTY Right 9/9/2019    Procedure: TOTAL ANTERIOR RIGHT HIP ARTHROPLASTY;  Surgeon: Darrin New MD;  Location:  [facility];  Service: Orthopedics     General Information     Row Name 09/18/19 1204          PT Evaluation Time/Intention    Document Type  re-evaluation  -     Mode of Treatment  physical therapy  -     Row Name 09/18/19 1204          General Information    Patient Profile Reviewed?  yes  -LS     Prior Level of Function  -- see IE  -LS     Existing Precautions/Restrictions  fall;right;hip, anterior;other (see comments) slight R-sided weakness acute CVA  -LS     Barriers to Rehab  medically complex  -     Row Name 09/18/19 1204          Relationship/Environment    Lives With  -- see IE for social hx  -     Row Name 09/18/19 1204          Cognitive Assessment/Intervention- PT/OT    Orientation Status (Cognition)  oriented x 4  -     Row Name 09/18/19 1204          Safety Issues, Functional Mobility    Impairments Affecting Function (Mobility)  balance;coordination;endurance/activity tolerance;strength;range of motion (ROM)  -       User Key  (r) = Recorded By, (t) = Taken By, (c) = Cosigned By    Initials Name Provider Type    LS Shira Su, PT Physical Therapist        Mobility     Row Name 09/18/19 1206          Bed Mobility Assessment/Treatment    Comment (Bed Mobility)  Standing in room with OT upon arrival; transitioned to PT re-eval.  -     Row Name 09/18/19 1206           Transfer Assessment/Treatment    Comment (Transfers)  VC for sliding RLE forward prior to transfer.   -     Row Name 09/18/19 1206          Sit-Stand Transfer    Sit-Stand [place] (Transfers)  contact guard;verbal cues  -LS     Assistive Device (Sit-Stand Transfers)  walker, front-wheeled  -LS     Row Name 09/18/19 1206          Gait/Stairs Assessment/Training    [place] Level (Gait)  contact guard;verbal cues  -LS     Assistive Device (Gait)  walker, front-wheeled  -LS     Distance in Feet (Gait)  200  -LS     Pattern (Gait)  step-through  -LS     Deviations/Abnormal Patterns (Gait)  right sided deviations;stride length decreased  -LS     Bilateral Gait Deviations  forward flexed posture;weight shift ability decreased  -LS     Comment (Gait/Stairs)  VC for keeping RWx close to body and for even step length; distance limited by fatigue.   -     Row Name 09/18/19 1206          Mobility Assessment/Intervention    Extremity Weight-bearing Status  left lower extremity  -LS     Right Lower Extremity (Weight-bearing Status)  weight-bearing as tolerated (WBAT)  -       User Key  (r) = Recorded By, (t) = Taken By, (c) = Cosigned By    Initials Name Provider Type     Shira Su, PT Physical Therapist        Obj/Interventions     Row Name 09/18/19 1209          General ROM    GENERAL ROM COMMENTS  LLE WFL; R hip flex limited by pain, R knee/ankle WFL  -     Row Name 09/18/19 1209          MMT (Manual Muscle Testing)    General MMT Comments  LLE 4-/5; R hip flex 3-/5, R knee ext/ ankle DF: 3+/5  -     Row Name 09/18/19 1209          Therapeutic Exercise    Lower Extremity (Therapeutic Exercise)  gluteal sets;quad sets, bilateral;LAQ (long arc quad), right;SLR (straight leg raise), right  -     Lower Extremity Range of Motion (Therapeutic Exercise)  hip flexion/extension, right;hip abduction/adduction, right;ankle dorsiflexion/plantar flexion, bilateral  -     Exercise Type (Therapeutic  Exercise)  AAROM (active assistive range of motion)  -LS     Position (Therapeutic Exercise)  seated  -LS     Sets/Reps (Therapeutic Exercise)  1/10  -     Row Name 09/18/19 1209          Static Sitting Balance    Level of [place] (Unsupported Sitting, Static Balance)  supervision  -LS     Sitting Position (Unsupported Sitting, Static Balance)  sitting in chair  -LS     Row Name 09/18/19 1209          Static Standing Balance    Level of [place] (Supported Standing, Static Balance)  contact guard assist  -LS     Assistive Device Utilized (Supported Standing, Static Balance)  walker, rolling  -LS     Row Name 09/18/19 1209          Sensory Assessment/Intervention    Sensory General Assessment  no sensation deficits identified BLE  -LS       User Key  (r) = Recorded By, (t) = Taken By, (c) = Cosigned By    Initials Name Provider Type    LS Shira Su, PT Physical Therapist        Goals/Plan     Row Name 09/18/19 1213          Bed Mobility Goal 1 (PT)    Activity/Assistive Device (Bed Mobility Goal 1, PT)  sit to supine/supine to sit  -LS     [place] Level/Cues Needed (Bed Mobility Goal 1, PT)  conditional independence  -LS     Time Frame (Bed Mobility Goal 1, PT)  2 weeks  -LS     Progress/Outcomes (Bed Mobility Goal 1, PT)  goal ongoing  -     Row Name 09/18/19 1213          Transfer Goal 1 (PT)    Activity/Assistive Device (Transfer Goal 1, PT)  sit-to-stand/stand-to-sit;walker, rolling  -LS     [place] Level/Cues Needed (Transfer Goal 1, PT)  supervision required  -LS     Time Frame (Transfer Goal 1, PT)  2 weeks  -LS     Progress/Outcome (Transfer Goal 1, PT)  goal ongoing;goal revised this date  -     Row Name 09/18/19 1213          Gait Training Goal 1 (PT)    Activity/Assistive Device (Gait Training Goal 1, PT)  gait (walking locomotion);walker, rolling  -LS     [place] Level (Gait Training Goal 1, PT)  supervision required  -LS     Distance (Gait Goal 1, PT)  300  -LS     Time  Frame (Gait Training Goal 1, PT)  2 weeks  -LS     Progress/Outcome (Gait Training Goal 1, PT)  goal ongoing;goal revised this date  -LS       User Key  (r) = Recorded By, (t) = Taken By, (c) = Cosigned By    Initials Name Provider Type    Shira Carlton, PT Physical Therapist        Clinical Impression     Row Name 09/18/19 1211          Pain Scale: Numbers Pre/Post-Treatment    Pain Scale: Numbers, Pretreatment  4/10  -LS     Pain Scale: Numbers, Post-Treatment  2/10  -LS     Pain Location - Side  Right  -LS     Pain Location - Orientation  lateral  -LS     Pain Location  hip  -LS     Pre/Post Treatment Pain Comment  tolerated  -LS     Pain Intervention(s)  Repositioned;Ambulation/increased activity  -LS     Row Name 09/18/19 1211          Plan of Care Review    Plan of Care Reviewed With  patient  -LS     Row Name 09/18/19 1211          Physical Therapy Clinical Impression    Criteria for Skilled Interventions Met (PT Clinical Impression)  yes;treatment indicated  -LS     Rehab Potential (PT Clinical Summary)  good, to achieve stated therapy goals  -LS     Predicted Duration of Therapy (PT)  2wks  -LS     Row Name 09/18/19 1211          Vital Signs    Pre Systolic BP Rehab  113  -LS     Pre Treatment Diastolic BP  42  -LS     Posttreatment Heart Rate (beats/min)  73  -LS     O2 Delivery Pre Treatment  room air  -LS     Post SpO2 (%)  97  -LS     O2 Delivery Post Treatment  room air  -LS     Pre Patient Position  Standing  -LS     Intra Patient Position  Standing  -LS     Post Patient Position  Sitting  -LS       User Key  (r) = Recorded By, (t) = Taken By, (c) = Cosigned By    Initials Name Provider Type    Shira Carlton, PT Physical Therapist        Outcome Measures     Row Name 09/18/19 1219          How much help from another person do you currently need...    Turning from your back to your side while in flat bed without using bedrails?  3  -LS     Moving from lying on back to sitting on the side of a  flat bed without bedrails?  3  -LS     Moving to and from a bed to a chair (including a wheelchair)?  3  -LS     Standing up from a chair using your arms (e.g., wheelchair, bedside chair)?  3  -LS     Climbing 3-5 steps with a railing?  2  -LS     To walk in hospital room?  3  -LS     AM-PAC 6 Clicks Score (PT)  17  -LS     Row Name 09/18/19 1219          Modified Reddy Scale    Pre-Stroke Modified [place] Scale  0 - No Symptoms at all.  -LS     Modified Reddy Scale  3 - Moderate disability.  Requiring some help, but able to walk without assistance.  -     Row Name 09/18/19 1219          Functional Assessment    Outcome Measure Options  Modified Reddy  -LS       User Key  (r) = Recorded By, (t) = Taken By, (c) = Cosigned By    Initials Name Provider Type    Shira Carlton, PT Physical Therapist        Physical Therapy Education     Title: PT OT SLP Therapies (In Progress)     Topic: Physical Therapy (In Progress)     Point: Mobility training (In Progress)     Learning Progress Summary           Patient Acceptance, E,D, VU,NR by CHARLEY at 9/18/2019 12:14 PM    Acceptance, E,D, VU,NR by CLAY at 9/17/2019  8:24 AM    Comment:  Reviewed HEP, gait mechanics, benefits of mobility    Acceptance, E,D, VU,NR by  at 9/16/2019  2:10 PM    Comment:  Reviewed anterior hip precautions, HEP, gait mechanics, benefits of mobility, safety with mobility    Acceptance, E,D, VU,NR by  at 9/16/2019 10:20 AM    Comment:  Reviewed HEP, gait mechanics, benefits of mobility    Acceptance, E,D, VU,DU,NR by CT at 9/15/2019  9:44 AM    Eager, E,D, NR by CT at 9/14/2019  4:17 PM    Acceptance, E,D, VU,NR,DU by CT at 9/14/2019  1:33 PM    Eager, E, VU,NR by SC at 9/13/2019  3:41 PM    Comment:  REVIEWED BENEFITS OF ACTIVITY    Acceptance, E, VU by SC at 9/13/2019 10:17 AM    Comment:  REVIEWED BENEFITS OF ACTIVITY    Acceptance, E,D, VU,NR by AA at 9/12/2019  4:08 PM    Acceptance, E,D, NR by AA at 9/12/2019 11:42 AM    Acceptance, ARPITA MCALLISTER,  VU,NR by MJ at 9/11/2019  9:59 AM    Comment:  Reviewed HEP, gait mechanics, benefits of mobility, safety with mobility    Acceptance, E,D, VU,NR by MJ at 9/10/2019  3:07 PM    Comment:  Reviewed HEP, gait mechanics, benefits of mobility, safety with mobility    Acceptance, E,D, VU,NR by MJ at 9/10/2019  9:05 AM    Comment:  Reviewed anterior hip precautions, gait mechanics, benefits of mobility    Acceptance, E,D, VU,NR by LR at 9/9/2019  5:05 PM    Comment:  Educated on anterior hip precautions, weight bearing status, correct supine to sit t/f technique, correct sit<->stand t/f technique, correct gait mechanics, and progression of POC.   Family Acceptance, E,D, VU,NR by AA at 9/12/2019  4:08 PM    Acceptance, E,D, NR by AA at 9/12/2019 11:42 AM    Acceptance, E,D, VU,NR by LR at 9/9/2019  5:05 PM    Comment:  Educated on anterior hip precautions, weight bearing status, correct supine to sit t/f technique, correct sit<->stand t/f technique, correct gait mechanics, and progression of POC.   Significant Other Acceptance, E,D, VU,NR by CLAY at 9/11/2019  9:59 AM    Comment:  Reviewed HEP, gait mechanics, benefits of mobility, safety with mobility   Other Eager, E,D, NR by CT at 9/14/2019  4:17 PM                   Point: Home exercise program (In Progress)     Learning Progress Summary           Patient Acceptance, E,D, VU,NR by CHARLEY at 9/18/2019 12:14 PM    Acceptance, E,D, VU,NR by MJ at 9/17/2019  8:24 AM    Comment:  Reviewed HEP, gait mechanics, benefits of mobility    Acceptance, E,D, VU,NR by MJ at 9/16/2019  2:10 PM    Comment:  Reviewed anterior hip precautions, HEP, gait mechanics, benefits of mobility, safety with mobility    Acceptance, E,D, VU,NR by MJ at 9/16/2019 10:20 AM    Comment:  Reviewed HEP, gait mechanics, benefits of mobility    Acceptance, E,D, VU,DU,NR by CT at 9/15/2019  9:44 AM    Eager, E,D, NR by CT at 9/14/2019  4:17 PM    Acceptance, E,D, AUBREY NUÑEZ,MAGDALENE by CT at 9/14/2019  1:33 PM    ESVIN Patel,  VU,NR by SC at 9/13/2019  3:41 PM    Comment:  REVIEWED BENEFITS OF ACTIVITY    Acceptance, E, VU by SC at 9/13/2019 10:17 AM    Comment:  REVIEWED BENEFITS OF ACTIVITY    Acceptance, E,D, NR by AA at 9/12/2019 11:42 AM    Acceptance, E,D, VU,NR by MJ at 9/11/2019  9:59 AM    Comment:  Reviewed HEP, gait mechanics, benefits of mobility, safety with mobility    Acceptance, E,D, VU,NR by MJ at 9/10/2019  3:07 PM    Comment:  Reviewed HEP, gait mechanics, benefits of mobility, safety with mobility    Acceptance, E,D, VU,NR by MJ at 9/10/2019  9:05 AM    Comment:  Reviewed anterior hip precautions, gait mechanics, benefits of mobility    Acceptance, E,D, VU,NR by LR at 9/9/2019  5:05 PM    Comment:  Educated on anterior hip precautions, weight bearing status, correct supine to sit t/f technique, correct sit<->stand t/f technique, correct gait mechanics, and progression of POC.   Family Acceptance, E,D, NR by AA at 9/12/2019 11:42 AM    Acceptance, E,D, VU,NR by LR at 9/9/2019  5:05 PM    Comment:  Educated on anterior hip precautions, weight bearing status, correct supine to sit t/f technique, correct sit<->stand t/f technique, correct gait mechanics, and progression of POC.   Significant Other Acceptance, E,D, VU,NR by MJ at 9/11/2019  9:59 AM    Comment:  Reviewed HEP, gait mechanics, benefits of mobility, safety with mobility   Other Eager, E,D, NR by CT at 9/14/2019  4:17 PM                   Point: Body mechanics (In Progress)     Learning Progress Summary           Patient Acceptance, E,D, VU,NR by LS at 9/18/2019 12:14 PM    Acceptance, E,D, VU,NR by MJ at 9/17/2019  8:24 AM    Comment:  Reviewed HEP, gait mechanics, benefits of mobility    Acceptance, E,D, VU,NR by MJ at 9/16/2019  2:10 PM    Comment:  Reviewed anterior hip precautions, HEP, gait mechanics, benefits of mobility, safety with mobility    Acceptance, E,D, VU,NR by MJ at 9/16/2019 10:20 AM    Comment:  Reviewed HEP, gait mechanics, benefits of  mobility    Acceptance, E,D, VU,DU,NR by CT at 9/15/2019  9:44 AM    Eager, E,D, NR by CT at 9/14/2019  4:17 PM    Acceptance, E,D, VU,NR,DU by CT at 9/14/2019  1:33 PM    Eager, E, VU,NR by SC at 9/13/2019  3:41 PM    Comment:  REVIEWED BENEFITS OF ACTIVITY    Acceptance, E, VU by SC at 9/13/2019 10:17 AM    Comment:  REVIEWED BENEFITS OF ACTIVITY    Acceptance, E,D, VU,NR by AA at 9/12/2019  4:08 PM    Acceptance, E,D, NR by AA at 9/12/2019 11:42 AM    Acceptance, E,D, VU,NR by MJ at 9/11/2019  9:59 AM    Comment:  Reviewed HEP, gait mechanics, benefits of mobility, safety with mobility    Acceptance, E,D, VU,NR by MJ at 9/10/2019  3:07 PM    Comment:  Reviewed HEP, gait mechanics, benefits of mobility, safety with mobility    Acceptance, E,D, VU,NR by MJ at 9/10/2019  9:05 AM    Comment:  Reviewed anterior hip precautions, gait mechanics, benefits of mobility    Acceptance, E,D, VU,NR by LR at 9/9/2019  5:05 PM    Comment:  Educated on anterior hip precautions, weight bearing status, correct supine to sit t/f technique, correct sit<->stand t/f technique, correct gait mechanics, and progression of POC.   Family Acceptance, E,D, VU,NR by AA at 9/12/2019  4:08 PM    Acceptance, E,D, NR by AA at 9/12/2019 11:42 AM    Acceptance, E,D, VU,NR by LR at 9/9/2019  5:05 PM    Comment:  Educated on anterior hip precautions, weight bearing status, correct supine to sit t/f technique, correct sit<->stand t/f technique, correct gait mechanics, and progression of POC.   Significant Other Acceptance, E,D, VU,NR by  at 9/11/2019  9:59 AM    Comment:  Reviewed HEP, gait mechanics, benefits of mobility, safety with mobility   Other Eager, E,D, NR by CT at 9/14/2019  4:17 PM                   Point: Precautions (In Progress)     Learning Progress Summary           Patient Acceptance, E,D, VU,NR by CHARLEY at 9/18/2019 12:14 PM    Acceptance, ARPITA MCALLISTER VUNR by CLAY at 9/17/2019  8:24 AM    Comment:  Reviewed HEP, gait mechanics, benefits of  mobility    Acceptance, E,D, VU,NR by MJ at 9/16/2019  2:10 PM    Comment:  Reviewed anterior hip precautions, HEP, gait mechanics, benefits of mobility, safety with mobility    Acceptance, E,D, VU,NR by MJ at 9/16/2019 10:20 AM    Comment:  Reviewed HEP, gait mechanics, benefits of mobility    Acceptance, E,D, VU,DU,NR by CT at 9/15/2019  9:44 AM    Eager, E,D, NR by CT at 9/14/2019  4:17 PM    Acceptance, E,D, VU,NR,DU by CT at 9/14/2019  1:33 PM    Eager, E, VU,NR by SC at 9/13/2019  3:41 PM    Comment:  REVIEWED BENEFITS OF ACTIVITY    Acceptance, E, VU by SC at 9/13/2019 10:17 AM    Comment:  REVIEWED BENEFITS OF ACTIVITY    Acceptance, E,D, VU,NR by AA at 9/12/2019  4:08 PM    Acceptance, E,D, NR by AA at 9/12/2019 11:42 AM    Acceptance, E,D, VU,NR by  at 9/11/2019  9:59 AM    Comment:  Reviewed HEP, gait mechanics, benefits of mobility, safety with mobility    Acceptance, E,D, VU,NR by  at 9/10/2019  3:07 PM    Comment:  Reviewed HEP, gait mechanics, benefits of mobility, safety with mobility    Acceptance, E,D, VU,NR by  at 9/10/2019  9:05 AM    Comment:  Reviewed anterior hip precautions, gait mechanics, benefits of mobility    Acceptance, E,D, VU,NR by REJI at 9/9/2019  5:05 PM    Comment:  Educated on anterior hip precautions, weight bearing status, correct supine to sit t/f technique, correct sit<->stand t/f technique, correct gait mechanics, and progression of POC.   Family Acceptance, E,D, VU,NR by AA at 9/12/2019  4:08 PM    Acceptance, E,D, NR by AA at 9/12/2019 11:42 AM    Acceptance, E,D, VU,NR by REJI at 9/9/2019  5:05 PM    Comment:  Educated on anterior hip precautions, weight bearing status, correct supine to sit t/f technique, correct sit<->stand t/f technique, correct gait mechanics, and progression of POC.   Significant Other Acceptance, E,D, VU,NR by  at 9/11/2019  9:59 AM    Comment:  Reviewed HEP, gait mechanics, benefits of mobility, safety with mobility   Other Eager, ARPITA MCALLISTER, NR by CT  at 9/14/2019  4:17 PM                               User Key     Initials Effective Dates Name Provider Type Discipline    SC 06/19/15 -  Kimi Castillo, PT Physical Therapist PT    LR 06/19/15 -  Teresa Blackwell, PT Physical Therapist PT    LS 06/19/15 -  Shira Su, PT Physical Therapist PT    CT 06/19/15 -  Easton Kumar, PT Physical Therapist PT    MJ 04/03/18 -  Henrique Clements, PT Physical Therapist PT    AA 04/02/18 -  Vanessa Milligan, PT Physical Therapist PT              PT Recommendation and Plan  Planned Therapy Interventions (PT Eval): balance training, bed mobility training, gait training, home exercise program, patient/family education, strengthening, transfer training  Outcome Summary/Treatment Plan (PT)  Anticipated Discharge Disposition (PT): skilled nursing facility  Plan of Care Reviewed With: patient  Outcome Summary: PT re-evaluation (s/p L ICA stent and transfer to ICU on 9/17). Pt continues to demonstrate post-op RLE weakness and decreased indep re: functional mobility, warranting further skilled PT services to promote PLOF. Goals revised to reflect current functional status. Recommend SNF at d/c based upon current level of function.      Time Calculation:   PT Charges     Row Name 09/18/19 1015             Time Calculation    Start Time  1015  -      PT Received On  09/18/19  -      PT Goal Re-Cert Due Date  09/28/19  -         Time Calculation- PT    Total Timed Code Minutes- PT  23 minute(s)  -         Timed Charges    65623 - PT Therapeutic Exercise Minutes  8  -      83749 - Gait Training Minutes   11  -      66520 - PT Therapeutic Activity Minutes  4  -LS        User Key  (r) = Recorded By, (t) = Taken By, (c) = Cosigned By    Initials Name Provider Type    LS Shira Su, PT Physical Therapist        Therapy Charges for Today     Code Description Service Date Service Provider Modifiers Qty    73131017930 HC GAIT TRAINING EA 15 MIN 9/18/2019 Shira Su  CRYS, PT GP 1    58382035859 HC PT THER PROC EA 15 MIN 9/18/2019 Shira Su, PT GP 1    08491097811 HC PT RE-EVAL ESTABLISHED PLAN 2 9/18/2019 Shira Su, PT GP 1    57890726891 HC PT THER SUPP EA 15 MIN 9/18/2019 Shira Su, PT GP 1          PT G-Codes  Outcome Measure Options: Modified [place]  AM-PAC 6 Clicks Score (PT): 17  AM-PAC 6 Clicks Score (OT): 14  Modified Reddy Scale: 3 - Moderate disability.  Requiring some help, but able to walk without assistance.    Shira Su, PT  9/18/2019

## 2019-09-19 ENCOUNTER — NURSE TRIAGE (OUTPATIENT)
Dept: CALL CENTER | Facility: HOSPITAL | Age: 74
End: 2019-09-19

## 2019-09-19 NOTE — TELEPHONE ENCOUNTER
"Was not happy with the rehab facility  And is needing assistance for home and medications    Reason for Disposition  • Requesting regular office appointment    Additional Information  • Negative: [1] Caller is not with the adult (patient) AND [2] reporting urgent symptoms  • Negative: Lab result questions  • Negative: Medication questions  • Negative: Caller cannot be reached by phone  • Negative: Caller has already spoken to PCP or another triager  • Negative: RN needs further essential information from caller in order to complete triage    Answer Assessment - Initial Assessment Questions  1. REASON FOR CALL or QUESTION: \"What is your reason for calling today?\" or \"How can I best help you?\" or \"What question do you have that I can help answer?\"      Needing help from Home Health, PT/OT and her medication, patient signed out of the rehab famility    Protocols used: INFORMATION ONLY CALL-ADULT-      "

## 2019-09-20 ENCOUNTER — NURSE TRIAGE (OUTPATIENT)
Dept: CALL CENTER | Facility: HOSPITAL | Age: 74
End: 2019-09-20

## 2019-09-20 ENCOUNTER — TELEPHONE (OUTPATIENT)
Dept: FAMILY MEDICINE CLINIC | Facility: CLINIC | Age: 74
End: 2019-09-20

## 2019-09-20 RX ORDER — CLOPIDOGREL BISULFATE 75 MG/1
75 TABLET ORAL DAILY
Qty: 30 TABLET | Refills: 1 | Status: SHIPPED | OUTPATIENT
Start: 2019-09-20 | End: 2019-09-20 | Stop reason: SDUPTHER

## 2019-09-20 RX ORDER — ATORVASTATIN CALCIUM 80 MG/1
80 TABLET, FILM COATED ORAL NIGHTLY
Qty: 30 TABLET | Refills: 1 | Status: SHIPPED | OUTPATIENT
Start: 2019-09-20 | End: 2019-09-20 | Stop reason: SDUPTHER

## 2019-09-20 NOTE — OUTREACH NOTE
Case Management Call Center Follow-up      Responses   [facility] Call Center Tracking Reason?  Other (specify in comments)   Other Tracking Comments  Pt left rehab and is now needing HH scheduled   Has the Call Center Case Management Follow-up issue been resolved?  Yes   Follow-up Comments  Spoke with pt. States she was unhappy [facility] and left facility. Pt requesting HH and her medications to be called into her pharmacy. I advised pt she needs to call her PCP Dr Hernandes at [phone]. Pt verbalized understanding and will call CAROLYNN Reynolds    9/20/2019, 9:26 AM

## 2019-09-20 NOTE — TELEPHONE ENCOUNTER
"Call routed to case management.  Caller has not heard from them yet from call routed yesterday.  She left the rehab facility and needs her new prescriptions sent to her pharmacy and needs home health.    Reason for Disposition  • Health Information question, no triage required and triager able to answer question    Additional Information  • Negative: [1] Caller is not with the adult (patient) AND [2] reporting urgent symptoms  • Negative: Lab result questions  • Negative: Medication questions  • Negative: Caller cannot be reached by phone  • Negative: Caller has already spoken to PCP or another triager  • Negative: RN needs further essential information from caller in order to complete triage  • Negative: Requesting regular office appointment  • Negative: [1] Caller requesting NON-URGENT health information AND [2] PCP's office is the best resource    Answer Assessment - Initial Assessment Questions  1. REASON FOR CALL or QUESTION: \"What is your reason for calling today?\" or \"How can I best help you?\" or \"What question do you have that I can help answer?\"      I called yesterday and spoke with a nurse.  She told me if I had not heard from case management by 10 am to call back.    Protocols used: INFORMATION ONLY CALL-ADULT-      "

## 2019-09-20 NOTE — TELEPHONE ENCOUNTER
She states that she needs Home Health/ Therapy and that she does not have any of her discharge medications either. I explained to her that I would forward this to case management for follow up.

## 2019-09-20 NOTE — TELEPHONE ENCOUNTER
"----- Message from Jane Hernandez sent at 9/20/2019 10:26 AM EDT -----  Contact: PATIENT    PATIENT HAS 8 MEDICATIONS SHE WANTS REFILLED.    SHE WAS DISCHARGED FROM [facility] ON Wednesday, AND THEN SHE WENT TO REHAB; HOWEVER, \"REHAB WOULD NOT GIVE HER HER MEDICINES.    PATIENT IS SCARED TO BE WITHOUT HER MEDICATIONS.    [id number] [place] [address] - [phone]  - [phone] (Phone)  [phone] (Fax)          "

## 2019-09-20 NOTE — TELEPHONE ENCOUNTER
Patient requesting atorvastatin 80mg and Plavix 75mg. She was prescribed meds after having carotid stent at [facility] and went to [facility] for rehab. Patient states that she left the facility yesterday because she didn't feel she was getting proper care and they refused to give her medications to her when she left. She has an appointment with you September 25 and wants to know if she can get enough at least until her appointment.    Alice Michaud

## 2019-09-23 ENCOUNTER — TELEPHONE (OUTPATIENT)
Dept: FAMILY MEDICINE CLINIC | Facility: CLINIC | Age: 74
End: 2019-09-23

## 2019-09-23 ENCOUNTER — NURSE TRIAGE (OUTPATIENT)
Dept: CALL CENTER | Facility: HOSPITAL | Age: 74
End: 2019-09-23

## 2019-09-23 NOTE — TELEPHONE ENCOUNTER
"Patient left [facility]hab [facility] last week.  She did not feel that she was getting good care.  Multiple messages routed to case management who contacted the patient.  Case management told the patient that she would need to go through her PCP.  She did contact her PCP for her meds but did not ask about HH.  Recommended she call her PCP.    Reason for Disposition  • General information question, no triage required and triager able to answer question    Additional Information  • Negative: [1] Caller is not with the adult (patient) AND [2] reporting urgent symptoms  • Negative: Lab result questions  • Negative: Medication questions  • Negative: Caller cannot be reached by phone  • Negative: Caller has already spoken to PCP or another triager  • Negative: RN needs further essential information from caller in order to complete triage  • Negative: Requesting regular office appointment  • Negative: [1] Caller requesting NON-URGENT health information AND [2] PCP's office is the best resource  • Negative: Health Information question, no triage required and triager able to answer question  • Negative: Question about upcoming scheduled test, no triage required and triager able to answer question    Answer Assessment - Initial Assessment Questions  1. REASON FOR CALL or QUESTION: \"What is your reason for calling today?\" or \"How can I best help you?\" or \"What question do you have that I can help answer?\"      I still do not have HH or PT.    Protocols used: INFORMATION ONLY CALL-ADULT-      "

## 2019-09-25 ENCOUNTER — OFFICE VISIT (OUTPATIENT)
Dept: FAMILY MEDICINE CLINIC | Facility: CLINIC | Age: 74
End: 2019-09-25

## 2019-09-25 VITALS
OXYGEN SATURATION: 98 % | HEART RATE: 72 BPM | BODY MASS INDEX: 34.31 KG/M2 | WEIGHT: 201 LBS | TEMPERATURE: 98.8 F | SYSTOLIC BLOOD PRESSURE: 128 MMHG | DIASTOLIC BLOOD PRESSURE: 82 MMHG | HEIGHT: 64 IN

## 2019-09-25 DIAGNOSIS — I63.232 STROKE DUE TO STENOSIS OF LEFT CAROTID ARTERY (HCC): ICD-10-CM

## 2019-09-25 DIAGNOSIS — Z23 IMMUNIZATION DUE: ICD-10-CM

## 2019-09-25 DIAGNOSIS — Z96.641 STATUS POST TOTAL REPLACEMENT OF RIGHT HIP: Primary | ICD-10-CM

## 2019-09-25 PROCEDURE — 99213 OFFICE O/P EST LOW 20 MIN: CPT | Performed by: PHYSICIAN ASSISTANT

## 2019-09-25 RX ORDER — ASPIRIN 81 MG/1
81 TABLET ORAL DAILY
Qty: 90 TABLET | Refills: 3 | Status: ON HOLD | OUTPATIENT
Start: 2019-09-25 | End: 2020-07-01 | Stop reason: SDUPTHER

## 2019-09-25 RX ORDER — MIRTAZAPINE 30 MG/1
TABLET, FILM COATED ORAL
Refills: 2 | COMMUNITY
Start: 2019-08-29 | End: 2020-06-17

## 2019-09-25 RX ORDER — ESCITALOPRAM OXALATE 20 MG/1
20 TABLET ORAL DAILY
Qty: 90 TABLET | Refills: 3 | Status: SHIPPED | OUTPATIENT
Start: 2019-09-25 | End: 2020-02-18 | Stop reason: SDUPTHER

## 2019-09-25 NOTE — PROGRESS NOTES
Subjective   kAua Torres is a 74 y.o. female  Follow-up (Hospital follow up from Stroke and stent placement from 09/09-09/18)      History of Present Illness  + Patient comes in today for hospital follow-up.  She underwent a total hip replacement with Dr. New on her right hip and in postop experienced a left prefrontal gyrus stroke in association with severe occlusion of left internal carotid artery stenosis.  Underwent stent placement of left carotid artery successfully.  Patient was discharged and went to [facility] facility for less than 24 hours, did not feel she is receiving good care so checked herself out and went home.  She has been doing home exercises but has not been able to get physical therapy or home health yet and is needing us to order that.  She is ambulated with the assistance of a walker about her house.  She is still having weakness in her right hand.  The following portions of the patient's history were reviewed and updated as appropriate: allergies, current medications, past social history and problem list    Review of Systems   Constitutional: Positive for activity change.   Eyes: Negative.    Respiratory: Negative.    Cardiovascular: Negative.    Musculoskeletal: Positive for gait problem. Negative for arthralgias, joint swelling and myalgias.   Skin: Negative.    Neurological: Positive for weakness and numbness. Negative for dizziness, tremors, seizures, syncope, facial asymmetry, speech difficulty, light-headedness and headaches.   Psychiatric/Behavioral: Negative.        Objective     Vitals:    09/25/19 1052   BP: 128/82   Pulse: 72   Temp: 98.8 °F (37.1 °C)   SpO2: 98%       Physical Exam   Constitutional: She is oriented to person, place, and time. She appears well-developed and well-nourished. No distress.   Neck: No JVD present.   Cardiovascular: Normal rate, regular rhythm, normal heart sounds and intact distal pulses.   No murmur heard.  Pulmonary/Chest: Effort normal  and breath sounds normal. No respiratory distress. She exhibits no tenderness.   Abdominal: Soft. She exhibits no distension. There is no tenderness.   Musculoskeletal: She exhibits no edema or tenderness.   Patient ambulating with assistance of a walker, strength 3/5 right hand  versus 5/5 left hand    Neurological: She is alert and oriented to person, place, and time. She displays abnormal reflex. A sensory deficit is present. She exhibits abnormal muscle tone. Coordination normal.   Skin: Skin is warm and dry. She is not diaphoretic. No erythema. No pallor.   Psychiatric: She has a normal mood and affect. Her behavior is normal. Judgment and thought content normal.   Nursing note and vitals reviewed.      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  -     aspirin 81 MG EC tablet; Take 1 tablet by mouth Daily.

## 2019-09-29 RX ORDER — ATORVASTATIN CALCIUM 80 MG/1
80 TABLET, FILM COATED ORAL NIGHTLY
Qty: 90 TABLET | Refills: 3 | Status: SHIPPED | OUTPATIENT
Start: 2019-09-29 | End: 2019-12-23

## 2019-09-29 RX ORDER — CLOPIDOGREL BISULFATE 75 MG/1
75 TABLET ORAL DAILY
Qty: 90 TABLET | Refills: 3 | Status: SHIPPED | OUTPATIENT
Start: 2019-09-29 | End: 2019-11-13 | Stop reason: SDUPTHER

## 2019-10-17 ENCOUNTER — OFFICE VISIT (OUTPATIENT)
Dept: NEUROSURGERY | Facility: CLINIC | Age: 74
End: 2019-10-17

## 2019-10-17 ENCOUNTER — HOSPITAL ENCOUNTER (OUTPATIENT)
Dept: CARDIOLOGY | Facility: HOSPITAL | Age: 74
Discharge: HOME | End: 2019-10-17
Admitting: NEUROLOGICAL SURGERY

## 2019-10-17 VITALS
DIASTOLIC BLOOD PRESSURE: 68 MMHG | WEIGHT: 194.8 LBS | SYSTOLIC BLOOD PRESSURE: 126 MMHG | TEMPERATURE: 98.3 F | HEIGHT: 64 IN | BODY MASS INDEX: 33.26 KG/M2

## 2019-10-17 DIAGNOSIS — I65.23 BILATERAL CAROTID ARTERY STENOSIS: ICD-10-CM

## 2019-10-17 DIAGNOSIS — I65.22 LEFT CAROTID STENOSIS: Primary | ICD-10-CM

## 2019-10-17 LAB
BH CV LEFT CCA HIDDEN LRR: 1 CM/S
BH CV MID LEFT ICA HIDDEN LRR: 1 CM
BH CV VAS CAROTID LEFT DISTAL STENT EDV: 27.4 CM/S
BH CV VAS CAROTID LEFT DISTAL STENT: 108 CM/S
BH CV VAS CAROTID LEFT DISTAL TO STENT EDV: 36.1 CM/S
BH CV VAS CAROTID LEFT DISTAL TO STENT: 168 CM/S
BH CV VAS CAROTID LEFT MID STENT EDV: 33.7 CM/S
BH CV VAS CAROTID LEFT MID STENT: 145 CM/S
BH CV VAS CAROTID LEFT PROXIMAL STENT EDV: 18 CM/S
BH CV VAS CAROTID LEFT PROXIMAL STENT: 64.1 CM/S
BH CV VAS CAROTID LEFT PROXIMAL TO STENT: 54 CM/S
BH CV VAS CAROTID LEFT STENT NATIVE VESSEL PROXIMAL ED: 12.7 CM/S
BH CV XLRA MEAS LEFT CCA RATIO VEL: 65 CM/SEC
BH CV XLRA MEAS LEFT DIST CCA EDV: 18 CM/SEC
BH CV XLRA MEAS LEFT DIST CCA PSV: 64.1 CM/SEC
BH CV XLRA MEAS LEFT DIST ICA EDV: 30.6 CM/SEC
BH CV XLRA MEAS LEFT DIST ICA PSV: 122 CM/SEC
BH CV XLRA MEAS LEFT ICA RATIO VEL: 145 CM/SEC
BH CV XLRA MEAS LEFT ICA/CCA RATIO: 2.2
BH CV XLRA MEAS LEFT MID CCA EDV: 14.9 CM/SEC
BH CV XLRA MEAS LEFT MID CCA PSV: 65 CM/SEC
BH CV XLRA MEAS LEFT MID ICA EDV: 36.1 CM/SEC
BH CV XLRA MEAS LEFT MID ICA PSV: 168 CM/SEC
BH CV XLRA MEAS LEFT PROX CCA EDV: 18.2 CM/SEC
BH CV XLRA MEAS LEFT PROX CCA PSV: 77.6 CM/SEC
BH CV XLRA MEAS LEFT PROX ECA EDV: 12.1 CM/SEC
BH CV XLRA MEAS LEFT PROX ECA PSV: 248 CM/SEC
BH CV XLRA MEAS LEFT PROX ICA EDV: 33.7 CM/SEC
BH CV XLRA MEAS LEFT PROX ICA PSV: 145 CM/SEC
BH CV XLRA MEAS LEFT PROX SCLA EDV: 14.1 CM/SEC
BH CV XLRA MEAS LEFT PROX SCLA PSV: 196 CM/SEC
BH CV XLRA MEAS LEFT VERTEBRAL A EDV: 11 CM/SEC
BH CV XLRA MEAS LEFT VERTEBRAL A PSV: 65.4 CM/SEC
BH CV XLRA MEAS RIGHT CCA RATIO VEL: 74.2 CM/SEC
BH CV XLRA MEAS RIGHT DIST CCA EDV: 18.2 CM/SEC
BH CV XLRA MEAS RIGHT DIST CCA PSV: 87 CM/SEC
BH CV XLRA MEAS RIGHT DIST ICA EDV: 26.6 CM/SEC
BH CV XLRA MEAS RIGHT DIST ICA PSV: 116 CM/SEC
BH CV XLRA MEAS RIGHT ICA RATIO VEL: 106 CM/SEC
BH CV XLRA MEAS RIGHT ICA/CCA RATIO: 1.4
BH CV XLRA MEAS RIGHT MID CCA EDV: 14.3 CM/SEC
BH CV XLRA MEAS RIGHT MID CCA PSV: 74.2 CM/SEC
BH CV XLRA MEAS RIGHT MID ICA EDV: 24.8 CM/SEC
BH CV XLRA MEAS RIGHT MID ICA PSV: 106 CM/SEC
BH CV XLRA MEAS RIGHT PROX CCA EDV: 11.3 CM/SEC
BH CV XLRA MEAS RIGHT PROX CCA PSV: 77.2 CM/SEC
BH CV XLRA MEAS RIGHT PROX ECA EDV: 7.7 CM/SEC
BH CV XLRA MEAS RIGHT PROX ECA PSV: 93.2 CM/SEC
BH CV XLRA MEAS RIGHT PROX ICA EDV: 18 CM/SEC
BH CV XLRA MEAS RIGHT PROX ICA PSV: 91.3 CM/SEC
BH CV XLRA MEAS RIGHT PROX SCLA PSV: 140 CM/SEC
BH CV XLRA MEAS RIGHT VERTEBRAL A EDV: 12.1 CM/SEC
BH CV XLRA MEAS RIGHT VERTEBRAL A PSV: 113 CM/SEC
BH CVPROX LEFT ICA HIDDEN LRR: 1 CM
LEFT ARM BP: NORMAL MMHG
RIGHT ARM BP: NORMAL MMHG

## 2019-10-17 PROCEDURE — 93880 EXTRACRANIAL BILAT STUDY: CPT | Performed by: INTERNAL MEDICINE

## 2019-10-17 PROCEDURE — 93880 EXTRACRANIAL BILAT STUDY: CPT

## 2019-10-17 PROCEDURE — 99024 POSTOP FOLLOW-UP VISIT: CPT | Performed by: NEUROLOGICAL SURGERY

## 2019-10-17 NOTE — PROGRESS NOTES
NAME: ERYN STEVE   DOS: 10/17/2019  : 1945  PCP: Salty Hernandez MD    Chief Complaint:    Chief Complaint   Patient presents with   • S/p Left carotid stent     follow up carotid duplex       History of Present Illness:  74 y.o. female     Is a 74-year-old who presented with a history of symptomatic left-sided carotid stenosis she had an incidental right-sided aneurysm she underwent symptomatic treatment protocol that would include carotid stenting for a critical high-grade stenosis and is here for follow-up she is reporting significant improvement she is able to ambulate drive a car and has some residual weakness in her right arm she is status post right-sided hip replacement with postprocedural stroke.  PMHX  Allergies:  Allergies   Allergen Reactions   • Nsaids Other (See Comments)     KIDNEY DAMAGE   • Quinidine Nausea And Vomiting and Other (See Comments)     FEVER     • Nitrofuran Derivatives Diarrhea   • Bactrim [Sulfamethoxazole-Trimethoprim] Rash   • Penicillins Rash     Medications    Current Outpatient Medications:   •  acetaminophen (TYLENOL) 325 MG tablet, Take 650 mg by mouth Every 6 (Six) Hours As Needed for Mild Pain ., Disp: , Rfl:   •  aspirin 81 MG EC tablet, Take 1 tablet by mouth Daily., Disp: 90 tablet, Rfl: 3  •  atenolol (TENORMIN) 25 MG tablet, Take 1 tablet by mouth 2 (Two) Times a Day. Take 25 mg by mouth 2 (Two) Times a Day., Disp: 60 tablet, Rfl: 5  •  atorvastatin (LIPITOR) 80 MG tablet, TAKE 1 TABLET BY MOUTH EVERY NIGHT, Disp: 90 tablet, Rfl: 3  •  clopidogrel (PLAVIX) 75 MG tablet, TAKE 1 TABLET BY MOUTH DAILY, Disp: 90 tablet, Rfl: 3  •  escitalopram (LEXAPRO) 20 MG tablet, Take 1 tablet by mouth Daily., Disp: 90 tablet, Rfl: 3  •  hydrOXYzine (ATARAX) 25 MG tablet, Take 25 mg by mouth 2 (Two) Times a Day As Needed for Itching., Disp: , Rfl:   •  lisinopril (PRINIVIL,ZESTRIL) 20 MG tablet, Take 1 tablet by mouth Daily. Take 20 mg by mouth  Daily., Disp: 30 tablet, Rfl: 5  •  mirtazapine (REMERON) 30 MG tablet, TK 1 T PO HS, Disp: , Rfl: 2  Past Medical History:  Past Medical History:   Diagnosis Date   • A-fib (CMS/[facility])    • Anxiety    • Anxiety and depression    • Arthritis    • Cataract    • Depression    • Elevated serum creatinine     SEES DR SMITH EVERY 6 MONTHS- NEPHROLOGIST    • Extremity pain    • GERD (gastroesophageal reflux disease)    • Gout    • H/O bladder infections     JUST FINISHED MACROBID ON 11-2-17 FOR RECENT UTI   • Hypercholesteremia    • Hypertension    • Joint pain    • Kidney disease    • Kidney disease, chronic, stage III (GFR 30-59 ml/min) (CMS/[facility])    • Low back pain    • Neck pain    • Rheumatic fever    • Sleep apnea    • Urinary tract infection    • Wears glasses      Past Surgical History:  Past Surgical History:   Procedure Laterality Date   • BACK SURGERY  2004    LUMBAR PER DR MAN    • CARPAL TUNNEL RELEASE Left    • COLONOSCOPY     • EYE SURGERY     • FINGER SURGERY Right     3RD FINGER   • HEEL SPUR EXCISION Bilateral    • INTERVENTIONAL RADIOLOGY PROCEDURE N/A 9/17/2019    Procedure: Carotid and Cerebral Angiogram/ Possible Stent;  Surgeon: Imer Guerra MD;  Location:  [facility];  Service: Interventional Radiology   • KNEE ARTHROSCOPY Bilateral    • LUMBAR DISCECTOMY FUSION INSTRUMENTATION N/A 11/10/2017    Procedure: LUMBAR SPINAL FUSION ;  Surgeon: Gopi Man MD;  Location:  [facility];  Service:    • REPLACEMENT TOTAL KNEE BILATERAL Bilateral    • TOTAL HIP ARTHROPLASTY Right 9/9/2019    Procedure: TOTAL ANTERIOR RIGHT HIP ARTHROPLASTY;  Surgeon: Darrin New MD;  Location:  [facility];  Service: Orthopedics     Social Hx:  Social History     Tobacco Use   • Smoking status: Never Smoker   • Smokeless tobacco: Never Used   Substance Use Topics   • Alcohol use: No   • Drug use: No     Family Hx:  Family History   Problem Relation Age of Onset   • Cancer Mother    • Colon  polyps Mother    • Hypertension Mother    • Cancer Father    • Hypertension Brother    • Hyperlipidemia Maternal Uncle    • Kidney disease Maternal Uncle      Review of Systems:        Review of Systems   Constitutional: Positive for diaphoresis and fatigue. Negative for activity change, appetite change, chills, fever and unexpected weight change.   HENT: Negative for congestion, dental problem, drooling, ear discharge, ear pain, facial swelling, hearing loss, mouth sores, nosebleeds, postnasal drip, rhinorrhea, sinus pressure, sneezing, sore throat, tinnitus, trouble swallowing and voice change.    Eyes: Positive for itching. Negative for photophobia, pain, discharge, redness and visual disturbance.   Respiratory: Negative for apnea, cough, choking, chest tightness, shortness of breath, wheezing and stridor.    Cardiovascular: Positive for palpitations. Negative for chest pain and leg swelling.   Gastrointestinal: Negative for abdominal distention, abdominal pain, anal bleeding, blood in stool, constipation, diarrhea, nausea, rectal pain and vomiting.   Endocrine: Positive for heat intolerance. Negative for cold intolerance, polydipsia, polyphagia and polyuria.   Genitourinary: Negative for decreased urine volume, difficulty urinating, dysuria, enuresis, flank pain, frequency, genital sores, hematuria and urgency.   Musculoskeletal: Positive for arthralgias, back pain, gait problem, myalgias, neck pain and neck stiffness. Negative for joint swelling.   Skin: Negative for color change, pallor, rash and wound.   Allergic/Immunologic: Negative for environmental allergies, food allergies and immunocompromised state.   Neurological: Positive for tremors and headaches. Negative for dizziness, seizures, syncope, facial asymmetry, speech difficulty, weakness and numbness.   Hematological: Negative for adenopathy. Does not bruise/bleed easily.   Psychiatric/Behavioral: Negative for agitation, behavioral problems, confusion,  decreased concentration, dysphoric mood, hallucinations, self-injury, sleep disturbance and suicidal ideas. The patient is not nervous/anxious and is not hyperactive.    All other systems reviewed and are negative.           Physical Examination:  Vitals:    10/17/19 1409   BP: 126/68   Temp: 98.3 °F (36.8 °C)      General Appearance:   Well developed, well nourished, well groomed, alert, and cooperative.  Neurological examination:  Neurologic Exam  She is awake alert and follows commands her cranial nerves are grossly intact    She has no motor drift she walks with assistance her right hip is painful    Review of Imaging/DATA:  Carotid imaging shows market reduction in cervical velocities  Diagnoses/Plan:    Ms. Torres is a 74 y.o. female   Fascinating tail of right-sided hip replacement postprocedural stroke high-grade incidental left carotid stenosis with high surgical risk features she underwent successful stenting and recovery.  She looks very well the day she is able to ambulate she walks with the assistance of a cane.  Plan is to continue aspirin Plavix monitor her neurologic exam I will see her back in 1 year with a repeat ultrasound this was a highly calcific lesion

## 2019-11-13 ENCOUNTER — TELEPHONE (OUTPATIENT)
Dept: FAMILY MEDICINE CLINIC | Facility: CLINIC | Age: 74
End: 2019-11-13

## 2019-11-13 RX ORDER — CLOPIDOGREL BISULFATE 75 MG/1
75 TABLET ORAL DAILY
Qty: 90 TABLET | Refills: 3 | Status: SHIPPED | OUTPATIENT
Start: 2019-11-13 | End: 2022-04-06

## 2019-11-13 NOTE — TELEPHONE ENCOUNTER
PATIENT CALLED FOR MED REFILL  clopidogrel (PLAVIX) 75 MG tablet  SHE HAS ABOUT A WEEKS WORTH    PH[facility]Y [facility] [address]    PATIENT CALL BACK [phone]

## 2019-11-26 RX ORDER — ATORVASTATIN CALCIUM 80 MG/1
80 TABLET, FILM COATED ORAL NIGHTLY
Qty: 30 TABLET | Refills: 0 | OUTPATIENT
Start: 2019-11-26

## 2019-12-17 ENCOUNTER — TELEPHONE (OUTPATIENT)
Dept: FAMILY MEDICINE CLINIC | Facility: CLINIC | Age: 74
End: 2019-12-17

## 2019-12-17 RX ORDER — ATORVASTATIN CALCIUM 80 MG/1
80 TABLET, FILM COATED ORAL NIGHTLY
Qty: 90 TABLET | Refills: 3 | OUTPATIENT
Start: 2019-12-17

## 2019-12-23 RX ORDER — ATORVASTATIN CALCIUM 80 MG/1
80 TABLET, FILM COATED ORAL NIGHTLY
Qty: 30 TABLET | Refills: 0 | Status: SHIPPED | OUTPATIENT
Start: 2019-12-23 | End: 2020-01-06 | Stop reason: SDUPTHER

## 2019-12-23 RX ORDER — ATORVASTATIN CALCIUM 80 MG/1
80 TABLET, FILM COATED ORAL NIGHTLY
Qty: 90 TABLET | OUTPATIENT
Start: 2019-12-23

## 2020-01-06 ENCOUNTER — OFFICE VISIT (OUTPATIENT)
Dept: FAMILY MEDICINE CLINIC | Facility: CLINIC | Age: 75
End: 2020-01-06

## 2020-01-06 VITALS
BODY MASS INDEX: 33.97 KG/M2 | SYSTOLIC BLOOD PRESSURE: 144 MMHG | HEART RATE: 73 BPM | TEMPERATURE: 97.9 F | OXYGEN SATURATION: 96 % | HEIGHT: 64 IN | WEIGHT: 199 LBS | DIASTOLIC BLOOD PRESSURE: 80 MMHG

## 2020-01-06 DIAGNOSIS — E78.5 DYSLIPIDEMIA: ICD-10-CM

## 2020-01-06 DIAGNOSIS — R73.03 PREDIABETES: Primary | ICD-10-CM

## 2020-01-06 DIAGNOSIS — M19.90 ARTHRITIS: ICD-10-CM

## 2020-01-06 DIAGNOSIS — E66.9 OBESITY (BMI 30-39.9): ICD-10-CM

## 2020-01-06 DIAGNOSIS — I10 ESSENTIAL HYPERTENSION: ICD-10-CM

## 2020-01-06 LAB
GLUCOSE BLDC GLUCOMTR-MCNC: 163 MG/DL (ref 70–130)
HBA1C MFR BLD: 5.6 %

## 2020-01-06 PROCEDURE — 99213 OFFICE O/P EST LOW 20 MIN: CPT | Performed by: PHYSICIAN ASSISTANT

## 2020-01-06 PROCEDURE — 82962 GLUCOSE BLOOD TEST: CPT | Performed by: PHYSICIAN ASSISTANT

## 2020-01-06 PROCEDURE — 83036 HEMOGLOBIN GLYCOSYLATED A1C: CPT | Performed by: PHYSICIAN ASSISTANT

## 2020-01-06 RX ORDER — LISINOPRIL 20 MG/1
20 TABLET ORAL DAILY
Qty: 30 TABLET | Refills: 5 | Status: SHIPPED | OUTPATIENT
Start: 2020-01-06 | End: 2020-07-13 | Stop reason: SDUPTHER

## 2020-01-06 RX ORDER — ATENOLOL 25 MG/1
25 TABLET ORAL 2 TIMES DAILY
Qty: 60 TABLET | Refills: 5 | Status: SHIPPED | OUTPATIENT
Start: 2020-01-06 | End: 2020-07-13 | Stop reason: SDUPTHER

## 2020-01-06 RX ORDER — ATORVASTATIN CALCIUM 80 MG/1
80 TABLET, FILM COATED ORAL NIGHTLY
Qty: 30 TABLET | Refills: 11 | Status: SHIPPED | OUTPATIENT
Start: 2020-01-06 | End: 2022-04-06 | Stop reason: SDUPTHER

## 2020-01-06 NOTE — PROGRESS NOTES
Subjective   Akua Torres is a 74 y.o. female  Hyperlipidemia (6 month follow up on cholesterol. req refills on Lipitor ) and Hypertension (Follow up on BP, req refills on Lisinopril and Atenolol)      History of Present Illness  Patient comes in today for follow-up on hyperlipidemia, hypertension and prediabetes as well as obesity.  Patient is due for refills of medication for hypertension and dyslipidemia.  She states that she has not had her sugar checked since September.  Her weight is up a few pounds.  She is trying to follow a diet where she avoids eating too much sugar but is not following a strictly low-carb diet.  She states this morning she had half of an Mariela 8 with her medication pills.  Patient gets a pedicure regularly, states she has no problems with her feet no sores and no numbness, no problems with her nails.  She is up-to-date on eye exams.  She states she is feeling well other than the fact that her arthritis is bothering her more lately.  She states she aches in all of her joints and would like to see a rheumatologist.  She cannot take NSAIDs and so she states she is very limited on what she can take.  The following portions of the patient's history were reviewed and updated as appropriate: allergies, current medications, past social history and problem list    Review of Systems   Constitutional: Negative for appetite change, diaphoresis, fatigue and unexpected weight change.   Eyes: Negative.  Negative for visual disturbance.   Respiratory: Negative for cough, chest tightness and shortness of breath.    Cardiovascular: Negative for chest pain, palpitations and leg swelling.   Gastrointestinal: Negative for diarrhea, nausea and vomiting.   Endocrine: Negative for polydipsia, polyphagia and polyuria.   Genitourinary: Negative.    Musculoskeletal: Positive for arthralgias. Negative for myalgias.   Skin: Negative.  Negative for color change and rash.   Neurological: Negative for dizziness,  syncope, weakness, light-headedness, numbness and headaches.       Objective     Vitals:    01/06/20 1006   BP: 144/80   Pulse: 73   Temp: 97.9 °F (36.6 °C)   SpO2: 96%       Physical Exam   Constitutional: She is oriented to person, place, and time. She appears well-developed and well-nourished. No distress.   HENT:   Head: Normocephalic and atraumatic.   Eyes: Conjunctivae are normal.   Neck: No JVD present.   Cardiovascular: Normal rate, regular rhythm, normal heart sounds and intact distal pulses.   No murmur heard.  Pulses:       Dorsalis pedis pulses are 2+ on the right side, and 2+ on the left side.        Posterior tibial pulses are 2+ on the right side, and 2+ on the left side.   Pulmonary/Chest: Effort normal and breath sounds normal. No respiratory distress. She exhibits no tenderness.   Abdominal: Soft. She exhibits no distension. There is no tenderness.   Musculoskeletal: She exhibits no edema or deformity.   Neurological: She is alert and oriented to person, place, and time. No cranial nerve deficit. Coordination normal.   Skin: Skin is warm and dry. No rash noted. She is not diaphoretic. No erythema. No pallor.   Psychiatric: She has a normal mood and affect.   Nursing note and vitals reviewed.    Glucose is 163 and A1c is 5.6, discussed results with patient encouraged her to lower her weight and lower the concentrated sugars in her diet.  Recheck A1c and glucose in 6 months.  Assessment/Plan     Diagnoses and all orders for this visit:    Prediabetes    Essential hypertension    Dyslipidemia    Obesity (BMI 30-39.9)    Arthritis  -     Ambulatory Referral to Rheumatology    Other orders  -     lisinopril (PRINIVIL,ZESTRIL) 20 MG tablet; Take 1 tablet by mouth Daily. Take 20 mg by mouth Daily.  -     atorvastatin (LIPITOR) 80 MG tablet; Take 1 tablet by mouth Every Night.  -     atenolol (TENORMIN) 25 MG tablet; Take 1 tablet by mouth 2 (Two) Times a Day. Take 25 mg by mouth 2 (Two) Times a Day.

## 2020-01-16 ENCOUNTER — TELEPHONE (OUTPATIENT)
Dept: FAMILY MEDICINE CLINIC | Facility: CLINIC | Age: 75
End: 2020-01-16

## 2020-01-16 NOTE — TELEPHONE ENCOUNTER
Pt says she was suppose to get a referral to the arthritis center and has not heard anything back.  Pt requesting a call back to discuss further.

## 2020-01-23 ENCOUNTER — TELEPHONE (OUTPATIENT)
Dept: FAMILY MEDICINE CLINIC | Facility: CLINIC | Age: 75
End: 2020-01-23

## 2020-01-23 NOTE — TELEPHONE ENCOUNTER
Pt called inquiring about the status of referral to [facility] [place].  Please advise    Pt call back  [phone]

## 2020-01-27 ENCOUNTER — TRANSCRIBE ORDERS (OUTPATIENT)
Dept: FAMILY MEDICINE CLINIC | Facility: CLINIC | Age: 75
End: 2020-01-27

## 2020-01-27 ENCOUNTER — TELEPHONE (OUTPATIENT)
Dept: FAMILY MEDICINE CLINIC | Facility: CLINIC | Age: 75
End: 2020-01-27

## 2020-01-27 DIAGNOSIS — Z78.0 OSTEOPENIA AFTER MENOPAUSE: Primary | ICD-10-CM

## 2020-01-27 DIAGNOSIS — M85.80 OSTEOPENIA AFTER MENOPAUSE: Primary | ICD-10-CM

## 2020-01-31 ENCOUNTER — TELEPHONE (OUTPATIENT)
Dept: FAMILY MEDICINE CLINIC | Facility: CLINIC | Age: 75
End: 2020-01-31

## 2020-01-31 NOTE — TELEPHONE ENCOUNTER
NICHOLE Shields with [facility].     Faxed over a [facility] and wanted to make sure we received it and see if a bone denisty scan needed to be order.       Callback [phone]

## 2020-02-11 ENCOUNTER — TELEPHONE (OUTPATIENT)
Dept: FAMILY MEDICINE CLINIC | Facility: CLINIC | Age: 75
End: 2020-02-11

## 2020-02-11 NOTE — TELEPHONE ENCOUNTER
NICHOLE Shields with [facility].      He is calling to see if a bone denisty scan had been ordered for pt. Please advise     Call back [phone]

## 2020-02-18 ENCOUNTER — OFFICE VISIT (OUTPATIENT)
Dept: FAMILY MEDICINE CLINIC | Facility: CLINIC | Age: 75
End: 2020-02-18

## 2020-02-18 VITALS
HEART RATE: 68 BPM | DIASTOLIC BLOOD PRESSURE: 84 MMHG | TEMPERATURE: 98.4 F | OXYGEN SATURATION: 97 % | HEIGHT: 64 IN | BODY MASS INDEX: 32.81 KG/M2 | WEIGHT: 192.2 LBS | SYSTOLIC BLOOD PRESSURE: 118 MMHG

## 2020-02-18 DIAGNOSIS — F41.9 ANXIETY: ICD-10-CM

## 2020-02-18 DIAGNOSIS — H92.01 RIGHT EAR PAIN: ICD-10-CM

## 2020-02-18 DIAGNOSIS — H81.11 BENIGN PAROXYSMAL POSITIONAL VERTIGO OF RIGHT EAR: ICD-10-CM

## 2020-02-18 DIAGNOSIS — F32.A DEPRESSIVE DISORDER: ICD-10-CM

## 2020-02-18 DIAGNOSIS — J18.9 COMMUNITY ACQUIRED PNEUMONIA OF RIGHT LUNG, UNSPECIFIED PART OF LUNG: Primary | ICD-10-CM

## 2020-02-18 DIAGNOSIS — R42 DIZZINESS: ICD-10-CM

## 2020-02-18 PROCEDURE — 99214 OFFICE O/P EST MOD 30 MIN: CPT | Performed by: PHYSICIAN ASSISTANT

## 2020-02-18 RX ORDER — HYDROXYZINE HYDROCHLORIDE 25 MG/1
25 TABLET, FILM COATED ORAL 2 TIMES DAILY PRN
Qty: 60 TABLET | Refills: 11 | Status: SHIPPED | OUTPATIENT
Start: 2020-02-18 | End: 2021-03-01

## 2020-02-18 RX ORDER — MECLIZINE HCL 12.5 MG/1
12.5 TABLET ORAL 3 TIMES DAILY PRN
Qty: 21 TABLET | Refills: 0 | Status: SHIPPED | OUTPATIENT
Start: 2020-02-18 | End: 2020-06-17

## 2020-02-18 RX ORDER — ESCITALOPRAM OXALATE 20 MG/1
20 TABLET ORAL DAILY
Qty: 30 TABLET | Refills: 11 | Status: SHIPPED | OUTPATIENT
Start: 2020-02-18 | End: 2022-04-06 | Stop reason: SDUPTHER

## 2020-02-18 RX ORDER — LEVALBUTEROL TARTRATE 45 UG/1
AEROSOL, METERED ORAL EVERY 6 HOURS
COMMUNITY
End: 2020-06-17

## 2020-02-18 RX ORDER — DOXYCYCLINE HYCLATE 100 MG/1
CAPSULE ORAL EVERY 12 HOURS SCHEDULED
COMMUNITY
End: 2020-02-18

## 2020-02-18 RX ORDER — IPRATROPIUM BROMIDE AND ALBUTEROL SULFATE 2.5; .5 MG/3ML; MG/3ML
3 SOLUTION RESPIRATORY (INHALATION)
COMMUNITY
End: 2020-06-17

## 2020-02-18 RX ORDER — LEVOFLOXACIN 750 MG/1
TABLET ORAL
COMMUNITY
Start: 2020-02-02 | End: 2020-06-17

## 2020-02-18 NOTE — PROGRESS NOTES
Subjective   Akua Torres is a 74 y.o. female  Pneumonia (Follow up from  Admission from 01/31-02/02) and Earache (right ear pain with fullness and balance issues x4 days )      History of Present Illness  Patient comes in today for 2 separate medical problems for evaluation treatment.  She is here for follow-up regarding viral pneumonia from [facility] admission January 31 through February 2.  She has completed all of her prescription medication given to her for the pneumonia which was determined to be caused by  Human metapneumovirus.  She has already completed her vaccination series of Pneumovax and Prevnar 13.  She states that her chest feels back to normal now no further shortness of breath cough back pain chest pain or any fever.  She states however over the past 4 days she has developed a new problem which is of right ear pain with fullness and some balance issues with head movement.  She states she actually fell this morning after losing her balance.  She states she did not injure herself her  caught her.  Lastly she is here for follow-up on depressive disorder with anxiety which is currently stable on Lexapro and Atarax.  She has been seeing Dr. Royal over the past year for her depression and anxiety, she states both conditions are currently stable and she would like to follow-up with us for refills at this time, stating she will go back to see Dr. Royal for consultation of her symptoms of depression and anxiety return.  The following portions of the patient's history were reviewed and updated as appropriate: allergies, current medications, past social history and problem list    Review of Systems   Constitutional: Negative for activity change, appetite change, fatigue, fever and unexpected weight change.   HENT: Positive for ear pain. Negative for congestion, ear discharge, hearing loss and sinus pain.    Respiratory: Negative for cough, chest tightness and shortness of breath.     Cardiovascular: Negative for chest pain, palpitations and leg swelling.   Gastrointestinal: Negative for abdominal pain, diarrhea, nausea and vomiting.   Genitourinary: Negative for difficulty urinating and dysuria.   Skin: Negative for color change and rash.   Allergic/Immunologic: Negative.  Negative for immunocompromised state.   Neurological: Positive for dizziness and light-headedness. Negative for tremors, seizures, syncope, facial asymmetry, weakness, numbness and headaches.   Psychiatric/Behavioral: Positive for dysphoric mood. Negative for agitation, behavioral problems, confusion, decreased concentration, sleep disturbance and suicidal ideas. The patient is nervous/anxious.         Currently anxiety and depression stable on medication       Objective     Vitals:    02/18/20 1119   BP: 118/84   Pulse: 68   Temp: 98.4 °F (36.9 °C)   SpO2: 97%       Physical Exam   Constitutional: She is oriented to person, place, and time. She appears well-developed and well-nourished. No distress.   HENT:   Head: Normocephalic and atraumatic.   Right Ear: Hearing and ear canal normal. Tympanic membrane is retracted. A middle ear effusion is present.   Left Ear: Hearing, tympanic membrane and ear canal normal.   Eyes: Pupils are equal, round, and reactive to light.   Neck: Normal carotid pulses and no JVD present. Carotid bruit is not present.   Cardiovascular: Normal rate, regular rhythm, normal heart sounds and intact distal pulses.   No murmur heard.  Pulmonary/Chest: Effort normal and breath sounds normal. No respiratory distress. She exhibits no tenderness.   Abdominal: Soft. She exhibits no distension. There is no tenderness.   Musculoskeletal: Normal range of motion. She exhibits no edema.   Neurological: She is alert and oriented to person, place, and time. She displays normal reflexes. No cranial nerve deficit. She exhibits normal muscle tone. Coordination normal.   Skin: Skin is warm and dry. She is not  diaphoretic. No erythema. No pallor.   Psychiatric: She has a normal mood and affect. Her behavior is normal. Judgment and thought content normal.   Nursing note and vitals reviewed.      Assessment/Plan     Akua was seen today for pneumonia and earache.    Diagnoses and all orders for this visit:    Community acquired pneumonia of right lung, unspecified part of lung    Dizziness    Right ear pain    Benign paroxysmal positional vertigo of right ear    Depressive disorder    Anxiety    Other orders  -     meclizine (ANTIVERT) 12.5 MG tablet; Take 1 tablet by mouth 3 (Three) Times a Day As Needed for Dizziness.  -     escitalopram (LEXAPRO) 20 MG tablet; Take 1 tablet by mouth Daily.  -     hydrOXYzine (ATARAX) 25 MG tablet; Take 1 tablet by mouth 2 (Two) Times a Day As Needed for Anxiety.    Pneumonia clinically resolved follow-up as needed discussed the diagnosis of viral pneumonia versus bacterial pneumonia and reassured her that she has received her proper vaccination series.

## 2020-05-26 ENCOUNTER — APPOINTMENT (OUTPATIENT)
Dept: BONE DENSITY | Facility: HOSPITAL | Age: 75
End: 2020-05-26

## 2020-06-03 ENCOUNTER — TELEPHONE (OUTPATIENT)
Dept: FAMILY MEDICINE CLINIC | Facility: CLINIC | Age: 75
End: 2020-06-03

## 2020-06-03 NOTE — TELEPHONE ENCOUNTER
SCOTT, AN RN WITH [facility], IS REQUESTING A CALL BACK REGARDING IF THE PATIENT'S BONE DENSITY TEST WAS EVER COMPLETED.    PLEASE CALL SCOTT [phone]

## 2020-06-04 NOTE — TELEPHONE ENCOUNTER
Message left on Cornelius's confidential voicemail informing him that we don't have record of DEXA scan being performed yet.

## 2020-06-17 ENCOUNTER — HOSPITAL ENCOUNTER (OUTPATIENT)
Dept: GENERAL RADIOLOGY | Facility: HOSPITAL | Age: 75
Discharge: HOME | End: 2020-06-17
Admitting: ORTHOPAEDIC SURGERY

## 2020-06-17 ENCOUNTER — APPOINTMENT (OUTPATIENT)
Dept: PREADMISSION TESTING | Facility: HOSPITAL | Age: 75
End: 2020-06-17

## 2020-06-17 ENCOUNTER — TELEPHONE (OUTPATIENT)
Dept: FAMILY MEDICINE CLINIC | Facility: CLINIC | Age: 75
End: 2020-06-17

## 2020-06-17 VITALS — BODY MASS INDEX: 34.21 KG/M2 | WEIGHT: 200.4 LBS | HEIGHT: 64 IN

## 2020-06-17 LAB
ABO GROUP BLD: NORMAL
ALBUMIN SERPL-MCNC: 4.6 G/DL (ref 3.5–5.2)
ALBUMIN/GLOB SERPL: 2.4 G/DL
ALP SERPL-CCNC: 126 U/L (ref 39–117)
ALT SERPL W P-5'-P-CCNC: 10 U/L (ref 1–33)
ANION GAP SERPL CALCULATED.3IONS-SCNC: 11 MMOL/L (ref 5–15)
APTT PPP: 29.7 SECONDS (ref 24–37)
AST SERPL-CCNC: 16 U/L (ref 1–32)
BACTERIA UR QL AUTO: ABNORMAL /HPF
BACTERIA UR QL AUTO: NORMAL /HPF
BASOPHILS # BLD AUTO: 0.04 10*3/MM3 (ref 0–0.2)
BASOPHILS NFR BLD AUTO: 0.5 % (ref 0–1.5)
BILIRUB SERPL-MCNC: 0.8 MG/DL (ref 0.2–1.2)
BILIRUB UR QL STRIP: NEGATIVE
BILIRUB UR QL STRIP: NEGATIVE
BLD GP AB SCN SERPL QL: NEGATIVE
BUN BLD-MCNC: 20 MG/DL (ref 8–23)
BUN/CREAT SERPL: 23 (ref 7–25)
CALCIUM SPEC-SCNC: 9 MG/DL (ref 8.6–10.5)
CHLORIDE SERPL-SCNC: 103 MMOL/L (ref 98–107)
CLARITY UR: ABNORMAL
CLARITY UR: CLEAR
CO2 SERPL-SCNC: 27 MMOL/L (ref 22–29)
COLOR UR: ABNORMAL
COLOR UR: YELLOW
CREAT BLD-MCNC: 0.87 MG/DL (ref 0.57–1)
CRP SERPL-MCNC: 0.46 MG/DL (ref 0–0.5)
DEPRECATED RDW RBC AUTO: 49.5 FL (ref 37–54)
EOSINOPHIL # BLD AUTO: 0.27 10*3/MM3 (ref 0–0.4)
EOSINOPHIL NFR BLD AUTO: 3.1 % (ref 0.3–6.2)
ERYTHROCYTE [DISTWIDTH] IN BLOOD BY AUTOMATED COUNT: 14.4 % (ref 12.3–15.4)
ERYTHROCYTE [SEDIMENTATION RATE] IN BLOOD: 20 MM/HR (ref 0–30)
GFR SERPL CREATININE-BSD FRML MDRD: 63 ML/MIN/1.73
GLOBULIN UR ELPH-MCNC: 1.9 GM/DL
GLUCOSE BLD-MCNC: 140 MG/DL (ref 65–99)
GLUCOSE UR STRIP-MCNC: NEGATIVE MG/DL
GLUCOSE UR STRIP-MCNC: NEGATIVE MG/DL
HBA1C MFR BLD: 6.1 % (ref 4.8–5.6)
HCT VFR BLD AUTO: 47.4 % (ref 34–46.6)
HGB BLD-MCNC: 14.3 G/DL (ref 12–15.9)
HGB UR QL STRIP.AUTO: NEGATIVE
HGB UR QL STRIP.AUTO: NEGATIVE
HYALINE CASTS UR QL AUTO: ABNORMAL /LPF
HYALINE CASTS UR QL AUTO: NORMAL /LPF
IMM GRANULOCYTES # BLD AUTO: 0.04 10*3/MM3 (ref 0–0.05)
IMM GRANULOCYTES NFR BLD AUTO: 0.5 % (ref 0–0.5)
INR PPP: 1 (ref 0.85–1.16)
KETONES UR QL STRIP: ABNORMAL
KETONES UR QL STRIP: ABNORMAL
LEUKOCYTE ESTERASE UR QL STRIP.AUTO: ABNORMAL
LEUKOCYTE ESTERASE UR QL STRIP.AUTO: ABNORMAL
LYMPHOCYTES # BLD AUTO: 1.64 10*3/MM3 (ref 0.7–3.1)
LYMPHOCYTES NFR BLD AUTO: 19.1 % (ref 19.6–45.3)
MCH RBC QN AUTO: 28.4 PG (ref 26.6–33)
MCHC RBC AUTO-ENTMCNC: 30.2 G/DL (ref 31.5–35.7)
MCV RBC AUTO: 94.2 FL (ref 79–97)
MONOCYTES # BLD AUTO: 0.58 10*3/MM3 (ref 0.1–0.9)
MONOCYTES NFR BLD AUTO: 6.8 % (ref 5–12)
NEUTROPHILS # BLD AUTO: 6.02 10*3/MM3 (ref 1.7–7)
NEUTROPHILS NFR BLD AUTO: 70 % (ref 42.7–76)
NITRITE UR QL STRIP: NEGATIVE
NITRITE UR QL STRIP: NEGATIVE
NRBC BLD AUTO-RTO: 0 /100 WBC (ref 0–0.2)
PH UR STRIP.AUTO: <=5 [PH] (ref 5–8)
PH UR STRIP.AUTO: <=5 [PH] (ref 5–8)
PLATELET # BLD AUTO: 244 10*3/MM3 (ref 140–450)
PMV BLD AUTO: 10.1 FL (ref 6–12)
POTASSIUM BLD-SCNC: 4.1 MMOL/L (ref 3.5–5.2)
PROT SERPL-MCNC: 6.5 G/DL (ref 6–8.5)
PROT UR QL STRIP: NEGATIVE
PROT UR QL STRIP: NEGATIVE
PROTHROMBIN TIME: 12.9 SECONDS (ref 11.5–14)
RBC # BLD AUTO: 5.03 10*6/MM3 (ref 3.77–5.28)
RBC # UR: ABNORMAL /HPF
RBC # UR: NORMAL /HPF
REF LAB TEST METHOD: ABNORMAL
REF LAB TEST METHOD: NORMAL
RH BLD: POSITIVE
SODIUM BLD-SCNC: 141 MMOL/L (ref 136–145)
SP GR UR STRIP: 1.04 (ref 1–1.03)
SP GR UR STRIP: >=1.03 (ref 1–1.03)
SQUAMOUS #/AREA URNS HPF: ABNORMAL /HPF
SQUAMOUS #/AREA URNS HPF: NORMAL /HPF
UROBILINOGEN UR QL STRIP: ABNORMAL
UROBILINOGEN UR QL STRIP: ABNORMAL
WBC NRBC COR # BLD: 8.59 10*3/MM3 (ref 3.4–10.8)
WBC UR QL AUTO: ABNORMAL /HPF
WBC UR QL AUTO: NORMAL /HPF

## 2020-06-17 PROCEDURE — 36415 COLL VENOUS BLD VENIPUNCTURE: CPT

## 2020-06-17 PROCEDURE — 85610 PROTHROMBIN TIME: CPT | Performed by: ORTHOPAEDIC SURGERY

## 2020-06-17 PROCEDURE — 85025 COMPLETE CBC W/AUTO DIFF WBC: CPT | Performed by: ORTHOPAEDIC SURGERY

## 2020-06-17 PROCEDURE — 71046 X-RAY EXAM CHEST 2 VIEWS: CPT

## 2020-06-17 PROCEDURE — 85652 RBC SED RATE AUTOMATED: CPT | Performed by: ORTHOPAEDIC SURGERY

## 2020-06-17 PROCEDURE — 83036 HEMOGLOBIN GLYCOSYLATED A1C: CPT | Performed by: ORTHOPAEDIC SURGERY

## 2020-06-17 PROCEDURE — 80053 COMPREHEN METABOLIC PANEL: CPT | Performed by: ORTHOPAEDIC SURGERY

## 2020-06-17 PROCEDURE — 86900 BLOOD TYPING SEROLOGIC ABO: CPT | Performed by: ORTHOPAEDIC SURGERY

## 2020-06-17 PROCEDURE — 86850 RBC ANTIBODY SCREEN: CPT | Performed by: ORTHOPAEDIC SURGERY

## 2020-06-17 PROCEDURE — 81001 URINALYSIS AUTO W/SCOPE: CPT | Performed by: ORTHOPAEDIC SURGERY

## 2020-06-17 PROCEDURE — 86901 BLOOD TYPING SEROLOGIC RH(D): CPT | Performed by: ORTHOPAEDIC SURGERY

## 2020-06-17 PROCEDURE — 86140 C-REACTIVE PROTEIN: CPT | Performed by: ORTHOPAEDIC SURGERY

## 2020-06-17 PROCEDURE — 87086 URINE CULTURE/COLONY COUNT: CPT | Performed by: ORTHOPAEDIC SURGERY

## 2020-06-17 PROCEDURE — 93005 ELECTROCARDIOGRAM TRACING: CPT

## 2020-06-17 PROCEDURE — 93010 ELECTROCARDIOGRAM REPORT: CPT | Performed by: INTERNAL MEDICINE

## 2020-06-17 PROCEDURE — 85730 THROMBOPLASTIN TIME PARTIAL: CPT | Performed by: ORTHOPAEDIC SURGERY

## 2020-06-17 RX ORDER — TRAMADOL HYDROCHLORIDE 50 MG/1
50 TABLET ORAL EVERY 6 HOURS PRN
COMMUNITY
End: 2020-07-01 | Stop reason: HOSPADM

## 2020-06-17 ASSESSMENT — HOOS JR
HOOS JR SCORE: 36.363
HOOS JR SCORE: 17

## 2020-06-17 NOTE — TELEPHONE ENCOUNTER
Pt is having total hip surgery.   06/29/20 is doing pre op testing and they want to know how many days before she should stop the plavix before the surgery

## 2020-06-17 NOTE — TELEPHONE ENCOUNTER
Sounds like she is needing an appointment for a preop consultation appointment for me to determine that.  Put her in a 30-minute slot this week.

## 2020-06-17 NOTE — TELEPHONE ENCOUNTER
[facility] called to request pt to stop taking clopidogrel (PLAVIX) 75 MG tablet prior to surgery and to receive medical clearance. It can be in the form of a message via epic or a call       Pre-admission testing     [phone]

## 2020-06-17 NOTE — PAT
Patient to apply Chlorhexadine wipes  to surgical area (as instructed) the night before procedure and the AM of procedure. Wipes provided.    Patient instructed to drink 20 ounces (or until full) of Gatorade and it needs to be completed 1 hour before given arrival time for procedure (NO RED Gatorade)    Patient verbalized understanding.    Per Anesthesia Request, patient instructed not to take their ACE/ARB medications on the AM of surgery.    Passed memory screen 5/5    Joint replacement book given to patient and reviewed information

## 2020-06-18 LAB — BACTERIA SPEC AEROBE CULT: NO GROWTH

## 2020-06-18 NOTE — TELEPHONE ENCOUNTER
Spoke to patient, she said she was already cleared and did pre-op yesterday with EKG and lab, just needs to when she should stop plavix prior to surgery

## 2020-06-18 NOTE — TELEPHONE ENCOUNTER
For me to be able to determine when she can stop her Plavix she needs some appointment with me.  She can have a 15-minute in office or video visit if she does not need full clearance. I need to talk to her about her medical situation before I can determine that.

## 2020-06-22 ENCOUNTER — TELEPHONE (OUTPATIENT)
Dept: FAMILY MEDICINE CLINIC | Facility: CLINIC | Age: 75
End: 2020-06-22

## 2020-06-22 ENCOUNTER — OFFICE VISIT (OUTPATIENT)
Dept: FAMILY MEDICINE CLINIC | Facility: CLINIC | Age: 75
End: 2020-06-22

## 2020-06-22 VITALS
DIASTOLIC BLOOD PRESSURE: 78 MMHG | TEMPERATURE: 97.1 F | WEIGHT: 201 LBS | SYSTOLIC BLOOD PRESSURE: 146 MMHG | OXYGEN SATURATION: 97 % | HEIGHT: 64 IN | HEART RATE: 72 BPM | BODY MASS INDEX: 34.31 KG/M2

## 2020-06-22 DIAGNOSIS — M16.12 OSTEOARTHRITIS OF LEFT HIP, UNSPECIFIED OSTEOARTHRITIS TYPE: Primary | ICD-10-CM

## 2020-06-22 DIAGNOSIS — I63.232 STENOSIS OF LEFT INTERNAL CAROTID ARTERY WITH CEREBRAL INFARCTION (HCC): ICD-10-CM

## 2020-06-22 DIAGNOSIS — I63.9 ACUTE CVA (CEREBROVASCULAR ACCIDENT) (HCC): ICD-10-CM

## 2020-06-22 PROCEDURE — 99213 OFFICE O/P EST LOW 20 MIN: CPT | Performed by: PHYSICIAN ASSISTANT

## 2020-06-22 NOTE — PROGRESS NOTES
Subjective   Akua Torres is a 75 y.o. female  Follow-up (Upcoming surgery with Dr. New for total L hip replacement, needs clearance to stop Plavix )      History of Present Illness  Patient is a pleasant 75-year-old white female who presents today in consultation regarding her history of CVA with carotid artery stent placed in September 2019 by Dr. Guerra following patient's experience of a CVA and postop after having her right hip replaced.  Please see notes in chart.  Patient is scheduled to have a left hip replacement Dr. New next week.  I have reviewed patient's chart history and chart notes regarding her CVA and stent placement by neurosurgery and have spoken with Dr. Guerra  regarding the need for his medical opinion on if patient is safe to stop Plavix preoperatively.  After checking with Dr. Guerra regarding this patient he has instructed me through his  message that is safe for patient to stop her Plavix while staying on aspirin.  The following portions of the patient's history were reviewed and updated as appropriate: allergies, current medications, past social history and problem list    Review of Systems   Constitutional: Positive for activity change.   Respiratory: Negative.    Cardiovascular: Negative.    Musculoskeletal: Positive for arthralgias, gait problem and myalgias. Negative for joint swelling.   Skin: Negative.    Neurological: Negative for weakness and numbness.   Psychiatric/Behavioral: The patient is nervous/anxious.        Objective     Vitals:    06/22/20 1043   BP: 146/78   Pulse: 72   Temp: 97.1 °F (36.2 °C)   SpO2: 97%       Physical Exam   Constitutional: She is oriented to person, place, and time. She appears well-developed and well-nourished. No distress.   Musculoskeletal: She exhibits tenderness. She exhibits no edema or deformity.   Patient significant decreased range of motion of her left hip walking with the assistance of a cane.    Neurological: She is alert and oriented to person, place, and time. She exhibits normal muscle tone. Coordination normal.   Skin: Skin is warm and dry. No rash noted. She is not diaphoretic. No erythema. No pallor.   Psychiatric: She has a normal mood and affect. Her behavior is normal. Judgment and thought content normal.   Nursing note and vitals reviewed.      Assessment/Plan     Akua was seen today for follow-up.    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  She will follow-up with her vascular surgeon for routine check in August.      Cc: Dr. New

## 2020-06-22 NOTE — TELEPHONE ENCOUNTER
Please call patient, I have spoken with Dr. Mccracken nurse and .  They have spoken directly with Dr. Guerra regarding her situation.  He is instructed me to tell patient that she can stop her Plavix at this time and resume it after surgery.  She does need to stay on her low-dose aspirin.  I will send this message to Dr. New as well

## 2020-06-23 NOTE — PAT
Called patient to confirm she was aware of ROXANA Freeman instructions on her blood thinners-patient was aware.

## 2020-06-26 ENCOUNTER — ANESTHESIA EVENT (OUTPATIENT)
Dept: PERIOP | Facility: HOSPITAL | Age: 75
End: 2020-06-26

## 2020-06-26 ENCOUNTER — APPOINTMENT (OUTPATIENT)
Dept: PREADMISSION TESTING | Facility: HOSPITAL | Age: 75
End: 2020-06-26

## 2020-06-26 PROCEDURE — U0002 COVID-19 LAB TEST NON-CDC: HCPCS

## 2020-06-26 PROCEDURE — C9803 HOPD COVID-19 SPEC COLLECT: HCPCS

## 2020-06-27 LAB
REF LAB TEST METHOD: NORMAL
SARS-COV-2 RNA RESP QL NAA+PROBE: NOT DETECTED

## 2020-06-29 ENCOUNTER — ANESTHESIA (OUTPATIENT)
Dept: PERIOP | Facility: HOSPITAL | Age: 75
End: 2020-06-29

## 2020-06-29 ENCOUNTER — APPOINTMENT (OUTPATIENT)
Dept: GENERAL RADIOLOGY | Facility: HOSPITAL | Age: 75
End: 2020-06-29

## 2020-06-29 ENCOUNTER — HOSPITAL ENCOUNTER (INPATIENT)
Facility: HOSPITAL | Age: 75
LOS: 2 days | Discharge: HOME HEALTH | End: 2020-07-01
Attending: ORTHOPAEDIC SURGERY | Admitting: ORTHOPAEDIC SURGERY

## 2020-06-29 DIAGNOSIS — Z96.641 STATUS POST TOTAL REPLACEMENT OF RIGHT HIP: Primary | ICD-10-CM

## 2020-06-29 PROBLEM — Z96.642 STATUS POST TOTAL REPLACEMENT OF LEFT HIP: Status: ACTIVE | Noted: 2020-06-29

## 2020-06-29 PROBLEM — M25.559 HIP PAIN: Status: ACTIVE | Noted: 2020-06-29

## 2020-06-29 PROBLEM — R73.09 ELEVATED HEMOGLOBIN A1C: Status: ACTIVE | Noted: 2020-06-29

## 2020-06-29 PROBLEM — E78.5 HYPERLIPIDEMIA: Status: ACTIVE | Noted: 2020-06-29

## 2020-06-29 PROBLEM — Z86.73 HISTORY OF STROKE: Status: ACTIVE | Noted: 2020-06-29

## 2020-06-29 PROBLEM — M16.12 ARTHRITIS OF LEFT HIP: Status: ACTIVE | Noted: 2020-06-29

## 2020-06-29 LAB
ABO GROUP BLD: NORMAL
BLD GP AB SCN SERPL QL: NEGATIVE
RH BLD: POSITIVE
T&S EXPIRATION DATE: NORMAL

## 2020-06-29 PROCEDURE — 86923 COMPATIBILITY TEST ELECTRIC: CPT

## 2020-06-29 PROCEDURE — 25010000002 DEXAMETHASONE PER 1 MG: Performed by: NURSE ANESTHETIST, CERTIFIED REGISTERED

## 2020-06-29 PROCEDURE — 86850 RBC ANTIBODY SCREEN: CPT | Performed by: ORTHOPAEDIC SURGERY

## 2020-06-29 PROCEDURE — 86901 BLOOD TYPING SEROLOGIC RH(D): CPT | Performed by: ORTHOPAEDIC SURGERY

## 2020-06-29 PROCEDURE — 25010000002 FENTANYL CITRATE (PF) 100 MCG/2ML SOLUTION: Performed by: NURSE ANESTHETIST, CERTIFIED REGISTERED

## 2020-06-29 PROCEDURE — 97116 GAIT TRAINING THERAPY: CPT

## 2020-06-29 PROCEDURE — 63710000001 POVIDONE-IODINE 10 % SOLUTION 30 ML BOTTLE: Performed by: ORTHOPAEDIC SURGERY

## 2020-06-29 PROCEDURE — 97161 PT EVAL LOW COMPLEX 20 MIN: CPT

## 2020-06-29 PROCEDURE — 73502 X-RAY EXAM HIP UNI 2-3 VIEWS: CPT

## 2020-06-29 PROCEDURE — 25010000002 HYDROMORPHONE PER 4 MG: Performed by: NURSE ANESTHETIST, CERTIFIED REGISTERED

## 2020-06-29 PROCEDURE — 76000 FLUOROSCOPY <1 HR PHYS/QHP: CPT

## 2020-06-29 PROCEDURE — 25010000002 VANCOMYCIN 10 G RECONSTITUTED SOLUTION: Performed by: ORTHOPAEDIC SURGERY

## 2020-06-29 PROCEDURE — 27130 TOTAL HIP ARTHROPLASTY: CPT | Performed by: SPECIALIST/TECHNOLOGIST, OTHER

## 2020-06-29 PROCEDURE — 25010000003 CEFAZOLIN IN DEXTROSE 2-4 GM/100ML-% SOLUTION: Performed by: NURSE ANESTHETIST, CERTIFIED REGISTERED

## 2020-06-29 PROCEDURE — C1776 JOINT DEVICE (IMPLANTABLE): HCPCS | Performed by: ORTHOPAEDIC SURGERY

## 2020-06-29 PROCEDURE — 0SRB04A REPLACEMENT OF LEFT HIP JOINT WITH CERAMIC ON POLYETHYLENE SYNTHETIC SUBSTITUTE, UNCEMENTED, OPEN APPROACH: ICD-10-PCS | Performed by: ORTHOPAEDIC SURGERY

## 2020-06-29 PROCEDURE — 25010000002 ROPIVACAINE PER 1 MG: Performed by: ORTHOPAEDIC SURGERY

## 2020-06-29 PROCEDURE — 25010000002 ONDANSETRON PER 1 MG: Performed by: NURSE ANESTHETIST, CERTIFIED REGISTERED

## 2020-06-29 PROCEDURE — 86900 BLOOD TYPING SEROLOGIC ABO: CPT | Performed by: ORTHOPAEDIC SURGERY

## 2020-06-29 PROCEDURE — 25010000003 CEFAZOLIN IN DEXTROSE 2-4 GM/100ML-% SOLUTION: Performed by: ORTHOPAEDIC SURGERY

## 2020-06-29 PROCEDURE — A9270 NON-COVERED ITEM OR SERVICE: HCPCS | Performed by: ORTHOPAEDIC SURGERY

## 2020-06-29 PROCEDURE — 94799 UNLISTED PULMONARY SVC/PX: CPT

## 2020-06-29 PROCEDURE — 25010000002 PROPOFOL 10 MG/ML EMULSION: Performed by: NURSE ANESTHETIST, CERTIFIED REGISTERED

## 2020-06-29 DEVICE — POLARSTEM COLLAR STANDARD                                    NON-CEMENTED WITH TI/HA 2
Type: IMPLANTABLE DEVICE | Site: HIP | Status: FUNCTIONAL
Brand: POLARSTEM

## 2020-06-29 DEVICE — R3 3 HOLE ACETABULAR SHELL 50MM
Type: IMPLANTABLE DEVICE | Site: HIP | Status: FUNCTIONAL
Brand: R3 ACETABULAR

## 2020-06-29 DEVICE — R3 0 DEGREE XLPE ACETABULAR LINER                                    32MM ID X OD 50MM
Type: IMPLANTABLE DEVICE | Site: HIP | Status: FUNCTIONAL
Brand: R3

## 2020-06-29 DEVICE — REFLECTION SPHERICAL HEAD SCREW 15MM
Type: IMPLANTABLE DEVICE | Site: HIP | Status: FUNCTIONAL
Brand: REFLECTION

## 2020-06-29 DEVICE — OXINIUM FEMORAL HEAD 12/14 TAPER                                    32MM +0
Type: IMPLANTABLE DEVICE | Site: HIP | Status: FUNCTIONAL
Brand: OXINIUM

## 2020-06-29 DEVICE — IMPLANTABLE DEVICE: Type: IMPLANTABLE DEVICE | Site: HIP | Status: FUNCTIONAL

## 2020-06-29 DEVICE — REFLECTION SPHERICAL HEAD SCREW 20MM
Type: IMPLANTABLE DEVICE | Site: HIP | Status: FUNCTIONAL
Brand: REFLECTION

## 2020-06-29 RX ORDER — IPRATROPIUM BROMIDE AND ALBUTEROL SULFATE 2.5; .5 MG/3ML; MG/3ML
3 SOLUTION RESPIRATORY (INHALATION) ONCE AS NEEDED
Status: DISCONTINUED | OUTPATIENT
Start: 2020-06-29 | End: 2020-06-29 | Stop reason: HOSPADM

## 2020-06-29 RX ORDER — ASPIRIN 325 MG
325 TABLET ORAL DAILY
Status: DISCONTINUED | OUTPATIENT
Start: 2020-06-30 | End: 2020-07-01 | Stop reason: HOSPADM

## 2020-06-29 RX ORDER — ONDANSETRON 2 MG/ML
4 INJECTION INTRAMUSCULAR; INTRAVENOUS ONCE AS NEEDED
Status: DISCONTINUED | OUTPATIENT
Start: 2020-06-29 | End: 2020-06-29 | Stop reason: HOSPADM

## 2020-06-29 RX ORDER — CEFAZOLIN SODIUM 2 G/100ML
INJECTION, SOLUTION INTRAVENOUS AS NEEDED
Status: DISCONTINUED | OUTPATIENT
Start: 2020-06-29 | End: 2020-06-29 | Stop reason: SURG

## 2020-06-29 RX ORDER — LIDOCAINE HYDROCHLORIDE 10 MG/ML
0.5 INJECTION, SOLUTION EPIDURAL; INFILTRATION; INTRACAUDAL; PERINEURAL ONCE AS NEEDED
Status: COMPLETED | OUTPATIENT
Start: 2020-06-29 | End: 2020-06-29

## 2020-06-29 RX ORDER — ONDANSETRON 2 MG/ML
INJECTION INTRAMUSCULAR; INTRAVENOUS AS NEEDED
Status: DISCONTINUED | OUTPATIENT
Start: 2020-06-29 | End: 2020-06-29 | Stop reason: SURG

## 2020-06-29 RX ORDER — LABETALOL HYDROCHLORIDE 5 MG/ML
5 INJECTION, SOLUTION INTRAVENOUS
Status: DISCONTINUED | OUTPATIENT
Start: 2020-06-29 | End: 2020-06-29 | Stop reason: HOSPADM

## 2020-06-29 RX ORDER — MEPERIDINE HYDROCHLORIDE 25 MG/ML
12.5 INJECTION INTRAMUSCULAR; INTRAVENOUS; SUBCUTANEOUS
Status: DISCONTINUED | OUTPATIENT
Start: 2020-06-29 | End: 2020-06-29 | Stop reason: HOSPADM

## 2020-06-29 RX ORDER — FENTANYL CITRATE 50 UG/ML
INJECTION, SOLUTION INTRAMUSCULAR; INTRAVENOUS AS NEEDED
Status: DISCONTINUED | OUTPATIENT
Start: 2020-06-29 | End: 2020-06-29 | Stop reason: SURG

## 2020-06-29 RX ORDER — SODIUM CHLORIDE 0.9 % (FLUSH) 0.9 %
10 SYRINGE (ML) INJECTION EVERY 12 HOURS SCHEDULED
Status: DISCONTINUED | OUTPATIENT
Start: 2020-06-29 | End: 2020-06-29 | Stop reason: HOSPADM

## 2020-06-29 RX ORDER — PROPOFOL 10 MG/ML
VIAL (ML) INTRAVENOUS AS NEEDED
Status: DISCONTINUED | OUTPATIENT
Start: 2020-06-29 | End: 2020-06-29 | Stop reason: SURG

## 2020-06-29 RX ORDER — DEXAMETHASONE SODIUM PHOSPHATE 4 MG/ML
INJECTION, SOLUTION INTRA-ARTICULAR; INTRALESIONAL; INTRAMUSCULAR; INTRAVENOUS; SOFT TISSUE AS NEEDED
Status: DISCONTINUED | OUTPATIENT
Start: 2020-06-29 | End: 2020-06-29 | Stop reason: SURG

## 2020-06-29 RX ORDER — LABETALOL HYDROCHLORIDE 5 MG/ML
10 INJECTION, SOLUTION INTRAVENOUS EVERY 4 HOURS PRN
Status: DISCONTINUED | OUTPATIENT
Start: 2020-06-29 | End: 2020-07-01 | Stop reason: HOSPADM

## 2020-06-29 RX ORDER — HYDRALAZINE HYDROCHLORIDE 20 MG/ML
5 INJECTION INTRAMUSCULAR; INTRAVENOUS
Status: DISCONTINUED | OUTPATIENT
Start: 2020-06-29 | End: 2020-06-29 | Stop reason: HOSPADM

## 2020-06-29 RX ORDER — ROCURONIUM BROMIDE 10 MG/ML
INJECTION, SOLUTION INTRAVENOUS AS NEEDED
Status: DISCONTINUED | OUTPATIENT
Start: 2020-06-29 | End: 2020-06-29 | Stop reason: SURG

## 2020-06-29 RX ORDER — SODIUM CHLORIDE 0.9 % (FLUSH) 0.9 %
10 SYRINGE (ML) INJECTION AS NEEDED
Status: DISCONTINUED | OUTPATIENT
Start: 2020-06-29 | End: 2020-06-29 | Stop reason: HOSPADM

## 2020-06-29 RX ORDER — FAMOTIDINE 20 MG/1
20 TABLET, FILM COATED ORAL ONCE
Status: COMPLETED | OUTPATIENT
Start: 2020-06-29 | End: 2020-06-29

## 2020-06-29 RX ORDER — GLYCOPYRROLATE 0.2 MG/ML
INJECTION INTRAMUSCULAR; INTRAVENOUS AS NEEDED
Status: DISCONTINUED | OUTPATIENT
Start: 2020-06-29 | End: 2020-06-29 | Stop reason: SURG

## 2020-06-29 RX ORDER — SODIUM CHLORIDE 9 MG/ML
150 INJECTION, SOLUTION INTRAVENOUS CONTINUOUS
Status: ACTIVE | OUTPATIENT
Start: 2020-06-29 | End: 2020-06-30

## 2020-06-29 RX ORDER — PROMETHAZINE HYDROCHLORIDE 12.5 MG/1
12.5 TABLET ORAL EVERY 6 HOURS PRN
Status: DISCONTINUED | OUTPATIENT
Start: 2020-06-29 | End: 2020-07-01 | Stop reason: HOSPADM

## 2020-06-29 RX ORDER — NALOXONE HCL 0.4 MG/ML
0.1 VIAL (ML) INJECTION
Status: DISCONTINUED | OUTPATIENT
Start: 2020-06-29 | End: 2020-07-01 | Stop reason: HOSPADM

## 2020-06-29 RX ORDER — ONDANSETRON 2 MG/ML
4 INJECTION INTRAMUSCULAR; INTRAVENOUS EVERY 6 HOURS PRN
Status: DISCONTINUED | OUTPATIENT
Start: 2020-06-29 | End: 2020-07-01 | Stop reason: HOSPADM

## 2020-06-29 RX ORDER — CEFAZOLIN SODIUM 2 G/100ML
2 INJECTION, SOLUTION INTRAVENOUS EVERY 8 HOURS
Status: COMPLETED | OUTPATIENT
Start: 2020-06-29 | End: 2020-06-30

## 2020-06-29 RX ORDER — FAMOTIDINE 10 MG/ML
20 INJECTION, SOLUTION INTRAVENOUS ONCE
Status: DISCONTINUED | OUTPATIENT
Start: 2020-06-29 | End: 2020-06-29 | Stop reason: HOSPADM

## 2020-06-29 RX ORDER — HYDROCODONE BITARTRATE AND ACETAMINOPHEN 5; 325 MG/1; MG/1
1 TABLET ORAL ONCE AS NEEDED
Status: DISCONTINUED | OUTPATIENT
Start: 2020-06-29 | End: 2020-06-29 | Stop reason: HOSPADM

## 2020-06-29 RX ORDER — TRAMADOL HYDROCHLORIDE 50 MG/1
50 TABLET ORAL EVERY 4 HOURS PRN
Status: DISCONTINUED | OUTPATIENT
Start: 2020-06-29 | End: 2020-07-01 | Stop reason: HOSPADM

## 2020-06-29 RX ORDER — ROPIVACAINE HYDROCHLORIDE 5 MG/ML
INJECTION, SOLUTION EPIDURAL; INFILTRATION; PERINEURAL AS NEEDED
Status: DISCONTINUED | OUTPATIENT
Start: 2020-06-29 | End: 2020-06-29 | Stop reason: HOSPADM

## 2020-06-29 RX ORDER — PROMETHAZINE HYDROCHLORIDE 25 MG/1
25 SUPPOSITORY RECTAL ONCE AS NEEDED
Status: DISCONTINUED | OUTPATIENT
Start: 2020-06-29 | End: 2020-06-29 | Stop reason: HOSPADM

## 2020-06-29 RX ORDER — MAGNESIUM HYDROXIDE 1200 MG/15ML
LIQUID ORAL AS NEEDED
Status: DISCONTINUED | OUTPATIENT
Start: 2020-06-29 | End: 2020-06-29 | Stop reason: HOSPADM

## 2020-06-29 RX ORDER — PROMETHAZINE HYDROCHLORIDE 25 MG/ML
6.25 INJECTION, SOLUTION INTRAMUSCULAR; INTRAVENOUS ONCE AS NEEDED
Status: DISCONTINUED | OUTPATIENT
Start: 2020-06-29 | End: 2020-06-29 | Stop reason: HOSPADM

## 2020-06-29 RX ORDER — SODIUM CHLORIDE 0.9 % (FLUSH) 0.9 %
3 SYRINGE (ML) INJECTION EVERY 12 HOURS SCHEDULED
Status: DISCONTINUED | OUTPATIENT
Start: 2020-06-29 | End: 2020-06-29 | Stop reason: HOSPADM

## 2020-06-29 RX ORDER — HYDROCODONE BITARTRATE AND ACETAMINOPHEN 5; 325 MG/1; MG/1
1 TABLET ORAL EVERY 4 HOURS PRN
Status: DISCONTINUED | OUTPATIENT
Start: 2020-06-29 | End: 2020-07-01 | Stop reason: HOSPADM

## 2020-06-29 RX ORDER — SODIUM CHLORIDE, SODIUM LACTATE, POTASSIUM CHLORIDE, CALCIUM CHLORIDE 600; 310; 30; 20 MG/100ML; MG/100ML; MG/100ML; MG/100ML
9 INJECTION, SOLUTION INTRAVENOUS CONTINUOUS
Status: DISCONTINUED | OUTPATIENT
Start: 2020-06-29 | End: 2020-07-01 | Stop reason: HOSPADM

## 2020-06-29 RX ORDER — LISINOPRIL 20 MG/1
20 TABLET ORAL DAILY
Status: DISCONTINUED | OUTPATIENT
Start: 2020-06-29 | End: 2020-07-01 | Stop reason: HOSPADM

## 2020-06-29 RX ORDER — ESCITALOPRAM OXALATE 20 MG/1
20 TABLET ORAL DAILY
Status: DISCONTINUED | OUTPATIENT
Start: 2020-06-30 | End: 2020-07-01 | Stop reason: HOSPADM

## 2020-06-29 RX ORDER — HYDROMORPHONE HYDROCHLORIDE 1 MG/ML
0.5 INJECTION, SOLUTION INTRAMUSCULAR; INTRAVENOUS; SUBCUTANEOUS
Status: DISCONTINUED | OUTPATIENT
Start: 2020-06-29 | End: 2020-07-01 | Stop reason: HOSPADM

## 2020-06-29 RX ORDER — HYDROXYZINE HYDROCHLORIDE 25 MG/1
25 TABLET, FILM COATED ORAL 2 TIMES DAILY PRN
Status: DISCONTINUED | OUTPATIENT
Start: 2020-06-29 | End: 2020-07-01 | Stop reason: HOSPADM

## 2020-06-29 RX ORDER — FENTANYL CITRATE 50 UG/ML
50 INJECTION, SOLUTION INTRAMUSCULAR; INTRAVENOUS
Status: DISCONTINUED | OUTPATIENT
Start: 2020-06-29 | End: 2020-06-29 | Stop reason: HOSPADM

## 2020-06-29 RX ORDER — HYDROMORPHONE HYDROCHLORIDE 1 MG/ML
0.5 INJECTION, SOLUTION INTRAMUSCULAR; INTRAVENOUS; SUBCUTANEOUS
Status: DISCONTINUED | OUTPATIENT
Start: 2020-06-29 | End: 2020-06-29 | Stop reason: HOSPADM

## 2020-06-29 RX ORDER — ATENOLOL 25 MG/1
25 TABLET ORAL 2 TIMES DAILY
Status: DISCONTINUED | OUTPATIENT
Start: 2020-06-29 | End: 2020-07-01 | Stop reason: HOSPADM

## 2020-06-29 RX ORDER — PROMETHAZINE HYDROCHLORIDE 25 MG/1
25 TABLET ORAL ONCE AS NEEDED
Status: DISCONTINUED | OUTPATIENT
Start: 2020-06-29 | End: 2020-06-29 | Stop reason: HOSPADM

## 2020-06-29 RX ORDER — SODIUM CHLORIDE 0.9 % (FLUSH) 0.9 %
3-10 SYRINGE (ML) INJECTION AS NEEDED
Status: DISCONTINUED | OUTPATIENT
Start: 2020-06-29 | End: 2020-06-29 | Stop reason: HOSPADM

## 2020-06-29 RX ORDER — LIDOCAINE HYDROCHLORIDE 10 MG/ML
INJECTION, SOLUTION EPIDURAL; INFILTRATION; INTRACAUDAL; PERINEURAL AS NEEDED
Status: DISCONTINUED | OUTPATIENT
Start: 2020-06-29 | End: 2020-06-29 | Stop reason: SURG

## 2020-06-29 RX ORDER — ATORVASTATIN CALCIUM 40 MG/1
80 TABLET, FILM COATED ORAL NIGHTLY
Status: DISCONTINUED | OUTPATIENT
Start: 2020-06-29 | End: 2020-07-01 | Stop reason: HOSPADM

## 2020-06-29 RX ORDER — NALOXONE HCL 0.4 MG/ML
0.4 VIAL (ML) INJECTION AS NEEDED
Status: DISCONTINUED | OUTPATIENT
Start: 2020-06-29 | End: 2020-06-29 | Stop reason: HOSPADM

## 2020-06-29 RX ORDER — CLOPIDOGREL BISULFATE 75 MG/1
75 TABLET ORAL DAILY
Status: DISCONTINUED | OUTPATIENT
Start: 2020-06-30 | End: 2020-07-01 | Stop reason: HOSPADM

## 2020-06-29 RX ADMIN — LIDOCAINE HYDROCHLORIDE 50 MG: 10 INJECTION, SOLUTION EPIDURAL; INFILTRATION; INTRACAUDAL; PERINEURAL at 07:46

## 2020-06-29 RX ADMIN — PROPOFOL 25 MCG/KG/MIN: 10 INJECTION, EMULSION INTRAVENOUS at 07:55

## 2020-06-29 RX ADMIN — TRANEXAMIC ACID 1000 MG: 100 INJECTION, SOLUTION INTRAVENOUS at 08:00

## 2020-06-29 RX ADMIN — HYDROMORPHONE HYDROCHLORIDE 0.5 MG: 1 INJECTION, SOLUTION INTRAMUSCULAR; INTRAVENOUS; SUBCUTANEOUS at 10:16

## 2020-06-29 RX ADMIN — TRAMADOL HYDROCHLORIDE 50 MG: 50 TABLET, FILM COATED ORAL at 17:39

## 2020-06-29 RX ADMIN — CEFAZOLIN SODIUM 3 G: 2 INJECTION, SOLUTION INTRAVENOUS at 09:45

## 2020-06-29 RX ADMIN — ATORVASTATIN CALCIUM 80 MG: 40 TABLET, FILM COATED ORAL at 20:11

## 2020-06-29 RX ADMIN — LIDOCAINE HYDROCHLORIDE 0.5 ML: 10 INJECTION, SOLUTION EPIDURAL; INFILTRATION; INTRACAUDAL; PERINEURAL at 06:57

## 2020-06-29 RX ADMIN — EPHEDRINE SULFATE 10 MG: 50 INJECTION INTRAMUSCULAR; INTRAVENOUS; SUBCUTANEOUS at 08:06

## 2020-06-29 RX ADMIN — TRANEXAMIC ACID 1000 MG: 100 INJECTION, SOLUTION INTRAVENOUS at 09:26

## 2020-06-29 RX ADMIN — SODIUM CHLORIDE, POTASSIUM CHLORIDE, SODIUM LACTATE AND CALCIUM CHLORIDE: 600; 310; 30; 20 INJECTION, SOLUTION INTRAVENOUS at 09:38

## 2020-06-29 RX ADMIN — FENTANYL CITRATE 50 MCG: 50 INJECTION, SOLUTION INTRAMUSCULAR; INTRAVENOUS at 08:30

## 2020-06-29 RX ADMIN — EPHEDRINE SULFATE 5 MG: 50 INJECTION INTRAMUSCULAR; INTRAVENOUS; SUBCUTANEOUS at 09:33

## 2020-06-29 RX ADMIN — SODIUM CHLORIDE 150 ML/HR: 9 INJECTION, SOLUTION INTRAVENOUS at 11:16

## 2020-06-29 RX ADMIN — ONDANSETRON 4 MG: 2 INJECTION INTRAMUSCULAR; INTRAVENOUS at 09:24

## 2020-06-29 RX ADMIN — CEFAZOLIN SODIUM 2 G: 2 INJECTION, SOLUTION INTRAVENOUS at 07:55

## 2020-06-29 RX ADMIN — EPHEDRINE SULFATE 10 MG: 50 INJECTION INTRAMUSCULAR; INTRAVENOUS; SUBCUTANEOUS at 08:44

## 2020-06-29 RX ADMIN — LISINOPRIL 20 MG: 20 TABLET ORAL at 14:06

## 2020-06-29 RX ADMIN — ATENOLOL 25 MG: 25 TABLET ORAL at 20:08

## 2020-06-29 RX ADMIN — SODIUM CHLORIDE, POTASSIUM CHLORIDE, SODIUM LACTATE AND CALCIUM CHLORIDE 9 ML/HR: 600; 310; 30; 20 INJECTION, SOLUTION INTRAVENOUS at 06:57

## 2020-06-29 RX ADMIN — VANCOMYCIN HYDROCHLORIDE 1250 MG: 10 INJECTION, POWDER, LYOPHILIZED, FOR SOLUTION INTRAVENOUS at 07:01

## 2020-06-29 RX ADMIN — DEXAMETHASONE SODIUM PHOSPHATE 4 MG: 4 INJECTION, SOLUTION INTRAMUSCULAR; INTRAVENOUS at 07:55

## 2020-06-29 RX ADMIN — FAMOTIDINE 20 MG: 20 TABLET, FILM COATED ORAL at 06:47

## 2020-06-29 RX ADMIN — ROCURONIUM BROMIDE 50 MG: 10 INJECTION INTRAVENOUS at 07:46

## 2020-06-29 RX ADMIN — FENTANYL CITRATE 50 MCG: 50 INJECTION, SOLUTION INTRAMUSCULAR; INTRAVENOUS at 07:46

## 2020-06-29 RX ADMIN — MUPIROCIN 1 APPLICATION: 20 OINTMENT TOPICAL at 06:47

## 2020-06-29 RX ADMIN — HYDROCODONE BITARTRATE AND ACETAMINOPHEN 1 TABLET: 5; 325 TABLET ORAL at 20:41

## 2020-06-29 RX ADMIN — PROPOFOL 130 MG: 10 INJECTION, EMULSION INTRAVENOUS at 07:46

## 2020-06-29 RX ADMIN — GLYCOPYRROLATE 0.4 MG: 0.2 INJECTION INTRAMUSCULAR; INTRAVENOUS at 09:45

## 2020-06-29 RX ADMIN — HYDROCODONE BITARTRATE AND ACETAMINOPHEN 1 TABLET: 5; 325 TABLET ORAL at 12:04

## 2020-06-29 RX ADMIN — CEFAZOLIN SODIUM 2 G: 2 INJECTION, SOLUTION INTRAVENOUS at 17:39

## 2020-06-29 RX ADMIN — HYDROMORPHONE HYDROCHLORIDE 0.5 MG: 1 INJECTION, SOLUTION INTRAMUSCULAR; INTRAVENOUS; SUBCUTANEOUS at 10:06

## 2020-06-29 NOTE — ANESTHESIA PROCEDURE NOTES
Airway  Urgency: elective    Date/Time: 6/29/2020 7:49 AM  Airway not difficult    General Information and Staff    Patient location during procedure: OR  CRNA: Dominga Delatorre CRNA    Indications and Patient Condition  Indications for airway management: airway protection    Preoxygenated: yes  MILS not maintained throughout  Mask difficulty assessment: 1 - vent by mask    Final Airway Details  Final airway type: endotracheal airway      Successful airway: ETT  Cuffed: yes   Successful intubation technique: direct laryngoscopy  Endotracheal tube insertion site: oral  Blade: Shaji  Blade size: 3  ETT size (mm): 7.0  Cormack-Lehane Classification: grade IIa - partial view of glottis  Placement verified by: chest auscultation and capnometry   Measured from: teeth  ETT/EBT  to teeth (cm): 21  Number of attempts at approach: 1  Assessment: lips, teeth, and gum same as pre-op and atraumatic intubation    Additional Comments  Bilateral equal symmetric chest rise, bilateral breath sounds verified, epigastric sounds negative

## 2020-06-29 NOTE — ANESTHESIA POSTPROCEDURE EVALUATION
Patient: Akua Torres    Procedure Summary     Date:  06/29/20 Room / [facility]:   [facility]    Anesthesia Start:  0743 Anesthesia Stop:      Procedure:  TOTAL HIP ARTHROPLASTY ANTERIOR LEFT (Left Hip) Diagnosis:      Surgeon:  Darrin New MD Provider:  Von Holguin MD    Anesthesia Type:  general ASA Status:  3          Anesthesia Type: general    Vitals  Vitals Value Taken Time   /74    Temp 97.8 F    Pulse 93 6/29/2020  9:54 AM   Resp 14    SpO2 93 % 6/29/2020  9:54 AM   Vitals shown include unvalidated device data.        Post Anesthesia Care and Evaluation    Patient location during evaluation: PACU  Patient participation: complete - patient participated  Level of consciousness: awake and alert  Pain score: 0  Pain management: adequate  Airway patency: patent  Anesthetic complications: No anesthetic complications  PONV Status: none  Cardiovascular status: hemodynamically stable and acceptable  Respiratory status: nonlabored ventilation, acceptable and nasal cannula  Hydration status: acceptable

## 2020-06-29 NOTE — ANESTHESIA PREPROCEDURE EVALUATION
Anesthesia Evaluation                  Airway   Mallampati: II  Dental      Pulmonary    (+) pneumonia ,   Cardiovascular     (+) hypertension, dysrhythmias,       Neuro/Psych  GI/Hepatic/Renal/Endo    (+) obesity,   renal disease,     Musculoskeletal     Abdominal    Substance History      OB/GYN          Other                        Anesthesia Plan    ASA 3     general     intravenous induction     Anesthetic plan, all risks, benefits, and alternatives have been provided, discussed and informed consent has been obtained with: patient.

## 2020-06-30 PROBLEM — G89.18 ACUTE POSTOPERATIVE PAIN: Status: ACTIVE | Noted: 2020-06-30

## 2020-06-30 LAB
ANION GAP SERPL CALCULATED.3IONS-SCNC: 8 MMOL/L (ref 5–15)
BASOPHILS # BLD AUTO: 0.02 10*3/MM3 (ref 0–0.2)
BASOPHILS NFR BLD AUTO: 0.1 % (ref 0–1.5)
BUN SERPL-MCNC: 21 MG/DL (ref 8–23)
BUN/CREAT SERPL: 20.2 (ref 7–25)
CALCIUM SPEC-SCNC: 8.3 MG/DL (ref 8.6–10.5)
CHLORIDE SERPL-SCNC: 106 MMOL/L (ref 98–107)
CO2 SERPL-SCNC: 26 MMOL/L (ref 22–29)
CREAT SERPL-MCNC: 1.04 MG/DL (ref 0.57–1)
DEPRECATED RDW RBC AUTO: 50.4 FL (ref 37–54)
EOSINOPHIL # BLD AUTO: 0 10*3/MM3 (ref 0–0.4)
EOSINOPHIL NFR BLD AUTO: 0 % (ref 0.3–6.2)
ERYTHROCYTE [DISTWIDTH] IN BLOOD BY AUTOMATED COUNT: 14.6 % (ref 12.3–15.4)
GFR SERPL CREATININE-BSD FRML MDRD: 52 ML/MIN/1.73
GLUCOSE SERPL-MCNC: 165 MG/DL (ref 65–99)
HCT VFR BLD AUTO: 33.5 % (ref 34–46.6)
HGB BLD-MCNC: 10.3 G/DL (ref 12–15.9)
IMM GRANULOCYTES # BLD AUTO: 0.07 10*3/MM3 (ref 0–0.05)
IMM GRANULOCYTES NFR BLD AUTO: 0.5 % (ref 0–0.5)
LYMPHOCYTES # BLD AUTO: 1.24 10*3/MM3 (ref 0.7–3.1)
LYMPHOCYTES NFR BLD AUTO: 8.7 % (ref 19.6–45.3)
MCH RBC QN AUTO: 28.7 PG (ref 26.6–33)
MCHC RBC AUTO-ENTMCNC: 30.7 G/DL (ref 31.5–35.7)
MCV RBC AUTO: 93.3 FL (ref 79–97)
MONOCYTES # BLD AUTO: 1.49 10*3/MM3 (ref 0.1–0.9)
MONOCYTES NFR BLD AUTO: 10.5 % (ref 5–12)
NEUTROPHILS NFR BLD AUTO: 11.37 10*3/MM3 (ref 1.7–7)
NEUTROPHILS NFR BLD AUTO: 80.2 % (ref 42.7–76)
NRBC BLD AUTO-RTO: 0 /100 WBC (ref 0–0.2)
PLATELET # BLD AUTO: 178 10*3/MM3 (ref 140–450)
PMV BLD AUTO: 10 FL (ref 6–12)
POTASSIUM SERPL-SCNC: 5 MMOL/L (ref 3.5–5.2)
RBC # BLD AUTO: 3.59 10*6/MM3 (ref 3.77–5.28)
SODIUM SERPL-SCNC: 140 MMOL/L (ref 136–145)
WBC # BLD AUTO: 14.19 10*3/MM3 (ref 3.4–10.8)

## 2020-06-30 PROCEDURE — 97116 GAIT TRAINING THERAPY: CPT | Performed by: PHYSICAL THERAPIST

## 2020-06-30 PROCEDURE — 25010000003 CEFAZOLIN IN DEXTROSE 2-4 GM/100ML-% SOLUTION: Performed by: ORTHOPAEDIC SURGERY

## 2020-06-30 PROCEDURE — 85025 COMPLETE CBC W/AUTO DIFF WBC: CPT | Performed by: ORTHOPAEDIC SURGERY

## 2020-06-30 PROCEDURE — 97110 THERAPEUTIC EXERCISES: CPT | Performed by: PHYSICAL THERAPIST

## 2020-06-30 PROCEDURE — 80048 BASIC METABOLIC PNL TOTAL CA: CPT | Performed by: NURSE PRACTITIONER

## 2020-06-30 RX ORDER — HYDROCODONE BITARTRATE AND ACETAMINOPHEN 5; 325 MG/1; MG/1
1 TABLET ORAL EVERY 4 HOURS PRN
Qty: 40 TABLET | Refills: 0 | Status: SHIPPED | OUTPATIENT
Start: 2020-06-30 | End: 2020-06-30

## 2020-06-30 RX ORDER — HYDROCODONE BITARTRATE AND ACETAMINOPHEN 5; 325 MG/1; MG/1
1 TABLET ORAL EVERY 4 HOURS PRN
Qty: 40 TABLET | Refills: 0 | Status: SHIPPED | OUTPATIENT
Start: 2020-06-30 | End: 2020-07-09

## 2020-06-30 RX ADMIN — HYDROCODONE BITARTRATE AND ACETAMINOPHEN 1 TABLET: 5; 325 TABLET ORAL at 04:36

## 2020-06-30 RX ADMIN — CEFAZOLIN SODIUM 2 G: 2 INJECTION, SOLUTION INTRAVENOUS at 01:59

## 2020-06-30 RX ADMIN — ASPIRIN 325 MG ORAL TABLET 325 MG: 325 PILL ORAL at 09:04

## 2020-06-30 RX ADMIN — ESCITALOPRAM OXALATE 20 MG: 20 TABLET ORAL at 09:04

## 2020-06-30 RX ADMIN — ATENOLOL 25 MG: 25 TABLET ORAL at 21:18

## 2020-06-30 RX ADMIN — LISINOPRIL 20 MG: 20 TABLET ORAL at 09:04

## 2020-06-30 RX ADMIN — HYDROCODONE BITARTRATE AND ACETAMINOPHEN 1 TABLET: 5; 325 TABLET ORAL at 21:28

## 2020-06-30 RX ADMIN — ATENOLOL 25 MG: 25 TABLET ORAL at 09:04

## 2020-06-30 RX ADMIN — ATORVASTATIN CALCIUM 80 MG: 40 TABLET, FILM COATED ORAL at 21:18

## 2020-06-30 RX ADMIN — HYDROXYZINE HYDROCHLORIDE 25 MG: 25 TABLET, FILM COATED ORAL at 09:18

## 2020-06-30 RX ADMIN — HYDROCODONE BITARTRATE AND ACETAMINOPHEN 1 TABLET: 5; 325 TABLET ORAL at 09:18

## 2020-06-30 RX ADMIN — CLOPIDOGREL BISULFATE 75 MG: 75 TABLET ORAL at 09:04

## 2020-07-01 ENCOUNTER — READMISSION MANAGEMENT (OUTPATIENT)
Dept: CALL CENTER | Facility: HOSPITAL | Age: 75
End: 2020-07-01

## 2020-07-01 VITALS
RESPIRATION RATE: 16 BRPM | SYSTOLIC BLOOD PRESSURE: 121 MMHG | TEMPERATURE: 98.7 F | HEIGHT: 64 IN | DIASTOLIC BLOOD PRESSURE: 55 MMHG | OXYGEN SATURATION: 96 % | BODY MASS INDEX: 34.21 KG/M2 | HEART RATE: 85 BPM | WEIGHT: 200.4 LBS

## 2020-07-01 PROCEDURE — 97116 GAIT TRAINING THERAPY: CPT | Performed by: PHYSICAL THERAPIST

## 2020-07-01 PROCEDURE — 97110 THERAPEUTIC EXERCISES: CPT | Performed by: PHYSICAL THERAPIST

## 2020-07-01 RX ORDER — DOCUSATE SODIUM 100 MG/1
100 CAPSULE, LIQUID FILLED ORAL 2 TIMES DAILY
Qty: 60 CAPSULE | Refills: 0 | Status: SHIPPED | OUTPATIENT
Start: 2020-07-01 | End: 2021-01-08

## 2020-07-01 RX ORDER — ASPIRIN 81 MG/1
81 TABLET ORAL DAILY
Qty: 90 TABLET | Refills: 3
Start: 2020-07-01 | End: 2022-04-06

## 2020-07-01 RX ORDER — ASPIRIN 325 MG
325 TABLET ORAL DAILY
Qty: 30 TABLET | Refills: 0 | Status: SHIPPED | OUTPATIENT
Start: 2020-07-02 | End: 2021-01-08

## 2020-07-01 RX ADMIN — CLOPIDOGREL BISULFATE 75 MG: 75 TABLET ORAL at 09:29

## 2020-07-01 RX ADMIN — LISINOPRIL 20 MG: 20 TABLET ORAL at 09:29

## 2020-07-01 RX ADMIN — ESCITALOPRAM OXALATE 20 MG: 20 TABLET ORAL at 09:29

## 2020-07-01 RX ADMIN — ATENOLOL 25 MG: 25 TABLET ORAL at 09:29

## 2020-07-01 RX ADMIN — HYDROCODONE BITARTRATE AND ACETAMINOPHEN 1 TABLET: 5; 325 TABLET ORAL at 10:57

## 2020-07-01 RX ADMIN — ASPIRIN 325 MG ORAL TABLET 325 MG: 325 PILL ORAL at 09:29

## 2020-07-01 NOTE — PLAN OF CARE
Problem: Patient Care Overview  Goal: Plan of Care Review  Outcome: Ongoing (interventions implemented as appropriate)  Flowsheets  Taken 7/1/2020 0914  Progress: improving  Taken 7/1/2020 0834  Plan of Care Reviewed With: patient  Outcome Summary: Pt able to perform bed mobility with min assist to move L LE to EOB. Pt able to perform STS and and ambulate 80' with RW CGA with step through gait mechanics. Pt able to ascend/descend 1 step with RW CGA. Pt's mobility limited by fatigue. Continued with HEP. Recommend patient home with assist and home health PT.

## 2020-07-01 NOTE — PROGRESS NOTES
"Akua Torres  [id number]  1945    /55 (BP Location: Left arm, Patient Position: Sitting)   Pulse 85   Temp 98.7 °F (37.1 °C) (Oral)   Resp 16   Ht 162.6 cm (64.02\")   Wt 90.9 kg (200 lb 6.4 oz)   SpO2 96%   BMI 34.38 kg/m²     Lab Results (last 24 hours)     ** No results found for the last 24 hours. **          Patient Care Team:  Salty Hernandez MD as PCP - General (Family Medicine)  Leon Hein MD as Consulting Physician (Neurosurgery)  Perkins, Corinne E, PT as Physical Therapist (Physical Therapy)  Cricket Nettles MD as Consulting Physician (Pain Medicine)    SUBJECTIVE  Pain well controlled.  Good progress in physical therapy.    PHYSICAL EXAM  Incision benign  Minimal thigh swelling  Neurovascularly intact to knees and toes       Status post total replacement of left hip    Mild obesity    Anxiety and depression    Essential hypertension    Leukocytosis, likely reactive    Paroxysmal atrial fibrillation (CMS/HCC)    Acute blood loss anemia, mild, asymptomatic    Arthritis of left hip    Hip pain    Hyperlipidemia    Elevated hemoglobin A1c    History of stroke    Acute postoperative pain      PLAN / DISPOSITION:  Discharge home today    Darrin New MD  07/01/20  12:36  "

## 2020-07-01 NOTE — PLAN OF CARE
Patient alert and oriented. VSS. Up to bedside commode with x2 assist. Patient experiencing episodes of incontinence and states that this has occurred with previous surgeries. Patient still very weak while transferring. Pain controlled with dose of PRN Norco. Patient slept well throughout most of the night. Bruna dressing clean, dry and intact.

## 2020-07-01 NOTE — THERAPY TREATMENT NOTE
Patient Name: Akua Torres  : 1945    MRN: [id number]                              Today's Date: 2020       Admit Date: 2020    Visit Dx:     ICD-10-CM ICD-9-CM   1. 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 Z96.641 V43.64     Patient Active Problem List   Diagnosis   • Degenerative disc disease, lumbar   • Lumbar stenosis with neurogenic claudication   • History of lumbar fusion   • Spondylosis of lumbar region without myelopathy or radiculopathy   • Mild obesity   • Physical deconditioning   • Idiopathic gout   • Anxiety and depression   • Greater trochanteric bursitis of both hips   • Status post total bilateral knee replacement   • Back pain   • Essential hypertension   • Prediabetes   • S/P lumbar spinal fusion   • Leukocytosis, likely reactive   • Depression   • Arthritis of right hip   • Paroxysmal atrial fibrillation (CMS/HCC)   • YUNIOR treated with BiPAP   • 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   • Acute blood loss anemia, mild, asymptomatic   • 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 (CMS/[facility])   • Supraventricular tachycardia (CMS/HCC)   • Left prefrontal gyrus stroke   • Arthritis of left hip   • Hip pain   • Status post total replacement of left hip   • Hyperlipidemia   • Elevated hemoglobin A1c   • History of stroke   • Acute postoperative pain     Past Medical History:   Diagnosis Date   • A-fib (CMS/[facility])    • Anxiety and depression    • Arthritis    • Cataract    • Depression    • Extremity pain    • GERD (gastroesophageal reflux disease)    • Gout    • H/O bladder infections     JUST FINISHED MACROBID ON 17 FOR RECENT UTI   • Hypercholesteremia    • Hypertension    • Joint pain    • Kidney disease, chronic, stage III (GFR 30-59 ml/min) (CMS/[facility])     resolved after stopping antiinflammatory ~   • Low back pain    • Neck pain    • Pneumonia 2020   • Rheumatic fever    • Skin cancer    • Sleep apnea     no cpap   •  Stroke (CMS/[facility]) 09/2019    right sided weakness   • Wears glasses      Past Surgical History:   Procedure Laterality Date   • BACK SURGERY  2004    LUMBAR PER DR MAN x2   • CARDIAC CATHETERIZATION  2019   • CARPAL TUNNEL RELEASE Left    • COLONOSCOPY     • EYE SURGERY      retina surgery   • FINGER SURGERY Right     3RD FINGER   • HEEL SPUR EXCISION Bilateral    • INTERVENTIONAL RADIOLOGY PROCEDURE N/A 9/17/2019    Procedure: Carotid and Cerebral Angiogram/ Possible Stent;  Surgeon: Imer Guerra MD;  Location:  [facility];  Service: Interventional Radiology   • KNEE ARTHROSCOPY Bilateral    • LUMBAR DISCECTOMY FUSION INSTRUMENTATION N/A 11/10/2017    Procedure: LUMBAR SPINAL FUSION ;  Surgeon: Gopi Man MD;  Location:  [facility];  Service:    • REPLACEMENT TOTAL KNEE BILATERAL Bilateral    • SKIN BIOPSY     • TOTAL HIP ARTHROPLASTY Right 9/9/2019    Procedure: TOTAL ANTERIOR RIGHT HIP ARTHROPLASTY;  Surgeon: Darrin New MD;  Location:  [facility];  Service: Orthopedics   • TOTAL HIP ARTHROPLASTY Left 6/29/2020    Procedure: TOTAL HIP ARTHROPLASTY ANTERIOR LEFT;  Surgeon: Darrin New MD;  Location:  [facility];  Service: Orthopedics;  Laterality: Left;     General Information     Row Name 07/01/20 0834          PT Evaluation Time/Intention    Document Type  therapy note (daily note)  -CS     Mode of Treatment  physical therapy;individual therapy  -CS     Row Name 07/01/20 0834          General Information    Patient Profile Reviewed?  yes  -CS     Existing Precautions/Restrictions  fall;hip, anterior;other (see comments) AUGUSTUS dressing  -CS     Row Name 07/01/20 0834          Cognitive Assessment/Intervention- PT/OT    Orientation Status (Cognition)  oriented x 4  -CS     Row Name 07/01/20 0834          Safety Issues, Functional Mobility    Safety Issues Affecting Function (Mobility)  safety precautions follow-through/compliance;safety precaution awareness  -CS     Impairments  Affecting Function (Mobility)  endurance/activity tolerance;pain;range of motion (ROM);strength  -CS       User Key  (r) = Recorded By, (t) = Taken By, (c) = Cosigned By    Initials Name Provider Type    Lety Longoria PT Physical Therapist        Mobility     Row Name 07/01/20 0834          Bed Mobility Assessment/Treatment    Bed Mobility Assessment/Treatment  supine-sit  -CS     Supine-Sit Wise (Bed Mobility)  verbal cues;minimum assist (75% patient effort)  -CS     Assistive Device (Bed Mobility)  head of bed elevated;bed rails  -CS     Comment (Bed Mobility)  VC's to move LE's to EOB and to use bedrails to pull self up. Pt needs min assist with moving L LE to EOB and patient able to initiate lifting trunk but needed slight assist with completely raising trunk up due to fatigue.  -CS     Row Name 07/01/20 0834          Transfer Assessment/Treatment    Comment (Transfers)  VC's to push off of bed to stand and to reach back for BSC and chair to sit. Transferred from bed to BSC CGA. VC's to step L LE out with sitting for comfort.   -CS     Row Name 07/01/20 0834          Bed-Chair Transfer    Bed-Chair Wise (Transfers)  verbal cues;contact guard  -CS     Assistive Device (Bed-Chair Transfers)  walker, front-wheeled  -CS     Row Name 07/01/20 0834          Sit-Stand Transfer    Sit-Stand Wise (Transfers)  verbal cues;contact guard  -CS     Assistive Device (Sit-Stand Transfers)  walker, front-wheeled  -CS     Row Name 07/01/20 0834          Gait/Stairs Assessment/Training    93832 - Gait Training Minutes   15  -CS     Gait/Stairs Assessment/Training  gait/ambulation independence;gait/ambulation assistive device;distance ambulated;gait pattern  -CS     [place]ise Level (Gait)  verbal cues;contact guard  -CS     Assistive Device (Gait)  walker, front-wheeled  -CS     Distance in Feet (Gait)  80'  -CS     Pattern (Gait)  step-through  -CS     Deviations/Abnormal Patterns (Gait)   bilateral deviations;nixon decreased;gait speed decreased;stride length decreased  -CS     Bilateral Gait Deviations  forward flexed posture  -CS     Left Sided Gait Deviations  weight shift ability decreased;heel strike decreased  -CS     Negotiation (Stairs)  stairs independence;stairs assistive device;handrail location;number of steps  -CS     [place] Level (Stairs)  verbal cues;contact guard  -CS     Assistive Device (Stairs)  walker, front-wheeled  -CS     Handrail Location (Stairs)  none  -CS     Number of Steps (Stairs)  1  -CS     Ascending Technique (Stairs)  step-to-step  -CS     Descending Technique (Stairs)  step-to-step  -CS     Comment (Gait/Stairs)  Pt able to amb with slow, step through gait mechanics. As patient progressed, gait speed did slightly increase. VC's to increase stride length, increase stance time on L LE, and decrease WB through UE's. Pt able to ascend/descend 1 step with RW CGA with no VC's on sequencing but did require cue to ensure nonsurgical LE was completely on the step before stepping up with surgical LE. Pt limited with mobility due to fatigue.  -     Row Name 07/01/20 0834          Mobility Assessment/Intervention    Extremity Weight-bearing Status  left lower extremity  -CS     Left Lower Extremity (Weight-bearing Status)  weight-bearing as tolerated (WBAT)  -       User Key  (r) = Recorded By, (t) = Taken By, (c) = Cosigned By    Initials Name Provider Type    CS Lety Braxton PT Physical Therapist        Obj/Interventions     Row Name 07/01/20 0834          Therapeutic Exercise    Lower Extremity (Therapeutic Exercise)  heel slides, left;LAQ (long arc quad), left;quad sets, left;SLR (straight leg raise), left  -CS     Lower Extremity Range of Motion (Therapeutic Exercise)  hip abduction/adduction, left;ankle dorsiflexion/plantar flexion, bilateral  -CS     Exercise Type (Therapeutic Exercise)  isometric contraction, static;AROM (active range of  motion);isotonic contraction, concentric;AAROM (active assistive range of motion)  -CS     Position (Therapeutic Exercise)  seated  -CS     Sets/Reps (Therapeutic Exercise)  15 reps each  -CS     Comment (Therapeutic Exercise)  VC's on technique. Pt required min assist with hip abd/add and mod assist with SLR due to weakness.  -CS       User Key  (r) = Recorded By, (t) = Taken By, (c) = Cosigned By    Initials Name Provider Type    CS Lety Braxton, PT Physical Therapist        Goals/Plan     Row Name 07/01/20 0834          Bed Mobility Goal 1 (PT)    Activity/Assistive Device (Bed Mobility Goal 1, PT)  sit to supine/supine to sit  -CS     [place] Level/Cues Needed (Bed Mobility Goal 1, PT)  conditional independence  -CS     Time Frame (Bed Mobility Goal 1, PT)  long term goal (LTG);3 days  -CS     Progress/Outcomes (Bed Mobility Goal 1, PT)  goal ongoing  -CS     Row Name 07/01/20 0834          Transfer Goal 1 (PT)    Activity/Assistive Device (Transfer Goal 1, PT)  sit-to-stand/stand-to-sit;walker, rolling  -CS     [place] Level/Cues Needed (Transfer Goal 1, PT)  conditional independence  -CS     Time Frame (Transfer Goal 1, PT)  long term goal (LTG);3 days  -CS     Progress/Outcome (Transfer Goal 1, PT)  goal ongoing  -CS     Row Name 07/01/20 0834          Gait Training Goal 1 (PT)    Activity/Assistive Device (Gait Training Goal 1, PT)  gait (walking locomotion);walker, rolling  -CS     [place] Level (Gait Training Goal 1, PT)  conditional independence  -CS     Distance (Gait Goal 1, PT)  150 feet  -CS     Time Frame (Gait Training Goal 1, PT)  long term goal (LTG);3 days  -CS     Progress/Outcome (Gait Training Goal 1, PT)  goal ongoing  -CS     Row Name 07/01/20 0834          Stairs Goal 1 (PT)    Activity/Assistive Device (Stairs Goal 1, PT)  stairs, all skills;walker, rolling  -CS     [place] Level/Cues Needed (Stairs Goal 1, PT)  standby assist  -CS     Number of Stairs (Stairs  Goal 1, PT)  1  -CS     Time Frame (Stairs Goal 1, PT)  long term goal (LTG);3 days  -CS     Progress/Outcome (Stairs Goal 1, PT)  goal ongoing  -CS       User Key  (r) = Recorded By, (t) = Taken By, (c) = Cosigned By    Initials Name Provider Type    Lety Longoria, SUSAN Physical Therapist        Clinical Impression     Row Name 07/01/20 0834          Pain Assessment    Additional Documentation  Pain Scale: Numbers Pre/Post-Treatment (Group)  -CS     Row Name 07/01/20 0834          Pain Scale: Numbers Pre/Post-Treatment    Pain Scale: Numbers, Post-Treatment  2/10  -CS     Pain Location - Side  Left  -CS     Pain Location - Orientation  incisional  -CS     Pain Location  hip  -CS     Pain Intervention(s)  Repositioned;Ambulation/increased activity  -CS     Row Name 07/01/20 0834          Plan of Care Review    Plan of Care Reviewed With  patient  -CS     Progress  improving  -CS     Outcome Summary  Pt able to perform bed mobility with min assist to move L LE to EOB. Pt able to perform STS and and ambulate 80' with RW CGA with step through gait mechanics. Pt able to ascend/descend 1 step with RW CGA. Pt's mobility limited by fatigue. Continued with HEP. Recommend patient home with assist and home health PT.  -CS     Row Name 07/01/20 0834          Physical Therapy Clinical Impression    Criteria for Skilled Interventions Met (PT Clinical Impression)  yes;treatment indicated  -CS     Rehab Potential (PT Clinical Summary)  good, to achieve stated therapy goals  -CS     Row Name 07/01/20 0834          Positioning and Restraints    Pre-Treatment Position  in bed  -CS     Post Treatment Position  chair  -CS     In Chair  notified nsg;reclined;call light within reach;encouraged to call for assist;exit alarm on;legs elevated  -CS       User Key  (r) = Recorded By, (t) = Taken By, (c) = Cosigned By    Initials Name Provider Type    Lety Longoria, SUSAN Physical Therapist        Outcome Measures     Row Name  07/01/20 0834          How much help from another person do you currently need...    Turning from your back to your side while in flat bed without using bedrails?  3  -CS     Moving from lying on back to sitting on the side of a flat bed without bedrails?  3  -CS     Standing up from a chair using your arms (e.g., wheelchair, bedside chair)?  3  -CS     Climbing 3-5 steps with a railing?  3  -CS     To walk in hospital room?  3  -CS     Row Name 07/01/20 0834          Functional Assessment    Outcome Measure Options  AM-PAC 6 Clicks Basic Mobility (PT)  -CS       User Key  (r) = Recorded By, (t) = Taken By, (c) = Cosigned By    Initials Name Provider Type    CS Lety Braxton PT Physical Therapist        Physical Therapy Education                 Title: PT OT SLP Therapies (Done)     Topic: Physical Therapy (Done)     Point: Mobility training (Done)     Description:   Instruct learner(s) on safety and technique for assisting patient out of bed, chair or wheelchair.  Instruct in the proper use of assistive devices, such as walker, crutches, cane or brace.              Patient Friendly Description:   It's important to get you on your feet again, but we need to do so in a way that is safe for you. Falling has serious consequences, and your personal safety is the most important thing of all.        When it's time to get out of bed, one of us or a family member will sit next to you on the bed to give you support.     If your doctor or nurse tells you to use a walker, crutches, a cane, or a brace, be sure you use it every time you get out of bed, even if you think you don't need it.    Learning Progress Summary           Patient Acceptance, E,D,EMILIANO, SAVANNAH DEL CASTILLO by  at 7/1/2020 0834    Comment:  Educated patient on bed mobility sequencing, safe hand placement with transfers, gait mechanics, ther ex, stair management, and reviewed handout.    Acceptance, EARPITA DU, VU by  at 6/30/2020 1413    Comment:  Educated patient on  safe hand placement with transfers, gait mechanics, bed mobility sequencing, ther ex, and plan for progression of care.    Acceptance, E,D,H, VU,DU by CECILE at 6/30/2020 0921    Comment:  Educated patient on safe hand placement with transfers, bed mobility, gait mechanics, sequencing with stairs, and ther ex.    Acceptance, D,E, VU by BARRY at 6/29/2020 1305    Comment:  Educated on safe sequencing with bed mobility, ambulatory transfers, and gait. Reviewed HEP and anterior hip precautions.                   Point: Home exercise program (Done)     Description:   Instruct learner(s) on appropriate technique for monitoring, assisting and/or progressing patient with therapeutic exercises and activities.              Learning Progress Summary           Patient Acceptance, E,D,H, DU,VU by CECILE at 7/1/2020 0834    Comment:  Educated patient on bed mobility sequencing, safe hand placement with transfers, gait mechanics, ther ex, stair management, and reviewed handout.    Acceptance, E,D, DU,VU by CECILE at 6/30/2020 1413    Comment:  Educated patient on safe hand placement with transfers, gait mechanics, bed mobility sequencing, ther ex, and plan for progression of care.    Acceptance, E,D,H, VU,DU by CECILE at 6/30/2020 0921    Comment:  Educated patient on safe hand placement with transfers, bed mobility, gait mechanics, sequencing with stairs, and ther ex.    Acceptance, D,E, VU by BARRY at 6/29/2020 1305    Comment:  Educated on safe sequencing with bed mobility, ambulatory transfers, and gait. Reviewed HEP and anterior hip precautions.                   Point: Body mechanics (Done)     Description:   Instruct learner(s) on proper positioning and spine alignment for patient and/or caregiver during mobility tasks and/or exercises.              Learning Progress Summary           Patient Acceptance, E,D,H, DU,VU by CS at 7/1/2020 0834    Comment:  Educated patient on bed mobility sequencing, safe hand placement with transfers, gait  mechanics, ther ex, stair management, and reviewed handout.    Acceptance, E,D, DU,VU by CECILE at 6/30/2020 1413    Comment:  Educated patient on safe hand placement with transfers, gait mechanics, bed mobility sequencing, ther ex, and plan for progression of care.    Acceptance, E,D,H, VU,DU by CECILE at 6/30/2020 0921    Comment:  Educated patient on safe hand placement with transfers, bed mobility, gait mechanics, sequencing with stairs, and ther ex.    Acceptance, D,E, VU by BARRY at 6/29/2020 1305    Comment:  Educated on safe sequencing with bed mobility, ambulatory transfers, and gait. Reviewed HEP and anterior hip precautions.                   Point: Precautions (Done)     Description:   Instruct learner(s) on prescribed precautions during mobility and gait tasks              Learning Progress Summary           Patient Acceptance, E,D,H, DU,VU by CECILE at 7/1/2020 0834    Comment:  Educated patient on bed mobility sequencing, safe hand placement with transfers, gait mechanics, ther ex, stair management, and reviewed handout.    Acceptance, E,D, DU,VU by CECILE at 6/30/2020 1413    Comment:  Educated patient on safe hand placement with transfers, gait mechanics, bed mobility sequencing, ther ex, and plan for progression of care.    Acceptance, E,D,H, VU,DU by CECILE at 6/30/2020 0921    Comment:  Educated patient on safe hand placement with transfers, bed mobility, gait mechanics, sequencing with stairs, and ther ex.    Acceptance, D,E, VU by BARRY at 6/29/2020 1305    Comment:  Educated on safe sequencing with bed mobility, ambulatory transfers, and gait. Reviewed HEP and anterior hip precautions.                               User Key     Initials Effective Dates Name Provider Type Discipline    CECILE 03/26/19 -  Lety Braxton, PT Physical Therapist PT    BARRY 09/10/19 -  Abimael Marin, PT Physical Therapist PT              PT Recommendation and Plan  Planned Therapy Interventions (PT Eval): balance training, bed mobility training,  gait training, home exercise program, neuromuscular re-education, patient/family education, ROM (range of motion), stair training, strengthening, transfer training  Outcome Summary/Treatment Plan (PT)  Anticipated Discharge Disposition (PT): home with assist, home with home health  Plan of Care Reviewed With: patient  Progress: improving  Outcome Summary: Pt able to perform bed mobility with min assist to move L LE to EOB. Pt able to perform STS and and ambulate 80' with RW CGA with step through gait mechanics. Pt able to ascend/descend 1 step with RW CGA. Pt's mobility limited by fatigue. Continued with HEP. Recommend patient home with assist and home health PT.     Time Calculation:   PT Charges     Row Name 07/01/20 0834             Time Calculation    Start Time  0834  -CS      PT Received On  07/01/20  -         Time Calculation- PT    Total Timed Code Minutes- PT  23 minute(s)  -CS         Timed Charges    03589 - PT Therapeutic Exercise Minutes  8  -CS      43956 - Gait Training Minutes   15  -CS        User Key  (r) = Recorded By, (t) = Taken By, (c) = Cosigned By    Initials Name Provider Type    CS Lety Braxton, PT Physical Therapist        Therapy Charges for Today     Code Description Service Date Service Provider Modifiers Qty    74423937003 HC PT THER PROC EA 15 MIN 6/30/2020 Lety Braxton, PT GP 1    77179314636 HC GAIT TRAINING EA 15 MIN 6/30/2020 Lety Braxton, PT GP 1    24723188921 HC PT THER PROC EA 15 MIN 6/30/2020 Lety Braxton, PT GP 1    54328322129 HC GAIT TRAINING EA 15 MIN 6/30/2020 Lety Braxton, PT GP 1    19317630608 HC PT THER PROC EA 15 MIN 7/1/2020 Lety Braxton, PT GP 1    23501837967 HC GAIT TRAINING EA 15 MIN 7/1/2020 Lety Braxton, PT GP 1          PT G-Codes  Outcome Measure Options: AM-PAC 6 Clicks Basic Mobility (PT)  AM-PAC 6 Clicks Score (PT): 18    Lety Braxton PT  7/1/2020

## 2020-07-01 NOTE — DISCHARGE SUMMARY
Patient Name: Akua Torres  MRN: [id number]  : 1945  DOS: 2020    Attending: Darrin New MD    Primary Care Provider: Salty Hernandez MD    Date of Admission:.2020  5:14 AM    Date of Discharge:  2020    Discharge Diagnosis:     Status post total replacement of left hip    Arthritis of left hip    Mild obesity    Anxiety and depression    Essential hypertension    Leukocytosis, likely reactive    Paroxysmal atrial fibrillation (CMS/HCC)    Acute blood loss anemia, mild, asymptomatic    Hip pain    Hyperlipidemia    Elevated hemoglobin A1c    History of stroke    Acute postoperative pain      Hospital Course    At admit:    Patient is a pleasant 75 y.o. female presented for scheduled surgery by Dr. New. She underwent left total hip arthroplasty under [facility]. She tolerated surgery well and was admitted for further medical management.  Her hip has been painful for many years.  She uses a cane or walker for ambulation.  She does report recent falls.     She is known to us from previous admission 2019 for right total hip replacement.  Her hospitalization was complicated by a left prefrontal gyrus stroke.     From previous admission: (((Postoperatively, she developed numbness and lack of  to right hand. Neurology was consulted. She underwent CT head and MRI head. She was found to have a left prefrontal gyrus stroke. She was started in heparin drip and aspirin. She has slowly improved in function of right hand throughout admission. She had ultrasound of carotids and was found to have high-grade stenosis/near complete occlusion of the proximal left internal carotid artery at the bifurcation with densely calcified plaque. Neurosurgery was consulted. She remained on heparin drip, plavix and aspirin until she underwent stenting of the carotid. Final op report still pending this date.   She has history of afib and is followed by cardiology, Dr. Pearl. She was diagnosed and  treated for sleep apnea and did have further occurrence of afib. Due to stroke postop will cardiology was consulted and she was placed on telemetry. There was no evidence of afib while inpatient. She will be need Holter monitor and will follow-up with cardiology in a month. She did have a single episode of SVT while inpatient that responded to an additional dose of beta blocker.)))     When seen postop she is doing well.  She is having moderate left hip pain.  She denies nausea, shortness of breath or chest pain.     (Above is noted, agree.  Seen her room afterwards.  Doing very well.  Has just ambulated with physical therapy.  Fair pain control.  No nausea or vomiting.  No shortness of breath.  Her Plavix was held prior to surgery, she continues her aspirin.)wy     After admit  Patient was provided pain medications as needed for pain control.  Adjustments were made to pain medications to optimize postop pain management when indicated. Risks and benefits of opiate medications discussed with patient.    She was seen by PT and has progressed well over her stay.    The patient was placed on DVT prophylaxis including aspirin and plavix. The patient was encouraged to use IS for atelectasis prophylaxis.     The patient was placed on a bowel regimen to prevent constipation while on pain medication.   The patient's H/H was monitored with a slight decrease that remained asymptomatic.    It is felt by all involved that the patient can discharge home at this time, and the patient has no further questions       Procedures Performed  6/29/2020     Pre-op Diagnosis:   Left hip osteoarthritis       Post-Op Diagnosis Codes:  Left hip osteoarthritis     Procedure/CPT® Codes:  63430     Procedure(s):  TOTAL HIP ARTHROPLASTY ANTERIOR LEFT     Surgeon(s):  Darrin New MD       Pertinent Test Results:    I reviewed the patient's new clinical results.   Results from last 7 days   Lab Units 06/30/20  0628   WBC 10*3/mm3 14.19*  "  HEMOGLOBIN g/dL 10.3*   HEMATOCRIT % 33.5*   PLATELETS 10*3/mm3 178     Results from last 7 days   Lab Units 20  0628   SODIUM mmol/L 140   POTASSIUM mmol/L 5.0   CHLORIDE mmol/L 106   CO2 mmol/L 26.0   BUN mg/dL 21   CREATININE mg/dL 1.04*   CALCIUM mg/dL 8.3*   GLUCOSE mg/dL 165*     I reviewed the patient's new imaging including images and reports.      Physical therapy: Pt able to perform bed mobility with min assist to move L LE to EOB. Pt able to perform STS and and ambulate 80' with RW CGA with step through gait mechanics. Pt able to ascend/descend 1 step with RW CGA. Pt's mobility limited by fatigue. Continued with HEP. Recommend patient home with assist and home health PT.    Discharge Assessment:      Visit Vitals  /55 (BP Location: Left arm, Patient Position: Sitting)   Pulse 85   Temp 98.7 °F (37.1 °C) (Oral)   Resp 16   Ht 162.6 cm (64.02\")   Wt 90.9 kg (200 lb 6.4 oz)   SpO2 96%   BMI 34.38 kg/m²     Temp (24hrs), Av.4 °F (36.9 °C), Min:97.8 °F (36.6 °C), Max:98.7 °F (37.1 °C)      General Appearance:    Alert, cooperative, in no acute distress   Lungs:     Clear to auscultation,respirations regular, even and  unlabored    Heart:    Regular rhythm and normal rate, normal S1 and S2   Abdomen:     Normal bowel sounds, no masses, no organomegaly, soft non-tender, non-distended, no guarding, no rebound  tenderness   Extremities:   CDI dressing left. Moves all extremities well, no edema, no cyanosis, no redness   Pulses:   Pulses palpable and equal bilaterally   Skin:   No bleeding, bruising or rash   Neurologic:   Cranial nerves 2 - 12 grossly intact, sensation intact, Flexion and dorsiflexion intact bilateral feet.       Discharge Disposition: Home         Discharge Medications      New Medications      Instructions Start Date   docusate sodium 100 MG capsule  Commonly known as:  Colace   100 mg, Oral, 2 Times Daily      HYDROcodone-acetaminophen 5-325 MG per tablet  Commonly known as:  " NORCO   1 tablet, Oral, Every 4 Hours PRN         Changes to Medications      Instructions Start Date   aspirin 81 MG EC tablet  What changed:  additional instructions   81 mg, Oral, Daily, Resume in 1 month      aspirin 325 MG tablet  What changed:  You were already taking a medication with the same name, and this prescription was added. Make sure you understand how and when to take each.   325 mg, Oral, Daily, For 1 month   Start Date:  July 2, 2020        Continue These Medications      Instructions Start Date   acetaminophen 325 MG tablet  Commonly known as:  TYLENOL   650 mg, Oral, Every 6 Hours PRN      atenolol 25 MG tablet  Commonly known as:  TENORMIN   25 mg, Oral, 2 Times Daily, Take 25 mg by mouth 2 (Two) Times a Day.      atorvastatin 80 MG tablet  Commonly known as:  LIPITOR   80 mg, Oral, Nightly      clopidogrel 75 MG tablet  Commonly known as:  PLAVIX   75 mg, Oral, Daily      escitalopram 20 MG tablet  Commonly known as:  LEXAPRO   20 mg, Oral, Daily      hydrOXYzine 25 MG tablet  Commonly known as:  ATARAX   25 mg, Oral, 2 Times Daily PRN      lisinopril 20 MG tablet  Commonly known as:  PRINIVIL,ZESTRIL   20 mg, Oral, Daily, Take 20 mg by mouth Daily.         Stop These Medications    traMADol 50 MG tablet  Commonly known as:  ULTRAM            Discharge Diet: Regular diet    Activity at Discharge: WBAT LLE    Follow-up Appointments  Dr. New per his orders    Discharge took over 30 min     WILFREDO Kramer  07/01/20  10:22     I have personally performed the evaluation on this patient. My history is consistant  with above documentation . My exam finding are listed above. I have personally reviewed and discussed the above formulated discharge plan with patient and APRN.wy.

## 2020-07-01 NOTE — OUTREACH NOTE
Prep Survey      Responses   [facility] facility patient discharged from?  [facility]   Is LACE score < 7 ?  Yes   Eligibility  [facility]   Date of Admission  06/29/20   Date of Discharge  07/01/20   Discharge Disposition  Home or Self Care   Discharge diagnosis   Status post total replacement of left hip   COVID-19 Test Status  Negative   Does the patient have one of the following disease processes/diagnoses(primary or secondary)?  Total Joint Replacement   Does the patient have Home health ordered?  Yes   What is the Home health agency?   [facility]    Is there a DME ordered?  No   Prep survey completed?  Yes          Melyssa Grier RN

## 2020-07-02 ENCOUNTER — TRANSITIONAL CARE MANAGEMENT TELEPHONE ENCOUNTER (OUTPATIENT)
Dept: CALL CENTER | Facility: HOSPITAL | Age: 75
End: 2020-07-02

## 2020-07-02 NOTE — OUTREACH NOTE
Call Center TCM Note      Responses   [facility] patient discharged from?  [place]   Does the patient have one of the following disease processes/diagnoses(primary or secondary)?  Total Joint Replacement   Joint surgery performed?  Hip   TCM attempt successful?  Yes   Call start time  1359   Call end time  1401   Discharge diagnosis   Status post total replacement of left hip   Does the patient have all medications related to this admission filled (includes all antibiotics, pain medications, etc.)  Yes   Is the patient taking all medications as directed (includes completed medication regime)?  Yes   Does the patient have a follow up appointment with their surgeon?  Yes   Has the patient kept scheduled appointments due by today?  N/A   Comments  f/u with PCP on 7/13   What is the Home health agency?   [facility]   Psychosocial issues?  No   Did the patient receive a copy of their discharge instructions?  Yes   What is the patient's perception of their functional status since discharge?  Improving   Is the patient/caregiver able to teach back the hierarchy of who to call/visit for symptoms/problems? PCP, Specialist, Home health nurse, Urgent Care, ED, 911  Yes   TCM call completed?  Yes   Wrap up additional comments  Patient says she is doing well, no questions or concerns at this time.          Radha Martin RN    7/2/2020, 14:01

## 2020-07-03 LAB
BH BB BLOOD EXPIRATION DATE: NORMAL
BH BB BLOOD EXPIRATION DATE: NORMAL
BH BB BLOOD TYPE BARCODE: 5100
BH BB BLOOD TYPE BARCODE: 5100
BH BB DISPENSE STATUS: NORMAL
BH BB DISPENSE STATUS: NORMAL
BH BB PRODUCT CODE: NORMAL
BH BB PRODUCT CODE: NORMAL
BH BB UNIT NUMBER: NORMAL
BH BB UNIT NUMBER: NORMAL
CROSSMATCH INTERPRETATION: NORMAL
CROSSMATCH INTERPRETATION: NORMAL
UNIT  ABO: NORMAL
UNIT  ABO: NORMAL
UNIT  RH: NORMAL
UNIT  RH: NORMAL

## 2020-07-13 ENCOUNTER — OFFICE VISIT (OUTPATIENT)
Dept: FAMILY MEDICINE CLINIC | Facility: CLINIC | Age: 75
End: 2020-07-13

## 2020-07-13 VITALS
WEIGHT: 202 LBS | TEMPERATURE: 97.3 F | BODY MASS INDEX: 34.49 KG/M2 | HEART RATE: 73 BPM | OXYGEN SATURATION: 99 % | HEIGHT: 64 IN | SYSTOLIC BLOOD PRESSURE: 140 MMHG | DIASTOLIC BLOOD PRESSURE: 68 MMHG

## 2020-07-13 DIAGNOSIS — L24.A9 WOUND DRAINAGE: ICD-10-CM

## 2020-07-13 DIAGNOSIS — I10 ESSENTIAL HYPERTENSION: Primary | ICD-10-CM

## 2020-07-13 PROCEDURE — 99214 OFFICE O/P EST MOD 30 MIN: CPT | Performed by: PHYSICIAN ASSISTANT

## 2020-07-13 RX ORDER — CEPHALEXIN 500 MG/1
500 CAPSULE ORAL 3 TIMES DAILY
Qty: 21 CAPSULE | Refills: 0 | Status: SHIPPED | OUTPATIENT
Start: 2020-07-13 | End: 2021-01-08

## 2020-07-13 RX ORDER — LISINOPRIL 20 MG/1
20 TABLET ORAL DAILY
Qty: 30 TABLET | Refills: 5 | Status: SHIPPED | OUTPATIENT
Start: 2020-07-13 | End: 2022-04-06 | Stop reason: SDUPTHER

## 2020-07-13 RX ORDER — ATENOLOL 25 MG/1
25 TABLET ORAL 2 TIMES DAILY
Qty: 60 TABLET | Refills: 5 | Status: SHIPPED | OUTPATIENT
Start: 2020-07-13 | End: 2022-04-06 | Stop reason: SDUPTHER

## 2020-07-13 NOTE — PROGRESS NOTES
Subjective   Akua Torres is a 75 y.o. female  Follow-up (Follow up on Left hip replacement with Dr. New, concern about incision )      History of Present Illness  Patient is a pleasant 75-year-old white female presents today for six-month follow-up on hypertension.  Additionally she underwent left hip replacement surgery recently, saw her surgeon last week and since then has noticed an area on the incision site that is draining small amount of blood-tinged material that she is concerned about.  She has follow-up with surgeon again next week.  She states is not hurting in that area but whenever she changes the bandage does have some purulent blood-tinged drainage on it  The following portions of the patient's history were reviewed and updated as appropriate: allergies, current medications, past social history and problem list    Review of Systems   Constitutional: Negative for fatigue, fever and unexpected weight change.   Respiratory: Negative for cough, chest tightness and shortness of breath.    Cardiovascular: Negative for chest pain, palpitations and leg swelling.   Gastrointestinal: Negative for nausea.   Musculoskeletal: Negative for arthralgias.   Skin: Positive for color change and wound. Negative for pallor and rash.   Neurological: Negative for dizziness, syncope, weakness and headaches.       Objective     Vitals:    07/13/20 1325   BP: 140/68   Pulse: 73   Temp: 97.3 °F (36.3 °C)   SpO2: 99%       Physical Exam   Constitutional: She appears well-developed and well-nourished. No distress.   Obesity noted     Neck: No JVD present. No thyromegaly present.   Cardiovascular: Normal rate, regular rhythm, normal heart sounds and intact distal pulses.   No murmur heard.  Pulmonary/Chest: Effort normal and breath sounds normal. No respiratory distress. She exhibits no tenderness.   Abdominal: Soft. She exhibits no distension. There is no tenderness.   Musculoskeletal: She exhibits no edema.   Skin: Skin  is warm and dry. She is not diaphoretic. There is erythema. No pallor.   Examination of wound on left hip reveals a small area along incision site measuring approximately 2 to 3 mm in length probably where the drain was originally placed.  Small amount of blood-tinged serosanguineous material at the site.  No surrounding erythema   Psychiatric: She has a normal mood and affect. Her behavior is normal. Judgment and thought content normal.   Nursing note and vitals reviewed.      Assessment/Plan     Akua was seen today for follow-up.    Diagnoses and all orders for this visit:    Essential hypertension    Wound drainage    Other orders  -     cephalexin (Keflex) 500 MG capsule; Take 1 capsule by mouth 3 (Three) Times a Day.  -     atenolol (TENORMIN) 25 MG tablet; Take 1 tablet by mouth 2 (Two) Times a Day. Take 25 mg by mouth 2 (Two) Times a Day.  -     lisinopril (PRINIVIL,ZESTRIL) 20 MG tablet; Take 1 tablet by mouth Daily. Take 20 mg by mouth Daily.    Follow-up in 6 months and as needed, keep follow-up with surgeon next week

## 2020-09-25 ENCOUNTER — TELEPHONE (OUTPATIENT)
Dept: FAMILY MEDICINE CLINIC | Facility: CLINIC | Age: 75
End: 2020-09-25

## 2020-09-25 NOTE — TELEPHONE ENCOUNTER
Caller: Akua Torres    Relationship: Self    Best call back number: [phone]     What orders are you requesting (i.e. lab or imaging): REFERRAL FOR A PODIATRIST    In what timeframe would the patient need to come in: ASAP    Where will you receive your lab/imaging services:     Additional notes: PATIENT HAS BEEN DIAGNOSED WITH GOUT/RIGHT FOOT/BIG  TOE

## 2020-09-28 ENCOUNTER — TRANSCRIBE ORDERS (OUTPATIENT)
Dept: FAMILY MEDICINE CLINIC | Facility: CLINIC | Age: 75
End: 2020-09-28

## 2020-09-28 DIAGNOSIS — M10.9 GOUTY ARTHRITIS OF TOE OF RIGHT FOOT: Primary | ICD-10-CM

## 2020-12-21 ENCOUNTER — TELEPHONE (OUTPATIENT)
Dept: NEUROSURGERY | Facility: CLINIC | Age: 75
End: 2020-12-21

## 2020-12-21 NOTE — TELEPHONE ENCOUNTER
Provider:  Charlie  Caller: Krystyna with Dr. Lance Morales's office  Time of call:   10:00am  Phone #:  [phone]  Surgery:  N/A  Surgery Date:  N/A  Last visit:   10/17/2019  Next visit: [facility] (2021)    APRIL:         Reason for call:    Krystyna with Dr. Morales's office called [facility] requesting surgical clearance be faxed. Patient is due to have a Right Rotator Cuff Repair on 01/19/2021. Requesting clearance for patient to come off Plavix, and how long she needs to be off the Plavix.     Fax: [phone]

## 2020-12-21 NOTE — TELEPHONE ENCOUNTER
Recommendations is that patient comes off Plavix 5 days prior, and then resumes afterwards and to stay on aspirin regardless.  However, if they want the patient to be off Plavix longer, please let us know and we can discuss.  Letter signed.

## 2021-01-05 ENCOUNTER — APPOINTMENT (OUTPATIENT)
Dept: PREADMISSION TESTING | Facility: HOSPITAL | Age: 76
End: 2021-01-05

## 2021-01-08 ENCOUNTER — APPOINTMENT (OUTPATIENT)
Dept: PREADMISSION TESTING | Facility: HOSPITAL | Age: 76
End: 2021-01-08

## 2021-01-08 ENCOUNTER — HOSPITAL ENCOUNTER (OUTPATIENT)
Dept: GENERAL RADIOLOGY | Facility: HOSPITAL | Age: 76
Discharge: HOME | End: 2021-01-08

## 2021-01-08 VITALS — BODY MASS INDEX: 35.97 KG/M2 | HEIGHT: 63 IN | WEIGHT: 203 LBS

## 2021-01-08 LAB
ANION GAP SERPL CALCULATED.3IONS-SCNC: 10 MMOL/L (ref 5–15)
BUN SERPL-MCNC: 15 MG/DL (ref 8–23)
BUN/CREAT SERPL: 17 (ref 7–25)
CALCIUM SPEC-SCNC: 8.8 MG/DL (ref 8.6–10.5)
CHLORIDE SERPL-SCNC: 104 MMOL/L (ref 98–107)
CO2 SERPL-SCNC: 27 MMOL/L (ref 22–29)
CREAT SERPL-MCNC: 0.88 MG/DL (ref 0.57–1)
DEPRECATED RDW RBC AUTO: 51 FL (ref 37–54)
ERYTHROCYTE [DISTWIDTH] IN BLOOD BY AUTOMATED COUNT: 15.1 % (ref 12.3–15.4)
GFR SERPL CREATININE-BSD FRML MDRD: 62 ML/MIN/1.73
GLUCOSE SERPL-MCNC: 115 MG/DL (ref 65–99)
HBA1C MFR BLD: 6 % (ref 4.8–5.6)
HCT VFR BLD AUTO: 44.7 % (ref 34–46.6)
HGB BLD-MCNC: 13.6 G/DL (ref 12–15.9)
MCH RBC QN AUTO: 28.2 PG (ref 26.6–33)
MCHC RBC AUTO-ENTMCNC: 30.4 G/DL (ref 31.5–35.7)
MCV RBC AUTO: 92.5 FL (ref 79–97)
PLATELET # BLD AUTO: 220 10*3/MM3 (ref 140–450)
PMV BLD AUTO: 9.6 FL (ref 6–12)
POTASSIUM SERPL-SCNC: 4.6 MMOL/L (ref 3.5–5.2)
QT INTERVAL: 412 MS
QTC INTERVAL: 438 MS
RBC # BLD AUTO: 4.83 10*6/MM3 (ref 3.77–5.28)
SODIUM SERPL-SCNC: 141 MMOL/L (ref 136–145)
WBC # BLD AUTO: 8.62 10*3/MM3 (ref 3.4–10.8)

## 2021-01-08 PROCEDURE — 85027 COMPLETE CBC AUTOMATED: CPT

## 2021-01-08 PROCEDURE — 93010 ELECTROCARDIOGRAM REPORT: CPT | Performed by: INTERNAL MEDICINE

## 2021-01-08 PROCEDURE — 83036 HEMOGLOBIN GLYCOSYLATED A1C: CPT

## 2021-01-08 PROCEDURE — 71046 X-RAY EXAM CHEST 2 VIEWS: CPT

## 2021-01-08 PROCEDURE — 93005 ELECTROCARDIOGRAM TRACING: CPT

## 2021-01-08 PROCEDURE — 36415 COLL VENOUS BLD VENIPUNCTURE: CPT

## 2021-01-08 PROCEDURE — 80048 BASIC METABOLIC PNL TOTAL CA: CPT

## 2021-01-08 RX ORDER — CELECOXIB 50 MG/1
50 CAPSULE ORAL 2 TIMES DAILY
COMMUNITY
End: 2021-04-18 | Stop reason: ALTCHOICE

## 2021-01-08 RX ORDER — ALLOPURINOL 100 MG/1
100 TABLET ORAL DAILY
COMMUNITY
End: 2022-04-06 | Stop reason: SDUPTHER

## 2021-01-08 NOTE — PAT
The following information and instructions were given:    Do not eat, drink, smoke or chew gum after midnight the night before surgery. This includes no mints.  Take all routine, prescribed medications including heart and blood pressure medicines with a sip of water unless otherwise instructed by your physician.   Do NOT take diabetic medication unless instructed by your physician.    DO NOT shave for two days before your procedure.  Do not wear makeup.      DO NOT wear fingernail polish (gel/regular) and/or acrylic/artificial nails on the day of surgery.   If you had a recent manicure and would rather not remove polish or artificial nails, the minimum requirement is that the polish/artificial nails must be removed from the middle finger on each hand.      If you are having surgery/procedure on an upper extremity, fingernail polish/artificial fingernails must be removed for surgery.  NO EXCEPTIONS.      If you are having surgery/procedure on a lower extremity, toenail polish on both extremities must be removed for surgery.  NO EXCEPTIONS.    Remove all jewelry (advise to go to jeweler if unable to remove).  Jewelry, especially rings, can no longer be taped for surgery.    Leave anything you consider valuable at home.    Leave your suitcase in the car until after your surgery.    Bring the following with you the day of your procedure (when applicable):       -Picture ID and insurance cards       -Co-pay/deductible required by insurance       -Medications in the original bottles (not a list) including all over-the-counter medications if not brought to PAT       -Copy of advance directive, living will or power of  documents if not brought to PAT       -CPAP or BIPAP mask and tubing (do not bring machine)       -Skin prep instruction(s) sheet       -PAT Pass    Education booklet, brochure, handout or binder related to procedure given to patient.  Education booklet also includes general information related to  their recovery that mentions signs and symptoms of infection and when to call the doctor.    When applicable, an ERAS handout/booklet was given to patient.    Pain Control After Surgery handout given to patient.    Respirex use (handout given to patient) and pneumonia prevention education provided.    Signs and Symptoms of infection discussed with patient in Pre Admission Testing.  Patient instructed to call their doctor if any of the following symptoms are noted during recovery:  Fever of 100.4 F or higher, incision that is warm or has increasing bleeding, redness or drainage.    DVT Prevention instructions given verbally during Pre Admission Testing appointment that stress the importance of ambulation to improve blood circulation.  Also encouraged patient to perform foot exercises when in bed and application of a sequential device may be applied to lower extremities to improve circulation.      Please apply Chlorhexidine wipes to surgical area (if instructed) the night before procedure and the AM of procedure and document date/time of applications on skin prep instruction Patient instructed to drink 20 ounces (or until full) of Gatorade and it needs to be completed 1 hour before given arrival time for procedure (NO RED Gatorade)    Patient verbalized understanding.  Patient given a prescription for Benzol Peroxide 5% wash during PAT visit.  Instructed them to fill the prescription or  from [facility] pharmacy if was submitted electronically by the surgeon's office.  Verbal and written instructions given regarding proper use of the Benzoyl Peroxide wash given to patient and/or famlily during PAT visit. Patient/family also instructed to complete checklist and return it to Pre-op on the day of surgery.  Patient and/or family verbalized understanding.      Additionally, reinforced with patient to acquire this prescription from the [facility] retail pharmacy before leaving the hospital after PAT visit due to the potential  unavailability at local pharmacies.

## 2021-01-08 NOTE — DISCHARGE INSTRUCTIONS

## 2021-01-08 NOTE — PAT
PATIENT IS TO CALL DR. HARRELL AT [facility] FOR INSTRUCTIONS REGARDING PLAVIX

## 2021-01-17 ENCOUNTER — APPOINTMENT (OUTPATIENT)
Dept: PREADMISSION TESTING | Facility: HOSPITAL | Age: 76
End: 2021-01-17

## 2021-01-17 LAB — SARS-COV-2 RNA RESP QL NAA+PROBE: NOT DETECTED

## 2021-01-17 PROCEDURE — C9803 HOPD COVID-19 SPEC COLLECT: HCPCS

## 2021-01-17 PROCEDURE — U0004 COV-19 TEST NON-CDC HGH THRU: HCPCS

## 2021-01-18 ENCOUNTER — ANESTHESIA EVENT (OUTPATIENT)
Dept: PERIOP | Facility: HOSPITAL | Age: 76
End: 2021-01-18

## 2021-01-19 ENCOUNTER — ANESTHESIA (OUTPATIENT)
Dept: PERIOP | Facility: HOSPITAL | Age: 76
End: 2021-01-19

## 2021-01-19 ENCOUNTER — HOSPITAL ENCOUNTER (OUTPATIENT)
Facility: HOSPITAL | Age: 76
Discharge: HOME | End: 2021-01-20
Attending: ORTHOPAEDIC SURGERY | Admitting: ORTHOPAEDIC SURGERY

## 2021-01-19 DIAGNOSIS — M75.121 NONTRAUMATIC COMPLETE TEAR OF RIGHT ROTATOR CUFF: Primary | ICD-10-CM

## 2021-01-19 LAB — GLUCOSE BLDC GLUCOMTR-MCNC: 178 MG/DL (ref 70–130)

## 2021-01-19 PROCEDURE — 25010000003 CEFAZOLIN IN DEXTROSE 2-4 GM/100ML-% SOLUTION: Performed by: ORTHOPAEDIC SURGERY

## 2021-01-19 PROCEDURE — 63710000001 ATORVASTATIN 40 MG TABLET: Performed by: ORTHOPAEDIC SURGERY

## 2021-01-19 PROCEDURE — A9270 NON-COVERED ITEM OR SERVICE: HCPCS | Performed by: ORTHOPAEDIC SURGERY

## 2021-01-19 PROCEDURE — 25010000002 DEXAMETHASONE PER 1 MG: Performed by: NURSE ANESTHETIST, CERTIFIED REGISTERED

## 2021-01-19 PROCEDURE — 25010000002 ONDANSETRON PER 1 MG: Performed by: NURSE ANESTHETIST, CERTIFIED REGISTERED

## 2021-01-19 PROCEDURE — 63710000001 CLOPIDOGREL 75 MG TABLET: Performed by: ORTHOPAEDIC SURGERY

## 2021-01-19 PROCEDURE — 76942 ECHO GUIDE FOR BIOPSY: CPT | Performed by: ORTHOPAEDIC SURGERY

## 2021-01-19 PROCEDURE — L3670 SO ACRO/CLAV CAN WEB PRE OTS: HCPCS | Performed by: ORTHOPAEDIC SURGERY

## 2021-01-19 PROCEDURE — G0378 HOSPITAL OBSERVATION PER HR: HCPCS

## 2021-01-19 PROCEDURE — 63710000001 DOCUSATE SODIUM 100 MG CAPSULE: Performed by: ORTHOPAEDIC SURGERY

## 2021-01-19 PROCEDURE — 63710000001 ATENOLOL 25 MG TABLET: Performed by: ORTHOPAEDIC SURGERY

## 2021-01-19 PROCEDURE — 25010000002 CEFAZOLIN PER 500 MG: Performed by: ORTHOPAEDIC SURGERY

## 2021-01-19 PROCEDURE — 63710000001 LISINOPRIL 20 MG TABLET: Performed by: ORTHOPAEDIC SURGERY

## 2021-01-19 PROCEDURE — 25010000002 NEOSTIGMINE 10 MG/10ML SOLUTION: Performed by: NURSE ANESTHETIST, CERTIFIED REGISTERED

## 2021-01-19 PROCEDURE — 25010000002 FENTANYL CITRATE (PF) 100 MCG/2ML SOLUTION: Performed by: NURSE ANESTHETIST, CERTIFIED REGISTERED

## 2021-01-19 PROCEDURE — 25010000002 DEXAMETHASONE SODIUM PHOSPHATE 10 MG/ML SOLUTION: Performed by: ANESTHESIOLOGY

## 2021-01-19 PROCEDURE — 25010000002 EPINEPHRINE PER 0.1 MG: Performed by: ORTHOPAEDIC SURGERY

## 2021-01-19 PROCEDURE — C1713 ANCHOR/SCREW BN/BN,TIS/BN: HCPCS | Performed by: ORTHOPAEDIC SURGERY

## 2021-01-19 PROCEDURE — 25010000002 ROPIVACAINE PER 1 MG: Performed by: NURSE ANESTHETIST, CERTIFIED REGISTERED

## 2021-01-19 PROCEDURE — 25010000002 PROPOFOL 10 MG/ML EMULSION: Performed by: NURSE ANESTHETIST, CERTIFIED REGISTERED

## 2021-01-19 PROCEDURE — 25010000003 LIDOCAINE 1 % SOLUTION: Performed by: NURSE ANESTHETIST, CERTIFIED REGISTERED

## 2021-01-19 PROCEDURE — 82962 GLUCOSE BLOOD TEST: CPT

## 2021-01-19 DEVICE — HEALIX ADVANCE BR 3 SUTURE ANCHOR W/DYNACORD TCP/PLGA ABSORBABLE ANCHOR (1) BLUE (1) WHITE/BLUE/GREEN STRIPED (1) WHITE/BLACK STRIPED, SIZE 2 (5 METRIC) DYNACORD SUTURE, 36" (91CM) 5.5MM
Type: IMPLANTABLE DEVICE | Site: SHOULDER | Status: FUNCTIONAL
Brand: HEALIX ADVANCE BR 3 SUTURE ANCHOR W/DYNACORD

## 2021-01-19 DEVICE — T50S65 TENOLOK TENODESIS KIT WITH 5.0 MM TENOLOK ANCHOR WITH ONE NO. 2 HI-FI SUTURE, 2.4 MM GUIDE PIN, AND 6.5 MM DRILL BIT
Type: IMPLANTABLE DEVICE | Site: SHOULDER | Status: FUNCTIONAL
Brand: TENOLOK

## 2021-01-19 RX ORDER — PROMETHAZINE HYDROCHLORIDE 25 MG/1
25 SUPPOSITORY RECTAL ONCE AS NEEDED
Status: DISCONTINUED | OUTPATIENT
Start: 2021-01-19 | End: 2021-01-19 | Stop reason: HOSPADM

## 2021-01-19 RX ORDER — LIDOCAINE HYDROCHLORIDE 10 MG/ML
INJECTION, SOLUTION INFILTRATION; PERINEURAL AS NEEDED
Status: DISCONTINUED | OUTPATIENT
Start: 2021-01-19 | End: 2021-01-19 | Stop reason: SURG

## 2021-01-19 RX ORDER — OXYCODONE HYDROCHLORIDE 5 MG/1
5 TABLET ORAL EVERY 4 HOURS PRN
Status: DISCONTINUED | OUTPATIENT
Start: 2021-01-19 | End: 2021-01-20 | Stop reason: HOSPADM

## 2021-01-19 RX ORDER — ROCURONIUM BROMIDE 10 MG/ML
INJECTION, SOLUTION INTRAVENOUS AS NEEDED
Status: DISCONTINUED | OUTPATIENT
Start: 2021-01-19 | End: 2021-01-19 | Stop reason: SURG

## 2021-01-19 RX ORDER — HYDROMORPHONE HYDROCHLORIDE 1 MG/ML
0.5 INJECTION, SOLUTION INTRAMUSCULAR; INTRAVENOUS; SUBCUTANEOUS
Status: DISCONTINUED | OUTPATIENT
Start: 2021-01-19 | End: 2021-01-19 | Stop reason: HOSPADM

## 2021-01-19 RX ORDER — BUPIVACAINE HYDROCHLORIDE 2.5 MG/ML
INJECTION, SOLUTION EPIDURAL; INFILTRATION; INTRACAUDAL
Status: DISCONTINUED | OUTPATIENT
Start: 2021-01-19 | End: 2021-01-19 | Stop reason: SURG

## 2021-01-19 RX ORDER — PROPOFOL 10 MG/ML
VIAL (ML) INTRAVENOUS AS NEEDED
Status: DISCONTINUED | OUTPATIENT
Start: 2021-01-19 | End: 2021-01-19 | Stop reason: SURG

## 2021-01-19 RX ORDER — ACETAMINOPHEN 500 MG
1000 TABLET ORAL ONCE
Status: COMPLETED | OUTPATIENT
Start: 2021-01-19 | End: 2021-01-19

## 2021-01-19 RX ORDER — SODIUM CHLORIDE 9 MG/ML
1000 INJECTION, SOLUTION INTRAVENOUS CONTINUOUS
Status: DISCONTINUED | OUTPATIENT
Start: 2021-01-19 | End: 2021-01-20 | Stop reason: HOSPADM

## 2021-01-19 RX ORDER — FENTANYL CITRATE 50 UG/ML
INJECTION, SOLUTION INTRAMUSCULAR; INTRAVENOUS
Status: COMPLETED | OUTPATIENT
Start: 2021-01-19 | End: 2021-01-19

## 2021-01-19 RX ORDER — DEXAMETHASONE SODIUM PHOSPHATE 10 MG/ML
INJECTION, SOLUTION INTRAMUSCULAR; INTRAVENOUS
Status: DISCONTINUED | OUTPATIENT
Start: 2021-01-19 | End: 2021-01-19 | Stop reason: SURG

## 2021-01-19 RX ORDER — NEOSTIGMINE METHYLSULFATE 1 MG/ML
INJECTION, SOLUTION INTRAVENOUS AS NEEDED
Status: DISCONTINUED | OUTPATIENT
Start: 2021-01-19 | End: 2021-01-19 | Stop reason: SURG

## 2021-01-19 RX ORDER — HYDROMORPHONE HYDROCHLORIDE 1 MG/ML
0.5 INJECTION, SOLUTION INTRAMUSCULAR; INTRAVENOUS; SUBCUTANEOUS
Status: DISCONTINUED | OUTPATIENT
Start: 2021-01-19 | End: 2021-01-20 | Stop reason: HOSPADM

## 2021-01-19 RX ORDER — MELATONIN
2000 DAILY
COMMUNITY

## 2021-01-19 RX ORDER — FAMOTIDINE 20 MG/1
20 TABLET, FILM COATED ORAL ONCE
Status: COMPLETED | OUTPATIENT
Start: 2021-01-19 | End: 2021-01-19

## 2021-01-19 RX ORDER — LISINOPRIL 20 MG/1
20 TABLET ORAL DAILY
Status: DISCONTINUED | OUTPATIENT
Start: 2021-01-19 | End: 2021-01-20 | Stop reason: HOSPADM

## 2021-01-19 RX ORDER — ACETAMINOPHEN 325 MG/1
650 TABLET ORAL EVERY 6 HOURS PRN
Qty: 40 TABLET | Refills: 0 | Status: SHIPPED | OUTPATIENT
Start: 2021-01-19 | End: 2021-04-18 | Stop reason: ALTCHOICE

## 2021-01-19 RX ORDER — BUPIVACAINE HYDROCHLORIDE 2.5 MG/ML
INJECTION, SOLUTION EPIDURAL; INFILTRATION; INTRACAUDAL
Status: COMPLETED | OUTPATIENT
Start: 2021-01-19 | End: 2021-01-19

## 2021-01-19 RX ORDER — ESMOLOL HYDROCHLORIDE 10 MG/ML
INJECTION INTRAVENOUS AS NEEDED
Status: DISCONTINUED | OUTPATIENT
Start: 2021-01-19 | End: 2021-01-19 | Stop reason: SURG

## 2021-01-19 RX ORDER — ESCITALOPRAM OXALATE 20 MG/1
20 TABLET ORAL DAILY
Status: DISCONTINUED | OUTPATIENT
Start: 2021-01-20 | End: 2021-01-20 | Stop reason: HOSPADM

## 2021-01-19 RX ORDER — CEFAZOLIN SODIUM 2 G/100ML
2 INJECTION, SOLUTION INTRAVENOUS EVERY 8 HOURS
Status: COMPLETED | OUTPATIENT
Start: 2021-01-19 | End: 2021-01-20

## 2021-01-19 RX ORDER — ASPIRIN 81 MG/1
81 TABLET ORAL DAILY
Status: DISCONTINUED | OUTPATIENT
Start: 2021-01-20 | End: 2021-01-20 | Stop reason: HOSPADM

## 2021-01-19 RX ORDER — LIDOCAINE HYDROCHLORIDE 10 MG/ML
0.5 INJECTION, SOLUTION EPIDURAL; INFILTRATION; INTRACAUDAL; PERINEURAL ONCE AS NEEDED
Status: COMPLETED | OUTPATIENT
Start: 2021-01-19 | End: 2021-01-19

## 2021-01-19 RX ORDER — OMEPRAZOLE 20 MG/1
20 CAPSULE, DELAYED RELEASE ORAL DAILY
COMMUNITY
End: 2021-04-18 | Stop reason: ALTCHOICE

## 2021-01-19 RX ORDER — OXYCODONE HYDROCHLORIDE 5 MG/1
5-10 TABLET ORAL EVERY 4 HOURS PRN
Qty: 20 TABLET | Refills: 0 | Status: SHIPPED | OUTPATIENT
Start: 2021-01-19 | End: 2021-04-18 | Stop reason: ALTCHOICE

## 2021-01-19 RX ORDER — HYDROXYZINE HYDROCHLORIDE 25 MG/1
25 TABLET, FILM COATED ORAL 2 TIMES DAILY PRN
Status: DISCONTINUED | OUTPATIENT
Start: 2021-01-19 | End: 2021-01-20 | Stop reason: HOSPADM

## 2021-01-19 RX ORDER — PANTOPRAZOLE SODIUM 40 MG/1
40 TABLET, DELAYED RELEASE ORAL EVERY MORNING
Status: DISCONTINUED | OUTPATIENT
Start: 2021-01-20 | End: 2021-01-20 | Stop reason: HOSPADM

## 2021-01-19 RX ORDER — ONDANSETRON 4 MG/1
4 TABLET, FILM COATED ORAL EVERY 6 HOURS PRN
Status: DISCONTINUED | OUTPATIENT
Start: 2021-01-19 | End: 2021-01-20 | Stop reason: HOSPADM

## 2021-01-19 RX ORDER — ATENOLOL 25 MG/1
25 TABLET ORAL 2 TIMES DAILY
Status: DISCONTINUED | OUTPATIENT
Start: 2021-01-19 | End: 2021-01-20 | Stop reason: HOSPADM

## 2021-01-19 RX ORDER — ALLOPURINOL 100 MG/1
100 TABLET ORAL DAILY
Status: DISCONTINUED | OUTPATIENT
Start: 2021-01-20 | End: 2021-01-20 | Stop reason: HOSPADM

## 2021-01-19 RX ORDER — SODIUM CHLORIDE 0.9 % (FLUSH) 0.9 %
10 SYRINGE (ML) INJECTION EVERY 12 HOURS SCHEDULED
Status: DISCONTINUED | OUTPATIENT
Start: 2021-01-19 | End: 2021-01-19 | Stop reason: HOSPADM

## 2021-01-19 RX ORDER — GLYCOPYRROLATE 0.2 MG/ML
INJECTION INTRAMUSCULAR; INTRAVENOUS AS NEEDED
Status: DISCONTINUED | OUTPATIENT
Start: 2021-01-19 | End: 2021-01-19 | Stop reason: SURG

## 2021-01-19 RX ORDER — FAMOTIDINE 10 MG/ML
20 INJECTION, SOLUTION INTRAVENOUS ONCE
Status: DISCONTINUED | OUTPATIENT
Start: 2021-01-19 | End: 2021-01-19 | Stop reason: HOSPADM

## 2021-01-19 RX ORDER — NALOXONE HCL 0.4 MG/ML
0.1 VIAL (ML) INJECTION
Status: DISCONTINUED | OUTPATIENT
Start: 2021-01-19 | End: 2021-01-20 | Stop reason: HOSPADM

## 2021-01-19 RX ORDER — SODIUM CHLORIDE 0.9 % (FLUSH) 0.9 %
10 SYRINGE (ML) INJECTION AS NEEDED
Status: DISCONTINUED | OUTPATIENT
Start: 2021-01-19 | End: 2021-01-19 | Stop reason: HOSPADM

## 2021-01-19 RX ORDER — ATORVASTATIN CALCIUM 40 MG/1
80 TABLET, FILM COATED ORAL NIGHTLY
Status: DISCONTINUED | OUTPATIENT
Start: 2021-01-19 | End: 2021-01-20 | Stop reason: HOSPADM

## 2021-01-19 RX ORDER — LABETALOL HYDROCHLORIDE 5 MG/ML
INJECTION, SOLUTION INTRAVENOUS AS NEEDED
Status: DISCONTINUED | OUTPATIENT
Start: 2021-01-19 | End: 2021-01-19 | Stop reason: SURG

## 2021-01-19 RX ORDER — DOCUSATE SODIUM 100 MG/1
100 CAPSULE, LIQUID FILLED ORAL 2 TIMES DAILY
Qty: 20 CAPSULE | Refills: 0 | Status: SHIPPED | OUTPATIENT
Start: 2021-01-19 | End: 2022-04-06

## 2021-01-19 RX ORDER — ONDANSETRON 4 MG/1
4 TABLET, FILM COATED ORAL EVERY 8 HOURS PRN
Qty: 10 TABLET | Refills: 0 | Status: SHIPPED | OUTPATIENT
Start: 2021-01-19 | End: 2021-04-18 | Stop reason: ALTCHOICE

## 2021-01-19 RX ORDER — LABETALOL HYDROCHLORIDE 5 MG/ML
10 INJECTION, SOLUTION INTRAVENOUS EVERY 4 HOURS PRN
Status: DISCONTINUED | OUTPATIENT
Start: 2021-01-19 | End: 2021-01-20 | Stop reason: HOSPADM

## 2021-01-19 RX ORDER — CLOPIDOGREL BISULFATE 75 MG/1
75 TABLET ORAL DAILY
Status: DISCONTINUED | OUTPATIENT
Start: 2021-01-19 | End: 2021-01-20 | Stop reason: HOSPADM

## 2021-01-19 RX ORDER — DEXAMETHASONE SODIUM PHOSPHATE 4 MG/ML
INJECTION, SOLUTION INTRA-ARTICULAR; INTRALESIONAL; INTRAMUSCULAR; INTRAVENOUS; SOFT TISSUE AS NEEDED
Status: DISCONTINUED | OUTPATIENT
Start: 2021-01-19 | End: 2021-01-19 | Stop reason: SURG

## 2021-01-19 RX ORDER — CEFAZOLIN SODIUM 2 G/100ML
2 INJECTION, SOLUTION INTRAVENOUS ONCE
Status: COMPLETED | OUTPATIENT
Start: 2021-01-19 | End: 2021-01-19

## 2021-01-19 RX ORDER — DOCUSATE SODIUM 100 MG/1
100 CAPSULE, LIQUID FILLED ORAL DAILY
Status: DISCONTINUED | OUTPATIENT
Start: 2021-01-19 | End: 2021-01-20 | Stop reason: HOSPADM

## 2021-01-19 RX ORDER — SODIUM CHLORIDE, SODIUM LACTATE, POTASSIUM CHLORIDE, CALCIUM CHLORIDE 600; 310; 30; 20 MG/100ML; MG/100ML; MG/100ML; MG/100ML
9 INJECTION, SOLUTION INTRAVENOUS CONTINUOUS
Status: DISCONTINUED | OUTPATIENT
Start: 2021-01-19 | End: 2021-01-20 | Stop reason: HOSPADM

## 2021-01-19 RX ORDER — CELECOXIB 100 MG/1
100 CAPSULE ORAL DAILY
Status: DISCONTINUED | OUTPATIENT
Start: 2021-01-19 | End: 2021-01-20 | Stop reason: HOSPADM

## 2021-01-19 RX ORDER — FENTANYL CITRATE 50 UG/ML
50 INJECTION, SOLUTION INTRAMUSCULAR; INTRAVENOUS
Status: DISCONTINUED | OUTPATIENT
Start: 2021-01-19 | End: 2021-01-19 | Stop reason: HOSPADM

## 2021-01-19 RX ORDER — MEPERIDINE HYDROCHLORIDE 25 MG/ML
12.5 INJECTION INTRAMUSCULAR; INTRAVENOUS; SUBCUTANEOUS
Status: DISCONTINUED | OUTPATIENT
Start: 2021-01-19 | End: 2021-01-19 | Stop reason: HOSPADM

## 2021-01-19 RX ORDER — PROMETHAZINE HYDROCHLORIDE 25 MG/1
25 TABLET ORAL ONCE AS NEEDED
Status: DISCONTINUED | OUTPATIENT
Start: 2021-01-19 | End: 2021-01-19 | Stop reason: HOSPADM

## 2021-01-19 RX ORDER — ONDANSETRON 2 MG/ML
4 INJECTION INTRAMUSCULAR; INTRAVENOUS EVERY 6 HOURS PRN
Status: DISCONTINUED | OUTPATIENT
Start: 2021-01-19 | End: 2021-01-20 | Stop reason: HOSPADM

## 2021-01-19 RX ORDER — ONDANSETRON 2 MG/ML
INJECTION INTRAMUSCULAR; INTRAVENOUS AS NEEDED
Status: DISCONTINUED | OUTPATIENT
Start: 2021-01-19 | End: 2021-01-19 | Stop reason: SURG

## 2021-01-19 RX ADMIN — CEFAZOLIN SODIUM 2 G: 2 INJECTION, SOLUTION INTRAVENOUS at 13:27

## 2021-01-19 RX ADMIN — GLYCOPYRROLATE 0.4 MG: 0.4 INJECTION INTRAMUSCULAR; INTRAVENOUS at 14:51

## 2021-01-19 RX ADMIN — LISINOPRIL 20 MG: 20 TABLET ORAL at 17:40

## 2021-01-19 RX ADMIN — DEXAMETHASONE SODIUM PHOSPHATE 8 MG: 4 INJECTION, SOLUTION INTRA-ARTICULAR; INTRALESIONAL; INTRAMUSCULAR; INTRAVENOUS; SOFT TISSUE at 13:36

## 2021-01-19 RX ADMIN — SODIUM CHLORIDE 1000 ML: 9 INJECTION, SOLUTION INTRAVENOUS at 10:48

## 2021-01-19 RX ADMIN — LIDOCAINE HYDROCHLORIDE 0.5 ML: 10 INJECTION, SOLUTION EPIDURAL; INFILTRATION; INTRACAUDAL; PERINEURAL at 10:47

## 2021-01-19 RX ADMIN — ROPIVACAINE HYDROCHLORIDE 6 ML/HR: 5 INJECTION, SOLUTION EPIDURAL; INFILTRATION; PERINEURAL at 14:56

## 2021-01-19 RX ADMIN — DEXAMETHASONE SODIUM PHOSPHATE 2 MG: 10 INJECTION, SOLUTION INTRAMUSCULAR; INTRAVENOUS at 15:41

## 2021-01-19 RX ADMIN — LIDOCAINE HYDROCHLORIDE 50 MG: 10 INJECTION, SOLUTION INFILTRATION; PERINEURAL at 13:33

## 2021-01-19 RX ADMIN — NEOSTIGMINE 4 MG: 1 INJECTION INTRAVENOUS at 14:51

## 2021-01-19 RX ADMIN — ROCURONIUM BROMIDE 50 MG: 10 INJECTION INTRAVENOUS at 13:33

## 2021-01-19 RX ADMIN — CLOPIDOGREL BISULFATE 75 MG: 75 TABLET ORAL at 17:40

## 2021-01-19 RX ADMIN — ATORVASTATIN CALCIUM 80 MG: 40 TABLET, FILM COATED ORAL at 20:22

## 2021-01-19 RX ADMIN — ACETAMINOPHEN 1000 MG: 500 TABLET ORAL at 10:47

## 2021-01-19 RX ADMIN — PROPOFOL 150 MG: 10 INJECTION, EMULSION INTRAVENOUS at 13:33

## 2021-01-19 RX ADMIN — LABETALOL HYDROCHLORIDE 5 MG: 5 INJECTION, SOLUTION INTRAVENOUS at 14:40

## 2021-01-19 RX ADMIN — ESMOLOL HYDROCHLORIDE 40 MG: 10 INJECTION, SOLUTION INTRAVENOUS at 13:33

## 2021-01-19 RX ADMIN — FENTANYL CITRATE 100 MCG: 50 INJECTION, SOLUTION INTRAMUSCULAR; INTRAVENOUS at 11:58

## 2021-01-19 RX ADMIN — ATENOLOL 25 MG: 25 TABLET ORAL at 20:23

## 2021-01-19 RX ADMIN — ONDANSETRON 4 MG: 2 INJECTION INTRAMUSCULAR; INTRAVENOUS at 14:51

## 2021-01-19 RX ADMIN — FAMOTIDINE 20 MG: 20 TABLET, FILM COATED ORAL at 10:47

## 2021-01-19 RX ADMIN — DOCUSATE SODIUM 100 MG: 100 CAPSULE ORAL at 17:40

## 2021-01-19 RX ADMIN — LABETALOL HYDROCHLORIDE 10 MG: 5 INJECTION, SOLUTION INTRAVENOUS at 14:43

## 2021-01-19 RX ADMIN — BUPIVACAINE HYDROCHLORIDE 30 ML: 2.5 INJECTION, SOLUTION EPIDURAL; INFILTRATION; INTRACAUDAL; PERINEURAL at 15:41

## 2021-01-19 RX ADMIN — CEFAZOLIN 2 G: 10 INJECTION, POWDER, FOR SOLUTION INTRAVENOUS at 20:23

## 2021-01-19 RX ADMIN — BUPIVACAINE HYDROCHLORIDE 15 ML: 2.5 INJECTION, SOLUTION EPIDURAL; INFILTRATION; INTRACAUDAL at 11:58

## 2021-01-19 NOTE — INTERVAL H&P NOTE
[facility] Pre-op    Full history and physical note from office is attached.    /81 (BP Location: Left arm, Patient Position: Lying)   Pulse 73   Temp 98.1 °F (36.7 °C) (Temporal)   Resp 18   SpO2 97%     Immunizations:  Influenza:  2020  Pneumococcal:  UTD  Tetanus:  UTD    LAB Results:  Lab Results   Component Value Date    WBC 8.62 01/08/2021    HGB 13.6 01/08/2021    HCT 44.7 01/08/2021    MCV 92.5 01/08/2021     01/08/2021    NEUTROABS 11.37 (H) 06/30/2020    GLUCOSE 115 (H) 01/08/2021    BUN 15 01/08/2021    CREATININE 0.88 01/08/2021    EGFRIFNONA 62 01/08/2021     01/08/2021    K 4.6 01/08/2021     01/08/2021    CO2 27.0 01/08/2021    MG 2.2 09/16/2019    PHOS 2.8 09/12/2018    CALCIUM 8.8 01/08/2021    ALBUMIN 4.60 06/17/2020    AST 16 06/17/2020    ALT 10 06/17/2020    BILITOT 0.8 06/17/2020    PTT 29.7 06/17/2020    INR 1.00 06/17/2020       Cancer Staging (if applicable)  Cancer Patient: __ yes __no __unknown__N/A; If yes, clinical stage T:__ N:__M:__, stage group or __N/A      Impression: Complete tear of right rotator cuff      Plan: RIGHT ARTHROSCOPIC ROTATOR CUFF REPAIR POSS BICEPS TENODESIS      Jennifer Dale, APRSAURABH   1/19/2021   11:00 EST

## 2021-01-19 NOTE — ANESTHESIA PREPROCEDURE EVALUATION
Anesthesia Evaluation     Patient summary reviewed and Nursing notes reviewed                Airway   Mallampati: II  TM distance: >3 FB  Neck ROM: full  No difficulty expected  Dental - normal exam     Pulmonary - normal exam   (+) sleep apnea,   Cardiovascular - normal exam    (+) hypertension, dysrhythmias Paroxysmal Atrial Fib, hyperlipidemia,  carotid artery disease (s/p left ICA stent)    ROS comment:   Echo 9/19: normal EF, no significant valve disease    Neuro/Psych  (+) CVA (right side weakness; plavix tx, last dose 7 days ago),     GI/Hepatic/Renal/Endo    (+)  GERD,  renal disease CRI,     Musculoskeletal     Abdominal  - normal exam    Bowel sounds: normal.   Substance History - negative use     OB/GYN negative ob/gyn ROS         Other   arthritis,                    Anesthesia Plan    ASA 3     general with block   (Peripheral nerve block + cath for post op pain relief)  intravenous induction     Anesthetic plan, all risks, benefits, and alternatives have been provided, discussed and informed consent has been obtained with: patient.    Plan discussed with CRNA.

## 2021-01-19 NOTE — OP NOTE
SHOULDER ARTHROSCOPY WITH ROTATOR CUFF REPAIR  Procedure Report    Patient Name:  Akua Torres  YOB: 1945    Date of Surgery:  1/19/2021     Indications:  This is a 75 year old female with longstanding history of right shoulder pain that has failed to respond to conservative measures. Clinical and radiographic findings were consistent with a rotator cuff tear. We discussed the risks and benefits of performing a rotator cuff repair with possible open biceps tenodesis. The risks and benefits were discussed with the patient to include, but not limited to: bleeding, infection, nerve injury, failure of fixation, stiffness, need for further procedures, loss of limb or life. The patient expressed understanding and wished to proceed with the procedure.      Pre-op Diagnosis:        Right rotator cuff tear      Right biceps tendinitis    Post-op Diagnosis:         Same    Procedure/CPT® Codes:  93385  34581    Procedure(s):  RIGHT ARTHROSCOPIC ROTATOR CUFF REPAIR POSS BICEPS TENODESIS    Staff:  Surgeon(s):  Lance Morales Jr., MD    Circulator: Petra Forman RN; Mary Granger RN; Johnna Ta RN  Scrub Person: Juan Leon  Vendor Representative: Balwinder Willams  Nursing Assistant: Fátima Candelaria PCT  Assistant: Von Haskins PA     Assistant: Von Haskins PA  was responsible for performing the following activities: Retraction, Suction, Irrigation, Suturing, Closing and Placing Dressing and their skilled assistance was necessary for the success of this case.    Anesthesia: General with Block    Estimated Blood Loss: 20 mL    Implants:    Implant Name Type Inv. Item Serial No.  Lot No. LRB No. Used Action   SUT/ANCH HEALIX ADV BR W/DYNACORD LOAD/TRPL 5.5MM - [id number] Implant SUT/ANCH HEALIX ADV BR W/DYNACORD LOAD/TRPL 5.5MM  DEPUY MITEK [id number] 1 Implanted   Conmed 5 mm tenolock anchor      Specimen:                None      Findings: Upper one third  subscapularis tear with subluxation of the biceps tendon and longitudinal tearing of the long head of the biceps    Complications: none apparent    Description of Procedure: After informed consent had been obtained, the right upper extremity was identified as the correct surgical extremity and marked. The patient then received a pre-operative interscalene peripheral nerve block. The patient was then taken back to the operating suite and was placed on the T-max operating table. General anesthesia was induced and the patient was intubated. The patient was then placed in the beachchair position. The surgical extremity was then examined under anesthesia, demonstrating forward elevation of 120 and external rotation of 50.  We thus did choose to perform a capsular release intraoperatively.  The surgical extremity was then prepped and draped in the usual, sterile fashion.     We began the procedure by making a 1 cm incision approximately 2 cm inferior and 1 cm medial to the posterolateral aspect of the acromion. The trochar for the arthroscope was introduced into the glenohumeral joint and the arthroscope was then inserted to begin our diagnostic arthroscopy. An anterior portal was created using an outside-in technique.  We then performed a rotator interval release with an arthroscopic shaver and wand along with a 360 degree capsular release to improve motion and excursion of the subscapularis tendon.  The long head of the biceps tendon was then released from the insertion on the superior glenoid tubercle taking care to avoid injury to the superior labrum.  We then debrided the lesser tuberosity of any remaining soft tissue.  All for the anchor was then inserted and malleted into place.  We then inserted a Mitek 5.5 mm Healix triple loaded anchor into the upper aspect of the lesser tuberosity.  1 set of sutures was passed in horizontal mattress fashion using a retrograde passer.  We then tied an SMC knot with 3 alternating  half inch knots to reduce the tendon back to the lesser tuberosity.  1 limb of a second set of sutures were then passed in a locking looped fashion.  We then tightened the second limb and reduce the upper, rolled border of the subscapularis back to the lesser tuberosity.  We tied this set of sutures with 5 alternating half inch knots, reducing the remainder of the subscapularis back to the lesser tuberosity. We then turned our attention to the subacromial space.    The arthroscope was then inserted into the subacromial space. An extensive amount of subacromial bursitis was removed using an arthroscopic shaver and RFA wand.  There was no sign of superior cuff tear noted subacromially. We then turned our attention to the biceps tendon.    A 3 cm incision was made along the inferior border of the pec major tendon travelling distally along the medial aspect of the humeral axis. Sharp dissection was then carried through the subcutaneous tissue. The fascia overlying the inferior aspect of the pectoralis was identified and incised in line with our skin incision. Blunt dissection was carried under the pectoralis and the biceps tendon was palpated in the groove. The tendon was then retrieved.  We then assessed the tendon for adequate length and tension and marked our bicipital groove at the location for placement of our anchor.  The biceps tendon was marked 20 mm proximal to our docking site.  We then attached our Conmed Tenolok anchor to this marked location on the tendon.  The remainder of the tendon was then amputated.  We then passed a 2.4 mm guidewire unicortically and our previously marked site within the groove.  We then passed a 6.5 mm reamer over top of the guidewire to the level of 20 mm in depth. Our reamer and guidewire were then removed.  The tendon was then reduced down to the bone. The anchor was malleted into place.  We then tightened the anchor until it was deployed within the humeral canal.  We then tied 5  alternating half inch knots over top of the anchor for further backup fixation of the biceps tendon.  The wounds were then irrigated thoroughly. The biceps incision was closed with 2-0 vicryl suture followed by closure of the skin with 3-0 Monocryl suture in a running, subcuticular fashion. Skinafix was then applied to this incision.    Our arthroscopic portals were then closed with 3-0 monocryl suture in a buried fashion.  Steri-Strips were then applied to her portal sites.  A sterile dressing was then applied and the patient was placed in a sling with an abduction pillow. The patient was then awakened from anesthesia, extubated, transferred to the [facility], and transferred to the post anesthesia care unit in stable condition.      Lance Morales Jr., MD     Date: 1/19/2021  Time: 14:48 EST

## 2021-01-19 NOTE — DISCHARGE INSTRUCTIONS
Lance Morales Jr., MD  Sports, Shoulder and Elbow Surgery  [address]  [place] [id number] [phone]  Post Op Instructions  ROTATOR CUFF REPAIR      Activity level - You have been placed in a sling/abduction pillow post-operatively and this will most likely be the most comfortable and give you the most protection for the following six weeks. The quality of your rotator cuff tissue/repair will determine how soon we will let you start moving your shoulder.  This is critical due to the fact that if one tries to “push the envelope” or do too much, one could potentially tear their repair.  The more you are able to allow your body to rest initially, the better and faster your recovery. Sleeping may be difficult the first few days after surgery and comfortable positioning is important.  Sleeping in an inclined position with the shoulder higher than the heart is usually most comfortable. This can be done best in a recliner or lying propped up by pillows in bed.    Precautions/activities to avoid - You may use your affected arm to do activities which require putting your hand to your face such as eating as well as performing simple exercises were your elbow stays at your side and your hand moves as tolerated. You should not perform any active motion with your arm, (lifting it away from the body). One should not lift anything heavier than a fork or spoon for six weeks. Stationary bike and elliptical  use, (without the arms), is fine as tolerated two-three days after surgery.  Running is better tolerated after 12 weeks.    Sling - The sling that you have been placed in is to support the weight of your arm and for comfort.  It should be worn to bed at night and should be worn when going to populated areas (crowds).  You may extend your elbow outside of the sling frequently to avoid stiffness of the elbow. Desktop level activities close to your body such as typing are fine a few days after  surgery as tolerated.      Dressings - Dressings can be removed after 3 days.  At this point, you may get incisions wet in the shower if they are completely dry.  Clean with simple soap and water and pat dry. Do not soak the wounds for the 2 weeks following surgery.  No baths or hot tubs in which your shoulder would be submerged. You may use skin lotion in all places except over the incisions. Do not apply any special ointments or creams to the incision until the skin is completely healed, typically 2-3 weeks after surgery.    Home exercises - You may begin doing modified pendulum exercises (in your sling) immediately after surgery as long as this is comfortable. These exercises will keep the shoulder joint mobile and prevent stiffness. You may also squeeze a soft ball, and move you fingers, wrist and elbow as much as you like.  Physical therapy may be prescribed when you return for follow up. This will consist primarily of a home exercise program.    Pain medication- It is important to stay on top of your painful symptoms particularly the first 48 hours after surgery when pain is worst. You were given a short acting oxycodone pain medication. This medication can be taken as frequently as every 3-4 hours as needed for severe pain.  Common side effects of narcotic medications are nausea, constipation and itching. Taking these medications with food and drinking plenty of fluids will help minimize nausea and constipation. Itching can be treated with over-the-counter Benadryl© (diphenhydramine). You were also given a prescription medication for nausea Zofran© (ondansetron) and a stool softener Colace© (docusate) or Miralax (polyethylene glycol) to avoid constipation from opiate pain medication. If you have itching accompanied by a rash, or if you have any breathing problems please contact the office, or go to the emergency department if severe.     Avoid  NSAIDs (non-steroidal anti-inflammatory medications) such as  Aleve© (naprosyn) or Motrin© (ibuprofen) for the first 6 weeks following surgery, if possible, as studies have shown it may affect rotator cuff healing.  Tylenol© (acetaminophen) is better for mild pain, but should not exceed 3 grams a day.  For mild to moderate pain, a combination of ibuprofen 200-400 mg and Tylenol 500-1000 mg every 6 hours can be helpful.    Return to work - You may return to work, depending upon your job description, as early as 3 to 4 days after your surgery. You will need to be off all narcotic pain medication for your brain to function normally. Jobs that have more physical requirements may require a longer time away.  Driving requires being off all narcotic pain medications and being comfortable using the shoulder.    Report any of the following symptoms immediately: Fever greater than 101o F, calf pain, pain not controlled by pain medications, redness or swelling specific to the incision site, excessive bleeding, problems with pain medication, numbness and tingling of the arm that was operated on.  You may call our office and speak with a member of our office staff during office hours at [phone].    Follow up- Follow up with Dr. Morales within two weeks following surgery. This appointment should be arranged prior to leaving the recovery room.  You will follow up again at 6 and 12 weeks after surgery.

## 2021-01-19 NOTE — ADDENDUM NOTE
Addendum  created 01/19/21 1547 by Mildred Ryan CRNA    Child order released for a procedure order, Clinical Note Signed, Diagnosis association updated, Intraprocedure Blocks edited

## 2021-01-19 NOTE — ANESTHESIA PROCEDURE NOTES
Airway  Urgency: elective    Date/Time: 1/19/2021 1:34 PM  Airway not difficult    General Information and Staff    Patient location during procedure: OR  CRNA: Yue Israel CRNA    Indications and Patient Condition  Indications for airway management: airway protection    Preoxygenated: yes  MILS not maintained throughout  Mask difficulty assessment: 1 - vent by mask    Final Airway Details  Final airway type: endotracheal airway      Successful airway: ETT  Cuffed: yes   Successful intubation technique: direct laryngoscopy  Endotracheal tube insertion site: oral  Blade: Shaji  Blade size: 3  ETT size (mm): 7.0  Cormack-Lehane Classification: grade I - full view of glottis  Placement verified by: chest auscultation and capnometry   Cuff volume (mL): 5  Measured from: lips  ETT/EBT  to lips (cm): 20  Number of attempts at approach: 1  Assessment: lips, teeth, and gum same as pre-op and atraumatic intubation    Additional Comments  Negative epigastric sounds, Breath sound equal bilaterally with symmetric chest rise and fall

## 2021-01-19 NOTE — ANESTHESIA POSTPROCEDURE EVALUATION
Patient: Akua Torres    Procedure Summary     Date: 01/19/21 Room / Location:  [facility] [address] [place] [address] [facility]    Anesthesia Start: 1327 Anesthesia Stop: 1517    Procedure: RIGHT ARTHROSCOPIC ROTATOR CUFF REPAIR WITH BICEPS TENODESIS (Right Shoulder) Diagnosis:     Surgeon: Lance Morales Jr., MD Provider: Dagoberto Lanza MD    Anesthesia Type: general with block ASA Status: 3          Anesthesia Type: general with block    Vitals  No vitals data found for the desired time range.          Post Anesthesia Care and Evaluation    Patient location during evaluation: PACU  Patient participation: complete - patient participated  Level of consciousness: awake and alert  Pain management: adequate  Airway patency: patent  Anesthetic complications: No anesthetic complications  PONV Status: none  Cardiovascular status: hemodynamically stable and acceptable  Respiratory status: nonlabored ventilation, acceptable and nasal cannula  Hydration status: acceptable

## 2021-01-19 NOTE — ANESTHESIA PROCEDURE NOTES
Peripheral Block    Pre-sedation assessment completed: 1/19/2021 11:58 AM    Patient reassessed immediately prior to procedure    Patient location during procedure: pre-op  Start time: 1/19/2021 11:58 AM  Reason for block: at surgeon's request and post-op pain management  Performed by  CRNA: Ajay Vega CRNA  Assisted by: Benita Wilder RN  Preanesthetic Checklist  Completed: patient identified, site marked, surgical consent, pre-op evaluation, timeout performed, IV checked, risks and benefits discussed and monitors and equipment checked  Prep:  Sterile barriers:cap, gloves, mask and sterile barriers  Prep: ChloraPrep  Patient monitoring: blood pressure monitoring, continuous pulse oximetry and EKG  Procedure  Sedation:yes  Performed under: local infiltration  Guidance:ultrasound guided  Images:still images obtained, printed/placed on chart    Laterality:right  Block Type:interscalene  Injection Technique:catheter  Needle Type:Tuohy and echogenic  Needle Gauge:18 G  Resistance on Injection: none  Catheter Size:20 G (20g)  Cath Depth at skin: 9 cm    Medications Used: bupivacaine PF (MARCAINE) 0.25 % injection, 15 mL  fentaNYL citrate (PF) (SUBLIMAZE) injection, 100 mcg  Med admintered at 1/19/2021 11:58 AM      Medications  Preservative Free Saline:5ml    Post Assessment  Injection Assessment: negative aspiration for heme, no paresthesia on injection and incremental injection  Patient Tolerance:comfortable throughout block  Complications:no  Additional Notes  Procedure:                 The pt was placed in semifowlers position with a slight tilt of the thorax contralateral to the insertion site.  The Insertion Site was prepped and draped in sterile fashion.  The pt was anesthetized with  IV Sedation( see meds) and  Skin and cutaneous tissue was infiltrated and anesthetized with 1% Lidocaine 3 mls via a 25g needle.  Utilizing ultrasound guidance, a BBraun 2 inch 18 g Contiplex echogenic OmegaGenesisy needle was  advanced in-plane.  Hydro dissection of tissue was achieved with Normal saline. Major vessels(carotid and Internal Jugular) where visualized as the brachial plexus was approached at the approximate level of C-7/ T-1.  Cervical 5 and Branches of Cervical 6 nerve roots where visualized and the needle tip was placed posterior at the level of C-6 roots.  LA spread was visualized and injection was made incrementally every 5 mls with aspiration. Injection pressure was normal or little, there was no intraneural injection, no vascular injection.      The BBraun 20 g wire stylet  catheter was then placed under US guidance on the posterior aspect of the Brachial Plexus.  Location of catheter was confirmed with NS injection visualized with US . The tuohy was then removed and the skin was sealed with Skin AFix at catheter insertion site.  Skin was prepped with mastisol and the labeled catheter  was secured with steristrips and a CHG tegaderm. Thank You.

## 2021-01-19 NOTE — ANESTHESIA PROCEDURE NOTES
PECS I & II BLOCKS      Patient reassessed immediately prior to procedure    Patient location during procedure: post-op  Reason for block: at surgeon's request and post-op pain management  Performed by  CRNA: Mildred Ryan CRNA  Assisted by: Wes Saleh CRNA  Preanesthetic Checklist  Completed: patient identified, site marked, surgical consent, pre-op evaluation, timeout performed, IV checked, risks and benefits discussed and monitors and equipment checked  Prep:  Pt Position: supine  Sterile barriers:cap, gloves, gown and mask  Prep: ChloraPrep  Patient monitoring: blood pressure monitoring, continuous pulse oximetry and EKG  Procedure  Performed under: local infiltration  Guidance:ultrasound guided and landmark technique  Images:still images obtained, printed/placed on chart    Laterality:right  Block Type:PECS I and PECS II  Injection Technique:single-shot  Needle Type:short-bevel  Needle Gauge:20 G  Resistance on Injection: none    Medications Used: bupivacaine PF (MARCAINE) 0.25 % injection, 30 mL  dexamethasone sodium phosphate injection, 2 mg  Med admintered at 1/19/2021 3:41 PM      Medications  Preservative Free Saline:10ml  Comment:Block Injection:  Total volume of LA divided between PECS I & II    Post Assessment  Injection Assessment: negative aspiration for heme and incremental injection  Patient Tolerance:comfortable throughout block  Complications:no  Additional Notes  The pt. Was placed in the Supine Position and GA was induced     The insertion site was prepped with CHG and Ultrasound guidance with In-Plane techniquewas  a 4inch BBraun 360 degree echogenic needle was visualized.  Normal Saline PSF was  utilized for hydrodissection of tissue. PECS 1 Block- Pectoralis Major and Minor where identified and LA was injected between PMM and PmM at the level of the 3rd Rib(10ml),  PECS 2-  Pectoralis Minor and Serratus muscle where identified and the needle was advanced laterally in-plane with  the 4th rib as a backstop, pleura was monitored.  LA was injected between SA and PmM at the level of 4th rib( 20ml).  LA injection spread was visualized, injection was incremental 1-5ml, normal or low injection pressure, no intravascular injection, no pneumothorax appreciated.  Thank You.

## 2021-01-19 NOTE — PLAN OF CARE
Goal Outcome Evaluation:  Plan of Care Reviewed With: patient  Progress: no change  Outcome Summary: received to room, oriented to unit and POC, vss SR on tele,

## 2021-01-20 ENCOUNTER — READMISSION MANAGEMENT (OUTPATIENT)
Dept: CALL CENTER | Facility: HOSPITAL | Age: 76
End: 2021-01-20

## 2021-01-20 VITALS
HEART RATE: 75 BPM | RESPIRATION RATE: 16 BRPM | SYSTOLIC BLOOD PRESSURE: 155 MMHG | DIASTOLIC BLOOD PRESSURE: 66 MMHG | OXYGEN SATURATION: 92 % | TEMPERATURE: 98.5 F

## 2021-01-20 LAB
ANION GAP SERPL CALCULATED.3IONS-SCNC: 10 MMOL/L (ref 5–15)
BUN SERPL-MCNC: 18 MG/DL (ref 8–23)
BUN/CREAT SERPL: 18.8 (ref 7–25)
CALCIUM SPEC-SCNC: 9.2 MG/DL (ref 8.6–10.5)
CHLORIDE SERPL-SCNC: 104 MMOL/L (ref 98–107)
CO2 SERPL-SCNC: 27 MMOL/L (ref 22–29)
CREAT SERPL-MCNC: 0.96 MG/DL (ref 0.57–1)
GFR SERPL CREATININE-BSD FRML MDRD: 57 ML/MIN/1.73
GLUCOSE SERPL-MCNC: 203 MG/DL (ref 65–99)
HCT VFR BLD AUTO: 43.6 % (ref 34–46.6)
HGB BLD-MCNC: 13.1 G/DL (ref 12–15.9)
POTASSIUM SERPL-SCNC: 5.3 MMOL/L (ref 3.5–5.2)
SODIUM SERPL-SCNC: 141 MMOL/L (ref 136–145)

## 2021-01-20 PROCEDURE — 97535 SELF CARE MNGMENT TRAINING: CPT | Performed by: OCCUPATIONAL THERAPIST

## 2021-01-20 PROCEDURE — 85018 HEMOGLOBIN: CPT | Performed by: ORTHOPAEDIC SURGERY

## 2021-01-20 PROCEDURE — 63710000001 ATENOLOL 25 MG TABLET: Performed by: ORTHOPAEDIC SURGERY

## 2021-01-20 PROCEDURE — 63710000001 ESCITALOPRAM 20 MG TABLET: Performed by: ORTHOPAEDIC SURGERY

## 2021-01-20 PROCEDURE — 63710000001 ASPIRIN 81 MG TABLET DELAYED-RELEASE: Performed by: ORTHOPAEDIC SURGERY

## 2021-01-20 PROCEDURE — A9270 NON-COVERED ITEM OR SERVICE: HCPCS | Performed by: ORTHOPAEDIC SURGERY

## 2021-01-20 PROCEDURE — G0378 HOSPITAL OBSERVATION PER HR: HCPCS

## 2021-01-20 PROCEDURE — 63710000001 CELECOXIB 100 MG CAPSULE: Performed by: ORTHOPAEDIC SURGERY

## 2021-01-20 PROCEDURE — 85014 HEMATOCRIT: CPT | Performed by: ORTHOPAEDIC SURGERY

## 2021-01-20 PROCEDURE — 63710000001 ALLOPURINOL 100 MG TABLET: Performed by: ORTHOPAEDIC SURGERY

## 2021-01-20 PROCEDURE — 63710000001 PANTOPRAZOLE 40 MG TABLET DELAYED-RELEASE: Performed by: ORTHOPAEDIC SURGERY

## 2021-01-20 PROCEDURE — 80048 BASIC METABOLIC PNL TOTAL CA: CPT | Performed by: ORTHOPAEDIC SURGERY

## 2021-01-20 PROCEDURE — 63710000001 CLOPIDOGREL 75 MG TABLET: Performed by: ORTHOPAEDIC SURGERY

## 2021-01-20 PROCEDURE — 25010000002 CEFAZOLIN PER 500 MG: Performed by: ORTHOPAEDIC SURGERY

## 2021-01-20 PROCEDURE — 97165 OT EVAL LOW COMPLEX 30 MIN: CPT | Performed by: OCCUPATIONAL THERAPIST

## 2021-01-20 PROCEDURE — 63710000001 LISINOPRIL 20 MG TABLET: Performed by: ORTHOPAEDIC SURGERY

## 2021-01-20 PROCEDURE — 97110 THERAPEUTIC EXERCISES: CPT | Performed by: OCCUPATIONAL THERAPIST

## 2021-01-20 PROCEDURE — 63710000001 DOCUSATE SODIUM 100 MG CAPSULE: Performed by: ORTHOPAEDIC SURGERY

## 2021-01-20 RX ADMIN — ASPIRIN 81 MG: 81 TABLET, COATED ORAL at 09:23

## 2021-01-20 RX ADMIN — PANTOPRAZOLE SODIUM 40 MG: 40 TABLET, DELAYED RELEASE ORAL at 06:01

## 2021-01-20 RX ADMIN — ALLOPURINOL 100 MG: 100 TABLET ORAL at 09:23

## 2021-01-20 RX ADMIN — PANTOPRAZOLE SODIUM 40 MG: 40 TABLET, DELAYED RELEASE ORAL at 05:41

## 2021-01-20 RX ADMIN — CELECOXIB 100 MG: 100 CAPSULE ORAL at 09:23

## 2021-01-20 RX ADMIN — CLOPIDOGREL BISULFATE 75 MG: 75 TABLET ORAL at 09:23

## 2021-01-20 RX ADMIN — LISINOPRIL 20 MG: 20 TABLET ORAL at 09:22

## 2021-01-20 RX ADMIN — ESCITALOPRAM OXALATE 20 MG: 20 TABLET ORAL at 09:23

## 2021-01-20 RX ADMIN — CEFAZOLIN 2 G: 10 INJECTION, POWDER, FOR SOLUTION INTRAVENOUS at 05:41

## 2021-01-20 RX ADMIN — DOCUSATE SODIUM 100 MG: 100 CAPSULE ORAL at 09:23

## 2021-01-20 RX ADMIN — ATENOLOL 25 MG: 25 TABLET ORAL at 09:23

## 2021-01-20 NOTE — PLAN OF CARE
Goal Outcome Evaluation:  Plan of Care Reviewed With: patient  Progress: improving   Pt currently in bed resting quietly. No complaints of pain during shift. Block running at 6ml to right shoulder. Sling in place. Pt ambulatory to bathroom with 1. Voiding well. VSS with room air when awake and 2L at HS. IV atb continues with no adverse side effects noted. No other observations at this time. Will continue to monitor, call bell in reach.

## 2021-01-20 NOTE — PROGRESS NOTES
Discharge Planning Assessment  [facility]     Patient Name: Akua oTrres  MRN: [id number]  Today's Date: 1/20/2021    Admit Date: 1/19/2021    Discharge Needs Assessment     Row Name 01/20/21 1022       Living Environment    Lives With  spouse    Name(s) of Who Lives With Patient  Mehran Torres - Relation: Spouse - Home: [phone]    Current Living Arrangements  home/apartment/condo    Primary Care Provided by  self    Provides Primary Care For  no one    Family Caregiver if Needed  spouse    Family Caregiver Names  Mehran Torres - Relation: Spouse - Home: [phone]    Quality of Family Relationships  helpful;involved;supportive    Able to Return to Prior Arrangements  yes       Resource/Environmental Concerns    Resource/Environmental Concerns  none    Transportation Concerns  car, none       Transition Planning    Patient/Family Anticipates Transition to  home with family;home with help/services    Transportation Anticipated  family or friend will provide       Discharge Needs Assessment    Readmission Within the Last 30 Days  no previous admission in last 30 days    Equipment Currently Used at Home  cane, straight;other (see comments) Has a rolling walker, wheelchair and bedside commode in her home - does not use    Concerns to be Addressed  denies needs/concerns at this time    Anticipated Changes Related to Illness  none        Discharge Plan     Row Name 01/20/21 1025       Plan    Plan  Home with spouse's assistance and [facility] for PT/OT    Plan Comments  CM spoke with patient at bedside. Patient resides in [place] with her spouse. Patient is independent with ADL's. Patient states she uses a straight cane for mobility assistance prn. Patient also has a rolling walker, bedside commode and a wheelchair in her home. Patient states she has hired [facility] to assist with home needs such as cleaning, assistance with dressing and bathing. Patient has an appointment with  [facility] this afternoon. Patient denies any current home health or outpatient services. CM received call from OT. They are recommending home health @ discharge. Patient is in agreement and has chose [facility] for PT/OT. CM spoke with Pipe with [facility] who accepted referral. Pipe will follow up with patient. Spouse to provide transportation to home. CM following.    Final Discharge Disposition Code  01 - home or self-care        Continued Care and Services - Admitted Since 1/19/2021    Coordination has not been started for this encounter.       Expected Discharge Date and Time     Expected Discharge Date Expected Discharge Time    Jan 20, 2021         Demographic Summary     Row Name 01/20/21 1019       General Information    Arrived From  home    Referral Source  physician    Reason for Consult  discharge planning    Preferred Language  English     Used During This Interaction  no    General Information Comments  PCP: Salty Hernandez       Contact Information    Contact Information Comments  MelissaMehran - Relation: Spouse - Home: [phone]        Functional Status     Row Name 01/20/21 1022       Functional Status    Usual Activity Tolerance  moderate    Current Activity Tolerance  -- See PT notes       Functional Status, IADL    Medications  independent    Meal Preparation  independent    Housekeeping  independent    Laundry  independent    Shopping  independent       Mental Status    General Appearance WDL  WDL       Mental Status Summary    Recent Changes in Mental Status/Cognitive Functioning  no changes       Employment/    Employment Status  retired                 Adriana Otero RN

## 2021-01-20 NOTE — H&P
Patient Name: Akua Torres  MRN: [id number]  : 1945  DOS: 2021    Attending: Lance Morales Jr., MD    Primary Care Provider: Salty Hernandez MD      Chief complaint: Right shoulder pain    Subjective   Patient is a pleasant 75 y.o. female presented for scheduled surgery by Dr. Morales.    Per his note (This is a 75 year old female with longstanding history of right shoulder pain that has failed to respond to conservative measures. Clinical and radiographic findings were consistent with a rotator cuff tear. We discussed the risks and benefits of performing a rotator cuff repair with possible open biceps tenodesis. The risks and benefits were discussed with the patient to include, but not limited to: bleeding, infection, nerve injury, failure of fixation, stiffness, need for further procedures, loss of limb or life. The patient expressed understanding and wished to proceed with the procedure.).    Patient underwent right shoulder arthroscopic surgery with rotator cuff repair under GA and a block, tolerated surgery well, was admitted for further management.    Ms. Torres is known to me from prior hospitalizations most recent of which was in 2020 when she had left total hip arthroplasty.  Prior to that in 2019 she had right total hip arthroplasty and had postoperative stroke related to severe left internal carotid artery stenosis.  During that hospitalization she underwent stenting of the carotid artery.     Today when seen after surgery, she is doing fairly well.  No complaints of pain, nausea, or vomiting.  No complaints of shortness of breath.    Allergies   Allergen Reactions   • Nsaids Other (See Comments)     KIDNEY DAMAGE   • Quinidine Nausea And Vomiting and Other (See Comments)     FEVER     • Bactrim [Sulfamethoxazole-Trimethoprim] Rash   • Nitrofuran Derivatives Diarrhea   • Penicillins Rash       Meds:  Medications Prior to Admission   Medication  Sig Dispense Refill Last Dose   • allopurinol (ZYLOPRIM) 100 MG tablet Take 100 mg by mouth Daily.   1/19/2021   • aspirin (aspirin) 81 MG EC tablet Take 1 tablet by mouth Daily. Resume in 1 month 90 tablet 3 1/19/2021 at 0730   • atenolol (TENORMIN) 25 MG tablet Take 1 tablet by mouth 2 (Two) Times a Day. Take 25 mg by mouth 2 (Two) Times a Day. 60 tablet 5 1/18/2021 at 1700   • atorvastatin (LIPITOR) 80 MG tablet Take 1 tablet by mouth Every Night. 30 tablet 11 1/18/2021   • celecoxib (CeleBREX) 50 MG capsule Take 50 mg by mouth 2 (Two) Times a Day.   1/18/2021   • cholecalciferol (VITAMIN D3) 25 MCG (1000 UT) tablet Take 2,000 Units by mouth Daily.   1/19/2021   • escitalopram (LEXAPRO) 20 MG tablet Take 1 tablet by mouth Daily. 30 tablet 11 1/19/2021   • hydrOXYzine (ATARAX) 25 MG tablet Take 1 tablet by mouth 2 (Two) Times a Day As Needed for Anxiety. 60 tablet 11 1/19/2021 at     • lisinopril (PRINIVIL,ZESTRIL) 20 MG tablet Take 1 tablet by mouth Daily. Take 20 mg by mouth Daily. 30 tablet 5 1/18/2021   • omeprazole (priLOSEC) 20 MG capsule Take 20 mg by mouth Daily.   1/18/2021   • [DISCONTINUED] acetaminophen (TYLENOL) 325 MG tablet Take 650 mg by mouth Every 6 (Six) Hours As Needed for Mild Pain .   Past Week   • [DISCONTINUED] benzoyl peroxide (benzoyl peroxide) 5 % external liquid Apply topically to the appropriate area three times prior to surgery as directed. 148 g 0 1/19/2021   • clopidogrel (PLAVIX) 75 MG tablet Take 1 tablet by mouth Daily. 90 tablet 3 1/13/2021         History:   Past Medical History:   Diagnosis Date   • A-fib (CMS/[facility]) 2019   • Anxiety and depression    • Arthritis    • Cataract    • Depression    • Extremity pain    • GERD (gastroesophageal reflux disease)    • Gout    • H/O bladder infections     JUST FINISHED MACROBID ON 11-2-17 FOR RECENT UTI   • Hypercholesteremia    • Hypertension    • Joint pain    • Kidney disease, chronic, stage III (GFR 30-59 ml/min)     resolved after  stopping antiinflammatory ~2015   • Low back pain    • Neck pain    • Pneumonia 02/2020   • Rheumatic fever    • Skin cancer    • Sleep apnea     no cpap   • Stroke (CMS/HCC) 09/2019    right sided weakness   • Wears glasses      Past Surgical History:   Procedure Laterality Date   • BACK SURGERY  2004    LUMBAR PER DR MAN x2   • CARDIAC CATHETERIZATION  2019   • CARPAL TUNNEL RELEASE Left    • COLONOSCOPY  11/2020   • EYE SURGERY      retina surgery   • FINGER SURGERY Right     3RD FINGER   • HEEL SPUR EXCISION Bilateral    • INTERVENTIONAL RADIOLOGY PROCEDURE N/A 9/17/2019    Procedure: Carotid and Cerebral Angiogram/ Possible Stent;  Surgeon: Imer Guerra MD;  Location: [facility] CATH INVASIVE LOCATION;  Service: Interventional Radiology   • KNEE ARTHROSCOPY Bilateral    • LUMBAR DISCECTOMY FUSION INSTRUMENTATION N/A 11/10/2017    Procedure: LUMBAR SPINAL FUSION ;  Surgeon: Gopi Man MD;  Location: [facility] OR;  Service:    • REPLACEMENT TOTAL KNEE BILATERAL Bilateral    • SKIN BIOPSY     • TOTAL HIP ARTHROPLASTY Right 9/9/2019    Procedure: TOTAL ANTERIOR RIGHT HIP ARTHROPLASTY;  Surgeon: Darrin New MD;  Location: [facility] OR;  Service: Orthopedics   • TOTAL HIP ARTHROPLASTY Left 6/29/2020    Procedure: TOTAL HIP ARTHROPLASTY ANTERIOR LEFT;  Surgeon: Darrin New MD;  Location: [facility] OR;  Service: Orthopedics;  Laterality: Left;     Family History   Problem Relation Age of Onset   • Cancer Mother    • Colon polyps Mother    • Hypertension Mother    • Cancer Father    • Hypertension Brother    • Hyperlipidemia Maternal Uncle    • Kidney disease Maternal Uncle      Social History     Tobacco Use   • Smoking status: Never Smoker   • Smokeless tobacco: Never Used   Substance Use Topics   • Alcohol use: No   • Drug use: No   She is  with 2 children. She is a retired .    Review of Systems  Pertinent items are noted in HPI    Vital Signs  /67 (BP Location: Left arm, Patient  Position: Lying)   Pulse 86   Temp 98.3 °F (36.8 °C) (Oral)   Resp 16   SpO2 90%     Physical Exam:    General Appearance:    Alert, cooperative, in no acute distress   Head:    Normocephalic, without obvious abnormality, atraumatic   Eyes:            Lids and lashes normal, conjunctivae and sclerae normal, no   icterus, no pallor, corneas clear,    Ears:    Ears appear intact with no abnormalities noted   Throat:   No oral lesions, no thrush, oral mucosa moist   Neck:   No adenopathy, supple, trachea midline, no thyromegaly         Lungs:     Clear to auscultation,respirations regular, even and                   unlabored    Heart:    Regular rhythm and normal rate, normal S1 and S2, no      murmur    Abdomen:     Normal bowel sounds, no masses, no organomegaly, soft        non-tender, non-distended, no guarding, no rebound                 tenderness   Genitalia:    Deferred   Extremities:  Right UE in a sling, CDI   dressing over arthroscopic incisions of the right shoulder. Interscalene nerve block cath present.  Distal pulses, cap refill, movements of fingers, wrist, intact.     Pulses:   Pulses palpable and equal bilaterally   Skin:   No bleeding, bruising or rash   Neurologic:   Cranial nerves 2 - 12 grossly intact      I reviewed the patient's new clinical results.             Invalid input(s): NEUTOPHILPCT,  EOSPCT        Invalid input(s): LABALBU, PROT  Lab Results   Component Value Date    HGBA1C 6.00 (H) 01/08/2021   Results for ERYN STEVE (MRN [id number]) as of 1/19/2021 23:17   Ref. Range 1/8/2021 09:11   Glucose Latest Ref Range: 65 - 99 mg/dL 115 (H)   Sodium Latest Ref Range: 136 - 145 mmol/L 141   Potassium Latest Ref Range: 3.5 - 5.2 mmol/L 4.6   CO2 Latest Ref Range: 22.0 - 29.0 mmol/L 27.0   Chloride Latest Ref Range: 98 - 107 mmol/L 104   Anion Gap Latest Ref Range: 5.0 - 15.0 mmol/L 10.0   Creatinine Latest Ref Range: 0.57 - 1.00 mg/dL 0.88   BUN Latest Ref Range: 8 - 23 mg/dL 15    BUN/Creatinine Ratio Latest Ref Range: 7.0 - 25.0  17.0   Calcium Latest Ref Range: 8.6 - 10.5 mg/dL 8.8   eGFR Non  Am Latest Ref Range: >60 mL/min/1.73 62   Hemoglobin A1C Latest Ref Range: 4.80 - 5.60 % 6.00 (H)   WBC Latest Ref Range: 3.40 - 10.80 10*3/mm3 8.62   RBC Latest Ref Range: 3.77 - 5.28 10*6/mm3 4.83   Hemoglobin Latest Ref Range: 12.0 - 15.9 g/dL 13.6   Hematocrit Latest Ref Range: 34.0 - 46.6 % 44.7   RDW Latest Ref Range: 12.3 - 15.4 % 15.1   MCV Latest Ref Range: 79.0 - 97.0 fL 92.5   MCH Latest Ref Range: 26.6 - 33.0 pg 28.2   MCHC Latest Ref Range: 31.5 - 35.7 g/dL 30.4 (L)   MPV Latest Ref Range: 6.0 - 12.0 fL 9.6   Platelets Latest Ref Range: 140 - 450 10*3/mm3 220           Assessment and Plan:   S/p right shoulder arthroscopy, rotator cuff repair    Nontraumatic complete tear of right rotator cuff    History of lumbar fusion    Anxiety and depression    Status post total bilateral knee replacement    Essential hypertension    Paroxysmal atrial fibrillation (CMS/HCC)    YUNIOR treated with BiPAP    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    Status post total replacement of left hip  Hx of stroke.    Plan    1. PT/OT. NWB, right UE, ROM hand, wrist, elbow.  2. Pain control-prns, interscalene nerve block cath with ropivacaine infusion.  3. IS-encourage  4. DVT proph- Mech/ mobilize.  5. Bowel regimen  6. Resume home medications as appropriate  7. Monitor post-op labs  8. DC planning for home    - Hypertension:  Resume home medications as appropriate, formulary substitution when indicated.  Holding parameters.  Prn medications for elevated blood pressure.    -History of stroke: Resume Plavix, aspirin and statin.  Resume antihypertensives.     -GERD:  Resume PPI.  Formulary substitution when indicated.      -YUNIOR:  Continue BiPAP.   Monitor O2 sats.    Discussed with patient postoperative management plan she expressed understanding and agreement.    Dragon disclaimer:  Part of this  encounter note is an electronic transcription/translation of spoken language to printed text. The electronic translation of spoken language may permit erroneous, or at times, nonsensical words or phrases to be inadvertently transcribed; Although I have reviewed the note for such errors, some may still exist.    Brit Galo MD  01/19/21  21:28 EST

## 2021-01-20 NOTE — DISCHARGE SUMMARY
Patient Name: Akua Torres  MRN: [id number]  : 1945  DOS: 2021    Attending: Lance Morales Jr., MD    Primary Care Provider: Salty Hernandez MD    Date of Admission:.2021 10:08 AM    Date of Discharge:  2021    Discharge Diagnosis:  S/p right shoulder arthroscopy, rotator cuff repair     Nontraumatic complete tear of right rotator cuff    History of lumbar fusion    Anxiety and depression    Status post total bilateral knee replacement    Essential hypertension    Paroxysmal atrial fibrillation (CMS/HCC)    YUNIOR treated with BiPAP    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    Status post total replacement of left hip  Acute postop pain    Hospital Course    At admit:    Patient is a pleasant 75 y.o. female presented for scheduled surgery by Dr. Morales.     Per his note (This is a 75 year old female with longstanding history of right shoulder pain that has failed to respond to conservative measures. Clinical and radiographic findings were consistent with a rotator cuff tear. We discussed the risks and benefits of performing a rotator cuff repair with possible open biceps tenodesis. The risks and benefits were discussed with the patient to include, but not limited to: bleeding, infection, nerve injury, failure of fixation, stiffness, need for further procedures, loss of limb or life. The patient expressed understanding and wished to proceed with the procedure.).     Patient underwent right shoulder arthroscopic surgery with rotator cuff repair under GA and a block, tolerated surgery well, was admitted for further management.     Ms. Torres is known to me from prior hospitalizations most recent of which was in 2020 when she had left total hip arthroplasty.  Prior to that in 2019 she had right total hip arthroplasty and had postoperative stroke related to severe left internal carotid artery stenosis.  During that hospitalization she underwent  stenting of the carotid artery.     Today when seen after surgery, she is doing fairly well.  No complaints of pain, nausea, or vomiting.  No complaints of shortness of breath.    After admit:    Patient was provided pain medications as needed for pain control, along with interscalene nerve block infusion of Ropivacaine.    Adjustments were made to pain medications to optimize postop pain management.   Risks and benefits of opiate medications discussed with patient.  Ben report in chart was reviewed prior to discharge.    The patient was seen by OT and was taught exercises for her right arm.  The patient used an IS for atelectasis prophylaxis and mechanicals for DVT prophylaxis.    Home medications were resumed as appropriate, and labs were monitored and remained fairly stable.     With the progress she has made, she is ready for DC home today.      The patient will have an arrow pump ( instructed on it during this admit)  Discussed with patient regarding plan and she shows understanding and agreement.        Procedures Performed    Pre-op Diagnosis:        Right rotator cuff tear      Right biceps tendinitis     Post-op Diagnosis:         Same     Procedure/CPT® Codes:  80691  26453     Procedure(s):  RIGHT ARTHROSCOPIC ROTATOR CUFF REPAIR POSS BICEPS TENODESIS     Staff:  Surgeon(s):  Lance Morales Jr., MD    Pertinent Test Results:    I reviewed the patient's new clinical results.   Results from last 7 days   Lab Units 01/20/21  0543   HEMOGLOBIN g/dL 13.1   HEMATOCRIT % 43.6     Results from last 7 days   Lab Units 01/20/21  0543   SODIUM mmol/L 141   POTASSIUM mmol/L 5.3*   CHLORIDE mmol/L 104   CO2 mmol/L 27.0   BUN mg/dL 18   CREATININE mg/dL 0.96   CALCIUM mg/dL 9.2   GLUCOSE mg/dL 203*     I reviewed the patient's new imaging including images and reports.      Occupational Therapy: Interscalene infusing at 6, no c/o pain end of session. Spouse not available for teaching d/t spouse not comfortable  coming into hospital d/t covid. Educated pt on R shoulder precautions, ADL retraining to maintain, sling management and care of interscalene nerve catheter during ADLs to avoid dislodgement. Pt needed min cues and mod assist to don sling- recommend HHOT to assist with carry-over and for family training, pt in agreement. Pt passed mobility screen with score of 19, no PT needs. Recommend DC home with family assist, issued rehab department phone number in case questions arise.      Discharge Assessment:    Vital Signs  Visit Vitals  /66 (BP Location: Left arm, Patient Position: Sitting)   Pulse 75   Temp 98.5 °F (36.9 °C) (Oral)   Resp 16   SpO2 92%     Temp (24hrs), Av.8 °F (36.6 °C), Min:96.6 °F (35.9 °C), Max:98.5 °F (36.9 °C)      General Appearance:    Alert, cooperative, in no acute distress   Lungs:     Clear to auscultation,respirations regular, even and   unlabored    Heart:    Regular rhythm and normal rate, normal S1 and S2    Abdomen:     Normal bowel sounds, no masses, no organomegaly, soft   non-tender, non-distended, no guarding, no rebound tenderness   Extremities:   RUE in a sling, CDI dressing. Interscalene nerve block cath present. Distal pulses, cap refill, movements of fingers, wrist, intact.   Pulses:   Pulses palpable and equal bilaterally   Skin:   No bleeding, bruising or rash   Neurologic:   Cranial nerves 2 - 12 grossly intact       Discharge Disposition: Home          Discharge Medications      New Medications      Instructions Start Date   docusate sodium 100 MG capsule  Commonly known as: COLACE   100 mg, Oral, 2 Times Daily      ondansetron 4 MG tablet  Commonly known as: Zofran   4 mg, Oral, Every 8 Hours PRN      oxyCODONE 5 MG immediate release tablet  Commonly known as: ROXICODONE   5-10 mg, Oral, Every 4 Hours PRN      Ropivacine HCl-NaCl  Commonly known as: NAROPIN   6 mL/hr (12 mg/hr), Peripheral Nerve, Continuous         Continue These Medications      Instructions Start  Date   acetaminophen 325 MG tablet  Commonly known as: TYLENOL   650 mg, Oral, Every 6 Hours PRN      allopurinol 100 MG tablet  Commonly known as: ZYLOPRIM   100 mg, Oral, Daily      aspirin 81 MG EC tablet   81 mg, Oral, Daily, Resume in 1 month      atenolol 25 MG tablet  Commonly known as: TENORMIN   25 mg, Oral, 2 Times Daily, Take 25 mg by mouth 2 (Two) Times a Day.      atorvastatin 80 MG tablet  Commonly known as: LIPITOR   80 mg, Oral, Nightly      celecoxib 50 MG capsule  Commonly known as: CeleBREX   50 mg, Oral, 2 Times Daily      cholecalciferol 25 MCG (1000 UT) tablet  Commonly known as: VITAMIN D3   2,000 Units, Oral, Daily      clopidogrel 75 MG tablet  Commonly known as: PLAVIX   75 mg, Oral, Daily      escitalopram 20 MG tablet  Commonly known as: LEXAPRO   20 mg, Oral, Daily      hydrOXYzine 25 MG tablet  Commonly known as: ATARAX   25 mg, Oral, 2 Times Daily PRN      lisinopril 20 MG tablet  Commonly known as: PRINIVIL,ZESTRIL   20 mg, Oral, Daily, Take 20 mg by mouth Daily.      omeprazole 20 MG capsule  Commonly known as: priLOSEC   20 mg, Oral, Daily         Stop These Medications    benzoyl peroxide 5 % external liquid  Generic drug: benzoyl peroxide            Discharge Diet: Regular diet      Activity at Discharge: NWB RUE  Activity Instructions     Non-Weight Bearing - Specify Restrictions      Patient May Shower; No Tub Baths, Pools or Hot Tubs            Follow-up Appointments  Dr. Morales per his orders      WILFREDO Kramer  01/20/21  11:56 EST

## 2021-01-20 NOTE — OUTREACH NOTE
Prep Survey      Responses   Orthodoxy facility patient discharged from?  [facility]   Is LACE score < 7 ?  Yes   Emergency Room discharge w/ pulse ox?  No   Eligibility  [facility]   Date of Admission  01/19/21   Date of Discharge  01/20/21   Discharge Disposition  Home or Self Care   Discharge diagnosis  Shoulder arthro   Does the patient have one of the following disease processes/diagnoses(primary or secondary)?  Total Joint Replacement   Does the patient have Home health ordered?  No   Is there a DME ordered?  No   Prep survey completed?  Yes          Minal Charles RN

## 2021-01-20 NOTE — THERAPY DISCHARGE NOTE
Acute Care - Occupational Therapy Discharge  [facility]    Patient Name: Akua Torres  : 1945    MRN: [id number]                              Today's Date: 2021       Admit Date: 2021    Visit Dx:     ICD-10-CM ICD-9-CM   1. Nontraumatic complete tear of right rotator cuff  M75.121 727.61     Patient Active Problem List   Diagnosis   • Degenerative disc disease, lumbar   • Lumbar stenosis with neurogenic claudication   • History of lumbar fusion   • Spondylosis of lumbar region without myelopathy or radiculopathy   • Mild obesity   • Physical deconditioning   • Idiopathic gout   • Anxiety and depression   • Greater trochanteric bursitis of both hips   • Status post total bilateral knee replacement   • Back pain   • Essential hypertension   • Prediabetes   • S/P lumbar spinal fusion   • Leukocytosis, likely reactive   • Depression   • Arthritis of right hip   • Paroxysmal atrial fibrillation (CMS/HCC)   • YUNIOR treated with BiPAP   • 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   • Acute blood loss anemia, mild, asymptomatic   • 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 (CMS/HCC)   • Supraventricular tachycardia (CMS/HCC)   • Left prefrontal gyrus stroke   • Arthritis of left hip   • Hip pain   • Status post total replacement of left hip   • Hyperlipidemia   • Elevated hemoglobin A1c   • History of stroke   • Acute postoperative pain   • Nontraumatic complete tear of right rotator cuff     Past Medical History:   Diagnosis Date   • A-fib (CMS/HCC)    • Anxiety and depression    • Arthritis    • Cataract    • Depression    • Extremity pain    • GERD (gastroesophageal reflux disease)    • Gout    • H/O bladder infections     JUST FINISHED MACROBID ON 17 FOR RECENT UTI   • Hypercholesteremia    • Hypertension    • Joint pain    • Kidney disease, chronic, stage III (GFR 30-59 ml/min)     resolved after stopping antiinflammatory ~   • Low back pain    •  Neck pain    • Pneumonia 02/2020   • Rheumatic fever    • Skin cancer    • Sleep apnea     no cpap   • Stroke (CMS/HCC) 09/2019    right sided weakness   • Wears glasses      Past Surgical History:   Procedure Laterality Date   • BACK SURGERY  2004    LUMBAR PER DR MAN x2   • CARDIAC CATHETERIZATION  2019   • CARPAL TUNNEL RELEASE Left    • COLONOSCOPY  11/2020   • EYE SURGERY      retina surgery   • FINGER SURGERY Right     3RD FINGER   • HEEL SPUR EXCISION Bilateral    • INTERVENTIONAL RADIOLOGY PROCEDURE N/A 9/17/2019    Procedure: Carotid and Cerebral Angiogram/ Possible Stent;  Surgeon: Imer Guerra MD;  Location:  [facility];  Service: Interventional Radiology   • KNEE ARTHROSCOPY Bilateral    • LUMBAR DISCECTOMY FUSION INSTRUMENTATION N/A 11/10/2017    Procedure: LUMBAR SPINAL FUSION ;  Surgeon: Gopi Man MD;  Location: [facility] OR;  Service:    • REPLACEMENT TOTAL KNEE BILATERAL Bilateral    • SHOULDER ARTHROSCOPY W/ ROTATOR CUFF REPAIR Right 1/19/2021    Procedure: ARTHROSCOPIC ROTATOR CUFF REPAIR WITH BICEPS TENODESIS RIGHT;  Surgeon: Lance Morales Jr., MD;  Location: [facility] OR;  Service: Orthopedics;  Laterality: Right;   • SKIN BIOPSY     • TOTAL HIP ARTHROPLASTY Right 9/9/2019    Procedure: TOTAL ANTERIOR RIGHT HIP ARTHROPLASTY;  Surgeon: Darrin New MD;  Location: [facility] OR;  Service: Orthopedics   • TOTAL HIP ARTHROPLASTY Left 6/29/2020    Procedure: TOTAL HIP ARTHROPLASTY ANTERIOR LEFT;  Surgeon: Darrin New MD;  Location:  [facility] OR;  Service: Orthopedics;  Laterality: Left;     General Information     Row Name 01/20/21 0827          OT Time and Intention    Document Type  evaluation;therapy note (daily note);discharge treatment  -AR     Mode of Treatment  individual therapy;occupational therapy  -AR     Row Name 01/20/21 0827          General Information    Patient Profile Reviewed  yes  -AR     Prior Level of Function  independent:;all household  mobility;community mobility;gait;transfer;min assist:;ADL's  -AR     Existing Precautions/Restrictions  fall;right;shoulder;non-weight bearing;other (see comments) interscalene, Donjoy Ultra II with sling  -AR     Barriers to Rehab  none identified  -AR     Row Name 01/20/21 0827          Living Environment    Lives With  spouse  -AR     Row Name 01/20/21 0827          Home Main Entrance    Number of Stairs, Main Entrance  none  -AR     Row Name 01/20/21 0827          Stairs Within Home, Primary    Number of Stairs, Within Home, Primary  none  -AR     Stairs Comment, Within Home, Primary  Pt has walk-in shower  -AR     Row Name 01/20/21 0827          Cognition    Orientation Status (Cognition)  oriented x 4  -AR     Row Name 01/20/21 0827          Safety Issues, Functional Mobility    Safety Issues Affecting Function (Mobility)  safety precautions follow-through/compliance;safety precaution awareness  -AR     Impairments Affecting Function (Mobility)  balance;endurance/activity tolerance;sensation/sensory awareness  -AR     Row Name 01/20/21 0827          Relationship/Environment    Name(s) of Who Lives With Patient  Spouse will provide 24/7 assist and provate care agency will be assisting  -AR       User Key  (r) = Recorded By, (t) = Taken By, (c) = Cosigned By    Initials Name Provider Type    Jennifer Castillo OT Occupational Therapist        Mobility/ADL's     Row Name 01/20/21 1140          Bed Mobility    Bed Mobility  supine-sit  -AR     Supine-Sit [place] (Bed Mobility)  minimum assist (75% patient effort);verbal cues  -AR     Bed Mobility, Safety Issues  decreased use of arms for pushing/pulling  -AR     Row Name 01/20/21 0827          Transfers    Transfers  sit-stand transfer;bed-chair transfer  -AR     Bed-Chair [place] (Transfers)  contact guard;verbal cues  -AR     Sit-Stand [place] (Transfers)  contact guard;verbal cues  -AR     [place] Level (Toilet Transfer)  contact  guard;verbal cues  -AR     Assistive Device (Toilet Transfer)  cane, straight  -AR     Row Name 01/20/21 0827          Sit-Stand Transfer    Assistive Device (Sit-Stand Transfers)  cane, straight  -AR     Row Name 01/20/21 0827          Toilet Transfer    Type (Toilet Transfer)  sit-stand;stand-sit  -AR     Row Name 01/20/21 0827          Functional Mobility    Functional Mobility- Ind. Level  verbal cues required;contact guard assist  -AR     Functional Mobility- Device  straight cane  -AR     Functional Mobility-Distance (Feet)  60  -AR     Functional Mobility-Maintain WBing  able to maintain weight bearing status  -AR     Functional Mobility- Comment  Pt passed mobility screen with score of 19, no PT needs. Pt ambuates with cane baseline  -AR     Row Name 01/20/21 0827          Activities of Daily Living    24946 - OT Self Care/Mgmt Minutes  40  -AR     BADL Assessment/Intervention  bathing;upper body dressing;lower body dressing;grooming;feeding  -AR     Row Name 01/20/21 0827          Bathing Assessment/Intervention    [place] Level (Bathing)  bathing skills;upper extremities;maximum assist (25% patient effort)  -AR     Position (Bathing)  supported sitting  -AR     Comment (Bathing)  Educated pt on right shoulder precautions, R axilla care to maintain and that pt may not shower until interscalene has been DC  -AR     Row Name 01/20/21 0827          Upper Body Dressing Assessment/Training    [place] Level (Upper Body Dressing)  doff;don;front opening garment;moderate assist (50% patient effort) sling with mod assist  -AR     Position (Upper Body Dressing)  supported standing  -AR     Comment (Upper Body Dressing)  Pt educated pt on right shoulder precautions, ADL retraining to maintain, sling management and care of interscalene nerve catheter during ADLs to avoid dislodgement. Spouse not at bedside and pt says he is not comfortable coming into facilty d/t covid. Educated her extensively on correct  application and issued handout on application/fit. Pt needed min cues following edcuation. Recommend HHOT for follow-up education at home, pt in agreement. CM notified of need for HH.  -AR     Row Name 01/20/21 0827          Lower Body Dressing Assessment/Training    [place] Level (Lower Body Dressing)  don;pants/bottoms;moderate assist (50% patient effort)  -AR     Position (Lower Body Dressing)  supported sitting;supported standing  -AR     Row Name 01/20/21 0827          Grooming Assessment/Training    [place] Level (Grooming)  wash face, hands;oral care regimen;supervision;standby assist  -AR     Position (Grooming)  supported sitting  -AR     Row Name 01/20/21 0827          Self-Feeding Assessment/Training    [place] Level (Feeding)  set up;supervision  -AR     Position (Self-Feeding)  supported sitting  -AR       User Key  (r) = Recorded By, (t) = Taken By, (c) = Cosigned By    Initials Name Provider Type    Jennifer Castillo, OT Occupational Therapist        Obj/Interventions     Row Name 01/20/21 0827          Sensory Assessment (Somatosensory)    Sensory Assessment (Somatosensory)  right UE  -AR     Sensory Subjective Reports  numbness  -AR     Row Name 01/20/21 0827          Vision Assessment/Intervention    Visual Impairment/Limitations  WFL  -AR     Row Name 01/20/21 0827          Range of Motion Comprehensive    Comment, General Range of Motion  LUE WFL, R elbow/wrist/hand WFL  -AR     Row Name 01/20/21 0827          Strength Comprehensive (MMT)    Comment, General Manual Muscle Testing (MMT) Assessment  LUE WFL, RUE deferred  -AR     Row Name 01/20/21 0827          Elbow/Forearm (Therapeutic Exercise)    Elbow/Forearm (Therapeutic Exercise)  AROM (active range of motion)  -AR     Elbow/Forearm AROM (Therapeutic Exercise)  right;flexion;extension;supination;pronation;sitting;10 repetitions  -AR     Row Name 01/20/21 0827          Wrist (Therapeutic Exercise)    Wrist (Therapeutic  Exercise)  AROM (active range of motion)  -AR     Wrist AROM (Therapeutic Exercise)  right;flexion;extension;10 repetitions  -AR     Row Name 01/20/21 0827          Hand (Therapeutic Exercise)    Hand (Therapeutic Exercise)  AROM (active range of motion)  -AR     Hand AROM/AAROM (Therapeutic Exercise)  right;finger flexion;finger extension;10 repetitions  -AR     Row Name 01/20/21 0827          Balance    Balance Assessment  sitting static balance;sitting dynamic balance;standing static balance;standing dynamic balance  -AR     Static Sitting Balance  WNL;supported;sitting in chair  -AR     Dynamic Sitting Balance  WNL;supported;sitting in chair  -AR     Static Standing Balance  WFL;supported;standing  -AR     Dynamic Standing Balance  WFL;supported;standing  -AR     Row Name 01/20/21 0827          Therapeutic Exercise    Therapeutic Exercise  elbow/forearm;wrist;hand  -AR       User Key  (r) = Recorded By, (t) = Taken By, (c) = Cosigned By    Initials Name Provider Type    Jennifer Castillo, CHARLES Occupational Therapist        Goals/Plan     Row Name 01/20/21 0827          Transfer Goal 1 (OT)    Activity/Assistive Device (Transfer Goal 1, OT)  sit-to-stand/stand-to-sit;bed-to-chair/chair-to-bed  -AR     [place] Level/Cues Needed (Transfer Goal 1, OT)  verbal cues required;contact guard assist  -AR     Time Frame (Transfer Goal 1, OT)  short term goal (STG);1 day  -AR     Progress/Outcome (Transfer Goal 1, OT)  goal ongoing  -AR     Row Name 01/20/21 0827          Dressing Goal 1 (OT)    Activity/Device (Dressing Goal 1, OT)  other (see comments) Pt will verbalize to caregiver correct sling application with supervision  -AR     Time Frame (Dressing Goal 1, OT)  short term goal (STG);1 day  -AR     Progress/Outcome (Dressing Goal 1, OT)  goal ongoing  -AR     Row Name 01/20/21 0827          ROM Goal 1 (OT)    ROM Goal 1 (OT)  Pt will complete RUE HEP within physician parameters with supervision  -AR     Time  Frame (ROM Goal 1, OT)  short term goal (STG);1 day  -AR     Progress/Outcome (ROM Goal 1, OT)  goal met  -AR     Row Name 01/20/21 0827          Therapy Assessment/Plan (OT)    Planned Therapy Interventions (OT)  adaptive equipment training;BADL retraining;IADL retraining;orthotic fabrication/fitting/training;patient/caregiver education/training;ROM/therapeutic exercise;transfer/mobility retraining  -AR       User Key  (r) = Recorded By, (t) = Taken By, (c) = Cosigned By    Initials Name Provider Type    Jennifer Castillo, CHARLES Occupational Therapist        Clinical Impression     Row Name 01/20/21 0827          Pain Assessment    Additional Documentation  Pain Scale: Numbers Pre/Post-Treatment (Group)  -AR     Row Name 01/20/21 0827          Pain Scale: Numbers Pre/Post-Treatment    Pretreatment Pain Rating  0/10 - no pain  -AR     Posttreatment Pain Rating  0/10 - no pain  -AR     Pain Intervention(s)  Cold applied;Medication (See MAR);Repositioned;Ambulation/increased activity  -AR     Row Name 01/20/21 0827          Plan of Care Review    Plan of Care Reviewed With  patient  -AR     Outcome Summary  Interscalene infusing at 6, no c/o pain end of session. Spouse not available for teaching d/t spouse not comfortable coming into hospital d/t covid. Educated pt on R shoulder precautions, ADL retraining to maintain, sling management and care of interscalene nerve catheter during ADLs to avoid dislodgement. Pt needed min cues and mod assist to don sling- recommend HHOT to assist with carry-over and for family training, pt in agreement. Pt passed mobility screen with score of 19, no PT needs. Recommend DC home with family assist, issued rehab department phone number in case questions arise.  -AR       User Key  (r) = Recorded By, (t) = Taken By, (c) = Cosigned By    Initials Name Provider Type    Jennifer Castillo, CHARLES Occupational Therapist        Outcome Measures     Row Name 01/20/21 0827          How much  help from another is currently needed...    Putting on and taking off regular lower body clothing?  2  -AR     Bathing (including washing, rinsing, and drying)  2  -AR     Toileting (which includes using toilet bed pan or urinal)  3  -AR     Putting on and taking off regular upper body clothing  2  -AR     Taking care of personal grooming (such as brushing teeth)  3  -AR     Eating meals  3  -AR     AM-PAC 6 Clicks Score (OT)  15  -AR     Row Name 01/20/21 0827          Functional Assessment    Outcome Measure Options  AM-PAC 6 Clicks Daily Activity (OT)  -AR       User Key  (r) = Recorded By, (t) = Taken By, (c) = Cosigned By    Initials Name Provider Type    Jennifer Castillo OT Occupational Therapist        Occupational Therapy Education                 Title: PT OT SLP Therapies (Not Started)     Topic: Occupational Therapy (Done)     Point: ADL training (Done)     Description:   Instruct learner(s) on proper safety adaptation and remediation techniques during self care or transfers.   Instruct in proper use of assistive devices.              Learning Progress Summary           Patient ESVIN Patel TB,D,H, VU,NR by AR at 1/20/2021 0827                   Point: Home exercise program (Done)     Description:   Instruct learner(s) on appropriate technique for monitoring, assisting and/or progressing therapeutic exercises/activities.              Learning Progress Summary           Patient ESVIN Patel TB,D,H, VU,NR by AR at 1/20/2021 0827                   Point: Precautions (Done)     Description:   Instruct learner(s) on prescribed precautions during self-care and functional transfers.              Learning Progress Summary           Patient ESVIN Patel TB,D,H, VU,NR by AR at 1/20/2021 0827                   Point: Body mechanics (Done)     Description:   Instruct learner(s) on proper positioning and spine alignment during self-care, functional mobility activities and/or exercises.              Learning Progress  Summary           Patient Eager, E,TB,D,H, VU,NR by AR at 1/20/2021 0827                               User Key     Initials Effective Dates Name Provider Type Discipline    AR 06/22/15 -  Jennifer Odonnell, OT Occupational Therapist OT              OT Recommendation and Plan  Retired Outcome Summary/Treatment Plan (OT)  Anticipated Discharge Disposition (OT): home with assist, home with home health  Planned Therapy Interventions (OT): adaptive equipment training, BADL retraining, IADL retraining, orthotic fabrication/fitting/training, patient/caregiver education/training, ROM/therapeutic exercise, transfer/mobility retraining  Plan of Care Review  Plan of Care Reviewed With: patient  Outcome Summary: Interscalene infusing at 6, no c/o pain end of session. Spouse not available for teaching d/t spouse not comfortable coming into hospital d/t covid. Educated pt on R shoulder precautions, ADL retraining to maintain, sling management and care of interscalene nerve catheter during ADLs to avoid dislodgement. Pt needed min cues and mod assist to don sling- recommend HHOT to assist with carry-over and for family training, pt in agreement. Pt passed mobility screen with score of 19, no PT needs. Recommend DC home with family assist, issued rehab department phone number in case questions arise.  Plan of Care Reviewed With: patient  Outcome Summary: Interscalene infusing at 6, no c/o pain end of session. Spouse not available for teaching d/t spouse not comfortable coming into hospital d/t covid. Educated pt on R shoulder precautions, ADL retraining to maintain, sling management and care of interscalene nerve catheter during ADLs to avoid dislodgement. Pt needed min cues and mod assist to don sling- recommend HHOT to assist with carry-over and for family training, pt in agreement. Pt passed mobility screen with score of 19, no PT needs. Recommend DC home with family assist, issued rehab department phone number in case  questions arise.     Time Calculation:   Time Calculation- OT     Row Name 01/20/21 0827             Time Calculation- OT    OT Start Time  0827  -AR      OT Received On  01/20/21  -AR      OT Goal Re-Cert Due Date  01/30/21  -AR         Timed Charges    17017 - OT Therapeutic Exercise Minutes  14  -AR      59916 - OT Self Care/Mgmt Minutes  40  -AR        User Key  (r) = Recorded By, (t) = Taken By, (c) = Cosigned By    Initials Name Provider Type    AR Jennifer Odonnell OT Occupational Therapist        Therapy Charges for Today     Code Description Service Date Service Provider Modifiers Qty    50329881077 HC OT THER PROC EA 15 MIN 1/20/2021 Jennifer Odonnell OT GO 1    75449715898 HC OT SELF CARE/MGMT/TRAIN EA 15 MIN 1/20/2021 Jennifer Odonnell OT GO 3    41543283060 HC OT EVAL LOW COMPLEXITY 4 1/20/2021 Jennifer Odonnell OT GO 1               Jennifer Odonnell OT  1/20/2021

## 2021-01-20 NOTE — PROGRESS NOTES
[facility]    Acute pain service Inpatient Progress Note    Patient Name: Akua Torres  :  1945  MRN:  [id number]        Acute Pain  Service Inpatient Progress Note:    Analgesia:Good  Pain Score:210  LOC: alert and awake  Resp Status: room air  Cardiac: VS stable  Side Effects:None  Catheter Site:clean, dressing intact and dry  Cath type: peripheral nerve cath with ON Q  Infusion rate: 6ml/hr  Catheter Plan:Catheter to remain Insitu and Continue catheter infusion rate unchanged  Comments: ---------------------------------------------------------------------------------  Physical Therapy Manual Muscle Testing Results:   ]    Physical Therapy - Plan of Care Review - Outcome Summary:   ]    Occupational Therapy - Plan of Care Review - Outcome Summary:   ]  ----------------------------------------------------------------------------------

## 2021-01-20 NOTE — PROGRESS NOTES
Orthopedic Daily Progress Note      CC: TEA overnight.    Pain well controlled  General: no fevers, chills  Abdomen: No nausea, vomiting, or diarrhea    No other complaints    Physical Exam:  I have reviewed the vital signs.  Temp:  [96.6 °F (35.9 °C)-98.5 °F (36.9 °C)] 98 °F (36.7 °C)  Heart Rate:  [64-95] 81  Resp:  [16-20] 18  BP: (124-168)/(56-98) 168/96    Objective  General Appearance:    Alert, cooperative, no distress  Extremities: No clubbing, cyanosis, or edema to lower extremities  Pulses:  2+ in distal surgical extremity  Skin: Dressing Clean/dry/intact      Results Review:    I have reviewed the labs, radiology results and diagnostic studies:    Results from last 7 days   Lab Units 01/20/21  0543   HEMOGLOBIN g/dL 13.1     Results from last 7 days   Lab Units 01/20/21  0543   SODIUM mmol/L 141   POTASSIUM mmol/L 5.3*   CO2 mmol/L 27.0   CREATININE mg/dL 0.96   GLUCOSE mg/dL 203*       I have reviewed the medications.    Assessment/Problem List  POD# 1 Day Post-Op   S/p right rotator cuff repair and biceps tenodesis    Plan  Nonweightbearing right upper extremity with active range of motion of the elbow and wrist as tolerated.  Modified pendulums in the sling.  PT OT      Discharge Planning: I expect patient to be discharged to home in 0 days.    Lance Morales Jr., MD  01/20/21  08:02 EST

## 2021-01-20 NOTE — PLAN OF CARE
Goal Outcome Evaluation:  Plan of Care Reviewed With: patient  Progress: improving  Outcome Summary: Interscalene infusing at 6, no c/o pain end of session. Spouse not available for teaching d/t spouse not comfortable coming into hospital d/t covid. Educated pt on R shoulder precautions, ADL retraining to maintain, sling management and care of interscalene nerve catheter during ADLs to avoid dislodgement. Pt needed min cues and mod assist to don sling- recommend HHOT to assist with carry-over and for family training, pt in agreement. Pt passed mobility screen with score of 19, no PT needs. Recommend DC home with family assist, issued rehab department phone number in case questions arise.

## 2021-01-21 ENCOUNTER — TRANSITIONAL CARE MANAGEMENT TELEPHONE ENCOUNTER (OUTPATIENT)
Dept: CALL CENTER | Facility: HOSPITAL | Age: 76
End: 2021-01-21

## 2021-01-21 NOTE — OUTREACH NOTE
Call Center TCM Note      Responses   [facility] patient discharged from?  [facility]   Does the patient have one of the following disease processes/diagnoses(primary or secondary)?  Total Joint Replacement   Joint surgery performed?  Shoulder   TCM attempt successful?  Yes   Call start time  1702   Call end time  1703   Has the patient been back in either the hospital or Emergency Department since discharge?  No   Discharge diagnosis  Shoulder arthro   Does the patient have all medications related to this admission filled (includes all antibiotics, pain medications, etc.)  Yes   Is the patient taking all medications as directed (includes completed medication regime)?  Yes   Is the patient able to teach back alternate methods of pain control?  Ice, Shoulder-elevate above heart/ keep in sling as advised, Reposition, Correct alignment, Short, frequent activity   Does the patient have a follow up appointment with their surgeon?  No   What is preventing the patient from scheduling follow up appointments within 7 days of discharge?  Waiting on return call   Nursing Interventions  Advised patient to make appointment   Has the patient kept scheduled appointments due by today?  N/A   What is the Home health agency?   [facility]   Psychosocial issues?  No   Has the patient fallen since discharge?  No   Did the patient receive a copy of their discharge instructions?  Yes   Nursing interventions  Reviewed instructions with patient   What is the patient's perception of their functional status since discharge?  Improving   Is the patient able to teach back signs and symptoms of infection?  Temp >100.4 for 24h or longer, Incisional drainage, Increased swelling or redness around incision (not associated with surgical edema), Blisters around incision, Severe discomfort or pain, Changes in mobility, Shortness of breath or chest pain   Is the patient able to teach back how to prevent infection?  Check incision daily   Is  the patient/caregiver able to teach back the hierarchy of who to call/visit for symptoms/problems? PCP, Specialist, Home health nurse, Urgent Care, ED, 911  Yes   TCM call completed?  Yes   Wrap up additional comments  States she is doing well, [facility] is coming in the morning to help the patient, no questions or concerns at this time, she will be following up with her surgeon.          Radha Martin RN    1/21/2021, 17:03 EST

## 2021-01-21 NOTE — PROGRESS NOTES
AR[facility]on    Nerve Cath Post Op Call    Patient Name: Akua Torres  :  1945  MRN:  [id number]  Date of Discharge: 2021    Nerve Cath Post Op Call:    Analgesia:Excellent  Pain Score:0/10  Side Effects:None  Catheter Site:clean  Patient Controlled ON Q pump infusion rate: 6ml/hr  Catheter Plan:Will continue with plan at home without changes and The patient was instructed to call ON CALL Anesthesia provider for any questions or problems  Patient/Family instructed to call ON CALL anesthesia provider for any questions or problems.  Patient Follow Up:

## 2021-01-22 NOTE — PROGRESS NOTES
AR[facility]on    Nerve Cath Post Op Call    Patient Name: Akua Torres  :  1945  MRN:  [id number]  Date of Discharge: 2021    Nerve Cath Post Op Call:    Catheter Plan:Patient/Family member report nerve catheter previously discontinued, tip intact  Patient/Family instructed to call ON CALL anesthesia provider for any questions or problems.  Patient Follow Up:

## 2021-03-01 RX ORDER — HYDROXYZINE HYDROCHLORIDE 25 MG/1
TABLET, FILM COATED ORAL
Qty: 60 TABLET | Refills: 11 | Status: SHIPPED | OUTPATIENT
Start: 2021-03-01 | End: 2022-04-06 | Stop reason: SDUPTHER

## 2021-04-18 ENCOUNTER — APPOINTMENT (OUTPATIENT)
Dept: GENERAL RADIOLOGY | Facility: HOSPITAL | Age: 76
End: 2021-04-18

## 2021-04-18 ENCOUNTER — APPOINTMENT (OUTPATIENT)
Dept: CT IMAGING | Facility: HOSPITAL | Age: 76
End: 2021-04-18

## 2021-04-18 ENCOUNTER — HOSPITAL ENCOUNTER (EMERGENCY)
Facility: HOSPITAL | Age: 76
Discharge: HOME | End: 2021-04-19
Attending: EMERGENCY MEDICINE | Admitting: EMERGENCY MEDICINE

## 2021-04-18 DIAGNOSIS — Z86.39 HISTORY OF HYPERLIPIDEMIA: ICD-10-CM

## 2021-04-18 DIAGNOSIS — R42 DIZZINESS: ICD-10-CM

## 2021-04-18 DIAGNOSIS — Z86.79 HISTORY OF HYPERTENSION: ICD-10-CM

## 2021-04-18 DIAGNOSIS — R00.2 PALPITATIONS: Primary | ICD-10-CM

## 2021-04-18 DIAGNOSIS — Z86.79 HISTORY OF ATRIAL FIBRILLATION: ICD-10-CM

## 2021-04-18 DIAGNOSIS — I69.30 HISTORY OF CEREBROVASCULAR ACCIDENT (CVA) WITH RESIDUAL DEFICIT: ICD-10-CM

## 2021-04-18 DIAGNOSIS — R07.89 ATYPICAL CHEST PAIN: ICD-10-CM

## 2021-04-18 LAB
ALBUMIN SERPL-MCNC: 4.2 G/DL (ref 3.5–5.2)
ALBUMIN/GLOB SERPL: 1.5 G/DL
ALP SERPL-CCNC: 141 U/L (ref 39–117)
ALT SERPL W P-5'-P-CCNC: 10 U/L (ref 1–33)
ANION GAP SERPL CALCULATED.3IONS-SCNC: 11 MMOL/L (ref 5–15)
AST SERPL-CCNC: 17 U/L (ref 1–32)
BASOPHILS # BLD AUTO: 0.04 10*3/MM3 (ref 0–0.2)
BASOPHILS NFR BLD AUTO: 0.4 % (ref 0–1.5)
BILIRUB SERPL-MCNC: 0.5 MG/DL (ref 0–1.2)
BUN SERPL-MCNC: 15 MG/DL (ref 8–23)
BUN/CREAT SERPL: 12.6 (ref 7–25)
CALCIUM SPEC-SCNC: 8.8 MG/DL (ref 8.6–10.5)
CHLORIDE SERPL-SCNC: 103 MMOL/L (ref 98–107)
CO2 SERPL-SCNC: 29 MMOL/L (ref 22–29)
CREAT SERPL-MCNC: 1.19 MG/DL (ref 0.57–1)
DEPRECATED RDW RBC AUTO: 57.8 FL (ref 37–54)
EOSINOPHIL # BLD AUTO: 0.23 10*3/MM3 (ref 0–0.4)
EOSINOPHIL NFR BLD AUTO: 2.1 % (ref 0.3–6.2)
ERYTHROCYTE [DISTWIDTH] IN BLOOD BY AUTOMATED COUNT: 16.9 % (ref 12.3–15.4)
GFR SERPL CREATININE-BSD FRML MDRD: 44 ML/MIN/1.73
GLOBULIN UR ELPH-MCNC: 2.8 GM/DL
GLUCOSE SERPL-MCNC: 146 MG/DL (ref 65–99)
HCT VFR BLD AUTO: 46.6 % (ref 34–46.6)
HGB BLD-MCNC: 14.3 G/DL (ref 12–15.9)
HOLD SPECIMEN: NORMAL
HOLD SPECIMEN: NORMAL
IMM GRANULOCYTES # BLD AUTO: 0.03 10*3/MM3 (ref 0–0.05)
IMM GRANULOCYTES NFR BLD AUTO: 0.3 % (ref 0–0.5)
LIPASE SERPL-CCNC: 24 U/L (ref 13–60)
LYMPHOCYTES # BLD AUTO: 2.61 10*3/MM3 (ref 0.7–3.1)
LYMPHOCYTES NFR BLD AUTO: 24.1 % (ref 19.6–45.3)
MCH RBC QN AUTO: 28.5 PG (ref 26.6–33)
MCHC RBC AUTO-ENTMCNC: 30.7 G/DL (ref 31.5–35.7)
MCV RBC AUTO: 93 FL (ref 79–97)
MONOCYTES # BLD AUTO: 0.52 10*3/MM3 (ref 0.1–0.9)
MONOCYTES NFR BLD AUTO: 4.8 % (ref 5–12)
NEUTROPHILS NFR BLD AUTO: 68.3 % (ref 42.7–76)
NEUTROPHILS NFR BLD AUTO: 7.4 10*3/MM3 (ref 1.7–7)
NRBC BLD AUTO-RTO: 0 /100 WBC (ref 0–0.2)
NT-PROBNP SERPL-MCNC: 101.6 PG/ML (ref 0–1800)
PLATELET # BLD AUTO: 237 10*3/MM3 (ref 140–450)
PMV BLD AUTO: 10 FL (ref 6–12)
POTASSIUM SERPL-SCNC: 3.9 MMOL/L (ref 3.5–5.2)
PROT SERPL-MCNC: 7 G/DL (ref 6–8.5)
RBC # BLD AUTO: 5.01 10*6/MM3 (ref 3.77–5.28)
SODIUM SERPL-SCNC: 143 MMOL/L (ref 136–145)
TROPONIN T SERPL-MCNC: <0.01 NG/ML (ref 0–0.03)
TROPONIN T SERPL-MCNC: <0.01 NG/ML (ref 0–0.03)
WBC # BLD AUTO: 10.83 10*3/MM3 (ref 3.4–10.8)
WHOLE BLOOD HOLD SPECIMEN: NORMAL
WHOLE BLOOD HOLD SPECIMEN: NORMAL

## 2021-04-18 PROCEDURE — 85025 COMPLETE CBC W/AUTO DIFF WBC: CPT | Performed by: EMERGENCY MEDICINE

## 2021-04-18 PROCEDURE — 83880 ASSAY OF NATRIURETIC PEPTIDE: CPT | Performed by: EMERGENCY MEDICINE

## 2021-04-18 PROCEDURE — 84484 ASSAY OF TROPONIN QUANT: CPT | Performed by: EMERGENCY MEDICINE

## 2021-04-18 PROCEDURE — 93005 ELECTROCARDIOGRAM TRACING: CPT | Performed by: EMERGENCY MEDICINE

## 2021-04-18 PROCEDURE — 99284 EMERGENCY DEPT VISIT MOD MDM: CPT

## 2021-04-18 PROCEDURE — 80053 COMPREHEN METABOLIC PANEL: CPT | Performed by: EMERGENCY MEDICINE

## 2021-04-18 PROCEDURE — 83690 ASSAY OF LIPASE: CPT | Performed by: EMERGENCY MEDICINE

## 2021-04-18 PROCEDURE — 71045 X-RAY EXAM CHEST 1 VIEW: CPT

## 2021-04-18 PROCEDURE — 70450 CT HEAD/BRAIN W/O DYE: CPT

## 2021-04-18 RX ORDER — SODIUM CHLORIDE 0.9 % (FLUSH) 0.9 %
10 SYRINGE (ML) INJECTION AS NEEDED
Status: DISCONTINUED | OUTPATIENT
Start: 2021-04-18 | End: 2021-04-19 | Stop reason: HOSPADM

## 2021-04-19 ENCOUNTER — EPISODE CHANGES (OUTPATIENT)
Dept: CASE MANAGEMENT | Facility: OTHER | Age: 76
End: 2021-04-19

## 2021-04-19 VITALS
HEIGHT: 63 IN | BODY MASS INDEX: 33.66 KG/M2 | WEIGHT: 190 LBS | SYSTOLIC BLOOD PRESSURE: 128 MMHG | DIASTOLIC BLOOD PRESSURE: 71 MMHG | HEART RATE: 82 BPM | RESPIRATION RATE: 18 BRPM | TEMPERATURE: 98.2 F | OXYGEN SATURATION: 100 %

## 2021-04-19 LAB — HOLD SPECIMEN: NORMAL

## 2021-04-19 NOTE — ED PROVIDER NOTES
Subjective   The patient is a 75 y.o. year old female presenting to the ED complaining of palpitations and elevated blood pressure that began this evening while at rest. Her heart rate was in the high 90s and blood pressure fluctuating near 170/90. She endorses transient symptoms like this in the past, but her current symptoms have lasted one hour which caused her to come be evaluated at the ED. She endorses associated symptoms such as chest pain, dizziness, and generalized weakness. She describes her chest pain as heaviness without radiation. She also complains of transient pain throughout her left shoulder. Currently, she denies chest pain. She reports nausea without vomiting. She denies near-syncope and syncope. Her last stress test was 30 years ago. The patient does report history of CVA approximately 1.5 years ago with some mild right-sided residual deficits. She denies worsening of unilateral deficits this evening. She denies symptoms of illness. Her second and final COVID-19 vaccination was completed on 2/4/21. The patient has surgical history of left carotid endarterectomy via Dr. Guerra.  Patient denies any recent symptoms of illness.  She denies any recent new medications or med changes.  The patient currently lists no other acute symptoms at this time.       History provided by:  Patient  Chest Pain  Pain location:  Unable to specify  Pain quality comment:  Heaviness  Pain radiates to:  Does not radiate  Pain severity:  Moderate  Onset quality:  Sudden  Duration:  1 hour  Timing:  Intermittent  Progression:  Resolved  Chronicity:  New  Context: at rest    Ineffective treatments:  None tried  Associated symptoms: dizziness, nausea, palpitations and weakness (generalized and residual right-sided deficits from previous CVA)    Associated symptoms: no back pain, no cough, no fever and no vomiting        Review of Systems   Constitutional: Negative for chills and fever.   HENT: Negative for congestion.     Respiratory: Negative for cough.    Cardiovascular: Positive for chest pain (Transient SS tightness. Currently denies experiencing chest pain) and palpitations.        Elevated blood pressure   Gastrointestinal: Positive for nausea. Negative for vomiting.   Genitourinary: Negative.    Musculoskeletal: Negative.  Negative for back pain.        Left shoulder pain   Neurological: Positive for dizziness and weakness (generalized and residual right-sided deficits from previous CVA). Negative for syncope.        No near-syncope   All other systems reviewed and are negative.      Past Medical History:   Diagnosis Date   • A-fib (CMS/[facility]) 2019   • Anxiety and depression    • Arthritis    • Cataract    • Depression    • Extremity pain    • GERD (gastroesophageal reflux disease)    • Gout    • H/O bladder infections     JUST FINISHED MACROBID ON 11-2-17 FOR RECENT UTI   • Hypercholesteremia    • Hypertension    • Joint pain    • Kidney disease, chronic, stage III (GFR 30-59 ml/min) (CMS/[facility])     resolved after stopping antiinflammatory ~2015   • Low back pain    • Neck pain    • Pneumonia 02/2020   • Rheumatic fever    • Skin cancer    • Sleep apnea     no cpap   • Stroke (CMS/[facility]) 09/2019    right sided weakness   • Wears glasses        Allergies   Allergen Reactions   • Nsaids Other (See Comments)     KIDNEY DAMAGE   • Quinidine Nausea And Vomiting and Other (See Comments)     FEVER     • Bactrim [Sulfamethoxazole-Trimethoprim] Rash   • Nitrofuran Derivatives Diarrhea   • Penicillins Rash       Past Surgical History:   Procedure Laterality Date   • BACK SURGERY  2004    LUMBAR PER DR THORNTON x2   • CARDIAC CATHETERIZATION  2019   • CARPAL TUNNEL RELEASE Left    • COLONOSCOPY  11/2020   • EYE SURGERY      retina surgery   • FINGER SURGERY Right     3RD FINGER   • HEEL SPUR EXCISION Bilateral    • INTERVENTIONAL RADIOLOGY PROCEDURE N/A 9/17/2019    Procedure: Carotid and Cerebral Angiogram/ Possible Stent;  Surgeon:  Imer Guerra MD;  Location:  [facility];  Service: Interventional Radiology   • KNEE ARTHROSCOPY Bilateral    • LUMBAR DISCECTOMY FUSION INSTRUMENTATION N/A 11/10/2017    Procedure: LUMBAR SPINAL FUSION ;  Surgeon: Gopi Man MD;  Location:  [facility];  Service:    • REPLACEMENT TOTAL KNEE BILATERAL Bilateral    • SHOULDER ARTHROSCOPY W/ ROTATOR CUFF REPAIR Right 1/19/2021    Procedure: ARTHROSCOPIC ROTATOR CUFF REPAIR WITH BICEPS TENODESIS RIGHT;  Surgeon: Lance Morales Jr., MD;  Location:  [facility];  Service: Orthopedics;  Laterality: Right;   • SKIN BIOPSY     • TOTAL HIP ARTHROPLASTY Right 9/9/2019    Procedure: TOTAL ANTERIOR RIGHT HIP ARTHROPLASTY;  Surgeon: Darrin New MD;  Location:  [facility];  Service: Orthopedics   • TOTAL HIP ARTHROPLASTY Left 6/29/2020    Procedure: TOTAL HIP ARTHROPLASTY ANTERIOR LEFT;  Surgeon: Darrin New MD;  Location:  [facility];  Service: Orthopedics;  Laterality: Left;       Family History   Problem Relation Age of Onset   • Cancer Mother    • Colon polyps Mother    • Hypertension Mother    • Cancer Father    • Hypertension Brother    • Hyperlipidemia Maternal Uncle    • Kidney disease Maternal Uncle        Social History     Socioeconomic History   • Marital status:      Spouse name: Not on file   • Number of children: Not on file   • Years of education: Not on file   • Highest education level: Not on file   Tobacco Use   • Smoking status: Never Smoker   • Smokeless tobacco: Never Used   Substance and Sexual Activity   • Alcohol use: No   • Drug use: No   • Sexual activity: Defer         Objective   Physical Exam  Vitals and nursing note reviewed.   Constitutional:       General: She is not in acute distress.     Appearance: She is well-developed.      Comments: Resting comfortably   HENT:      Head: Normocephalic and atraumatic.      Nose: Nose normal.   Eyes:      General: No scleral icterus.     Conjunctiva/sclera: Conjunctivae  normal.   Neck:      Comments: No carotid bruits  Cardiovascular:      Rate and Rhythm: Normal rate and regular rhythm.      Heart sounds: Normal heart sounds.    No diastolic murmur is present.   No friction rub. No gallop.       Comments: Regular rate and rhythm; no pedal edema; no ectopy; no rubs, gallops, or murmur  Pulmonary:      Effort: Pulmonary effort is normal. No respiratory distress.      Breath sounds: Normal breath sounds.   Abdominal:      Palpations: Abdomen is soft.      Tenderness: There is no abdominal tenderness.   Musculoskeletal:         General: Normal range of motion.      Cervical back: Normal range of motion and neck supple.   Skin:     General: Skin is warm and dry.   Neurological:      Mental Status: She is alert and oriented to person, place, and time.      Comments: smile is equal, tongue is midline;  strength 5/5. Right leg strength is 4/5 and left leg strength is 5/5. Patient states right leg has always been weaker after the stroke. No new focal deficits   Psychiatric:         Behavior: Behavior normal.         Procedures         ED Course  ED Course as of Apr 18 2341   Sun Apr 18, 2021   2338 EKGs x2 showed normal sinus rhythm.  There is no acute ST-T wave changes consistent with ischemia no evidence of ectopy or arrhythmia.  CBC and chemistries were essentially normal.  Creatinine was mildly elevated at 1.19.  Last creatinine approximately 2 months ago was 0.96.  BNP was 101.  Troponins x2 sets are less than 0.010.  Chest x-ray shows no acute cardiopulmonary process and CT the brain without contrast reveals no acute intracranial abnormality.  Heart score is 4 and [id number] score is 5.  Vital signs are stable and patient has had no chest pain throughout the entire ER evaluation.  Telemetry reveals normal sinus rhythm and there has been no evidence of atrial fibrillation or other arrhythmia.  We will refer patient closely to the chest pain clinic for recheck.  Continue with all  other current medical management.  Return to the ER if worsening symptoms.    [FC]      ED Course User Index  [FC] Reba Beck PA-C     Recent Results (from the past 24 hour(s))   Light Blue Top    Collection Time: 04/18/21  8:03 PM   Result Value Ref Range    Extra Tube hold for add-on    Troponin    Collection Time: 04/18/21  8:04 PM    Specimen: Blood   Result Value Ref Range    Troponin T <0.010 0.000 - 0.030 ng/mL   Comprehensive Metabolic Panel    Collection Time: 04/18/21  8:04 PM    Specimen: Blood   Result Value Ref Range    Glucose 146 (H) 65 - 99 mg/dL    BUN 15 8 - 23 mg/dL    Creatinine 1.19 (H) 0.57 - 1.00 mg/dL    Sodium 143 136 - 145 mmol/L    Potassium 3.9 3.5 - 5.2 mmol/L    Chloride 103 98 - 107 mmol/L    CO2 29.0 22.0 - 29.0 mmol/L    Calcium 8.8 8.6 - 10.5 mg/dL    Total Protein 7.0 6.0 - 8.5 g/dL    Albumin 4.20 3.50 - 5.20 g/dL    ALT (SGPT) 10 1 - 33 U/L    AST (SGOT) 17 1 - 32 U/L    Alkaline Phosphatase 141 (H) 39 - 117 U/L    Total Bilirubin 0.5 0.0 - 1.2 mg/dL    eGFR Non African Amer 44 (L) >60 mL/min/1.73    Globulin 2.8 gm/dL    A/G Ratio 1.5 g/dL    BUN/Creatinine Ratio 12.6 7.0 - 25.0    Anion Gap 11.0 5.0 - 15.0 mmol/L   Lipase    Collection Time: 04/18/21  8:04 PM    Specimen: Blood   Result Value Ref Range    Lipase 24 13 - 60 U/L   BNP    Collection Time: 04/18/21  8:04 PM    Specimen: Blood   Result Value Ref Range    proBNP 101.6 0.0-1,800.0 pg/mL   Green Top (Gel)    Collection Time: 04/18/21  8:04 PM   Result Value Ref Range    Extra Tube Hold for add-ons.    Lavender Top    Collection Time: 04/18/21  8:04 PM   Result Value Ref Range    Extra Tube hold for add-on    CBC Auto Differential    Collection Time: 04/18/21  8:04 PM    Specimen: Blood   Result Value Ref Range    WBC 10.83 (H) 3.40 - 10.80 10*3/mm3    RBC 5.01 3.77 - 5.28 10*6/mm3    Hemoglobin 14.3 12.0 - 15.9 g/dL    Hematocrit 46.6 34.0 - 46.6 %    MCV 93.0 79.0 - 97.0 fL    MCH 28.5 26.6 - 33.0 pg    MCHC 30.7  (L) 31.5 - 35.7 g/dL    RDW 16.9 (H) 12.3 - 15.4 %    RDW-SD 57.8 (H) 37.0 - 54.0 fl    MPV 10.0 6.0 - 12.0 fL    Platelets 237 140 - 450 10*3/mm3    Neutrophil % 68.3 42.7 - 76.0 %    Lymphocyte % 24.1 19.6 - 45.3 %    Monocyte % 4.8 (L) 5.0 - 12.0 %    Eosinophil % 2.1 0.3 - 6.2 %    Basophil % 0.4 0.0 - 1.5 %    Immature Grans % 0.3 0.0 - 0.5 %    Neutrophils, Absolute 7.40 (H) 1.70 - 7.00 10*3/mm3    Lymphocytes, Absolute 2.61 0.70 - 3.10 10*3/mm3    Monocytes, Absolute 0.52 0.10 - 0.90 10*3/mm3    Eosinophils, Absolute 0.23 0.00 - 0.40 10*3/mm3    Basophils, Absolute 0.04 0.00 - 0.20 10*3/mm3    Immature Grans, Absolute 0.03 0.00 - 0.05 10*3/mm3    nRBC 0.0 0.0 - 0.2 /100 WBC   Gold Top - SST    Collection Time: 04/18/21  8:12 PM   Result Value Ref Range    Extra Tube Hold for add-ons.    Troponin    Collection Time: 04/18/21  9:48 PM    Specimen: Blood   Result Value Ref Range    Troponin T <0.010 0.000 - 0.030 ng/mL     Note: In addition to lab results from this visit, the labs listed above may include labs taken at another facility or during a different encounter within the last 24 hours. Please correlate lab times with ED admission and discharge times for further clarification of the services performed during this visit.    CT Head Without Contrast   Final Result   Senescent changes without acute abnormality.               Signer Name: Salty Enrique MD    Signed: 4/18/2021 10:38 PM    Work[facility]      XR Chest 1 View   Final Result   No acute cardiopulmonary findings.      Signer Name: Salty Enrique MD    Signed: 4/18/2021 8:29 PM    [facility]        Vitals:    04/18/21 1945 04/18/21 2107   BP: 141/83 137/73   BP Location: Right arm    Patient Position: Sitting    Pulse: 94 76   Resp: 20 18   Temp: 98.2 °F (36.8 °C)    TempSrc: Oral    SpO2: 98% 98%   Weight: 86.2 kg (190 lb)    Height: 160  "cm (63\")      Medications   sodium chloride 0.9 % flush 10 mL (has no administration in time range)     ECG/EMG Results (last 24 hours)     Procedure Component Value Units Date/Time    ECG 12 Lead [[id number]] Collected: 04/18/21 1958     Updated: 04/18/21 1959    ECG 12 Lead [[id number]] Collected: 04/18/21 2155     Updated: 04/18/21 2155        ECG 12 Lead         ECG 12 Lead                                              [id number] Score (for atrial fibrillation stroke risk) reviewed and/or performed as part of the patient evaluation and treatment planning process.  The result associated with this review/performance is: 5    HEART Score (for prediction of 6-week risk of major adverse cardiac event) reviewed and/or performed as part of the patient evaluation and treatment planning process.  The result associated with this review/performance is: 4           MDM    Final diagnoses:   Palpitations   Atypical chest pain   Dizziness   History of cerebrovascular accident (CVA) with residual deficit   History of atrial fibrillation   History of hypertension   History of hyperlipidemia       Documentation assistance provided by carmen New.  Information recorded by the carmen was done at my direction and has been verified and validated by me.     Alejandro New  04/18/21 2247       Alejandro New  04/18/21 2309       Alejandro New  04/18/21 2310       Reba Beck PA-C  04/18/21 2349    "

## 2021-04-19 NOTE — DISCHARGE INSTRUCTIONS
ER evaluation revealed normal cardiac work-up with 2 stable EKGs and 2 normal troponins.  No evidence of arrhythmia or ischemia.  CT the head without contrast reveals no acute intracranial abnormality.  All other labs, including CBC and chemistries were within normal limits.  We will refer patient closely to the chest pain clinic for recheck.  Heart rate has been normal throughout the ER work-up and patient has not had any chest pain.  Continue with all other current medical management.  Return to the ER if worsening symptoms.

## 2021-04-20 ENCOUNTER — TELEPHONE (OUTPATIENT)
Dept: CARDIOLOGY | Facility: HOSPITAL | Age: 76
End: 2021-04-20

## 2021-04-20 NOTE — TELEPHONE ENCOUNTER
----- Message from CRYS Soriano sent at 4/20/2021  2:10 PM EDT -----  Regarding: New pt  Contact: [phone]  Pt seen in ER on Sunday. She is on your schedule for tomorrow AM. Can you call her at [phone], she does not want to go back to ER [facility]. She is just wanting to know she is doing the right thing. I think she is fine, just scared and anxious. Her  is too. Stroke 1 year ago, stent placed by Dr Guerra. Let me know what I should do with her. Or I can have MA call her back. Thanks     normal...

## 2021-04-20 NOTE — PROGRESS NOTES
Chief Complaint  Establish Care, Palpitations, and Chest Pain    Subjective    History of Present Illness {CC  Problem List  Visit  Diagnosis   Encounters  Notes  Medications  Labs  Result Review Imaging  Media :23}     Akua Torres presents to [facility] for   History of Present Illness   75-year-old female with hypertension, paroxysmal atrial fibrillation/SVT, anxiety/depression carotid artery stenosis status post LICA stent placement 2019, CVA who presents today for evaluation of palpitations and chest pain at the request of HALEY Britton.  Patient presented the ED on 4/18 with complaints of elevated blood pressure, palpitations and chest pain.  She reports she had transient episodes like this in the past but usually are short-lived.  On the day of the ED visit symptoms lasted for over an hour and a blood pressure was fluctuating near 170/90 and heart rates were in the high 90s and therefore she presented to the ED.  She notes generalized weakness and chest heaviness without radiation.  She denies near syncope or syncope.  She had a stroke approximately 1/2 years ago with some mild right-sided residual deficits and also had a left internal carotid artery stent by Dr. Guerra.  Work-up in ED was unremarkable.  She reports that over the past 6 months or so she has been having worsening HUFF, she says says that now she is short of breath and weak just walking around her house. She notes some orthopnea when first laying down but improves when she relaxes. She has associated chest pressure and intermittent episodes of fast heart rates and dizziness that are now happening more often. She thinks she had a LHC around 2 years ago at [facility] that was reportedly normal. [facility] reports that she was told she probably had afib but no definitive diagnosis. She says she wore a heart monitor many years ago for 24 hours  She does have anxiety and thinks this might contribute, hydroxyzine seems  "to help. She admits to a lot of stress in general but no new stressors  Objective     Vital Signs:   Vitals:    04/21/21 0827 04/21/21 0828   BP: 156/73 148/67   BP Location: Left arm Left arm   Patient Position: Standing Sitting   Cuff Size: Large Adult Large Adult   Pulse: 69 65   Resp:  20   Temp:  97.5 °F (36.4 °C)   TempSrc:  Temporal   SpO2: 96% 99%   Weight:  89.9 kg (198 lb 2 oz)   Height:  160 cm (63\")     Body mass index is 35.1 kg/m².  Physical Exam  Vitals reviewed.   Constitutional:       Appearance: Normal appearance.   HENT:      Head: Normocephalic.   Eyes:      Extraocular Movements: Extraocular movements intact.      Pupils: Pupils are equal, round, and reactive to light.   Neck:      Vascular: No carotid bruit.   Cardiovascular:      Rate and Rhythm: Normal rate and regular rhythm.      Pulses: Normal pulses.      Heart sounds: Normal heart sounds, S1 normal and S2 normal. No murmur heard.     Pulmonary:      Effort: Pulmonary effort is normal. No respiratory distress.      Breath sounds: Normal breath sounds.   Chest:      Chest wall: No tenderness.   Abdominal:      General: Abdomen is flat. Bowel sounds are normal.      Palpations: Abdomen is soft.   Musculoskeletal:      Cervical back: Neck supple.      Right lower leg: No edema.      Left lower leg: No edema.   Skin:     General: Skin is warm and dry.   Neurological:      General: No focal deficit present.      Mental Status: She is alert and oriented to person, place, and time. Mental status is at baseline.   Psychiatric:         Mood and Affect: Mood normal.         Behavior: Behavior normal.         Thought Content: Thought content normal.              Result Review  Data Reviewed:{ Labs  Result Review  Imaging  Med Tab  Media :23}   ECG 12 Lead (04/18/2021 21:55)  ECG 12 Lead (04/18/2021 19:58)  Troponin (04/18/2021 21:48)  BNP (04/18/2021 20:04)  Lipase (04/18/2021 20:04)  CBC & Differential (04/18/2021 20:04)  Comprehensive " Metabolic Panel (04/18/2021 20:04)  Troponin (04/18/2021 20:04)  CT Head Without Contrast (04/18/2021 22:19)  XR Chest 1 View (04/18/2021 20:23)  ECG 12 Lead (04/18/2021 21:55)  ECG 12 Lead (04/18/2021 19:58)  SCANNED - ECHOCARDIOGRAM (10/09/2020)  Duplex Carotid Ultrasound CAR (10/17/2019 13:05)  Adult Transthoracic Echo Complete W/ Cont if Necessary Per Protocol (With Agitated Saline) (09/11/2019 17:35)    Consultant notes Reba Beck PA-C            Assessment and Plan {CC Problem List  Visit Diagnosis  ROS  Review (Popup)  Health Maintenance  Quality  BestPractice  Medications  SmartSets  SnapShot Encounters  Media :23}   1. Chest pain, unspecified type  Exertional chest pressure-ordered lexiscan stress urgently  - Stress Test With Myocardial Perfusion (1 Day); Future  - Adult Transthoracic Echo Complete W/ Cont if Necessary Per Protocol; Future    2. HUFF (dyspnea on exertion)  Normal proBNP, chest x-ray and no evidence of fluid overload. Possible anginal equivalent  - Stress Test With Myocardial Perfusion (1 Day); Future  - Adult Transthoracic Echo Complete W/ Cont if Necessary Per Protocol; Future    3. Palpitations  High risk for A. fib. A. fib and SVT noted in her chart but she does not recall a formal diagnosis of this. She reports that it was presumed but was never treated with anticoagulation  - Mobile Cardiac Outpatient Telemetry; Future  - Adult Transthoracic Echo Complete W/ Cont if Necessary Per Protocol; Future    4. Bilateral carotid artery stenosis  Status post LICA stent 2019  - Stress Test With Myocardial Perfusion (1 Day); Future  - Duplex Carotid Ultrasound CAR; Future    5. Essential hypertension  Mildly elevated today but she admits that she is anxious. Her blood pressures at home are well controlled. Continue to monitor at home  - Stress Test With Myocardial Perfusion (1 Day); Future  - Adult Transthoracic Echo Complete W/ Cont if Necessary Per Protocol; Future    6.  Hyperlipidemia, unspecified hyperlipidemia type  Continue statin therapy  - Stress Test With Myocardial Perfusion (1 Day); Future          Follow Up {Instructions Charge Capture  Follow-up Communications :23}   Return in about 6 weeks (around 6/2/2021) for Monitor results, Telemedicine.    Patient was given instructions and counseling regarding her condition or for health maintenance advice. Please see specific information pulled into the AVS if appropriate.  Patient was instructed to call the [facility] with any questions, concerns, or worsening symptoms.    *Please note that portions of this note were completed with a voice recognition program. Efforts were made to edit the dictations, but occasionally words are mistranscribed.

## 2021-04-20 NOTE — TELEPHONE ENCOUNTER
"I called patient and she reports that she had another \"spell\" today with her heart racing, headache, a \"knot\" in her chest, and difficulty swallowing. She says her chest is \"heavy\" for an hour and she is short of breath. She is very nervous she is going to have another stroke. Reports that's her palpitations are better currently but still thinks her pulse is too fast. She says her BP is 132/69 and her HR is 84. Her  rechecked it again and her BP was 122/78 and HR was 77. She is tearful. She says she has \"spells\" like this in the past, but usually they are shorter and not this severe. She says she feels so weak. I advised her that since I have never met her before that I do not feel comfortable tell her it is safe to wait until her appt tomorrow. I advised her if she feels that bad then to go to the ED. She is hesitant to do this because she went with similar symptoms Sunday and everything was ok.  I told her that things can change and just because she had a normal work up Sunday, it doesn't mean that she is ok today. She thinks it may be her anxiety. I advised her that I will be happy to see her in the morning but she needs to go to the ED if she has any worsening symptoms.   "

## 2021-04-21 ENCOUNTER — OFFICE VISIT (OUTPATIENT)
Dept: CARDIOLOGY | Facility: HOSPITAL | Age: 76
End: 2021-04-21

## 2021-04-21 ENCOUNTER — HOSPITAL ENCOUNTER (OUTPATIENT)
Dept: CARDIOLOGY | Facility: HOSPITAL | Age: 76
Discharge: HOME | End: 2021-04-21

## 2021-04-21 VITALS
RESPIRATION RATE: 20 BRPM | BODY MASS INDEX: 35.11 KG/M2 | SYSTOLIC BLOOD PRESSURE: 148 MMHG | OXYGEN SATURATION: 99 % | DIASTOLIC BLOOD PRESSURE: 67 MMHG | WEIGHT: 198.13 LBS | TEMPERATURE: 97.5 F | HEIGHT: 63 IN | HEART RATE: 65 BPM

## 2021-04-21 DIAGNOSIS — R07.9 CHEST PAIN, UNSPECIFIED TYPE: Primary | ICD-10-CM

## 2021-04-21 DIAGNOSIS — R06.09 DOE (DYSPNEA ON EXERTION): ICD-10-CM

## 2021-04-21 DIAGNOSIS — R00.2 PALPITATIONS: ICD-10-CM

## 2021-04-21 DIAGNOSIS — E78.5 HYPERLIPIDEMIA, UNSPECIFIED HYPERLIPIDEMIA TYPE: ICD-10-CM

## 2021-04-21 DIAGNOSIS — I65.23 BILATERAL CAROTID ARTERY STENOSIS: ICD-10-CM

## 2021-04-21 DIAGNOSIS — I10 ESSENTIAL HYPERTENSION: ICD-10-CM

## 2021-04-21 PROCEDURE — 99214 OFFICE O/P EST MOD 30 MIN: CPT | Performed by: NURSE PRACTITIONER

## 2021-04-21 NOTE — PROGRESS NOTES
[facility] Heart Monitor Documentation    Akua Torres  1945  [id number]  04/21/21    WILFREDO Wright    [] ZIO XT Patch  Model [id number] Prescribed for N/A Days    · Serial Number: (N + 9 Digits) N   · Apply-By Date on Box:   · USPS Tracking Number:   · USPS Tracking        [] [employer] BodyGuardian MINI PLUS Mobile Cardiac Telemetry  Model BGMINIPLUS Prescribed for 30 Days    · Serial Number: (BGM + 7 Digits) [id number]  · Shipped-By Date on Box: 702772  · UPS Tracking Number: [id number]  · UPS Tracking      [] [employer]ian MINI Holter Monitor  Model BGMINIEL Prescribed for N/A Days    · Serial Number: (7 Digits)   · Shipped-By Date on Box:   · UPS Tracking Number: [id number]  · UPS Tracking        This monitor was applied to the patient's chest and checked for proper functioning.  Ms. Akua Torres was instructed in the proper use of this monitor.  She was given the opportunity to ask questions and left the office with the device 's instruction manual.    Jennifer Juarez MA, 08:54 EDT, 04/21/21                  [facility]

## 2021-04-23 LAB
QT INTERVAL: 332 MS
QT INTERVAL: 388 MS
QTC INTERVAL: 433 MS
QTC INTERVAL: 451 MS

## 2021-04-26 ENCOUNTER — HOSPITAL ENCOUNTER (OUTPATIENT)
Dept: CARDIOLOGY | Facility: HOSPITAL | Age: 76
Discharge: HOME | End: 2021-04-26
Admitting: NURSE PRACTITIONER

## 2021-04-26 ENCOUNTER — PATIENT OUTREACH (OUTPATIENT)
Dept: CASE MANAGEMENT | Facility: OTHER | Age: 76
End: 2021-04-26

## 2021-04-26 VITALS — BODY MASS INDEX: 35.08 KG/M2 | WEIGHT: 198 LBS | HEIGHT: 63 IN

## 2021-04-26 DIAGNOSIS — R06.09 DOE (DYSPNEA ON EXERTION): ICD-10-CM

## 2021-04-26 DIAGNOSIS — R00.2 PALPITATIONS: ICD-10-CM

## 2021-04-26 DIAGNOSIS — I10 ESSENTIAL HYPERTENSION: ICD-10-CM

## 2021-04-26 DIAGNOSIS — R07.9 CHEST PAIN, UNSPECIFIED TYPE: ICD-10-CM

## 2021-04-26 LAB
ASCENDING AORTA: 3.2 CM
BH CV ECHO MEAS - AO MAX PG (FULL): 0.82 MMHG
BH CV ECHO MEAS - AO MAX PG: 9 MMHG
BH CV ECHO MEAS - AO MEAN PG (FULL): 1 MMHG
BH CV ECHO MEAS - AO MEAN PG: 5 MMHG
BH CV ECHO MEAS - AO ROOT AREA (BSA CORRECTED): 1.4
BH CV ECHO MEAS - AO ROOT AREA: 5.3 CM^2
BH CV ECHO MEAS - AO ROOT DIAM: 2.6 CM
BH CV ECHO MEAS - AO V2 MAX: 150 CM/SEC
BH CV ECHO MEAS - AO V2 MEAN: 107 CM/SEC
BH CV ECHO MEAS - AO V2 VTI: 34.4 CM
BH CV ECHO MEAS - ASC AORTA: 3.2 CM
BH CV ECHO MEAS - AVA(I,A): 3 CM^2
BH CV ECHO MEAS - AVA(I,D): 3 CM^2
BH CV ECHO MEAS - AVA(V,A): 3 CM^2
BH CV ECHO MEAS - AVA(V,D): 3 CM^2
BH CV ECHO MEAS - BSA(HAYCOCK): 2 M^2
BH CV ECHO MEAS - BSA: 1.9 M^2
BH CV ECHO MEAS - BZI_BMI: 35.1 KILOGRAMS/M^2
BH CV ECHO MEAS - BZI_METRIC_HEIGHT: 160 CM
BH CV ECHO MEAS - BZI_METRIC_WEIGHT: 89.8 KG
BH CV ECHO MEAS - EDV(CUBED): 42.5 ML
BH CV ECHO MEAS - EDV(MOD-SP2): 36 ML
BH CV ECHO MEAS - EDV(MOD-SP4): 40 ML
BH CV ECHO MEAS - EDV(TEICH): 50.5 ML
BH CV ECHO MEAS - EF(CUBED): 79.4 %
BH CV ECHO MEAS - EF(MOD-BP): 67 %
BH CV ECHO MEAS - EF(MOD-SP2): 66.7 %
BH CV ECHO MEAS - EF(MOD-SP4): 67.5 %
BH CV ECHO MEAS - EF(TEICH): 72.8 %
BH CV ECHO MEAS - ESV(CUBED): 8.7 ML
BH CV ECHO MEAS - ESV(MOD-SP2): 12 ML
BH CV ECHO MEAS - ESV(MOD-SP4): 13 ML
BH CV ECHO MEAS - ESV(TEICH): 13.7 ML
BH CV ECHO MEAS - FS: 41 %
BH CV ECHO MEAS - IVS/LVPW: 0.91
BH CV ECHO MEAS - IVSD: 0.87 CM
BH CV ECHO MEAS - LA DIMENSION: 3.2 CM
BH CV ECHO MEAS - LA/AO: 1.2
BH CV ECHO MEAS - LAD MAJOR: 5.3 CM
BH CV ECHO MEAS - LAT PEAK E' VEL: 7.1 CM/SEC
BH CV ECHO MEAS - LATERAL E/E' RATIO: 11.8
BH CV ECHO MEAS - LV DIASTOLIC VOL/BSA (35-75): 20.8 ML/M^2
BH CV ECHO MEAS - LV IVRT: 0.1 SEC
BH CV ECHO MEAS - LV MASS(C)D: 90.2 GRAMS
BH CV ECHO MEAS - LV MASS(C)DI: 46.9 GRAMS/M^2
BH CV ECHO MEAS - LV MAX PG: 8.2 MMHG
BH CV ECHO MEAS - LV MEAN PG: 4 MMHG
BH CV ECHO MEAS - LV SYSTOLIC VOL/BSA (12-30): 6.8 ML/M^2
BH CV ECHO MEAS - LV V1 MAX: 143 CM/SEC
BH CV ECHO MEAS - LV V1 MEAN: 96.2 CM/SEC
BH CV ECHO MEAS - LV V1 VTI: 32.7 CM
BH CV ECHO MEAS - LVIDD: 3.5 CM
BH CV ECHO MEAS - LVIDS: 2.1 CM
BH CV ECHO MEAS - LVLD AP2: 6.1 CM
BH CV ECHO MEAS - LVLD AP4: 6.6 CM
BH CV ECHO MEAS - LVLS AP2: 5.3 CM
BH CV ECHO MEAS - LVLS AP4: 6.2 CM
BH CV ECHO MEAS - LVOT AREA (M): 3.1 CM^2
BH CV ECHO MEAS - LVOT AREA: 3.1 CM^2
BH CV ECHO MEAS - LVOT DIAM: 2 CM
BH CV ECHO MEAS - LVPWD: 0.96 CM
BH CV ECHO MEAS - MED PEAK E' VEL: 7.2 CM/SEC
BH CV ECHO MEAS - MEDIAL E/E' RATIO: 11.7
BH CV ECHO MEAS - MV A MAX VEL: 95.8 CM/SEC
BH CV ECHO MEAS - MV DEC SLOPE: 405 CM/SEC^2
BH CV ECHO MEAS - MV DEC TIME: 0.21 SEC
BH CV ECHO MEAS - MV E MAX VEL: 84.4 CM/SEC
BH CV ECHO MEAS - MV E/A: 0.88
BH CV ECHO MEAS - MV P1/2T MAX VEL: 100 CM/SEC
BH CV ECHO MEAS - MV P1/2T: 72.3 MSEC
BH CV ECHO MEAS - MVA P1/2T LCG: 2.2 CM^2
BH CV ECHO MEAS - MVA(P1/2T): 3 CM^2
BH CV ECHO MEAS - PA ACC SLOPE: 572 CM/SEC^2
BH CV ECHO MEAS - PA ACC TIME: 0.14 SEC
BH CV ECHO MEAS - PA PR(ACCEL): 18.3 MMHG
BH CV ECHO MEAS - SI(AO): 94.8 ML/M^2
BH CV ECHO MEAS - SI(CUBED): 17.5 ML/M^2
BH CV ECHO MEAS - SI(LVOT): 53.4 ML/M^2
BH CV ECHO MEAS - SI(MOD-SP2): 12.5 ML/M^2
BH CV ECHO MEAS - SI(MOD-SP4): 14 ML/M^2
BH CV ECHO MEAS - SI(TEICH): 19.1 ML/M^2
BH CV ECHO MEAS - SV(AO): 182.6 ML
BH CV ECHO MEAS - SV(CUBED): 33.8 ML
BH CV ECHO MEAS - SV(LVOT): 102.7 ML
BH CV ECHO MEAS - SV(MOD-SP2): 24 ML
BH CV ECHO MEAS - SV(MOD-SP4): 27 ML
BH CV ECHO MEAS - SV(TEICH): 36.8 ML
BH CV ECHO MEAS - TAPSE (>1.6): 2.2 CM
BH CV ECHO MEASUREMENTS AVERAGE E/E' RATIO: 11.8
BH CV VAS BP RIGHT ARM: NORMAL MMHG
BH CV XLRA - RV BASE: 2.9 CM
BH CV XLRA - RV LENGTH: 5 CM
BH CV XLRA - RV MID: 2.1 CM
BH CV XLRA - TDI S': 15.7 CM/SEC
LEFT ATRIUM VOLUME INDEX: 18.7 ML/M^2
LEFT ATRIUM VOLUME: 36 ML
MAXIMAL PREDICTED HEART RATE: 144 BPM
STRESS TARGET HR: 122 BPM

## 2021-04-26 PROCEDURE — 93306 TTE W/DOPPLER COMPLETE: CPT

## 2021-04-26 PROCEDURE — 93306 TTE W/DOPPLER COMPLETE: CPT | Performed by: INTERNAL MEDICINE

## 2021-04-26 NOTE — OUTREACH NOTE
Care Plan Note      Responses   Annual Wellness Visit:   Patient Has Completed [Patient stated she had her AWV in 2020, she believes it was in the Fall of 2020.]   Care Gaps Addressed  Other (See Comment), Colon Cancer Screening, Flu Shot, Pneumonia Vaccine [Has had Covid Vaccine x 2 this year, ?Feb or March 2021]   Colon Cancer Screening Type  Colonoscopy   Colonoscopy Status  Up to Date (< 10 yrs)   Flu Shot Status  Up to Date   Pneumonia Vaccine Status  Up to Date   Specific Disease Process Teaching  Hypertension   Other Patient Education/Resources   24/7 [facility] Nurse Call Line   Does patient have depression diagnosis?  Yes   Advanced Directives:  Patient Has   Ed Visits past 12 months:  1   Hospitalizations past 12 months  2 or 3   Medication Adherence  Medications understood        The main concerns and/or symptoms the patient would like to address are:  Had ED visit 4-18-21 with chest pain, palpitations, elevated Blood Pressure.  Patient stated she has seen Cardiology since the ED visit; she is wearing a Heart Monitor for a month; she had an ECHOcardiogram this morning.  Stated she has only had one other brief episode of chest pain right after the ED visit, but not since.  Stated the Cardio. Office is planning for patient to have a Stress Test.  Patient said she is able to walk around okay; uses a Cane when out of house.  Said she feels tired all the time; gets out of breath with exertion, but sitting to rest resolves it.  Voiced independence with ADL's, Mobility, Driving and Medications; compliance daily with meds as ordered.      Education/instruction provided by Care Coordinator:   Explained role of Ambulatory  and contact information.  Discussed ED discharge recommendations/ AVS.  Discussed HTN education including importance of daily BP monitor, Low sodium diet, exercise as tolerated, compliance daily with medications.  Patient said she has not checked her BP in about a week, but  she knows she needs to.  Reminded of [facility] 24/7 Nurse Phone line available for her to use.  Discussed Medicare's AWV (see above).  Discussed open Care Gaps (see above).  Noted, Advanced Directives are on file; Samuel is active.  Discussed importance of close PCP follow up.  Patient said she saw her PCP a couple of months ago, and will see him again in the summer.  Patient voiced comfort letting the Cardiologist/ Heart Center follow with her right now.      Follow Up Outreach Due:   As needed.    Tomasa Man RN  Ambulatory     4/26/2021, 16:05 EDT

## 2021-04-26 NOTE — OUTREACH NOTE
Care Coordination Assessment    Documented/Reviewed By: Tomasa Man RN Date/time: 4/26/2021  4:00 PM   Assessment completed with: patient  Enrolled in care management program: No  Living arrangement: spouse  Support system: spouse  Type of residence: private residence  Home care services: No  Equipment used at home: cane (Comment: BP Monitor)  Communication device: No  Bed or wheelchair confined: No  Inadequate nutrition: No  Medication adherence problem: No  Experiencing side effects from current medications: No  History of fall(s) in last 6 months: No  Difficulty keeping appointments: No  Family aware of the patient's advance care planning wishes: Yes

## 2021-05-09 ENCOUNTER — APPOINTMENT (OUTPATIENT)
Dept: PREADMISSION TESTING | Facility: HOSPITAL | Age: 76
End: 2021-05-09

## 2021-05-10 ENCOUNTER — TELEPHONE (OUTPATIENT)
Dept: CARDIOLOGY | Facility: HOSPITAL | Age: 76
End: 2021-05-10

## 2021-05-10 NOTE — TELEPHONE ENCOUNTER
Received event monitor transmission which showed an episode of paroxysmal SVT around 2:15 am for 38 seconds. Attempted to call pt but she was unavailable, discussed with her  and he says she did feel it.  She has a stress test and echo scheduled tomorrow.  Continue atenolol for now.  According to records she has had this in the past.  We will continue to monitor.  He had no further questions or concerns.

## 2021-05-10 NOTE — TELEPHONE ENCOUNTER
----- Message from CRYS Soriano sent at 5/10/2021  4:37 PM EDT -----  Regarding: RE: stress & carotid  She's just not feeling well she says.   ----- Message -----  From: Lulu Rojas APRN  Sent: 5/10/2021   4:36 PM EDT  To: CRYS Soriano  Subject: RE: stress & carotid                             That's strange. I just talked to her  today and advised her to have them done. Did she give a reason?  ----- Message -----  From: CRYS Soriano  Sent: 5/10/2021   4:34 PM EDT  To: WILFREDO Del Cid  Subject: stress & carotid                                 Patient called just now to cancel her tests for tomorrow. She does not want testing and does not want to reschedule at this time...

## 2021-05-11 ENCOUNTER — TELEPHONE (OUTPATIENT)
Dept: CARDIOLOGY | Facility: HOSPITAL | Age: 76
End: 2021-05-11

## 2021-05-11 ENCOUNTER — APPOINTMENT (OUTPATIENT)
Dept: CARDIOLOGY | Facility: HOSPITAL | Age: 76
End: 2021-05-11

## 2021-05-11 ENCOUNTER — HOSPITAL ENCOUNTER (OUTPATIENT)
Dept: CARDIOLOGY | Facility: HOSPITAL | Age: 76
End: 2021-05-11

## 2021-05-11 NOTE — TELEPHONE ENCOUNTER
----- Message from Maribel Desir RN sent at 5/10/2021  3:56 PM EDT -----  Regarding: please call patient and address thanks    ----- Message -----  From: Frida Parish MA  Sent: 5/7/2021  12:02 PM EDT  To: Maribel Desir RN    Hey Girl this patient called and said that she is having problems with her monitor. She said that she was given my number to call. However it was ordered by  Lulu Rojas. Patient said that her skin was raw and burning. Can someone give her a call?   Thanks, Frida

## 2021-05-13 NOTE — TELEPHONE ENCOUNTER
Called patient to assist with skin irritation due to monitor strips. Patient stated she has already sent her monitor back.

## 2021-06-01 PROCEDURE — 93228 REMOTE 30 DAY ECG REV/REPORT: CPT | Performed by: INTERNAL MEDICINE

## 2021-06-02 ENCOUNTER — TELEPHONE (OUTPATIENT)
Dept: CARDIOLOGY | Facility: HOSPITAL | Age: 76
End: 2021-06-02

## 2021-06-02 NOTE — TELEPHONE ENCOUNTER
Reviewed monitor results.  Patient canceled her follow-up with me.  Attempted to call patient to discuss results.  Discussed results with her  and advised him that there were a couple episodes of SVT, which according to records she has had in the past.  Episodes of SVT lasted 38 and 30 seconds.  He says she was doing fine.  I advised him to call me if she continues to have symptoms and to reschedule her appointment.  He verbalized understanding with no further questions or concerns

## 2021-07-27 ENCOUNTER — APPOINTMENT (OUTPATIENT)
Dept: CT IMAGING | Facility: HOSPITAL | Age: 76
End: 2021-07-27

## 2021-07-27 ENCOUNTER — HOSPITAL ENCOUNTER (EMERGENCY)
Facility: HOSPITAL | Age: 76
Discharge: HOME | End: 2021-07-27
Attending: EMERGENCY MEDICINE | Admitting: EMERGENCY MEDICINE

## 2021-07-27 ENCOUNTER — APPOINTMENT (OUTPATIENT)
Dept: GENERAL RADIOLOGY | Facility: HOSPITAL | Age: 76
End: 2021-07-27

## 2021-07-27 VITALS
RESPIRATION RATE: 18 BRPM | TEMPERATURE: 97.1 F | HEIGHT: 63 IN | BODY MASS INDEX: 31.89 KG/M2 | SYSTOLIC BLOOD PRESSURE: 139 MMHG | DIASTOLIC BLOOD PRESSURE: 62 MMHG | WEIGHT: 180 LBS | OXYGEN SATURATION: 96 % | HEART RATE: 70 BPM

## 2021-07-27 DIAGNOSIS — R51.9 ACUTE NONINTRACTABLE HEADACHE, UNSPECIFIED HEADACHE TYPE: ICD-10-CM

## 2021-07-27 DIAGNOSIS — M62.838 CERVICAL PARASPINOUS MUSCLE SPASM: Primary | ICD-10-CM

## 2021-07-27 LAB
ANION GAP SERPL CALCULATED.3IONS-SCNC: 12 MMOL/L (ref 5–15)
BACTERIA UR QL AUTO: ABNORMAL /HPF
BASOPHILS # BLD AUTO: 0.05 10*3/MM3 (ref 0–0.2)
BASOPHILS NFR BLD AUTO: 0.3 % (ref 0–1.5)
BILIRUB UR QL STRIP: NEGATIVE
BUN SERPL-MCNC: 34 MG/DL (ref 8–23)
BUN/CREAT SERPL: 30.6 (ref 7–25)
CALCIUM SPEC-SCNC: 9.4 MG/DL (ref 8.6–10.5)
CHLORIDE SERPL-SCNC: 100 MMOL/L (ref 98–107)
CLARITY UR: CLEAR
CO2 SERPL-SCNC: 25 MMOL/L (ref 22–29)
COLOR UR: YELLOW
CREAT SERPL-MCNC: 1.11 MG/DL (ref 0.57–1)
DEPRECATED RDW RBC AUTO: 50.7 FL (ref 37–54)
EOSINOPHIL # BLD AUTO: 0.07 10*3/MM3 (ref 0–0.4)
EOSINOPHIL NFR BLD AUTO: 0.4 % (ref 0.3–6.2)
ERYTHROCYTE [DISTWIDTH] IN BLOOD BY AUTOMATED COUNT: 14.5 % (ref 12.3–15.4)
GFR SERPL CREATININE-BSD FRML MDRD: 48 ML/MIN/1.73
GLUCOSE SERPL-MCNC: 133 MG/DL (ref 65–99)
GLUCOSE UR STRIP-MCNC: NEGATIVE MG/DL
HCT VFR BLD AUTO: 45.7 % (ref 34–46.6)
HGB BLD-MCNC: 14 G/DL (ref 12–15.9)
HGB UR QL STRIP.AUTO: NEGATIVE
HYALINE CASTS UR QL AUTO: ABNORMAL /LPF
IMM GRANULOCYTES # BLD AUTO: 0.07 10*3/MM3 (ref 0–0.05)
IMM GRANULOCYTES NFR BLD AUTO: 0.4 % (ref 0–0.5)
KETONES UR QL STRIP: NEGATIVE
LEUKOCYTE ESTERASE UR QL STRIP.AUTO: ABNORMAL
LYMPHOCYTES # BLD AUTO: 1.88 10*3/MM3 (ref 0.7–3.1)
LYMPHOCYTES NFR BLD AUTO: 11.4 % (ref 19.6–45.3)
MCH RBC QN AUTO: 29.4 PG (ref 26.6–33)
MCHC RBC AUTO-ENTMCNC: 30.6 G/DL (ref 31.5–35.7)
MCV RBC AUTO: 95.8 FL (ref 79–97)
MONOCYTES # BLD AUTO: 1 10*3/MM3 (ref 0.1–0.9)
MONOCYTES NFR BLD AUTO: 6.1 % (ref 5–12)
NEUTROPHILS NFR BLD AUTO: 13.44 10*3/MM3 (ref 1.7–7)
NEUTROPHILS NFR BLD AUTO: 81.4 % (ref 42.7–76)
NITRITE UR QL STRIP: NEGATIVE
NRBC BLD AUTO-RTO: 0 /100 WBC (ref 0–0.2)
PH UR STRIP.AUTO: <=5 [PH] (ref 5–8)
PLATELET # BLD AUTO: 212 10*3/MM3 (ref 140–450)
PMV BLD AUTO: 10.4 FL (ref 6–12)
POTASSIUM SERPL-SCNC: 4.4 MMOL/L (ref 3.5–5.2)
PROT UR QL STRIP: NEGATIVE
QT INTERVAL: 370 MS
QTC INTERVAL: 413 MS
RBC # BLD AUTO: 4.77 10*6/MM3 (ref 3.77–5.28)
RBC # UR: ABNORMAL /HPF
REF LAB TEST METHOD: ABNORMAL
SODIUM SERPL-SCNC: 137 MMOL/L (ref 136–145)
SP GR UR STRIP: 1.02 (ref 1–1.03)
SQUAMOUS #/AREA URNS HPF: ABNORMAL /HPF
TROPONIN T SERPL-MCNC: <0.01 NG/ML (ref 0–0.03)
UROBILINOGEN UR QL STRIP: ABNORMAL
WBC # BLD AUTO: 16.51 10*3/MM3 (ref 3.4–10.8)
WBC UR QL AUTO: ABNORMAL /HPF

## 2021-07-27 PROCEDURE — 93005 ELECTROCARDIOGRAM TRACING: CPT | Performed by: EMERGENCY MEDICINE

## 2021-07-27 PROCEDURE — 72125 CT NECK SPINE W/O DYE: CPT

## 2021-07-27 PROCEDURE — 84484 ASSAY OF TROPONIN QUANT: CPT | Performed by: EMERGENCY MEDICINE

## 2021-07-27 PROCEDURE — 63710000001 PREDNISONE PER 1 MG: Performed by: EMERGENCY MEDICINE

## 2021-07-27 PROCEDURE — 80048 BASIC METABOLIC PNL TOTAL CA: CPT | Performed by: EMERGENCY MEDICINE

## 2021-07-27 PROCEDURE — 70450 CT HEAD/BRAIN W/O DYE: CPT

## 2021-07-27 PROCEDURE — 99284 EMERGENCY DEPT VISIT MOD MDM: CPT

## 2021-07-27 PROCEDURE — 81001 URINALYSIS AUTO W/SCOPE: CPT | Performed by: EMERGENCY MEDICINE

## 2021-07-27 PROCEDURE — 71045 X-RAY EXAM CHEST 1 VIEW: CPT

## 2021-07-27 PROCEDURE — 85025 COMPLETE CBC W/AUTO DIFF WBC: CPT | Performed by: EMERGENCY MEDICINE

## 2021-07-27 RX ORDER — DIAZEPAM 2 MG/1
2 TABLET ORAL ONCE
Status: COMPLETED | OUTPATIENT
Start: 2021-07-27 | End: 2021-07-27

## 2021-07-27 RX ORDER — OXYCODONE HYDROCHLORIDE AND ACETAMINOPHEN 5; 325 MG/1; MG/1
1 TABLET ORAL ONCE
Status: COMPLETED | OUTPATIENT
Start: 2021-07-27 | End: 2021-07-27

## 2021-07-27 RX ORDER — METHOCARBAMOL 750 MG/1
750 TABLET, FILM COATED ORAL 3 TIMES DAILY PRN
Qty: 15 TABLET | Refills: 0 | Status: SHIPPED | OUTPATIENT
Start: 2021-07-27 | End: 2022-04-06

## 2021-07-27 RX ORDER — PREDNISONE 20 MG/1
60 TABLET ORAL ONCE
Status: COMPLETED | OUTPATIENT
Start: 2021-07-27 | End: 2021-07-27

## 2021-07-27 RX ORDER — PREDNISONE 20 MG/1
TABLET ORAL
Qty: 10 TABLET | Refills: 0 | Status: SHIPPED | OUTPATIENT
Start: 2021-07-27 | End: 2022-04-06

## 2021-07-27 RX ADMIN — OXYCODONE HYDROCHLORIDE AND ACETAMINOPHEN 1 TABLET: 5; 325 TABLET ORAL at 08:40

## 2021-07-27 RX ADMIN — PREDNISONE 60 MG: 20 TABLET ORAL at 08:40

## 2021-07-27 RX ADMIN — DIAZEPAM 2 MG: 2 TABLET ORAL at 08:39

## 2022-04-06 ENCOUNTER — OFFICE VISIT (OUTPATIENT)
Dept: FAMILY MEDICINE CLINIC | Facility: CLINIC | Age: 77
End: 2022-04-06

## 2022-04-06 VITALS
WEIGHT: 208 LBS | BODY MASS INDEX: 36.86 KG/M2 | SYSTOLIC BLOOD PRESSURE: 128 MMHG | HEART RATE: 68 BPM | TEMPERATURE: 97.2 F | HEIGHT: 63 IN | DIASTOLIC BLOOD PRESSURE: 76 MMHG | OXYGEN SATURATION: 97 %

## 2022-04-06 DIAGNOSIS — E78.2 MIXED HYPERLIPIDEMIA: ICD-10-CM

## 2022-04-06 DIAGNOSIS — I10 ESSENTIAL HYPERTENSION: Primary | ICD-10-CM

## 2022-04-06 DIAGNOSIS — F32.A ANXIETY AND DEPRESSION: ICD-10-CM

## 2022-04-06 DIAGNOSIS — M10.09 IDIOPATHIC GOUT OF MULTIPLE SITES, UNSPECIFIED CHRONICITY: ICD-10-CM

## 2022-04-06 DIAGNOSIS — F41.9 ANXIETY AND DEPRESSION: ICD-10-CM

## 2022-04-06 PROCEDURE — 99213 OFFICE O/P EST LOW 20 MIN: CPT | Performed by: PHYSICIAN ASSISTANT

## 2022-04-06 RX ORDER — ACETAMINOPHEN 325 MG/1
TABLET ORAL
COMMUNITY

## 2022-04-06 RX ORDER — HYDROXYZINE HYDROCHLORIDE 25 MG/1
25 TABLET, FILM COATED ORAL EVERY 8 HOURS PRN
Qty: 60 TABLET | Refills: 11 | Status: SHIPPED | OUTPATIENT
Start: 2022-04-06

## 2022-04-06 RX ORDER — WARFARIN SODIUM 5 MG/1
TABLET ORAL
COMMUNITY
Start: 2022-01-28 | End: 2022-10-10 | Stop reason: HOSPADM

## 2022-04-06 RX ORDER — LISINOPRIL 20 MG/1
20 TABLET ORAL DAILY
Qty: 30 TABLET | Refills: 6 | Status: SHIPPED | OUTPATIENT
Start: 2022-04-06 | End: 2022-11-07

## 2022-04-06 RX ORDER — ATENOLOL 25 MG/1
25 TABLET ORAL 2 TIMES DAILY
Qty: 60 TABLET | Refills: 5 | Status: SHIPPED | OUTPATIENT
Start: 2022-04-06 | End: 2022-11-07

## 2022-04-06 RX ORDER — ATORVASTATIN CALCIUM 80 MG/1
80 TABLET, FILM COATED ORAL NIGHTLY
Qty: 30 TABLET | Refills: 11 | Status: SHIPPED | OUTPATIENT
Start: 2022-04-06

## 2022-04-06 RX ORDER — PROPAFENONE HYDROCHLORIDE 225 MG/1
CAPSULE, EXTENDED RELEASE ORAL EVERY 12 HOURS
COMMUNITY
End: 2022-10-10 | Stop reason: HOSPADM

## 2022-04-06 RX ORDER — ALLOPURINOL 100 MG/1
100 TABLET ORAL DAILY
Qty: 30 TABLET | Refills: 11 | Status: SHIPPED | OUTPATIENT
Start: 2022-04-06 | End: 2022-05-18 | Stop reason: SDUPTHER

## 2022-04-06 RX ORDER — ESCITALOPRAM OXALATE 20 MG/1
20 TABLET ORAL DAILY
Qty: 30 TABLET | Refills: 11 | Status: SHIPPED | OUTPATIENT
Start: 2022-04-06

## 2022-04-06 NOTE — PROGRESS NOTES
Subjective   Akua Torres is a 76 y.o. female  Hypertension (Follow up on blood pressure, patient was seeing Bunny Caba/At  but switched back to Nathalia Freeman, refill on atenolol, ) and Anxiety (Follow up on anxiety, refill on lexapro)      History of Present Illness     The patient presents today to follow up on hypertension and generalized anxiety disorder. She is reestablishing care. She had been seeing a provider at  for the last year.    The patient notes she was being seen by a doctor that was located closer to her home. She states during a visit, she requested a medication refill prescription for her alopurinol. The patient state she was told by the doctor that he would not refill any prescriptions he did not prescribe to her. She states she had to call the clinic manager and was able to get her medication refilled but was concerned that the same situation may occur again, so she returned back to our office for her care.     The patient notes she has been doing well. The patient states she is still taking Lexapro, which has been improving her anxiety. She notes she is seen by Dr. New, orthopedic surgery, for her chronic hip and knee pain, and he gives her cortisone injections. She has been going to physical therapy for the past 6 weeks for her balance and it has been helping. The patient notes she has been taking her lisinopril regularly and her blood pressure is doing well. She is also still taking her atorvastatin and atenolol regularly.    The patient notes she is seen by her cardiologist, Dr. Langford, for her history of A fib. She states she is still taking her warfarin and propafenone as prescribed by him. The patient notes the medications have been helping, as she no longer feels any palpitations.    The following portions of the patient's history were reviewed and updated as appropriate: allergies, current medications, past social history and problem list    Review of Systems    Constitutional: Negative for appetite change, fatigue and unexpected weight change.   Respiratory: Negative for cough, chest tightness and shortness of breath.    Cardiovascular: Negative for chest pain, palpitations and leg swelling.   Gastrointestinal: Negative for abdominal pain, diarrhea and nausea.   Musculoskeletal: Positive for arthralgias ( chronically stable).   Skin: Negative for color change and rash.   Neurological: Negative for dizziness, tremors, syncope, weakness, light-headedness and headaches.   Psychiatric/Behavioral: Negative for agitation, behavioral problems, confusion, decreased concentration, dysphoric mood, hallucinations, self-injury, sleep disturbance and suicidal ideas. The patient is nervous/anxious ( stable on meds). The patient is not hyperactive.        Objective     Vitals:    04/06/22 1311   BP: 128/76   Pulse: 68   Temp: 97.2 °F (36.2 °C)   SpO2: 97%       Physical Exam  Vitals and nursing note reviewed.   Constitutional:       General: She is not in acute distress.     Appearance: Normal appearance. She is well-developed. She is not ill-appearing, toxic-appearing or diaphoretic.   HENT:      Head: Normocephalic and atraumatic.   Neck:      Thyroid: No thyroid mass or thyromegaly.      Vascular: No carotid bruit or JVD.   Cardiovascular:      Rate and Rhythm: Normal rate and regular rhythm.      Pulses: Normal pulses.      Heart sounds: Normal heart sounds. No murmur heard.  Pulmonary:      Effort: Pulmonary effort is normal. No respiratory distress.      Breath sounds: Normal breath sounds.   Abdominal:      Palpations: Abdomen is soft.      Tenderness: There is no abdominal tenderness.   Skin:     General: Skin is warm and dry.   Neurological:      Mental Status: She is alert and oriented to person, place, and time.   Psychiatric:         Attention and Perception: Attention and perception normal. She is attentive.         Mood and Affect: Mood is anxious. Mood is not depressed.  Affect is not angry or inappropriate.         Speech: Speech normal.         Behavior: Behavior normal.         Thought Content: Thought content normal.         Cognition and Memory: Cognition normal.         Judgment: Judgment normal.         Assessment/Plan     Diagnoses and all orders for this visit:    1. Essential hypertension (Primary)    2. Idiopathic gout of multiple sites, unspecified chronicity    3. Mixed hyperlipidemia    4. Anxiety and depression    Other orders  -     lisinopril (PRINIVIL,ZESTRIL) 20 MG tablet; Take 1 tablet by mouth Daily. For BP Take 20 mg by mouth Daily.  Dispense: 30 tablet; Refill: 6  -     escitalopram (LEXAPRO) 20 MG tablet; Take 1 tablet by mouth Daily. For depression and anxiety  Dispense: 30 tablet; Refill: 11  -     atorvastatin (LIPITOR) 80 MG tablet; Take 1 tablet by mouth Every Night.  Dispense: 30 tablet; Refill: 11  -     atenolol (TENORMIN) 25 MG tablet; Take 1 tablet by mouth 2 (Two) Times a Day. Take 25 mg by mouth 2 (Two) Times a Day.  Dispense: 60 tablet; Refill: 5  -     allopurinol (ZYLOPRIM) 100 MG tablet; Take 1 tablet by mouth Daily.  Dispense: 30 tablet; Refill: 11  -     hydrOXYzine (ATARAX) 25 MG tablet; Take 1 tablet by mouth Every 8 (Eight) Hours As Needed for Anxiety. for anxiety  Dispense: 60 tablet; Refill: 11    The patient's blood pressure is well controlled at this time. She will continue her current medication regimen.    She will continue her current medication regimen.    I sent in refill prescriptions for all of her regular medications, besides those prescribed by her cardiologist. The patient will follow up in 6 months.     Transcribed from ambient dictation for Nathalia Freeman PA-C by Shiloh Thomason.  04/06/22   17:27 EDT    Patient verbalized consent to the visit recording.  I have personally performed the services described in this document as transcribed by the above individual, and it is both accurate and complete.  Nathalia Freeman,  HALYE  4/7/2022  13:12 EDT

## 2022-05-12 ENCOUNTER — TELEPHONE (OUTPATIENT)
Dept: FAMILY MEDICINE CLINIC | Facility: CLINIC | Age: 77
End: 2022-05-12

## 2022-05-12 NOTE — TELEPHONE ENCOUNTER
Hub staff attempted to follow warm transfer process and was unsuccessful     Caller: Akua Torres    Relationship to patient: Self    Best call back number: [phone]    Patient is needing: PATIENT REPORTS ON THE LAST 2 VISITS TO PHYSICAL THERAPY HER BLOOD PRESSURE WAS HIGH BUT TODAY THEY WOULDN'T DO PHYSICAL THERAPY.  BLOOD PRESSURE 170/100.  PLEASE CALL

## 2022-05-12 NOTE — TELEPHONE ENCOUNTER
Patient was added to scheduled and notified that if she has chest pains/SOB or any cardiac related issues to go to ED for evaluation - spoke to patient verbally

## 2022-05-18 ENCOUNTER — OFFICE VISIT (OUTPATIENT)
Dept: FAMILY MEDICINE CLINIC | Facility: CLINIC | Age: 77
End: 2022-05-18

## 2022-05-18 VITALS
HEART RATE: 58 BPM | HEIGHT: 63 IN | WEIGHT: 207 LBS | SYSTOLIC BLOOD PRESSURE: 138 MMHG | TEMPERATURE: 97.2 F | DIASTOLIC BLOOD PRESSURE: 72 MMHG | BODY MASS INDEX: 36.68 KG/M2 | OXYGEN SATURATION: 98 %

## 2022-05-18 DIAGNOSIS — M51.36 DEGENERATIVE DISC DISEASE, LUMBAR: ICD-10-CM

## 2022-05-18 DIAGNOSIS — M10.09 IDIOPATHIC GOUT OF MULTIPLE SITES, UNSPECIFIED CHRONICITY: ICD-10-CM

## 2022-05-18 DIAGNOSIS — I10 ESSENTIAL HYPERTENSION: Primary | ICD-10-CM

## 2022-05-18 PROCEDURE — 99213 OFFICE O/P EST LOW 20 MIN: CPT | Performed by: PHYSICIAN ASSISTANT

## 2022-05-18 RX ORDER — ALLOPURINOL 100 MG/1
100 TABLET ORAL DAILY
Qty: 30 TABLET | Refills: 11 | Status: SHIPPED | OUTPATIENT
Start: 2022-05-18

## 2022-05-18 RX ORDER — METHOCARBAMOL 750 MG/1
750 TABLET, FILM COATED ORAL DAILY PRN
Qty: 30 TABLET | Refills: 11 | Status: SHIPPED | OUTPATIENT
Start: 2022-05-18

## 2022-05-18 NOTE — PROGRESS NOTES
Subjective   Akua Torres is a 77 y.o. female  Hypertension (Physical Therapy concerned about fluctuating BP, patient of Dr. Langford)      History of Present Illness   Patient is a 77-year-old female seen today to discuss uncontrolled hypertension.    The patient's blood pressure reading was normal today during the encounter. The patient has been checking, documenting, and monitoring her blood pressure at home with a digital meter, and has brought documentation to the encounter.     The patient reports experiencing elevated blood pressure readings on three different occasions when seeing Shea in Physical Therapy with [facility], and states on the days her blood pressure is elevated Shea will not proceed with treatment. She notes that her blood pressure has not been elevated in other medical providers' offices. The patient reports witnessing occasional elevated blood pressure readings at home. According to the documentation the patient brought with her, the highest blood pressure measurement she has witnessed at home was 163 mmHg to 165 mmHg. She notes that her blood pressure measurements at home tend to fluctuate, and have been as low as 101 mmHg. The patient denies experiencing consistent elevated blood pressure readings at home. The patient states she sees Cardiology weekly for finger-lancet testing for her Warfarin medication.    The patient reports experiencing balance issues that is being treated by Shea in Physical Therapy with [facility] as tolerated.    The patient reports recently experiencing increased headaches.    The patient is currently taking methocarbamol for fibromyalgia, and has received one injection for hip pain.    The patient is currently taking allopurinol (ZYLOPRIM) 100 MG tablet once daily for gout with significant improvement and control of symptoms. The patient denies experiencing any new or worsening issues with gout.      The following  portions of the patient's history were reviewed and updated as appropriate: allergies, current medications, past social history and problem list    Review of Systems   Constitutional: Positive for activity change. Negative for fatigue and unexpected weight change.   Respiratory: Negative for cough, chest tightness and shortness of breath.    Cardiovascular: Negative for chest pain, palpitations and leg swelling.   Gastrointestinal: Negative for nausea.   Musculoskeletal: Positive for arthralgias and myalgias. Negative for gait problem and joint swelling.   Skin: Negative.  Negative for color change and rash.   Neurological: Negative for dizziness, syncope, weakness, numbness and headaches.       Objective     Vitals:    05/18/22 1325   BP: 138/72   Pulse: 58   Temp: 97.2 °F (36.2 °C)   SpO2: 98%       Physical Exam  Vitals and nursing note reviewed.   Constitutional:       General: She is not in acute distress.     Appearance: Normal appearance. She is well-developed. She is not ill-appearing, toxic-appearing or diaphoretic.   Neck:      Vascular: No carotid bruit or JVD.   Cardiovascular:      Rate and Rhythm: Normal rate and regular rhythm.      Pulses: Normal pulses.      Heart sounds: Normal heart sounds. No murmur heard.  Pulmonary:      Effort: Pulmonary effort is normal. No respiratory distress.      Breath sounds: Normal breath sounds.   Abdominal:      Palpations: Abdomen is soft.      Tenderness: There is no abdominal tenderness.   Musculoskeletal:      Comments: Joint pain is completely well controlled and stable on current medications.   Skin:     General: Skin is warm and dry.   Neurological:      Mental Status: She is alert.         Assessment & Plan    1. Essential hypertension  - The patient had a normal blood pressure reading during encounter.  - The patient is doing well on her current medication regimen, and will continue on current medications as directed with no change to current plan.  -  Discussed with the patient that it does not sound like her hypertension is the real issue behind her elevated blood pressure readings at Physical Therapy office, but instead seems like they might be using a miscalibrated blood pressure cuff for readings.  - Advised patient to request blood pressure measurements to be performed at her weekly Warfarin medication recheck visits with Cardiology. Explained that if Cardiology is also getting normal blood pressure readings at her encounters, then this only further supports the idea that the elevated blood pressure readings are due to an outside source.    2. Idiopathic gout of multiple sites  - The patient is doing well on her current medication regimen, and will continue on current medications as directed with no change to current plan. Medication refill orders electronically sent to her preferred pharmacy at this time.  - Medication refill orders to continue allopurinol (ZYLOPRIM) 100 MG tablet once daily for gout.    3. Lumbar degenerative disc disease  - The patient is doing well on her current medication regimen, and will continue on current medications as directed with no change to current plan. Medication refill orders electronically sent to her preferred pharmacy at this time.  - Medication refill order to continue methocarbamol (ROBAXIN) 750 MG tablet once daily as needed for muscle spasms.      Diagnoses and all orders for this visit:    1. Essential hypertension (Primary)    2. Idiopathic gout of multiple sites, unspecified chronicity    3. Degenerative disc disease, lumbar    Other orders  -     allopurinol (ZYLOPRIM) 100 MG tablet; Take 1 tablet by mouth Daily. For gout  Dispense: 30 tablet; Refill: 11  -     methocarbamol (ROBAXIN) 750 MG tablet; Take 1 tablet by mouth Daily As Needed for Muscle Spasms.  Dispense: 30 tablet; Refill: 11         Transcribed from ambient dictation for Nathalia Freeman PA-C by ALEX PRINCE.  05/18/22   14:27 EDT    Patient  verbalized consent to the visit recording.

## 2022-10-04 ENCOUNTER — APPOINTMENT (OUTPATIENT)
Dept: CT IMAGING | Facility: HOSPITAL | Age: 77
End: 2022-10-04

## 2022-10-04 ENCOUNTER — HOSPITAL ENCOUNTER (OUTPATIENT)
Facility: HOSPITAL | Age: 77
Setting detail: OBSERVATION
LOS: 1 days | Discharge: NURSING, REHAB OR LONG-TERM CARE | End: 2022-10-10
Attending: EMERGENCY MEDICINE | Admitting: INTERNAL MEDICINE

## 2022-10-04 ENCOUNTER — TELEPHONE (OUTPATIENT)
Dept: FAMILY MEDICINE CLINIC | Facility: CLINIC | Age: 77
End: 2022-10-04

## 2022-10-04 DIAGNOSIS — Z79.01 ANTICOAGULATED: ICD-10-CM

## 2022-10-04 DIAGNOSIS — D64.9 ANEMIA, UNSPECIFIED TYPE: ICD-10-CM

## 2022-10-04 DIAGNOSIS — T79.6XXA TRAUMATIC RHABDOMYOLYSIS, INITIAL ENCOUNTER: Primary | ICD-10-CM

## 2022-10-04 DIAGNOSIS — N17.9 AKI (ACUTE KIDNEY INJURY): ICD-10-CM

## 2022-10-04 PROBLEM — M79.7 FIBROMYALGIA: Status: ACTIVE | Noted: 2022-10-04

## 2022-10-04 PROBLEM — S70.11XA HEMATOMA OF RIGHT THIGH: Status: ACTIVE | Noted: 2022-10-04

## 2022-10-04 PROBLEM — M79.604 PAIN OF RIGHT LOWER EXTREMITY: Status: ACTIVE | Noted: 2022-10-04

## 2022-10-04 LAB
ABO GROUP BLD: NORMAL
ALBUMIN SERPL-MCNC: 3.9 G/DL (ref 3.5–5.2)
ALBUMIN/GLOB SERPL: 1.5 G/DL
ALP SERPL-CCNC: 100 U/L (ref 39–117)
ALT SERPL W P-5'-P-CCNC: 12 U/L (ref 1–33)
ANION GAP SERPL CALCULATED.3IONS-SCNC: 12 MMOL/L (ref 5–15)
AST SERPL-CCNC: 24 U/L (ref 1–32)
BASOPHILS # BLD AUTO: 0.03 10*3/MM3 (ref 0–0.2)
BASOPHILS NFR BLD AUTO: 0.3 % (ref 0–1.5)
BILIRUB SERPL-MCNC: 1.4 MG/DL (ref 0–1.2)
BLD GP AB SCN SERPL QL: NEGATIVE
BUN SERPL-MCNC: 41 MG/DL (ref 8–23)
BUN/CREAT SERPL: 22.5 (ref 7–25)
CALCIUM SPEC-SCNC: 8.7 MG/DL (ref 8.6–10.5)
CHLORIDE SERPL-SCNC: 103 MMOL/L (ref 98–107)
CK SERPL-CCNC: 398 U/L (ref 20–180)
CO2 SERPL-SCNC: 23 MMOL/L (ref 22–29)
CREAT SERPL-MCNC: 1.82 MG/DL (ref 0.57–1)
DEPRECATED RDW RBC AUTO: 54.4 FL (ref 37–54)
DEVELOPER EXPIRATION DATE: 0
DEVELOPER LOT NUMBER: 0
EGFRCR SERPLBLD CKD-EPI 2021: 28.2 ML/MIN/1.73
EOSINOPHIL # BLD AUTO: 0.28 10*3/MM3 (ref 0–0.4)
EOSINOPHIL NFR BLD AUTO: 3.1 % (ref 0.3–6.2)
ERYTHROCYTE [DISTWIDTH] IN BLOOD BY AUTOMATED COUNT: 16.1 % (ref 12.3–15.4)
EXPIRATION DATE: NORMAL
FECAL OCCULT BLOOD SCREEN, POC: NEGATIVE
GLOBULIN UR ELPH-MCNC: 2.6 GM/DL
GLUCOSE SERPL-MCNC: 109 MG/DL (ref 65–99)
HCT VFR BLD AUTO: 25.4 % (ref 34–46.6)
HGB BLD-MCNC: 8.1 G/DL (ref 12–15.9)
HOLD SPECIMEN: NORMAL
IMM GRANULOCYTES # BLD AUTO: 0.03 10*3/MM3 (ref 0–0.05)
IMM GRANULOCYTES NFR BLD AUTO: 0.3 % (ref 0–0.5)
INR PPP: 1.85 (ref 0.84–1.13)
LYMPHOCYTES # BLD AUTO: 1.75 10*3/MM3 (ref 0.7–3.1)
LYMPHOCYTES NFR BLD AUTO: 19.4 % (ref 19.6–45.3)
Lab: NORMAL
MCH RBC QN AUTO: 30 PG (ref 26.6–33)
MCHC RBC AUTO-ENTMCNC: 31.9 G/DL (ref 31.5–35.7)
MCV RBC AUTO: 94.1 FL (ref 79–97)
MONOCYTES # BLD AUTO: 0.54 10*3/MM3 (ref 0.1–0.9)
MONOCYTES NFR BLD AUTO: 6 % (ref 5–12)
NEGATIVE CONTROL: NEGATIVE
NEUTROPHILS NFR BLD AUTO: 6.38 10*3/MM3 (ref 1.7–7)
NEUTROPHILS NFR BLD AUTO: 70.9 % (ref 42.7–76)
NRBC BLD AUTO-RTO: 0 /100 WBC (ref 0–0.2)
PLATELET # BLD AUTO: 188 10*3/MM3 (ref 140–450)
PMV BLD AUTO: 10.5 FL (ref 6–12)
POSITIVE CONTROL: POSITIVE
POTASSIUM SERPL-SCNC: 5 MMOL/L (ref 3.5–5.2)
PROT SERPL-MCNC: 6.5 G/DL (ref 6–8.5)
PROTHROMBIN TIME: 21.3 SECONDS (ref 11.4–14.4)
RBC # BLD AUTO: 2.7 10*6/MM3 (ref 3.77–5.28)
RH BLD: POSITIVE
SODIUM SERPL-SCNC: 138 MMOL/L (ref 136–145)
T&S EXPIRATION DATE: NORMAL
WBC NRBC COR # BLD: 9.01 10*3/MM3 (ref 3.4–10.8)
WHOLE BLOOD HOLD COAG: NORMAL
WHOLE BLOOD HOLD SPECIMEN: NORMAL

## 2022-10-04 PROCEDURE — 96375 TX/PRO/DX INJ NEW DRUG ADDON: CPT

## 2022-10-04 PROCEDURE — 80053 COMPREHEN METABOLIC PANEL: CPT | Performed by: EMERGENCY MEDICINE

## 2022-10-04 PROCEDURE — 73700 CT LOWER EXTREMITY W/O DYE: CPT

## 2022-10-04 PROCEDURE — 96374 THER/PROPH/DIAG INJ IV PUSH: CPT

## 2022-10-04 PROCEDURE — 36430 TRANSFUSION BLD/BLD COMPNT: CPT

## 2022-10-04 PROCEDURE — 82270 OCCULT BLOOD FECES: CPT | Performed by: NURSE PRACTITIONER

## 2022-10-04 PROCEDURE — 99220 PR INITIAL OBSERVATION CARE/DAY 70 MINUTES: CPT | Performed by: INTERNAL MEDICINE

## 2022-10-04 PROCEDURE — 86923 COMPATIBILITY TEST ELECTRIC: CPT

## 2022-10-04 PROCEDURE — 99285 EMERGENCY DEPT VISIT HI MDM: CPT

## 2022-10-04 PROCEDURE — 74176 CT ABD & PELVIS W/O CONTRAST: CPT

## 2022-10-04 PROCEDURE — P9016 RBC LEUKOCYTES REDUCED: HCPCS

## 2022-10-04 PROCEDURE — 85025 COMPLETE CBC W/AUTO DIFF WBC: CPT | Performed by: EMERGENCY MEDICINE

## 2022-10-04 PROCEDURE — 25010000002 ONDANSETRON PER 1 MG: Performed by: EMERGENCY MEDICINE

## 2022-10-04 PROCEDURE — 82550 ASSAY OF CK (CPK): CPT | Performed by: NURSE PRACTITIONER

## 2022-10-04 PROCEDURE — 86900 BLOOD TYPING SEROLOGIC ABO: CPT

## 2022-10-04 PROCEDURE — 85610 PROTHROMBIN TIME: CPT | Performed by: EMERGENCY MEDICINE

## 2022-10-04 PROCEDURE — 25010000002 MORPHINE PER 10 MG: Performed by: EMERGENCY MEDICINE

## 2022-10-04 PROCEDURE — 86901 BLOOD TYPING SEROLOGIC RH(D): CPT | Performed by: NURSE PRACTITIONER

## 2022-10-04 PROCEDURE — 36415 COLL VENOUS BLD VENIPUNCTURE: CPT

## 2022-10-04 PROCEDURE — 86900 BLOOD TYPING SEROLOGIC ABO: CPT | Performed by: NURSE PRACTITIONER

## 2022-10-04 PROCEDURE — 86850 RBC ANTIBODY SCREEN: CPT | Performed by: NURSE PRACTITIONER

## 2022-10-04 PROCEDURE — 93005 ELECTROCARDIOGRAM TRACING: CPT | Performed by: NURSE PRACTITIONER

## 2022-10-04 RX ORDER — BISACODYL 5 MG/1
5 TABLET, DELAYED RELEASE ORAL DAILY PRN
Status: DISCONTINUED | OUTPATIENT
Start: 2022-10-04 | End: 2022-10-10 | Stop reason: HOSPADM

## 2022-10-04 RX ORDER — ESCITALOPRAM OXALATE 20 MG/1
20 TABLET ORAL DAILY
Status: DISCONTINUED | OUTPATIENT
Start: 2022-10-04 | End: 2022-10-10 | Stop reason: HOSPADM

## 2022-10-04 RX ORDER — HYDROCODONE BITARTRATE AND ACETAMINOPHEN 7.5; 325 MG/1; MG/1
1 TABLET ORAL EVERY 6 HOURS PRN
Status: DISPENSED | OUTPATIENT
Start: 2022-10-04 | End: 2022-10-07

## 2022-10-04 RX ORDER — ACETAMINOPHEN 325 MG/1
650 TABLET ORAL EVERY 6 HOURS PRN
Status: DISCONTINUED | OUTPATIENT
Start: 2022-10-04 | End: 2022-10-10 | Stop reason: HOSPADM

## 2022-10-04 RX ORDER — AMOXICILLIN 250 MG
2 CAPSULE ORAL 2 TIMES DAILY
Status: DISCONTINUED | OUTPATIENT
Start: 2022-10-04 | End: 2022-10-10 | Stop reason: HOSPADM

## 2022-10-04 RX ORDER — SODIUM CHLORIDE 0.9 % (FLUSH) 0.9 %
10 SYRINGE (ML) INJECTION AS NEEDED
Status: DISCONTINUED | OUTPATIENT
Start: 2022-10-04 | End: 2022-10-10 | Stop reason: HOSPADM

## 2022-10-04 RX ORDER — ONDANSETRON 2 MG/ML
4 INJECTION INTRAMUSCULAR; INTRAVENOUS EVERY 6 HOURS PRN
Status: DISCONTINUED | OUTPATIENT
Start: 2022-10-04 | End: 2022-10-10 | Stop reason: HOSPADM

## 2022-10-04 RX ORDER — SODIUM CHLORIDE 0.9 % (FLUSH) 0.9 %
10 SYRINGE (ML) INJECTION EVERY 12 HOURS SCHEDULED
Status: DISCONTINUED | OUTPATIENT
Start: 2022-10-04 | End: 2022-10-10 | Stop reason: HOSPADM

## 2022-10-04 RX ORDER — ALLOPURINOL 100 MG/1
100 TABLET ORAL DAILY
Status: DISCONTINUED | OUTPATIENT
Start: 2022-10-05 | End: 2022-10-10 | Stop reason: HOSPADM

## 2022-10-04 RX ORDER — ONDANSETRON 2 MG/ML
4 INJECTION INTRAMUSCULAR; INTRAVENOUS ONCE
Status: COMPLETED | OUTPATIENT
Start: 2022-10-04 | End: 2022-10-04

## 2022-10-04 RX ORDER — BISACODYL 10 MG
10 SUPPOSITORY, RECTAL RECTAL DAILY PRN
Status: DISCONTINUED | OUTPATIENT
Start: 2022-10-04 | End: 2022-10-10 | Stop reason: HOSPADM

## 2022-10-04 RX ORDER — HYDROXYZINE HYDROCHLORIDE 25 MG/1
25 TABLET, FILM COATED ORAL EVERY 8 HOURS PRN
Status: DISCONTINUED | OUTPATIENT
Start: 2022-10-04 | End: 2022-10-10 | Stop reason: HOSPADM

## 2022-10-04 RX ORDER — ONDANSETRON 4 MG/1
4 TABLET, FILM COATED ORAL EVERY 6 HOURS PRN
Status: DISCONTINUED | OUTPATIENT
Start: 2022-10-04 | End: 2022-10-10 | Stop reason: HOSPADM

## 2022-10-04 RX ORDER — PROPAFENONE HYDROCHLORIDE 225 MG/1
225 CAPSULE, EXTENDED RELEASE ORAL EVERY 12 HOURS SCHEDULED
Status: DISCONTINUED | OUTPATIENT
Start: 2022-10-04 | End: 2022-10-10 | Stop reason: HOSPADM

## 2022-10-04 RX ORDER — MORPHINE SULFATE 2 MG/ML
2 INJECTION, SOLUTION INTRAMUSCULAR; INTRAVENOUS ONCE
Status: COMPLETED | OUTPATIENT
Start: 2022-10-04 | End: 2022-10-04

## 2022-10-04 RX ORDER — POLYETHYLENE GLYCOL 3350 17 G/17G
17 POWDER, FOR SOLUTION ORAL DAILY PRN
Status: DISCONTINUED | OUTPATIENT
Start: 2022-10-04 | End: 2022-10-10 | Stop reason: HOSPADM

## 2022-10-04 RX ADMIN — ESCITALOPRAM OXALATE 20 MG: 20 TABLET ORAL at 21:23

## 2022-10-04 RX ADMIN — SENNOSIDES AND DOCUSATE SODIUM 2 TABLET: 50; 8.6 TABLET ORAL at 21:23

## 2022-10-04 RX ADMIN — PROPAFENONE HYDROCHLORIDE 225 MG: 225 CAPSULE, EXTENDED RELEASE ORAL at 21:24

## 2022-10-04 RX ADMIN — MORPHINE SULFATE 2 MG: 2 INJECTION, SOLUTION INTRAMUSCULAR; INTRAVENOUS at 15:59

## 2022-10-04 RX ADMIN — SODIUM CHLORIDE 1000 ML: 9 INJECTION, SOLUTION INTRAVENOUS at 15:59

## 2022-10-04 RX ADMIN — ONDANSETRON 4 MG: 2 INJECTION INTRAMUSCULAR; INTRAVENOUS at 15:59

## 2022-10-04 NOTE — TELEPHONE ENCOUNTER
Blood pressure sounds like it is in a normal range to me if she is having any symptoms from this I would recommend scheduling an in person appointment and check blood pressure once daily until appointment but the blood pressure is in a normal range.

## 2022-10-04 NOTE — H&P
[facility] Medicine Services  HISTORY AND PHYSICAL    Patient Name: Akua Torres  : 1945  MRN: [id number]  Primary Care Physician: Nathalia Freeman PA-C  Date of admission: 10/4/2022    Subjective   Subjective     Chief Complaint:  Right leg pain/swelling/bruising    HPI:  Akua Torres is a 77 y.o. female with PMH afib (on coumadin), HTN, HLP, CVA, fibromyalgia and osteoarthritis who presented to the ED with complaints of RLE pain, swelling and bruising and she is unable to walk or bear weight due to the pain.  She had been having pain in her right hip and had fluid drain on 22.  Pt reports it was a very painful procedure and she noticed swelling at the site and progressive ecchymosis moving down her RLE that started Friday.  She was seen in [facility] ED on Saturday, was given pain medication and dc'd home.  She is unsure if labs were drawn while there.  She states she has not had her coumadin or BP medicine since Friday.      Review of Systems   Constitutional: Positive for activity change and appetite change. Negative for fever.   HENT: Negative for hearing loss.    Eyes: Negative for visual disturbance.   Respiratory: Negative for cough and shortness of breath.    Gastrointestinal: Negative for abdominal pain, anal bleeding, diarrhea, nausea and vomiting.   Genitourinary: Negative for dysuria and hematuria.   Musculoskeletal: Positive for arthralgias. Negative for back pain.   Neurological: Positive for weakness. Negative for dizziness and headaches.   Psychiatric/Behavioral: Negative for behavioral problems and confusion.        All other systems reviewed and are negative.     Personal History     Past Medical History:   Diagnosis Date   • A-fib ([facility]) 2019   • Anxiety and depression    • Arthritis    • Cataract    • Depression    • Extremity pain    • Fibromyalgia    • GERD (gastroesophageal reflux disease)    • Gout    • H/O bladder infections     JUST FINISHED  MACROBID ON 11-2-17 FOR RECENT UTI   • Hypercholesteremia    • Hypertension    • Joint pain    • Kidney disease, chronic, stage III (GFR 30-59 ml/min) (HCC)     resolved after stopping antiinflammatory ~2015   • Low back pain    • Neck pain    • Pneumonia 02/2020   • Rheumatic fever    • Skin cancer    • Sleep apnea     no cpap   • Stroke (HCC) 09/2019    right sided weakness   • Wears glasses        Past Surgical History:   Procedure Laterality Date   • BACK SURGERY  2004    LUMBAR PER DR MAN x2   • CARDIAC CATHETERIZATION  2019   • CARPAL TUNNEL RELEASE Left    • COLONOSCOPY  11/2020   • EYE SURGERY      retina surgery   • FINGER SURGERY Right     3RD FINGER   • HEEL SPUR EXCISION Bilateral    • INTERVENTIONAL RADIOLOGY PROCEDURE N/A 9/17/2019    Procedure: Carotid and Cerebral Angiogram/ Possible Stent;  Surgeon: Imer Guerra MD;  Location:  [facility];  Service: Interventional Radiology   • KNEE ARTHROSCOPY Bilateral    • LUMBAR DISCECTOMY FUSION INSTRUMENTATION N/A 11/10/2017    Procedure: LUMBAR SPINAL FUSION ;  Surgeon: Gopi Man MD;  Location: [facility];  Service:    • REPLACEMENT TOTAL KNEE BILATERAL Bilateral    • SHOULDER ARTHROSCOPY W/ ROTATOR CUFF REPAIR Right 1/19/2021    Procedure: ARTHROSCOPIC ROTATOR CUFF REPAIR WITH BICEPS TENODESIS RIGHT;  Surgeon: Lance Morales Jr., MD;  Location: [facility];  Service: Orthopedics;  Laterality: Right;   • SKIN BIOPSY     • TOTAL HIP ARTHROPLASTY Right 9/9/2019    Procedure: TOTAL ANTERIOR RIGHT HIP ARTHROPLASTY;  Surgeon: Darrin New MD;  Location: [facility];  Service: Orthopedics   • TOTAL HIP ARTHROPLASTY Left 6/29/2020    Procedure: TOTAL HIP ARTHROPLASTY ANTERIOR LEFT;  Surgeon: Darrin New MD;  Location: [facility];  Service: Orthopedics;  Laterality: Left;       Family History: family history includes Arthritis in her maternal grandfather; Bone cancer in her maternal grandmother; Cancer in her father and mother;  Colon polyps in her mother; Hyperlipidemia in her maternal uncle; Hypertension in her brother and mother; Kidney disease in her maternal uncle. Otherwise pertinent FHx was reviewed and unremarkable.     Social History:  reports that she has never smoked. She has never used smokeless tobacco. She reports that she does not drink alcohol and does not use drugs.  Social History     Social History Narrative    Lives in [place] with        Medications:  acetaminophen, allopurinol, atenolol, atorvastatin, cholecalciferol, escitalopram, hydrOXYzine, lisinopril, methocarbamol, propafenone SR, and warfarin    Allergies   Allergen Reactions   • Nsaids Other (See Comments)     KIDNEY DAMAGE   • Quinidine Nausea And Vomiting and Other (See Comments)     FEVER     • Bactrim [Sulfamethoxazole-Trimethoprim] Rash   • Nitrofuran Derivatives Diarrhea   • Penicillins Rash       Objective   Objective     Vital Signs:   Temp:  [98.6 °F (37 °C)-98.7 °F (37.1 °C)] 98.7 °F (37.1 °C)  Heart Rate:  [65-78] 74  Resp:  [16] 16  BP: (133-150)/() 150/54    Physical Exam   Constitutional: Awake, alert, slightly groggy from pain medicines received  Eyes: PERRLA, sclerae anicteric, no conjunctival injection  HENT: NCAT, mucous membranes moist  Neck: Supple, no thyromegaly, no lymphadenopathy, trachea midline  Respiratory: Clear to auscultation bilaterally, nonlabored respirations   Cardiovascular: RRR, no murmurs, rubs, or gallops, palpable pedal pulses bilaterally  Gastrointestinal: Positive bowel sounds, soft, nontender, nondistended, obese  Musculoskeletal: Trace right ankle edema, no clubbing or cyanosis to extremities.  1+ pitting edema right thigh.  Ecchymosis begins in right groin and extends medially behind right knee  Psychiatric: Appropriate affect, cooperative  Neurologic: Oriented x 3, XIONG although very painful to move RLE, speech clear  Skin: No rashes noted    Results Reviewed:  I have personally reviewed most  recent indicated data and agree with findings including:  [x]  Laboratory  [x]  Radiology  [x]  EKG/Telemetry QTc 446   []  Pathology  []  Cardiac/Vascular Studies   []  Old records  []  Other:  Most pertinent findings include:      LAB RESULTS:      Lab 10/04/22  1401   WBC 9.01   HEMOGLOBIN 8.1*   HEMATOCRIT 25.4*   PLATELETS 188   NEUTROS ABS 6.38   IMMATURE GRANS (ABS) 0.03   LYMPHS ABS 1.75   MONOS ABS 0.54   EOS ABS 0.28   MCV 94.1   PROTIME 21.3*         Lab 10/04/22  1401   SODIUM 138   POTASSIUM 5.0   CHLORIDE 103   CO2 23.0   ANION GAP 12.0   BUN 41*   CREATININE 1.82*   EGFR 28.2*   GLUCOSE 109*   CALCIUM 8.7         Lab 10/04/22  1401   TOTAL PROTEIN 6.5   ALBUMIN 3.90   GLOBULIN 2.6   ALT (SGPT) 12   AST (SGOT) 24   BILIRUBIN 1.4*   ALK PHOS 100         Lab 10/04/22  1401   PROTIME 21.3*   INR 1.85*             Lab 10/04/22  1505   ABO TYPING O   RH TYPING Positive   ANTIBODY SCREEN Negative         Brief Urine Lab Results     None        Microbiology Results (last 10 days)     ** No results found for the last 240 hours. **          CT Abdomen Pelvis Without Contrast    Result Date: 10/4/2022   DATE OF EXAM: 10/4/2022 4:03 PM  PROCEDURE: CT ABDOMEN PELVIS WO CONTRAST-  INDICATIONS: Gastrointestinal hemorrhage, recent right hip arthrocentesis, anemia, abdominal pain, patient on Coumadin.  COMPARISON: 06/06/2018.  TECHNIQUE: Routine transaxial slices were obtained through the abdomen and pelvis without the administration of intravenous contrast. Reconstructed coronal and sagittal images were also obtained. Automated exposure control and iterative construction methods were used.  FINDINGS: There is a partially imaged hematoma in the proximal anterior right thigh. This was discussed in detail under the separately dictated CT right lower extremity report. There is no retroperitoneal or intrapelvic hemorrhage. There is a round fluid density mass in the right lobe of the liver measuring 2.3 x 2.4 cm felt to  represent a benign cyst. The gallbladder, pancreas, adrenal glands, and spleen are normal. There are round fluid density masses extending off the inferior pole of the right kidney felt to represent renal cysts. The left kidney is normal. There are no dilated loops of bowel to indicate an obstructive process. There is no abnormal bowel wall thickening. The appendix is normal. The uterus appears unremarkable. The urinary bladder is hyperdense which suggest old iodinated contrast. Alternatively, this could represent hemorrhage in the bladder. There is scattered artifact through the pelvis secondary to the patient's bilateral hip arthroplasties. The patient's also had multilevel spinal fusion. There are transpedicular screws and posterior fixation rods from the L2-S1 levels. The patient's had multilevel decompression laminectomies. There are no suspicious osteolytic or sclerotic lesions within the bony structures. There is a bandlike linear density in the right lower lobe consistent with subsegmental atelectasis.      Impression:  1. Partially imaged hematoma within the proximal anterior right thigh. Discussed in detail under the separately dictated CT right lower extremity report. 2. No retroperitoneal or intrapelvic hemorrhage. 3. The urinary bladder is hyperdense which suggest old contrast material. Alternatively, this could represent hemorrhage in the bladder. 4. Probable cysts within the right lobe of the liver and inferior pole the right kidney. 5. The appendix is normal. 6. Additional findings as noted above.  This report was finalized on 10/4/2022 4:47 PM by David Williamson MD.      CT Lower Extremity Right Without Contrast    Result Date: 10/4/2022  DATE OF EXAM: 10/4/2022 4:03 PM  PROCEDURE: CT LOWER EXTREMITY RIGHT WO CONTRAST-  INDICATIONS: Right hip and thigh pain, anemia, recent right hip arthrodesis, patient on Coumadin.  COMPARISON: No comparisons available.  TECHNIQUE: CT of the right lower extremity was  obtained without the administration of contrast. Coronal and sagittal reformats were obtained. Automated exposure control and alternative reconstruction methods were used.  The radiation dose reduction device was turned on for each scan per the ALARA (As Low as Reasonably Achievable) protocol.  FINDINGS: There is a hyperdense hematoma in the proximal right thigh. It measures 9.3 x 7.0 cm in transaxial diameter and 17.5 cm in length. There is scattered artifact from the patient's total right hip arthroplasty. There is also scattered artifact from the patient's total right knee arthroplasty. Both arthroplasties appear intact with no evidence of loosening or failure. There are vascular calcifications along the distribution of the right superficial femoral artery. Findings in the lower pelvis were discussed under the separately dictated CT pelvis report.      Impression: There is a hyperdense hematoma the proximal right thigh measuring 9.3 x 7.0 cm in diameter and 17.5 cm in length.  This report was finalized on 10/4/2022 4:52 PM by David Williamson MD.        Results for orders placed during the hospital encounter of 04/26/21    Adult Transthoracic Echo Complete W/ Cont if Necessary Per Protocol    Interpretation Summary  · Calculated left ventricular EF = 67%  · The aortic valve exhibits sclerosis  · No significant valvular stenosis or regurgitation      Assessment & Plan   Assessment & Plan       Essential hypertension    Renal insufficiency    Paroxysmal atrial fibrillation (HCC)    Traumatic rhabdomyolysis, initial encounter (HCC)    Hematoma of right thigh    Pain of right lower extremity    Anemia    Fibromyalgia    Right thigh hematoma  --measures 9.3x7x17.5 cm on CT, no RP or intrapelvic hemorrhage noted  --ortho to see 10/5/22  --continue to hold coumadin  --complete 2unit PRBC transfusion ordered in ED  --serial H/H after transfusion complete  --PT/OT    Subtherapeutic INR  --continue to hold coumadin, restart  when stable  --INR in am    Afib  --continue rythmol    HTN  --restart home meds as needed and if renal function stable    Renal insufficiency  --baseline unknown  --recheck labs in am    Fibromyalgia    DVT prophylaxis:  Mechanical only to LLE.  Would not tolerate on RLE due to pain    CODE STATUS:    Medical Intervention Limits: NO intubation (DNI)  Level Of Support Discussed With: Patient  Code Status (Patient has no pulse and is not breathing): No CPR (Do Not Attempt to Resuscitate)  Medical Interventions (Patient has pulse or is breathing): Limited Support      This note has been completed as part of a split-shared workflow.     Signature: Electronically signed by WILFREDO Bartlett, 10/04/22, 6:57 PM EDT        Attending   Admission Attestation       I have performed an independent face-to-face diagnostic evaluation including performing an independent physical examination as documented here.  The documented plan of care above was reviewed and developed with the advanced practice clinician (APC).      Brief Summary Statement:   Akua Torres is a 77 y.o. female who states she had fluid drained from her hip (joint aspiration) on 9/29.  She notes that on Friday (4 days ago) she had marked increase in right lower extremity pain, more so of the right hip, worse on ambulation.  She began to notice swelling along the medial aspect of the right hip which then began to extend down towards her knee with significant ecchymosis.  She was seen at the ED at [facility] yesterday and was discharged home after evaluation there, but her symptoms continued and even worsened through today, prompting her to be evaluated here.  She does confirm that she has not taken her warfarin since preprocedure on Friday.  She denies chest pain, shortness of breath, fever/chills, nausea/emesis, focal neurologic deficit beyond baseline, or syncope.    Remainder of detailed HPI is as noted by APC and has been reviewed and/or edited by me for  completeness.    Attending Physical Exam:  Temp:  [97.7 °F (36.5 °C)-99.3 °F (37.4 °C)] 98.2 °F (36.8 °C)  Heart Rate:  [65-85] 77  Resp:  [16] 16  BP: (106-150)/() 119/55    Constitutional: Awake, alert, NAD, pleasant.  Eyes: PERRLA, sclerae anicteric, no conjunctival injection  HENT: NCAT, mucous membranes moist  Neck: Supple, no thyromegaly, no lymphadenopathy, trachea midline  Respiratory: Clear to auscultation bilaterally, nonlabored respirations   Cardiovascular: RRR, no murmurs, rubs, or gallops, palpable pedal pulses bilaterally  Gastrointestinal: Positive bowel sounds, soft, nontender, nondistended  Musculoskeletal: No bilateral ankle edema, no clubbing or cyanosis to extremities  Psychiatric: Appropriate affect, cooperative  Neurologic: Oriented x 3, strength symmetric in all extremities, Cranial Nerves grossly intact to confrontation, speech clear  Skin: There is swelling along the medial aspect of the entirety of the right thigh and just distal to the right knee, large ecchymosis, painful on palpation, painful range of motion of the knee, hip and ankle on that side.    Brief Assessment/Plan :  See detailed assessment and plan developed with APC which I have reviewed and/or edited for completeness.        Admission Status: I believe that this patient meets inpatient criteria, due to need for hemoglobin monitoring, will also need consultation with the orthopedic surgery service, and telemetry monitoring while her Coumadin is held.  For these reasons I feel her care will extend over 2 midnights.      Denis Tobin III, DO  10/05/22

## 2022-10-04 NOTE — TELEPHONE ENCOUNTER
Patient's  Mehran, called about Akua's blood pressure, it's running lower at 121/42. He is concerned, you can reach him at [phone]..   Thanks,   Concetta.....    .

## 2022-10-04 NOTE — ED PROVIDER NOTES
Subjective   History of Present Illness  Pleasant patient who presents the ER for right hip pain for the last several days.  She tells me she had some fluid drained out of her right hip last week by an orthopedist that works with [facility] orth[facility].  She is unsure who it was.  She mentions several different orthopedics including Dr. Samuels as well as Dr. Ibrahim.  She tells me she is anticoagulated on warfarin.  She has bruising pain and swelling in her right hip and her right thigh and she also tells me her stools have been black as well.  She does report feeling very weak.  She tells me she was seen at [facility] recently given pain medication and sent home.  She did not mention anything about lab work but tells me that her system may have been down.  She denies any fever or chest pain.    Hip Pain  Location:  Right hip and thigh  Severity:  Moderate  Onset quality:  Gradual  Duration:  1 week  Timing:  Constant  Progression:  Worsening  Chronicity:  New  Relieved by:  Rest  Worsened by:  Movement  Associated symptoms: no abdominal pain, no chest pain, no congestion, no cough, no diarrhea, no fever, no nausea, no shortness of breath, no sore throat, no vomiting and no wheezing        Review of Systems   Constitutional: Negative for chills, diaphoresis and fever.   HENT: Negative for congestion and sore throat.    Respiratory: Negative for cough, choking, chest tightness, shortness of breath and wheezing.    Cardiovascular: Negative for chest pain and leg swelling.   Gastrointestinal: Positive for blood in stool. Negative for abdominal distention, abdominal pain, anal bleeding, constipation, diarrhea, nausea and vomiting.   Genitourinary: Negative for difficulty urinating, dysuria, flank pain, frequency, hematuria and urgency.   All other systems reviewed and are negative.      Past Medical History:   Diagnosis Date   • A-fib ([facility]) 2019   • Anxiety and depression    • Arthritis    • Cataract    • Depression     • Extremity pain    • GERD (gastroesophageal reflux disease)    • Gout    • H/O bladder infections     JUST FINISHED MACROBID ON 11-2-17 FOR RECENT UTI   • Hypercholesteremia    • Hypertension    • Joint pain    • Kidney disease, chronic, stage III (GFR 30-59 ml/min) ([facility])     resolved after stopping antiinflammatory ~2015   • Low back pain    • Neck pain    • Pneumonia 02/2020   • Rheumatic fever    • Skin cancer    • Sleep apnea     no cpap   • Stroke ([facility]) 09/2019    right sided weakness   • Wears glasses        Allergies   Allergen Reactions   • Nsaids Other (See Comments)     KIDNEY DAMAGE   • Quinidine Nausea And Vomiting and Other (See Comments)     FEVER     • Bactrim [Sulfamethoxazole-Trimethoprim] Rash   • Nitrofuran Derivatives Diarrhea   • Penicillins Rash       Past Surgical History:   Procedure Laterality Date   • BACK SURGERY  2004    LUMBAR PER DR MAN x2   • CARDIAC CATHETERIZATION  2019   • CARPAL TUNNEL RELEASE Left    • COLONOSCOPY  11/2020   • EYE SURGERY      retina surgery   • FINGER SURGERY Right     3RD FINGER   • HEEL SPUR EXCISION Bilateral    • INTERVENTIONAL RADIOLOGY PROCEDURE N/A 9/17/2019    Procedure: Carotid and Cerebral Angiogram/ Possible Stent;  Surgeon: Imer Guerra MD;  Location:  [facility];  Service: Interventional Radiology   • KNEE ARTHROSCOPY Bilateral    • LUMBAR DISCECTOMY FUSION INSTRUMENTATION N/A 11/10/2017    Procedure: LUMBAR SPINAL FUSION ;  Surgeon: Gopi Man MD;  Location:  [facility] OR;  Service:    • REPLACEMENT TOTAL KNEE BILATERAL Bilateral    • SHOULDER ARTHROSCOPY W/ ROTATOR CUFF REPAIR Right 1/19/2021    Procedure: ARTHROSCOPIC ROTATOR CUFF REPAIR WITH BICEPS TENODESIS RIGHT;  Surgeon: Lance Morales Jr., MD;  Location:  [facility];  Service: Orthopedics;  Laterality: Right;   • SKIN BIOPSY     • TOTAL HIP ARTHROPLASTY Right 9/9/2019    Procedure: TOTAL ANTERIOR RIGHT HIP ARTHROPLASTY;  Surgeon: Darrin New MD;   Location:  [facility];  Service: Orthopedics   • TOTAL HIP ARTHROPLASTY Left 6/29/2020    Procedure: TOTAL HIP ARTHROPLASTY ANTERIOR LEFT;  Surgeon: Darrin New MD;  Location:  [facility];  Service: Orthopedics;  Laterality: Left;       Family History   Problem Relation Age of Onset   • Cancer Mother    • Colon polyps Mother    • Hypertension Mother    • Cancer Father    • Hypertension Brother    • Hyperlipidemia Maternal Uncle    • Kidney disease Maternal Uncle    • Bone cancer Maternal Grandmother    • Arthritis Maternal Grandfather        Social History     Socioeconomic History   • Marital status:    Tobacco Use   • Smoking status: Never Smoker   • Smokeless tobacco: Never Used   Substance and Sexual Activity   • Alcohol use: No   • Drug use: Never   • Sexual activity: Defer           Objective   Physical Exam  Constitutional:       Appearance: She is well-developed. She is ill-appearing.   HENT:      Head: Normocephalic and atraumatic.      Right Ear: External ear normal.      Left Ear: External ear normal.      Nose: Nose normal.   Eyes:      Conjunctiva/sclera: Conjunctivae normal.      Pupils: Pupils are equal, round, and reactive to light.   Cardiovascular:      Rate and Rhythm: Normal rate and regular rhythm.      Heart sounds: Normal heart sounds.   Pulmonary:      Effort: Pulmonary effort is normal.      Breath sounds: Normal breath sounds.   Abdominal:      General: Bowel sounds are normal.      Palpations: Abdomen is soft.   Musculoskeletal:         General: Normal range of motion.      Cervical back: Normal range of motion and neck supple.      Comments: Moderate to significant amount of bruising ecchymosis to the right hip and upper thigh.  Her pulses are intact and her sensation.  She does have soft compartments in her calf.  She does have painful range of motion.   Skin:     General: Skin is warm and dry.      Coloration: Skin is pale.   Neurological:      Mental Status: She is alert and  oriented to person, place, and time.   Psychiatric:         Behavior: Behavior normal.         Thought Content: Thought content normal.         Judgment: Judgment normal.         Critical Care  Performed by: Gopi Haines APRN  Authorized by: Darrel Galvez MD     Critical care provider statement:     Critical care time (minutes):  35    Critical care time was exclusive of:  Separately billable procedures and treating other patients    Critical care was necessary to treat or prevent imminent or life-threatening deterioration of the following conditions: Rhabdo.  Blood transfusion.  Acute kidney injury.  Anemia.    Critical care was time spent personally by me on the following activities:  Ordering and performing treatments and interventions, ordering and review of laboratory studies, ordering and review of radiographic studies, pulse oximetry, re-evaluation of patient's condition, review of old charts, obtaining history from patient or surrogate, examination of patient, evaluation of patient's response to treatment, discussions with consultants and development of treatment plan with patient or surrogate               ED Course  ED Course as of 10/04/22 1755   Tue Oct 04, 2022   1527 Broad differential.  She is anemic which describes also GI bleeding but she also has substantial amount of ecchymosis noted to her right hip.  I doubt all of her blood is pooling in that area however we need to get imaging for further evaluation.  She also appears to have an acute kidney injury as well. [JM]   1606 I am on the phone with [facility]. Pt follows Dr. Estrada for her hip who ordered the joint procedure performed by [facility]. I spoke with Madhav Wu PAc who will speak with Dr. Estrada or Dr. Hinojosa who will follow. RN reports neg guiac. I will admit to the hospitalist. [JM]   1715 Repeat evaluation still with good pulses and soft compartments.  Sensation intact. [JM]   1726 I spoke with Dr. Tobin who will admit.  I  will personally speak to the orthopedist who will take care of her.  I have a text out to Dr. Samuels. []   9579 I spoke with Dr. Hinojosa who will see the pt. he advised to trend the H&H and to hold off on the Coumadin for tonight.  Recommend not reversing the Coumadin unless we see a substantial change in the H&H. [JM]      ED Course User Index  [JM] Gopi Haines, APRN                                           MDM  Number of Diagnoses or Management Options  LOTTIE (acute kidney injury) (HCC): new and requires workup  Anemia, unspecified type: new and requires workup  Anticoagulated: established and worsening  Traumatic rhabdomyolysis, initial encounter (HCC): new and requires workup     Amount and/or Complexity of Data Reviewed  Clinical lab tests: ordered and reviewed  Tests in the radiology section of CPT®: ordered and reviewed  Tests in the medicine section of CPT®: ordered and reviewed  Discussion of test results with the performing providers: yes  Decide to obtain previous medical records or to obtain history from someone other than the patient: yes  Obtain history from someone other than the patient: yes  Review and summarize past medical records: yes  Discuss the patient with other providers: yes    Risk of Complications, Morbidity, and/or Mortality  Presenting problems: high  Diagnostic procedures: high  Management options: high    Critical Care  Total time providing critical care: 30-74 minutes    Patient Progress  Patient progress: stable      Final diagnoses:   Traumatic rhabdomyolysis, initial encounter (HCC)   LOTTIE (acute kidney injury) (HCC)   Anticoagulated   Anemia, unspecified type       ED Disposition  ED Disposition     ED Disposition   Decision to Admit    Condition   --    Comment   Level of Care: Telemetry [5]   Diagnosis: Traumatic rhabdomyolysis, initial encounter (HCC) [[id number]]   Admitting Physician: GEOVANY COHEN III [[id number]]   Attending Physician: GEOVANY COHEN III  [[id number]]   Isolate for COVID?: No [0]   Bed Request Comments: inpt tele   Certification: I Certify That Inpatient Hospital Services Are Medically Necessary For Greater Than 2 Midnights               No follow-up provider specified.       Medication List      No changes were made to your prescriptions during this visit.          Gopi Haines, WILFREDO  10/04/22 1332       Gopi Haines APRN  10/04/22 2040

## 2022-10-04 NOTE — CONSULTS
Orthopedic Consult      Patient: Akua Torres    Date of Admission: 10/4/2022  1:55 PM    YOB: 1945    Medical Record Number: [id number]    Attending Physician: Denis Tobin III, DO    Consulting Physician: José Hinojosa MD      Chief Complaints: Traumatic rhabdomyolysis, initial encounter ([facility]) [T79.6XXA]      History of Present Illness: 77 y.o. female admitted to [facility] with Traumatic rhabdomyolysis, initial encounter ([facility]) [T79.6XXA]. I was consulted for further evaluation and treatment of right thigh hematoma.  This patient was seen by a partner of mine and sent for an aspiration of her hip last Thursday.  She reports that she had a gradual onset of increasing pain and swelling in the thigh since that time.  She presented to an outside hospital over the weekend and was discharged home but returned to the ER when the symptoms persisted.  She is found to have a large hematoma on CT scan with evidence of decreased hemoglobin and hematocrit.  Patient takes Coumadin.  INR on admission is 1.8.  She denies any numbness or tingling down the legs.  Pain is located in the thigh with the onset of hematoma visible diffusely through the thigh.  She is having difficulty ambulating due to the pain.       Allergies   Allergen Reactions   • Nsaids Other (See Comments)     KIDNEY DAMAGE   • Quinidine Nausea And Vomiting and Other (See Comments)     FEVER     • Bactrim [Sulfamethoxazole-Trimethoprim] Rash   • Nitrofuran Derivatives Diarrhea   • Penicillins Rash        Home Medications:  Medications Prior to Admission   Medication Sig Dispense Refill Last Dose   • acetaminophen (TYLENOL) 325 MG tablet  TAKE 2 TABLETS BY MOUTH EVERY 6 HOURS AS NEEDED FOR MILD PAIN      • allopurinol (ZYLOPRIM) 100 MG tablet Take 1 tablet by mouth Daily. For gout 30 tablet 11    • atenolol (TENORMIN) 25 MG tablet Take 1 tablet by mouth 2 (Two) Times a Day. Take 25 mg by mouth 2 (Two) Times a Day.  60 tablet 5    • atorvastatin (LIPITOR) 80 MG tablet Take 1 tablet by mouth Every Night. 30 tablet 11    • cholecalciferol (VITAMIN D3) 25 MCG (1000 UT) tablet Take 2,000 Units by mouth Daily.      • escitalopram (LEXAPRO) 20 MG tablet Take 1 tablet by mouth Daily. For depression and anxiety 30 tablet 11    • hydrOXYzine (ATARAX) 25 MG tablet Take 1 tablet by mouth Every 8 (Eight) Hours As Needed for Anxiety. for anxiety 60 tablet 11    • lisinopril (PRINIVIL,ZESTRIL) 20 MG tablet Take 1 tablet by mouth Daily. For BP Take 20 mg by mouth Daily. 30 tablet 6    • methocarbamol (ROBAXIN) 750 MG tablet Take 1 tablet by mouth Daily As Needed for Muscle Spasms. 30 tablet 11    • propafenone SR (RYTHMOL SR) 225 MG 12 hr capsule Every 12 (Twelve) Hours. Every 12 (Twelve) Hours.      • warfarin (COUMADIN) 5 MG tablet Once daily            Past Medical History:   Diagnosis Date   • A-fib ([facility]) 2019   • Anxiety and depression    • Arthritis    • Cataract    • Depression    • Extremity pain    • Fibromyalgia    • GERD (gastroesophageal reflux disease)    • Gout    • H/O bladder infections     JUST FINISHED MACROBID ON 11-2-17 FOR RECENT UTI   • Hypercholesteremia    • Hypertension    • Joint pain    • Kidney disease, chronic, stage III (GFR 30-59 ml/min) ([facility])     resolved after stopping antiinflammatory ~2015   • Low back pain    • Neck pain    • Pneumonia 02/2020   • Rheumatic fever    • Skin cancer    • Sleep apnea     no cpap   • Stroke ([facility]) 09/2019    right sided weakness   • Wears glasses         Past Surgical History:   Procedure Laterality Date   • BACK SURGERY  2004    LUMBAR PER DR THORNTON x2   • CARDIAC CATHETERIZATION  2019   • CARPAL TUNNEL RELEASE Left    • COLONOSCOPY  11/2020   • EYE SURGERY      retina surgery   • FINGER SURGERY Right     3RD FINGER   • HEEL SPUR EXCISION Bilateral    • INTERVENTIONAL RADIOLOGY PROCEDURE N/A 9/17/2019    Procedure: Carotid and Cerebral Angiogram/ Possible Stent;  Surgeon:  Imer Guerra MD;  Location:  [facility];  Service: Interventional Radiology   • KNEE ARTHROSCOPY Bilateral    • LUMBAR DISCECTOMY FUSION INSTRUMENTATION N/A 11/10/2017    Procedure: LUMBAR SPINAL FUSION ;  Surgeon: Gopi Man MD;  Location:  [facility];  Service:    • REPLACEMENT TOTAL KNEE BILATERAL Bilateral    • SHOULDER ARTHROSCOPY W/ ROTATOR CUFF REPAIR Right 1/19/2021    Procedure: ARTHROSCOPIC ROTATOR CUFF REPAIR WITH BICEPS TENODESIS RIGHT;  Surgeon: Lance Morales Jr., MD;  Location:  [facility];  Service: Orthopedics;  Laterality: Right;   • SKIN BIOPSY     • TOTAL HIP ARTHROPLASTY Right 9/9/2019    Procedure: TOTAL ANTERIOR RIGHT HIP ARTHROPLASTY;  Surgeon: Darrin New MD;  Location:  [facility];  Service: Orthopedics   • TOTAL HIP ARTHROPLASTY Left 6/29/2020    Procedure: TOTAL HIP ARTHROPLASTY ANTERIOR LEFT;  Surgeon: Darrin New MD;  Location:  [facility];  Service: Orthopedics;  Laterality: Left;        Social History     Occupational History   • Not on file   Tobacco Use   • Smoking status: Never Smoker   • Smokeless tobacco: Never Used   Substance and Sexual Activity   • Alcohol use: No   • Drug use: Never   • Sexual activity: Defer      Social History     Social History Narrative    Lives in [place] with         Family History   Problem Relation Age of Onset   • Cancer Mother    • Colon polyps Mother    • Hypertension Mother    • Cancer Father    • Hypertension Brother    • Hyperlipidemia Maternal Uncle    • Kidney disease Maternal Uncle    • Bone cancer Maternal Grandmother    • Arthritis Maternal Grandfather          Review of Systems:   HEENT: Patient denies any headaches, vision changes, change in hearing, or tinnitus, Patient denies any rhinorrhea,epistaxis, sinus pain, mouth or dental problems, sore throat or hoarseness, or dysphagia  Pulmonary: Patient denies any cough, congestion, SOA, or wheezing  Cardiovascular: Patient denies any chest pain,  dyspnea, palpitations, weakness, intolerance of exercise, varicosities, swelling of extremities, known murmur  Gastrointestinal:  Patient denies nausea, vomiting, diarrhea, constipation, loss  of appetite, change in appetite, dysphagia, gas, heartburn, melena, change in bowel habits, use of laxatives or other drugs to alter the function of the gastrointestinal tract.  Genital/Urinary: Patient denies dysuria, change in color of urine, change in frequency of urination, pain with urgency, incontinence, retention, or nocturia.  Musculoskeletal: Other than above- Patient denies increased warmth; redness; or swelling of joints; limitation of function; deformity; crepitation: pain in a joint or an extremity, the neck, or the back, especially with movement.  Neurological: Patient denies dizziness, tremor, ataxia, difficulty in speaking, change in speech, paresthesia, loss of sensation, seizures, syncope, changes in memory.  Endocrine system: Patient denies tremors, palpitations, intolerance of heat or cold, polyuria, polydipsia, polyphagia, diaphoresis, exophthalmos, or goiter.  Psychological: Patient denies thoughts/plans of harming self or others; depression,  insomnia, night terrors, elias, memory loss, disorientation.  Skin: Patient denies any bruising, rashes, discoloration, pruritus, wounds, ulcers, decubiti, changes in the hair or nails  Hematopoietic: Patient denies history of spontaneous or excessive bleeding other than above, epistaxis, hematuria, melena, fatigue, enlarged or tender lymph nodes, pallor, history of anemia.    Physical Exam: 77 y.o. female  General Appearance:    Alert, cooperative, in no acute distress                   Vitals:    10/04/22 1703 10/04/22 1746 10/04/22 1801 10/04/22 1856   BP: 146/51 150/65 150/54 127/54   BP Location: Right arm  Right arm Right arm   Patient Position: Lying  Lying Lying   Pulse: 78 70 74 73   Resp: 16 16 16 16   Temp: 98.6 °F (37 °C) 98.6 °F (37 °C) 98.7 °F (37.1  °C) 97.7 °F (36.5 °C)   TempSrc: Oral  Oral Oral   SpO2: 92% 94% 93% 91%   Weight:    99.8 kg (220 lb 1.6 oz)   Height:            Head:    Normocephalic, without obvious abnormality, atraumatic      Right Upper Extremity:  No obvious deformity, painless ROM shoulder, elbow, wrist,  Left Upper Extremity:  No obvious deformity, painless ROM shoulder, elbow, wrist,    Right Lower Extremity: There is notable swelling of the mid thigh tracking down towards the level of the knee with hematoma most prominent along the medial side of the thigh but tracking circumferentially.  She has difficulty with straight leg raise due to pain and difficulty with activating the quad however I am able to flex and extend her knee without significant pain or discomfort.  Distally sensation motor grossly intact.  She able to fire ankle dorsiflexors and plantar flexors as well as wiggle her toes.  Sensation is grossly normal throughout her foot.  Left Lower Extremity:  No obvious deformity, painless ROM hip, knee, ankle, compartments soft,        Diagnostic Tests:    I have reviewed the labs, radiology results and diagnostic studies:  CT scan of the thigh is available for review which shows a large hematoma of the proximal thigh.  No evidence of fractures dislocations or obvious loosening of the prosthesis.  Results from last 7 days   Lab Units 10/04/22  1401   WBC 10*3/mm3 9.01   HEMOGLOBIN g/dL 8.1*   PLATELETS 10*3/mm3 188     Results from last 7 days   Lab Units 10/04/22  1401   SODIUM mmol/L 138   POTASSIUM mmol/L 5.0   CO2 mmol/L 23.0   CREATININE mg/dL 1.82*   GLUCOSE mg/dL 109*           Assessment:  Patient Active Problem List   Diagnosis   • Degenerative disc disease, lumbar   • Lumbar stenosis with neurogenic claudication   • History of lumbar fusion   • Spondylosis of lumbar region without myelopathy or radiculopathy   • Mild obesity   • Physical deconditioning   • Idiopathic gout   • Anxiety and depression   • Greater  trochanteric bursitis of both hips   • Status post total bilateral knee replacement   • Back pain   • Essential hypertension   • Prediabetes   • S/P lumbar spinal fusion   • Renal insufficiency   • Leukocytosis, likely reactive   • Depression   • Arthritis of right hip   • Paroxysmal atrial fibrillation (HCC)   • YUNIOR treated with BiPAP   • 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   • Acute blood loss anemia, mild, asymptomatic   • 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  This seems to be related to recent aspiration while on Coumadin.  I think is unlikely that she still actively bleeding given that the aspiration was done almost a week ago.  Out of caution I think we can hold the Coumadin tonight.  As long she has a normal response to the blood transfusion this can be restarted.  Given the size of the hematoma and the significant pain she is having I did explain to her that this will likely take multiple weeks if not multiple months to improve and resolve.  I think she will continue to have difficulty with moving the leg and strength due to the pain related to the hematoma.  Unfortunately there is really no other intervention that would help resolve this and this just needs to be treated symptomatically.  I think she would benefit from working with physical therapy but is very likely she may need a stay in rehab if she is unable to get up and move appropriately.  There is any concern  for persistent bleeding after blood draw tomorrow could consider evaluation by interventional radiology for a source of bleed that could be coagulated.  I do not think there is a surgical intervention needed for this.  She can follow-up with our total joints team Dr. Samuels or Dr. Rosario after discharge from the hospital to review the results of her aspiration and discuss further treatment.   [facility], please call [phone] for appointment.            José Hinojosa MD  10/04/22  19:33 EDT

## 2022-10-05 LAB
ANION GAP SERPL CALCULATED.3IONS-SCNC: 10 MMOL/L (ref 5–15)
BH BB BLOOD EXPIRATION DATE: NORMAL
BH BB BLOOD EXPIRATION DATE: NORMAL
BH BB BLOOD TYPE BARCODE: 5100
BH BB BLOOD TYPE BARCODE: 5100
BH BB DISPENSE STATUS: NORMAL
BH BB DISPENSE STATUS: NORMAL
BH BB PRODUCT CODE: NORMAL
BH BB PRODUCT CODE: NORMAL
BH BB UNIT NUMBER: NORMAL
BH BB UNIT NUMBER: NORMAL
BUN SERPL-MCNC: 32 MG/DL (ref 8–23)
BUN/CREAT SERPL: 25.6 (ref 7–25)
CALCIUM SPEC-SCNC: 8.1 MG/DL (ref 8.6–10.5)
CHLORIDE SERPL-SCNC: 106 MMOL/L (ref 98–107)
CO2 SERPL-SCNC: 24 MMOL/L (ref 22–29)
CREAT SERPL-MCNC: 1.25 MG/DL (ref 0.57–1)
CROSSMATCH INTERPRETATION: NORMAL
CROSSMATCH INTERPRETATION: NORMAL
EGFRCR SERPLBLD CKD-EPI 2021: 44.2 ML/MIN/1.73
FLUAV RNA RESP QL NAA+PROBE: NOT DETECTED
FLUBV RNA RESP QL NAA+PROBE: NOT DETECTED
GLUCOSE SERPL-MCNC: 92 MG/DL (ref 65–99)
HCT VFR BLD AUTO: 28.1 % (ref 34–46.6)
HCT VFR BLD AUTO: 29.6 % (ref 34–46.6)
HGB BLD-MCNC: 8.9 G/DL (ref 12–15.9)
HGB BLD-MCNC: 9.4 G/DL (ref 12–15.9)
INR PPP: 1.54 (ref 0.84–1.13)
POTASSIUM SERPL-SCNC: 5.1 MMOL/L (ref 3.5–5.2)
PROTHROMBIN TIME: 18.4 SECONDS (ref 11.4–14.4)
QT INTERVAL: 392 MS
QTC INTERVAL: 446 MS
SARS-COV-2 RNA RESP QL NAA+PROBE: NOT DETECTED
SODIUM SERPL-SCNC: 140 MMOL/L (ref 136–145)
UNIT  ABO: NORMAL
UNIT  ABO: NORMAL
UNIT  RH: NORMAL
UNIT  RH: NORMAL

## 2022-10-05 PROCEDURE — 85610 PROTHROMBIN TIME: CPT | Performed by: NURSE PRACTITIONER

## 2022-10-05 PROCEDURE — 97162 PT EVAL MOD COMPLEX 30 MIN: CPT

## 2022-10-05 PROCEDURE — 85018 HEMOGLOBIN: CPT | Performed by: NURSE PRACTITIONER

## 2022-10-05 PROCEDURE — 96376 TX/PRO/DX INJ SAME DRUG ADON: CPT

## 2022-10-05 PROCEDURE — G0378 HOSPITAL OBSERVATION PER HR: HCPCS

## 2022-10-05 PROCEDURE — 87636 SARSCOV2 & INF A&B AMP PRB: CPT | Performed by: INTERNAL MEDICINE

## 2022-10-05 PROCEDURE — 97530 THERAPEUTIC ACTIVITIES: CPT

## 2022-10-05 PROCEDURE — 97166 OT EVAL MOD COMPLEX 45 MIN: CPT

## 2022-10-05 PROCEDURE — 99225 PR SBSQ OBSERVATION CARE/DAY 25 MINUTES: CPT | Performed by: INTERNAL MEDICINE

## 2022-10-05 PROCEDURE — 85018 HEMOGLOBIN: CPT | Performed by: INTERNAL MEDICINE

## 2022-10-05 PROCEDURE — 25010000002 MORPHINE PER 10 MG: Performed by: INTERNAL MEDICINE

## 2022-10-05 PROCEDURE — 85014 HEMATOCRIT: CPT | Performed by: INTERNAL MEDICINE

## 2022-10-05 PROCEDURE — 85014 HEMATOCRIT: CPT | Performed by: NURSE PRACTITIONER

## 2022-10-05 PROCEDURE — 80048 BASIC METABOLIC PNL TOTAL CA: CPT | Performed by: NURSE PRACTITIONER

## 2022-10-05 RX ORDER — MORPHINE SULFATE 2 MG/ML
2 INJECTION, SOLUTION INTRAMUSCULAR; INTRAVENOUS ONCE
Status: COMPLETED | OUTPATIENT
Start: 2022-10-05 | End: 2022-10-05

## 2022-10-05 RX ADMIN — Medication 10 ML: at 07:39

## 2022-10-05 RX ADMIN — ALLOPURINOL 100 MG: 100 TABLET ORAL at 07:39

## 2022-10-05 RX ADMIN — ESCITALOPRAM OXALATE 20 MG: 20 TABLET ORAL at 07:38

## 2022-10-05 RX ADMIN — HYDROCODONE BITARTRATE AND ACETAMINOPHEN 1 TABLET: 7.5; 325 TABLET ORAL at 02:20

## 2022-10-05 RX ADMIN — SENNOSIDES AND DOCUSATE SODIUM 2 TABLET: 50; 8.6 TABLET ORAL at 07:39

## 2022-10-05 RX ADMIN — Medication 10 ML: at 20:41

## 2022-10-05 RX ADMIN — MORPHINE SULFATE 2 MG: 2 INJECTION, SOLUTION INTRAMUSCULAR; INTRAVENOUS at 03:45

## 2022-10-05 RX ADMIN — PROPAFENONE HYDROCHLORIDE 225 MG: 225 CAPSULE, EXTENDED RELEASE ORAL at 07:39

## 2022-10-05 RX ADMIN — PROPAFENONE HYDROCHLORIDE 225 MG: 225 CAPSULE, EXTENDED RELEASE ORAL at 20:41

## 2022-10-05 NOTE — CASE MANAGEMENT/SOCIAL WORK
Discharge Planning Assessment  Psychiatric     Patient Name: Akua Torres  MRN: [id number]  Today's Date: 10/5/2022    Admit Date: 10/4/2022    Plan: Rehab   Discharge Needs Assessment     Row Name 10/05/22 1329       Living Environment    People in Home spouse    Current Living Arrangements home    Primary Care Provided by self    Provides Primary Care For no one    Family Caregiver if Needed spouse    Able to Return to Prior Arrangements yes       Resource/Environmental Concerns    Resource/Environmental Concerns none       Transition Planning    Patient/Family Anticipates Transition to home with family    Patient/Family Anticipated Services at Transition     Transportation Anticipated family or friend will provide       Discharge Needs Assessment    Readmission Within the Last 30 Days no previous admission in last 30 days    Equipment Currently Used at Home walker, rolling;shower chair;cane, quad tip;grab bar    Concerns to be Addressed discharge planning    Anticipated Changes Related to Illness none    Equipment Needed After Discharge none               Discharge Plan     Row Name 10/05/22 1429       Plan    Plan Rehab    Patient/Family in Agreement with Plan yes    Plan Comments Met & spoke with Mrs Torres at the bedside. She lives with her spouse in [facility]. At baseline, is ind with ADL's. Uses a quad cane or RW for mobility. Has a shower chair/grab bars. Denies HH/Rehab/O2. POA is her spouse and her friend, Perez Hernandez. Has a living will. PT/OT consults placed today. Expressed concern about her spouse and that he can't take care of her post DC. Advised to consider rehab first. She is agreeable. Will wait for therapy recs.    Final Discharge Disposition Code 30 - still a patient              Continued Care and Services - Admitted Since 10/4/2022    Coordination has not been started for this encounter.       Expected Discharge Date and Time     Expected Discharge Date Expected  Discharge Time    Oct 12, 2022          Demographic Summary     Row Name 10/05/22 1328       General Information    Admission Type observation    General Information Comments Verified PCP is Dr Freeman. Primary insurance is [employer]. Has drug coverage.       Contact Information    Permission Granted to Share Info With     Contact Information Obtained for                Functional Status     Row Name 10/05/22 1329       Functional Status    Usual Activity Tolerance moderate    Current Activity Tolerance poor       Functional Status, IADL    Medications independent    Meal Preparation independent    Housekeeping independent    Laundry independent    Shopping independent               Psychosocial    No documentation.                Abuse/Neglect    No documentation.                Legal    No documentation.                Substance Abuse    No documentation.                Patient Forms    No documentation.                   Telma Mitchell, NIHCOLE

## 2022-10-05 NOTE — THERAPY EVALUATION
Patient Name: Akua Torres  : 1945    MRN: [id number]                              Today's Date: 10/5/2022       Admit Date: 10/4/2022    Visit Dx:     ICD-10-CM ICD-9-CM   1. Traumatic rhabdomyolysis, initial encounter ([facility])  T79.6XXA 958.6   2. LOTTIE (acute kidney injury) ([facility])  N17.9 584.9   3. Anticoagulated  Z79.01 V58.61   4. Anemia, unspecified type  D64.9 285.9     Patient Active Problem List   Diagnosis   • Degenerative disc disease, lumbar   • Lumbar stenosis with neurogenic claudication   • History of lumbar fusion   • Spondylosis of lumbar region without myelopathy or radiculopathy   • Mild obesity   • Physical deconditioning   • Idiopathic gout   • Anxiety and depression   • Greater trochanteric bursitis of both hips   • Status post total bilateral knee replacement   • Back pain   • Essential hypertension   • Prediabetes   • S/P lumbar spinal fusion   • Renal insufficiency   • Leukocytosis, likely reactive   • Depression   • Arthritis of right hip   • Paroxysmal atrial fibrillation ([facility])   • YUNIOR treated with BiPAP   • 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   • Acute blood loss anemia, mild, asymptomatic   • 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 ([facility])   • Supraventricular tachycardia ([facility])   • Left prefrontal gyrus stroke   • Arthritis of left hip   • Hip pain   • Status post total replacement of left hip   • Hyperlipidemia   • Elevated hemoglobin A1c   • History of stroke   • Acute postoperative pain   • Nontraumatic complete tear of right rotator cuff   • Traumatic rhabdomyolysis, initial encounter ([facility])   • Hematoma of right thigh   • Pain of right lower extremity   • Anemia   • Fibromyalgia     Past Medical History:   Diagnosis Date   • A-fib ([facility])    • Anxiety and depression    • Arthritis    • Cataract    • Depression    • Extremity pain    • Fibromyalgia    • GERD (gastroesophageal reflux disease)    • Gout    • H/O bladder infections      JUST FINISHED MACROBID ON 11-2-17 FOR RECENT UTI   • Hypercholesteremia    • Hypertension    • Joint pain    • Kidney disease, chronic, stage III (GFR 30-59 ml/min) (HCC)     resolved after stopping antiinflammatory ~2015   • Low back pain    • Neck pain    • Pneumonia 02/2020   • Rheumatic fever    • Skin cancer    • Sleep apnea     no cpap   • Stroke (HCC) 09/2019    right sided weakness   • Wears glasses      Past Surgical History:   Procedure Laterality Date   • BACK SURGERY  2004    LUMBAR PER DR MAN x2   • CARDIAC CATHETERIZATION  2019   • CARPAL TUNNEL RELEASE Left    • COLONOSCOPY  11/2020   • EYE SURGERY      retina surgery   • FINGER SURGERY Right     3RD FINGER   • HEEL SPUR EXCISION Bilateral    • INTERVENTIONAL RADIOLOGY PROCEDURE N/A 9/17/2019    Procedure: Carotid and Cerebral Angiogram/ Possible Stent;  Surgeon: Imer Guerra MD;  Location:  [facility];  Service: Interventional Radiology   • KNEE ARTHROSCOPY Bilateral    • LUMBAR DISCECTOMY FUSION INSTRUMENTATION N/A 11/10/2017    Procedure: LUMBAR SPINAL FUSION ;  Surgeon: Gopi Man MD;  Location:  [facility];  Service:    • REPLACEMENT TOTAL KNEE BILATERAL Bilateral    • SHOULDER ARTHROSCOPY W/ ROTATOR CUFF REPAIR Right 1/19/2021    Procedure: ARTHROSCOPIC ROTATOR CUFF REPAIR WITH BICEPS TENODESIS RIGHT;  Surgeon: Lance Morales Jr., MD;  Location:  [facility];  Service: Orthopedics;  Laterality: Right;   • SKIN BIOPSY     • TOTAL HIP ARTHROPLASTY Right 9/9/2019    Procedure: TOTAL ANTERIOR RIGHT HIP ARTHROPLASTY;  Surgeon: Darrin New MD;  Location:  [facility];  Service: Orthopedics   • TOTAL HIP ARTHROPLASTY Left 6/29/2020    Procedure: TOTAL HIP ARTHROPLASTY ANTERIOR LEFT;  Surgeon: Darrin New MD;  Location:  [facility];  Service: Orthopedics;  Laterality: Left;      General Information     Row Name 10/05/22 1508          Physical Therapy Time and Intention    Document Type evaluation  -MB     Mode of  Treatment physical therapy  -MB     Row Name 10/05/22 1502          General Information    Patient Profile Reviewed yes  -MB     Prior Level of Function independent:;bed mobility;ADL's;transfer;gait;all household mobility;driving;using stairs  Prior to onset R LE pain and edema, pt. reports she was independent w/ ambulation w/ RW.  -MB     Existing Precautions/Restrictions fall  RLE pain, edema  -MB     Barriers to Rehab previous functional deficit  -MB     Row Name 10/05/22 1502          Living Environment    People in Home spouse  -MB     Row Name 10/05/22 1502          Home Main Entrance    Number of Stairs, Main Entrance one  -MB     Stair Railings, Main Entrance none  -MB     Row Name 10/05/22 1502          Stairs Within Home, Primary    Number of Stairs, Within Home, Primary none  -MB     Row Name 10/05/22 1502          Cognition    Orientation Status (Cognition) oriented x 3  -MB     Row Name 10/05/22 1502          Safety Issues, Functional Mobility    Safety Issues Affecting Function (Mobility) insight into deficits/self-awareness;judgment;problem-solving;safety precaution awareness;safety precautions follow-through/compliance;sequencing abilities  -MB     Impairments Affecting Function (Mobility) balance;endurance/activity tolerance;pain;strength  -MB           User Key  (r) = Recorded By, (t) = Taken By, (c) = Cosigned By    Initials Name Provider Type    Zainab Guzman, PT Physical Therapist               Mobility     Row Name 10/05/22 6773          Bed Mobility    Bed Mobility supine-sit  -MB     Supine-Sit [place] (Bed Mobility) moderate assist (50% patient effort)  -MB     Assistive Device (Bed Mobility) bed rails;draw sheet;head of bed elevated  -MB     Comment, (Bed Mobility) Pt. required assist to move RLE towards EOB and raise trunk/shoulders. Increased time/effort to complete.  -MB     Row Name 10/05/22 8163          Transfers    Comment, (Transfers) STS x 4 w/ VCs to step RLE forward  for comfort and push/reach back w/ UEs.  -MB     Row Name 10/05/22 1553          Bed-Chair Transfer    Bed-Chair [place] (Transfers) minimum assist (75% patient effort);verbal cues  -MB     Assistive Device (Bed-Chair Transfers) walker, front-wheeled  -MB     Row Name 10/05/22 1553          Sit-Stand Transfer    Sit-Stand [place] (Transfers) minimum assist (75% patient effort);verbal cues  -MB     Assistive Device (Sit-Stand Transfers) walker, front-wheeled  -MB     Row Name 10/05/22 1553          Gait/Stairs (Locomotion)    [place] Level (Gait) minimum assist (75% patient effort);verbal cues;nonverbal cues (demo/gesture)  -MB     Assistive Device (Gait) walker, front-wheeled  -MB     Distance in Feet (Gait) 6  -MB     Deviations/Abnormal Patterns (Gait) nixon decreased;right sided deviations;gait speed decreased;stride length decreased;weight shifting decreased;base of support, wide  -MB     Bilateral Gait Deviations forward flexed posture;heel strike decreased  -MB     Right Sided Gait Deviations weight shift ability decreased  -MB     Comment, (Gait/Stairs) Pt. able to tolerate weight-bearing on RLE. She ambulated w/ step to gait pattern w/ decreased B stride length, keeping R knee extended. VCs for step sequence, increased R knee flexion during swing phase, and increased B step length.  -MB           User Key  (r) = Recorded By, (t) = Taken By, (c) = Cosigned By    Initials Name Provider Type    Zainab Guzman, PT Physical Therapist               Obj/Interventions     Row Name 10/05/22 1559          Range of Motion Comprehensive    General Range of Motion upper extremity range of motion deficits identified;lower extremity range of motion deficits identified  -MB     Comment, General Range of Motion R hip/knee flexion limited d/t pain/edema. B shoulder flexion limited ~25%.  -MB     Row Name 10/05/22 155          Strength Comprehensive (MMT)    General Manual Muscle Testing (MMT)  Assessment upper extremity strength deficits identified;lower extremity strength deficits identified  -MB     Comment, General Manual Muscle Testing (MMT) Assessment LLE grossly 4/5. R hip flexion/knee ext/ankle DF 3-/5  -MB     Row Name 10/05/22 1558          Motor Skills    Therapeutic Exercise hip;knee;ankle  -MB     Row Name 10/05/22 1558          Hip (Therapeutic Exercise)    Hip (Therapeutic Exercise) isometric exercises;AAROM (active assistive range of motion)  -MB     Hip AAROM (Therapeutic Exercise) bilateral;flexion;aBduction;5 repetitions  -MB     Hip Isometrics (Therapeutic Exercise) bilateral;gluteal sets;10 repetitions  -MB     Row Name 10/05/22 1558          Knee (Therapeutic Exercise)    Knee (Therapeutic Exercise) isometric exercises;AAROM (active assistive range of motion)  -MB     Knee AAROM (Therapeutic Exercise) bilateral;flexion;extension;5 repetitions  -MB     Knee Isometrics (Therapeutic Exercise) bilateral;quad sets;10 repetitions  -MB     Row Name 10/05/22 1558          Ankle (Therapeutic Exercise)    Ankle (Therapeutic Exercise) AROM (active range of motion)  -MB     Ankle AROM (Therapeutic Exercise) bilateral;dorsiflexion;plantarflexion;10 repetitions  -MB     Row Name 10/05/22 1558          Balance    Balance Assessment sitting static balance;standing static balance;standing dynamic balance  -MB     Static Sitting Balance independent  -MB     Position, Sitting Balance unsupported;sitting edge of bed  -MB     Static Standing Balance contact guard  -MB     Dynamic Standing Balance contact guard  -MB     Position/Device Used, Standing Balance supported;walker, rolling  -MB     Balance Interventions sitting;standing;sit to stand;occupation based/functional task;weight shifting activity  -MB     Row Name 10/05/22 1550          Sensory Assessment (Somatosensory)    Sensory Assessment (Somatosensory) right LE;left LE  -MB     Left LE Sensory Assessment light touch awareness;impaired  -MB      Right LE Sensory Assessment light touch awareness;intact  -MB           User Key  (r) = Recorded By, (t) = Taken By, (c) = Cosigned By    Initials Name Provider Type    Zainab Guzman, PT Physical Therapist               Goals/Plan     Row Name 10/05/22 1603          Bed Mobility Goal 1 (PT)    Activity/Assistive Device (Bed Mobility Goal 1, PT) sit to supine/supine to sit  -MB     [place] Level/Cues Needed (Bed Mobility Goal 1, PT) contact guard required  -MB     Time Frame (Bed Mobility Goal 1, PT) 10 days  -MB     Row Name 10/05/22 1603          Transfer Goal 1 (PT)    Activity/Assistive Device (Transfer Goal 1, PT) sit-to-stand/stand-to-sit;bed-to-chair/chair-to-bed;walker, rolling  -MB     [place] Level/Cues Needed (Transfer Goal 1, PT) contact guard required  -MB     Time Frame (Transfer Goal 1, PT) 10 days  -MB     Row Name 10/05/22 1603          Gait Training Goal 1 (PT)    Activity/Assistive Device (Gait Training Goal 1, PT) gait (walking locomotion);walker, rolling;improve balance and speed;increase endurance/gait distance;decrease fall risk;diminish gait deviation  -MB     [place] Level (Gait Training Goal 1, PT) contact guard required  -MB     Distance (Gait Training Goal 1, PT) 150  -MB     Time Frame (Gait Training Goal 1, PT) 10 days  -MB     Row Name 10/05/22 1603          Stairs Goal 1 (PT)    Activity/Assistive Device (Stairs Goal 1, PT) ascending stairs;descending stairs;walker, rolling  -MB     [place] Level/Cues Needed (Stairs Goal 1, PT) contact guard required  -MB     Number of Stairs (Stairs Goal 1, PT) 1  -MB     Time Frame (Stairs Goal 1, PT) by discharge  -MB     Row Name 10/05/22 1603          Patient Education Goal (PT)    Activity (Patient Education Goal, PT) HEP  -MB     [place]/Cues/Accuracy (Memory Goal 2, PT) demonstrates adequately  -MB     Time Frame (Patient Education Goal, PT) 1 week  -MB     Row Name 10/05/22 1603          Therapy  Assessment/Plan (PT)    Planned Therapy Interventions (PT) bed mobility training;balance training;gait training;home exercise program;patient/family education;postural re-education;ROM (range of motion);stair training;strengthening;transfer training  -MB           User Key  (r) = Recorded By, (t) = Taken By, (c) = Cosigned By    Initials Name Provider Type    Zainab Guzman, PT Physical Therapist               Clinical Impression     Row Name 10/05/22 1600          Pain    Pretreatment Pain Rating 5/10  -MB     Posttreatment Pain Rating 5/10  -MB     Pain Location - Side/Orientation Right  -MB     Pain Location lower  -MB     Pain Location - extremity  -MB     Pain Intervention(s) Medication (See MAR);Ambulation/increased activity;Repositioned  -MB     Row Name 10/05/22 1600          Plan of Care Review    Plan of Care Reviewed With patient;family  -MB     Progress no change  -MB     Outcome Evaluation PT eval completed. Patient presents w/ decreased strength and ROM, pain w/ mobility, impaired balance, gait instability, and functional decline. She completed bed mobility w/ mod A, transfers w/ RW and min A, and ambulated 6ft w/ RW and min A. Skilled IPPT warranted to improve pt's safety and independence w/ functional mobility. Recommend IP rehab at D/C.  -MB     Row Name 10/05/22 1600          Therapy Assessment/Plan (PT)    Patient/Family Therapy Goals Statement (PT) Decreased pain, independent mobility  -MB     Rehab Potential (PT) good, to achieve stated therapy goals  -MB     Criteria for Skilled Interventions Met (PT) yes;meets criteria;skilled treatment is necessary  -MB     Therapy Frequency (PT) daily  -MB     Row Name 10/05/22 1600          Vital Signs    Pre Systolic BP Rehab 132  -MB     Pre Treatment Diastolic BP 50  -MB     Pre SpO2 (%) 96  -MB     O2 Delivery Pre Treatment room air  -MB     O2 Delivery Intra Treatment room air  -MB     Post SpO2 (%) 95  -MB     O2 Delivery Post Treatment room  air  -MB     Pre Patient Position Supine  -MB     Intra Patient Position Standing  -MB     Post Patient Position Sitting  -MB     Row Name 10/05/22 1600          Positioning and Restraints    Pre-Treatment Position in bed  -MB     Post Treatment Position chair  -MB     In Chair notified nsg;reclined;call light within reach;encouraged to call for assist;exit alarm on;with family/caregiver;waffle cushion;legs elevated;heels elevated  -MB           User Key  (r) = Recorded By, (t) = Taken By, (c) = Cosigned By    Initials Name Provider Type    Zainab Guzman, PT Physical Therapist               Outcome Measures     Row Name 10/05/22 1605          How much help from another person do you currently need...    Turning from your back to your side while in flat bed without using bedrails? 3  -MB     Moving from lying on back to sitting on the side of a flat bed without bedrails? 2  -MB     Moving to and from a bed to a chair (including a wheelchair)? 3  -MB     Standing up from a chair using your arms (e.g., wheelchair, bedside chair)? 3  -MB     Climbing 3-5 steps with a railing? 2  -MB     To walk in hospital room? 3  -MB     AM-PAC 6 Clicks Score (PT) 16  -MB     Highest level of mobility 5 --> Static standing  -MB     Row Name 10/05/22 1605          Functional Assessment    Outcome Measure Options AM-PAC 6 Clicks Basic Mobility (PT)  -MB           User Key  (r) = Recorded By, (t) = Taken By, (c) = Cosigned By    Initials Name Provider Type    Zainab Guzman, PT Physical Therapist                             Physical Therapy Education                 Title: PT OT SLP Therapies (In Progress)     Topic: Physical Therapy (Done)     Point: Mobility training (Done)     Learning Progress Summary           Patient Acceptance, E,D, VU,NR by MB at 10/5/2022 1605                   Point: Home exercise program (Done)     Learning Progress Summary           Patient Acceptance, E,D, VU,NR by MB at 10/5/2022 1605                    Point: Body mechanics (Done)     Learning Progress Summary           Patient Acceptance, E,D, VU,NR by MB at 10/5/2022 1605                   Point: Precautions (Done)     Learning Progress Summary           Patient Acceptance, E,D, VU,NR by MB at 10/5/2022 1605                               User Key     Initials Effective Dates Name Provider Type Discipline    MB 06/16/21 -  Zainab Hook, PT Physical Therapist PT              PT Recommendation and Plan  Planned Therapy Interventions (PT): bed mobility training, balance training, gait training, home exercise program, patient/family education, postural re-education, ROM (range of motion), stair training, strengthening, transfer training  Plan of Care Reviewed With: patient, family  Progress: no change  Outcome Evaluation: PT eval completed. Patient presents w/ decreased strength and ROM, pain w/ mobility, impaired balance, gait instability, and functional decline. She completed bed mobility w/ mod A, transfers w/ RW and min A, and ambulated 6ft w/ RW and min A. Skilled IPPT warranted to improve pt's safety and independence w/ functional mobility. Recommend IP rehab at D/C.     Time Calculation:    PT Charges     Row Name 10/05/22 1606             Time Calculation    Start Time 1502  -MB      PT Received On 10/05/22  -MB      PT Goal Re-Cert Due Date 10/15/22  -MB              Time Calculation- PT    Total Timed Code Minutes- PT 15 minute(s)  -MB              Timed Charges    10227 - PT Therapeutic Activity Minutes 15  -MB              Untimed Charges    PT Eval/Re-eval Minutes 48  -MB              Total Minutes    Timed Charges Total Minutes 15  -MB      Untimed Charges Total Minutes 48  -MB       Total Minutes 63  -MB            User Key  (r) = Recorded By, (t) = Taken By, (c) = Cosigned By    Initials Name Provider Type    Zainab Guzman, PT Physical Therapist              Therapy Charges for Today     Code Description Service Date  Service Provider Modifiers Qty    52654456290 HC PT THERAPEUTIC ACT EA 15 MIN 10/5/2022 Zainab Hook, PT GP 1    50417314576 HC PT EVAL MOD COMPLEXITY 4 10/5/2022 Zainab Hook, PT GP 1          PT G-Codes  Outcome Measure Options: AM-PAC 6 Clicks Basic Mobility (PT)  AM-PAC 6 Clicks Score (PT): 16    Zainab Hook, PT  10/5/2022

## 2022-10-05 NOTE — PROGRESS NOTES
[facility] Medicine Services  PROGRESS NOTE    Patient Name: Akua Torres  : 1945  MRN: [id number]    Date of Admission: 10/4/2022  Primary Care Physician: Nathalia Freeman, HALEY    Subjective   Subjective     CC:  Right leg pain and swelling    HPI:  Resting in bed no acute distress but she is quite uncomfortable and complains of right thigh pain.  Anytime that she moves the right leg she has severe pain.  No fever or chills.  No chest pain or palpitation or shortness of breath at rest.  No nausea vomiting or diarrhea or abdominal pain.    ROS:  As above    Objective   Objective     Vital Signs:   Temp:  [97.7 °F (36.5 °C)-99.3 °F (37.4 °C)] 98 °F (36.7 °C)  Heart Rate:  [66-85] 76  Resp:  [16-18] 18  BP: (106-178)/(43-88) 132/50     Physical Exam:  Constitutional: No acute distress  HENT: NCAT, mucous membranes moist  Respiratory: Clear to auscultation bilaterally, respiratory effort normal   Cardiovascular: RRR, no murmurs, rubs, or gallops  Gastrointestinal: Abdomen is obese.  Positive bowel sounds, soft, nontender, nondistended  Musculoskeletal:  bilateral ankle edema, right thigh hematoma with extensive bruising.  Pain when patient moved to limb.  Psychiatric: Appropriate affect, cooperative  Neurologic: Awake, alert, oriented x3, no focality appreciated, speech clear  Skin: No rashes    Results Reviewed:  LAB RESULTS:      Lab 10/05/22  1456 10/05/22  0646 10/04/22  1401   WBC  --   --  9.01   HEMOGLOBIN 9.4* 8.9* 8.1*   HEMATOCRIT 29.6* 28.1* 25.4*   PLATELETS  --   --  188   NEUTROS ABS  --   --  6.38   IMMATURE GRANS (ABS)  --   --  0.03   LYMPHS ABS  --   --  1.75   MONOS ABS  --   --  0.54   EOS ABS  --   --  0.28   MCV  --   --  94.1   PROTIME  --  18.4* 21.3*         Lab 10/05/22  0646 10/04/22  1401   SODIUM 140 138   POTASSIUM 5.1 5.0   CHLORIDE 106 103   CO2 24.0 23.0   ANION GAP 10.0 12.0   BUN 32* 41*   CREATININE 1.25* 1.82*   EGFR 44.2* 28.2*   GLUCOSE  92 109*   CALCIUM 8.1* 8.7         Lab 10/04/22  1401   TOTAL PROTEIN 6.5   ALBUMIN 3.90   GLOBULIN 2.6   ALT (SGPT) 12   AST (SGOT) 24   BILIRUBIN 1.4*   ALK PHOS 100         Lab 10/05/22  0646 10/04/22  1401   PROTIME 18.4* 21.3*   INR 1.54* 1.85*             Lab 10/04/22  1505   ABO TYPING O   RH TYPING Positive   ANTIBODY SCREEN Negative         Brief Urine Lab Results     None          Microbiology Results Abnormal     Procedure Component Value - Date/Time    COVID PRE-OP / PRE-PROCEDURE SCREENING ORDER (NO ISOLATION) - Swab, Nasopharynx [[id number]]  (Normal) Collected: 10/05/22 0740    Lab Status: Final result Specimen: Swab from Nasopharynx Updated: 10/05/22 0822    Narrative:      The following orders were created for panel order COVID PRE-OP / PRE-PROCEDURE SCREENING ORDER (NO ISOLATION) - Swab, Nasopharynx.  Procedure                               Abnormality         Status                     ---------                               -----------         ------                     COVID-19 and FLU A/B PCR...[[id number]]  Normal              Final result                 Please view results for these tests on the individual orders.    COVID-19 and FLU A/B PCR - Swab, Nasopharynx [[id number]]  (Normal) Collected: 10/05/22 0740    Lab Status: Final result Specimen: Swab from Nasopharynx Updated: 10/05/22 0822     COVID19 Not Detected     Influenza A PCR Not Detected     Influenza B PCR Not Detected    Narrative:      Fact sheet for providers: https://www.fda.gov/media/120066/download    Fact sheet for patients: https://www.fda.gov/media/546034/download    Test performed by PCR.          CT Abdomen Pelvis Without Contrast    Result Date: 10/4/2022   DATE OF EXAM: 10/4/2022 4:03 PM  PROCEDURE: CT ABDOMEN PELVIS WO CONTRAST-  INDICATIONS: Gastrointestinal hemorrhage, recent right hip arthrocentesis, anemia, abdominal pain, patient on Coumadin.  COMPARISON: 06/06/2018.  TECHNIQUE: Routine transaxial slices were  obtained through the abdomen and pelvis without the administration of intravenous contrast. Reconstructed coronal and sagittal images were also obtained. Automated exposure control and iterative construction methods were used.  FINDINGS: There is a partially imaged hematoma in the proximal anterior right thigh. This was discussed in detail under the separately dictated CT right lower extremity report. There is no retroperitoneal or intrapelvic hemorrhage. There is a round fluid density mass in the right lobe of the liver measuring 2.3 x 2.4 cm felt to represent a benign cyst. The gallbladder, pancreas, adrenal glands, and spleen are normal. There are round fluid density masses extending off the inferior pole of the right kidney felt to represent renal cysts. The left kidney is normal. There are no dilated loops of bowel to indicate an obstructive process. There is no abnormal bowel wall thickening. The appendix is normal. The uterus appears unremarkable. The urinary bladder is hyperdense which suggest old iodinated contrast. Alternatively, this could represent hemorrhage in the bladder. There is scattered artifact through the pelvis secondary to the patient's bilateral hip arthroplasties. The patient's also had multilevel spinal fusion. There are transpedicular screws and posterior fixation rods from the L2-S1 levels. The patient's had multilevel decompression laminectomies. There are no suspicious osteolytic or sclerotic lesions within the bony structures. There is a bandlike linear density in the right lower lobe consistent with subsegmental atelectasis.      Impression:  1. Partially imaged hematoma within the proximal anterior right thigh. Discussed in detail under the separately dictated CT right lower extremity report. 2. No retroperitoneal or intrapelvic hemorrhage. 3. The urinary bladder is hyperdense which suggest old contrast material. Alternatively, this could represent hemorrhage in the bladder. 4.  Probable cysts within the right lobe of the liver and inferior pole the right kidney. 5. The appendix is normal. 6. Additional findings as noted above.  This report was finalized on 10/4/2022 4:47 PM by David Williamson MD.      CT Lower Extremity Right Without Contrast    Result Date: 10/4/2022  DATE OF EXAM: 10/4/2022 4:03 PM  PROCEDURE: CT LOWER EXTREMITY RIGHT WO CONTRAST-  INDICATIONS: Right hip and thigh pain, anemia, recent right hip arthrodesis, patient on Coumadin.  COMPARISON: No comparisons available.  TECHNIQUE: CT of the right lower extremity was obtained without the administration of contrast. Coronal and sagittal reformats were obtained. Automated exposure control and alternative reconstruction methods were used.  The radiation dose reduction device was turned on for each scan per the ALARA (As Low as Reasonably Achievable) protocol.  FINDINGS: There is a hyperdense hematoma in the proximal right thigh. It measures 9.3 x 7.0 cm in transaxial diameter and 17.5 cm in length. There is scattered artifact from the patient's total right hip arthroplasty. There is also scattered artifact from the patient's total right knee arthroplasty. Both arthroplasties appear intact with no evidence of loosening or failure. There are vascular calcifications along the distribution of the right superficial femoral artery. Findings in the lower pelvis were discussed under the separately dictated CT pelvis report.      Impression: There is a hyperdense hematoma the proximal right thigh measuring 9.3 x 7.0 cm in diameter and 17.5 cm in length.  This report was finalized on 10/4/2022 4:52 PM by David Williamson MD.        Results for orders placed during the hospital encounter of 04/26/21    Adult Transthoracic Echo Complete W/ Cont if Necessary Per Protocol    Interpretation Summary  · Calculated left ventricular EF = 67%  · The aortic valve exhibits sclerosis  · No significant valvular stenosis or regurgitation      I have reviewed  the medications:  Scheduled Meds:allopurinol, 100 mg, Oral, Daily  escitalopram, 20 mg, Oral, Daily  propafenone SR, 225 mg, Oral, Q12H  senna-docusate sodium, 2 tablet, Oral, BID  sodium chloride, 10 mL, Intravenous, Q12H      Continuous Infusions:   PRN Meds:.•  acetaminophen  •  senna-docusate sodium **AND** polyethylene glycol **AND** bisacodyl **AND** bisacodyl  •  HYDROcodone-acetaminophen  •  hydrOXYzine  •  ondansetron **OR** ondansetron  •  sodium chloride  •  sodium chloride    Assessment & Plan   Assessment & Plan     Active Hospital Problems    Diagnosis  POA   • Traumatic rhabdomyolysis, initial encounter (HCC) [T79.6XXA]  Yes   • Hematoma of right thigh [S70.11XA]  Yes   • Pain of right lower extremity [M79.604]  Yes   • Anemia [D64.9]  Yes   • Fibromyalgia [M79.7]  Yes   • Paroxysmal atrial fibrillation (HCC) [I48.0]  Yes   • Renal insufficiency [N28.9]  Yes   • Essential hypertension [I10]  Yes      Resolved Hospital Problems   No resolved problems to display.        Brief Hospital Course to date:  Akua Torres is a 77 y.o. female with past medical history significant for paroxysmal atrial fibrillation on Coumadin, hypertension, hyperlipidemia, history of CVA, osteoarthritis, morbid obesity, fibromyalgia.  Patient was admitted for an right lower extremity pain and swelling.    *Right lower extremity pain and swelling.  Patient has had right hip aspirated on 9/29/2022.  Currently patient has a large hematoma that is causing severe pain on movement.  Orthopedic surgery has seen and evaluated the patient but they recommend conservative treatment.  Patient did receive packed red blood cell transfusion and hemoglobin and hematocrit are being monitored and are stable.  We will continue monitoring hemoglobin and hematocrit.    *Hypertension, currently controlled.    *Paroxysmal atrial fibrillation.  Patient was on Coumadin but currently Coumadin is on hold because of hematoma.  INR was 1.54  today.    *Chronic kidney disease.  Today's BUN was 32 and creatinine 1.25 which corresponds to GFR about 44.    *Morbid obesity    Expected Discharge Location and Transportation:rehab  Expected Discharge Date:when bed available.    DVT prophylaxis:  Mechanical DVT prophylaxis orders are present.          CODE STATUS:   Code Status and Medical Interventions:   Ordered at: 10/04/22 1851     Medical Intervention Limits:    NO intubation (DNI)     Level Of Support Discussed With:    Patient     Code Status (Patient has no pulse and is not breathing):    No CPR (Do Not Attempt to Resuscitate)     Medical Interventions (Patient has pulse or is breathing):    Limited Support       Harsh Freeman MD  10/05/22

## 2022-10-05 NOTE — PLAN OF CARE
Goal Outcome Evaluation:              Outcome Evaluation: A&Ox4. VSS on RA. NSR on monitor. No PRN pain medication given during this shift. Bruising noted anteriorly and posteriorly on R upper leg. Pt actively participated in PT, ethusiasitc about mobility and their current state of ability. Up to chair x1 assist with walker and gait belt. Multiple incontinent BM. No other complaints during this shift.

## 2022-10-05 NOTE — PROGRESS NOTES
"Nutrition Services    Patient Name:  Akua Torres  YOB: 1945  MRN: [id number]  Admit Date:  10/4/2022    Pt screened for MST report of \"unsure of weight loss.\"  Per EMR, weight stable/gain over the past 2 years.     Last 15 Recorded Weights  View Complete Flowsheet  Weight Weight (kg) Weight (lbs) Weight Method VISIT REPORT   10/4/2022 99.837 kg 220 lb 1.6 oz Bed scale -   10/4/2022 90.719 kg 200 lb Estimated -   5/18/2022 93.895 kg 207 lb - Report   4/6/2022 94.348 kg 208 lb - Report   7/27/2021 81.647 kg 180 lb Stated -   4/26/2021 89.812 kg 198 lb - -   4/21/2021 89.869 kg 198 lb 2 oz - Report   4/18/2021 86.183 kg 190 lb Stated -   1/8/2021 92.08 kg 203 lb Standing scale -   7/13/2020 91.627 kg 202 lb - Report   6/29/2020 90.9 kg 200 lb 6.4 oz - -   6/22/2020 91.173 kg 201 lb - Report   6/17/2020 90.9 kg 200 lb 6.4 oz Standing scale -   2/18/2020 87.181 kg 192 lb 3.2 oz - Report   1/6/2020 90.266 kg 199 lb - Report     EMR reviewed, pt does not appear to meet nutrition risk screen criteria.  Please consult nutrition services if needed.    Electronically signed by:  Sandee Cyr RD  10/05/22 07:35 EDT   "

## 2022-10-05 NOTE — PLAN OF CARE
Goal Outcome Evaluation:  Plan of Care Reviewed With: patient        Progress: no change  Outcome Evaluation: OT evaluation completed. Pt presents with RLE weakness, pain, and swelling, impaired mobility, and decreased activity tolerance limiting independence with ADLs. Pt very motivated to participate in therapy sessions and regain independence. Pt performed STS with Min A using the RW and ambulated short distance using the RW with Min A. No buckling of RLE noted, however painful with weightbearing. OT rec IP OT and IPR at [place].

## 2022-10-05 NOTE — PLAN OF CARE
Goal Outcome Evaluation:  Plan of Care Reviewed With: patient, family        Progress: no change  Outcome Evaluation: PT eval completed. Patient presents w/ decreased strength and ROM, pain w/ mobility, impaired balance, gait instability, and functional decline. She completed bed mobility w/ mod A, transfers w/ RW and min A, and ambulated 6ft w/ RW and min A. Skilled IPPT warranted to improve pt's safety and independence w/ functional mobility. Recommend IP rehab at [place].

## 2022-10-05 NOTE — THERAPY EVALUATION
Patient Name: Akua Torres  : 1945    MRN: [id number]                              Today's Date: 10/5/2022       Admit Date: 10/4/2022    Visit Dx:     ICD-10-CM ICD-9-CM   1. Traumatic rhabdomyolysis, initial encounter ([facility])  T79.6XXA 958.6   2. LOTTIE (acute kidney injury) ([facility])  N17.9 584.9   3. Anticoagulated  Z79.01 V58.61   4. Anemia, unspecified type  D64.9 285.9     Patient Active Problem List   Diagnosis   • Degenerative disc disease, lumbar   • Lumbar stenosis with neurogenic claudication   • History of lumbar fusion   • Spondylosis of lumbar region without myelopathy or radiculopathy   • Mild obesity   • Physical deconditioning   • Idiopathic gout   • Anxiety and depression   • Greater trochanteric bursitis of both hips   • Status post total bilateral knee replacement   • Back pain   • Essential hypertension   • Prediabetes   • S/P lumbar spinal fusion   • Renal insufficiency   • Leukocytosis, likely reactive   • Depression   • Arthritis of right hip   • Paroxysmal atrial fibrillation ([facility])   • YUNIOR treated with BiPAP   • 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   • Acute blood loss anemia, mild, asymptomatic   • 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 ([facility])   • Supraventricular tachycardia ([facility])   • Left prefrontal gyrus stroke   • Arthritis of left hip   • Hip pain   • Status post total replacement of left hip   • Hyperlipidemia   • Elevated hemoglobin A1c   • History of stroke   • Acute postoperative pain   • Nontraumatic complete tear of right rotator cuff   • Traumatic rhabdomyolysis, initial encounter ([facility])   • Hematoma of right thigh   • Pain of right lower extremity   • Anemia   • Fibromyalgia     Past Medical History:   Diagnosis Date   • A-fib ([facility])    • Anxiety and depression    • Arthritis    • Cataract    • Depression    • Extremity pain    • Fibromyalgia    • GERD (gastroesophageal reflux disease)    • Gout    • H/O bladder infections      JUST FINISHED MACROBID ON 11-2-17 FOR RECENT UTI   • Hypercholesteremia    • Hypertension    • Joint pain    • Kidney disease, chronic, stage III (GFR 30-59 ml/min) (HCC)     resolved after stopping antiinflammatory ~2015   • Low back pain    • Neck pain    • Pneumonia 02/2020   • Rheumatic fever    • Skin cancer    • Sleep apnea     no cpap   • Stroke (HCC) 09/2019    right sided weakness   • Wears glasses      Past Surgical History:   Procedure Laterality Date   • BACK SURGERY  2004    LUMBAR PER DR MAN x2   • CARDIAC CATHETERIZATION  2019   • CARPAL TUNNEL RELEASE Left    • COLONOSCOPY  11/2020   • EYE SURGERY      retina surgery   • FINGER SURGERY Right     3RD FINGER   • HEEL SPUR EXCISION Bilateral    • INTERVENTIONAL RADIOLOGY PROCEDURE N/A 9/17/2019    Procedure: Carotid and Cerebral Angiogram/ Possible Stent;  Surgeon: Imer Guerra MD;  Location:  [facility];  Service: Interventional Radiology   • KNEE ARTHROSCOPY Bilateral    • LUMBAR DISCECTOMY FUSION INSTRUMENTATION N/A 11/10/2017    Procedure: LUMBAR SPINAL FUSION ;  Surgeon: Gopi Man MD;  Location:  [facility];  Service:    • REPLACEMENT TOTAL KNEE BILATERAL Bilateral    • SHOULDER ARTHROSCOPY W/ ROTATOR CUFF REPAIR Right 1/19/2021    Procedure: ARTHROSCOPIC ROTATOR CUFF REPAIR WITH BICEPS TENODESIS RIGHT;  Surgeon: Lance Morales Jr., MD;  Location:  [facility];  Service: Orthopedics;  Laterality: Right;   • SKIN BIOPSY     • TOTAL HIP ARTHROPLASTY Right 9/9/2019    Procedure: TOTAL ANTERIOR RIGHT HIP ARTHROPLASTY;  Surgeon: Darrin New MD;  Location:  [facility];  Service: Orthopedics   • TOTAL HIP ARTHROPLASTY Left 6/29/2020    Procedure: TOTAL HIP ARTHROPLASTY ANTERIOR LEFT;  Surgeon: Darrin New MD;  Location: [facility];  Service: Orthopedics;  Laterality: Left;      General Information     Row Name 10/05/22 1736          OT Time and Intention    Document Type evaluation  -AN     Mode of Treatment  occupational therapy  -AN     Row Name 10/05/22 1736          General Information    Patient Profile Reviewed yes  -AN     Prior Level of Function independent:;all household mobility;community mobility;gait;ADL's;home management;cooking;cleaning;driving  intermittent use of RW due to RLE pain  -AN     Existing Precautions/Restrictions fall  RLE pain  -AN     Barriers to Rehab medically complex  -AN     Row Name 10/05/22 1736          Living Environment    People in Home spouse  -AN     Row Name 10/05/22 1736          Home Main Entrance    Number of Stairs, Main Entrance one  -AN     Stair Railings, Main Entrance none  -AN     Row Name 10/05/22 1736          Stairs Within Home, Primary    Number of Stairs, Within Home, Primary none  -AN     Stairs Comment, Within Home, Primary walk in shower with shower chair and grab bars; owns cane, RW, w/c, BSC, and quad cane  -AN     Row Name 10/05/22 1736          Cognition    Orientation Status (Cognition) oriented x 3  -AN     Row Name 10/05/22 1736          Safety Issues, Functional Mobility    Safety Issues Affecting Function (Mobility) safety precaution awareness;safety precautions follow-through/compliance;sequencing abilities  -AN     Impairments Affecting Function (Mobility) balance;endurance/activity tolerance;pain;strength  -AN           User Key  (r) = Recorded By, (t) = Taken By, (c) = Cosigned By    Initials Name Provider Type    AN Tonia Carrasquillo OT Occupational Therapist                 Mobility/ADL's     Row Name 10/05/22 1738          Bed Mobility    Comment, (Bed Mobility) UIC  -AN     Row Name 10/05/22 1738          Transfers    Transfers sit-stand transfer;stand-sit transfer  -AN     Comment, (Transfers) verbal cues and demo for body mechanics with STS using RW  -AN     Bed-Chair [place] (Transfers) minimum assist (75% patient effort);verbal cues  -AN     Assistive Device (Bed-Chair Transfers) walker, front-wheeled  -AN     Sit-Stand [place]  (Transfers) minimum assist (75% patient effort);verbal cues  -AN     Stand-Sit [place] (Transfers) verbal cues;minimum assist (75% patient effort);1 person assist  -AN     Row Name 10/05/22 1738          Sit-Stand Transfer    Assistive Device (Sit-Stand Transfers) walker, front-wheeled  -AN     Row Name 10/05/22 1738          Stand-Sit Transfer    Assistive Device (Stand-Sit Transfers) walker, front-wheeled  -AN     Row Name 10/05/22 1738          Functional Mobility    Functional Mobility- Ind. Level minimum assist (75% patient effort);1 person;verbal cues required  -AN     Functional Mobility-Distance (Feet) 15  -AN     Functional Mobility- Comment pt ambulated short distance from chair<>door using the RW with Min A; pt ambulated at a slow pace with difficulty maintaining full weightbearing through RLE due to pain, no buckling noted  -AN     Row Name 10/05/22 1738          Activities of Daily Living    BADL Assessment/Intervention lower body dressing;feeding  -AN     Row Name 10/05/22 1738          Lower Body Dressing Assessment/Training    [place] Level (Lower Body Dressing) don;doff;socks;maximum assist (25% patient effort)  -AN     Position (Lower Body Dressing) unsupported sitting  -AN     Comment, (Lower Body Dressing) pt currently owns reacher and sock aid and uses for LB dressing due to decreased bending and reaching abilities  -AN     Row Name 10/05/22 1738          Self-Feeding Assessment/Training    [place] Level (Feeding) liquids to mouth;prepare tray/open items;scoop food and bring to mouth;set up  -AN     Position (Self-Feeding) supported sitting  -AN           User Key  (r) = Recorded By, (t) = Taken By, (c) = Cosigned By    Initials Name Provider Type    Tonia Randolph OT Occupational Therapist               Obj/Interventions     Row Name 10/05/22 1740          Sensory Assessment (Somatosensory)    Sensory Assessment (Somatosensory) UE sensation intact  -AN     Row Name  10/05/22 1740          Vision Assessment/Intervention    Visual Impairment/Limitations WFL  -AN     Row Name 10/05/22 1740          Range of Motion Comprehensive    General Range of Motion upper extremity range of motion deficits identified;lower extremity range of motion deficits identified  -AN     Comment, General Range of Motion R hip and knee flexion limited by edema and pain; b/l shoulder flexion limited to partial AROM  -AN     Row Name 10/05/22 1740          Strength Comprehensive (MMT)    General Manual Muscle Testing (MMT) Assessment upper extremity strength deficits identified;lower extremity strength deficits identified  -AN     Comment, General Manual Muscle Testing (MMT) Assessment BUE grossly 2+/5, BLE weakness (R>L) noted with mobility  -AN     Row Name 10/05/22 1740          Balance    Balance Assessment sitting static balance;sitting dynamic balance;sit to stand dynamic balance;standing static balance;standing dynamic balance  -AN     Static Sitting Balance independent  -AN     Dynamic Sitting Balance independent  -AN     Position, Sitting Balance sitting in chair  -AN     Sit to Stand Dynamic Balance verbal cues;minimal assist;1-person assist  -AN     Static Standing Balance contact guard;verbal cues  -AN     Dynamic Standing Balance minimal assist;1-person assist;verbal cues  -AN     Position/Device Used, Standing Balance walker, rolling  -AN     Balance Interventions standing;sit to stand;supported;dynamic;static;minimal challenge;occupation based/functional task  -AN           User Key  (r) = Recorded By, (t) = Taken By, (c) = Cosigned By    Initials Name Provider Type    Tonia Randolph OT Occupational Therapist               Goals/Plan     Row Name 10/05/22 1746          Bed Mobility Goal 1 (OT)    Activity/Assistive Device (Bed Mobility Goal 1, OT) sit to supine/supine to sit  -AN     [place] Level/Cues Needed (Bed Mobility Goal 1, OT) minimum assist (75% or more patient effort)   -AN     Time Frame (Bed Mobility Goal 1, OT) long term goal (LTG);10 days  -AN     Row Name 10/05/22 1746          Transfer Goal 1 (OT)    Activity/Assistive Device (Transfer Goal 1, OT) sit-to-stand/stand-to-sit;toilet;walker, rolling  -AN     [place] Level/Cues Needed (Transfer Goal 1, OT) contact guard required  -AN     Time Frame (Transfer Goal 1, OT) long term goal (LTG);10 days  -AN     Row Name 10/05/22 1746          Toileting Goal 1 (OT)    Activity/Device (Toileting Goal 1, OT) perform perineal hygiene;adjust/manage clothing;commode  -AN     [place] Level/Cues Needed (Toileting Goal 1, OT) minimum assist (75% or more patient effort)  -AN     Time Frame (Toileting Goal 1, OT) long term goal (LTG);10 days  -AN     Row Name 10/05/22 1746          Grooming Goal 1 (OT)    Activity/Device (Grooming Goal 1, OT) oral care;wash face, hands  -AN     [place] (Grooming Goal 1, OT) contact guard required  -AN     Time Frame (Grooming Goal 1, OT) long term goal (LTG);10 days  -AN     Strategies/Barriers (Grooming Goal 1, OT) sink side  -AN     Row Name 10/05/22 1746          Therapy Assessment/Plan (OT)    Planned Therapy Interventions (OT) activity tolerance training;adaptive equipment training;BADL retraining;edema control/reduction;functional balance retraining;occupation/activity based interventions;patient/caregiver education/training;transfer/mobility retraining;strengthening exercise  -AN           User Key  (r) = Recorded By, (t) = Taken By, (c) = Cosigned By    Initials Name Provider Type    Tonia Randolph OT Occupational Therapist               Clinical Impression     Row Name 10/05/22 1742          Pain Assessment    Pretreatment Pain Rating 3/10  -AN     Posttreatment Pain Rating 3/10  -AN     Pain Location - Side/Orientation Right  -AN     Pain Location lower  -AN     Pain Location - extremity  -AN     Pre/Posttreatment Pain Comment tolerated  -AN     Pain Intervention(s)  Ambulation/increased activity;Repositioned  -AN     Row Name 10/05/22 1742          Plan of Care Review    Plan of Care Reviewed With patient  -AN     Progress no change  -AN     Outcome Evaluation OT evaluation completed. Pt presents with RLE weakness, pain, and swelling, impaired mobility, and decreased activity tolerance limiting independence with ADLs. Pt very motivated to participate in therapy sessions and regain independence. Pt performed STS with Min A using the RW and ambulated short distance using the RW with Min A. No buckling of RLE noted, however painful with weightbearing. OT rec IP OT and IPR at [place].  -AN     Row Name 10/05/22 1742          Therapy Assessment/Plan (OT)    Patient/Family Therapy Goal Statement (OT) Return to PLOF  -AN     Rehab Potential (OT) good, to achieve stated therapy goals  -AN     Criteria for Skilled Therapeutic Interventions Met (OT) yes;skilled treatment is necessary  -AN     Therapy Frequency (OT) daily  -AN     Row Name 10/05/22 1742          Therapy Plan Review/Discharge Plan (OT)    Anticipated Discharge Disposition (OT) inpatient rehabilitation facility  -AN     Row Name 10/05/22 1742          Vital Signs    Pre Systolic BP Rehab --  VSS  -AN     O2 Delivery Pre Treatment room air  -AN     O2 Delivery Intra Treatment room air  -AN     O2 Delivery Post Treatment room air  -AN     Pre Patient Position Sitting  -AN     Intra Patient Position Standing  -AN     Post Patient Position Sitting  -AN     Row Name 10/05/22 1742          Positioning and Restraints    Pre-Treatment Position sitting in chair/recliner  -AN     Post Treatment Position chair  -AN     In Chair notified nsg;reclined;call light within reach;encouraged to call for assist;exit alarm on;waffle cushion;legs elevated  -AN           User Key  (r) = Recorded By, (t) = Taken By, (c) = Cosigned By    Initials Name Provider Type    Tonia Randolph OT Occupational Therapist               Outcome Measures     Row  Name 10/05/22 1747          How much help from another is currently needed...    Putting on and taking off regular lower body clothing? 2  -AN     Bathing (including washing, rinsing, and drying) 2  -AN     Toileting (which includes using toilet bed pan or urinal) 2  -AN     Putting on and taking off regular upper body clothing 3  -AN     Taking care of personal grooming (such as brushing teeth) 3  -AN     Eating meals 3  -AN     AM-PAC 6 Clicks Score (OT) 15  -AN     Row Name 10/05/22 1605          How much help from another person do you currently need...    Turning from your back to your side while in flat bed without using bedrails? 3  -MB     Moving from lying on back to sitting on the side of a flat bed without bedrails? 2  -MB     Moving to and from a bed to a chair (including a wheelchair)? 3  -MB     Standing up from a chair using your arms (e.g., wheelchair, bedside chair)? 3  -MB     Climbing 3-5 steps with a railing? 2  -MB     To walk in hospital room? 3  -MB     AM-PAC 6 Clicks Score (PT) 16  -MB     Highest level of mobility 5 --> Static standing  -MB     Row Name 10/05/22 1747 10/05/22 1605       Functional Assessment    Outcome Measure Options AM-PAC 6 Clicks Daily Activity (OT)  -AN AM-PAC 6 Clicks Basic Mobility (PT)  -MB          User Key  (r) = Recorded By, (t) = Taken By, (c) = Cosigned By    Initials Name Provider Type    Zainab Guzman, PT Physical Therapist    Tonia Randolph OT Occupational Therapist                Occupational Therapy Education                 Title: PT OT SLP Therapies (In Progress)     Topic: Occupational Therapy (In Progress)     Point: ADL training (Done)     Description:   Instruct learner(s) on proper safety adaptation and remediation techniques during self care or transfers.   Instruct in proper use of assistive devices.              Learning Progress Summary           Patient AcceptanceESVIN VU by GARRETT at 10/5/2022 2513                   Point: Home  exercise program (Not Started)     Description:   Instruct learner(s) on appropriate technique for monitoring, assisting and/or progressing therapeutic exercises/activities.              Learner Progress:  Not documented in this visit.          Point: Precautions (Done)     Description:   Instruct learner(s) on prescribed precautions during self-care and functional transfers.              Learning Progress Summary           Patient Acceptance, E, VU by AN at 10/5/2022 1747                   Point: Body mechanics (Done)     Description:   Instruct learner(s) on proper positioning and spine alignment during self-care, functional mobility activities and/or exercises.              Learning Progress Summary           Patient Acceptance, E, VU by AN at 10/5/2022 1747                               User Key     Initials Effective Dates Name Provider Type Discipline     09/21/21 -  Tonia Carrasquillo OT Occupational Therapist OT              OT Recommendation and Plan  Planned Therapy Interventions (OT): activity tolerance training, adaptive equipment training, BADL retraining, edema control/reduction, functional balance retraining, occupation/activity based interventions, patient/caregiver education/training, transfer/mobility retraining, strengthening exercise  Therapy Frequency (OT): daily  Plan of Care Review  Plan of Care Reviewed With: patient  Progress: no change  Outcome Evaluation: OT evaluation completed. Pt presents with RLE weakness, pain, and swelling, impaired mobility, and decreased activity tolerance limiting independence with ADLs. Pt very motivated to participate in therapy sessions and regain independence. Pt performed STS with Min A using the RW and ambulated short distance using the RW with Min A. No buckling of RLE noted, however painful with weightbearing. OT rec IP OT and IPR at dc.     Time Calculation:    Time Calculation- OT     Row Name 10/05/22 1747             Time Calculation- OT    OT Start  Time 1705  -AN      OT Received On 10/05/22  -AN      OT Goal Re-Cert Due Date 10/15/22  -AN              Untimed Charges    OT Eval/Re-eval Minutes 46  -AN              Total Minutes    Untimed Charges Total Minutes 46  -AN       Total Minutes 46  -AN            User Key  (r) = Recorded By, (t) = Taken By, (c) = Cosigned By    Initials Name Provider Type    Tonia Randolph OT Occupational Therapist              Therapy Charges for Today     Code Description Service Date Service Provider Modifiers Qty    [id number]  OT EVAL MOD COMPLEXITY 4 10/5/2022 Tonia Carrasquillo OT GO 1               Tonia Carrasquillo OT  10/5/2022

## 2022-10-06 LAB
HCT VFR BLD AUTO: 27.4 % (ref 34–46.6)
HCT VFR BLD AUTO: 29.3 % (ref 34–46.6)
HCT VFR BLD AUTO: 30.5 % (ref 34–46.6)
HGB BLD-MCNC: 8.9 G/DL (ref 12–15.9)
HGB BLD-MCNC: 9.3 G/DL (ref 12–15.9)
HGB BLD-MCNC: 9.5 G/DL (ref 12–15.9)
INR PPP: 1.28 (ref 0.84–1.13)
PROTHROMBIN TIME: 15.9 SECONDS (ref 11.4–14.4)

## 2022-10-06 PROCEDURE — 99225 PR SBSQ OBSERVATION CARE/DAY 25 MINUTES: CPT | Performed by: INTERNAL MEDICINE

## 2022-10-06 PROCEDURE — 96376 TX/PRO/DX INJ SAME DRUG ADON: CPT

## 2022-10-06 PROCEDURE — G0378 HOSPITAL OBSERVATION PER HR: HCPCS

## 2022-10-06 PROCEDURE — 85610 PROTHROMBIN TIME: CPT | Performed by: NURSE PRACTITIONER

## 2022-10-06 PROCEDURE — 85014 HEMATOCRIT: CPT | Performed by: INTERNAL MEDICINE

## 2022-10-06 PROCEDURE — 85018 HEMOGLOBIN: CPT | Performed by: INTERNAL MEDICINE

## 2022-10-06 PROCEDURE — 25010000002 ONDANSETRON PER 1 MG: Performed by: NURSE PRACTITIONER

## 2022-10-06 RX ORDER — PREGABALIN 50 MG/1
50 CAPSULE ORAL 3 TIMES DAILY
COMMUNITY

## 2022-10-06 RX ADMIN — ACETAMINOPHEN 650 MG: 325 TABLET ORAL at 20:45

## 2022-10-06 RX ADMIN — ESCITALOPRAM OXALATE 20 MG: 20 TABLET ORAL at 08:41

## 2022-10-06 RX ADMIN — Medication 10 ML: at 09:00

## 2022-10-06 RX ADMIN — ONDANSETRON 4 MG: 2 INJECTION INTRAMUSCULAR; INTRAVENOUS at 22:19

## 2022-10-06 RX ADMIN — HYDROCODONE BITARTRATE AND ACETAMINOPHEN 1 TABLET: 7.5; 325 TABLET ORAL at 22:19

## 2022-10-06 RX ADMIN — HYDROCODONE BITARTRATE AND ACETAMINOPHEN 1 TABLET: 7.5; 325 TABLET ORAL at 05:41

## 2022-10-06 RX ADMIN — Medication 10 ML: at 20:46

## 2022-10-06 RX ADMIN — PROPAFENONE HYDROCHLORIDE 225 MG: 225 CAPSULE, EXTENDED RELEASE ORAL at 20:46

## 2022-10-06 RX ADMIN — PROPAFENONE HYDROCHLORIDE 225 MG: 225 CAPSULE, EXTENDED RELEASE ORAL at 08:41

## 2022-10-06 RX ADMIN — ALLOPURINOL 100 MG: 100 TABLET ORAL at 08:41

## 2022-10-06 NOTE — PROGRESS NOTES
[facility] Medicine Services  PROGRESS NOTE    Patient Name: Akua Torres  : 1945  MRN: [id number]    Date of Admission: 10/4/2022  Primary Care Physician: Nathalia Freeman, HALEY    Subjective   Subjective     CC:  Right leg pain and swelling    HPI:  Resting in bed no acute distress and tells me she feels better.  The right leg feels less heavy.  No fever or chills.  No chest pain or palpitation or shortness of breath at rest.  No nausea vomiting or diarrhea or abdominal pain.    ROS:  As above    Objective   Objective     Vital Signs:   Temp:  [98.2 °F (36.8 °C)-98.5 °F (36.9 °C)] 98.3 °F (36.8 °C)  Heart Rate:  [70-82] 72  Resp:  [16-18] 18  BP: (124-149)/(50-81) 144/63     Physical Exam:  Constitutional: No acute distress  HENT: NCAT, mucous membranes moist  Respiratory: Clear to auscultation bilaterally, respiratory effort normal   Cardiovascular: RRR, no murmurs, rubs, or gallops  Gastrointestinal: Abdomen is obese.  Positive bowel sounds, soft, nontender, nondistended  Musculoskeletal:  bilateral ankle edema, right thigh hematoma with extensive bruising.  Pain when patient moved to limb.  Psychiatric: Appropriate affect, cooperative  Neurologic: Awake, alert, oriented x3, no focality appreciated, speech clear  Skin: No rashes    Results Reviewed:  LAB RESULTS:      Lab 10/06/22  0745 10/06/22  0051 10/05/22  1456 10/05/22  0646 10/04/22  1401   WBC  --   --   --   --  9.01   HEMOGLOBIN 9.3* 8.9* 9.4* 8.9* 8.1*   HEMATOCRIT 29.3* 27.4* 29.6* 28.1* 25.4*   PLATELETS  --   --   --   --  188   NEUTROS ABS  --   --   --   --  6.38   IMMATURE GRANS (ABS)  --   --   --   --  0.03   LYMPHS ABS  --   --   --   --  1.75   MONOS ABS  --   --   --   --  0.54   EOS ABS  --   --   --   --  0.28   MCV  --   --   --   --  94.1   PROTIME 15.9*  --   --  18.4* 21.3*         Lab 10/05/22  0646 10/04/22  1401   SODIUM 140 138   POTASSIUM 5.1 5.0   CHLORIDE 106 103   CO2 24.0 23.0    ANION GAP 10.0 12.0   BUN 32* 41*   CREATININE 1.25* 1.82*   EGFR 44.2* 28.2*   GLUCOSE 92 109*   CALCIUM 8.1* 8.7         Lab 10/04/22  1401   TOTAL PROTEIN 6.5   ALBUMIN 3.90   GLOBULIN 2.6   ALT (SGPT) 12   AST (SGOT) 24   BILIRUBIN 1.4*   ALK PHOS 100         Lab 10/06/22  0745 10/05/22  0646 10/04/22  1401   PROTIME 15.9* 18.4* 21.3*   INR 1.28* 1.54* 1.85*             Lab 10/04/22  1505   ABO TYPING O   RH TYPING Positive   ANTIBODY SCREEN Negative         Brief Urine Lab Results     None          Microbiology Results Abnormal     Procedure Component Value - Date/Time    COVID PRE-OP / PRE-PROCEDURE SCREENING ORDER (NO ISOLATION) - Swab, Nasopharynx [[id number]]  (Normal) Collected: 10/05/22 0740    Lab Status: Final result Specimen: Swab from Nasopharynx Updated: 10/05/22 0822    Narrative:      The following orders were created for panel order COVID PRE-OP / PRE-PROCEDURE SCREENING ORDER (NO ISOLATION) - Swab, Nasopharynx.  Procedure                               Abnormality         Status                     ---------                               -----------         ------                     COVID-19 and FLU A/B PCR...[[id number]]  Normal              Final result                 Please view results for these tests on the individual orders.    COVID-19 and FLU A/B PCR - Swab, Nasopharynx [[id number]]  (Normal) Collected: 10/05/22 0740    Lab Status: Final result Specimen: Swab from Nasopharynx Updated: 10/05/22 0822     COVID19 Not Detected     Influenza A PCR Not Detected     Influenza B PCR Not Detected    Narrative:      Fact sheet for providers: https://www.fda.gov/media/103721/download    Fact sheet for patients: https://www.fda.gov/media/140583/download    Test performed by PCR.          CT Abdomen Pelvis Without Contrast    Result Date: 10/4/2022   DATE OF EXAM: 10/4/2022 4:03 PM  PROCEDURE: CT ABDOMEN PELVIS WO CONTRAST-  INDICATIONS: Gastrointestinal hemorrhage, recent right hip arthrocentesis,  anemia, abdominal pain, patient on Coumadin.  COMPARISON: 06/06/2018.  TECHNIQUE: Routine transaxial slices were obtained through the abdomen and pelvis without the administration of intravenous contrast. Reconstructed coronal and sagittal images were also obtained. Automated exposure control and iterative construction methods were used.  FINDINGS: There is a partially imaged hematoma in the proximal anterior right thigh. This was discussed in detail under the separately dictated CT right lower extremity report. There is no retroperitoneal or intrapelvic hemorrhage. There is a round fluid density mass in the right lobe of the liver measuring 2.3 x 2.4 cm felt to represent a benign cyst. The gallbladder, pancreas, adrenal glands, and spleen are normal. There are round fluid density masses extending off the inferior pole of the right kidney felt to represent renal cysts. The left kidney is normal. There are no dilated loops of bowel to indicate an obstructive process. There is no abnormal bowel wall thickening. The appendix is normal. The uterus appears unremarkable. The urinary bladder is hyperdense which suggest old iodinated contrast. Alternatively, this could represent hemorrhage in the bladder. There is scattered artifact through the pelvis secondary to the patient's bilateral hip arthroplasties. The patient's also had multilevel spinal fusion. There are transpedicular screws and posterior fixation rods from the L2-S1 levels. The patient's had multilevel decompression laminectomies. There are no suspicious osteolytic or sclerotic lesions within the bony structures. There is a bandlike linear density in the right lower lobe consistent with subsegmental atelectasis.      Impression:  1. Partially imaged hematoma within the proximal anterior right thigh. Discussed in detail under the separately dictated CT right lower extremity report. 2. No retroperitoneal or intrapelvic hemorrhage. 3. The urinary bladder is  hyperdense which suggest old contrast material. Alternatively, this could represent hemorrhage in the bladder. 4. Probable cysts within the right lobe of the liver and inferior pole the right kidney. 5. The appendix is normal. 6. Additional findings as noted above.  This report was finalized on 10/4/2022 4:47 PM by David Williamson MD.      CT Lower Extremity Right Without Contrast    Result Date: 10/4/2022  DATE OF EXAM: 10/4/2022 4:03 PM  PROCEDURE: CT LOWER EXTREMITY RIGHT WO CONTRAST-  INDICATIONS: Right hip and thigh pain, anemia, recent right hip arthrodesis, patient on Coumadin.  COMPARISON: No comparisons available.  TECHNIQUE: CT of the right lower extremity was obtained without the administration of contrast. Coronal and sagittal reformats were obtained. Automated exposure control and alternative reconstruction methods were used.  The radiation dose reduction device was turned on for each scan per the ALARA (As Low as Reasonably Achievable) protocol.  FINDINGS: There is a hyperdense hematoma in the proximal right thigh. It measures 9.3 x 7.0 cm in transaxial diameter and 17.5 cm in length. There is scattered artifact from the patient's total right hip arthroplasty. There is also scattered artifact from the patient's total right knee arthroplasty. Both arthroplasties appear intact with no evidence of loosening or failure. There are vascular calcifications along the distribution of the right superficial femoral artery. Findings in the lower pelvis were discussed under the separately dictated CT pelvis report.      Impression: There is a hyperdense hematoma the proximal right thigh measuring 9.3 x 7.0 cm in diameter and 17.5 cm in length.  This report was finalized on 10/4/2022 4:52 PM by David Williamson MD.        Results for orders placed during the hospital encounter of 04/26/21    Adult Transthoracic Echo Complete W/ Cont if Necessary Per Protocol    Interpretation Summary  · Calculated left ventricular EF =  67%  · The aortic valve exhibits sclerosis  · No significant valvular stenosis or regurgitation      I have reviewed the medications:  Scheduled Meds:allopurinol, 100 mg, Oral, Daily  escitalopram, 20 mg, Oral, Daily  propafenone SR, 225 mg, Oral, Q12H  senna-docusate sodium, 2 tablet, Oral, BID  sodium chloride, 10 mL, Intravenous, Q12H      Continuous Infusions:   PRN Meds:.•  acetaminophen  •  senna-docusate sodium **AND** polyethylene glycol **AND** bisacodyl **AND** bisacodyl  •  HYDROcodone-acetaminophen  •  hydrOXYzine  •  ondansetron **OR** ondansetron  •  sodium chloride  •  sodium chloride    Assessment & Plan   Assessment & Plan     Active Hospital Problems    Diagnosis  POA   • Traumatic rhabdomyolysis, initial encounter (HCC) [T79.6XXA]  Yes   • Hematoma of right thigh [S70.11XA]  Yes   • Pain of right lower extremity [M79.604]  Yes   • Anemia [D64.9]  Yes   • Fibromyalgia [M79.7]  Yes   • Paroxysmal atrial fibrillation (HCC) [I48.0]  Yes   • Renal insufficiency [N28.9]  Yes   • Essential hypertension [I10]  Yes      Resolved Hospital Problems   No resolved problems to display.        Brief Hospital Course to date:  Akua Torres is a 77 y.o. female with past medical history significant for paroxysmal atrial fibrillation on Coumadin, hypertension, hyperlipidemia, history of CVA, osteoarthritis, morbid obesity, fibromyalgia.  Patient was admitted for an right lower extremity pain and swelling.    *Right lower extremity pain and swelling.  Patient has had right hip aspirated on 9/29/2022.  Currently patient has a large hematoma that is causing severe pain on movement.  Orthopedic surgery has seen and evaluated the patient but they recommend conservative treatment.  Patient did receive packed red blood cell transfusion and hemoglobin and hematocrit are being monitored and are stable.  We will continue monitoring hemoglobin and hematocrit.    *Hypertension, currently controlled.    *Paroxysmal atrial  fibrillation.  Patient was on Coumadin but currently Coumadin is on hold because of hematoma.  INR was 1.28 today.    *Chronic kidney disease.  Will monitor renal function.    *Morbid obesity    Expected Discharge Location and Transportation:rehab  Expected Discharge Date:when bed available.    DVT prophylaxis:  Mechanical DVT prophylaxis orders are present.     AM-PAC 6 Clicks Score (PT): 16 (10/05/22 5885)    CODE STATUS:   Code Status and Medical Interventions:   Ordered at: 10/04/22 4179     Medical Intervention Limits:    NO intubation (DNI)     Level Of Support Discussed With:    Patient     Code Status (Patient has no pulse and is not breathing):    No CPR (Do Not Attempt to Resuscitate)     Medical Interventions (Patient has pulse or is breathing):    Limited Support       Harsh Freeman MD  10/06/22

## 2022-10-06 NOTE — PLAN OF CARE
Goal Outcome Evaluation:      Pt A&Ox4, VSS, RA, NSR on tele, no c/o pain during shift. Pt remained free of falls. Frequent weight shift encouraged and assistance offered. No acute events this shift.

## 2022-10-06 NOTE — PLAN OF CARE
Goal Outcome Evaluation:  Plan of Care Reviewed With: patient        Progress: no change     Alert/oriented x4. VSS, denies pain at this time. Continues working with PT. Awaiting SNF placement.

## 2022-10-07 LAB
ANION GAP SERPL CALCULATED.3IONS-SCNC: 10 MMOL/L (ref 5–15)
BUN SERPL-MCNC: 21 MG/DL (ref 8–23)
BUN/CREAT SERPL: 20.4 (ref 7–25)
CALCIUM SPEC-SCNC: 8.6 MG/DL (ref 8.6–10.5)
CHLORIDE SERPL-SCNC: 105 MMOL/L (ref 98–107)
CO2 SERPL-SCNC: 22 MMOL/L (ref 22–29)
CREAT SERPL-MCNC: 1.03 MG/DL (ref 0.57–1)
EGFRCR SERPLBLD CKD-EPI 2021: 55.8 ML/MIN/1.73
GLUCOSE SERPL-MCNC: 112 MG/DL (ref 65–99)
HCT VFR BLD AUTO: 30.4 % (ref 34–46.6)
HCT VFR BLD AUTO: 30.9 % (ref 34–46.6)
HCT VFR BLD AUTO: 33.1 % (ref 34–46.6)
HGB BLD-MCNC: 10.3 G/DL (ref 12–15.9)
HGB BLD-MCNC: 9.4 G/DL (ref 12–15.9)
HGB BLD-MCNC: 9.8 G/DL (ref 12–15.9)
POTASSIUM SERPL-SCNC: 4.5 MMOL/L (ref 3.5–5.2)
SODIUM SERPL-SCNC: 137 MMOL/L (ref 136–145)

## 2022-10-07 PROCEDURE — 85018 HEMOGLOBIN: CPT | Performed by: INTERNAL MEDICINE

## 2022-10-07 PROCEDURE — 85014 HEMATOCRIT: CPT | Performed by: INTERNAL MEDICINE

## 2022-10-07 PROCEDURE — 97535 SELF CARE MNGMENT TRAINING: CPT

## 2022-10-07 PROCEDURE — 80048 BASIC METABOLIC PNL TOTAL CA: CPT | Performed by: INTERNAL MEDICINE

## 2022-10-07 PROCEDURE — G0378 HOSPITAL OBSERVATION PER HR: HCPCS

## 2022-10-07 PROCEDURE — 63710000001 DIPHENHYDRAMINE PER 50 MG: Performed by: NURSE PRACTITIONER

## 2022-10-07 PROCEDURE — 97530 THERAPEUTIC ACTIVITIES: CPT

## 2022-10-07 PROCEDURE — 99225 PR SBSQ OBSERVATION CARE/DAY 25 MINUTES: CPT | Performed by: INTERNAL MEDICINE

## 2022-10-07 RX ORDER — DIPHENHYDRAMINE HCL 25 MG
25 CAPSULE ORAL EVERY 6 HOURS PRN
Status: DISCONTINUED | OUTPATIENT
Start: 2022-10-07 | End: 2022-10-10 | Stop reason: HOSPADM

## 2022-10-07 RX ORDER — PREGABALIN 50 MG/1
50 CAPSULE ORAL 3 TIMES DAILY
Status: DISCONTINUED | OUTPATIENT
Start: 2022-10-07 | End: 2022-10-10 | Stop reason: HOSPADM

## 2022-10-07 RX ORDER — PREGABALIN 50 MG/1
50 CAPSULE ORAL ONCE
Status: COMPLETED | OUTPATIENT
Start: 2022-10-07 | End: 2022-10-07

## 2022-10-07 RX ADMIN — ESCITALOPRAM OXALATE 20 MG: 20 TABLET ORAL at 08:45

## 2022-10-07 RX ADMIN — PREGABALIN 50 MG: 50 CAPSULE ORAL at 16:56

## 2022-10-07 RX ADMIN — PREGABALIN 50 MG: 50 CAPSULE ORAL at 01:26

## 2022-10-07 RX ADMIN — PROPAFENONE HYDROCHLORIDE 225 MG: 225 CAPSULE, EXTENDED RELEASE ORAL at 08:48

## 2022-10-07 RX ADMIN — PROPAFENONE HYDROCHLORIDE 225 MG: 225 CAPSULE, EXTENDED RELEASE ORAL at 20:03

## 2022-10-07 RX ADMIN — ALLOPURINOL 100 MG: 100 TABLET ORAL at 08:45

## 2022-10-07 RX ADMIN — PREGABALIN 50 MG: 50 CAPSULE ORAL at 20:03

## 2022-10-07 RX ADMIN — Medication 10 ML: at 08:45

## 2022-10-07 RX ADMIN — Medication 10 ML: at 20:03

## 2022-10-07 RX ADMIN — PREGABALIN 50 MG: 50 CAPSULE ORAL at 08:45

## 2022-10-07 RX ADMIN — DIPHENHYDRAMINE HYDROCHLORIDE 25 MG: 25 CAPSULE ORAL at 01:26

## 2022-10-07 NOTE — CASE MANAGEMENT/SOCIAL WORK
Continued Stay Note  [facility]     Patient Name: Akua Torres  MRN: [id number]  Today's Date: 10/7/2022    Admit Date: 10/4/2022    Plan: Rehab   Discharge Plan     Row Name 10/07/22 1140       Plan    Plan Comments MSW received consult for concern for pt's  that has Dementia and needing possible placement while pt in hospital/rehab. MSW spoke with pt at bedside. MSW asked pt about being the caregiver for her  and pt explained that they just both help each other but she is not his caregiver. Pt explained that lately her  has had to assist her more and she feels her going to rehab will be a good thing for her and him as well so he won't have to assist as much. Pt reports that her  has never been diagnosed with Dementia. Pt explained that he can be forgetful at times over some things but he is also 86 years old and she feels that is normal. Pt reports that her  does not use any medical equipment and still drives and does pretty well. Pt did not have any current concerns for her  and feels he is able to care for himself at home while she is away. Pt reports he does have some friends he keeps in contact [facility] that can help him with something if needed. MSW did provide pt with information for a place for mom that pt and pt's  could look into if will soon be needing more of assisted or independent living options. Pt had no further concerns for MSW at this time.               Discharge Codes    No documentation.               Expected Discharge Date and Time     Expected Discharge Date Expected Discharge Time    Oct 12, 2022             CAROLYNN Reed

## 2022-10-07 NOTE — PLAN OF CARE
Problem: Adult Inpatient Plan of Care  Goal: Plan of Care Review  Recent Flowsheet Documentation  Taken 10/7/2022 1406 by Deejay Ferguson, OT  Progress: improving  Plan of Care Reviewed With: patient  Outcome Evaluation: Pt demonstrated improved activity tolerance progressing to Vinay for bed mobility and CGA for transfers and ADL related mobility. Completed sinkside grooming tasks w/ supervision and engaged in dynamic core and balance activities EOB. Will cont IPOT per POC as tolerated. Rec d/c to IP rehab for best functional outcomes.

## 2022-10-07 NOTE — CASE MANAGEMENT/SOCIAL WORK
Discharge Planning Assessment  [facility]     Patient Name: Akua Torres  MRN: [id number]  Today's Date: 10/7/2022    Admit Date: 10/4/2022    Plan: [facility]   Discharge Needs Assessment    No documentation.                Discharge Plan     Row Name 10/07/22 1708       Plan    Plan [facility]    Patient/Family in Agreement with Plan yes    Plan Comments Met & spoke with Mrs Torres at the bedside earlier today. We discussed rehab facilities. She asked about [facility]. No beds currently available at two campuses and one campus has covid in the building. Discussed [facility]. She was agreeable to a referral. Called liaison, Sarai, and referral given. Insurance auth started today and a bed offer is contingent on insurance approval. If approved over the w/e, CM can arrange transport with [facility]. Called [facility] and they do have available transportation for Saturday/Sunday. Called and spoke with her friend, Perez, who is also one of the POA's and he requested that CM call her spouse, Mehran and himself on day of discharge. Sarai requested a covid be ordered for Saturday. Covid order placed. Facility pharmacy changed in [facility].    Final Discharge Disposition Code 30 - still a patient              Continued Care and Services - Admitted Since 10/4/2022     Destination     Service Provider Request Status Selected Services Address Phone Fax [facility]  [facility] - No [facility] N[facility]A [address] [facility] [facility] [address] [phone] --       Internal Comment last updated by Telma Mitchell, RN 10/7/2022 1721    10/7 referral called                         Expected Discharge Date and Time     Expected Discharge Date Expected Discharge Time    Oct 12, 2022          Demographic Summary    No documentation.                Functional Status    No documentation.                Psychosocial    No documentation.                 Abuse/Neglect    No documentation.                Legal    No documentation.                Substance Abuse    No documentation.                Patient Forms    No documentation.                   Telma Mitchell RN

## 2022-10-07 NOTE — PROGRESS NOTES
[facility] Medicine Services  PROGRESS NOTE    Patient Name: Akua Torres  : 1945  MRN: [id number]    Date of Admission: 10/4/2022  Primary Care Physician: Nathalia Freeman, HALEY    Subjective   Subjective     CC:  Right leg pain and swelling    HPI:  Resting in bed no acute distress and tells me she feels better.  No fever or chills.  No chest pain or palpitation or shortness of breath at rest.  No nausea vomiting or diarrhea or abdominal pain.    ROS:  As above    Objective   Objective     Vital Signs:   Temp:  [97.8 °F (36.6 °C)-98.5 °F (36.9 °C)] 97.9 °F (36.6 °C)  Heart Rate:  [73-84] 73  Resp:  [18-20] 18  BP: (147-165)/(58-70) 165/70     Physical Exam:  Constitutional: No acute distress  HENT: NCAT, mucous membranes moist  Respiratory: Clear to auscultation bilaterally, respiratory effort normal   Cardiovascular: RRR, no murmurs, rubs, or gallops  Gastrointestinal: Abdomen is obese.  Positive bowel sounds, soft, nontender, nondistended  Musculoskeletal:  bilateral ankle edema, right thigh hematoma with extensive bruising.   Psychiatric: Appropriate affect, cooperative  Neurologic: Awake, alert, oriented x3, no focality appreciated, speech clear  Skin: No rashes    Results Reviewed:  LAB RESULTS:      Lab 10/07/22  1459 10/07/22  0850 10/07/22  0040 10/06/22  1650 10/06/22  0745 10/05/22  1456 10/05/22  0646 10/04/22  1401   WBC  --   --   --   --   --   --   --  9.01   HEMOGLOBIN 10.3* 9.8* 9.4* 9.5* 9.3*   < > 8.9* 8.1*   HEMATOCRIT 33.1* 30.9* 30.4* 30.5* 29.3*   < > 28.1* 25.4*   PLATELETS  --   --   --   --   --   --   --  188   NEUTROS ABS  --   --   --   --   --   --   --  6.38   IMMATURE GRANS (ABS)  --   --   --   --   --   --   --  0.03   LYMPHS ABS  --   --   --   --   --   --   --  1.75   MONOS ABS  --   --   --   --   --   --   --  0.54   EOS ABS  --   --   --   --   --   --   --  0.28   MCV  --   --   --   --   --   --   --  94.1   PROTIME  --   --   --    --  15.9*  --  18.4* 21.3*    < > = values in this interval not displayed.         Lab 10/07/22  0532 10/05/22  0646 10/04/22  1401   SODIUM 137 140 138   POTASSIUM 4.5 5.1 5.0   CHLORIDE 105 106 103   CO2 22.0 24.0 23.0   ANION GAP 10.0 10.0 12.0   BUN 21 32* 41*   CREATININE 1.03* 1.25* 1.82*   EGFR 55.8* 44.2* 28.2*   GLUCOSE 112* 92 109*   CALCIUM 8.6 8.1* 8.7         Lab 10/04/22  1401   TOTAL PROTEIN 6.5   ALBUMIN 3.90   GLOBULIN 2.6   ALT (SGPT) 12   AST (SGOT) 24   BILIRUBIN 1.4*   ALK PHOS 100         Lab 10/06/22  0745 10/05/22  0646 10/04/22  1401   PROTIME 15.9* 18.4* 21.3*   INR 1.28* 1.54* 1.85*             Lab 10/04/22  1505   ABO TYPING O   RH TYPING Positive   ANTIBODY SCREEN Negative         Brief Urine Lab Results     None          Microbiology Results Abnormal     Procedure Component Value - Date/Time    COVID PRE-OP / PRE-PROCEDURE SCREENING ORDER (NO ISOLATION) - Swab, Nasopharynx [[id number]]  (Normal) Collected: 10/05/22 0740    Lab Status: Final result Specimen: Swab from Nasopharynx Updated: 10/05/22 0822    Narrative:      The following orders were created for panel order COVID PRE-OP / PRE-PROCEDURE SCREENING ORDER (NO ISOLATION) - Swab, Nasopharynx.  Procedure                               Abnormality         Status                     ---------                               -----------         ------                     COVID-19 and FLU A/B PCR...[[id number]]  Normal              Final result                 Please view results for these tests on the individual orders.    COVID-19 and FLU A/B PCR - Swab, Nasopharynx [[id number]]  (Normal) Collected: 10/05/22 0740    Lab Status: Final result Specimen: Swab from Nasopharynx Updated: 10/05/22 0822     COVID19 Not Detected     Influenza A PCR Not Detected     Influenza B PCR Not Detected    Narrative:      Fact sheet for providers: https://www.fda.gov/media/986631/download    Fact sheet for patients:  https://www.fda.gov/media/582091/download    Test performed by PCR.          No radiology results from the last 24 hrs    Results for orders placed during the hospital encounter of 04/26/21    Adult Transthoracic Echo Complete W/ Cont if Necessary Per Protocol    Interpretation Summary  · Calculated left ventricular EF = 67%  · The aortic valve exhibits sclerosis  · No significant valvular stenosis or regurgitation      I have reviewed the medications:  Scheduled Meds:allopurinol, 100 mg, Oral, Daily  escitalopram, 20 mg, Oral, Daily  pregabalin, 50 mg, Oral, TID  propafenone SR, 225 mg, Oral, Q12H  senna-docusate sodium, 2 tablet, Oral, BID  sodium chloride, 10 mL, Intravenous, Q12H      Continuous Infusions:   PRN Meds:.•  acetaminophen  •  senna-docusate sodium **AND** polyethylene glycol **AND** bisacodyl **AND** bisacodyl  •  diphenhydrAMINE  •  HYDROcodone-acetaminophen  •  hydrOXYzine  •  ondansetron **OR** ondansetron  •  sodium chloride  •  sodium chloride    Assessment & Plan   Assessment & Plan     Active Hospital Problems    Diagnosis  POA   • Traumatic rhabdomyolysis, initial encounter (HCC) [T79.6XXA]  Yes   • Hematoma of right thigh [S70.11XA]  Yes   • Pain of right lower extremity [M79.604]  Yes   • Anemia [D64.9]  Yes   • Fibromyalgia [M79.7]  Yes   • Paroxysmal atrial fibrillation (HCC) [I48.0]  Yes   • Renal insufficiency [N28.9]  Yes   • Essential hypertension [I10]  Yes      Resolved Hospital Problems   No resolved problems to display.        Brief Hospital Course to date:  Akua Torres is a 77 y.o. female with past medical history significant for paroxysmal atrial fibrillation on Coumadin, hypertension, hyperlipidemia, history of CVA, osteoarthritis, morbid obesity, fibromyalgia.  Patient was admitted for an right lower extremity pain and swelling.    *Right lower extremity pain and swelling.  Patient has had right hip aspirated on 9/29/2022.  Currently patient has a large hematoma that  is causing severe pain on movement.  Orthopedic surgery has seen and evaluated the patient but they recommend conservative treatment.  Patient did receive packed red blood cell transfusion and hemoglobin and hematocrit are being monitored and are stable.  We will continue monitoring hemoglobin and hematocrit.    *Hypertension, currently controlled.    *Paroxysmal atrial fibrillation.  Patient was on Coumadin but currently Coumadin is on hold because of hematoma.  INR was 1.28 today.    *Chronic kidney disease.  Will monitor renal function.    *Morbid obesity    Expected Discharge Location and Transportation:rehab  Expected Discharge Date:when bed available.    DVT prophylaxis:  Mechanical DVT prophylaxis orders are present.     AM-PAC 6 Clicks Score (PT): 16 (10/07/22 0844)    CODE STATUS:   Code Status and Medical Interventions:   Ordered at: 10/04/22 1977     Medical Intervention Limits:    NO intubation (DNI)     Level Of Support Discussed With:    Patient     Code Status (Patient has no pulse and is not breathing):    No CPR (Do Not Attempt to Resuscitate)     Medical Interventions (Patient has pulse or is breathing):    Limited Support       Harsh Freeman MD  10/07/22

## 2022-10-07 NOTE — THERAPY TREATMENT NOTE
Patient Name: Akua Torres  : 1945    MRN: [id number]                              Today's Date: 10/7/2022       Admit Date: 10/4/2022    Visit Dx:     ICD-10-CM ICD-9-CM   1. Traumatic rhabdomyolysis, initial encounter ([facility])  T79.6XXA 958.6   2. LOTTIE (acute kidney injury) ([facility])  N17.9 584.9   3. Anticoagulated  Z79.01 V58.61   4. Anemia, unspecified type  D64.9 285.9     Patient Active Problem List   Diagnosis   • Degenerative disc disease, lumbar   • Lumbar stenosis with neurogenic claudication   • History of lumbar fusion   • Spondylosis of lumbar region without myelopathy or radiculopathy   • Mild obesity   • Physical deconditioning   • Idiopathic gout   • Anxiety and depression   • Greater trochanteric bursitis of both hips   • Status post total bilateral knee replacement   • Back pain   • Essential hypertension   • Prediabetes   • S/P lumbar spinal fusion   • Renal insufficiency   • Leukocytosis, likely reactive   • Depression   • Arthritis of right hip   • Paroxysmal atrial fibrillation ([facility])   • YUNIOR treated with BiPAP   • 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   • Acute blood loss anemia, mild, asymptomatic   • 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 ([facility])   • Supraventricular tachycardia ([facility])   • Left prefrontal gyrus stroke   • Arthritis of left hip   • Hip pain   • Status post total replacement of left hip   • Hyperlipidemia   • Elevated hemoglobin A1c   • History of stroke   • Acute postoperative pain   • Nontraumatic complete tear of right rotator cuff   • Traumatic rhabdomyolysis, initial encounter ([facility])   • Hematoma of right thigh   • Pain of right lower extremity   • Anemia   • Fibromyalgia     Past Medical History:   Diagnosis Date   • A-fib ([facility])    • Anxiety and depression    • Arthritis    • Cataract    • Depression    • Extremity pain    • Fibromyalgia    • GERD (gastroesophageal reflux disease)    • Gout    • H/O bladder infections      JUST FINISHED MACROBID ON 11-2-17 FOR RECENT UTI   • Hypercholesteremia    • Hypertension    • Joint pain    • Kidney disease, chronic, stage III (GFR 30-59 ml/min) (HCC)     resolved after stopping antiinflammatory ~2015   • Low back pain    • Neck pain    • Pneumonia 02/2020   • Rheumatic fever    • Skin cancer    • Sleep apnea     no cpap   • Stroke (HCC) 09/2019    right sided weakness   • Wears glasses      Past Surgical History:   Procedure Laterality Date   • BACK SURGERY  2004    LUMBAR PER DR MAN x2   • CARDIAC CATHETERIZATION  2019   • CARPAL TUNNEL RELEASE Left    • COLONOSCOPY  11/2020   • EYE SURGERY      retina surgery   • FINGER SURGERY Right     3RD FINGER   • HEEL SPUR EXCISION Bilateral    • INTERVENTIONAL RADIOLOGY PROCEDURE N/A 9/17/2019    Procedure: Carotid and Cerebral Angiogram/ Possible Stent;  Surgeon: Imer Guerra MD;  Location:  [facility];  Service: Interventional Radiology   • KNEE ARTHROSCOPY Bilateral    • LUMBAR DISCECTOMY FUSION INSTRUMENTATION N/A 11/10/2017    Procedure: LUMBAR SPINAL FUSION ;  Surgeon: Gopi Man MD;  Location:  [facility];  Service:    • REPLACEMENT TOTAL KNEE BILATERAL Bilateral    • SHOULDER ARTHROSCOPY W/ ROTATOR CUFF REPAIR Right 1/19/2021    Procedure: ARTHROSCOPIC ROTATOR CUFF REPAIR WITH BICEPS TENODESIS RIGHT;  Surgeon: Lance Morales Jr., MD;  Location:  [facility];  Service: Orthopedics;  Laterality: Right;   • SKIN BIOPSY     • TOTAL HIP ARTHROPLASTY Right 9/9/2019    Procedure: TOTAL ANTERIOR RIGHT HIP ARTHROPLASTY;  Surgeon: Darrin New MD;  Location:  [facility];  Service: Orthopedics   • TOTAL HIP ARTHROPLASTY Left 6/29/2020    Procedure: TOTAL HIP ARTHROPLASTY ANTERIOR LEFT;  Surgeon: Darrin New MD;  Location:  [facility];  Service: Orthopedics;  Laterality: Left;      General Information     Row Name 10/07/22 1352          OT Time and Intention    Document Type therapy note (daily note)  -CS     Mode of  Treatment occupational therapy  -     Row Name 10/07/22 1352          General Information    Patient Profile Reviewed yes  -CS     Existing Precautions/Restrictions fall;other (see comments)  RLE pain w/ proximal bruising  -CS     Barriers to Rehab medically complex  -CS     Row Name 10/07/22 1352          Cognition    Orientation Status (Cognition) oriented x 3  -CS     Row Name 10/07/22 1352          Safety Issues, Functional Mobility    Safety Issues Affecting Function (Mobility) safety precaution awareness;safety precautions follow-through/compliance  -CS     Impairments Affecting Function (Mobility) balance;endurance/activity tolerance;pain;strength  -CS           User Key  (r) = Recorded By, (t) = Taken By, (c) = Cosigned By    Initials Name Provider Type    CS Deejay Ferguson OT Occupational Therapist                 Mobility/ADL's     Row Name 10/07/22 8443          Bed Mobility    Bed Mobility supine-sit  -CS     Supine-Sit Luna (Bed Mobility) minimum assist (75% patient effort)  -CS     Bed Mobility, Safety Issues decreased use of legs for bridging/pushing  -CS     Assistive Device (Bed Mobility) bed rails;head of bed elevated  -CS     Comment, (Bed Mobility) reports decreased pain while moving RLE to EOB, assist at trunk to achieve sitting posture, no dizziness reported  -CS     Row Name 10/07/22 1353          Transfers    Transfers bed-chair transfer  -CS     Bed-Chair Luna (Transfers) contact guard;verbal cues  -CS     Assistive Device (Bed-Chair Transfers) walker, front-wheeled  -CS     Sit-Stand Luna (Transfers) contact guard;verbal cues  -CS     Stand-Sit Luna (Transfers) contact guard;verbal cues  -     Row Name 10/07/22 1353          Bed-Chair Transfer    Comment, (Bed-Chair Transfer) cues for improved AD sequencing and positioning, no RLE buckling observed  -CS     Row Name 10/07/22 1353          Sit-Stand Transfer    Assistive Device (Sit-Stand Transfers)  walker, front-wheeled  -CS     Comment, (Sit-Stand Transfer) cues for sequencing safe hand placement w/ RW use, no dizziness reported upon standing  -CS     Row Name 10/07/22 1353          Stand-Sit Transfer    Assistive Device (Stand-Sit Transfers) walker, front-wheeled  -CS     Comment, (Stand-Sit Transfer) cues for UE reachback and controlled lowering  -CS     Row Name 10/07/22 1353          Functional Mobility    Functional Mobility- Comment CGA for in-room and short hallway distance w/ RW  -CS     Row Name 10/07/22 1353          Activities of Daily Living    BADL Assessment/Intervention lower body dressing;grooming;upper body dressing  -CS     Row Name 10/07/22 1353          Lower Body Dressing Assessment/Training    [place] Level (Lower Body Dressing) dependent (less than 25% patient effort);socks;other (see comments)  -CS     Position (Lower Body Dressing) edge of bed sitting  -CS     Comment, (Lower Body Dressing) Dep - uses AE at baseline, reports no need for new equipment or review  -CS     Row Name 10/07/22 [facility]          Grooming Assessment/Training    [place] Level (Grooming) hair care, combing/brushing;oral care regimen;wash face, hands;supervision  -CS     Position (Grooming) sink side  -CS     Comment, (Grooming) cues for optimal AD positioning at sinkside  -CS     Row Name 10/07/22 [facility]          Upper Body Dressing Assessment/Training    [place] Level (Upper Body Dressing) don;pajama/robe;set up  -CS     Position (Upper Body Dressing) edge of bed sitting  -CS           User Key  (r) = Recorded By, (t) = Taken By, (c) = Cosigned By    Initials Name Provider Type    CS Deejay Ferguson OT Occupational Therapist               Obj/Interventions     Row Name 10/07/22 1403          Sensory Assessment (Somatosensory)    Sensory Assessment (Somatosensory) UE sensation intact  -CS     Row Name 10/07/22 1403          Shoulder (Therapeutic Exercise)    Shoulder (Therapeutic Exercise) AROM  (active range of motion)  -     Shoulder AROM (Therapeutic Exercise) bilateral;flexion;extension;2 sets;10 repetitions  -     Row Name 10/07/22 1403          Elbow/Forearm (Therapeutic Exercise)    Elbow/Forearm (Therapeutic Exercise) AROM (active range of motion)  -     Elbow/Forearm AROM (Therapeutic Exercise) bilateral;flexion;extension;2 sets;10 repetitions  -     Row Name 10/07/22 1403          Motor Skills    Therapeutic Exercise shoulder;elbow/forearm;other (see comments)  BUE AROM incorporated into targeted reaching activities EOB  -     Row Name 10/07/22 1403          Balance    Balance Assessment sitting static balance;sitting dynamic balance;standing static balance;standing dynamic balance  -     Static Sitting Balance independent  -     Dynamic Sitting Balance standby assist  -     Position, Sitting Balance unsupported;sitting edge of bed  -     Static Standing Balance standby assist;verbal cues  -     Dynamic Standing Balance contact guard;verbal cues  -     Position/Device Used, Standing Balance supported;walker, front-wheeled  -     Balance Interventions sitting;sit to stand;standing;dynamic reaching;UE activity with balance activity;weight shifting activity  -     Row Name 10/07/22 1403          Hip (Therapeutic Exercise)    Hip (Therapeutic Exercise) AAROM (active assistive range of motion)  -     Hip AAROM (Therapeutic Exercise) bilateral;flexion;extension;2 sets;5 repetitions  -     Row Name 10/07/22 1403          Knee (Therapeutic Exercise)    Knee (Therapeutic Exercise) AROM (active range of motion)  -     Knee AROM (Therapeutic Exercise) 2 sets;5 repetitions;SAQ (short arc quad);supine  -           User Key  (r) = Recorded By, (t) = Taken By, (c) = Cosigned By    Initials Name Provider Type    Deejay Lagunas OT Occupational Therapist               Goals/Plan    No documentation.                Clinical Impression     Row Name 10/07/22 1406          Pain  Assessment    Additional Documentation Pain Scale: FACES Pre/Post-Treatment (Group)  -     Row Name 10/07/22 1406          Pain Scale: FACES Pre/Post-Treatment    Pain: FACES Scale, Pretreatment 2-->hurts little bit  -CS     Posttreatment Pain Rating 2-->hurts little bit  -CS     Pain Location - Side/Orientation Right  -CS     Pain Location lower  -CS     Pain Location - extremity  -CS     Pre/Posttreatment Pain Comment tolerated session  -CS     Row Name 10/07/22 1406          Plan of Care Review    Plan of Care Reviewed With patient  -CS     Progress improving  -CS     Outcome Evaluation Pt demonstrated improved activity tolerance progressing to Vinay for bed mobility and CGA for transfers and ADL related mobility. Completed sinkside grooming tasks w/ supervision and engaged in dynamic core and balance activities EOB. Will cont IPOT per POC as tolerated. Rec d/c to IP rehab for best functional outcomes.  -     Row Name 10/07/22 1406          Therapy Plan Review/Discharge Plan (OT)    Anticipated Discharge Disposition (OT) inpatient rehabilitation facility  -     Row Name 10/07/22 1406          Vital Signs    Pre Systolic BP Rehab --  RN cleared for tx, VSS on RA  -CS     O2 Delivery Pre Treatment room air  -CS     O2 Delivery Intra Treatment room air  -CS     O2 Delivery Post Treatment room air  -CS     Pre Patient Position Supine  -CS     Intra Patient Position Standing  -CS     Post Patient Position Sitting  -CS     Row Name 10/07/22 1406          Positioning and Restraints    Pre-Treatment Position in bed  -CS     Post Treatment Position chair  -CS     In Chair notified nsg;reclined;sitting;exit alarm on;encouraged to call for assist;call light within reach;waffle cushion;legs elevated;on mechanical lift sling  -CS           User Key  (r) = Recorded By, (t) = Taken By, (c) = Cosigned By    Initials Name Provider Type    Deejay Lagunas, OT Occupational Therapist               Outcome Measures     Row  Name 10/07/22 1419          How much help from another is currently needed...    Putting on and taking off regular lower body clothing? 2  -CS     Bathing (including washing, rinsing, and drying) 3  -CS     Toileting (which includes using toilet bed pan or urinal) 3  -CS     Putting on and taking off regular upper body clothing 3  -CS     Taking care of personal grooming (such as brushing teeth) 3  -CS     Eating meals 4  -CS     AM-PAC 6 Clicks Score (OT) 18  -CS     Row Name 10/07/22 0845          How much help from another person do you currently need...    Turning from your back to your side while in flat bed without using bedrails? 3  -MF     Moving from lying on back to sitting on the side of a flat bed without bedrails? 2  -MF     Moving to and from a bed to a chair (including a wheelchair)? 3  -MF     Standing up from a chair using your arms (e.g., wheelchair, bedside chair)? 3  -MF     Climbing 3-5 steps with a railing? 2  -MF     To walk in hospital room? 3  -MF     AM-PAC 6 Clicks Score (PT) 16  -MF     Highest level of mobility 5 --> Static standing  -MF     Row Name 10/07/22 1419          Functional Assessment    Outcome Measure Options AM-PAC 6 Clicks Daily Activity (OT)  -CS           User Key  (r) = Recorded By, (t) = Taken By, (c) = Cosigned By    Initials Name Provider Type    Deejay Lagunas OT Occupational Therapist    Henrique Disla, RN Registered Nurse                Occupational Therapy Education                 Title: PT OT SLP Therapies (Done)     Topic: Occupational Therapy (Done)     Point: ADL training (Done)     Description:   Instruct learner(s) on proper safety adaptation and remediation techniques during self care or transfers.   Instruct in proper use of assistive devices.              Learning Progress Summary           Patient Acceptance, E,D, VU,DU by CS at 10/7/2022 1423    Acceptance, E, VU by GARRETT at 10/5/2022 1747                   Point: Home exercise program (Done)      Description:   Instruct learner(s) on appropriate technique for monitoring, assisting and/or progressing therapeutic exercises/activities.              Learning Progress Summary           Patient Acceptance, E,D, VU,DU by  at 10/7/2022 1423                   Point: Precautions (Done)     Description:   Instruct learner(s) on prescribed precautions during self-care and functional transfers.              Learning Progress Summary           Patient Acceptance, E,D, VU,DU by  at 10/7/2022 1423    Acceptance, E, VU by AN at 10/5/2022 1747                   Point: Body mechanics (Done)     Description:   Instruct learner(s) on proper positioning and spine alignment during self-care, functional mobility activities and/or exercises.              Learning Progress Summary           Patient Acceptance, E,D, VU,DU by  at 10/7/2022 1423    Acceptance, E, VU by AN at 10/5/2022 1747                               User Key     Initials Effective Dates Name Provider Type Discipline     06/16/21 -  Deejay Ferguson OT Occupational Therapist OT    GARRETT 09/21/21 -  Tonia Carrasquillo OT Occupational Therapist OT              OT Recommendation and Plan     Plan of Care Review  Plan of Care Reviewed With: patient  Progress: improving  Outcome Evaluation: Pt demonstrated improved activity tolerance progressing to Vinay for bed mobility and CGA for transfers and ADL related mobility. Completed sinkside grooming tasks w/ supervision and engaged in dynamic core and balance activities EOB. Will cont IPOT per POC as tolerated. Rec d/c to IP rehab for best functional outcomes.     Time Calculation:    Time Calculation- OT     Row Name 10/07/22 1423             Time Calculation- OT    OT Start Time 1333  -      OT Received On 10/07/22  -      OT Goal Re-Cert Due Date 10/15/22  -              Timed Charges    81738 - OT Therapeutic Exercise Minutes 6  -      60262 - OT Therapeutic Activity Minutes 10  -      64626 - OT Self  Care/Mgmt Minutes 12  -CS              Total Minutes    Timed Charges Total Minutes 28  -CS       Total Minutes 28  -CS            User Key  (r) = Recorded By, (t) = Taken By, (c) = Cosigned By    Initials Name Provider Type    CS Deejay Ferguson OT Occupational Therapist              Therapy Charges for Today     Code Description Service Date Service Provider Modifiers Qty    38417740592 HC OT SELF CARE/MGMT/TRAIN EA 15 MIN 10/7/2022 Deejay Ferguson OT GO 1    [id number]  OT THERAPEUTIC ACT EA 15 MIN 10/7/2022 Deejay Ferguson OT GO 1               Deejay Ferguson OT  10/7/2022

## 2022-10-07 NOTE — NURSING NOTE
Low Air Loss specialty bed ordered through "[employer].  Per WOC consult order, patient refusing turns due to pain, please follow refusal of care policy as appropriate.

## 2022-10-07 NOTE — PLAN OF CARE
"Goal Outcome Evaluation:      Pt A&Ox4, VSS, RA, NSR on tele, c/o lower extremity pain- treated per MAR. Pt stated that she is hesitant to take her prescribed pain medication due to fear of dependency. Pt educated on prevention of dependency and importance of pain management. Pt became tearful during shift and stated \"This pain is never going to get better.\" Support provided and pain management techniques reviewed w/pt. Case management/ consult placed due to concern that pt will require IPR[facility] and will be unable to care for  w/dementia. Pt c/o itching and pain in RLE in areas where bruised- PRN benadryl ordered and given.            "

## 2022-10-08 LAB
HCT VFR BLD AUTO: 33.1 % (ref 34–46.6)
HCT VFR BLD AUTO: 34.2 % (ref 34–46.6)
HGB BLD-MCNC: 10.4 G/DL (ref 12–15.9)
HGB BLD-MCNC: 10.7 G/DL (ref 12–15.9)
SARS-COV-2 RDRP RESP QL NAA+PROBE: NORMAL

## 2022-10-08 PROCEDURE — 99225 PR SBSQ OBSERVATION CARE/DAY 25 MINUTES: CPT | Performed by: INTERNAL MEDICINE

## 2022-10-08 PROCEDURE — 85018 HEMOGLOBIN: CPT | Performed by: INTERNAL MEDICINE

## 2022-10-08 PROCEDURE — 85014 HEMATOCRIT: CPT | Performed by: INTERNAL MEDICINE

## 2022-10-08 PROCEDURE — G0378 HOSPITAL OBSERVATION PER HR: HCPCS

## 2022-10-08 PROCEDURE — 87635 SARS-COV-2 COVID-19 AMP PRB: CPT | Performed by: INTERNAL MEDICINE

## 2022-10-08 RX ADMIN — PROPAFENONE HYDROCHLORIDE 225 MG: 225 CAPSULE, EXTENDED RELEASE ORAL at 20:13

## 2022-10-08 RX ADMIN — PREGABALIN 50 MG: 50 CAPSULE ORAL at 20:13

## 2022-10-08 RX ADMIN — ESCITALOPRAM OXALATE 20 MG: 20 TABLET ORAL at 08:49

## 2022-10-08 RX ADMIN — ACETAMINOPHEN 650 MG: 325 TABLET ORAL at 22:57

## 2022-10-08 RX ADMIN — ACETAMINOPHEN 650 MG: 325 TABLET ORAL at 00:29

## 2022-10-08 RX ADMIN — ALLOPURINOL 100 MG: 100 TABLET ORAL at 08:49

## 2022-10-08 RX ADMIN — Medication 10 ML: at 20:13

## 2022-10-08 RX ADMIN — PREGABALIN 50 MG: 50 CAPSULE ORAL at 08:49

## 2022-10-08 RX ADMIN — PROPAFENONE HYDROCHLORIDE 225 MG: 225 CAPSULE, EXTENDED RELEASE ORAL at 08:50

## 2022-10-08 RX ADMIN — Medication 10 ML: at 08:50

## 2022-10-08 RX ADMIN — PREGABALIN 50 MG: 50 CAPSULE ORAL at 15:49

## 2022-10-08 NOTE — PROGRESS NOTES
[facility] Medicine Services  PROGRESS NOTE    Patient Name: Akua Torres  : 1945  MRN: [id number]    Date of Admission: 10/4/2022  Primary Care Physician: Nathalia Freeman, HALEY    Subjective   Subjective     CC:  Right leg pain and swelling    HPI:  Resting in bed no acute distress and tells me she feels okay and has no specific complaint.  No fever or chills.  No chest pain or palpitation or shortness of breath at rest.  No nausea vomiting or diarrhea or abdominal pain.    ROS:  As above    Objective   Objective     Vital Signs:   Temp:  [97.7 °F (36.5 °C)-98.4 °F (36.9 °C)] 98.3 °F (36.8 °C)  Heart Rate:  [77-98] 82  Resp:  [16-18] 18  BP: (142-156)/(55-77) 151/74     Physical Exam:  Constitutional: No acute distress  HENT: NCAT, mucous membranes moist  Respiratory: Clear to auscultation bilaterally, respiratory effort normal   Cardiovascular: RRR, no murmurs, rubs, or gallops  Gastrointestinal: Abdomen is obese.  Positive bowel sounds, soft, nontender, nondistended  Musculoskeletal:  bilateral ankle edema, right thigh hematoma with extensive bruising.   Psychiatric: Appropriate affect, cooperative  Neurologic: Awake, alert, oriented x3, no focality appreciated, speech clear  Skin: No rashes    Results Reviewed:  LAB RESULTS:      Lab 10/08/22  0810 10/08/22  0032 10/07/22  1459 10/07/22  0850 10/07/22  0040 10/06/22  1650 10/06/22  0745 10/05/22  1456 10/05/22  0646 10/04/22  1401   WBC  --   --   --   --   --   --   --   --   --  9.01   HEMOGLOBIN 10.7* 10.4* 10.3* 9.8* 9.4*   < > 9.3*   < > 8.9* 8.1*   HEMATOCRIT 34.2 33.1* 33.1* 30.9* 30.4*   < > 29.3*   < > 28.1* 25.4*   PLATELETS  --   --   --   --   --   --   --   --   --  188   NEUTROS ABS  --   --   --   --   --   --   --   --   --  6.38   IMMATURE GRANS (ABS)  --   --   --   --   --   --   --   --   --  0.03   LYMPHS ABS  --   --   --   --   --   --   --   --   --  1.75   MONOS ABS  --   --   --   --   --   --    --   --   --  0.54   EOS ABS  --   --   --   --   --   --   --   --   --  0.28   MCV  --   --   --   --   --   --   --   --   --  94.1   PROTIME  --   --   --   --   --   --  15.9*  --  18.4* 21.3*    < > = values in this interval not displayed.         Lab 10/07/22  0532 10/05/22  0646 10/04/22  1401   SODIUM 137 140 138   POTASSIUM 4.5 5.1 5.0   CHLORIDE 105 106 103   CO2 22.0 24.0 23.0   ANION GAP 10.0 10.0 12.0   BUN 21 32* 41*   CREATININE 1.03* 1.25* 1.82*   EGFR 55.8* 44.2* 28.2*   GLUCOSE 112* 92 109*   CALCIUM 8.6 8.1* 8.7         Lab 10/04/22  1401   TOTAL PROTEIN 6.5   ALBUMIN 3.90   GLOBULIN 2.6   ALT (SGPT) 12   AST (SGOT) 24   BILIRUBIN 1.4*   ALK PHOS 100         Lab 10/06/22  0745 10/05/22  0646 10/04/22  1401   PROTIME 15.9* 18.4* 21.3*   INR 1.28* 1.54* 1.85*             Lab 10/04/22  1505   ABO TYPING O   RH TYPING Positive   ANTIBODY SCREEN Negative         Brief Urine Lab Results     None          Microbiology Results Abnormal     Procedure Component Value - Date/Time    COVID PRE-OP / PRE-PROCEDURE SCREENING ORDER (NO ISOLATION) - Swab, Nasopharynx [[id number]]  (Normal) Collected: 10/05/22 0740    Lab Status: Final result Specimen: Swab from Nasopharynx Updated: 10/05/22 0822    Narrative:      The following orders were created for panel order COVID PRE-OP / PRE-PROCEDURE SCREENING ORDER (NO ISOLATION) - Swab, Nasopharynx.  Procedure                               Abnormality         Status                     ---------                               -----------         ------                     COVID-19 and FLU A/B PCR...[[id number]]  Normal              Final result                 Please view results for these tests on the individual orders.    COVID-19 and FLU A/B PCR - Swab, Nasopharynx [[id number]]  (Normal) Collected: 10/05/22 0740    Lab Status: Final result Specimen: Swab from Nasopharynx Updated: 10/05/22 0822     COVID19 Not Detected     Influenza A PCR Not Detected     Influenza B  PCR Not Detected    Narrative:      Fact sheet for providers: https://www.fda.gov/media/640965/download    Fact sheet for patients: https://www.fda.gov/media/377126/download    Test performed by PCR.          No radiology results from the last 24 hrs    Results for orders placed during the hospital encounter of 04/26/21    Adult Transthoracic Echo Complete W/ Cont if Necessary Per Protocol    Interpretation Summary  · Calculated left ventricular EF = 67%  · The aortic valve exhibits sclerosis  · No significant valvular stenosis or regurgitation      I have reviewed the medications:  Scheduled Meds:allopurinol, 100 mg, Oral, Daily  escitalopram, 20 mg, Oral, Daily  pregabalin, 50 mg, Oral, TID  propafenone SR, 225 mg, Oral, Q12H  senna-docusate sodium, 2 tablet, Oral, BID  sodium chloride, 10 mL, Intravenous, Q12H      Continuous Infusions:   PRN Meds:.•  acetaminophen  •  senna-docusate sodium **AND** polyethylene glycol **AND** bisacodyl **AND** bisacodyl  •  diphenhydrAMINE  •  hydrOXYzine  •  ondansetron **OR** ondansetron  •  sodium chloride  •  sodium chloride    Assessment & Plan   Assessment & Plan     Active Hospital Problems    Diagnosis  POA   • Traumatic rhabdomyolysis, initial encounter (HCC) [T79.6XXA]  Yes   • Hematoma of right thigh [S70.11XA]  Yes   • Pain of right lower extremity [M79.604]  Yes   • Anemia [D64.9]  Yes   • Fibromyalgia [M79.7]  Yes   • Paroxysmal atrial fibrillation (HCC) [I48.0]  Yes   • Renal insufficiency [N28.9]  Yes   • Essential hypertension [I10]  Yes      Resolved Hospital Problems   No resolved problems to display.        Brief Hospital Course to date:  Akua Torres is a 77 y.o. female with past medical history significant for paroxysmal atrial fibrillation on Coumadin, hypertension, hyperlipidemia, history of CVA, osteoarthritis, morbid obesity, fibromyalgia.  Patient was admitted for an right lower extremity pain and swelling.    *Right lower extremity pain and  swelling.  Patient has had right hip aspirated on 9/29/2022.  Currently patient has a large hematoma that is causing severe pain on movement.  Orthopedic surgery has seen and evaluated the patient but they recommend conservative treatment.  Patient did receive packed red blood cell transfusion and hemoglobin and hematocrit are being monitored and are stable.  We will continue monitoring hemoglobin and hematocrit.    *Acute blood loss anemia, s/p packed red blood cell transfusion.  Hemoglobin and hematocrit is stable.    *Hypertension, currently controlled.    *Paroxysmal atrial fibrillation.  Patient was on Coumadin but currently Coumadin is on hold because of hematoma.  INR was 1.28 today.    *Chronic kidney disease.  Will monitor renal function.    *Morbid obesity    Expected Discharge Location and Transportation:rehab  Expected Discharge Date:when bed available.    DVT prophylaxis:  Mechanical DVT prophylaxis orders are present.     AM-PAC 6 Clicks Score (PT): 16 (10/08/22 0850)    CODE STATUS:   Code Status and Medical Interventions:   Ordered at: 10/04/22 0996     Medical Intervention Limits:    NO intubation (DNI)     Level Of Support Discussed With:    Patient     Code Status (Patient has no pulse and is not breathing):    No CPR (Do Not Attempt to Resuscitate)     Medical Interventions (Patient has pulse or is breathing):    Limited Support       Harsh Freeman MD  10/08/22

## 2022-10-09 LAB
INR PPP: 1.12 (ref 0.84–1.13)
PROTHROMBIN TIME: 14.3 SECONDS (ref 11.4–14.4)

## 2022-10-09 PROCEDURE — 99226 PR SBSQ OBSERVATION CARE/DAY 35 MINUTES: CPT | Performed by: INTERNAL MEDICINE

## 2022-10-09 PROCEDURE — 85610 PROTHROMBIN TIME: CPT | Performed by: INTERNAL MEDICINE

## 2022-10-09 PROCEDURE — 25010000002 ENOXAPARIN PER 10 MG: Performed by: INTERNAL MEDICINE

## 2022-10-09 PROCEDURE — 96372 THER/PROPH/DIAG INJ SC/IM: CPT

## 2022-10-09 PROCEDURE — G0378 HOSPITAL OBSERVATION PER HR: HCPCS

## 2022-10-09 RX ORDER — ENOXAPARIN SODIUM 100 MG/ML
1 INJECTION SUBCUTANEOUS EVERY 12 HOURS SCHEDULED
Status: DISCONTINUED | OUTPATIENT
Start: 2022-10-09 | End: 2022-10-10 | Stop reason: HOSPADM

## 2022-10-09 RX ORDER — WARFARIN SODIUM 5 MG/1
5 TABLET ORAL
Status: DISCONTINUED | OUTPATIENT
Start: 2022-10-09 | End: 2022-10-10 | Stop reason: HOSPADM

## 2022-10-09 RX ADMIN — SENNOSIDES AND DOCUSATE SODIUM 2 TABLET: 50; 8.6 TABLET ORAL at 09:07

## 2022-10-09 RX ADMIN — Medication 10 ML: at 21:38

## 2022-10-09 RX ADMIN — PROPAFENONE HYDROCHLORIDE 225 MG: 225 CAPSULE, EXTENDED RELEASE ORAL at 09:07

## 2022-10-09 RX ADMIN — ESCITALOPRAM OXALATE 20 MG: 20 TABLET ORAL at 09:07

## 2022-10-09 RX ADMIN — PROPAFENONE HYDROCHLORIDE 225 MG: 225 CAPSULE, EXTENDED RELEASE ORAL at 20:00

## 2022-10-09 RX ADMIN — PREGABALIN 50 MG: 50 CAPSULE ORAL at 09:07

## 2022-10-09 RX ADMIN — ALLOPURINOL 100 MG: 100 TABLET ORAL at 09:07

## 2022-10-09 RX ADMIN — ENOXAPARIN SODIUM 100 MG: 100 INJECTION SUBCUTANEOUS at 09:08

## 2022-10-09 RX ADMIN — ACETAMINOPHEN 650 MG: 325 TABLET ORAL at 20:09

## 2022-10-09 RX ADMIN — SENNOSIDES AND DOCUSATE SODIUM 2 TABLET: 50; 8.6 TABLET ORAL at 20:09

## 2022-10-09 RX ADMIN — ENOXAPARIN SODIUM 100 MG: 100 INJECTION SUBCUTANEOUS at 20:03

## 2022-10-09 RX ADMIN — PREGABALIN 50 MG: 50 CAPSULE ORAL at 15:36

## 2022-10-09 RX ADMIN — PREGABALIN 50 MG: 50 CAPSULE ORAL at 20:01

## 2022-10-09 RX ADMIN — Medication 10 ML: at 09:08

## 2022-10-09 RX ADMIN — WARFARIN SODIUM 5 MG: 5 TABLET ORAL at 17:57

## 2022-10-09 NOTE — PROGRESS NOTES
[facility] Medicine Services  PROGRESS NOTE    Patient Name: Akua Torres  : 1945  MRN: [id number]    Date of Admission: 10/4/2022  Primary Care Physician: Nathalia Freeman, HALEY    Subjective   Subjective     CC:  Right leg pain and swelling    HPI:  Resting in bed no acute distress and tells me she feels okay and has no specific complaint.  Tells me the right thigh is much softer and does not tense anymore.  No fever or chills.  No chest pain or palpitation or shortness of breath at rest.  No nausea vomiting or diarrhea or abdominal pain.    ROS:  As above    Objective   Objective     Vital Signs:   Temp:  [97.7 °F (36.5 °C)-98.4 °F (36.9 °C)] 97.7 °F (36.5 °C)  Heart Rate:  [] 86  Resp:  [18-20] 20  BP: (138-164)/(64-78) 138/76     Physical Exam:  Constitutional: No acute distress  HENT: NCAT, mucous membranes moist  Respiratory: Clear to auscultation bilaterally, respiratory effort normal   Cardiovascular: RRR, no murmurs, rubs, or gallops  Gastrointestinal: Abdomen is obese.  Positive bowel sounds, soft, nontender, nondistended  Musculoskeletal:  bilateral ankle edema, right thigh hematoma with extensive bruising.   Psychiatric: Appropriate affect, cooperative  Neurologic: Awake, alert, oriented x3, no focality appreciated, speech clear  Skin: No rashes    Results Reviewed:  LAB RESULTS:      Lab 10/09/22  1033 10/08/22  0810 10/08/22  0032 10/07/22  1459 10/07/22  0850 10/07/22  0040 10/06/22  1650 10/06/22  0745 10/05/22  1456 10/05/22  0646 10/04/22  1401   WBC  --   --   --   --   --   --   --   --   --   --  9.01   HEMOGLOBIN  --  10.7* 10.4* 10.3* 9.8* 9.4*   < > 9.3*   < > 8.9* 8.1*   HEMATOCRIT  --  34.2 33.1* 33.1* 30.9* 30.4*   < > 29.3*   < > 28.1* 25.4*   PLATELETS  --   --   --   --   --   --   --   --   --   --  188   NEUTROS ABS  --   --   --   --   --   --   --   --   --   --  6.38   IMMATURE GRANS (ABS)  --   --   --   --   --   --   --   --   --    --  0.03   LYMPHS ABS  --   --   --   --   --   --   --   --   --   --  1.75   MONOS ABS  --   --   --   --   --   --   --   --   --   --  0.54   EOS ABS  --   --   --   --   --   --   --   --   --   --  0.28   MCV  --   --   --   --   --   --   --   --   --   --  94.1   PROTIME 14.3  --   --   --   --   --   --  15.9*  --  18.4* 21.3*    < > = values in this interval not displayed.         Lab 10/07/22  0532 10/05/22  0646 10/04/22  1401   SODIUM 137 140 138   POTASSIUM 4.5 5.1 5.0   CHLORIDE 105 106 103   CO2 22.0 24.0 23.0   ANION GAP 10.0 10.0 12.0   BUN 21 32* 41*   CREATININE 1.03* 1.25* 1.82*   EGFR 55.8* 44.2* 28.2*   GLUCOSE 112* 92 109*   CALCIUM 8.6 8.1* 8.7         Lab 10/04/22  1401   TOTAL PROTEIN 6.5   ALBUMIN 3.90   GLOBULIN 2.6   ALT (SGPT) 12   AST (SGOT) 24   BILIRUBIN 1.4*   ALK PHOS 100         Lab 10/09/22  1033 10/06/22  0745 10/05/22  0646 10/04/22  1401   PROTIME 14.3 15.9* 18.4* 21.3*   INR 1.12 1.28* 1.54* 1.85*             Lab 10/04/22  1505   ABO TYPING O   RH TYPING Positive   ANTIBODY SCREEN Negative         Brief Urine Lab Results     None          Microbiology Results Abnormal     Procedure Component Value - Date/Time    COVID PRE-OP / PRE-PROCEDURE SCREENING ORDER (NO ISOLATION) - Swab, Nasopharynx [[id number]]  (Normal) Collected: 10/08/22 3031    Lab Status: Final result Specimen: Swab from Nasopharynx Updated: 10/08/22 1630    Narrative:      The following orders were created for panel order COVID PRE-OP / PRE-PROCEDURE SCREENING ORDER (NO ISOLATION) - Swab, Nasopharynx.  Procedure                               Abnormality         Status                     ---------                               -----------         ------                     COVID-19, ABBOTT IN-HOUS...[[id number]]  Normal              Final result                 Please view results for these tests on the individual orders.    COVID-19, ABBOTT IN-HOUSE,NASAL Swab (NO TRANSPORT MEDIA) 2 HR TAT - Swab, Nasopharynx  [[id number]]  (Normal) Collected: 10/08/22 1556    Lab Status: Final result Specimen: Swab from Nasopharynx Updated: 10/08/22 1630     COVID19 Presumptive Negative    Narrative:      Fact sheet for providers: https://www.fda.gov/media/483974/download     Fact sheet for patients: https://www.fda.gov/media/538988/download    Test performed by PCR.  If inconsistent with clinical signs and symptoms patient should be tested with different authorized molecular test.    COVID PRE-OP / PRE-PROCEDURE SCREENING ORDER (NO ISOLATION) - Swab, Nasopharynx [[id number]]  (Normal) Collected: 10/05/22 0740    Lab Status: Final result Specimen: Swab from Nasopharynx Updated: 10/05/22 0822    Narrative:      The following orders were created for panel order COVID PRE-OP / PRE-PROCEDURE SCREENING ORDER (NO ISOLATION) - Swab, Nasopharynx.  Procedure                               Abnormality         Status                     ---------                               -----------         ------                     COVID-19 and FLU A/B PCR...[[id number]]  Normal              Final result                 Please view results for these tests on the individual orders.    COVID-19 and FLU A/B PCR - Swab, Nasopharynx [[id number]]  (Normal) Collected: 10/05/22 0740    Lab Status: Final result Specimen: Swab from Nasopharynx Updated: 10/05/22 0822     COVID19 Not Detected     Influenza A PCR Not Detected     Influenza B PCR Not Detected    Narrative:      Fact sheet for providers: https://www.fda.gov/media/960339/download    Fact sheet for patients: https://www.fda.gov/media/145518/download    Test performed by PCR.          No radiology results from the last 24 hrs    Results for orders placed during the hospital encounter of 04/26/21    Adult Transthoracic Echo Complete W/ Cont if Necessary Per Protocol    Interpretation Summary  · Calculated left ventricular EF = 67%  · The aortic valve exhibits sclerosis  · No significant valvular stenosis or  regurgitation      I have reviewed the medications:  Scheduled Meds:allopurinol, 100 mg, Oral, Daily  enoxaparin, 1 mg/kg, Subcutaneous, Q12H  escitalopram, 20 mg, Oral, Daily  pregabalin, 50 mg, Oral, TID  propafenone SR, 225 mg, Oral, Q12H  senna-docusate sodium, 2 tablet, Oral, BID  sodium chloride, 10 mL, Intravenous, Q12H  warfarin, 5 mg, Oral, Daily      Continuous Infusions:Pharmacy to dose warfarin,   Pharmacy to dose warfarin,       PRN Meds:.•  acetaminophen  •  senna-docusate sodium **AND** polyethylene glycol **AND** bisacodyl **AND** bisacodyl  •  diphenhydrAMINE  •  hydrOXYzine  •  ondansetron **OR** ondansetron  •  Pharmacy to dose warfarin  •  Pharmacy to dose warfarin  •  sodium chloride  •  sodium chloride    Assessment & Plan   Assessment & Plan     Active Hospital Problems    Diagnosis  POA   • Traumatic rhabdomyolysis, initial encounter ([facility]) [T79.6XXA]  Yes   • Hematoma of right thigh [S70.11XA]  Yes   • Pain of right lower extremity [M79.604]  Yes   • Anemia [D64.9]  Yes   • Fibromyalgia [M79.7]  Yes   • Paroxysmal atrial fibrillation (HCC) [I48.0]  Yes   • Renal insufficiency [N28.9]  Yes   • Essential hypertension [I10]  Yes      Resolved Hospital Problems   No resolved problems to display.        Brief Hospital Course to date:  Akua Torres is a 77 y.o. female with past medical history significant for paroxysmal atrial fibrillation on Coumadin, hypertension, hyperlipidemia, history of CVA, osteoarthritis, morbid obesity, fibromyalgia.  Patient was admitted for an right lower extremity pain and swelling.    *Right lower extremity pain and swelling.  Patient has had right hip aspirated on 9/29/2022.  Currently patient has a large hematoma that is causing severe pain on movement.  Orthopedic surgery has seen and evaluated the patient but they recommend conservative treatment.  Patient did receive packed red blood cell transfusion and hemoglobin and hematocrit are being monitored and are  stable.   -Will start Coumadin and Lovenox today.    *Acute blood loss anemia, s/p packed red blood cell transfusion.  Hemoglobin and hematocrit is stable.    *Hypertension, currently controlled.    *Paroxysmal atrial fibrillation.  Patient was on Coumadin but currently Coumadin is on hold because of hematoma.  As mentioned above, Coumadin will be restarted again today and will be bridged with Lovenox.    *Left carotid artery stenosis with stent placement by Dr. Guerra.    *Chronic kidney disease.  Will monitor renal function.    *Morbid obesity    PLAN:  - continue current care  - start Coumadin and bridged with Lovenox  -Monitor hemoglobin and hematocrit and watch for rebleeding  -Rehab when bed available.    Expected Discharge Location and Transportation:rehab  Expected Discharge Date:when bed available.    DVT prophylaxis:  Medical and mechanical DVT prophylaxis orders are present.     AM-PAC 6 Clicks Score (PT): 16 (10/09/22 0905)    CODE STATUS:   Code Status and Medical Interventions:   Ordered at: 10/04/22 7049     Medical Intervention Limits:    NO intubation (DNI)     Level Of Support Discussed With:    Patient     Code Status (Patient has no pulse and is not breathing):    No CPR (Do Not Attempt to Resuscitate)     Medical Interventions (Patient has pulse or is breathing):    Limited Support       Harsh Freeman MD  10/09/22

## 2022-10-09 NOTE — PROGRESS NOTES
"Pharmacy Consult  -  Warfarin    Akua Torres is a  77 y.o. female   Height - 160 cm (63\")  Weight - 99.8 kg (220 lb 1.6 oz)    Consulting Provider: - Hospitalist  Indication: - Afib  Goal INR: - Likely 2-3 as indication is Atrial fibrillation. Unable to verify patient's INR Goal. Pt states that [facility] manages her Warfarin. Clinic closed until Monday. Pharmacist to verify goal and home dose on Monday  Home Regimen:   - 5mg daily    Bridge Therapy: Yes   and enoxapain    Drug-Drug Interactions with current regimen:    Allopurinol- may enhance the anticoagulant effect of Vitamin K                Enoxaparin- may enhance the anticoagulant effect of Vitamin K               Escitalopram- may enhance the anticoagulant effect of Vitamin K               Propafenone- may enhance the anticoagulant effect of Vitamin K    Warfarin Dosing During Admission:    Date  10/9           INR  1.12           Dose  5mg                Education Provided:  on 10/9 verbally and in writing      Discharge Follow up:   Following Provider - Follow up with outpatient anticoagulation clinic    Follow up time range or appointment - Follow up 3-5 days after discharge      Labs:    Results from last 7 days   Lab Units 10/09/22  1033 10/08/22  0810 10/08/22  0032 10/07/22  1459 10/07/22  0850 10/07/22  0040 10/06/22  1650 10/06/22  0745 10/05/22  1456 10/05/22  0646 10/04/22  1401   INR  1.12  --   --   --   --   --   --  1.28*  --  1.54* 1.85*   HEMOGLOBIN g/dL  --  10.7* 10.4* 10.3* 9.8* 9.4* 9.5* 9.3*   < > 8.9* 8.1*   HEMATOCRIT %  --  34.2 33.1* 33.1* 30.9* 30.4* 30.5* 29.3*   < > 28.1* 25.4*    < > = values in this interval not displayed.     Results from last 7 days   Lab Units 10/07/22  0532 10/05/22  0646 10/04/22  1401   SODIUM mmol/L 137 140 138   POTASSIUM mmol/L 4.5 5.1 5.0   CHLORIDE mmol/L 105 106 103   CO2 mmol/L 22.0 24.0 23.0   BUN mg/dL 21 32* 41*   CREATININE mg/dL 1.03* 1.25* 1.82*   CALCIUM mg/dL 8.6 8.1* 8.7 "   BILIRUBIN mg/dL  --   --  1.4*   ALK PHOS U/L  --   --  100   ALT (SGPT) U/L  --   --  12   AST (SGOT) U/L  --   --  24   GLUCOSE mg/dL 112* 92 109*       Current dietary intake: 75%-100% of meals charted      Assessment/Plan:      Patient's INR is SUBtherapeutic at 1.12 today.  Give 5mg today and reassess based on INR tomorrow  Daily PT/INR ordered.  Monitor signs/symptoms of bleeding, dietary intake, and drug-drug interactions. Make dose adjustments as necessary.  Pharmacy will continue to follow.         Thank you  Diana Khalil RPH  10/9/2022  15:23 EDT

## 2022-10-09 NOTE — PROGRESS NOTES
Warfarin education provided on 10/9 verbally and in writing.  Discussed effects of warfarin, importance of checking INR, drug-drug and drug-food interactions, and signs/symptoms of bleeding and clotting.  Patient verbalized understanding through teach back.  All pertinent questions were answered.    [FreeTextEntry1] : This note was written by Delores Mello on 05/24/2021 acting as a scribe for VALENTINA RODRIGUEZ III, MD

## 2022-10-09 NOTE — PLAN OF CARE
Goal Outcome Evaluation:      Pt A&Ox4, VSS, RA, NSR on tele, c/o R. Groin and upper leg pain- treated per MAR. Pt c/o bilateral LE numbness and tingling from her knee to her waist, states this is baseline and scheduled lyrica w/ PRN tylenol was effective in treating symptoms. No acute changes this shift.

## 2022-10-10 VITALS
RESPIRATION RATE: 17 BRPM | HEIGHT: 63 IN | TEMPERATURE: 98.2 F | OXYGEN SATURATION: 97 % | BODY MASS INDEX: 39 KG/M2 | DIASTOLIC BLOOD PRESSURE: 89 MMHG | SYSTOLIC BLOOD PRESSURE: 159 MMHG | WEIGHT: 220.1 LBS | HEART RATE: 79 BPM

## 2022-10-10 LAB
HCT VFR BLD AUTO: 34.6 % (ref 34–46.6)
HGB BLD-MCNC: 10.9 G/DL (ref 12–15.9)
INR PPP: 1.11 (ref 0.84–1.13)
PROTHROMBIN TIME: 14.3 SECONDS (ref 11.4–14.4)

## 2022-10-10 PROCEDURE — G0378 HOSPITAL OBSERVATION PER HR: HCPCS

## 2022-10-10 PROCEDURE — 85610 PROTHROMBIN TIME: CPT | Performed by: INTERNAL MEDICINE

## 2022-10-10 PROCEDURE — 25010000002 ENOXAPARIN PER 10 MG: Performed by: INTERNAL MEDICINE

## 2022-10-10 PROCEDURE — 85014 HEMATOCRIT: CPT | Performed by: INTERNAL MEDICINE

## 2022-10-10 PROCEDURE — 85018 HEMOGLOBIN: CPT | Performed by: INTERNAL MEDICINE

## 2022-10-10 PROCEDURE — 99217 PR OBSERVATION CARE DISCHARGE MANAGEMENT: CPT | Performed by: NURSE PRACTITIONER

## 2022-10-10 PROCEDURE — 96372 THER/PROPH/DIAG INJ SC/IM: CPT

## 2022-10-10 RX ORDER — ATENOLOL 25 MG/1
25 TABLET ORAL 2 TIMES DAILY
Status: DISCONTINUED | OUTPATIENT
Start: 2022-10-10 | End: 2022-10-10 | Stop reason: HOSPADM

## 2022-10-10 RX ORDER — ENOXAPARIN SODIUM 100 MG/ML
1 INJECTION SUBCUTANEOUS EVERY 12 HOURS SCHEDULED
Qty: 14 ML | Refills: 0 | Status: SHIPPED | OUTPATIENT
Start: 2022-10-10 | End: 2022-10-17

## 2022-10-10 RX ORDER — LISINOPRIL 20 MG/1
20 TABLET ORAL DAILY
Status: DISCONTINUED | OUTPATIENT
Start: 2022-10-10 | End: 2022-10-10 | Stop reason: HOSPADM

## 2022-10-10 RX ORDER — WARFARIN SODIUM 5 MG/1
TABLET ORAL
Status: ON HOLD
Start: 2022-10-10 | End: 2023-02-04 | Stop reason: SDUPTHER

## 2022-10-10 RX ORDER — ATORVASTATIN CALCIUM 40 MG/1
80 TABLET, FILM COATED ORAL NIGHTLY
Status: DISCONTINUED | OUTPATIENT
Start: 2022-10-10 | End: 2022-10-10 | Stop reason: HOSPADM

## 2022-10-10 RX ORDER — PROPAFENONE HYDROCHLORIDE 225 MG/1
225 CAPSULE, EXTENDED RELEASE ORAL EVERY 12 HOURS SCHEDULED
Start: 2022-10-10

## 2022-10-10 RX ADMIN — ALLOPURINOL 100 MG: 100 TABLET ORAL at 08:03

## 2022-10-10 RX ADMIN — PROPAFENONE HYDROCHLORIDE 225 MG: 225 CAPSULE, EXTENDED RELEASE ORAL at 08:05

## 2022-10-10 RX ADMIN — ESCITALOPRAM OXALATE 20 MG: 20 TABLET ORAL at 08:03

## 2022-10-10 RX ADMIN — PREGABALIN 50 MG: 50 CAPSULE ORAL at 08:03

## 2022-10-10 RX ADMIN — LISINOPRIL 20 MG: 20 TABLET ORAL at 12:16

## 2022-10-10 RX ADMIN — ATENOLOL 25 MG: 25 TABLET ORAL at 12:16

## 2022-10-10 RX ADMIN — Medication 10 ML: at 08:04

## 2022-10-10 RX ADMIN — ENOXAPARIN SODIUM 100 MG: 100 INJECTION SUBCUTANEOUS at 08:03

## 2022-10-10 NOTE — DISCHARGE SUMMARY
[facility] Medicine Services  TRANSFER SUMMARY    Patient Name: Akua Torres  : 1945  MRN: [id number]    Date of Admission: 10/4/2022  Date of Discharge: 10/10/2022   Length of Stay: 1  Primary Care Physician: Nathalia Freeman PA-C    Consults     Date and Time Order Name Status Description    10/4/2022  5:55 PM Inpatient Orthopedic Surgery Consult Completed           Hospital Course   Presenting Problem:   Traumatic rhabdomyolysis, initial encounter ([facility]) [T79.6XXA]    Active Hospital Problems    Diagnosis  POA   • Traumatic rhabdomyolysis, initial encounter ([facility]) [T79.6XXA]  Yes   • Hematoma of right thigh [S70.11XA]  Yes   • Pain of right lower extremity [M79.604]  Yes   • Anemia [D64.9]  Yes   • Fibromyalgia [M79.7]  Yes   • Paroxysmal atrial fibrillation (HCC) [I48.0]  Yes   • Renal insufficiency [N28.9]  Yes   • Essential hypertension [I10]  Yes      Resolved Hospital Problems   No resolved problems to display.      Hospital Course:  Akua Torres is a 77 y.o. female with past medical history significant for paroxysmal atrial fibrillation on Coumadin, hypertension, hyperlipidemia, history of CVA, osteoarthritis, morbid obesity, fibromyalgia.  Patient was admitted for an right lower extremity pain and swelling after a right anterior hip aspiration by [facility] orthopedics.     Right lower extremity pain and swelling.    --Had right hip aspirated on 2022.    --Currently patient has a large hematoma that is causing severe pain on movement.    --Orthopedic surgery has seen and evaluated the patient but they recommend conservative treatment; follow-up with joint specialist in their office    --Patient did receive packed red blood cell transfusion and hemoglobin      Acute blood loss anemia  --S/p packed red blood cell transfusion    --H/H 10.9/34.6; improving     Hypertension, currently controlled  --Continue home medications     Paroxysmal atrial fibrillation.     --Coumadin was held because of hematoma    --Coumadin was restarted 10/9 and will be bridged with Lovenox until INR 2-3  --INR 1.11     Left carotid artery stenosis  --Stent placement by Dr. Guerra     Chronic kidney disease.    --Will monitor renal function.     Obesity  --Complicates all aspects of care    Patient was offered a bed at [facility] from here for rehab today and will be transported by her .  Discharge follow-up recommendations and appointments are listed below.    Discharge Follow Up Recommendations for outpatient labs/diagnostics:  --Follow-up with facility provider in 1 to 2 days  --Follow-up with your family doctor 1 week after discharge from rehab  --Follow-up with our total joints team Dr. Samuels or Dr. Rosario to find out aspiration results  --Continue on Coumadin 5 mg every evening  --Continue Lovenox every 12 hours until INR between 2-3  Day of Discharge   HPI:   Patient sitting up in bed in NAD and happy about going to rehab today.  Patient states that her hematoma feels much better.  No other complaints.    Review of Systems  Gen- No fevers, chills  CV- No chest pain, palpitations  Resp- No cough, dyspnea  GI- No N/V/D, abd pain  Ext- + right leg hematoma; +right leg tenderness  All other systems have been reviewed and the pertinent positives and negatives are listed above in the HPI or ROS    Vital Signs:   Temp:  [97.7 °F (36.5 °C)-98.4 °F (36.9 °C)] 98.2 °F (36.8 °C)  Heart Rate:  [77-90] 83  Resp:  [18-20] 18  BP: (132-159)/() 159/79   Physical Exam:  Constitutional: Alert, obese female sitting up in bed in NAD  Eyes: EOMI, sclerae anicteric, no conjunctival injection  Head: NCAT  ENT: Devol, moist mucous membranes   Respiratory: Nonlabored, symmetrical chest expansion, CTAB, 98% RA  Cardiovascular: RRR, HR 84, no M/R/G, +DP pulses bilaterally  Gastrointestinal: Obese, soft, NT, ND +BS  Musculoskeletal: XIONG; +1 LE ankle edema bilaterally; large medial and posterior  thigh hematoma extending down to posterior and medial knee and very stages of healing  Neurologic: Oriented x4, strength symmetric in all extremities, follows all commands, speech clear  Skin: No rashes on exposed skin  Psychiatric: Pleasant and cooperative; normal affect  Pertinent Results     Results from last 7 days   Lab Units 10/10/22  1113 10/08/22  0810 10/08/22  0032 10/07/22  1459 10/07/22  0850 10/07/22  0532 10/07/22  0040 10/06/22  1650 10/05/22  1456 10/05/22  0646 10/04/22  1401   WBC 10*3/mm3  --   --   --   --   --   --   --   --   --   --  9.01   HEMOGLOBIN g/dL 10.9* 10.7* 10.4* 10.3* 9.8*  --  9.4* 9.5*   < > 8.9* 8.1*   HEMATOCRIT % 34.6 34.2 33.1* 33.1* 30.9*  --  30.4* 30.5*   < > 28.1* 25.4*   PLATELETS 10*3/mm3  --   --   --   --   --   --   --   --   --   --  188   SODIUM mmol/L  --   --   --   --   --  137  --   --   --  140 138   POTASSIUM mmol/L  --   --   --   --   --  4.5  --   --   --  5.1 5.0   CHLORIDE mmol/L  --   --   --   --   --  105  --   --   --  106 103   CO2 mmol/L  --   --   --   --   --  22.0  --   --   --  24.0 23.0   BUN mg/dL  --   --   --   --   --  21  --   --   --  32* 41*   CREATININE mg/dL  --   --   --   --   --  1.03*  --   --   --  1.25* 1.82*   GLUCOSE mg/dL  --   --   --   --   --  112*  --   --   --  92 109*   CALCIUM mg/dL  --   --   --   --   --  8.6  --   --   --  8.1* 8.7    < > = values in this interval not displayed.     Results from last 7 days   Lab Units 10/10/22  0541 10/09/22  1033 10/06/22  0745 10/05/22  0646 10/04/22  1401   BILIRUBIN mg/dL  --   --   --   --  1.4*   ALK PHOS U/L  --   --   --   --  100   ALT (SGPT) U/L  --   --   --   --  12   AST (SGOT) U/L  --   --   --   --  24   PROTIME Seconds 14.3 14.3 15.9* 18.4* 21.3*   INR  1.11 1.12 1.28* 1.54* 1.85*           Invalid input(s): TG, LDLCALC, LDLREALC      Brief Urine Lab Results     None          Microbiology Results Abnormal     Procedure Component Value - Date/Time    COVID PRE-OP /  PRE-PROCEDURE SCREENING ORDER (NO ISOLATION) - Swab, Nasopharynx [[id number]]  (Normal) Collected: 10/08/22 1556    Lab Status: Final result Specimen: Swab from Nasopharynx Updated: 10/08/22 1630    Narrative:      The following orders were created for panel order COVID PRE-OP / PRE-PROCEDURE SCREENING ORDER (NO ISOLATION) - Swab, Nasopharynx.  Procedure                               Abnormality         Status                     ---------                               -----------         ------                     COVID-19, ABBOTT IN-HOUS...[[id number]]  Normal              Final result                 Please view results for these tests on the individual orders.    COVID-19, ABBOTT IN-HOUSE,NASAL Swab (NO TRANSPORT MEDIA) 2 HR TAT - Swab, Nasopharynx [[id number]]  (Normal) Collected: 10/08/22 1556    Lab Status: Final result Specimen: Swab from Nasopharynx Updated: 10/08/22 1630     COVID19 Presumptive Negative    Narrative:      Fact sheet for providers: https://www.fda.gov/media/777047/download     Fact sheet for patients: https://www.fda.gov/media/630630/download    Test performed by PCR.  If inconsistent with clinical signs and symptoms patient should be tested with different authorized molecular test.    COVID PRE-OP / PRE-PROCEDURE SCREENING ORDER (NO ISOLATION) - Swab, Nasopharynx [[id number]]  (Normal) Collected: 10/05/22 0740    Lab Status: Final result Specimen: Swab from Nasopharynx Updated: 10/05/22 0822    Narrative:      The following orders were created for panel order COVID PRE-OP / PRE-PROCEDURE SCREENING ORDER (NO ISOLATION) - Swab, Nasopharynx.  Procedure                               Abnormality         Status                     ---------                               -----------         ------                     COVID-19 and FLU A/B PCR...[[id number]]  Normal              Final result                 Please view results for these tests on the individual orders.    COVID-19 and FLU A/B PCR - Swab,  Nasopharynx [[id number]]  (Normal) Collected: 10/05/22 0740    Lab Status: Final result Specimen: Swab from Nasopharynx Updated: 10/05/22 0822     COVID19 Not Detected     Influenza A PCR Not Detected     Influenza B PCR Not Detected    Narrative:      Fact sheet for providers: https://www.fda.gov/media/090467/download    Fact sheet for patients: https://www.fda.gov/media/996628/download    Test performed by PCR.          Imaging Results (All)     Procedure Component Value Units Date/Time    CT Lower Extremity Right Without Contrast [[id number]] Collected: 10/04/22 1647     Updated: 10/04/22 1655    Narrative:      DATE OF EXAM: 10/4/2022 4:03 PM     PROCEDURE: CT LOWER EXTREMITY RIGHT WO CONTRAST-     INDICATIONS: Right hip and thigh pain, anemia, recent right hip  arthrodesis, patient on Coumadin.     COMPARISON: No comparisons available.     TECHNIQUE: CT of the right lower extremity was obtained without the  administration of contrast. Coronal and sagittal reformats were  obtained. Automated exposure control and alternative reconstruction  methods were used.     The radiation dose reduction device was turned on for each scan per the  ALARA (As Low as Reasonably Achievable) protocol.     FINDINGS:   There is a hyperdense hematoma in the proximal right thigh. It measures  9.3 x 7.0 cm in transaxial diameter and 17.5 cm in length. There is  scattered artifact from the patient's total right hip arthroplasty.  There is also scattered artifact from the patient's total right knee  arthroplasty. Both arthroplasties appear intact with no evidence of  loosening or failure. There are vascular calcifications along the  distribution of the right superficial femoral artery. Findings in the  lower pelvis were discussed under the separately dictated CT pelvis  report.        Impression:      There is a hyperdense hematoma the proximal right thigh measuring 9.3 x  7.0 cm in diameter and 17.5 cm in length.     This report was  finalized on 10/4/2022 4:52 PM by David Williamson MD.       CT Abdomen Pelvis Without Contrast [[id number]] Collected: 10/04/22 1640     Updated: 10/04/22 1650    Narrative:         DATE OF EXAM:  10/4/2022 4:03 PM     PROCEDURE:  CT ABDOMEN PELVIS WO CONTRAST-     INDICATIONS:  Gastrointestinal hemorrhage, recent right hip arthrocentesis, anemia,  abdominal pain, patient on Coumadin.     COMPARISON:  06/06/2018.     TECHNIQUE:  Routine transaxial slices were obtained through the abdomen and pelvis  without the administration of intravenous contrast. Reconstructed  coronal and sagittal images were also obtained. Automated exposure  control and iterative construction methods were used.      FINDINGS:  There is a partially imaged hematoma in the proximal anterior right  thigh. This was discussed in detail under the separately dictated CT  right lower extremity report. There is no retroperitoneal or intrapelvic  hemorrhage. There is a round fluid density mass in the right lobe of the  liver measuring 2.3 x 2.4 cm felt to represent a benign cyst. The  gallbladder, pancreas, adrenal glands, and spleen are normal. There are  round fluid density masses extending off the inferior pole of the right  kidney felt to represent renal cysts. The left kidney is normal. There  are no dilated loops of bowel to indicate an obstructive process. There  is no abnormal bowel wall thickening. The appendix is normal. The uterus  appears unremarkable. The urinary bladder is hyperdense which suggest  old iodinated contrast. Alternatively, this could represent hemorrhage  in the bladder. There is scattered artifact through the pelvis secondary  to the patient's bilateral hip arthroplasties. The patient's also had  multilevel spinal fusion. There are transpedicular screws and posterior  fixation rods from the L2-S1 levels. The patient's had multilevel  decompression laminectomies. There are no suspicious osteolytic or  sclerotic lesions within the  bony structures. There is a bandlike linear  density in the right lower lobe consistent with subsegmental  atelectasis.        Impression:         1. Partially imaged hematoma within the proximal anterior right thigh.  Discussed in detail under the separately dictated CT right lower  extremity report.  2. No retroperitoneal or intrapelvic hemorrhage.  3. The urinary bladder is hyperdense which suggest old contrast  material. Alternatively, this could represent hemorrhage in the bladder.  4. Probable cysts within the right lobe of the liver and inferior pole  the right kidney.  5. The appendix is normal.  6. Additional findings as noted above.     This report was finalized on 10/4/2022 4:47 PM by David Williamson MD.             Results for orders placed during the hospital encounter of 04/26/21    Adult Transthoracic Echo Complete W/ Cont if Necessary Per Protocol    Interpretation Summary  · Calculated left ventricular EF = 67%  · The aortic valve exhibits sclerosis  · No significant valvular stenosis or regurgitation    Discharge Details        Discharge Medications      New Medications      Instructions Start Date   Enoxaparin Sodium 100 MG/ML solution prefilled syringe syringe  Commonly known as: LOVENOX   1 mg/kg (100 mg), Subcutaneous, Every 12 Hours Scheduled         Changes to Medications      Instructions Start Date   propafenone  MG 12 hr capsule  Commonly known as: RYTHMOL SR  What changed:   · how much to take  · how to take this  · when to take this  · additional instructions   225 mg, Oral, Every 12 Hours Scheduled      warfarin 5 MG tablet  Commonly known as: COUMADIN  What changed: additional instructions   5 mg, Oral, Daily Warfarin;Target INR: 2 - 3         Continue These Medications      Instructions Start Date   acetaminophen 325 MG tablet  Commonly known as: TYLENOL    TAKE 2 TABLETS BY MOUTH EVERY 6 HOURS AS NEEDED FOR MILD PAIN      allopurinol 100 MG tablet  Commonly known as: ZYLOPRIM   100  mg, Oral, Daily, For gout      atenolol 25 MG tablet  Commonly known as: TENORMIN   25 mg, Oral, 2 Times Daily, Take 25 mg by mouth 2 (Two) Times a Day.      atorvastatin 80 MG tablet  Commonly known as: LIPITOR   80 mg, Oral, Nightly      cholecalciferol 25 MCG (1000 UT) tablet  Commonly known as: VITAMIN D3   2,000 Units, Oral, Daily      escitalopram 20 MG tablet  Commonly known as: LEXAPRO   20 mg, Oral, Daily, For depression and anxiety      hydrOXYzine 25 MG tablet  Commonly known as: ATARAX   25 mg, Oral, Every 8 Hours PRN, for anxiety      lisinopril 20 MG tablet  Commonly known as: PRINIVIL,ZESTRIL   20 mg, Oral, Daily, For BP Take 20 mg by mouth Daily.      methocarbamol 750 MG tablet  Commonly known as: ROBAXIN   750 mg, Oral, Daily PRN      pregabalin 50 MG capsule  Commonly known as: LYRICA   50 mg, Oral, 3 Times Daily             Allergies   Allergen Reactions   • Nsaids Other (See Comments)     KIDNEY DAMAGE   • Quinidine Nausea And Vomiting and Other (See Comments)     FEVER     • Bactrim [Sulfamethoxazole-Trimethoprim] Rash   • Nitrofuran Derivatives Diarrhea   • Penicillins Rash     Discharge Disposition:  Rehab Facility or Unit (DC - External)    Discharge Diet:  Diet Order   Procedures   • Diet Soft Texture; [employer]; Cardiac       Discharge Activity:  Activity Instructions     Activity as Tolerated          CODE STATUS:    Code Status and Medical Interventions:   Ordered at: 10/04/22 1851     Medical Intervention Limits:    NO intubation (DNI)     Level Of Support Discussed With:    Patient     Code Status (Patient has no pulse and is not breathing):    No CPR (Do Not Attempt to Resuscitate)     Medical Interventions (Patient has pulse or is breathing):    Limited Support       Additional Instructions for the Follow-ups that You Need to Schedule     Discharge Follow-up with PCP   As directed       Currently Documented PCP:    Nathalia Freeman PA-C    PCP Phone Number:    [phone]      Follow Up Details: 1 week after discharge from rehab         Discharge Follow-up with Specified Provider: Follow-up with facility provider in 1 to 2 days   As directed      To: Follow-up with facility provider in 1 to 2 days         Discharge Follow-up with Specified Provider: follow-up with our total joints team Dr. Samuels or Dr. Rosario; please call the office and schedule an appointment prior to patient's discharge   As directed      To: follow-up with our total joints team Dr. Samuels or Dr. Rosario; please call the office and schedule an appointment prior to patient's discharge             Electronically signed by WILFREDO Jeffery, 10/10/22, 11:50 AM EDT.      Time Spent on Discharge: I spent 40 minutes on this discharge activity which included: face-to-face encounter with the patient, reviewing the data in the system, coordination of the care with the nursing staff as well as consultants, documentation, and entering orders.

## 2022-10-10 NOTE — CASE MANAGEMENT/SOCIAL WORK
Case Management Discharge Note      Final Note: Ms. Torres has a skilled bed at [facility] today, if medically ready.  Confirmed bed with Audra at facility.  Insurance authorization received by the patient's [facility] Medicare for the rehab admission.  Updated Ms. Torres and she is agreeable to DC plan.  She states that her  can transport her to the facility today by private vehicle.  COVID test completed on 10/8/22, and results are normal.  Facility Pharmacy is [facility], [place], and is noted in EPIC for e-scribing.  Please call report to [facility] at tq [phone], and fax the DC summary to fax [phone].  Thank you.         Selected Continued Care - Admitted Since 10/4/2022     Destination Coordination complete.    Service Provider Selected Services Address Phone Fax Patient Preferred    [facility] [address], [facility] [address] [phone] --       Internal Comment last updated by Telma Mitchell, RN 10/7/2022 5858    10/7 referral called                             Transportation Services  Private: Car    Final Discharge Disposition Code: 03 - skilled nursing facility (SNF)

## 2022-10-10 NOTE — PROGRESS NOTES
"Pharmacy Consult  -  Warfarin    Akua Torres is a  77 y.o. female   Height - 160 cm (63\")  Weight - 99.8 kg (220 lb 1.6 oz)    Consulting Provider: - Hospitalist  Indication: - Afib  Goal INR: - 2-3     Home Regimen:   - 5mg daily    Bridge Therapy: Yes   and enoxapain    Drug-Drug Interactions with current regimen:    Allopurinol- may enhance the anticoagulant effect of Vitamin K                Enoxaparin- may enhance the anticoagulant effect of Vitamin K               Escitalopram- may enhance the anticoagulant effect of Vitamin K               Propafenone- may enhance the anticoagulant effect of Vitamin K    Warfarin Dosing During Admission:    Date  10/9 10/10          INR  1.12 1.11          Dose  5mg 5mg               Education Provided:  Warfarin education provided on 10/9 verbally and in writing.  Discussed effects of warfarin, importance of checking INR, drug-drug and drug-food interactions, and signs/symptoms of bleeding and clotting.  Patient verbalized understanding through teach back.  All pertinent questions were answered.      Discharge Follow up:   Following Provider - Follow up with Dr. Langford    Follow up time range or appointment - Follow up 3-5 days after discharge      Labs:    Results from last 7 days   Lab Units 10/10/22  1113 10/10/22  0541 10/09/22  1033 10/08/22  0810 10/08/22  0032 10/07/22  1459 10/07/22  0850 10/07/22  0040 10/06/22  1650 10/06/22  0745 10/05/22  1456 10/05/22  0646 10/04/22  1401   INR   --  1.11 1.12  --   --   --   --   --   --  1.28*  --  1.54* 1.85*   HEMOGLOBIN g/dL 10.9*  --   --  10.7* 10.4* 10.3* 9.8* 9.4* 9.5* 9.3*   < > 8.9* 8.1*   HEMATOCRIT % 34.6  --   --  34.2 33.1* 33.1* 30.9* 30.4* 30.5* 29.3*   < > 28.1* 25.4*    < > = values in this interval not displayed.     Results from last 7 days   Lab Units 10/07/22  0532 10/05/22  0646 10/04/22  1401   SODIUM mmol/L 137 140 138   POTASSIUM mmol/L 4.5 5.1 5.0   CHLORIDE mmol/L 105 106 103   CO2 mmol/L 22.0 " 24.0 23.0   BUN mg/dL 21 32* 41*   CREATININE mg/dL 1.03* 1.25* 1.82*   CALCIUM mg/dL 8.6 8.1* 8.7   BILIRUBIN mg/dL  --   --  1.4*   ALK PHOS U/L  --   --  100   ALT (SGPT) U/L  --   --  12   AST (SGOT) U/L  --   --  24   GLUCOSE mg/dL 112* 92 109*       Current dietary intake: 75%-100% of meals charted      Assessment/Plan:     Patient's INR is SUBtherapeutic at 1.11 today.  Patient is being discharged to rehab facility today.   Patient currently receiving warfarin with lovenox bridge.  Recommend to continue patients home regimen of warfarin 5mg daily. Recheck next INR on Wednesday 10/12 and stop lovenox when INR >2.           Thank you  Diana Khalil [facility]  10/10/2022  12:29 EDT

## 2022-10-10 NOTE — NURSING NOTE
Discharge education provided to Pt, no questions at this time. Attempted to call report X2 to facility; spoke to John on phone- placed on hold. No response. Will re attempt. Pt awaiting transport via family.

## 2022-10-10 NOTE — NURSING NOTE
Called Report to HERMANN LARSEN @[facility]. Pt still in house, awaiting transport via family.

## 2022-10-10 NOTE — DISCHARGE PLACEMENT REQUEST
"Akua Torres (77 y.o. Female)     Date of Birth   1945    Social Security Number       Address   [address] [place] [address]    Home Phone   [phone]    MRN   [id number]       Buddhist   Buddhist    Marital Status                               Admission Date   10/4/22    Admission Type   Emergency    Admitting Provider   Harsh Freeman MD    Attending Provider   Harsh Freeman MD    Department, Room/Bed   [facility] 3F, S304/1       Discharge Date       Discharge Disposition   Rehab Facility or Unit (DC - External)    Discharge Destination                               Attending Provider: Harsh Freeman MD    Allergies: Nsaids, Quinidine, Bactrim [Sulfamethoxazole-trimethoprim], Nitrofuran Derivatives, Penicillins    Isolation: None   Infection: None   Code Status: No CPR    Ht: 160 cm (63\")   Wt: 99.8 kg (220 lb 1.6 oz)    Admission Cmt: None   Principal Problem: None                Active Insurance as of 10/4/2022     Primary Coverage     Payor Plan Insurance Group Employer/Plan Group    HUMANA MEDICARE REPLACEMENT HUMANA MEDICARE REPLACEMENT [id number]     Payor Plan Address Payor Plan Phone Number Payor Plan Fax Number Effective Dates    [address] [address] [phone]  1/1/2022 - None Entered    [facility] [id number]       Subscriber Name Subscriber Birth Date Member ID       AKUA TORRES 1945 [id number]                 Emergency Contacts      (Rel.) Home Phone Work Phone Mobile Phone    Mehran Torres (Spouse) [phone] -- [phone]    CINDY MEIER (Friend) -- -- [phone]              Current Facility-Administered Medications   Medication Dose Route Frequency Provider Last Rate Last Admin   • acetaminophen (TYLENOL) tablet 650 mg  650 mg Oral Q6H PRN Talia Read APRN   650 mg at 10/09/22 2009   • allopurinol (ZYLOPRIM) tablet 100 mg  100 mg Oral Daily Talia Read APRN   100 mg at 10/10/22 0803   • " atenolol (TENORMIN) tablet 25 mg  25 mg Oral BID Harsh Freeman MD   25 mg at 10/10/22 1216   • atorvastatin (LIPITOR) tablet 80 mg  80 mg Oral Nightly Harsh Freeman MD       • sennosides-docusate (PERICOLACE) 8.6-50 MG per tablet 2 tablet  2 tablet Oral BID Talia Read APRN   2 tablet at 10/09/22 2009    And   • polyethylene glycol (MIRALAX) packet 17 g  17 g Oral Daily PRN Talia Read APRN        And   • bisacodyl (DULCOLAX) EC tablet 5 mg  5 mg Oral Daily PRN Talia Read APRN        And   • bisacodyl (DULCOLAX) suppository 10 mg  10 mg Rectal Daily PRN Talia Read APRN       • diphenhydrAMINE (BENADRYL) capsule 25 mg  25 mg Oral Q6H PRN Arcelia Torres APRN   25 mg at 10/07/22 0126   • Enoxaparin Sodium (LOVENOX) syringe 100 mg  1 mg/kg Subcutaneous Q12H Harsh Freeman MD   100 mg at 10/10/22 0803   • escitalopram (LEXAPRO) tablet 20 mg  20 mg Oral Daily Talia Read APRN   20 mg at 10/10/22 0803   • hydrOXYzine (ATARAX) tablet 25 mg  25 mg Oral Q8H PRN Talia Read APRN       • lisinopril (PRINIVIL,ZESTRIL) tablet 20 mg  20 mg Oral Daily Harsh Freeman MD   20 mg at 10/10/22 1216   • ondansetron (ZOFRAN) tablet 4 mg  4 mg Oral Q6H PRN Talia Read APRN        Or   • ondansetron (ZOFRAN) injection 4 mg  4 mg Intravenous Q6H PRN Talia Read APRN   4 mg at 10/06/22 2219   • Pharmacy to dose warfarin   Does not apply Continuous PRN Harsh Freeman MD       • pregabalin (LYRICA) capsule 50 mg  50 mg Oral TID Arcelia Torres APRN   50 mg at 10/10/22 0803   • propafenone SR (RYTHMOL SR) 12 hr capsule 225 mg  225 mg Oral Q12H Talia Read APRN   225 mg at 10/10/22 0805   • sodium chloride 0.9 % flush 10 mL  10 mL Intravenous PRN Talia Read APRN       • sodium chloride 0.9 % flush 10 mL  10 mL Intravenous Q12H Talia Read APRN   10 mL at 10/10/22 0804   • sodium chloride 0.9 % flush 10 mL  10 mL Intravenous PRN Talia Read APRN       • warfarin (COUMADIN) tablet 5 mg  5 mg  Oral Daily Harsh Freeman MD   5 mg at 10/09/22 2492

## 2022-10-19 ENCOUNTER — HOME HEALTH ADMISSION (OUTPATIENT)
Dept: HOME HEALTH SERVICES | Facility: HOME HEALTHCARE | Age: 77
End: 2022-10-19

## 2022-10-19 ENCOUNTER — HOME CARE VISIT (OUTPATIENT)
Dept: HOME HEALTH SERVICES | Facility: HOME HEALTHCARE | Age: 77
End: 2022-10-19

## 2022-10-19 ENCOUNTER — TRANSCRIBE ORDERS (OUTPATIENT)
Dept: HOME HEALTH SERVICES | Facility: HOME HEALTHCARE | Age: 77
End: 2022-10-19

## 2022-10-19 DIAGNOSIS — T79.6XXA TRAUMATIC ISCHEMIA OF MUSCLE, INITIAL ENCOUNTER: Primary | ICD-10-CM

## 2022-10-19 PROCEDURE — G0299 HHS/HOSPICE OF RN EA 15 MIN: HCPCS

## 2022-10-20 ENCOUNTER — OFFICE VISIT (OUTPATIENT)
Dept: FAMILY MEDICINE CLINIC | Facility: CLINIC | Age: 77
End: 2022-10-20

## 2022-10-20 ENCOUNTER — HOME CARE VISIT (OUTPATIENT)
Dept: HOME HEALTH SERVICES | Facility: HOME HEALTHCARE | Age: 77
End: 2022-10-20

## 2022-10-20 ENCOUNTER — TRANSCRIBE ORDERS (OUTPATIENT)
Dept: FAMILY MEDICINE CLINIC | Facility: CLINIC | Age: 77
End: 2022-10-20

## 2022-10-20 VITALS
TEMPERATURE: 97.5 F | BODY MASS INDEX: 37.21 KG/M2 | HEIGHT: 63 IN | DIASTOLIC BLOOD PRESSURE: 76 MMHG | HEART RATE: 69 BPM | OXYGEN SATURATION: 98 % | WEIGHT: 210 LBS | SYSTOLIC BLOOD PRESSURE: 140 MMHG

## 2022-10-20 VITALS
HEART RATE: 75 BPM | SYSTOLIC BLOOD PRESSURE: 110 MMHG | OXYGEN SATURATION: 96 % | DIASTOLIC BLOOD PRESSURE: 62 MMHG | TEMPERATURE: 97.2 F | RESPIRATION RATE: 18 BRPM

## 2022-10-20 VITALS — OXYGEN SATURATION: 97 % | SYSTOLIC BLOOD PRESSURE: 139 MMHG | HEART RATE: 67 BPM | DIASTOLIC BLOOD PRESSURE: 79 MMHG

## 2022-10-20 DIAGNOSIS — M81.0 POST-MENOPAUSAL OSTEOPOROSIS: Primary | ICD-10-CM

## 2022-10-20 DIAGNOSIS — Z11.59 ENCOUNTER FOR HEPATITIS C SCREENING TEST FOR LOW RISK PATIENT: ICD-10-CM

## 2022-10-20 DIAGNOSIS — Z00.00 MEDICARE ANNUAL WELLNESS VISIT, SUBSEQUENT: Primary | ICD-10-CM

## 2022-10-20 DIAGNOSIS — Z13.220 SCREENING CHOLESTEROL LEVEL: ICD-10-CM

## 2022-10-20 DIAGNOSIS — Z23 IMMUNIZATION DUE: ICD-10-CM

## 2022-10-20 DIAGNOSIS — T79.6XXD TRAUMATIC RHABDOMYOLYSIS, SUBSEQUENT ENCOUNTER: ICD-10-CM

## 2022-10-20 DIAGNOSIS — Z12.31 BREAST CANCER SCREENING BY MAMMOGRAM: ICD-10-CM

## 2022-10-20 PROCEDURE — G0152 HHCP-SERV OF OT,EA 15 MIN: HCPCS

## 2022-10-20 PROCEDURE — 96160 PT-FOCUSED HLTH RISK ASSMT: CPT | Performed by: PHYSICIAN ASSISTANT

## 2022-10-20 PROCEDURE — G0439 PPPS, SUBSEQ VISIT: HCPCS | Performed by: PHYSICIAN ASSISTANT

## 2022-10-20 PROCEDURE — G0008 ADMIN INFLUENZA VIRUS VAC: HCPCS | Performed by: PHYSICIAN ASSISTANT

## 2022-10-20 PROCEDURE — 90662 IIV NO PRSV INCREASED AG IM: CPT | Performed by: PHYSICIAN ASSISTANT

## 2022-10-20 PROCEDURE — 1170F FXNL STATUS ASSESSED: CPT | Performed by: PHYSICIAN ASSISTANT

## 2022-10-20 PROCEDURE — 1159F MED LIST DOCD IN RCRD: CPT | Performed by: PHYSICIAN ASSISTANT

## 2022-10-20 NOTE — PROGRESS NOTES
The ABCs of the Annual Wellness Visit  Subsequent Medicare Wellness Visit    Chief Complaint   Patient presents with   • Hip Pain     Follow up on recent hip procedure with Dr. Ibrahim, went to [facility] x8 days and BH admission    • Medicare Wellness-subsequent     Sub medicare wellness      Subjective   History of Present Illness:  Akua Torres is a 77 y.o. female who presents for a Subsequent Medicare Wellness Visit.    Akua Torres presents today for a wellness visit.     The patient has history of bilateral hip replacement and states she recently began having pain in her right hip, made worse with movement. She saw Dr. Singh's assistant for this complaint who wanted to check for an infection with aspiration. She states the procedure caused her to have internal bleeding and an ambulance had to be called. She shares that she was supposed to stop her Coumadin several days before the procedure but nobody had told her. She was first seen at [facility] where she was given morphine and discharged with hydrocodone but her symptoms continued to worsen over the next several days. She attended rehab for 8 days and has followed-up with Dr. Smith, a hip specialist. The patient is scheduled to see Dr. Nelson for her back. She would like an influenza vaccination today.    The following portions of the patient's history were reviewed and   updated as appropriate: allergies, current medications, past family history, past social history, past surgical history and problem list.    Compared to one year ago, the patient feels her physical   health is worse.    Compared to one year ago, the patient feels her mental   health is the same.    Recent Hospitalizations:  This patient has had a [facility] admission record on file within the last 365 days.    Current Medical Providers:  Patient Care Team:  Nathalia Freeman PA-C as PCP - General (Family Medicine)  Leon Hein MD (Inactive) as  Consulting Physician (Neurosurgery)  Perkins, Corinne E, PT as Physical Therapist (Physical Therapy)  Cricket Nettles MD as Consulting Physician (Pain Medicine)    Outpatient Medications Prior to Visit   Medication Sig Dispense Refill   • acetaminophen (TYLENOL) 325 MG tablet  TAKE 2 TABLETS BY MOUTH EVERY 6 HOURS AS NEEDED FOR MILD PAIN  Indications: Fever, Pain     • allopurinol (ZYLOPRIM) 100 MG tablet Take 1 tablet by mouth Daily. For gout 30 tablet 11   • atenolol (TENORMIN) 25 MG tablet Take 1 tablet by mouth 2 (Two) Times a Day. Take 25 mg by mouth 2 (Two) Times a Day. 60 tablet 5   • atorvastatin (LIPITOR) 80 MG tablet Take 1 tablet by mouth Every Night. 30 tablet 11   • cholecalciferol (VITAMIN D3) 25 MCG (1000 UT) tablet Take 2,000 Units by mouth Daily. Indications: Vitamin D Deficiency     • escitalopram (LEXAPRO) 20 MG tablet Take 1 tablet by mouth Daily. For depression and anxiety 30 tablet 11   • hydrOXYzine (ATARAX) 25 MG tablet Take 1 tablet by mouth Every 8 (Eight) Hours As Needed for Anxiety. for anxiety 60 tablet 11   • lisinopril (PRINIVIL,ZESTRIL) 20 MG tablet Take 1 tablet by mouth Daily. For BP Take 20 mg by mouth Daily. 30 tablet 6   • methocarbamol (ROBAXIN) 750 MG tablet Take 1 tablet by mouth Daily As Needed for Muscle Spasms. 30 tablet 11   • pregabalin (LYRICA) 50 MG capsule Take 50 mg by mouth 3 (Three) Times a Day. Indications: Fibromyalgia Syndrome     • propafenone SR (RYTHMOL SR) 225 MG 12 hr capsule Take 1 capsule by mouth Every 12 (Twelve) Hours.     • warfarin (COUMADIN) 5 MG tablet 5 mg, Oral, Daily Warfarin;Target INR: 2 - 3  Indications: Atrial Fibrillation       No facility-administered medications prior to visit.       No opioid medication identified on active medication list. I have reviewed chart for other potential  high risk medication/s and harmful drug interactions in the elderly.          Aspirin is not on active medication list.  Aspirin use is not indicated based  "on review of current medical condition/s. Risk of harm outweighs potential benefits.  .    Patient Active Problem List   Diagnosis   • Degenerative disc disease, lumbar   • Lumbar stenosis with neurogenic claudication   • History of lumbar fusion   • Spondylosis of lumbar region without myelopathy or radiculopathy   • Mild obesity   • Physical deconditioning   • Idiopathic gout   • Anxiety and depression   • Greater trochanteric bursitis of both hips   • Status post total bilateral knee replacement   • Back pain   • Essential hypertension   • Prediabetes   • S/P lumbar spinal fusion   • Renal insufficiency   • Leukocytosis, likely reactive   • Depression   • Arthritis of right hip   • Paroxysmal atrial fibrillation ([facility])   • YUNIOR treated with BiPAP   • 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   • Acute blood loss anemia, mild, asymptomatic   • 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 ([facility])   • Supraventricular tachycardia ([facility])   • Left prefrontal gyrus stroke   • Arthritis of left hip   • Hip pain   • Status post total replacement of left hip   • Hyperlipidemia   • Elevated hemoglobin A1c   • History of stroke   • Acute postoperative pain   • Nontraumatic complete tear of right rotator cuff   • Traumatic rhabdomyolysis, initial encounter ([facility])   • Hematoma of right thigh   • Pain of right lower extremity   • Anemia   • Fibromyalgia     Advance Care Planning  has an advanced directive - a copy has been provided and is in file    Review of Systems   Constitutional: Positive for activity change.   Musculoskeletal: Positive for arthralgias and myalgias. Negative for gait problem and joint swelling.   Skin: Negative.    Neurological: Negative for weakness and numbness.   Hematological: Bruises/bleeds easily.         Objective      Vitals:    10/20/22 1512   BP: 140/76   Pulse: 69   Temp: 97.5 °F (36.4 °C)   SpO2: 98%   Weight: 95.3 kg (210 lb)   Height: 160 cm (63\")   PainSc:   6     BMI " Readings from Last 1 Encounters:   10/20/22 37.20 kg/m²   BMI is above normal parameters. Recommendations include: exercise counseling    Does the patient have evidence of cognitive impairment? No    Physical Exam  Constitutional:       General: She is not in acute distress.     Appearance: Normal appearance. She is well-developed. She is not ill-appearing, toxic-appearing or diaphoretic.   HENT:      Head: Normocephalic and atraumatic.   Eyes:      Conjunctiva/sclera: Conjunctivae normal.   Pulmonary:      Effort: Pulmonary effort is normal. No respiratory distress.   Skin:     General: Skin is dry.      Coloration: Skin is not pale.      Findings: Bruising ( right thigh) present. No erythema or rash.   Neurological:      Mental Status: She is alert and oriented to person, place, and time.      Coordination: Coordination normal.      Gait: Gait abnormal ( with cane).   Psychiatric:         Attention and Perception: She is attentive.         Mood and Affect: Mood normal.         Speech: Speech normal.         Behavior: Behavior normal.         Thought Content: Thought content normal.         Judgment: Judgment normal.                 HEALTH RISK ASSESSMENT    Smoking Status:  Social History     Tobacco Use   Smoking Status Never   Smokeless Tobacco Never     Alcohol Consumption:  Social History     Substance and Sexual Activity   Alcohol Use No     Fall Risk Screen:    STEADI Fall Risk Assessment was completed, and patient is at HIGH risk for falls. Assessment completed on:10/20/2022    Depression Screening:  PHQ-2/PHQ-9 Depression Screening 10/20/2022   Retired Total Score -   Little Interest or Pleasure in Doing Things 0-->not at all   Feeling Down, Depressed or Hopeless 1-->several days   PHQ-9: Brief Depression Severity Measure Score 1       Health Habits and Functional and Cognitive Screening:  Functional & Cognitive Status 10/20/2022   Do you have difficulty preparing food and eating? No   Do you have  difficulty bathing yourself, getting dressed or grooming yourself? No   Do you have difficulty using the toilet? No   Do you have difficulty moving around from place to place? Yes   Do you have trouble with steps or getting out of a bed or a chair? Yes   Current Diet Well Balanced Diet   Dental Exam Up to date   Eye Exam Up to date   Exercise (times per week) 0 times per week   Current Exercises Include No Regular Exercise   Current Exercise Activities Include -   Do you need help using the phone?  No   Are you deaf or do you have serious difficulty hearing?  No   Do you need help with transportation? No   Do you need help shopping? No   Do you need help preparing meals?  No   Do you need help with housework?  No   Do you need help with laundry? No   Do you need help taking your medications? No   Do you need help managing money? No   Do you ever drive or ride in a car without wearing a seat belt? No   Have you felt unusual stress, anger or loneliness in the last month? No   Who do you live with? Spouse   If you need help, do you have trouble finding someone available to you? -   Have you been bothered in the last four weeks by sexual problems? No   Do you have difficulty concentrating, remembering or making decisions? No       Age-appropriate Screening Schedule:  Refer to the list below for future screening recommendations based on patient's age, sex and/or medical conditions. Orders for these recommended tests are listed in the plan section. The patient has been provided with a written plan.    Health Maintenance   Topic Date Due   • DXA SCAN  Never done   • MAMMOGRAM  09/01/2019   • LIPID PANEL  09/12/2020   • INFLUENZA VACCINE  08/01/2022   • ZOSTER VACCINE (1 of 2) 10/20/2022 (Originally 4/30/1994)   • TDAP/TD VACCINES (2 - Td or Tdap) 10/20/2023 (Originally 4/20/2021)              Assessment & Plan     CMS Preventative Services Quick Reference  Risk Factors Identified During Encounter  Immunizations  Discussed/Encouraged (specific Immunizations; Influenza  The above risks/problems have been discussed with the patient.  Follow up actions/plans if indicated are seen below in the Assessment/Plan Section.  Pertinent information has been shared with the patient in the After Visit Summary.    Diagnoses and all orders for this visit:    1. Medicare annual wellness visit, subsequent (Primary)    2. Traumatic rhabdomyolysis, subsequent encounter  -     Basic metabolic panel; Future  -     CBC (No Diff); Future    3. Screening cholesterol level  -     Lipid Panel; Future    4. Encounter for hepatitis C screening test for low risk patient  -     Hepatitis C Antibody; Future    5. Breast cancer screening by mammogram  -     Mammo Screening Digital Tomosynthesis Bilateral With CAD; Future        The patient will receive her influenza vaccine today. She is due for a cholesterol check. An order for a mammogram has been placed and they will call her to schedule an appointment.    Follow Up:  Return in about 1 year (around 10/20/2023) for Medicare Wellness.     An After Visit Summary and PPPS were given to the patient.    Transcribed from ambient dictation for Nathalia Freeman PA-C by Patrick Nichols.  10/20/22   16:42 EDT    Patient or patient representative verbalized consent to the visit recording.  I have personally performed the services described in this document as transcribed by the above individual, and it is both accurate and complete.  Nathalia Freeman PA-C  10/21/2022  09:16 EDT

## 2022-10-20 NOTE — HOME HEALTH
SOC Note: Patient reports having a procedure at [facility] orthopedic and had fluid drawn out of right hip to see if there was an infection. From that procedure patient reports that right knee and leg started swelling on the next day. By Saturday, she decided to go to the hospital at [facility]. She reports that she recieved only morphine for pain and released. Patient stated she got worse after 2 days then EMS and was taken to [facility]. Patient reported she stayed in hospital for a 1 week then transition to [facility] for 8 days then released on  10.17.2022. Patient is alert and oriented x 4, responisve and mood stable. VSS. Has AFIB without shortness of breath or chest pain. Patient has clear breath sounds. Patient is eating 3 meals a day and drinking adequately. Patient abdomen is soft, non-tender, and non-distended. Patient is continent of bladder and bowel. Patient has bruising to posterior right knee and right inner thigh. Patient has no skin breakdown to buttocks. Patient has no edema to BLE. Patient saw cardiologist earlier today and they checked her INR. The result was 2.1 and patient is to continue on 5 mg of coumadin daily. Patient reported that she spoke with nurse at office to express to doctor to switch her back to her former regimen without coumadin. SN informed her that INR could be checked at visits while under Home Care.    Home Health ordered for: disciplines SN/PT/OT    Reason for Hosp/Primary Dx/Co-morbidities: Essential hypertension, Renal insufficieny, Paroxysmal atrial fibrillation, Traumatic rhabdomyolysis, Hematoma of right thigh, Pain of right lower extremity, Anemia and Fibromyalgia.    Focus of Care: AFIB    Current Functional status/mobility/DME: Assist x 1 with walker    HB status/Living Arrangements: lives with     Skin Integrity/wound status: C/D/I    Code Status: DNR    Fall Risk: high    POC confirmed with Akua Torres on date 10.19.2022

## 2022-10-20 NOTE — HOME HEALTH
76 y/o F seen today for OT evaluation and has no further skilled OT needs at this time secondary to pt. has returned to her baseline level of independence with all ADLs/IADLs at home. Pt. demonstrated understanding of basic home safety and has all necessary DME needs at this time.

## 2022-10-21 NOTE — CASE COMMUNICATION
Patient admitted to [facility] on 10.19.2022.    SOC Note: Patient reports having a procedure at [facility] and had fluid drawn out of right hip to see if there was an infection. From that procedure patient reports that right knee and leg started swelling on the next day. By Saturday, she decided to [place] [facility] She reports that she recieved only morphine for pain and released. Patient stated she got worse  after 2 days then EMS and was taken to [facility]. Patient reported she stayed in hospital for a 1 week then transition to [facility] for 8 days then released on  10.17.2022. Patient is alert and oriented x 4, responisve and mood stable. VSS. Has AFIB without shortness of breath or chest pain. Patient has clear breath sounds. Patient is eating 3 meals a day and drinking adequately. Patient abdomen is soft, non-tender, and non-distended . Patient is continent of bladder and bowel. Patient has bruising to posterior right knee and right inner thigh. Patient has no skin breakdown to buttocks. Patient has no edema to BLE. Patient saw cardiologist earlier today and they checked her INR. The result was 2.1 and patient is to continue on 5 mg of coumadin daily. Patient reported that she spoke with nurse at office to express to doctor to switch her back to her former regimen wi [facility] coumadin. SN informed her that INR could be checked at visits while under Home Care.    Home Health ordered for: disciplines SN/PT/OT    Reason for Hosp/Primary Dx/Co-morbidities: Essential hypertension, Renal insufficieny, Paroxysmal atrial fibrillation, Traumatic rhabdomyolysis, Hematoma of right thigh, Pain of right lower extremity, Anemia and Fibromyalgia.    Focus of Care: AFIB    Current Functional status/mobility/DME: Narcisa t x 1 with walker    HB status/Living Arrangements: lives with     Skin Integrity/wound status: C/D/I    Code Status: DNR    Fall Risk: high

## 2022-10-24 ENCOUNTER — HOME CARE VISIT (OUTPATIENT)
Dept: HOME HEALTH SERVICES | Facility: HOME HEALTHCARE | Age: 77
End: 2022-10-24

## 2022-10-24 VITALS
HEART RATE: 72 BPM | DIASTOLIC BLOOD PRESSURE: 67 MMHG | OXYGEN SATURATION: 97 % | RESPIRATION RATE: 18 BRPM | TEMPERATURE: 98.1 F | SYSTOLIC BLOOD PRESSURE: 124 MMHG

## 2022-10-24 PROCEDURE — G0151 HHCP-SERV OF PT,EA 15 MIN: HCPCS

## 2022-10-24 NOTE — HOME HEALTH
Patient initially treated for R hip pain following a procedure to test for infection.  Progressively worsened to the point patient was unable to ambulate.  She began bleeding internally secondary to blood thinner, kept in hospital x 1 week.  She states she began to get progessively weaker.  She states she did go to [facility] for rehab x 8 days.

## 2022-10-24 NOTE — CASE COMMUNICATION
Patient seen for initial evaluation only this date.  She declines further services and states she would like to continue on her own as she has a membership to her local [employer].

## 2022-10-28 ENCOUNTER — HOME CARE VISIT (OUTPATIENT)
Dept: HOME HEALTH SERVICES | Facility: HOME HEALTHCARE | Age: 77
End: 2022-10-28

## 2022-10-28 VITALS
RESPIRATION RATE: 20 BRPM | OXYGEN SATURATION: 98 % | DIASTOLIC BLOOD PRESSURE: 70 MMHG | SYSTOLIC BLOOD PRESSURE: 120 MMHG | HEART RATE: 74 BPM

## 2022-10-28 PROCEDURE — G0299 HHS/HOSPICE OF RN EA 15 MIN: HCPCS

## 2022-10-28 PROCEDURE — G0180 MD CERTIFICATION HHA PATIENT: HCPCS | Performed by: PHYSICIAN ASSISTANT

## 2022-10-28 NOTE — HOME HEALTH
Routine Visit Note: Patient is alert and oriented x 4, responsive, and mood stable. VSS. Has clear breath sounds and AFIB stable at visit. Patient reports carrying on with her daily activities without issues. Patient reports eating and drinking w/o issues. Patient reports being continent of bladder and bowel. Patient has no skin breakdown.    Skill/education provided:  Instructed patient on emergency plan response, Instructed on medication purpose, dose, frequency, and side effects. Patient reports no s/s of bleeding with anticoagulant. Instructed on pressure prevention measures. Instructed on AFIB disease process. Instructed on depression and positve coping mechanisms. Instructed on fall prevention and transfer safety.    Patient/caregiver response: Patient verbalized understanding    Plan for next visit: CP assessment, Agency discharge    Other pertinent info: n/a

## 2022-11-01 ENCOUNTER — TELEPHONE (OUTPATIENT)
Dept: FAMILY MEDICINE CLINIC | Facility: CLINIC | Age: 77
End: 2022-11-01

## 2022-11-01 NOTE — TELEPHONE ENCOUNTER
Caller: OPAL    Relationship: JUAN Ramirez call back number: [phone]    What medications are you currently taking:   Current Outpatient Medications on File Prior to Visit   Medication Sig Dispense Refill   • acetaminophen (TYLENOL) 325 MG tablet  TAKE 2 TABLETS BY MOUTH EVERY 6 HOURS AS NEEDED FOR MILD PAIN  Indications: Fever, Pain     • allopurinol (ZYLOPRIM) 100 MG tablet Take 1 tablet by mouth Daily. For gout 30 tablet 11   • atenolol (TENORMIN) 25 MG tablet Take 1 tablet by mouth 2 (Two) Times a Day. Take 25 mg by mouth 2 (Two) Times a Day. 60 tablet 5   • atorvastatin (LIPITOR) 80 MG tablet Take 1 tablet by mouth Every Night. 30 tablet 11   • cholecalciferol (VITAMIN D3) 25 MCG (1000 UT) tablet Take 2,000 Units by mouth Daily. Indications: Vitamin D Deficiency     • escitalopram (LEXAPRO) 20 MG tablet Take 1 tablet by mouth Daily. For depression and anxiety 30 tablet 11   • hydrOXYzine (ATARAX) 25 MG tablet Take 1 tablet by mouth Every 8 (Eight) Hours As Needed for Anxiety. for anxiety 60 tablet 11   • lisinopril (PRINIVIL,ZESTRIL) 20 MG tablet Take 1 tablet by mouth Daily. For BP Take 20 mg by mouth Daily. 30 tablet 6   • methocarbamol (ROBAXIN) 750 MG tablet Take 1 tablet by mouth Daily As Needed for Muscle Spasms. 30 tablet 11   • pregabalin (LYRICA) 50 MG capsule Take 50 mg by mouth 3 (Three) Times a Day. Indications: Fibromyalgia Syndrome     • propafenone SR (RYTHMOL SR) 225 MG 12 hr capsule Take 1 capsule by mouth Every 12 (Twelve) Hours.     • warfarin (COUMADIN) 5 MG tablet 5 mg, Oral, Daily Warfarin;Target INR: 2 - 3  Indications: Atrial Fibrillation       No current facility-administered medications on file prior to visit.          Which medication are you concerned about: HYDROXIZINE AND METHOCARBAMOL    Who prescribed you this medication: ILIR BLUES    What are your concerns: HYDROXIZINE IS HIGH RISK FOR ANYONE OVER THE AGE OF 65 AND RECOMMENDATION IS BUSPIRONE. AND  METHOCARBAMOL INCREASED RISK FOR SIDE EFFECTS FOR PEOPLE OVER 65 RECOMMENDATION IS BACLOFEN OR TIZANIDINE.  PAPERWORK FOR THESE WERE FAXED ON 10/28/2022. PATIENT IS AWARE. PLEASE ADVISE

## 2022-11-01 NOTE — TELEPHONE ENCOUNTER
Which medication are you concerned about: HYDROXIZINE AND METHOCARBAMOL     Who prescribed you this medication: ILIR MONGE     What are your concerns: HYDROXIZINE IS HIGH RISK FOR ANYONE OVER THE AGE OF 65 AND RECOMMENDATION IS BUSPIRONE. AND METHOCARBAMOL INCREASED RISK FOR SIDE EFFECTS FOR PEOPLE OVER 65 RECOMMENDATION IS BACLOFEN OR TIZANIDINE.  PAPERWORK FOR THESE WERE FAXED ON 10/28/2022. PATIENT IS AWARE. PLEASE ADVISE

## 2022-11-02 ENCOUNTER — HOME CARE VISIT (OUTPATIENT)
Dept: HOME HEALTH SERVICES | Facility: HOME HEALTHCARE | Age: 77
End: 2022-11-02

## 2022-11-02 VITALS
SYSTOLIC BLOOD PRESSURE: 140 MMHG | OXYGEN SATURATION: 96 % | TEMPERATURE: 97.3 F | HEART RATE: 68 BPM | RESPIRATION RATE: 20 BRPM | DIASTOLIC BLOOD PRESSURE: 88 MMHG

## 2022-11-02 PROCEDURE — G0299 HHS/HOSPICE OF RN EA 15 MIN: HCPCS

## 2022-11-04 NOTE — HOME HEALTH
Patient opted to discontinue home care services as well as patient had resume driving to the [facility] for exercises. Patient is no longer homebound.

## 2022-11-04 NOTE — CASE COMMUNICATION
Patient discontinued home care as of 11.2.2022. Patient has an addional for the following week but will not need as she is no longer homebound and has resumed driving and going to the [facility].    Patient discharged with all goals met.

## 2022-11-07 RX ORDER — LISINOPRIL 20 MG/1
TABLET ORAL
Qty: 30 TABLET | Refills: 6 | Status: ON HOLD | OUTPATIENT
Start: 2022-11-07 | End: 2023-01-30

## 2022-11-07 RX ORDER — ATENOLOL 25 MG/1
TABLET ORAL
Qty: 60 TABLET | Refills: 5 | Status: SHIPPED | OUTPATIENT
Start: 2022-11-07

## 2022-11-14 ENCOUNTER — LAB (OUTPATIENT)
Dept: LAB | Facility: HOSPITAL | Age: 77
End: 2022-11-14

## 2022-11-14 DIAGNOSIS — Z13.220 SCREENING CHOLESTEROL LEVEL: ICD-10-CM

## 2022-11-14 DIAGNOSIS — Z11.59 ENCOUNTER FOR HEPATITIS C SCREENING TEST FOR LOW RISK PATIENT: ICD-10-CM

## 2022-11-14 DIAGNOSIS — T79.6XXD TRAUMATIC RHABDOMYOLYSIS, SUBSEQUENT ENCOUNTER: ICD-10-CM

## 2022-11-15 DIAGNOSIS — N18.31 STAGE 3A CHRONIC KIDNEY DISEASE: Primary | ICD-10-CM

## 2022-11-18 LAB
CHOLEST SERPL-MCNC: 147 MG/DL (ref 100–199)
HCT VFR BLD AUTO: NORMAL %
HCV AB S/CO SERPL IA: <0.1 S/CO RATIO (ref 0–0.9)
HDLC SERPL-MCNC: 35 MG/DL
HGB BLD-MCNC: NORMAL G/DL
LDLC SERPL CALC-MCNC: 72 MG/DL (ref 0–99)
NRBC BLD AUTO-RTO: NORMAL %
PLATELET # BLD AUTO: NORMAL 10*3/UL
RBC # BLD AUTO: NORMAL 10*6/UL
SPECIMEN STATUS: NORMAL
TRIGL SERPL-MCNC: 241 MG/DL (ref 0–149)
VLDLC SERPL CALC-MCNC: 40 MG/DL (ref 5–40)
WBC # BLD AUTO: NORMAL X10E3/UL

## 2022-12-08 NOTE — TELEPHONE ENCOUNTER
APPT SCHEDULED FOR MONDAY   Simponi Pregnancy And Lactation Text: The risk during pregnancy and breastfeeding is uncertain with this medication.

## 2022-12-19 ENCOUNTER — APPOINTMENT (OUTPATIENT)
Dept: OTHER | Facility: HOSPITAL | Age: 77
End: 2022-12-19

## 2022-12-19 ENCOUNTER — HOSPITAL ENCOUNTER (OUTPATIENT)
Dept: BONE DENSITY | Facility: HOSPITAL | Age: 77
Discharge: HOME | End: 2022-12-19

## 2022-12-19 ENCOUNTER — HOSPITAL ENCOUNTER (OUTPATIENT)
Dept: MAMMOGRAPHY | Facility: HOSPITAL | Age: 77
Discharge: HOME | End: 2022-12-19

## 2022-12-19 DIAGNOSIS — Z12.31 BREAST CANCER SCREENING BY MAMMOGRAM: ICD-10-CM

## 2022-12-19 PROCEDURE — 77063 BREAST TOMOSYNTHESIS BI: CPT

## 2022-12-19 PROCEDURE — 77067 SCR MAMMO BI INCL CAD: CPT

## 2022-12-19 PROCEDURE — 77080 DXA BONE DENSITY AXIAL: CPT

## 2022-12-19 PROCEDURE — 77063 BREAST TOMOSYNTHESIS BI: CPT | Performed by: RADIOLOGY

## 2022-12-19 PROCEDURE — 77067 SCR MAMMO BI INCL CAD: CPT | Performed by: RADIOLOGY

## 2023-01-27 ENCOUNTER — TELEPHONE (OUTPATIENT)
Dept: FAMILY MEDICINE CLINIC | Facility: CLINIC | Age: 78
End: 2023-01-27
Payer: MEDICARE

## 2023-01-27 NOTE — TELEPHONE ENCOUNTER
Pt requesting a referral to a neurologist at [place] by the name of Roma Trujillo MD on [facility] for her swelling legs.

## 2023-01-29 ENCOUNTER — APPOINTMENT (OUTPATIENT)
Dept: GENERAL RADIOLOGY | Facility: HOSPITAL | Age: 78
End: 2023-01-29
Payer: MEDICARE

## 2023-01-29 ENCOUNTER — HOSPITAL ENCOUNTER (OUTPATIENT)
Facility: HOSPITAL | Age: 78
Setting detail: OBSERVATION
Discharge: HOME HEALTH | End: 2023-02-04
Attending: STUDENT IN AN ORGANIZED HEALTH CARE EDUCATION/TRAINING PROGRAM | Admitting: STUDENT IN AN ORGANIZED HEALTH CARE EDUCATION/TRAINING PROGRAM
Payer: MEDICARE

## 2023-01-29 DIAGNOSIS — I48.0 PAROXYSMAL ATRIAL FIBRILLATION: ICD-10-CM

## 2023-01-29 DIAGNOSIS — E66.9 MILD OBESITY: ICD-10-CM

## 2023-01-29 DIAGNOSIS — R53.1 WEAKNESS: ICD-10-CM

## 2023-01-29 DIAGNOSIS — U07.1 COVID-19 VIRUS DETECTED: ICD-10-CM

## 2023-01-29 DIAGNOSIS — R53.81 PHYSICAL DECONDITIONING: ICD-10-CM

## 2023-01-29 DIAGNOSIS — D62 ANEMIA DUE TO ACUTE BLOOD LOSS: ICD-10-CM

## 2023-01-29 DIAGNOSIS — M25.559 HIP PAIN: ICD-10-CM

## 2023-01-29 DIAGNOSIS — Z98.1 HISTORY OF LUMBAR FUSION: ICD-10-CM

## 2023-01-29 DIAGNOSIS — Z86.73 HISTORY OF CVA (CEREBROVASCULAR ACCIDENT): ICD-10-CM

## 2023-01-29 DIAGNOSIS — R79.1 SUBTHERAPEUTIC INTERNATIONAL NORMALIZED RATIO (INR): ICD-10-CM

## 2023-01-29 DIAGNOSIS — Z86.73 HISTORY OF STROKE: ICD-10-CM

## 2023-01-29 DIAGNOSIS — M79.7 FIBROMYALGIA: ICD-10-CM

## 2023-01-29 DIAGNOSIS — U07.1 COVID-19: Primary | ICD-10-CM

## 2023-01-29 DIAGNOSIS — D69.59 THROMBOCYTOPENIA DUE TO COVID-19 VIRUS: ICD-10-CM

## 2023-01-29 DIAGNOSIS — I47.1 SUPRAVENTRICULAR TACHYCARDIA: ICD-10-CM

## 2023-01-29 DIAGNOSIS — M16.12 ARTHRITIS OF LEFT HIP: ICD-10-CM

## 2023-01-29 DIAGNOSIS — G47.33 OSA TREATED WITH BIPAP: ICD-10-CM

## 2023-01-29 DIAGNOSIS — G89.18 ACUTE POSTOPERATIVE PAIN: ICD-10-CM

## 2023-01-29 DIAGNOSIS — Z96.642 STATUS POST TOTAL REPLACEMENT OF LEFT HIP: ICD-10-CM

## 2023-01-29 DIAGNOSIS — R50.9 FEVER AND CHILLS: ICD-10-CM

## 2023-01-29 DIAGNOSIS — I63.232 STENOSIS OF LEFT INTERNAL CAROTID ARTERY WITH CEREBRAL INFARCTION: ICD-10-CM

## 2023-01-29 DIAGNOSIS — E86.0 DEHYDRATION: ICD-10-CM

## 2023-01-29 DIAGNOSIS — M70.61 GREATER TROCHANTERIC BURSITIS OF BOTH HIPS: ICD-10-CM

## 2023-01-29 DIAGNOSIS — M75.121 NONTRAUMATIC COMPLETE TEAR OF RIGHT ROTATOR CUFF: ICD-10-CM

## 2023-01-29 DIAGNOSIS — N28.9 RENAL INSUFFICIENCY: ICD-10-CM

## 2023-01-29 DIAGNOSIS — M70.62 GREATER TROCHANTERIC BURSITIS OF BOTH HIPS: ICD-10-CM

## 2023-01-29 DIAGNOSIS — I63.9 ACUTE CVA (CEREBROVASCULAR ACCIDENT): ICD-10-CM

## 2023-01-29 DIAGNOSIS — Z96.641 STATUS POST TOTAL REPLACEMENT OF RIGHT HIP: ICD-10-CM

## 2023-01-29 DIAGNOSIS — R73.03 PREDIABETES: ICD-10-CM

## 2023-01-29 DIAGNOSIS — I10 ESSENTIAL HYPERTENSION: ICD-10-CM

## 2023-01-29 DIAGNOSIS — M16.11 ARTHRITIS OF RIGHT HIP: ICD-10-CM

## 2023-01-29 DIAGNOSIS — N17.9 AKI (ACUTE KIDNEY INJURY): ICD-10-CM

## 2023-01-29 DIAGNOSIS — M79.604 PAIN OF RIGHT LOWER EXTREMITY: ICD-10-CM

## 2023-01-29 DIAGNOSIS — D62 ACUTE BLOOD LOSS ANEMIA: ICD-10-CM

## 2023-01-29 DIAGNOSIS — T79.6XXA TRAUMATIC RHABDOMYOLYSIS, INITIAL ENCOUNTER: ICD-10-CM

## 2023-01-29 DIAGNOSIS — M51.36 DEGENERATIVE DISC DISEASE, LUMBAR: ICD-10-CM

## 2023-01-29 DIAGNOSIS — R73.09 ELEVATED HEMOGLOBIN A1C: ICD-10-CM

## 2023-01-29 DIAGNOSIS — S70.11XA HEMATOMA OF RIGHT THIGH, INITIAL ENCOUNTER: ICD-10-CM

## 2023-01-29 DIAGNOSIS — E78.2 MIXED HYPERLIPIDEMIA: ICD-10-CM

## 2023-01-29 DIAGNOSIS — R53.1 RIGHT SIDED WEAKNESS: ICD-10-CM

## 2023-01-29 DIAGNOSIS — U07.1 THROMBOCYTOPENIA DUE TO COVID-19 VIRUS: ICD-10-CM

## 2023-01-29 PROCEDURE — 93005 ELECTROCARDIOGRAM TRACING: CPT | Performed by: STUDENT IN AN ORGANIZED HEALTH CARE EDUCATION/TRAINING PROGRAM

## 2023-01-29 PROCEDURE — 81003 URINALYSIS AUTO W/O SCOPE: CPT | Performed by: STUDENT IN AN ORGANIZED HEALTH CARE EDUCATION/TRAINING PROGRAM

## 2023-01-29 PROCEDURE — 99285 EMERGENCY DEPT VISIT HI MDM: CPT

## 2023-01-29 PROCEDURE — P9612 CATHETERIZE FOR URINE SPEC: HCPCS

## 2023-01-29 PROCEDURE — G0378 HOSPITAL OBSERVATION PER HR: HCPCS

## 2023-01-29 PROCEDURE — 71045 X-RAY EXAM CHEST 1 VIEW: CPT

## 2023-01-29 RX ORDER — METRONIDAZOLE 500 MG/100ML
500 INJECTION, SOLUTION INTRAVENOUS ONCE
Status: COMPLETED | OUTPATIENT
Start: 2023-01-29 | End: 2023-01-30

## 2023-01-29 RX ORDER — SODIUM CHLORIDE 0.9 % (FLUSH) 0.9 %
10 SYRINGE (ML) INJECTION AS NEEDED
Status: DISCONTINUED | OUTPATIENT
Start: 2023-01-29 | End: 2023-02-04 | Stop reason: HOSPADM

## 2023-01-29 RX ORDER — ACETAMINOPHEN 500 MG
1000 TABLET ORAL ONCE
Status: COMPLETED | OUTPATIENT
Start: 2023-01-29 | End: 2023-01-30

## 2023-01-30 ENCOUNTER — APPOINTMENT (OUTPATIENT)
Dept: CT IMAGING | Facility: HOSPITAL | Age: 78
End: 2023-01-30
Payer: MEDICARE

## 2023-01-30 ENCOUNTER — APPOINTMENT (OUTPATIENT)
Dept: MRI IMAGING | Facility: HOSPITAL | Age: 78
End: 2023-01-30
Payer: MEDICARE

## 2023-01-30 PROBLEM — U07.1 COVID-19 VIRUS DETECTED: Status: ACTIVE | Noted: 2023-01-30

## 2023-01-30 PROBLEM — U07.1 COVID-19: Status: ACTIVE | Noted: 2023-01-30

## 2023-01-30 PROBLEM — R53.1 WEAKNESS: Status: ACTIVE | Noted: 2023-01-30

## 2023-01-30 LAB
ALBUMIN SERPL-MCNC: 3.7 G/DL (ref 3.5–5.2)
ALBUMIN SERPL-MCNC: 3.9 G/DL (ref 3.5–5.2)
ALBUMIN/GLOB SERPL: 1.3 G/DL
ALP SERPL-CCNC: 103 U/L (ref 39–117)
ALP SERPL-CCNC: 107 U/L (ref 39–117)
ALT SERPL W P-5'-P-CCNC: 10 U/L (ref 1–33)
ALT SERPL W P-5'-P-CCNC: 12 U/L (ref 1–33)
ANION GAP SERPL CALCULATED.3IONS-SCNC: 9 MMOL/L (ref 5–15)
APTT PPP: 30.6 SECONDS (ref 22–39)
AST SERPL-CCNC: 17 U/L (ref 1–32)
AST SERPL-CCNC: 21 U/L (ref 1–32)
B PARAPERT DNA SPEC QL NAA+PROBE: NOT DETECTED
B PERT DNA SPEC QL NAA+PROBE: NOT DETECTED
BASOPHILS # BLD AUTO: 0.02 10*3/MM3 (ref 0–0.2)
BASOPHILS NFR BLD AUTO: 0.2 % (ref 0–1.5)
BILIRUB CONJ SERPL-MCNC: 0.2 MG/DL (ref 0–0.3)
BILIRUB INDIRECT SERPL-MCNC: 0.8 MG/DL
BILIRUB SERPL-MCNC: 1 MG/DL (ref 0–1.2)
BILIRUB SERPL-MCNC: 1.1 MG/DL (ref 0–1.2)
BILIRUB UR QL STRIP: NEGATIVE
BUN SERPL-MCNC: 29 MG/DL (ref 8–23)
BUN/CREAT SERPL: 20.6 (ref 7–25)
C PNEUM DNA NPH QL NAA+NON-PROBE: NOT DETECTED
CALCIUM SPEC-SCNC: 8.5 MG/DL (ref 8.6–10.5)
CHLORIDE SERPL-SCNC: 105 MMOL/L (ref 98–107)
CHOLEST SERPL-MCNC: 106 MG/DL (ref 0–200)
CLARITY UR: CLEAR
CO2 SERPL-SCNC: 23 MMOL/L (ref 22–29)
COLOR UR: YELLOW
CREAT SERPL-MCNC: 1.19 MG/DL (ref 0.57–1)
CREAT SERPL-MCNC: 1.41 MG/DL (ref 0.57–1)
CRP SERPL-MCNC: 5.82 MG/DL (ref 0–0.5)
D DIMER PPP FEU-MCNC: 0.36 MCGFEU/ML (ref 0–0.78)
D-LACTATE SERPL-SCNC: 0.8 MMOL/L (ref 0.5–2)
DEPRECATED RDW RBC AUTO: 52.4 FL (ref 37–54)
EGFRCR SERPLBLD CKD-EPI 2021: 38.3 ML/MIN/1.73
EGFRCR SERPLBLD CKD-EPI 2021: 46.9 ML/MIN/1.73
EOSINOPHIL # BLD AUTO: 0.03 10*3/MM3 (ref 0–0.4)
EOSINOPHIL NFR BLD AUTO: 0.3 % (ref 0.3–6.2)
ERYTHROCYTE [DISTWIDTH] IN BLOOD BY AUTOMATED COUNT: 15.1 % (ref 12.3–15.4)
FERRITIN SERPL-MCNC: 189.8 NG/ML (ref 13–150)
FIBRINOGEN PPP-MCNC: 544 MG/DL (ref 220–470)
FLUAV SUBTYP SPEC NAA+PROBE: NOT DETECTED
FLUBV RNA ISLT QL NAA+PROBE: NOT DETECTED
GLOBULIN UR ELPH-MCNC: 2.8 GM/DL
GLUCOSE BLDC GLUCOMTR-MCNC: 65 MG/DL (ref 70–130)
GLUCOSE BLDC GLUCOMTR-MCNC: 91 MG/DL (ref 70–130)
GLUCOSE BLDC GLUCOMTR-MCNC: 97 MG/DL (ref 70–130)
GLUCOSE SERPL-MCNC: 107 MG/DL (ref 65–99)
GLUCOSE UR STRIP-MCNC: NEGATIVE MG/DL
HADV DNA SPEC NAA+PROBE: NOT DETECTED
HBA1C MFR BLD: 5.9 % (ref 4.8–5.6)
HCOV 229E RNA SPEC QL NAA+PROBE: NOT DETECTED
HCOV HKU1 RNA SPEC QL NAA+PROBE: NOT DETECTED
HCOV NL63 RNA SPEC QL NAA+PROBE: NOT DETECTED
HCOV OC43 RNA SPEC QL NAA+PROBE: NOT DETECTED
HCT VFR BLD AUTO: 43.7 % (ref 34–46.6)
HDLC SERPL-MCNC: 41 MG/DL (ref 40–60)
HGB BLD-MCNC: 13.8 G/DL (ref 12–15.9)
HGB UR QL STRIP.AUTO: NEGATIVE
HMPV RNA NPH QL NAA+NON-PROBE: NOT DETECTED
HOLD SPECIMEN: NORMAL
HPIV1 RNA ISLT QL NAA+PROBE: NOT DETECTED
HPIV2 RNA SPEC QL NAA+PROBE: NOT DETECTED
HPIV3 RNA NPH QL NAA+PROBE: NOT DETECTED
HPIV4 P GENE NPH QL NAA+PROBE: NOT DETECTED
IMM GRANULOCYTES # BLD AUTO: 0.03 10*3/MM3 (ref 0–0.05)
IMM GRANULOCYTES NFR BLD AUTO: 0.3 % (ref 0–0.5)
INR PPP: 1.31 (ref 0.84–1.13)
INR PPP: 1.33 (ref 0.84–1.13)
KETONES UR QL STRIP: NEGATIVE
LDH SERPL-CCNC: 189 U/L (ref 135–214)
LDLC SERPL CALC-MCNC: 48 MG/DL (ref 0–100)
LDLC/HDLC SERPL: 1.17 {RATIO}
LEUKOCYTE ESTERASE UR QL STRIP.AUTO: NEGATIVE
LIPASE SERPL-CCNC: 68 U/L (ref 13–60)
LYMPHOCYTES # BLD AUTO: 1.22 10*3/MM3 (ref 0.7–3.1)
LYMPHOCYTES NFR BLD AUTO: 11.7 % (ref 19.6–45.3)
M PNEUMO IGG SER IA-ACNC: NOT DETECTED
MAGNESIUM SERPL-MCNC: 2 MG/DL (ref 1.6–2.4)
MCH RBC QN AUTO: 30 PG (ref 26.6–33)
MCHC RBC AUTO-ENTMCNC: 31.6 G/DL (ref 31.5–35.7)
MCV RBC AUTO: 95 FL (ref 79–97)
MONOCYTES # BLD AUTO: 1.05 10*3/MM3 (ref 0.1–0.9)
MONOCYTES NFR BLD AUTO: 10.1 % (ref 5–12)
NEUTROPHILS NFR BLD AUTO: 77.4 % (ref 42.7–76)
NEUTROPHILS NFR BLD AUTO: 8.06 10*3/MM3 (ref 1.7–7)
NITRITE UR QL STRIP: NEGATIVE
NRBC BLD AUTO-RTO: 0 /100 WBC (ref 0–0.2)
PH UR STRIP.AUTO: 6.5 [PH] (ref 5–8)
PHOSPHATE SERPL-MCNC: 2.6 MG/DL (ref 2.5–4.5)
PLATELET # BLD AUTO: 145 10*3/MM3 (ref 140–450)
PMV BLD AUTO: 10.5 FL (ref 6–12)
POTASSIUM SERPL-SCNC: 5.1 MMOL/L (ref 3.5–5.2)
PROCALCITONIN SERPL-MCNC: 0.24 NG/ML (ref 0–0.25)
PROT SERPL-MCNC: 6.5 G/DL (ref 6–8.5)
PROT SERPL-MCNC: 6.6 G/DL (ref 6–8.5)
PROT UR QL STRIP: ABNORMAL
PROTHROMBIN TIME: 16.2 SECONDS (ref 11.4–14.4)
PROTHROMBIN TIME: 16.5 SECONDS (ref 11.4–14.4)
RBC # BLD AUTO: 4.6 10*6/MM3 (ref 3.77–5.28)
RHINOVIRUS RNA SPEC NAA+PROBE: NOT DETECTED
RSV RNA NPH QL NAA+NON-PROBE: NOT DETECTED
SARS-COV-2 RNA NPH QL NAA+NON-PROBE: DETECTED
SODIUM SERPL-SCNC: 137 MMOL/L (ref 136–145)
SP GR UR STRIP: 1.02 (ref 1–1.03)
T4 FREE SERPL-MCNC: 0.95 NG/DL (ref 0.93–1.7)
TRIGL SERPL-MCNC: 86 MG/DL (ref 0–150)
TROPONIN T SERPL-MCNC: <0.01 NG/ML (ref 0–0.03)
TSH SERPL DL<=0.05 MIU/L-ACNC: 0.35 UIU/ML (ref 0.27–4.2)
UROBILINOGEN UR QL STRIP: ABNORMAL
VLDLC SERPL-MCNC: 17 MG/DL (ref 5–40)
WBC NRBC COR # BLD: 10.41 10*3/MM3 (ref 3.4–10.8)
WHOLE BLOOD HOLD COAG: NORMAL
WHOLE BLOOD HOLD SPECIMEN: NORMAL

## 2023-01-30 PROCEDURE — 83615 LACTATE (LD) (LDH) ENZYME: CPT | Performed by: NURSE PRACTITIONER

## 2023-01-30 PROCEDURE — 80061 LIPID PANEL: CPT

## 2023-01-30 PROCEDURE — 87040 BLOOD CULTURE FOR BACTERIA: CPT | Performed by: STUDENT IN AN ORGANIZED HEALTH CARE EDUCATION/TRAINING PROGRAM

## 2023-01-30 PROCEDURE — 85610 PROTHROMBIN TIME: CPT | Performed by: NURSE PRACTITIONER

## 2023-01-30 PROCEDURE — 84100 ASSAY OF PHOSPHORUS: CPT | Performed by: STUDENT IN AN ORGANIZED HEALTH CARE EDUCATION/TRAINING PROGRAM

## 2023-01-30 PROCEDURE — 36415 COLL VENOUS BLD VENIPUNCTURE: CPT

## 2023-01-30 PROCEDURE — 99214 OFFICE O/P EST MOD 30 MIN: CPT

## 2023-01-30 PROCEDURE — 84439 ASSAY OF FREE THYROXINE: CPT | Performed by: STUDENT IN AN ORGANIZED HEALTH CARE EDUCATION/TRAINING PROGRAM

## 2023-01-30 PROCEDURE — 97166 OT EVAL MOD COMPLEX 45 MIN: CPT

## 2023-01-30 PROCEDURE — 70450 CT HEAD/BRAIN W/O DYE: CPT

## 2023-01-30 PROCEDURE — 96365 THER/PROPH/DIAG IV INF INIT: CPT

## 2023-01-30 PROCEDURE — 0202U NFCT DS 22 TRGT SARS-COV-2: CPT | Performed by: STUDENT IN AN ORGANIZED HEALTH CARE EDUCATION/TRAINING PROGRAM

## 2023-01-30 PROCEDURE — 82248 BILIRUBIN DIRECT: CPT | Performed by: STUDENT IN AN ORGANIZED HEALTH CARE EDUCATION/TRAINING PROGRAM

## 2023-01-30 PROCEDURE — G0378 HOSPITAL OBSERVATION PER HR: HCPCS

## 2023-01-30 PROCEDURE — 92523 SPEECH SOUND LANG COMPREHEN: CPT

## 2023-01-30 PROCEDURE — 83036 HEMOGLOBIN GLYCOSYLATED A1C: CPT

## 2023-01-30 PROCEDURE — 83735 ASSAY OF MAGNESIUM: CPT | Performed by: STUDENT IN AN ORGANIZED HEALTH CARE EDUCATION/TRAINING PROGRAM

## 2023-01-30 PROCEDURE — 84443 ASSAY THYROID STIM HORMONE: CPT | Performed by: STUDENT IN AN ORGANIZED HEALTH CARE EDUCATION/TRAINING PROGRAM

## 2023-01-30 PROCEDURE — 71250 CT THORAX DX C-: CPT

## 2023-01-30 PROCEDURE — 82565 ASSAY OF CREATININE: CPT | Performed by: STUDENT IN AN ORGANIZED HEALTH CARE EDUCATION/TRAINING PROGRAM

## 2023-01-30 PROCEDURE — 99223 1ST HOSP IP/OBS HIGH 75: CPT | Performed by: NURSE PRACTITIONER

## 2023-01-30 PROCEDURE — 85384 FIBRINOGEN ACTIVITY: CPT | Performed by: NURSE PRACTITIONER

## 2023-01-30 PROCEDURE — 97162 PT EVAL MOD COMPLEX 30 MIN: CPT | Performed by: PHYSICAL THERAPIST

## 2023-01-30 PROCEDURE — 70551 MRI BRAIN STEM W/O DYE: CPT

## 2023-01-30 PROCEDURE — 85610 PROTHROMBIN TIME: CPT | Performed by: STUDENT IN AN ORGANIZED HEALTH CARE EDUCATION/TRAINING PROGRAM

## 2023-01-30 PROCEDURE — 82962 GLUCOSE BLOOD TEST: CPT

## 2023-01-30 PROCEDURE — 96375 TX/PRO/DX INJ NEW DRUG ADDON: CPT

## 2023-01-30 PROCEDURE — 25010000002 ONDANSETRON PER 1 MG: Performed by: STUDENT IN AN ORGANIZED HEALTH CARE EDUCATION/TRAINING PROGRAM

## 2023-01-30 PROCEDURE — 85025 COMPLETE CBC W/AUTO DIFF WBC: CPT | Performed by: STUDENT IN AN ORGANIZED HEALTH CARE EDUCATION/TRAINING PROGRAM

## 2023-01-30 PROCEDURE — 25010000002 REMDESIVIR 100 MG/20ML SOLUTION 1 EACH VIAL: Performed by: STUDENT IN AN ORGANIZED HEALTH CARE EDUCATION/TRAINING PROGRAM

## 2023-01-30 PROCEDURE — 80053 COMPREHEN METABOLIC PANEL: CPT | Performed by: STUDENT IN AN ORGANIZED HEALTH CARE EDUCATION/TRAINING PROGRAM

## 2023-01-30 PROCEDURE — 85379 FIBRIN DEGRADATION QUANT: CPT | Performed by: NURSE PRACTITIONER

## 2023-01-30 PROCEDURE — 74176 CT ABD & PELVIS W/O CONTRAST: CPT

## 2023-01-30 PROCEDURE — 70547 MR ANGIOGRAPHY NECK W/O DYE: CPT

## 2023-01-30 PROCEDURE — 96367 TX/PROPH/DG ADDL SEQ IV INF: CPT

## 2023-01-30 PROCEDURE — 83690 ASSAY OF LIPASE: CPT | Performed by: STUDENT IN AN ORGANIZED HEALTH CARE EDUCATION/TRAINING PROGRAM

## 2023-01-30 PROCEDURE — 84484 ASSAY OF TROPONIN QUANT: CPT | Performed by: STUDENT IN AN ORGANIZED HEALTH CARE EDUCATION/TRAINING PROGRAM

## 2023-01-30 PROCEDURE — 84145 PROCALCITONIN (PCT): CPT | Performed by: STUDENT IN AN ORGANIZED HEALTH CARE EDUCATION/TRAINING PROGRAM

## 2023-01-30 PROCEDURE — 86140 C-REACTIVE PROTEIN: CPT | Performed by: STUDENT IN AN ORGANIZED HEALTH CARE EDUCATION/TRAINING PROGRAM

## 2023-01-30 PROCEDURE — 25010000002 VANCOMYCIN 10 G RECONSTITUTED SOLUTION: Performed by: STUDENT IN AN ORGANIZED HEALTH CARE EDUCATION/TRAINING PROGRAM

## 2023-01-30 PROCEDURE — 25010000002 CEFEPIME PER 500 MG: Performed by: STUDENT IN AN ORGANIZED HEALTH CARE EDUCATION/TRAINING PROGRAM

## 2023-01-30 PROCEDURE — 70544 MR ANGIOGRAPHY HEAD W/O DYE: CPT

## 2023-01-30 PROCEDURE — 82728 ASSAY OF FERRITIN: CPT | Performed by: NURSE PRACTITIONER

## 2023-01-30 PROCEDURE — 85730 THROMBOPLASTIN TIME PARTIAL: CPT | Performed by: NURSE PRACTITIONER

## 2023-01-30 PROCEDURE — 83605 ASSAY OF LACTIC ACID: CPT | Performed by: STUDENT IN AN ORGANIZED HEALTH CARE EDUCATION/TRAINING PROGRAM

## 2023-01-30 RX ORDER — ONDANSETRON 2 MG/ML
4 INJECTION INTRAMUSCULAR; INTRAVENOUS EVERY 6 HOURS PRN
Status: DISCONTINUED | OUTPATIENT
Start: 2023-01-30 | End: 2023-02-04 | Stop reason: HOSPADM

## 2023-01-30 RX ORDER — SODIUM CHLORIDE 0.9 % (FLUSH) 0.9 %
10 SYRINGE (ML) INJECTION AS NEEDED
Status: DISCONTINUED | OUTPATIENT
Start: 2023-01-30 | End: 2023-02-04 | Stop reason: HOSPADM

## 2023-01-30 RX ORDER — SODIUM CHLORIDE 0.9 % (FLUSH) 0.9 %
10 SYRINGE (ML) INJECTION EVERY 12 HOURS SCHEDULED
Status: DISCONTINUED | OUTPATIENT
Start: 2023-01-30 | End: 2023-02-04 | Stop reason: HOSPADM

## 2023-01-30 RX ORDER — DEXAMETHASONE SODIUM PHOSPHATE 4 MG/ML
6 INJECTION, SOLUTION INTRA-ARTICULAR; INTRALESIONAL; INTRAMUSCULAR; INTRAVENOUS; SOFT TISSUE DAILY
Status: DISCONTINUED | OUTPATIENT
Start: 2023-01-30 | End: 2023-01-30

## 2023-01-30 RX ORDER — HYDROXYZINE HYDROCHLORIDE 25 MG/1
25 TABLET, FILM COATED ORAL EVERY 8 HOURS PRN
Status: DISCONTINUED | OUTPATIENT
Start: 2023-01-30 | End: 2023-02-04 | Stop reason: HOSPADM

## 2023-01-30 RX ORDER — PREGABALIN 50 MG/1
50 CAPSULE ORAL 3 TIMES DAILY
Status: DISCONTINUED | OUTPATIENT
Start: 2023-01-30 | End: 2023-02-04 | Stop reason: HOSPADM

## 2023-01-30 RX ORDER — ALBUTEROL SULFATE 90 UG/1
2 AEROSOL, METERED RESPIRATORY (INHALATION) EVERY 6 HOURS PRN
Status: DISCONTINUED | OUTPATIENT
Start: 2023-01-30 | End: 2023-02-04 | Stop reason: HOSPADM

## 2023-01-30 RX ORDER — SODIUM CHLORIDE 9 MG/ML
40 INJECTION, SOLUTION INTRAVENOUS AS NEEDED
Status: DISCONTINUED | OUTPATIENT
Start: 2023-01-30 | End: 2023-02-04 | Stop reason: HOSPADM

## 2023-01-30 RX ORDER — ESCITALOPRAM OXALATE 10 MG/1
20 TABLET ORAL DAILY
Status: DISCONTINUED | OUTPATIENT
Start: 2023-01-30 | End: 2023-02-04 | Stop reason: HOSPADM

## 2023-01-30 RX ORDER — ACETAMINOPHEN 325 MG/1
650 TABLET ORAL EVERY 6 HOURS PRN
Status: DISCONTINUED | OUTPATIENT
Start: 2023-01-30 | End: 2023-02-04 | Stop reason: HOSPADM

## 2023-01-30 RX ORDER — PROPAFENONE HYDROCHLORIDE 225 MG/1
225 CAPSULE, EXTENDED RELEASE ORAL EVERY 12 HOURS SCHEDULED
Status: DISCONTINUED | OUTPATIENT
Start: 2023-01-30 | End: 2023-02-04 | Stop reason: HOSPADM

## 2023-01-30 RX ORDER — SODIUM CHLORIDE 0.9 % (FLUSH) 0.9 %
10 SYRINGE (ML) INJECTION EVERY 12 HOURS SCHEDULED
Status: DISCONTINUED | OUTPATIENT
Start: 2023-01-30 | End: 2023-01-30 | Stop reason: SDUPTHER

## 2023-01-30 RX ORDER — MELATONIN
2000 DAILY
Status: DISCONTINUED | OUTPATIENT
Start: 2023-01-30 | End: 2023-02-04 | Stop reason: HOSPADM

## 2023-01-30 RX ORDER — WARFARIN SODIUM 5 MG/1
5 TABLET ORAL
Status: DISCONTINUED | OUTPATIENT
Start: 2023-01-30 | End: 2023-01-30

## 2023-01-30 RX ORDER — ATORVASTATIN CALCIUM 40 MG/1
80 TABLET, FILM COATED ORAL NIGHTLY
Status: DISCONTINUED | OUTPATIENT
Start: 2023-01-30 | End: 2023-02-04 | Stop reason: HOSPADM

## 2023-01-30 RX ORDER — ATENOLOL 25 MG/1
25 TABLET ORAL EVERY 12 HOURS SCHEDULED
Status: DISCONTINUED | OUTPATIENT
Start: 2023-01-30 | End: 2023-02-04 | Stop reason: HOSPADM

## 2023-01-30 RX ORDER — WARFARIN SODIUM 5 MG/1
5 TABLET ORAL
Status: DISCONTINUED | OUTPATIENT
Start: 2023-01-31 | End: 2023-01-31

## 2023-01-30 RX ORDER — SODIUM CHLORIDE 0.9 % (FLUSH) 0.9 %
10 SYRINGE (ML) INJECTION AS NEEDED
Status: DISCONTINUED | OUTPATIENT
Start: 2023-01-30 | End: 2023-01-30 | Stop reason: SDUPTHER

## 2023-01-30 RX ORDER — SODIUM CHLORIDE 9 MG/ML
40 INJECTION, SOLUTION INTRAVENOUS AS NEEDED
Status: DISCONTINUED | OUTPATIENT
Start: 2023-01-30 | End: 2023-01-30 | Stop reason: SDUPTHER

## 2023-01-30 RX ORDER — LISINOPRIL AND HYDROCHLOROTHIAZIDE 25; 20 MG/1; MG/1
1 TABLET ORAL DAILY
COMMUNITY

## 2023-01-30 RX ORDER — CLOPIDOGREL BISULFATE 75 MG/1
75 TABLET ORAL DAILY
Status: DISCONTINUED | OUTPATIENT
Start: 2023-01-30 | End: 2023-02-04 | Stop reason: HOSPADM

## 2023-01-30 RX ORDER — WARFARIN SODIUM 7.5 MG/1
7.5 TABLET ORAL
Status: COMPLETED | OUTPATIENT
Start: 2023-01-30 | End: 2023-01-30

## 2023-01-30 RX ORDER — ALLOPURINOL 100 MG/1
100 TABLET ORAL DAILY
Status: DISCONTINUED | OUTPATIENT
Start: 2023-01-30 | End: 2023-02-04 | Stop reason: HOSPADM

## 2023-01-30 RX ORDER — BENZONATATE 100 MG/1
100 CAPSULE ORAL 3 TIMES DAILY PRN
Status: DISCONTINUED | OUTPATIENT
Start: 2023-01-30 | End: 2023-01-31 | Stop reason: SDUPTHER

## 2023-01-30 RX ADMIN — Medication 10 ML: at 20:17

## 2023-01-30 RX ADMIN — PREGABALIN 50 MG: 50 CAPSULE ORAL at 17:05

## 2023-01-30 RX ADMIN — PROPAFENONE HYDROCHLORIDE 225 MG: 225 CAPSULE, EXTENDED RELEASE ORAL at 11:11

## 2023-01-30 RX ADMIN — VANCOMYCIN HYDROCHLORIDE 2000 MG: 10 INJECTION, POWDER, LYOPHILIZED, FOR SOLUTION INTRAVENOUS at 02:23

## 2023-01-30 RX ADMIN — ESCITALOPRAM OXALATE 20 MG: 10 TABLET, FILM COATED ORAL at 11:10

## 2023-01-30 RX ADMIN — ATORVASTATIN CALCIUM 80 MG: 40 TABLET, FILM COATED ORAL at 20:17

## 2023-01-30 RX ADMIN — CEFEPIME HYDROCHLORIDE 2 G: 2 INJECTION, POWDER, FOR SOLUTION INTRAMUSCULAR; INTRAVENOUS at 00:58

## 2023-01-30 RX ADMIN — CLOPIDOGREL BISULFATE 75 MG: 75 TABLET ORAL at 11:11

## 2023-01-30 RX ADMIN — ALLOPURINOL 100 MG: 100 TABLET ORAL at 11:10

## 2023-01-30 RX ADMIN — Medication 10 ML: at 17:32

## 2023-01-30 RX ADMIN — PROPAFENONE HYDROCHLORIDE 225 MG: 225 CAPSULE, EXTENDED RELEASE ORAL at 20:17

## 2023-01-30 RX ADMIN — SODIUM CHLORIDE, POTASSIUM CHLORIDE, SODIUM LACTATE AND CALCIUM CHLORIDE 1000 ML: 600; 310; 30; 20 INJECTION, SOLUTION INTRAVENOUS at 00:40

## 2023-01-30 RX ADMIN — ACETAMINOPHEN 325MG 650 MG: 325 TABLET ORAL at 20:17

## 2023-01-30 RX ADMIN — Medication 2000 UNITS: at 11:10

## 2023-01-30 RX ADMIN — PREGABALIN 50 MG: 50 CAPSULE ORAL at 11:10

## 2023-01-30 RX ADMIN — METRONIDAZOLE 500 MG: 500 INJECTION, SOLUTION INTRAVENOUS at 01:48

## 2023-01-30 RX ADMIN — WARFARIN SODIUM 7.5 MG: 7.5 TABLET ORAL at 17:09

## 2023-01-30 RX ADMIN — SODIUM CHLORIDE, POTASSIUM CHLORIDE, SODIUM LACTATE AND CALCIUM CHLORIDE 1000 ML: 600; 310; 30; 20 INJECTION, SOLUTION INTRAVENOUS at 00:39

## 2023-01-30 RX ADMIN — ONDANSETRON 4 MG: 2 INJECTION INTRAMUSCULAR; INTRAVENOUS at 17:09

## 2023-01-30 RX ADMIN — REMDESIVIR 200 MG: 100 INJECTION, POWDER, LYOPHILIZED, FOR SOLUTION INTRAVENOUS at 17:06

## 2023-01-30 RX ADMIN — ACETAMINOPHEN 1000 MG: 500 TABLET ORAL at 00:37

## 2023-01-30 RX ADMIN — ATENOLOL 25 MG: 25 TABLET ORAL at 11:10

## 2023-01-30 RX ADMIN — ATENOLOL 25 MG: 25 TABLET ORAL at 20:17

## 2023-01-30 NOTE — TELEPHONE ENCOUNTER
Last seen in office 10/20/2022    Patient was admitted to  for COVID and LOTTIE, she is still currently admitted

## 2023-01-30 NOTE — TELEPHONE ENCOUNTER
Patient needs to discuss this with the hospitalist since she is admitted, otherwise we can do this when she comes in for hospital follow-up I would need to examine her first.

## 2023-01-31 ENCOUNTER — APPOINTMENT (OUTPATIENT)
Dept: CARDIOLOGY | Facility: HOSPITAL | Age: 78
End: 2023-01-31
Payer: MEDICARE

## 2023-01-31 LAB
ALBUMIN SERPL-MCNC: 3.1 G/DL (ref 3.5–5.2)
ALBUMIN/GLOB SERPL: 1.2 G/DL
ALP SERPL-CCNC: 80 U/L (ref 39–117)
ALT SERPL W P-5'-P-CCNC: 9 U/L (ref 1–33)
ANION GAP SERPL CALCULATED.3IONS-SCNC: 9 MMOL/L (ref 5–15)
AST SERPL-CCNC: 19 U/L (ref 1–32)
BASOPHILS # BLD AUTO: 0.02 10*3/MM3 (ref 0–0.2)
BASOPHILS NFR BLD AUTO: 0.3 % (ref 0–1.5)
BH CV ECHO MEAS - AO MAX PG: 10.1 MMHG
BH CV ECHO MEAS - AO MEAN PG: 6 MMHG
BH CV ECHO MEAS - AO ROOT DIAM: 3.1 CM
BH CV ECHO MEAS - AO V2 MAX: 159 CM/SEC
BH CV ECHO MEAS - AO V2 VTI: 34.1 CM
BH CV ECHO MEAS - AVA(I,D): 2.08 CM2
BH CV ECHO MEAS - EDV(CUBED): 91.1 ML
BH CV ECHO MEAS - EDV(MOD-SP2): 63.3 ML
BH CV ECHO MEAS - EDV(MOD-SP4): 78.3 ML
BH CV ECHO MEAS - EF(MOD-BP): 71.9 %
BH CV ECHO MEAS - EF(MOD-SP2): 65.9 %
BH CV ECHO MEAS - EF(MOD-SP4): 76.5 %
BH CV ECHO MEAS - ESV(CUBED): 27 ML
BH CV ECHO MEAS - ESV(MOD-SP2): 21.6 ML
BH CV ECHO MEAS - ESV(MOD-SP4): 18.4 ML
BH CV ECHO MEAS - FS: 33.3 %
BH CV ECHO MEAS - IVS/LVPW: 1 CM
BH CV ECHO MEAS - IVSD: 0.8 CM
BH CV ECHO MEAS - LA DIMENSION: 3.4 CM
BH CV ECHO MEAS - LAT PEAK E' VEL: 7.9 CM/SEC
BH CV ECHO MEAS - LV MASS(C)D: 113.6 GRAMS
BH CV ECHO MEAS - LV MAX PG: 6.3 MMHG
BH CV ECHO MEAS - LV MEAN PG: 3.5 MMHG
BH CV ECHO MEAS - LV V1 MAX: 125 CM/SEC
BH CV ECHO MEAS - LV V1 VTI: 25 CM
BH CV ECHO MEAS - LVIDD: 4.5 CM
BH CV ECHO MEAS - LVIDS: 3 CM
BH CV ECHO MEAS - LVOT AREA: 2.8 CM2
BH CV ECHO MEAS - LVOT DIAM: 1.9 CM
BH CV ECHO MEAS - LVPWD: 0.8 CM
BH CV ECHO MEAS - MED PEAK E' VEL: 8.9 CM/SEC
BH CV ECHO MEAS - MV A MAX VEL: 107 CM/SEC
BH CV ECHO MEAS - MV DEC SLOPE: 351 CM/SEC2
BH CV ECHO MEAS - MV DEC TIME: 0.24 MSEC
BH CV ECHO MEAS - MV E MAX VEL: 126 CM/SEC
BH CV ECHO MEAS - MV E/A: 1.18
BH CV ECHO MEAS - MV P1/2T: 88.5 MSEC
BH CV ECHO MEAS - MVA(P1/2T): 2.49 CM2
BH CV ECHO MEAS - PA ACC TIME: 0.09 SEC
BH CV ECHO MEAS - PA PR(ACCEL): 38.5 MMHG
BH CV ECHO MEAS - PA V2 MAX: 95 CM/SEC
BH CV ECHO MEAS - PAPD(PI EDV): 5 MMHG
BH CV ECHO MEAS - PI END-D VEL: 94.7 CM/SEC
BH CV ECHO MEAS - RAP SYSTOLE: 3 MMHG
BH CV ECHO MEAS - SV(LVOT): 70.7 ML
BH CV ECHO MEAS - SV(MOD-SP2): 41.7 ML
BH CV ECHO MEAS - SV(MOD-SP4): 59.9 ML
BH CV ECHO MEAS - TAPSE (>1.6): 3 CM
BH CV ECHO MEASUREMENTS AVERAGE E/E' RATIO: 15
BH CV ECHO SHUNT ASSESSMENT PERFORMED (HIDDEN SCRIPTING): 1
BH CV XLRA - RV BASE: 4.2 CM
BH CV XLRA - RV LENGTH: 7.3 CM
BH CV XLRA - RV MID: 3.6 CM
BH CV XLRA - TDI S': 11.3 CM/SEC
BILIRUB CONJ SERPL-MCNC: 0.2 MG/DL (ref 0–0.3)
BILIRUB SERPL-MCNC: 0.9 MG/DL (ref 0–1.2)
BUN SERPL-MCNC: 22 MG/DL (ref 8–23)
BUN/CREAT SERPL: 18.5 (ref 7–25)
CALCIUM SPEC-SCNC: 8.2 MG/DL (ref 8.6–10.5)
CHLORIDE SERPL-SCNC: 105 MMOL/L (ref 98–107)
CO2 SERPL-SCNC: 24 MMOL/L (ref 22–29)
CREAT SERPL-MCNC: 1.19 MG/DL (ref 0.57–1)
CRP SERPL-MCNC: 11.97 MG/DL (ref 0–0.5)
DEPRECATED RDW RBC AUTO: 53.1 FL (ref 37–54)
EGFRCR SERPLBLD CKD-EPI 2021: 46.9 ML/MIN/1.73
EOSINOPHIL # BLD AUTO: 0.04 10*3/MM3 (ref 0–0.4)
EOSINOPHIL NFR BLD AUTO: 0.5 % (ref 0.3–6.2)
ERYTHROCYTE [DISTWIDTH] IN BLOOD BY AUTOMATED COUNT: 15.2 % (ref 12.3–15.4)
GLOBULIN UR ELPH-MCNC: 2.5 GM/DL
GLUCOSE BLDC GLUCOMTR-MCNC: 125 MG/DL (ref 70–130)
GLUCOSE BLDC GLUCOMTR-MCNC: 241 MG/DL (ref 70–130)
GLUCOSE BLDC GLUCOMTR-MCNC: 74 MG/DL (ref 70–130)
GLUCOSE SERPL-MCNC: 80 MG/DL (ref 65–99)
HCT VFR BLD AUTO: 39.8 % (ref 34–46.6)
HGB BLD-MCNC: 12.5 G/DL (ref 12–15.9)
IMM GRANULOCYTES # BLD AUTO: 0.03 10*3/MM3 (ref 0–0.05)
IMM GRANULOCYTES NFR BLD AUTO: 0.4 % (ref 0–0.5)
INR PPP: 1.36 (ref 0.84–1.13)
IVRT: 79 MSEC
LEFT ATRIUM VOLUME INDEX: 27.4 ML/M2
LV EF 2D ECHO EST: 72 %
LYMPHOCYTES # BLD AUTO: 1.35 10*3/MM3 (ref 0.7–3.1)
LYMPHOCYTES NFR BLD AUTO: 17.3 % (ref 19.6–45.3)
MAGNESIUM SERPL-MCNC: 1.7 MG/DL (ref 1.6–2.4)
MAXIMAL PREDICTED HEART RATE: 142 BPM
MCH RBC QN AUTO: 29.9 PG (ref 26.6–33)
MCHC RBC AUTO-ENTMCNC: 31.4 G/DL (ref 31.5–35.7)
MCV RBC AUTO: 95.2 FL (ref 79–97)
MONOCYTES # BLD AUTO: 0.86 10*3/MM3 (ref 0.1–0.9)
MONOCYTES NFR BLD AUTO: 11 % (ref 5–12)
NEUTROPHILS NFR BLD AUTO: 5.49 10*3/MM3 (ref 1.7–7)
NEUTROPHILS NFR BLD AUTO: 70.5 % (ref 42.7–76)
NRBC BLD AUTO-RTO: 0 /100 WBC (ref 0–0.2)
PLATELET # BLD AUTO: 114 10*3/MM3 (ref 140–450)
PMV BLD AUTO: 10.4 FL (ref 6–12)
POTASSIUM SERPL-SCNC: 4.6 MMOL/L (ref 3.5–5.2)
PROT SERPL-MCNC: 5.6 G/DL (ref 6–8.5)
PROTHROMBIN TIME: 16.7 SECONDS (ref 11.4–14.4)
RBC # BLD AUTO: 4.18 10*6/MM3 (ref 3.77–5.28)
SODIUM SERPL-SCNC: 138 MMOL/L (ref 136–145)
STRESS TARGET HR: 121 BPM
WBC NRBC COR # BLD: 7.79 10*3/MM3 (ref 3.4–10.8)

## 2023-01-31 PROCEDURE — 82248 BILIRUBIN DIRECT: CPT | Performed by: STUDENT IN AN ORGANIZED HEALTH CARE EDUCATION/TRAINING PROGRAM

## 2023-01-31 PROCEDURE — 82962 GLUCOSE BLOOD TEST: CPT

## 2023-01-31 PROCEDURE — 25010000002 REMDESIVIR 100 MG/20ML SOLUTION 1 EACH VIAL: Performed by: STUDENT IN AN ORGANIZED HEALTH CARE EDUCATION/TRAINING PROGRAM

## 2023-01-31 PROCEDURE — 93306 TTE W/DOPPLER COMPLETE: CPT

## 2023-01-31 PROCEDURE — 80053 COMPREHEN METABOLIC PANEL: CPT | Performed by: NURSE PRACTITIONER

## 2023-01-31 PROCEDURE — G0378 HOSPITAL OBSERVATION PER HR: HCPCS

## 2023-01-31 PROCEDURE — 86140 C-REACTIVE PROTEIN: CPT | Performed by: NURSE PRACTITIONER

## 2023-01-31 PROCEDURE — 96372 THER/PROPH/DIAG INJ SC/IM: CPT

## 2023-01-31 PROCEDURE — 99232 SBSQ HOSP IP/OBS MODERATE 35: CPT | Performed by: STUDENT IN AN ORGANIZED HEALTH CARE EDUCATION/TRAINING PROGRAM

## 2023-01-31 PROCEDURE — 85610 PROTHROMBIN TIME: CPT | Performed by: NURSE PRACTITIONER

## 2023-01-31 PROCEDURE — 93306 TTE W/DOPPLER COMPLETE: CPT | Performed by: INTERNAL MEDICINE

## 2023-01-31 PROCEDURE — 83735 ASSAY OF MAGNESIUM: CPT | Performed by: NURSE PRACTITIONER

## 2023-01-31 PROCEDURE — 85025 COMPLETE CBC W/AUTO DIFF WBC: CPT | Performed by: NURSE PRACTITIONER

## 2023-01-31 PROCEDURE — 25010000002 ENOXAPARIN PER 10 MG: Performed by: STUDENT IN AN ORGANIZED HEALTH CARE EDUCATION/TRAINING PROGRAM

## 2023-01-31 RX ORDER — BENZONATATE 100 MG/1
200 CAPSULE ORAL 3 TIMES DAILY
Status: DISCONTINUED | OUTPATIENT
Start: 2023-01-31 | End: 2023-02-04 | Stop reason: HOSPADM

## 2023-01-31 RX ORDER — GUAIFENESIN 600 MG/1
1200 TABLET, EXTENDED RELEASE ORAL EVERY 12 HOURS SCHEDULED
Status: DISCONTINUED | OUTPATIENT
Start: 2023-01-31 | End: 2023-02-04 | Stop reason: HOSPADM

## 2023-01-31 RX ORDER — WARFARIN SODIUM 5 MG/1
5 TABLET ORAL
Status: DISCONTINUED | OUTPATIENT
Start: 2023-02-01 | End: 2023-02-01

## 2023-01-31 RX ORDER — WARFARIN SODIUM 7.5 MG/1
7.5 TABLET ORAL
Status: COMPLETED | OUTPATIENT
Start: 2023-01-31 | End: 2023-01-31

## 2023-01-31 RX ORDER — ENOXAPARIN SODIUM 100 MG/ML
1 INJECTION SUBCUTANEOUS EVERY 12 HOURS SCHEDULED
Status: DISCONTINUED | OUTPATIENT
Start: 2023-01-31 | End: 2023-02-03

## 2023-01-31 RX ADMIN — PREGABALIN 50 MG: 50 CAPSULE ORAL at 15:43

## 2023-01-31 RX ADMIN — PROPAFENONE HYDROCHLORIDE 225 MG: 225 CAPSULE, EXTENDED RELEASE ORAL at 09:11

## 2023-01-31 RX ADMIN — ALLOPURINOL 100 MG: 100 TABLET ORAL at 08:30

## 2023-01-31 RX ADMIN — CLOPIDOGREL BISULFATE 75 MG: 75 TABLET ORAL at 08:30

## 2023-01-31 RX ADMIN — BENZONATATE 200 MG: 100 CAPSULE ORAL at 17:40

## 2023-01-31 RX ADMIN — ATENOLOL 25 MG: 25 TABLET ORAL at 21:46

## 2023-01-31 RX ADMIN — Medication 2000 UNITS: at 08:31

## 2023-01-31 RX ADMIN — ESCITALOPRAM OXALATE 20 MG: 10 TABLET, FILM COATED ORAL at 09:11

## 2023-01-31 RX ADMIN — ATORVASTATIN CALCIUM 80 MG: 40 TABLET, FILM COATED ORAL at 21:46

## 2023-01-31 RX ADMIN — ATENOLOL 25 MG: 25 TABLET ORAL at 08:30

## 2023-01-31 RX ADMIN — Medication 10 ML: at 08:31

## 2023-01-31 RX ADMIN — Medication 10 ML: at 21:47

## 2023-01-31 RX ADMIN — GUAIFENESIN 1200 MG: 600 TABLET, EXTENDED RELEASE ORAL at 21:46

## 2023-01-31 RX ADMIN — PREGABALIN 50 MG: 50 CAPSULE ORAL at 08:30

## 2023-01-31 RX ADMIN — REMDESIVIR 100 MG: 100 INJECTION, POWDER, LYOPHILIZED, FOR SOLUTION INTRAVENOUS at 17:40

## 2023-01-31 RX ADMIN — PROPAFENONE HYDROCHLORIDE 225 MG: 225 CAPSULE, EXTENDED RELEASE ORAL at 21:44

## 2023-01-31 RX ADMIN — PREGABALIN 50 MG: 50 CAPSULE ORAL at 21:45

## 2023-01-31 RX ADMIN — WARFARIN SODIUM 7.5 MG: 7.5 TABLET ORAL at 17:40

## 2023-01-31 RX ADMIN — ENOXAPARIN SODIUM 100 MG: 100 INJECTION SUBCUTANEOUS at 15:43

## 2023-02-01 LAB
ALBUMIN SERPL-MCNC: 3 G/DL (ref 3.5–5.2)
ALBUMIN/GLOB SERPL: 1.3 G/DL
ALP SERPL-CCNC: 74 U/L (ref 39–117)
ALT SERPL W P-5'-P-CCNC: 10 U/L (ref 1–33)
ANION GAP SERPL CALCULATED.3IONS-SCNC: 8 MMOL/L (ref 5–15)
AST SERPL-CCNC: 21 U/L (ref 1–32)
BASOPHILS # BLD AUTO: 0.01 10*3/MM3 (ref 0–0.2)
BASOPHILS NFR BLD AUTO: 0.2 % (ref 0–1.5)
BILIRUB CONJ SERPL-MCNC: <0.2 MG/DL (ref 0–0.3)
BILIRUB SERPL-MCNC: 0.6 MG/DL (ref 0–1.2)
BUN SERPL-MCNC: 21 MG/DL (ref 8–23)
BUN/CREAT SERPL: 21.9 (ref 7–25)
CALCIUM SPEC-SCNC: 8 MG/DL (ref 8.6–10.5)
CHLORIDE SERPL-SCNC: 106 MMOL/L (ref 98–107)
CO2 SERPL-SCNC: 24 MMOL/L (ref 22–29)
CREAT SERPL-MCNC: 0.96 MG/DL (ref 0.57–1)
CRP SERPL-MCNC: 10 MG/DL (ref 0–0.5)
DEPRECATED RDW RBC AUTO: 52.6 FL (ref 37–54)
EGFRCR SERPLBLD CKD-EPI 2021: 60.7 ML/MIN/1.73
EOSINOPHIL # BLD AUTO: 0.2 10*3/MM3 (ref 0–0.4)
EOSINOPHIL NFR BLD AUTO: 4.3 % (ref 0.3–6.2)
ERYTHROCYTE [DISTWIDTH] IN BLOOD BY AUTOMATED COUNT: 15 % (ref 12.3–15.4)
GLOBULIN UR ELPH-MCNC: 2.4 GM/DL
GLUCOSE SERPL-MCNC: 71 MG/DL (ref 65–99)
HCT VFR BLD AUTO: 39.7 % (ref 34–46.6)
HGB BLD-MCNC: 12.5 G/DL (ref 12–15.9)
IMM GRANULOCYTES # BLD AUTO: 0.02 10*3/MM3 (ref 0–0.05)
IMM GRANULOCYTES NFR BLD AUTO: 0.4 % (ref 0–0.5)
INR PPP: 1.42 (ref 0.84–1.13)
LYMPHOCYTES # BLD AUTO: 1.46 10*3/MM3 (ref 0.7–3.1)
LYMPHOCYTES NFR BLD AUTO: 31.7 % (ref 19.6–45.3)
MAGNESIUM SERPL-MCNC: 1.6 MG/DL (ref 1.6–2.4)
MCH RBC QN AUTO: 29.6 PG (ref 26.6–33)
MCHC RBC AUTO-ENTMCNC: 31.5 G/DL (ref 31.5–35.7)
MCV RBC AUTO: 94.1 FL (ref 79–97)
MONOCYTES # BLD AUTO: 0.5 10*3/MM3 (ref 0.1–0.9)
MONOCYTES NFR BLD AUTO: 10.8 % (ref 5–12)
NEUTROPHILS NFR BLD AUTO: 2.42 10*3/MM3 (ref 1.7–7)
NEUTROPHILS NFR BLD AUTO: 52.6 % (ref 42.7–76)
NRBC BLD AUTO-RTO: 0 /100 WBC (ref 0–0.2)
PLATELET # BLD AUTO: 110 10*3/MM3 (ref 140–450)
PMV BLD AUTO: 10.3 FL (ref 6–12)
POTASSIUM SERPL-SCNC: 4.2 MMOL/L (ref 3.5–5.2)
PROT SERPL-MCNC: 5.4 G/DL (ref 6–8.5)
PROTHROMBIN TIME: 17.3 SECONDS (ref 11.4–14.4)
RBC # BLD AUTO: 4.22 10*6/MM3 (ref 3.77–5.28)
SODIUM SERPL-SCNC: 138 MMOL/L (ref 136–145)
WBC NRBC COR # BLD: 4.61 10*3/MM3 (ref 3.4–10.8)

## 2023-02-01 PROCEDURE — 80053 COMPREHEN METABOLIC PANEL: CPT | Performed by: NURSE PRACTITIONER

## 2023-02-01 PROCEDURE — 0 MAGNESIUM SULFATE 4 GM/100ML SOLUTION: Performed by: STUDENT IN AN ORGANIZED HEALTH CARE EDUCATION/TRAINING PROGRAM

## 2023-02-01 PROCEDURE — 25010000002 REMDESIVIR 100 MG/20ML SOLUTION 1 EACH VIAL: Performed by: STUDENT IN AN ORGANIZED HEALTH CARE EDUCATION/TRAINING PROGRAM

## 2023-02-01 PROCEDURE — 25010000002 ENOXAPARIN PER 10 MG: Performed by: STUDENT IN AN ORGANIZED HEALTH CARE EDUCATION/TRAINING PROGRAM

## 2023-02-01 PROCEDURE — 99232 SBSQ HOSP IP/OBS MODERATE 35: CPT | Performed by: STUDENT IN AN ORGANIZED HEALTH CARE EDUCATION/TRAINING PROGRAM

## 2023-02-01 PROCEDURE — 85025 COMPLETE CBC W/AUTO DIFF WBC: CPT | Performed by: NURSE PRACTITIONER

## 2023-02-01 PROCEDURE — G0378 HOSPITAL OBSERVATION PER HR: HCPCS

## 2023-02-01 PROCEDURE — 85610 PROTHROMBIN TIME: CPT | Performed by: NURSE PRACTITIONER

## 2023-02-01 PROCEDURE — 96372 THER/PROPH/DIAG INJ SC/IM: CPT

## 2023-02-01 PROCEDURE — 97530 THERAPEUTIC ACTIVITIES: CPT

## 2023-02-01 PROCEDURE — 82248 BILIRUBIN DIRECT: CPT | Performed by: STUDENT IN AN ORGANIZED HEALTH CARE EDUCATION/TRAINING PROGRAM

## 2023-02-01 PROCEDURE — 83735 ASSAY OF MAGNESIUM: CPT | Performed by: NURSE PRACTITIONER

## 2023-02-01 PROCEDURE — 97535 SELF CARE MNGMENT TRAINING: CPT

## 2023-02-01 PROCEDURE — 86140 C-REACTIVE PROTEIN: CPT | Performed by: NURSE PRACTITIONER

## 2023-02-01 RX ORDER — WARFARIN SODIUM 5 MG/1
5 TABLET ORAL
Status: DISCONTINUED | OUTPATIENT
Start: 2023-02-02 | End: 2023-02-02

## 2023-02-01 RX ORDER — MAGNESIUM SULFATE HEPTAHYDRATE 40 MG/ML
4 INJECTION, SOLUTION INTRAVENOUS ONCE
Status: COMPLETED | OUTPATIENT
Start: 2023-02-01 | End: 2023-02-01

## 2023-02-01 RX ORDER — WARFARIN SODIUM 7.5 MG/1
7.5 TABLET ORAL
Status: COMPLETED | OUTPATIENT
Start: 2023-02-01 | End: 2023-02-01

## 2023-02-01 RX ADMIN — ATENOLOL 25 MG: 25 TABLET ORAL at 20:28

## 2023-02-01 RX ADMIN — ENOXAPARIN SODIUM 100 MG: 100 INJECTION SUBCUTANEOUS at 07:01

## 2023-02-01 RX ADMIN — BENZONATATE 200 MG: 100 CAPSULE ORAL at 09:08

## 2023-02-01 RX ADMIN — GUAIFENESIN 1200 MG: 600 TABLET, EXTENDED RELEASE ORAL at 09:08

## 2023-02-01 RX ADMIN — ESCITALOPRAM OXALATE 20 MG: 10 TABLET, FILM COATED ORAL at 09:08

## 2023-02-01 RX ADMIN — WARFARIN SODIUM 7.5 MG: 7.5 TABLET ORAL at 17:28

## 2023-02-01 RX ADMIN — ALLOPURINOL 100 MG: 100 TABLET ORAL at 09:08

## 2023-02-01 RX ADMIN — BENZONATATE 200 MG: 100 CAPSULE ORAL at 20:28

## 2023-02-01 RX ADMIN — Medication 10 ML: at 09:09

## 2023-02-01 RX ADMIN — PREGABALIN 50 MG: 50 CAPSULE ORAL at 20:28

## 2023-02-01 RX ADMIN — PROPAFENONE HYDROCHLORIDE 225 MG: 225 CAPSULE, EXTENDED RELEASE ORAL at 09:19

## 2023-02-01 RX ADMIN — GUAIFENESIN 1200 MG: 600 TABLET, EXTENDED RELEASE ORAL at 20:27

## 2023-02-01 RX ADMIN — BENZONATATE 200 MG: 100 CAPSULE ORAL at 15:36

## 2023-02-01 RX ADMIN — REMDESIVIR 100 MG: 100 INJECTION, POWDER, LYOPHILIZED, FOR SOLUTION INTRAVENOUS at 16:25

## 2023-02-01 RX ADMIN — Medication 10 ML: at 20:28

## 2023-02-01 RX ADMIN — ATENOLOL 25 MG: 25 TABLET ORAL at 09:08

## 2023-02-01 RX ADMIN — MAGNESIUM SULFATE HEPTAHYDRATE 4 G: 40 INJECTION, SOLUTION INTRAVENOUS at 17:28

## 2023-02-01 RX ADMIN — ENOXAPARIN SODIUM 100 MG: 100 INJECTION SUBCUTANEOUS at 17:28

## 2023-02-01 RX ADMIN — ATORVASTATIN CALCIUM 80 MG: 40 TABLET, FILM COATED ORAL at 20:27

## 2023-02-01 RX ADMIN — CLOPIDOGREL BISULFATE 75 MG: 75 TABLET ORAL at 09:08

## 2023-02-01 RX ADMIN — PREGABALIN 50 MG: 50 CAPSULE ORAL at 09:08

## 2023-02-01 RX ADMIN — Medication 2000 UNITS: at 09:08

## 2023-02-01 RX ADMIN — PROPAFENONE HYDROCHLORIDE 225 MG: 225 CAPSULE, EXTENDED RELEASE ORAL at 20:28

## 2023-02-01 RX ADMIN — PREGABALIN 50 MG: 50 CAPSULE ORAL at 15:36

## 2023-02-01 NOTE — PLAN OF CARE
Goal Outcome Evaluation:  Plan of Care Reviewed With: patient        Progress: improving  Outcome Evaluation: Pt progressed from max a to mod a with toileting hygiene, and from min A to CGA with toilet transfer.  Pt continues to function below baseline with self care d/t weakness and decreased activity tolerance.  OT will continue to follow to advance pt toward PLOF with ADLs.  Recommend IP rehab upon d/c.

## 2023-02-01 NOTE — PLAN OF CARE
Goal Outcome Evaluation:  Plan of Care Reviewed With: patient       Pt OOB in chair. Meds given as ordered. Pt mag 1.6, electrolyte replacement given. No c/o pain. Fall precautions in place. Call bell within reach. Will continue to assess

## 2023-02-01 NOTE — PROGRESS NOTES
"Pharmacy Consult  -  Warfarin    Akua Torres is a  77 y.o. female   Height - 172.7 cm (68\")  Weight - 95.3 kg (210 lb)    Consulting Provider: Chika VILLALOBOS  Indication: Afib  Goal INR: 2 - 3  Home Regimen: Patient states she takes Warfarin 5 mg oral Daily   - When INR < 2, she reports taking Warfarin 5 mg oral Daily, except for 7.5 mg on Tuesday and Thursday  Bridge Therapy: yes, therapeutic lovenox     Drug-Drug Interactions with current regimen:   Allopurinol may result in an increased INR and risk of bleeding (home)   Clopidogrel may result in an increased risk of bleeding (home)   Escitalopram may result in an increased risk of bleeding (home)   Enoxaparin may result in an increased risk of bleeding   Propafenone may result in an increased risk of bleeding   PRN acetaminophen may result in an increased INR and risk of bleeding   Remdesivir (1/30 -2/1) may enhance the anticoagulant effects of warfarin   Flagyl x1 on 1/29, vancomycin x1 on 1/29    Warfarin Dosing During Admission:    Date  1/30 1/31 2/1         INR   1.33 1.36  1.42         Dose   7.5 mg  7.5 mg 7.5 mg            Education Provided:  Warfarin education provided on 1/31 verbally and in writing.  Discussed effects of warfarin, importance of checking INR, drug-drug and drug-food interactions, and signs/symptoms of bleeding and clotting.  Patient verbalized understanding through teach back.  All pertinent questions were answered.  Discharge Follow up:   Following Provider - Dr. Langford, goes to office weekly for INR checks   Follow up time range or appointment - within 2 to 3 days of discharge    Labs:  Results from last 7 days   Lab Units 02/01/23 0424 01/31/23  0605 01/30/23  0618 01/30/23  0008   INR  1.42* 1.36* 1.31* 1.33*   APTT seconds  --   --   --  30.6   HEMOGLOBIN g/dL 12.5 12.5  --  13.8   HEMATOCRIT % 39.7 39.8  --  43.7     Results from last 7 days   Lab Units 02/01/23  0424 01/31/23  0605 01/30/23  1730 01/30/23  0008 "   SODIUM mmol/L 138 138  --  137   POTASSIUM mmol/L 4.2 4.6  --  5.1   CHLORIDE mmol/L 106 105  --  105   CO2 mmol/L 24.0 24.0  --  23.0   BUN mg/dL 21 22  --  29*   CREATININE mg/dL 0.96 1.19* 1.19* 1.41*   CALCIUM mg/dL 8.0* 8.2*  --  8.5*   BILIRUBIN mg/dL 0.6 0.9 1.0 1.1   ALK PHOS U/L 74 80 103 107   ALT (SGPT) U/L 10 9 12 10   AST (SGOT) U/L 21 19 21 17   GLUCOSE mg/dL 71 80  --  107*     Current dietary intake:  per RN, patient eats majority of meals  Diet Order   Procedures    Diet: Cardiac Diets; Healthy Heart (2-3 Na+); Texture: Regular Texture (IDDSI 7); Fluid Consistency: Thin (IDDSI 0)     Assessment/Plan:     1. Patient's INR remains SUB therapeutic today at 1.42 despite boosted doses. Goal INR 2 to 3. (Sub therapeutic on admission)      Ms. Torres denies any missed doses or increased GLV prior to admission.      Receiving therapeutic enoxaparin, continue to monitor platelets (had dropped prior to starting)  2. Recommend boosted dose of warfarin 7.5 mg this evening.  3. Daily PT/INR ordered.  4. Monitor signs/symptoms of bleeding, dietary intake, and drug-drug interactions. Make dose adjustments as necessary.    Discharge recommendations: D    Pharmacy will continue to follow.      Thank you  Brooklyn Ortiz, PharmD  2/1/2023  12:27 EST

## 2023-02-01 NOTE — THERAPY TREATMENT NOTE
Patient Name: Akua Torres  : 1945    MRN: [id number]                              Today's Date: 2023       Admit Date: 2023    Visit Dx:     ICD-10-CM ICD-9-CM   1. COVID-19  U07.1 079.89   2. Fever and chills  R50.9 780.60   3. Right sided weakness  R53.1 728.87   4. LOTTIE (acute kidney injury) ([facility])  N17.9 584.9   5. Dehydration  E86.0 276.51   6. Mild obesity  E66.9 278.00   7. History of CVA (cerebrovascular accident)  Z86.73 V12.54     Patient Active Problem List   Diagnosis   • Degenerative disc disease, lumbar   • Lumbar stenosis with neurogenic claudication   • History of lumbar fusion   • Spondylosis of lumbar region without myelopathy or radiculopathy   • Mild obesity   • Physical deconditioning   • Idiopathic gout   • Anxiety and depression   • Greater trochanteric bursitis of both hips   • Status post total bilateral knee replacement   • Back pain   • Essential hypertension   • Prediabetes   • S/P lumbar spinal fusion   • Renal insufficiency   • Leukocytosis, likely reactive   • LOTTIE (acute kidney injury) ([facility])   • Depression   • Arthritis of right hip   • Paroxysmal atrial fibrillation ([facility])   • YUNIOR treated with BiPAP   • 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   • Acute blood loss anemia, mild, asymptomatic   • 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 ([facility])   • Supraventricular tachycardia ([facility])   • Left prefrontal gyrus stroke   • Arthritis of left hip   • Hip pain   • Status post total replacement of left hip   • Hyperlipidemia   • Elevated hemoglobin A1c   • History of stroke   • Acute postoperative pain   • Nontraumatic complete tear of right rotator cuff   • Traumatic rhabdomyolysis, initial encounter ([facility])   • Hematoma of right thigh   • Pain of right lower extremity   • Anemia   • Fibromyalgia   • COVID-19 virus detected   • Weakness   • COVID-19     Past Medical History:   Diagnosis Date   • A-fib ([facility]) 2019   • Anxiety and depression     • Arthritis    • Cataract    • Depression    • Extremity pain    • Fibromyalgia    • GERD (gastroesophageal reflux disease)    • Gout    • H/O bladder infections     JUST FINISHED MACROBID ON 11-2-17 FOR RECENT UTI   • Hypercholesteremia    • Hypertension    • Joint pain    • Kidney disease, chronic, stage III (GFR 30-59 ml/min) (HCC)     resolved after stopping antiinflammatory ~2015   • Low back pain    • Neck pain    • Pneumonia 02/2020   • Rheumatic fever    • Skin cancer    • Sleep apnea     no cpap   • Stroke (HCC) 09/2019    right sided weakness   • Wears glasses      Past Surgical History:   Procedure Laterality Date   • BACK SURGERY  2004    LUMBAR PER DR MAN x2   • CARDIAC CATHETERIZATION  2019   • CARPAL TUNNEL RELEASE Left    • COLONOSCOPY  11/2020   • EYE SURGERY      retina surgery   • FINGER SURGERY Right     3RD FINGER   • HEEL SPUR EXCISION Bilateral    • INTERVENTIONAL RADIOLOGY PROCEDURE N/A 09/17/2019    Procedure: Carotid and Cerebral Angiogram/ Possible Stent;  Surgeon: Imer Guerra MD;  Location:  [facility] CATH INVASIVE LOCATION;  Service: Interventional Radiology   • KNEE ARTHROSCOPY Bilateral    • LUMBAR DISCECTOMY FUSION INSTRUMENTATION N/A 11/10/2017    Procedure: LUMBAR SPINAL FUSION ;  Surgeon: Gopi Man MD;  Location: [facility] OR;  Service:    • REPLACEMENT TOTAL KNEE BILATERAL Bilateral    • SHOULDER ARTHROSCOPY W/ ROTATOR CUFF REPAIR Right 01/19/2021    Procedure: ARTHROSCOPIC ROTATOR CUFF REPAIR WITH BICEPS TENODESIS RIGHT;  Surgeon: Lance Morales Jr., MD;  Location: [facility] OR;  Service: Orthopedics;  Laterality: Right;   • SKIN BIOPSY     • TOTAL HIP ARTHROPLASTY Right 09/09/2019    Procedure: TOTAL ANTERIOR RIGHT HIP ARTHROPLASTY;  Surgeon: Darrin New MD;  Location: [facility] OR;  Service: Orthopedics   • TOTAL HIP ARTHROPLASTY Left 06/29/2020    Procedure: TOTAL HIP ARTHROPLASTY ANTERIOR LEFT;  Surgeon: Darrin New MD;  Location: [facility];  Service:  Orthopedics;  Laterality: Left;      General Information     Row Name 02/01/23 1415          OT Time and Intention    Document Type therapy note (daily note)  -     Mode of Treatment occupational therapy  -     Row Name 02/01/23 1415          General Information    Patient Profile Reviewed yes  -     Existing Precautions/Restrictions fall  contact/ airborne, residual R sided weakness  -     Row Name 02/01/23 1415          Cognition    Orientation Status (Cognition) oriented x 4  -     Row Name 02/01/23 1415          Safety Issues, Functional Mobility    Safety Issues Affecting Function (Mobility) safety precaution awareness  -     Impairments Affecting Function (Mobility) balance;endurance/activity tolerance;strength  -           User Key  (r) = Recorded By, (t) = Taken By, (c) = Cosigned By    Initials Name Provider Type     Dorie Gilmore, OT Occupational Therapist                 Mobility/ADL's     Row Name 02/01/23 1452          Bed Mobility    Comment, (Bed Mobility) standing in room with aide upon arrival  -     Row Name 02/01/23 1452          Transfers    Transfers sit-stand transfer  -     Row Name 02/01/23 1452          Sit-Stand Transfer    Sit-Stand [place] (Transfers) verbal cues;contact guard  -     Assistive Device (Sit-Stand Transfers) walker, front-wheeled  -     Row Name 02/01/23 1452          Toilet Transfer    Type (Toilet Transfer) stand-sit;sit-stand;stand pivot/stand step  -     [place] Level (Toilet Transfer) contact guard  -     Assistive Device (Toilet Transfer) cane, straight;grab bars/safety frame;walker, front-wheeled  -     Row Name 02/01/23 1452          Functional Mobility    Functional Mobility- Ind. Level contact guard assist  -     Functional Mobility- Device walker, front-wheeled  -     Functional Mobility-Distance (Feet) 60  -     Row Name 02/01/23 1452          Activities of Daily Living    BADL Assessment/Intervention lower body  dressing;grooming;toileting  -     Row Name 02/01/23 1452          Lower Body Dressing Assessment/Training    [place] Level (Lower Body Dressing) don;socks;dependent (less than 25% patient effort)  -     Position (Lower Body Dressing) supported sitting  -     Comment, (Lower Body Dressing) uses AE at baseline with LBD  -     Row Name 02/01/23 1452          Grooming Assessment/Training    [place] Level (Grooming) hair care, combing/brushing;wash face, hands;set up  -     Position (Grooming) sink side;supported standing  -     Row Name 02/01/23 1452          Toileting Assessment/Training    [place] Level (Toileting) perform perineal hygiene;moderate assist (50% patient effort)  -     Assistive Devices (Toileting) commode;grab bar/safety frame  -     Position (Toileting) supported standing;unsupported sitting  -AC           User Key  (r) = Recorded By, (t) = Taken By, (c) = Cosigned By    Initials Name Provider Type    AC Dorie Gilmore, OT Occupational Therapist               Obj/Interventions    No documentation.                Goals/Plan    No documentation.                Clinical Impression     [facility] Name 02/01/23 1455          Pain Assessment    Pretreatment Pain Rating 0/10 - no pain  -     Posttreatment Pain Rating 0/10 - no pain  -[facility] Name 02/01/23 1455          Plan of Care Review    Plan of Care Reviewed With patient  -     Progress improving  -     Outcome Evaluation Pt progressed from max a to mod a with toileting hygiene, and from min A to CGA with toilet transfer.  Pt continues to function below baseline with self care d/t weakness and decreased activity tolerance.  OT will continue to follow to advance pt toward PLOF with ADLs.  Recommend IP rehab upon d/c.  -[facility] Name 02/01/23 1454          Vital Signs    Pre Systolic BP Rehab 122  -AC     Pre Treatment Diastolic BP 69  -AC     Posttreatment Heart Rate (beats/min) 65  -AC     Pre SpO2 (%) 94  -AC     O2  Delivery Pre Treatment room air  -AC     Intra SpO2 (%) 92  -AC     O2 Delivery Intra Treatment room air  -AC     Post SpO2 (%) 97  -AC     O2 Delivery Post Treatment room air  -AC     Pre Patient Position Standing  -AC     Post Patient Position Sitting  -AC     Row Name 02/01/23 6282          Positioning and Restraints    Pre-Treatment Position standing in room  -AC     Post Treatment Position chair  -AC     In Chair notified nsg;reclined;call light within reach;encouraged to call for assist;exit alarm on;waffle cushion;heels elevated  -AC           User Key  (r) = Recorded By, (t) = Taken By, (c) = Cosigned By    Initials Name Provider Type    AC Dorie Gilmore, OT Occupational Therapist               Outcome Measures     Row Name 02/01/23 4535          How much help from another is currently needed...    Putting on and taking off regular lower body clothing? 2  -AC     Bathing (including washing, rinsing, and drying) 2  -AC     Toileting (which includes using toilet bed pan or urinal) 2  -AC     Putting on and taking off regular upper body clothing 3  -AC     Taking care of personal grooming (such as brushing teeth) 3  -AC     Eating meals 4  -AC     AM-PAC 6 Clicks Score (OT) 16  -AC     Row Name 02/01/23 0800          How much help from another person do you currently need...    Turning from your back to your side while in flat bed without using bedrails? 2  -BU     Moving from lying on back to sitting on the side of a flat bed without bedrails? 2  -BU     Moving to and from a bed to a chair (including a wheelchair)? 2  -BU     Standing up from a chair using your arms (e.g., wheelchair, bedside chair)? 2  -BU     Climbing 3-5 steps with a railing? 2  -BU     To walk in hospital room? 2  -BU     AM-PAC 6 Clicks Score (PT) 12  -BU     Highest level of mobility 4 --> Transferred to chair/commode  -BU     Row Name 02/01/23 5434          Functional Assessment    Outcome Measure Options AM-PAC 6 Clicks Daily Activity  (OT)  -           User Key  (r) = Recorded By, (t) = Taken By, (c) = Cosigned By    Initials Name Provider Type    AC Dorie Gilmore, OT Occupational Therapist    Becki Valdez, RN Registered Nurse                Occupational Therapy Education     Title: PT OT SLP Therapies (Done)     Topic: Occupational Therapy (Done)     Point: ADL training (Done)     Description:   Instruct learner(s) on proper safety adaptation and remediation techniques during self care or transfers.   Instruct in proper use of assistive devices.              Learning Progress Summary           Patient Acceptance, E, VU by  at 2/1/2023 1459    Eager, E, VU by  at 1/31/2023 1837    Acceptance, E, VU by  at 1/30/2023 1047    Comment: OT POC                   Point: Home exercise program (Done)     Description:   Instruct learner(s) on appropriate technique for monitoring, assisting and/or progressing therapeutic exercises/activities.              Learning Progress Summary           Patient Eager, E, VU by  at 1/31/2023 1837                   Point: Precautions (Done)     Description:   Instruct learner(s) on prescribed precautions during self-care and functional transfers.              Learning Progress Summary           Patient Eager, E, VU by  at 1/31/2023 1837    Acceptance, E, VU by  at 1/30/2023 1047    Comment: OT POC                   Point: Body mechanics (Done)     Description:   Instruct learner(s) on proper positioning and spine alignment during self-care, functional mobility activities and/or exercises.              Learning Progress Summary           Patient Eager, E, VU by  at 1/31/2023 1837    Acceptance, E, VU by  at 1/30/2023 1047    Comment: OT POC                               User Key     Initials Effective Dates Name Provider Type Discipline     06/16/21 -  Dorie Gilmore, OT Occupational Therapist [facility] 11/30/22 -  Henrique Crain, RN Registered Nurse Nurse    JANE 06/16/21 -  Aminata Guzmán OT  Occupational Therapist OT              OT Recommendation and Plan     Plan of Care Review  Plan of Care Reviewed With: patient  Progress: improving  Outcome Evaluation: Pt progressed from max a to mod a with toileting hygiene, and from min A to CGA with toilet transfer.  Pt continues to function below baseline with self care d/t weakness and decreased activity tolerance.  OT will continue to follow to advance pt toward PLOF with ADLs.  Recommend IP rehab upon d/c.     Time Calculation:    Time Calculation- OT     Row Name 02/01/23 1415             Time Calculation- OT    OT Start Time 1415  -AC      OT Received On 02/01/23  -AC      OT Goal Re-Cert Due Date 02/09/23  -AC         Timed Charges    17188 - OT Therapeutic Activity Minutes 10  -AC      47256 - OT Self Care/Mgmt Minutes 15  -AC         Total Minutes    Timed Charges Total Minutes 25  -AC       Total Minutes 25  -AC            User Key  (r) = Recorded By, (t) = Taken By, (c) = Cosigned By    Initials Name Provider Type    AC Dorie Gilmore, OT Occupational Therapist              Therapy Charges for Today     Code Description Service Date Service Provider Modifiers Qty    14604156021 HC OT THERAPEUTIC ACT EA 15 MIN 2/1/2023 Dorie Gilmore OT GO 1    43473466745 HC OT SELF CARE/MGMT/TRAIN EA 15 MIN 2/1/2023 Dorie Gilmore OT GO 1               Dorie Gilmore OT  2/1/2023

## 2023-02-01 NOTE — PROGRESS NOTES
[facility] Medicine Services  PROGRESS NOTE    Patient Name: Akua Torres  : 1945  MRN: [id number]    Date of Admission: 2023  Primary Care Physician: Natahlia Freeman PA-C    Subjective   Subjective     CC:  COVID    HPI:  Afebrile.  On room air.  Patient reports her headache from overnight resolved.  Reports her appetite has increased.  Has improved nonproductive cough.  Reports her energy levels are improved.  Reports being grateful for medical care at this hospital.    ROS:  Gen- no fevers, no chills  CV- No chest pain, palpitations  Resp- + cough, no dyspnea  GI- No N/V/D, abd pain     Objective   Objective     Vital Signs:   Temp:  [98 °F (36.7 °C)-98.8 °F (37.1 °C)] 98.4 °F (36.9 °C)  Heart Rate:  [60-73] 69  Resp:  [16-20] 18  BP: (115-128)/(55-76) 122/69  Flow (L/min):  [2] 2     Physical Exam:  Constitutional: No acute distress, awake, alert, laying in bed  HENT: NCAT, mucous membranes moist  Respiratory: Clear to auscultation bilaterally, respiratory effort normal   Cardiovascular: RRR, no murmurs, rubs, or gallops  Gastrointestinal: Positive bowel sounds, soft, nontender, nondistended  Musculoskeletal: No bilateral ankle edema  Psychiatric: Appropriate affect, cooperative  Neurologic: strength symmetric in all extremities, Cranial Nerves grossly intact to confrontation, speech clear  Skin: No rashes on exposed skin    Results Reviewed:  LAB RESULTS:      Lab 23  0424 23  0605 23  0618 23  0008   WBC 4.61 7.79  --  10.41   HEMOGLOBIN 12.5 12.5  --  13.8   HEMATOCRIT 39.7 39.8  --  43.7   PLATELETS 110* 114*  --  145   NEUTROS ABS 2.42 5.49  --  8.06*   IMMATURE GRANS (ABS) 0.02 0.03  --  0.03   LYMPHS ABS 1.46 1.35  --  1.22   MONOS ABS 0.50 0.86  --  1.05*   EOS ABS 0.20 0.04  --  0.03   MCV 94.1 95.2  --  95.0   CRP 10.00* 11.97*  --  5.82*   PROCALCITONIN  --   --   --  0.24   LACTATE  --   --   --  0.8   LDH  --   --   --  189    PROTIME 17.3* 16.7* 16.2* 16.5*   APTT  --   --   --  30.6   D DIMER QUANT  --   --   --  0.36         Lab 02/01/23 0424 01/31/23 0605 01/30/23 1730 01/30/23 0557 01/30/23 0008   SODIUM 138 138  --   --  137   POTASSIUM 4.2 4.6  --   --  5.1   CHLORIDE 106 105  --   --  105   CO2 24.0 24.0  --   --  23.0   ANION GAP 8.0 9.0  --   --  9.0   BUN 21 22  --   --  29*   CREATININE 0.96 1.19* 1.19*  --  1.41*   EGFR 60.7 46.9* 46.9*  --  38.3*   GLUCOSE 71 80  --   --  107*   CALCIUM 8.0* 8.2*  --   --  8.5*   MAGNESIUM 1.6 1.7  --   --  2.0   PHOSPHORUS  --   --   --   --  2.6   HEMOGLOBIN A1C  --   --   --  5.90*  --    TSH  --   --   --   --  0.355         Lab 02/01/23 0424 01/31/23 0605 01/30/23 1730 01/30/23 0008   TOTAL PROTEIN 5.4* 5.6* 6.6 6.5   ALBUMIN 3.0* 3.1* 3.9 3.7   GLOBULIN 2.4 2.5  --  2.8   ALT (SGPT) 10 9 12 10   AST (SGOT) 21 19 21 17   BILIRUBIN 0.6 0.9 1.0 1.1   INDIRECT BILIRUBIN  --   --  0.8  --    BILIRUBIN DIRECT <0.2 0.2 0.2  --    ALK PHOS 74 80 103 107   LIPASE  --   --   --  68*         Lab 02/01/23 0424 01/31/23 0605 01/30/23 0618 01/30/23 0008   TROPONIN T  --   --   --  <0.010   PROTIME 17.3* 16.7* 16.2* 16.5*   INR 1.42* 1.36* 1.31* 1.33*         Lab 01/30/23 0557   CHOLESTEROL 106   LDL CHOL 48   HDL CHOL 41   TRIGLYCERIDES 86         Lab 01/30/23  0008   FERRITIN 189.80*         Brief Urine Lab Results  (Last result in the past 365 days)      Color   Clarity   Blood   Leuk Est   Nitrite   Protein   CREAT   Urine HCG        01/29/23 2358 Yellow   Clear   Negative   Negative   Negative   Trace                 Microbiology Results Abnormal     Procedure Component Value - Date/Time    Blood Culture - Blood, Hand, Left [[id number]]  (Normal) Collected: 01/30/23 0020    Lab Status: Preliminary result Specimen: Blood from Hand, Left Updated: 02/01/23 0045     Blood Culture No growth at 2 days    Blood Culture - Blood, Arm, Left [[id number]]  (Normal) Collected: 01/30/23 0015     Lab Status: Preliminary result Specimen: Blood from Arm, Left Updated: 02/01/23 0045     Blood Culture No growth at 2 days          Adult Transthoracic Echo Complete W/ Cont if Necessary Per Protocol (With Agitated Saline)    Result Date: 1/31/2023  •  Left ventricular systolic function is normal. Left ventricular ejection fraction appears to be 66 - 70%. •  The right ventricular cavity is borderline dilated. •  Saline test results are negative. •  The right atrial cavity is mildly dilated.       Results for orders placed during the hospital encounter of 01/29/23    Adult Transthoracic Echo Complete W/ Cont if Necessary Per Protocol (With Agitated Saline)    Interpretation Summary  •  Left ventricular systolic function is normal. Left ventricular ejection fraction appears to be 66 - 70%.  •  The right ventricular cavity is borderline dilated.  •  Saline test results are negative.  •  The right atrial cavity is mildly dilated.      I have reviewed the medications:  Scheduled Meds:allopurinol, 100 mg, Oral, Daily  atenolol, 25 mg, Oral, Q12H  atorvastatin, 80 mg, Oral, Nightly  benzonatate, 200 mg, Oral, TID  cholecalciferol, 2,000 Units, Oral, Daily  clopidogrel, 75 mg, Oral, Daily  enoxaparin, 1 mg/kg, Subcutaneous, Q12H  escitalopram, 20 mg, Oral, Daily  guaiFENesin, 1,200 mg, Oral, Q12H  pregabalin, 50 mg, Oral, TID  propafenone SR, 225 mg, Oral, Q12H  remdesivir, 100 mg, Intravenous, Q24H  sodium chloride, 10 mL, Intravenous, Q12H  [START ON 2/2/2023] warfarin, 5 mg, Oral, Daily  warfarin, 7.5 mg, Oral, Once      Continuous Infusions:Pharmacy Consult - Remdesivir,   Pharmacy to dose warfarin,       PRN Meds:.•  acetaminophen  •  albuterol sulfate HFA  •  hydrOXYzine  •  Magnesium Standard Dose Replacement - Initiate Nurse / BPA Driven Protocol  •  ondansetron  •  Pharmacy Consult - Remdesivir  •  Pharmacy to dose warfarin  •  sodium chloride  •  sodium chloride  •  sodium chloride    Assessment & Plan    Assessment & Plan     Active Hospital Problems    Diagnosis  POA   • **COVID-19 [U07.1]  Yes   • COVID-19 virus detected [U07.1]  Yes   • Weakness [R53.1]  Yes   • History of stroke [Z86.73]  Not Applicable   • Hyperlipidemia [E78.5]  Yes   • Paroxysmal atrial fibrillation (HCC) [I48.0]  Yes   • LOTTIE (acute kidney injury) (HCC) [N17.9]  Yes   • Essential hypertension [I10]  Yes      Resolved Hospital Problems   No resolved problems to display.        Brief Hospital Course to date:  Akua Torres is a 77 y.o. female with PMH significant for PAF on Coumadin, CVA, GERD, HTN, dyslipidemia, recurrent UTI with urinary incontinence, depression, YUNIOR (not on CPAP), who presented to the ED with complaint of generalized weakness and was found to have COVID 19 infection. She was satting well on admission.     This patient's problems and plans were partially entered by my partner and updated as appropriate by me 02/01/23.      COVID-19  - Tylenol as needed for fever  - Symptom management with Laura Geronimoex  - not currently hypoxic  - Given comorbidities and high risk for progression, started patient on Remdesivir x3 days; jolene risks/benefits and side effects of remdesivir with patient and she is accepting of risks; discussed with pharmacy and only real medical interactions with warfarin, and remdesivir can increase INR, will continue to monitor  - If becomes hypoxic, will start Decadron, otherwise will not place patient on steroids  - Daily CBC, CMP for now  --COVID-positive on 1/30, symptoms began on ~1/27 and will need to isolate for 10 days from symptom onset (through 2/6)  --Once remdesivir done and if patient's A. fib remains rate controlled, will remove from telemetry    LOTTIE, improved  - Likely secondary to dehydration  - LR bolus x2 given in the ED  - Hold further IVF for now  - AM labs     History of pyuria  - Recently diagnosed with UTI on 1/24, treated with Cipro  - Hold further ABX for now, UA  clear     Weakness  Dysarthria  - Likely secondary to above  - Fall precautions  - PT/OT consulted --> recommended rehab  -- Stroke service evaluated the patient; TTE showed LV EF 66-70%, RV borderline dilated, saline test negative  -- MRI findings chronic in nature and reviewed by Stroke Neurology, who recommended no further intervention  -- Cleared by speech for diet  -- Plavix 75mg daily     PAF  - Rate controlled  - Anticoagulated on Coumadin, pharmacy to dose  - subtherapeutic INR on admission, bridge with Lovenox  - Continue Rythmol    Mild thrombocytopenia  --Was not exposed to any heparin products prior to mild drop in platelets, continue to monitor (stable)     Hypertension  Dyslipidemia  - Continue atenolol, Lipitor  - Hold lisinopril secondary to renal function     Fibromyalgia  - Continue Lyrica     Depression/anxiety  - Continue Lexapro  - As needed Atarax        DVT prophylaxis: On Coumadin, lovenox      Expected Discharge Location and Transportation: IRF, which patient is amenable to  Expected Discharge   Expected Discharge Date and Time     Expected Discharge Date Expected Discharge Time    Feb 6, 2023            DVT prophylaxis:  Medical and mechanical DVT prophylaxis orders are present.     AM-PAC 6 Clicks Score (PT): 12 (02/01/23 0800)    CODE STATUS:   Code Status and Medical Interventions:   Ordered at: 01/30/23 0309     Medical Intervention Limits:    NO intubation (DNI)     Level Of Support Discussed With:    Patient     Code Status (Patient has no pulse and is not breathing):    No CPR (Do Not Attempt to Resuscitate)     Medical Interventions (Patient has pulse or is breathing):    Limited Support       Debi West MD  02/01/23

## 2023-02-01 NOTE — CASE MANAGEMENT/SOCIAL WORK
Continued Stay Note  [facility]     Patient Name: Akua Torres  MRN: [id number]  Today's Date: 2/1/2023    Admit Date: 1/29/2023    Plan: [facility]   Discharge Plan     Row Name 02/01/23 1336       Plan    Plan [facility]    Patient/Family in Agreement with Plan yes    Plan Comments Spoke with Govind at [facility] and made the referral. Govind said patient will need to be out of isolation beofre they can accept the patient. CM will continue to follow.    Final Discharge Disposition Code 03 - skilled nursing facility (SNF)               Discharge Codes    No documentation.               Expected Discharge Date and Time     Expected Discharge Date Expected Discharge Time    Feb 3, 2023             Ken Turpin RN

## 2023-02-01 NOTE — PLAN OF CARE
Problem: Adult Inpatient Plan of Care  Goal: Plan of Care Review  Outcome: Ongoing, Progressing  Flowsheets (Taken 2/1/2023 0613)  Progress: no change  Plan of Care Reviewed With: patient  Goal: Patient-Specific Goal (Individualized)  Outcome: Ongoing, Progressing  Goal: Absence of Hospital-Acquired Illness or Injury  Outcome: Ongoing, Progressing  Intervention: Identify and Manage Fall Risk  Recent Flowsheet Documentation  Taken 2/1/2023 0600 by Talia Hernandez RN  Safety Promotion/Fall Prevention:   safety round/check completed   nonskid shoes/slippers when out of bed  Taken 2/1/2023 0351 by Talia Hernandez RN  Safety Promotion/Fall Prevention:   safety round/check completed   nonskid shoes/slippers when out of bed  Taken 2/1/2023 0200 by Talia Hernandez RN  Safety Promotion/Fall Prevention:   safety round/check completed   nonskid shoes/slippers when out of bed  Taken 2/1/2023 0000 by Talia Hernandez RN  Safety Promotion/Fall Prevention:   safety round/check completed   nonskid shoes/slippers when out of bed  Taken 1/31/2023 2200 by Talia Hernandez RN  Safety Promotion/Fall Prevention:   safety round/check completed   nonskid shoes/slippers when out of bed  Taken 1/31/2023 2000 by Talia Hernandez RN  Safety Promotion/Fall Prevention:   assistive device/personal items within reach   clutter free environment maintained   fall prevention program maintained   lighting adjusted   nonskid shoes/slippers when out of bed   room organization consistent   safety round/check completed  Intervention: Prevent Skin Injury  Recent Flowsheet Documentation  Taken 2/1/2023 0600 by Talia Hernandez RN  Body Position: position changed independently  Taken 2/1/2023 0351 by Talia Hernandez RN  Body Position: position changed independently  Taken 2/1/2023 0200 by Talia Hernandez RN  Body Position: position changed independently  Taken 2/1/2023 0000 by Talia Hernandez RN  Body Position: position changed independently  Taken 1/31/2023 2200 by Talia Hernandez  RN  Body Position: position changed independently  Taken 1/31/2023 2000 by Talia Hernandez RN  Body Position: position changed independently  Intervention: Prevent and Manage VTE (Venous Thromboembolism) Risk  Recent Flowsheet Documentation  Taken 2/1/2023 0600 by Talia Hernandez RN  Activity Management: activity adjusted per tolerance  Taken 2/1/2023 0351 by Talia Hernandez RN  Activity Management: activity adjusted per tolerance  Taken 2/1/2023 0200 by Talia Hernandez RN  Activity Management: activity adjusted per tolerance  Taken 2/1/2023 0000 by Talia Hernandez RN  Activity Management: activity adjusted per tolerance  Taken 1/31/2023 2200 by Talia Hernandez RN  Activity Management: activity adjusted per tolerance  Taken 1/31/2023 2000 by Talia Hernandez RN  Activity Management: activity adjusted per tolerance  Goal: Optimal Comfort and Wellbeing  Outcome: Ongoing, Progressing  Goal: Readiness for Transition of Care  Outcome: Ongoing, Progressing  Goal: Plan of Care Review  Outcome: Ongoing, Progressing  Flowsheets (Taken 2/1/2023 0613)  Progress: no change  Plan of Care Reviewed With: patient  Goal: Patient-Specific Goal (Individualized)  Outcome: Ongoing, Progressing  Goal: Absence of Hospital-Acquired Illness or Injury  Outcome: Ongoing, Progressing  Intervention: Identify and Manage Fall Risk  Recent Flowsheet Documentation  Taken 2/1/2023 0600 by Talia Hernandez RN  Safety Promotion/Fall Prevention:   safety round/check completed   nonskid shoes/slippers when out of bed  Taken 2/1/2023 0351 by Talia Hernandez RN  Safety Promotion/Fall Prevention:   safety round/check completed   nonskid shoes/slippers when out of bed  Taken 2/1/2023 0200 by Talia Hernandez RN  Safety Promotion/Fall Prevention:   safety round/check completed   nonskid shoes/slippers when out of bed  Taken 2/1/2023 0000 by Talia Hernandez RN  Safety Promotion/Fall Prevention:   safety round/check completed   nonskid shoes/slippers when out of bed  Taken 1/31/2023  2200 by Talia Hernandez RN  Safety Promotion/Fall Prevention:   safety round/check completed   nonskid shoes/slippers when out of bed  Taken 1/31/2023 2000 by Talia Hernandez RN  Safety Promotion/Fall Prevention:   assistive device/personal items within reach   clutter free environment maintained   fall prevention program maintained   lighting adjusted   nonskid shoes/slippers when out of bed   room organization consistent   safety round/check completed  Intervention: Prevent Skin Injury  Recent Flowsheet Documentation  Taken 2/1/2023 0600 by Talia Hernandez RN  Body Position: position changed independently  Taken 2/1/2023 0351 by Talia Hernandez RN  Body Position: position changed independently  Taken 2/1/2023 0200 by Talia Hernandez RN  Body Position: position changed independently  Taken 2/1/2023 0000 by Talia Hernandez RN  Body Position: position changed independently  Taken 1/31/2023 2200 by Talia Hernandez RN  Body Position: position changed independently  Taken 1/31/2023 2000 by Talia Hernandez RN  Body Position: position changed independently  Intervention: Prevent and Manage VTE (Venous Thromboembolism) Risk  Recent Flowsheet Documentation  Taken 2/1/2023 0600 by Talia Hernandez RN  Activity Management: activity adjusted per tolerance  Taken 2/1/2023 0351 by Talia Hernandez RN  Activity Management: activity adjusted per tolerance  Taken 2/1/2023 0200 by Talia Hernandez RN  Activity Management: activity adjusted per tolerance  Taken 2/1/2023 0000 by Talia Hernandez RN  Activity Management: activity adjusted per tolerance  Taken 1/31/2023 2200 by Talia Hernandez RN  Activity Management: activity adjusted per tolerance  Taken 1/31/2023 2000 by Talia Hernandez RN  Activity Management: activity adjusted per tolerance  Goal: Optimal Comfort and Wellbeing  Outcome: Ongoing, Progressing  Goal: Readiness for Transition of Care  Outcome: Ongoing, Progressing     Problem: Fall Injury Risk  Goal: Absence of Fall and Fall-Related Injury  Outcome:  Ongoing, Progressing  Intervention: Identify and Manage Contributors  Recent Flowsheet Documentation  Taken 1/31/2023 2000 by Talia Hernandez RN  Medication Review/Management: medications reviewed  Intervention: Promote Injury-Free Environment  Recent Flowsheet Documentation  Taken 2/1/2023 0600 by Talia Hernandez RN  Safety Promotion/Fall Prevention:   safety round/check completed   nonskid shoes/slippers when out of bed  Taken 2/1/2023 0351 by Talia Hernandez RN  Safety Promotion/Fall Prevention:   safety round/check completed   nonskid shoes/slippers when out of bed  Taken 2/1/2023 0200 by Talia Hernandez RN  Safety Promotion/Fall Prevention:   safety round/check completed   nonskid shoes/slippers when out of bed  Taken 2/1/2023 0000 by Talia Hernandez RN  Safety Promotion/Fall Prevention:   safety round/check completed   nonskid shoes/slippers when out of bed  Taken 1/31/2023 2200 by Talia Hernandez RN  Safety Promotion/Fall Prevention:   safety round/check completed   nonskid shoes/slippers when out of bed  Taken 1/31/2023 2000 by Talia Hernandez RN  Safety Promotion/Fall Prevention:   assistive device/personal items within reach   clutter free environment maintained   fall prevention program maintained   lighting adjusted   nonskid shoes/slippers when out of bed   room organization consistent   safety round/check completed     Problem: Behavioral Health Comorbidity  Goal: Maintenance of Behavioral Health Symptom Control  Outcome: Ongoing, Progressing  Intervention: Maintain Behavioral Health Symptom Control  Recent Flowsheet Documentation  Taken 1/31/2023 2000 by Talia Hernandez RN  Medication Review/Management: medications reviewed     Problem: Osteoarthritis Comorbidity  Goal: Maintenance of Osteoarthritis Symptom Control  Outcome: Ongoing, Progressing  Intervention: Maintain Osteoarthritis Symptom Control  Recent Flowsheet Documentation  Taken 2/1/2023 0600 by Talia Hernandez RN  Activity Management: activity adjusted per  tolerance  Taken 2/1/2023 0351 by Talia Hernandez RN  Activity Management: activity adjusted per tolerance  Taken 2/1/2023 0200 by Talia Hernandez RN  Activity Management: activity adjusted per tolerance  Taken 2/1/2023 0000 by Talia Hernandez RN  Activity Management: activity adjusted per tolerance  Taken 1/31/2023 2200 by Talia Hernandez RN  Activity Management: activity adjusted per tolerance  Taken 1/31/2023 2000 by Talia Hernandez RN  Activity Management: activity adjusted per tolerance  Medication Review/Management: medications reviewed     Problem: Skin Injury Risk Increased  Goal: Skin Health and Integrity  Outcome: Ongoing, Progressing  Intervention: Optimize Skin Protection  Recent Flowsheet Documentation  Taken 2/1/2023 0600 by Talia Hernandez RN  Head of Bed (HOB) Positioning: HOB elevated  Taken 2/1/2023 0351 by Talia Hernandez RN  Head of Bed (HOB) Positioning: HOB elevated  Taken 2/1/2023 0200 by Talia Hernandez RN  Head of Bed (HOB) Positioning: HOB elevated  Taken 2/1/2023 0000 by Talia Hernandez RN  Head of Bed (HOB) Positioning: HOB elevated  Taken 1/31/2023 2200 by Talia Hernandez RN  Head of Bed (HOB) Positioning: HOB elevated  Taken 1/31/2023 2000 by Talia Hernandez RN  Head of Bed (HOB) Positioning: HOB elevated     Problem: Breathing Pattern Ineffective  Goal: Effective Breathing Pattern  Outcome: Ongoing, Progressing  Intervention: Promote Improved Breathing Pattern  Recent Flowsheet Documentation  Taken 2/1/2023 0600 by Talia Hernandez RN  Head of Bed (HOB) Positioning: HOB elevated  Taken 2/1/2023 0351 by Talia Hernandez RN  Head of Bed (HOB) Positioning: HOB elevated  Taken 2/1/2023 0200 by Talia Hernandez RN  Head of Bed (HOB) Positioning: HOB elevated  Taken 2/1/2023 0000 by Talia Hernandez RN  Head of Bed (HOB) Positioning: HOB elevated  Taken 1/31/2023 2200 by Talia Hernandez RN  Head of Bed (HOB) Positioning: HOB elevated  Taken 1/31/2023 2000 by Talia Hernandez RN  Head of Bed (HOB) Positioning: HOB elevated   Goal  Outcome Evaluation:  Plan of Care Reviewed With: patient        Progress: no change

## 2023-02-02 LAB
ALBUMIN SERPL-MCNC: 3 G/DL (ref 3.5–5.2)
ALBUMIN/GLOB SERPL: 1.1 G/DL
ALP SERPL-CCNC: 80 U/L (ref 39–117)
ALT SERPL W P-5'-P-CCNC: 12 U/L (ref 1–33)
ANION GAP SERPL CALCULATED.3IONS-SCNC: 8 MMOL/L (ref 5–15)
AST SERPL-CCNC: 22 U/L (ref 1–32)
BASOPHILS # BLD AUTO: 0.01 10*3/MM3 (ref 0–0.2)
BASOPHILS NFR BLD AUTO: 0.2 % (ref 0–1.5)
BILIRUB SERPL-MCNC: 0.4 MG/DL (ref 0–1.2)
BUN SERPL-MCNC: 24 MG/DL (ref 8–23)
BUN/CREAT SERPL: 29.3 (ref 7–25)
CALCIUM SPEC-SCNC: 8.2 MG/DL (ref 8.6–10.5)
CHLORIDE SERPL-SCNC: 104 MMOL/L (ref 98–107)
CO2 SERPL-SCNC: 23 MMOL/L (ref 22–29)
CREAT SERPL-MCNC: 0.82 MG/DL (ref 0.57–1)
CRP SERPL-MCNC: 6.18 MG/DL (ref 0–0.5)
DEPRECATED RDW RBC AUTO: 51.8 FL (ref 37–54)
EGFRCR SERPLBLD CKD-EPI 2021: 73.3 ML/MIN/1.73
EOSINOPHIL # BLD AUTO: 0.3 10*3/MM3 (ref 0–0.4)
EOSINOPHIL NFR BLD AUTO: 6.9 % (ref 0.3–6.2)
ERYTHROCYTE [DISTWIDTH] IN BLOOD BY AUTOMATED COUNT: 14.9 % (ref 12.3–15.4)
GLOBULIN UR ELPH-MCNC: 2.8 GM/DL
GLUCOSE SERPL-MCNC: 70 MG/DL (ref 65–99)
HCT VFR BLD AUTO: 40.7 % (ref 34–46.6)
HGB BLD-MCNC: 12.9 G/DL (ref 12–15.9)
IMM GRANULOCYTES # BLD AUTO: 0.02 10*3/MM3 (ref 0–0.05)
IMM GRANULOCYTES NFR BLD AUTO: 0.5 % (ref 0–0.5)
INR PPP: 1.63 (ref 0.84–1.13)
LYMPHOCYTES # BLD AUTO: 1.58 10*3/MM3 (ref 0.7–3.1)
LYMPHOCYTES NFR BLD AUTO: 36.5 % (ref 19.6–45.3)
MAGNESIUM SERPL-MCNC: 2.2 MG/DL (ref 1.6–2.4)
MCH RBC QN AUTO: 30 PG (ref 26.6–33)
MCHC RBC AUTO-ENTMCNC: 31.7 G/DL (ref 31.5–35.7)
MCV RBC AUTO: 94.7 FL (ref 79–97)
MONOCYTES # BLD AUTO: 0.41 10*3/MM3 (ref 0.1–0.9)
MONOCYTES NFR BLD AUTO: 9.5 % (ref 5–12)
NEUTROPHILS NFR BLD AUTO: 2.01 10*3/MM3 (ref 1.7–7)
NEUTROPHILS NFR BLD AUTO: 46.4 % (ref 42.7–76)
NRBC BLD AUTO-RTO: 0 /100 WBC (ref 0–0.2)
PLATELET # BLD AUTO: 124 10*3/MM3 (ref 140–450)
PMV BLD AUTO: 10.6 FL (ref 6–12)
POTASSIUM SERPL-SCNC: 4.2 MMOL/L (ref 3.5–5.2)
PROT SERPL-MCNC: 5.8 G/DL (ref 6–8.5)
PROTHROMBIN TIME: 19.3 SECONDS (ref 11.4–14.4)
QT INTERVAL: 342 MS
QTC INTERVAL: 401 MS
RBC # BLD AUTO: 4.3 10*6/MM3 (ref 3.77–5.28)
SODIUM SERPL-SCNC: 135 MMOL/L (ref 136–145)
WBC NRBC COR # BLD: 4.33 10*3/MM3 (ref 3.4–10.8)

## 2023-02-02 PROCEDURE — 96372 THER/PROPH/DIAG INJ SC/IM: CPT

## 2023-02-02 PROCEDURE — G0378 HOSPITAL OBSERVATION PER HR: HCPCS

## 2023-02-02 PROCEDURE — 97530 THERAPEUTIC ACTIVITIES: CPT | Performed by: PHYSICAL THERAPIST

## 2023-02-02 PROCEDURE — 80053 COMPREHEN METABOLIC PANEL: CPT | Performed by: STUDENT IN AN ORGANIZED HEALTH CARE EDUCATION/TRAINING PROGRAM

## 2023-02-02 PROCEDURE — 97116 GAIT TRAINING THERAPY: CPT | Performed by: PHYSICAL THERAPIST

## 2023-02-02 PROCEDURE — 25010000002 ENOXAPARIN PER 10 MG: Performed by: STUDENT IN AN ORGANIZED HEALTH CARE EDUCATION/TRAINING PROGRAM

## 2023-02-02 PROCEDURE — 99231 SBSQ HOSP IP/OBS SF/LOW 25: CPT | Performed by: STUDENT IN AN ORGANIZED HEALTH CARE EDUCATION/TRAINING PROGRAM

## 2023-02-02 PROCEDURE — 83735 ASSAY OF MAGNESIUM: CPT | Performed by: NURSE PRACTITIONER

## 2023-02-02 PROCEDURE — 86140 C-REACTIVE PROTEIN: CPT | Performed by: NURSE PRACTITIONER

## 2023-02-02 PROCEDURE — 85610 PROTHROMBIN TIME: CPT | Performed by: NURSE PRACTITIONER

## 2023-02-02 PROCEDURE — 85025 COMPLETE CBC W/AUTO DIFF WBC: CPT | Performed by: NURSE PRACTITIONER

## 2023-02-02 RX ORDER — WARFARIN SODIUM 7.5 MG/1
7.5 TABLET ORAL
Status: DISCONTINUED | OUTPATIENT
Start: 2023-02-02 | End: 2023-02-03

## 2023-02-02 RX ORDER — LOPERAMIDE HYDROCHLORIDE 2 MG/1
2 CAPSULE ORAL 4 TIMES DAILY PRN
Status: DISCONTINUED | OUTPATIENT
Start: 2023-02-02 | End: 2023-02-04 | Stop reason: HOSPADM

## 2023-02-02 RX ADMIN — ACETAMINOPHEN 325MG 650 MG: 325 TABLET ORAL at 10:21

## 2023-02-02 RX ADMIN — ATENOLOL 25 MG: 25 TABLET ORAL at 20:36

## 2023-02-02 RX ADMIN — Medication 2000 UNITS: at 09:24

## 2023-02-02 RX ADMIN — Medication 10 ML: at 20:36

## 2023-02-02 RX ADMIN — BENZONATATE 200 MG: 100 CAPSULE ORAL at 16:25

## 2023-02-02 RX ADMIN — WARFARIN SODIUM 7.5 MG: 7.5 TABLET ORAL at 17:51

## 2023-02-02 RX ADMIN — GUAIFENESIN 1200 MG: 600 TABLET, EXTENDED RELEASE ORAL at 09:24

## 2023-02-02 RX ADMIN — GUAIFENESIN 1200 MG: 600 TABLET, EXTENDED RELEASE ORAL at 20:36

## 2023-02-02 RX ADMIN — PREGABALIN 50 MG: 50 CAPSULE ORAL at 16:25

## 2023-02-02 RX ADMIN — PREGABALIN 50 MG: 50 CAPSULE ORAL at 20:36

## 2023-02-02 RX ADMIN — BENZONATATE 200 MG: 100 CAPSULE ORAL at 20:36

## 2023-02-02 RX ADMIN — ATENOLOL 25 MG: 25 TABLET ORAL at 09:24

## 2023-02-02 RX ADMIN — ALLOPURINOL 100 MG: 100 TABLET ORAL at 09:24

## 2023-02-02 RX ADMIN — ENOXAPARIN SODIUM 100 MG: 100 INJECTION SUBCUTANEOUS at 17:51

## 2023-02-02 RX ADMIN — BENZONATATE 200 MG: 100 CAPSULE ORAL at 09:24

## 2023-02-02 RX ADMIN — PROPAFENONE HYDROCHLORIDE 225 MG: 225 CAPSULE, EXTENDED RELEASE ORAL at 09:24

## 2023-02-02 RX ADMIN — ENOXAPARIN SODIUM 100 MG: 100 INJECTION SUBCUTANEOUS at 06:00

## 2023-02-02 RX ADMIN — PROPAFENONE HYDROCHLORIDE 225 MG: 225 CAPSULE, EXTENDED RELEASE ORAL at 20:36

## 2023-02-02 RX ADMIN — ESCITALOPRAM OXALATE 20 MG: 10 TABLET, FILM COATED ORAL at 10:21

## 2023-02-02 RX ADMIN — ATORVASTATIN CALCIUM 80 MG: 40 TABLET, FILM COATED ORAL at 20:36

## 2023-02-02 RX ADMIN — LOPERAMIDE HYDROCHLORIDE 2 MG: 2 CAPSULE ORAL at 14:12

## 2023-02-02 RX ADMIN — Medication 10 ML: at 10:22

## 2023-02-02 RX ADMIN — CLOPIDOGREL BISULFATE 75 MG: 75 TABLET ORAL at 09:24

## 2023-02-02 RX ADMIN — PREGABALIN 50 MG: 50 CAPSULE ORAL at 09:24

## 2023-02-02 NOTE — THERAPY TREATMENT NOTE
Patient Name: Akua Torres  : 1945    MRN: [id number]                              Today's Date: 2023       Admit Date: 2023    Visit Dx:     ICD-10-CM ICD-9-CM   1. COVID-19  U07.1 079.89   2. Fever and chills  R50.9 780.60   3. Right sided weakness  R53.1 728.87   4. LOTTIE (acute kidney injury) ([facility])  N17.9 584.9   5. Dehydration  E86.0 276.51   6. Mild obesity  E66.9 278.00   7. History of CVA (cerebrovascular accident)  Z86.73 V12.54     Patient Active Problem List   Diagnosis   • Degenerative disc disease, lumbar   • Lumbar stenosis with neurogenic claudication   • History of lumbar fusion   • Spondylosis of lumbar region without myelopathy or radiculopathy   • Mild obesity   • Physical deconditioning   • Idiopathic gout   • Anxiety and depression   • Greater trochanteric bursitis of both hips   • Status post total bilateral knee replacement   • Back pain   • Essential hypertension   • Prediabetes   • S/P lumbar spinal fusion   • Renal insufficiency   • Leukocytosis, likely reactive   • LOTTIE (acute kidney injury) ([facility])   • Depression   • Arthritis of right hip   • Paroxysmal atrial fibrillation ([facility])   • YUNIOR treated with BiPAP   • 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   • Acute blood loss anemia, mild, asymptomatic   • 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 ([facility])   • Supraventricular tachycardia ([facility])   • Left prefrontal gyrus stroke   • Arthritis of left hip   • Hip pain   • Status post total replacement of left hip   • Hyperlipidemia   • Elevated hemoglobin A1c   • History of stroke   • Acute postoperative pain   • Nontraumatic complete tear of right rotator cuff   • Traumatic rhabdomyolysis, initial encounter ([facility])   • Hematoma of right thigh   • Pain of right lower extremity   • Anemia   • Fibromyalgia   • COVID-19 virus detected   • Weakness   • COVID-19     Past Medical History:   Diagnosis Date   • A-fib ([facility]) 2019   • Anxiety and depression     • Arthritis    • Cataract    • Depression    • Extremity pain    • Fibromyalgia    • GERD (gastroesophageal reflux disease)    • Gout    • H/O bladder infections     JUST FINISHED MACROBID ON 11-2-17 FOR RECENT UTI   • Hypercholesteremia    • Hypertension    • Joint pain    • Kidney disease, chronic, stage III (GFR 30-59 ml/min) (HCC)     resolved after stopping antiinflammatory ~2015   • Low back pain    • Neck pain    • Pneumonia 02/2020   • Rheumatic fever    • Skin cancer    • Sleep apnea     no cpap   • Stroke (HCC) 09/2019    right sided weakness   • Wears glasses      Past Surgical History:   Procedure Laterality Date   • BACK SURGERY  2004    LUMBAR PER DR MAN x2   • CARDIAC CATHETERIZATION  2019   • CARPAL TUNNEL RELEASE Left    • COLONOSCOPY  11/2020   • EYE SURGERY      retina surgery   • FINGER SURGERY Right     3RD FINGER   • HEEL SPUR EXCISION Bilateral    • INTERVENTIONAL RADIOLOGY PROCEDURE N/A 09/17/2019    Procedure: Carotid and Cerebral Angiogram/ Possible Stent;  Surgeon: Imer Guerra MD;  Location:  [facility] CATH INVASIVE LOCATION;  Service: Interventional Radiology   • KNEE ARTHROSCOPY Bilateral    • LUMBAR DISCECTOMY FUSION INSTRUMENTATION N/A 11/10/2017    Procedure: LUMBAR SPINAL FUSION ;  Surgeon: Gopi Man MD;  Location: [facility] OR;  Service:    • REPLACEMENT TOTAL KNEE BILATERAL Bilateral    • SHOULDER ARTHROSCOPY W/ ROTATOR CUFF REPAIR Right 01/19/2021    Procedure: ARTHROSCOPIC ROTATOR CUFF REPAIR WITH BICEPS TENODESIS RIGHT;  Surgeon: Lance Morales Jr., MD;  Location: [facility] OR;  Service: Orthopedics;  Laterality: Right;   • SKIN BIOPSY     • TOTAL HIP ARTHROPLASTY Right 09/09/2019    Procedure: TOTAL ANTERIOR RIGHT HIP ARTHROPLASTY;  Surgeon: Darrin New MD;  Location: [facility] OR;  Service: Orthopedics   • TOTAL HIP ARTHROPLASTY Left 06/29/2020    Procedure: TOTAL HIP ARTHROPLASTY ANTERIOR LEFT;  Surgeon: Darrin New MD;  Location: [facility];  Service:  Orthopedics;  Laterality: Left;      General Information     Row Name 02/02/23 1058          Physical Therapy Time and Intention    Document Type therapy note (daily note)  -LM     Mode of Treatment individual therapy;physical therapy  -LM     Row Name 02/02/23 1058          General Information    Patient Profile Reviewed yes  -LM     Existing Precautions/Restrictions fall  -LM     Row Name 02/02/23 1058          Cognition    Orientation Status (Cognition) oriented x 4  -LM     Row Name 02/02/23 1058          Safety Issues, Functional Mobility    Safety Issues Affecting Function (Mobility) safety precaution awareness  -LM     Impairments Affecting Function (Mobility) balance;endurance/activity tolerance;strength  -LM           User Key  (r) = Recorded By, (t) = Taken By, (c) = Cosigned By    Initials Name Provider Type    LM Jeannette Richardson, PT Physical Therapist               Mobility     Row Name 02/02/23 1100          Bed Mobility    Comment, (Bed Mobility) Pt in restroom with RN upon entering room.  -LM     Row Name 02/02/23 1100          Sit-Stand Transfer    Sit-Stand [place] (Transfers) contact guard;1 person assist;verbal cues  -LM     Assistive Device (Sit-Stand Transfers) walker, front-wheeled  -LM     Comment, (Sit-Stand Transfer) Stood from toilet.  ModA needed with pericare.  -LM     Row Name 02/02/23 1100          Gait/Stairs (Locomotion)    [place] Level (Gait) contact guard;1 person assist;verbal cues  -LM     Assistive Device (Gait) walker, front-wheeled  -LM     Distance in Feet (Gait) 60  -LM     Deviations/Abnormal Patterns (Gait) base of support, wide;nixon decreased;gait speed decreased;stride length decreased  -LM     Bilateral Gait Deviations heel strike decreased  -LM     Comment, (Gait/Stairs) Vc's for upright posture.  Pt ambulates at a slower pace, but no unsteadiness noted today.  -LM           User Key  (r) = Recorded By, (t) = Taken By, (c) = Cosigned By    Initials Name  Provider Type    LM Jeannette Richardson PT Physical Therapist               Obj/Interventions     Row Name 02/02/23 1102          Motor Skills    Therapeutic Exercise hip;knee;ankle  -LM     Row Name 02/02/23 1102          Hip (Therapeutic Exercise)    Hip AROM (Therapeutic Exercise) bilateral;aBduction;aDduction;sitting;10 repetitions  -LM     Hip Isometrics (Therapeutic Exercise) bilateral;gluteal sets;supine;10 repetitions  -LM     Hip Strengthening (Therapeutic Exercise) bilateral;marching while seated;sitting;10 repetitions  -LM     Row Name 02/02/23 1102          Knee (Therapeutic Exercise)    Knee (Therapeutic Exercise) AROM (active range of motion)  -LM     Knee AROM (Therapeutic Exercise) bilateral;LAQ (long arc quad);heel slides;sitting;10 repetitions  -LM     Row Name 02/02/23 1102          Ankle (Therapeutic Exercise)    Ankle (Therapeutic Exercise) AROM (active range of motion)  -LM     Ankle AROM (Therapeutic Exercise) bilateral;dorsiflexion;plantarflexion;sitting;15 repititions  -LM     Row Name 02/02/23 1102          Balance    Static Sitting Balance independent  -LM     Position, Sitting Balance unsupported;other (see comments)  toilet  -LM     Static Standing Balance contact guard;1-person assist;verbal cues  -LM     Dynamic Standing Balance contact guard;1-person assist;verbal cues  -LM     Position/Device Used, Standing Balance supported;walker, front-wheeled  -LM     Comment, Balance Pt stood at sink to brush her teeth and hair.  No unsteadiness noted.  -LM           User Key  (r) = Recorded By, (t) = Taken By, (c) = Cosigned By    Initials Name Provider Type    LM Jeannette Richardson PT Physical Therapist               Goals/Plan    No documentation.                Clinical Impression     Row Name 02/02/23 1104          Pain    Pretreatment Pain Rating 3/10  -LM     Posttreatment Pain Rating 1/10  -LM     Pain Location - head  -LM     Pain Intervention(s) Repositioned;Ambulation/increased activity  -LM      Row Name 02/02/23 1104          Plan of Care Review    Plan of Care Reviewed With patient  -LM     Progress improving  -LM     Outcome Evaluation Pt progressing well.  Pt stood with CGA and increased gait distance to 60 feet using rw with CGAx1.  Pt continues to function below baseline d/t weakness and decreased activity tolerance.  Continue to recommend inpt rehab at d/c.  -LM     Row Name 02/02/23 1104          Vital Signs    Pre SpO2 (%) 92  -LM     O2 Delivery Pre Treatment room air  -LM     Post SpO2 (%) 94  -LM     O2 Delivery Post Treatment room air  -LM     Post Patient Position Sitting  -LM     Row Name 02/02/23 1104          Positioning and Restraints    Pre-Treatment Position bathroom  -LM     Post Treatment Position chair  -LM     In Chair reclined;call light within reach;encouraged to call for assist;exit alarm on;waffle cushion;notified nsg  -LM           User Key  (r) = Recorded By, (t) = Taken By, (c) = Cosigned By    Initials Name Provider Type    Jeannette Dee, SUSAN Physical Therapist               Outcome Measures     Row Name 02/02/23 1107          How much help from another person do you currently need...    Turning from your back to your side while in flat bed without using bedrails? 3  -LM     Moving from lying on back to sitting on the side of a flat bed without bedrails? 3  -LM     Moving to and from a bed to a chair (including a wheelchair)? 3  -LM     Standing up from a chair using your arms (e.g., wheelchair, bedside chair)? 3  -LM     Climbing 3-5 steps with a railing? 3  -LM     To walk in hospital room? 3  -LM     AM-PAC 6 Clicks Score (PT) 18  -LM     Highest level of mobility 6 --> Walked 10 steps or more  -LM     Row Name 02/02/23 1107          Functional Assessment    Outcome Measure Options AM-PAC 6 Clicks Basic Mobility (PT)  -LM           User Key  (r) = Recorded By, (t) = Taken By, (c) = Cosigned By    Initials Name Provider Type    Jeannette Dee PT Physical Therapist                              Physical Therapy Education     Title: PT OT SLP Therapies (Done)     Topic: Physical Therapy (Done)     Point: Mobility training (Done)     Learning Progress Summary           Patient Eager, E, VU by  at 1/31/2023 1837    Acceptance, E, VU,DU by  at 1/30/2023 1013                   Point: Home exercise program (Done)     Learning Progress Summary           Patient Eager, E, VU by  at 1/31/2023 1837                   Point: Body mechanics (Done)     Learning Progress Summary           Patient Eager, E, VU by  at 1/31/2023 1837                   Point: Precautions (Done)     Learning Progress Summary           Patient Eager, E, VU by  at 1/31/2023 1837    Acceptance, E, VU,DU by  at 1/30/2023 1013                               User Key     Initials Effective Dates Name Provider Type Discipline     06/16/21 -  Jeannette Richardson, PT Physical Therapist PT     11/30/22 -  Henrique Crain RN Registered Nurse Nurse              PT Recommendation and Plan  Planned Therapy Interventions (PT): balance training, bed mobility training, gait training, home exercise program, motor coordination training, neuromuscular re-education, patient/family education, postural re-education, ROM (range of motion), strengthening, stretching, transfer training  Plan of Care Reviewed With: patient  Progress: improving  Outcome Evaluation: Pt progressing well.  Pt stood with CGA and increased gait distance to 60 feet using rw with CGAx1.  Pt continues to function below baseline d/t weakness and decreased activity tolerance.  Continue to recommend inpt rehab at d/c.     Time Calculation:    PT Charges     Row Name 02/02/23 1107             Time Calculation    Start Time 1028  -LM      PT Received On 02/02/23  -      PT Goal Re-Cert Due Date 02/09/23  -         Timed Charges    98147 - PT Therapeutic Exercise Minutes 7  -LM      47813 - Gait Training Minutes  12  -LM      01137 - PT Therapeutic  Activity Minutes 8  -LM         Total Minutes    Timed Charges Total Minutes 27  -LM       Total Minutes 27  -LM            User Key  (r) = Recorded By, (t) = Taken By, (c) = Cosigned By    Initials Name Provider Type    LM Jeannette Richardson, SUSAN Physical Therapist              Therapy Charges for Today     Code Description Service Date Service Provider Modifiers Qty    85654191431  GAIT TRAINING EA 15 MIN 2/2/2023 Jeannette Richardson, PT GP 1    [id number]  PT THERAPEUTIC ACT EA 15 MIN 2/2/2023 Jeannette Richardson, PT GP 1          PT G-Codes  Outcome Measure Options: AM-PAC 6 Clicks Basic Mobility (PT)  AM-PAC 6 Clicks Score (PT): 18  AM-PAC 6 Clicks Score (OT): 16  PT Discharge Summary  Anticipated Discharge Disposition (PT): inpatient rehabilitation facility    Jeannette Richardson PT  2/2/2023

## 2023-02-02 NOTE — PROGRESS NOTES
"Pharmacy Consult  -  Warfarin    Akua Torres is a  77 y.o. female   Height - 172.7 cm (68\")  Weight - 95.3 kg (210 lb)    Consulting Provider: Chika VILLALOBOS  Indication: Afib  Goal INR: 2 - 3  Home Regimen: Patient states she takes Warfarin 5 mg oral Daily , sometimes 7.5 mg TueThur  Per Dr. Langford office, she was instructed to take warfarin 7.5 mg oral daily EXCEPT 5 mg on MonFri (not taking as directed)  Bridge Therapy: yes, therapeutic lovenox     Drug-Drug Interactions with current regimen:   Allopurinol may result in an increased INR and risk of bleeding (home)   Clopidogrel may result in an increased risk of bleeding (home)   Escitalopram may result in an increased risk of bleeding (home)   Enoxaparin may result in an increased risk of bleeding   Propafenone may result in an increased risk of bleeding   PRN acetaminophen may result in an increased INR and risk of bleeding   S/p Remdesivir (1/30 -2/1) may enhance the anticoagulant effects of warfarin   S/p Flagyl x1 on 1/29, vancomycin x1 on 1/29    Warfarin Dosing During Admission:    Date  1/30 1/31 2/1 2/2        INR   1.33 1.36  1.42 1.63         Dose   7.5 mg  7.5 mg 7.5 mg 7.5 mg           Education Provided:  Warfarin education provided on 1/31 verbally and in writing.  Discussed effects of warfarin, importance of checking INR, drug-drug and drug-food interactions, and signs/symptoms of bleeding and clotting.  Patient verbalized understanding through teach back.  All pertinent questions were answered.  Discharge Follow up:   Following Provider - Dr. Langford, goes to office weekly for INR checks   Follow up time range or appointment - within 2 to 3 days of discharge    Labs:  Results from last 7 days   Lab Units 02/02/23  0420 02/01/23  0424 01/31/23  0605 01/30/23  0618 01/30/23  0008   INR  1.63* 1.42* 1.36* 1.31* 1.33*   APTT seconds  --   --   --   --  30.6   HEMOGLOBIN g/dL 12.9 12.5 12.5  --  13.8   HEMATOCRIT % 40.7 39.7 39.8  --  43.7 "     Results from last 7 days   Lab Units 02/02/23  0420 02/01/23  0424 01/31/23  0605   SODIUM mmol/L 135* 138 138   POTASSIUM mmol/L 4.2 4.2 4.6   CHLORIDE mmol/L 104 106 105   CO2 mmol/L 23.0 24.0 24.0   BUN mg/dL 24* 21 22   CREATININE mg/dL 0.82 0.96 1.19*   CALCIUM mg/dL 8.2* 8.0* 8.2*   BILIRUBIN mg/dL 0.4 0.6 0.9   ALK PHOS U/L 80 74 80   ALT (SGPT) U/L 12 10 9   AST (SGOT) U/L 22 21 19   GLUCOSE mg/dL 70 71 80     Current dietary intake:   % of meals today  Diet Order   Procedures   • Diet: Cardiac Diets; Healthy Heart (2-3 Na+); Texture: Regular Texture (IDDSI 7); Fluid Consistency: Thin (IDDSI 0)     Assessment/Plan:     1. Patient's INR remains SUB therapeutic today at 1.63, though trending up after boosted doses. Goal INR 2 to 3. (Sub therapeutic on admission)  Per Dr. Langford office, had been instructed to take warfarin 7.5 mg oral daily EXCEPT 5 mg MonFri.        Ms. Torres denies any missed doses or increased GLV prior to admission.  Noted variance between prescribed dose vs dose actually taking      Receiving therapeutic enoxaparin, continue to monitor platelets (had dropped prior to starting) Platelets improved to 124 today.  2. Recommend to take warfarin 7.5 mg tonight.     3. Daily PT/INR ordered.  4. Monitor signs/symptoms of bleeding, dietary intake, and drug-drug interactions. Make dose adjustments as necessary.    Discharge recommendations: per Dr. Langford office, prescribed home regimen is warfarin 7.5 mg oral daily except 5 mg MonFri    Pharmacy will continue to follow.      Thank you  Brooklyn Ortiz, PharmD  2/2/2023  13:29 EST

## 2023-02-02 NOTE — PLAN OF CARE
Goal Outcome Evaluation:  Plan of Care Reviewed With: patient        Progress: improving  Outcome Evaluation: a/o x 4; VSS; RA no oxygen required; stand by with walker; isolation continued; no changes to report from overnight; will continue POC      Problem: Adult Inpatient Plan of Care  Goal: Absence of Hospital-Acquired Illness or Injury  Intervention: Identify and Manage Fall Risk  Recent Flowsheet Documentation  Taken 2/2/2023 0351 by Von Sánchez, RN  Safety Promotion/Fall Prevention:   assistive device/personal items within reach   clutter free environment maintained   activity supervised   fall prevention program maintained   nonskid shoes/slippers when out of bed   safety round/check completed   room organization consistent  Taken 2/2/2023 0200 by Von Sánchez, RN  Safety Promotion/Fall Prevention:   assistive device/personal items within reach   clutter free environment maintained   activity supervised   fall prevention program maintained   nonskid shoes/slippers when out of bed   room organization consistent   safety round/check completed  Taken 2/2/2023 0000 by Von Sánchez, RN  Safety Promotion/Fall Prevention:   activity supervised   clutter free environment maintained   assistive device/personal items within reach   fall prevention program maintained   room organization consistent   safety round/check completed   nonskid shoes/slippers when out of bed  Taken 2/1/2023 2157 by Von Sánchez, RN  Safety Promotion/Fall Prevention:   assistive device/personal items within reach   clutter free environment maintained   activity supervised   fall prevention program maintained   nonskid shoes/slippers when out of bed   room organization consistent   safety round/check completed  Taken 2/1/2023 2030 by Von Sánchez, RN  Safety Promotion/Fall Prevention:   clutter free environment maintained   assistive device/personal items within reach   activity supervised   fall prevention program  maintained   nonskid shoes/slippers when out of bed   room organization consistent   safety round/check completed

## 2023-02-02 NOTE — PLAN OF CARE
Goal Outcome Evaluation:  Plan of Care Reviewed With: patient        Progress: improving  Outcome Evaluation: Pt progressing well.  Pt stood with CGA and increased gait distance to 60 feet using rw with CGAx1.  Pt continues to function below baseline d/t weakness and decreased activity tolerance.  Continue to recommend inpt rehab at d/c.

## 2023-02-02 NOTE — PROGRESS NOTES
[facility] Medicine Services  PROGRESS NOTE    Patient Name: Akua Torres  : 1945  MRN: [id number]    Date of Admission: 2023  Primary Care Physician: Nathalia Freeman PA-C    Subjective   Subjective     CC:  COVID    HPI:  Afebrile.  On room air.  Reports 3-4 loose stools yesterday.  2 BMs so far today.  Denied any blood in stool.  Eating and drinking well.  No cough.    ROS:  Gen- no fevers, no chills  CV- No chest pain, palpitations  Resp- + cough, no dyspnea, no hemoptysis  GI- No N/V, abd pain     Objective   Objective     Vital Signs:   Temp:  [97.6 °F (36.4 °C)-98.4 °F (36.9 °C)] 97.6 °F (36.4 °C)  Heart Rate:  [58-73] 58  Resp:  [16-18] 16  BP: (115-135)/(56-70) 132/59     Physical Exam:  Constitutional: No acute distress, awake, alert, sitting up in chair  HENT: NCAT, mucous membranes moist  Respiratory: Clear to auscultation bilaterally, respiratory effort normal   Cardiovascular: RRR, no murmurs, rubs, or gallops  Gastrointestinal: Positive bowel sounds, soft, nontender, nondistended  Musculoskeletal: No bilateral ankle edema  Psychiatric: Appropriate affect, cooperative  Neurologic: strength symmetric in all extremities, Cranial Nerves grossly intact to confrontation, speech clear  Skin: No rashes on exposed skin    Results Reviewed:  LAB RESULTS:      Lab 23  0420 23  0424 23  0605 23  0618 23  0008   WBC 4.33 4.61 7.79  --  10.41   HEMOGLOBIN 12.9 12.5 12.5  --  13.8   HEMATOCRIT 40.7 39.7 39.8  --  43.7   PLATELETS 124* 110* 114*  --  145   NEUTROS ABS 2.01 2.42 5.49  --  8.06*   IMMATURE GRANS (ABS) 0.02 0.02 0.03  --  0.03   LYMPHS ABS 1.58 1.46 1.35  --  1.22   MONOS ABS 0.41 0.50 0.86  --  1.05*   EOS ABS 0.30 0.20 0.04  --  0.03   MCV 94.7 94.1 95.2  --  95.0   CRP 6.18* 10.00* 11.97*  --  5.82*   PROCALCITONIN  --   --   --   --  0.24   LACTATE  --   --   --   --  0.8   LDH  --   --   --   --  189   PROTIME 19.3*  17.3* 16.7* 16.2* 16.5*   APTT  --   --   --   --  30.6   D DIMER QUANT  --   --   --   --  0.36         Lab 02/02/23 0420 02/01/23 0424 01/31/23 0605 01/30/23 1730 01/30/23  0557 01/30/23  0008   SODIUM 135* 138 138  --   --  137   POTASSIUM 4.2 4.2 4.6  --   --  5.1   CHLORIDE 104 106 105  --   --  105   CO2 23.0 24.0 24.0  --   --  23.0   ANION GAP 8.0 8.0 9.0  --   --  9.0   BUN 24* 21 22  --   --  29*   CREATININE 0.82 0.96 1.19* 1.19*  --  1.41*   EGFR 73.3 60.7 46.9* 46.9*  --  38.3*   GLUCOSE 70 71 80  --   --  107*   CALCIUM 8.2* 8.0* 8.2*  --   --  8.5*   MAGNESIUM 2.2 1.6 1.7  --   --  2.0   PHOSPHORUS  --   --   --   --   --  2.6   HEMOGLOBIN A1C  --   --   --   --  5.90*  --    TSH  --   --   --   --   --  0.355         Lab 02/02/23 0420 02/01/23 0424 01/31/23 0605 01/30/23 1730 01/30/23  0008   TOTAL PROTEIN 5.8* 5.4* 5.6* 6.6 6.5   ALBUMIN 3.0* 3.0* 3.1* 3.9 3.7   GLOBULIN 2.8 2.4 2.5  --  2.8   ALT (SGPT) 12 10 9 12 10   AST (SGOT) 22 21 19 21 17   BILIRUBIN 0.4 0.6 0.9 1.0 1.1   INDIRECT BILIRUBIN  --   --   --  0.8  --    BILIRUBIN DIRECT  --  <0.2 0.2 0.2  --    ALK PHOS 80 74 80 103 107   LIPASE  --   --   --   --  68*         Lab 02/02/23 0420 02/01/23 0424 01/31/23 0605 01/30/23  0618 01/30/23  0008   TROPONIN T  --   --   --   --  <0.010   PROTIME 19.3* 17.3* 16.7* 16.2* 16.5*   INR 1.63* 1.42* 1.36* 1.31* 1.33*         Lab 01/30/23  0557   CHOLESTEROL 106   LDL CHOL 48   HDL CHOL 41   TRIGLYCERIDES 86         Lab 01/30/23  0008   FERRITIN 189.80*         Brief Urine Lab Results  (Last result in the past 365 days)      Color   Clarity   Blood   Leuk Est   Nitrite   Protein   CREAT   Urine HCG        01/29/23 2358 Yellow   Clear   Negative   Negative   Negative   Trace                 Microbiology Results Abnormal     Procedure Component Value - Date/Time    Blood Culture - Blood, Arm, Left [[id number]]  (Normal) Collected: 01/30/23 0015    Lab Status: Preliminary result Specimen:  Blood from Arm, Left Updated: 02/02/23 0045     Blood Culture No growth at 3 days    Blood Culture - Blood, Hand, Left [[id number]]  (Normal) Collected: 01/30/23 0020    Lab Status: Preliminary result Specimen: Blood from Hand, Left Updated: 02/02/23 0045     Blood Culture No growth at 3 days          Adult Transthoracic Echo Complete W/ Cont if Necessary Per Protocol (With Agitated Saline)    Result Date: 1/31/2023  •  Left ventricular systolic function is normal. Left ventricular ejection fraction appears to be 66 - 70%. •  The right ventricular cavity is borderline dilated. •  Saline test results are negative. •  The right atrial cavity is mildly dilated.       Results for orders placed during the hospital encounter of 01/29/23    Adult Transthoracic Echo Complete W/ Cont if Necessary Per Protocol (With Agitated Saline)    Interpretation Summary  •  Left ventricular systolic function is normal. Left ventricular ejection fraction appears to be 66 - 70%.  •  The right ventricular cavity is borderline dilated.  •  Saline test results are negative.  •  The right atrial cavity is mildly dilated.      I have reviewed the medications:  Scheduled Meds:allopurinol, 100 mg, Oral, Daily  atenolol, 25 mg, Oral, Q12H  atorvastatin, 80 mg, Oral, Nightly  benzonatate, 200 mg, Oral, TID  cholecalciferol, 2,000 Units, Oral, Daily  clopidogrel, 75 mg, Oral, Daily  enoxaparin, 1 mg/kg, Subcutaneous, Q12H  escitalopram, 20 mg, Oral, Daily  guaiFENesin, 1,200 mg, Oral, Q12H  pregabalin, 50 mg, Oral, TID  propafenone SR, 225 mg, Oral, Q12H  sodium chloride, 10 mL, Intravenous, Q12H  warfarin, 5 mg, Oral, Daily      Continuous Infusions:Pharmacy Consult - Remdesivir,   Pharmacy to dose warfarin,       PRN Meds:.•  acetaminophen  •  albuterol sulfate HFA  •  hydrOXYzine  •  Magnesium Standard Dose Replacement - Initiate Nurse / BPA Driven Protocol  •  ondansetron  •  Pharmacy Consult - Remdesivir  •  Pharmacy to dose warfarin  •  sodium  chloride  •  sodium chloride  •  sodium chloride    Assessment & Plan   Assessment & Plan     Active Hospital Problems    Diagnosis  POA   • **COVID-19 [U07.1]  Yes   • COVID-19 virus detected [U07.1]  Yes   • Weakness [R53.1]  Yes   • History of stroke [Z86.73]  Not Applicable   • Hyperlipidemia [E78.5]  Yes   • Paroxysmal atrial fibrillation (HCC) [I48.0]  Yes   • LOTTIE (acute kidney injury) (HCC) [N17.9]  Yes   • Essential hypertension [I10]  Yes      Resolved Hospital Problems   No resolved problems to display.        Brief Hospital Course to date:  Akua Torres is a 77 y.o. female with PMH significant for PAF on Coumadin, CVA, GERD, HTN, dyslipidemia, recurrent UTI with urinary incontinence, depression, YUNIOR (not on CPAP), who presented to the ED with complaint of generalized weakness and was found to have COVID 19 infection. She was satting well on admission.     This patient's problems and plans were partially entered by my partner and updated as appropriate by me 02/02/23.    COVID-19  - Tylenol as needed for fever  - Symptom management with Archana Wu Mucinex  - not currently hypoxic  - Given comorbidities and high risk for progression, status post remdesivir x3 days; discussed risks/benefits and side effects of remdesivir with patient and she is accepting of risks  - If becomes hypoxic, will start Decadron, otherwise will not place patient on steroids  --COVID-positive on 1/30, symptoms began on ~1/27 and will need to isolate for 10 days from symptom onset (through 2/6)  -- Discontinue telemetry    LOTTIE, resolved  --Creatinine 1.41 on admission, improved 0.82 on 2/2  - Likely secondary to dehydration  - LR bolus x2 given in the ED  - Hold further IVF for now  - AM labs     History of pyuria  - Recently diagnosed with UTI on 1/24, treated with Cipro  - Hold further ABX for now, UA clear     Weakness  Dysarthria  - Likely secondary to above  - Fall precautions  - PT/OT consulted --> recommended  rehab; patient stated she was amenable to rehab on 2/2, stating that her  is sick right now and unable to care for her  -- Stroke service evaluated the patient; TTE showed LV EF 66-70%, RV borderline dilated, saline test negative  -- MRI findings chronic in nature and reviewed by Stroke Neurology, who recommended no further intervention  -- Cleared by speech for diet  -- Plavix 75mg daily     PAF  - Rate controlled  - Anticoagulated on Coumadin, pharmacy to dose  - subtherapeutic INR on admission, bridge with Lovenox  - Continue Rythmol    Mild thrombocytopenia  --Was not exposed to any heparin products prior to mild drop in platelets, continue to monitor (improving)     Hypertension  Dyslipidemia  - Continue atenolol, Lipitor  - Hold lisinopril secondary to renal function     Fibromyalgia  - Continue Lyrica     Depression/anxiety  - Continue Lexapro  - As needed Atarax        DVT prophylaxis: On Coumadin, lovenox      Expected Discharge Location and Transportation: IRF, which patient is amenable to  Expected Discharge   Expected Discharge Date and Time     Expected Discharge Date Expected Discharge Time    Feb 6, 2023            DVT prophylaxis:  Medical and mechanical DVT prophylaxis orders are present.     AM-PAC 6 Clicks Score (PT): 12 (02/01/23 0800)    CODE STATUS:   Code Status and Medical Interventions:   Ordered at: 01/30/23 0309     Medical Intervention Limits:    NO intubation (DNI)     Level Of Support Discussed With:    Patient     Code Status (Patient has no pulse and is not breathing):    No CPR (Do Not Attempt to Resuscitate)     Medical Interventions (Patient has pulse or is breathing):    Limited Support       Debi West MD  02/02/23

## 2023-02-02 NOTE — PLAN OF CARE
Goal Outcome Evaluation:  Plan of Care Reviewed With: patient          Pt back in bed comfortably. OOB in chair most of day. 1A oob. Pt doing much better on her feet today. Meds given as ordered. Pt reported a headache; prn tylenol given. Fall precautions in place. Call bell within reach. Will continue to assess.

## 2023-02-03 LAB
DEPRECATED RDW RBC AUTO: 50.1 FL (ref 37–54)
ERYTHROCYTE [DISTWIDTH] IN BLOOD BY AUTOMATED COUNT: 14.6 % (ref 12.3–15.4)
HCT VFR BLD AUTO: 40.5 % (ref 34–46.6)
HGB BLD-MCNC: 12.9 G/DL (ref 12–15.9)
INR PPP: 1.97 (ref 0.84–1.13)
MCH RBC QN AUTO: 29.7 PG (ref 26.6–33)
MCHC RBC AUTO-ENTMCNC: 31.9 G/DL (ref 31.5–35.7)
MCV RBC AUTO: 93.1 FL (ref 79–97)
PLATELET # BLD AUTO: 132 10*3/MM3 (ref 140–450)
PMV BLD AUTO: 9.9 FL (ref 6–12)
PROTHROMBIN TIME: 22.4 SECONDS (ref 11.4–14.4)
RBC # BLD AUTO: 4.35 10*6/MM3 (ref 3.77–5.28)
WBC NRBC COR # BLD: 3.97 10*3/MM3 (ref 3.4–10.8)

## 2023-02-03 PROCEDURE — G0378 HOSPITAL OBSERVATION PER HR: HCPCS

## 2023-02-03 PROCEDURE — 97535 SELF CARE MNGMENT TRAINING: CPT

## 2023-02-03 PROCEDURE — 97110 THERAPEUTIC EXERCISES: CPT

## 2023-02-03 PROCEDURE — 85610 PROTHROMBIN TIME: CPT | Performed by: NURSE PRACTITIONER

## 2023-02-03 PROCEDURE — 85027 COMPLETE CBC AUTOMATED: CPT | Performed by: STUDENT IN AN ORGANIZED HEALTH CARE EDUCATION/TRAINING PROGRAM

## 2023-02-03 PROCEDURE — 25010000002 ENOXAPARIN PER 10 MG: Performed by: STUDENT IN AN ORGANIZED HEALTH CARE EDUCATION/TRAINING PROGRAM

## 2023-02-03 PROCEDURE — 99231 SBSQ HOSP IP/OBS SF/LOW 25: CPT | Performed by: STUDENT IN AN ORGANIZED HEALTH CARE EDUCATION/TRAINING PROGRAM

## 2023-02-03 PROCEDURE — 96372 THER/PROPH/DIAG INJ SC/IM: CPT

## 2023-02-03 RX ORDER — WARFARIN SODIUM 5 MG/1
5 TABLET ORAL
Status: DISCONTINUED | OUTPATIENT
Start: 2023-02-03 | End: 2023-02-04

## 2023-02-03 RX ORDER — LISINOPRIL 10 MG/1
10 TABLET ORAL
Status: DISCONTINUED | OUTPATIENT
Start: 2023-02-03 | End: 2023-02-04

## 2023-02-03 RX ADMIN — ENOXAPARIN SODIUM 100 MG: 100 INJECTION SUBCUTANEOUS at 06:04

## 2023-02-03 RX ADMIN — PROPAFENONE HYDROCHLORIDE 225 MG: 225 CAPSULE, EXTENDED RELEASE ORAL at 08:38

## 2023-02-03 RX ADMIN — WARFARIN SODIUM 5 MG: 5 TABLET ORAL at 17:02

## 2023-02-03 RX ADMIN — ATENOLOL 25 MG: 25 TABLET ORAL at 08:34

## 2023-02-03 RX ADMIN — PREGABALIN 50 MG: 50 CAPSULE ORAL at 16:40

## 2023-02-03 RX ADMIN — PREGABALIN 50 MG: 50 CAPSULE ORAL at 08:35

## 2023-02-03 RX ADMIN — ATORVASTATIN CALCIUM 80 MG: 40 TABLET, FILM COATED ORAL at 20:46

## 2023-02-03 RX ADMIN — Medication 10 ML: at 20:46

## 2023-02-03 RX ADMIN — GUAIFENESIN 1200 MG: 600 TABLET, EXTENDED RELEASE ORAL at 20:46

## 2023-02-03 RX ADMIN — ATENOLOL 25 MG: 25 TABLET ORAL at 20:46

## 2023-02-03 RX ADMIN — BENZONATATE 200 MG: 100 CAPSULE ORAL at 20:46

## 2023-02-03 RX ADMIN — Medication 2000 UNITS: at 08:34

## 2023-02-03 RX ADMIN — PREGABALIN 50 MG: 50 CAPSULE ORAL at 20:46

## 2023-02-03 RX ADMIN — ESCITALOPRAM OXALATE 20 MG: 10 TABLET, FILM COATED ORAL at 08:34

## 2023-02-03 RX ADMIN — BENZONATATE 200 MG: 100 CAPSULE ORAL at 16:40

## 2023-02-03 RX ADMIN — Medication 10 ML: at 08:35

## 2023-02-03 RX ADMIN — ALLOPURINOL 100 MG: 100 TABLET ORAL at 08:38

## 2023-02-03 RX ADMIN — GUAIFENESIN 1200 MG: 600 TABLET, EXTENDED RELEASE ORAL at 08:34

## 2023-02-03 RX ADMIN — BENZONATATE 200 MG: 100 CAPSULE ORAL at 08:35

## 2023-02-03 RX ADMIN — CLOPIDOGREL BISULFATE 75 MG: 75 TABLET ORAL at 08:34

## 2023-02-03 RX ADMIN — PROPAFENONE HYDROCHLORIDE 225 MG: 225 CAPSULE, EXTENDED RELEASE ORAL at 20:46

## 2023-02-03 RX ADMIN — LISINOPRIL 10 MG: 10 TABLET ORAL at 08:35

## 2023-02-03 NOTE — PROGRESS NOTES
"Pharmacy Consult  -  Warfarin    Akua Torres is a  77 y.o. female   Height - 172.7 cm (68\")  Weight - 95.3 kg (210 lb)    Consulting Provider: Chika VILLALOBOS  Indication: Afib  Goal INR: 2 - 3  Home Regimen: Patient states she takes Warfarin 5 mg oral Daily (35 mg/week)   - When INR < 2, she reports taking Warfarin 5 mg oral Daily, except for 7.5 mg on Tuesday and Thursday  Addendum 2/2/23:  Per Dr. Langford office, was instructed to take warfarin 7.5 mg oral daily Except 5 mg MonFri  Bridge Therapy: yes, therapeutic lovenox     Drug-Drug Interactions with current regimen:   Allopurinol may result in an increased INR and risk of bleeding (home)   Clopidogrel may result in an increased risk of bleeding (home)   Escitalopram may result in an increased risk of bleeding (home)   Enoxaparin may result in an increased risk of bleeding   Propafenone may result in an increased risk of bleeding   PRN acetaminophen may result in an increased INR and risk of bleeding   S/p Remdesivir (1/30 -2/1) may enhance the anticoagulant effects of warfarin   S/p Flagyl x1 on 1/29, vancomycin x1 on 1/29    Warfarin Dosing During Admission:    Date  1/30  1/31  2/1  2/2 2/3       INR   1.33 1.36  1.42 1.63  1.97       Dose   7.5 mg  7.5 mg 7.5 mg  5 mg 5 mg         Education Provided:  Warfarin education provided on 1/31 verbally and in writing.  Discussed effects of warfarin, importance of checking INR, drug-drug and drug-food interactions, and signs/symptoms of bleeding and clotting.  Patient verbalized understanding through teach back.  All pertinent questions were answered.  Discharge Follow up:   Following Provider - Dr. Langford, goes to office weekly for INR checks   Follow up time range or appointment - within 2 to 3 days of discharge    Labs:  Results from last 7 days   Lab Units 02/03/23  0446 02/02/23  0420 02/01/23  0424 01/31/23  0605 01/30/23  0618 01/30/23  0008   INR  1.97* 1.63* 1.42* 1.36* 1.31* 1.33*   APTT seconds  " --   --   --   --   --  30.6   HEMOGLOBIN g/dL 12.9 12.9 12.5 12.5  --  13.8   HEMATOCRIT % 40.5 40.7 39.7 39.8  --  43.7     Results from last 7 days   Lab Units 02/02/23  0420 02/01/23  0424 01/31/23  0605   SODIUM mmol/L 135* 138 138   POTASSIUM mmol/L 4.2 4.2 4.6   CHLORIDE mmol/L 104 106 105   CO2 mmol/L 23.0 24.0 24.0   BUN mg/dL 24* 21 22   CREATININE mg/dL 0.82 0.96 1.19*   CALCIUM mg/dL 8.2* 8.0* 8.2*   BILIRUBIN mg/dL 0.4 0.6 0.9   ALK PHOS U/L 80 74 80   ALT (SGPT) U/L 12 10 9   AST (SGOT) U/L 22 21 19   GLUCOSE mg/dL 70 71 80     Current dietary intake:   % of meals over past 24 hours  Diet Order   Procedures    Diet: Cardiac Diets; Healthy Heart (2-3 Na+); Texture: Regular Texture (IDDSI 7); Fluid Consistency: Thin (IDDSI 0)     Assessment/Plan:     1. Patient's INR remains SUB therapeutic today at 1.97, though trending up after boosted doses. Goal INR 2 to 3. (Sub therapeutic on admission)  Per Dr. Langford office, had been instructed to take warfarin 7.5 mg oral daily EXCEPT 5mg MonFri.        Ms. Torres denies any missed doses or increased GLV prior to admission. Noted variance between prescribed dose vs dose actually taking  2. Will resume patient's instructed dose of warfarin 5 mg tonight. Would expect INR to continue trending upwards due to warfarin 7.5 mg doses given so far during admission. Lovenox bridge dc'd after d/w Dr West.  3. Daily PT/INR ordered.  4. Monitor signs/symptoms of bleeding, dietary intake, and drug-drug interactions. Make dose adjustments as necessary.    Discharge recommendations: warfarin 7.5 mg oral daily except 5mg MonFri (home regimen per DR. Langford office)    Pharmacy will continue to follow.      Thank you  Madhav Chen, PharmD  2/3/2023  15:14 EST

## 2023-02-03 NOTE — PLAN OF CARE
Goal Outcome Evaluation:  Plan of Care Reviewed With: patient        Progress: no change  Outcome Evaluation: Rested in bed through the night. Up with assist with use of walker. Continues in contact isolation for airborne due to Covid 19. States coughing is improving.

## 2023-02-03 NOTE — PLAN OF CARE
Goal Outcome Evaluation:  Plan of Care Reviewed With: patient        Progress: improving  Outcome Evaluation: OT promoted adl retraining with pt demo improved bed mob with cga for supine to sit, improved fxl mob without use of device and handheld assist.  Pt is setup to supervision level for toileting and grooming tasks.  Completed multiple TE with focus to improve activity esa and safety.  Pt continues to need rehab to return to baseline of indep level.

## 2023-02-03 NOTE — THERAPY TREATMENT NOTE
Patient Name: Akua Torres  : 1945    MRN: [id number]                              Today's Date: 2/3/2023       Admit Date: 2023    Visit Dx:     ICD-10-CM ICD-9-CM   1. COVID-19  U07.1 079.89   2. Fever and chills  R50.9 780.60   3. Right sided weakness  R53.1 728.87   4. LOTTIE (acute kidney injury) ([facility])  N17.9 584.9   5. Dehydration  E86.0 276.51   6. Mild obesity  E66.9 278.00   7. History of CVA (cerebrovascular accident)  Z86.73 V12.54     Patient Active Problem List   Diagnosis   • Degenerative disc disease, lumbar   • Lumbar stenosis with neurogenic claudication   • History of lumbar fusion   • Spondylosis of lumbar region without myelopathy or radiculopathy   • Mild obesity   • Physical deconditioning   • Idiopathic gout   • Anxiety and depression   • Greater trochanteric bursitis of both hips   • Status post total bilateral knee replacement   • Back pain   • Essential hypertension   • Prediabetes   • S/P lumbar spinal fusion   • Renal insufficiency   • Leukocytosis, likely reactive   • LOTTIE (acute kidney injury) ([facility])   • Depression   • Arthritis of right hip   • Paroxysmal atrial fibrillation ([facility])   • YUNIOR treated with BiPAP   • 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   • Acute blood loss anemia, mild, asymptomatic   • 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 ([facility])   • Supraventricular tachycardia ([facility])   • Left prefrontal gyrus stroke   • Arthritis of left hip   • Hip pain   • Status post total replacement of left hip   • Hyperlipidemia   • Elevated hemoglobin A1c   • History of stroke   • Acute postoperative pain   • Nontraumatic complete tear of right rotator cuff   • Traumatic rhabdomyolysis, initial encounter ([facility])   • Hematoma of right thigh   • Pain of right lower extremity   • Anemia   • Fibromyalgia   • COVID-19 virus detected   • Weakness   • COVID-19     Past Medical History:   Diagnosis Date   • A-fib ([facility]) 2019   • Anxiety and depression     • Arthritis    • Cataract    • Depression    • Extremity pain    • Fibromyalgia    • GERD (gastroesophageal reflux disease)    • Gout    • H/O bladder infections     JUST FINISHED MACROBID ON 11-2-17 FOR RECENT UTI   • Hypercholesteremia    • Hypertension    • Joint pain    • Kidney disease, chronic, stage III (GFR 30-59 ml/min) (HCC)     resolved after stopping antiinflammatory ~2015   • Low back pain    • Neck pain    • Pneumonia 02/2020   • Rheumatic fever    • Skin cancer    • Sleep apnea     no cpap   • Stroke (HCC) 09/2019    right sided weakness   • Wears glasses      Past Surgical History:   Procedure Laterality Date   • BACK SURGERY  2004    LUMBAR PER DR MAN x2   • CARDIAC CATHETERIZATION  2019   • CARPAL TUNNEL RELEASE Left    • COLONOSCOPY  11/2020   • EYE SURGERY      retina surgery   • FINGER SURGERY Right     3RD FINGER   • HEEL SPUR EXCISION Bilateral    • INTERVENTIONAL RADIOLOGY PROCEDURE N/A 09/17/2019    Procedure: Carotid and Cerebral Angiogram/ Possible Stent;  Surgeon: Imer Guerra MD;  Location:  [facility] CATH INVASIVE LOCATION;  Service: Interventional Radiology   • KNEE ARTHROSCOPY Bilateral    • LUMBAR DISCECTOMY FUSION INSTRUMENTATION N/A 11/10/2017    Procedure: LUMBAR SPINAL FUSION ;  Surgeon: Gopi Man MD;  Location: [facility];  Service:    • REPLACEMENT TOTAL KNEE BILATERAL Bilateral    • SHOULDER ARTHROSCOPY W/ ROTATOR CUFF REPAIR Right 01/19/2021    Procedure: ARTHROSCOPIC ROTATOR CUFF REPAIR WITH BICEPS TENODESIS RIGHT;  Surgeon: Lance Morales Jr., MD;  Location: [facility] OR;  Service: Orthopedics;  Laterality: Right;   • SKIN BIOPSY     • TOTAL HIP ARTHROPLASTY Right 09/09/2019    Procedure: TOTAL ANTERIOR RIGHT HIP ARTHROPLASTY;  Surgeon: Darrin New MD;  Location: [facility] OR;  Service: Orthopedics   • TOTAL HIP ARTHROPLASTY Left 06/29/2020    Procedure: TOTAL HIP ARTHROPLASTY ANTERIOR LEFT;  Surgeon: Darrin New MD;  Location: [facility];  Service:  Orthopedics;  Laterality: Left;      General Information     [facility] Name 02/03/23 0950          OT Time and Intention    Document Type therapy note (daily note)  -     Mode of Treatment occupational therapy  -[facility] Name 02/03/23 0950          General Information    Patient Profile Reviewed yes  -     Existing Precautions/Restrictions fall  contact/ airborne, residual R sided weakness  -[facility] Name 02/03/23 0950          Cognition    Orientation Status (Cognition) oriented x 4  -[facility] Name 02/03/23 0950          Safety Issues, Functional Mobility    Impairments Affecting Function (Mobility) balance;endurance/activity tolerance;strength  -           User Key  (r) = Recorded By, (t) = Taken By, (c) = Cosigned By    Initials Name Provider Type     Caridad Vargas OT Occupational Therapist                 Mobility/ADL's     [facility] Name 02/03/23 0950          Bed Mobility    Bed Mobility supine-sit  -     Supine-Sit [place] (Bed Mobility) contact guard;verbal cues  -     Assistive Device (Bed Mobility) bed rails;head of bed elevated  -SW     Row Name 02/03/23 0950          Transfers    Transfers sit-stand transfer;bed-chair transfer;toilet transfer  -SW     Row Name 02/03/23 0950          Bed-Chair Transfer    Bed-Chair [place] (Transfers) contact guard  -     Assistive Device (Bed-Chair Transfers) other (see comments)  HHA  -[facility] Name 02/03/23 0950          Sit-Stand Transfer    Sit-Stand [place] (Transfers) contact guard;verbal cues  -SW     Row Name 02/03/23 0950          Toilet Transfer    Type (Toilet Transfer) sit-stand;stand-sit  -     [place] Level (Toilet Transfer) contact guard  -     Assistive Device (Toilet Transfer) grab bars/safety frame;commode  -[facility] Name 02/03/23 0950          Functional Mobility    Functional Mobility- Ind. Level contact guard assist  -     Functional Mobility- Device other (see comments)  HHA  -SW     Functional Mobility-Distance  (Feet) 30  -     Row Name 02/03/23 0950          Activities of Daily Living    BADL Assessment/Intervention grooming;toileting  -     Row Name 02/03/23 0950          Grooming Assessment/Training    [place] Level (Grooming) grooming skills;wash face, hands;set up;hair care, combing/brushing;oral care regimen  -     Position (Grooming) unsupported standing  -     Row Name 02/03/23 0950          Toileting Assessment/Training    [place] Level (Toileting) toileting skills;set up;perform perineal hygiene  -     Assistive Devices (Toileting) commode;grab bar/safety frame  -     Position (Toileting) unsupported sitting  -           User Key  (r) = Recorded By, (t) = Taken By, (c) = Cosigned By    Initials Name Provider Type    Caridad Welch OT Occupational Therapist               Obj/Interventions     Row Name 02/03/23 0950          Shoulder (Therapeutic Exercise)    Shoulder (Therapeutic Exercise) AROM (active range of motion)  -     Shoulder AROM (Therapeutic Exercise) bilateral;flexion;extension;aBduction;sitting;standing;5 repetitions;10 repetitions  -[facility] Name 02/03/23 0950          Elbow/Forearm (Therapeutic Exercise)    Elbow/Forearm (Therapeutic Exercise) AROM (active range of motion)  -     Elbow/Forearm AROM (Therapeutic Exercise) bilateral;flexion;extension;supination;pronation;sitting;standing;5 repetitions;10 repetitions  -[facility] Name 02/03/23 0950          Motor Skills    Therapeutic Exercise shoulder;elbow/forearm  -[facility] Name 02/03/23 0950          Balance    Balance Assessment sitting static balance;sitting dynamic balance;sit to stand dynamic balance;standing dynamic balance;standing static balance  -     Static Sitting Balance independent  -     Dynamic Sitting Balance standby assist  -     Position, Sitting Balance unsupported  -     Sit to Stand Dynamic Balance contact guard;verbal cues  -     Static Standing Balance contact guard  -     Dynamic  Standing Balance contact guard  -SW     Position/Device Used, Standing Balance unsupported;supported  -SW     Balance Interventions sitting;standing;sit to stand;supported;dynamic;static;minimal challenge;occupation based/functional task  -SW           User Key  (r) = Recorded By, (t) = Taken By, (c) = Cosigned By    Initials Name Provider Type    Caridad Welch OT Occupational Therapist               Goals/Plan    No documentation.                Clinical Impression     Row Name 02/03/23 0950          Pain Assessment    Pretreatment Pain Rating 0/10 - no pain  -SW     Posttreatment Pain Rating 0/10 - no pain  -SW     Row Name 02/03/23 0950          Plan of Care Review    Plan of Care Reviewed With patient  -SW     Progress improving  -SW     Outcome Evaluation OT promoted adl retraining with pt demo improved bed mob with cga for supine to sit, improved fxl mob without use of device and handheld assist.  Pt is setup to supervision level for toileting and grooming tasks.  Completed multiple TE with focus to improve activity esa and safety.  Pt continues to need rehab to return to baseline of indep level.  -     Row Name 02/03/23 0950          Vital Signs    Pre Systolic BP Rehab 141  -SW     Pre Treatment Diastolic BP 57  -SW     Pre SpO2 (%) 90  -SW     O2 Delivery Pre Treatment room air  -SW     O2 Delivery Intra Treatment room air  -SW     O2 Delivery Post Treatment room air  -SW     Pre Patient Position Supine  -SW     Intra Patient Position Standing  -SW     Post Patient Position Sitting  -SW     Row Name 02/03/23 0950          Positioning and Restraints    Pre-Treatment Position in bed  -SW     Post Treatment Position chair  -SW     In Chair notified nsg;reclined;sitting;call light within reach;encouraged to call for assist;exit alarm on;waffle cushion;legs elevated  -           User Key  (r) = Recorded By, (t) = Taken By, (c) = Cosigned By    Initials Name Provider Type    Caridad Welch OT Occupational  Therapist               Outcome Measures     Row Name 02/03/23 1040          How much help from another is currently needed...    Putting on and taking off regular lower body clothing? 2  -SW     Bathing (including washing, rinsing, and drying) 3  -SW     Toileting (which includes using toilet bed pan or urinal) 4  -SW     Putting on and taking off regular upper body clothing 4  -SW     Taking care of personal grooming (such as brushing teeth) 4  -SW     Eating meals 4  -SW     AM-PAC 6 Clicks Score (OT) 21  -SW     Row Name 02/03/23 0800          How much help from another person do you currently need...    Turning from your back to your side while in flat bed without using bedrails? 3  -LT     Moving from lying on back to sitting on the side of a flat bed without bedrails? 3  -LT     Moving to and from a bed to a chair (including a wheelchair)? 3  -LT     Standing up from a chair using your arms (e.g., wheelchair, bedside chair)? 3  -LT     Climbing 3-5 steps with a railing? 3  -LT     To walk in hospital room? 3  -LT     AM-PAC 6 Clicks Score (PT) 18  -LT     Highest level of mobility 6 --> Walked 10 steps or more  -LT     Row Name 02/03/23 1040          Functional Assessment    Outcome Measure Options AM-PAC 6 Clicks Daily Activity (OT)  -           User Key  (r) = Recorded By, (t) = Taken By, (c) = Cosigned By    Initials Name Provider Type     Caridad Vargas OT Occupational Therapist     Nanci Long RN Registered Nurse                Occupational Therapy Education     Title: PT OT SLP Therapies (Done)     Topic: Occupational Therapy (Done)     Point: ADL training (Done)     Description:   Instruct learner(s) on proper safety adaptation and remediation techniques during self care or transfers.   Instruct in proper use of assistive devices.              Learning Progress Summary           Patient Acceptance, E, VU by TING at 2/3/2023 1041    Acceptance, E, VU by LAVON at 2/1/2023 4515    Eager, E, VU by  at  1/31/2023 1837    Acceptance, E, VU by JANE at 1/30/2023 1047    Comment: OT POC                   Point: Home exercise program (Done)     Description:   Instruct learner(s) on appropriate technique for monitoring, assisting and/or progressing therapeutic exercises/activities.              Learning Progress Summary           Patient Acceptance, E, VU by  at 2/3/2023 1041    Eager, E, VU by  at 1/31/2023 1837                   Point: Precautions (Done)     Description:   Instruct learner(s) on prescribed precautions during self-care and functional transfers.              Learning Progress Summary           Patient Acceptance, E, VU by  at 2/3/2023 1041    Eager, E, VU by  at 1/31/2023 1837    Acceptance, E, VU by  at 1/30/2023 1047    Comment: OT POC                   Point: Body mechanics (Done)     Description:   Instruct learner(s) on proper positioning and spine alignment during self-care, functional mobility activities and/or exercises.              Learning Progress Summary           Patient Eager, E, VU by  at 1/31/2023 1837    Acceptance, E, VU by  at 1/30/2023 1047    Comment: OT POC                               User Key     Initials Effective Dates Name Provider Type Discipline     06/16/21 - 02/02/23 Dorie Gilmore, OT Occupational Therapist OT     11/30/22 -  Henrique Crain, RN Registered Nurse Nurse     06/16/21 -  Caridad Vargas OT Occupational Therapist OT     06/16/21 -  Aminata Guzmán, OT Occupational Therapist OT              OT Recommendation and Plan     Plan of Care Review  Plan of Care Reviewed With: patient  Progress: improving  Outcome Evaluation: OT promoted adl retraining with pt demo improved bed mob with cga for supine to sit, improved fxl mob without use of device and handheld assist.  Pt is setup to supervision level for toileting and grooming tasks.  Completed multiple TE with focus to improve activity esa and safety.  Pt continues to need rehab to return to  baseline of indep level.     Time Calculation:    Time Calculation- OT     Row Name 02/03/23 0950             Time Calculation- OT    OT Start Time 0950  -SW      OT Received On 02/03/23  -SW         Timed Charges    07208 - OT Therapeutic Exercise Minutes 15  -SW      81501 - OT Self Care/Mgmt Minutes 25  -SW         Total Minutes    Timed Charges Total Minutes 40  -SW       Total Minutes 40  -SW            User Key  (r) = Recorded By, (t) = Taken By, (c) = Cosigned By    Initials Name Provider Type     Caridad Vargas OT Occupational Therapist              Therapy Charges for Today     Code Description Service Date Service Provider Modifiers Qty    00973219298 HC OT THER PROC EA 15 MIN 2/3/2023 Caridad Vargas OT GO 1    80857228193 HC OT SELF CARE/MGMT/TRAIN EA 15 MIN 2/3/2023 Caridad Vargas OT GO 2               Caridad Vargas OT  2/3/2023

## 2023-02-03 NOTE — PROGRESS NOTES
[facility] Medicine Services  PROGRESS NOTE    Patient Name: Akua Torres  : 1945  MRN: [id number]    Date of Admission: 2023  Primary Care Physician: Nathalia Freeman PA-C    Subjective   Subjective     CC:  COVID    HPI:  Afebrile.  On room air.  Cough improving.  Reports having increased energy and increased strength today.    ROS:  Gen- no fevers, no chills  CV- No chest pain, palpitations  Resp- + cough, no dyspnea, no hemoptysis  GI- No N/V, abd pain     Objective   Objective     Vital Signs:   Temp:  [97.4 °F (36.3 °C)-97.9 °F (36.6 °C)] 97.7 °F (36.5 °C)  Heart Rate:  [55-70] 58  Resp:  [16-18] 16  BP: (117-154)/(50-81) 141/57     Physical Exam:  Constitutional: No acute distress, awake, alert, sitting up in the chair  HENT: NCAT, mucous membranes moist  Respiratory: Clear to auscultation bilaterally, respiratory effort normal   Cardiovascular: RRR, no murmurs, rubs, or gallops  Gastrointestinal: Positive bowel sounds, soft, nontender, nondistended  Musculoskeletal: No bilateral ankle edema  Psychiatric: Appropriate affect, cooperative  Neurologic: strength symmetric in all extremities, Cranial Nerves grossly intact to confrontation, speech clear  Skin: No rashes on exposed skin    Results Reviewed:  LAB RESULTS:      Lab 23  0446 23  0420 23  0424 23  0605 23  0618 23  0008   WBC 3.97 4.33 4.61 7.79  --  10.41   HEMOGLOBIN 12.9 12.9 12.5 12.5  --  13.8   HEMATOCRIT 40.5 40.7 39.7 39.8  --  43.7   PLATELETS 132* 124* 110* 114*  --  145   NEUTROS ABS  --  2.01 2.42 5.49  --  8.06*   IMMATURE GRANS (ABS)  --  0.02 0.02 0.03  --  0.03   LYMPHS ABS  --  1.58 1.46 1.35  --  1.22   MONOS ABS  --  0.41 0.50 0.86  --  1.05*   EOS ABS  --  0.30 0.20 0.04  --  0.03   MCV 93.1 94.7 94.1 95.2  --  95.0   CRP  --  6.18* 10.00* 11.97*  --  5.82*   PROCALCITONIN  --   --   --   --   --  0.24   LACTATE  --   --   --   --   --  0.8   LDH  --    --   --   --   --  189   PROTIME 22.4* 19.3* 17.3* 16.7* 16.2* 16.5*   APTT  --   --   --   --   --  30.6   D DIMER QUANT  --   --   --   --   --  0.36         Lab 02/02/23 0420 02/01/23 0424 01/31/23 0605 01/30/23 1730 01/30/23  0557 01/30/23  0008   SODIUM 135* 138 138  --   --  137   POTASSIUM 4.2 4.2 4.6  --   --  5.1   CHLORIDE 104 106 105  --   --  105   CO2 23.0 24.0 24.0  --   --  23.0   ANION GAP 8.0 8.0 9.0  --   --  9.0   BUN 24* 21 22  --   --  29*   CREATININE 0.82 0.96 1.19* 1.19*  --  1.41*   EGFR 73.3 60.7 46.9* 46.9*  --  38.3*   GLUCOSE 70 71 80  --   --  107*   CALCIUM 8.2* 8.0* 8.2*  --   --  8.5*   MAGNESIUM 2.2 1.6 1.7  --   --  2.0   PHOSPHORUS  --   --   --   --   --  2.6   HEMOGLOBIN A1C  --   --   --   --  5.90*  --    TSH  --   --   --   --   --  0.355         Lab 02/02/23 0420 02/01/23 0424 01/31/23 0605 01/30/23 1730 01/30/23  0008   TOTAL PROTEIN 5.8* 5.4* 5.6* 6.6 6.5   ALBUMIN 3.0* 3.0* 3.1* 3.9 3.7   GLOBULIN 2.8 2.4 2.5  --  2.8   ALT (SGPT) 12 10 9 12 10   AST (SGOT) 22 21 19 21 17   BILIRUBIN 0.4 0.6 0.9 1.0 1.1   INDIRECT BILIRUBIN  --   --   --  0.8  --    BILIRUBIN DIRECT  --  <0.2 0.2 0.2  --    ALK PHOS 80 74 80 103 107   LIPASE  --   --   --   --  68*         Lab 02/03/23 0446 02/02/23  0420 02/01/23  0424 01/31/23  0605 01/30/23  0618 01/30/23  0008   TROPONIN T  --   --   --   --   --  <0.010   PROTIME 22.4* 19.3* 17.3* 16.7* 16.2* 16.5*   INR 1.97* 1.63* 1.42* 1.36* 1.31* 1.33*         Lab 01/30/23  0557   CHOLESTEROL 106   LDL CHOL 48   HDL CHOL 41   TRIGLYCERIDES 86         Lab 01/30/23  0008   FERRITIN 189.80*         Brief Urine Lab Results  (Last result in the past 365 days)      Color   Clarity   Blood   Leuk Est   Nitrite   Protein   CREAT   Urine HCG        01/29/23 2358 Yellow   Clear   Negative   Negative   Negative   Trace                 Microbiology Results Abnormal     Procedure Component Value - Date/Time    Blood Culture - Blood, Arm, Left  [[id number]]  (Normal) Collected: 01/30/23 0015    Lab Status: Preliminary result Specimen: Blood from Arm, Left Updated: 02/03/23 0045     Blood Culture No growth at 4 days    Blood Culture - Blood, Hand, Left [[id number]]  (Normal) Collected: 01/30/23 0020    Lab Status: Preliminary result Specimen: Blood from Hand, Left Updated: 02/03/23 0045     Blood Culture No growth at 4 days          No radiology results from the last 24 hrs    Results for orders placed during the hospital encounter of 01/29/23    Adult Transthoracic Echo Complete W/ Cont if Necessary Per Protocol (With Agitated Saline)    Interpretation Summary  •  Left ventricular systolic function is normal. Left ventricular ejection fraction appears to be 66 - 70%.  •  The right ventricular cavity is borderline dilated.  •  Saline test results are negative.  •  The right atrial cavity is mildly dilated.      I have reviewed the medications:  Scheduled Meds:allopurinol, 100 mg, Oral, Daily  atenolol, 25 mg, Oral, Q12H  atorvastatin, 80 mg, Oral, Nightly  benzonatate, 200 mg, Oral, TID  cholecalciferol, 2,000 Units, Oral, Daily  clopidogrel, 75 mg, Oral, Daily  enoxaparin, 1 mg/kg, Subcutaneous, Q12H  escitalopram, 20 mg, Oral, Daily  guaiFENesin, 1,200 mg, Oral, Q12H  pregabalin, 50 mg, Oral, TID  propafenone SR, 225 mg, Oral, Q12H  sodium chloride, 10 mL, Intravenous, Q12H  warfarin, 7.5 mg, Oral, Daily      Continuous Infusions:Pharmacy to dose warfarin,       PRN Meds:.•  acetaminophen  •  albuterol sulfate HFA  •  hydrOXYzine  •  loperamide  •  Magnesium Standard Dose Replacement - Initiate Nurse / BPA Driven Protocol  •  ondansetron  •  Pharmacy to dose warfarin  •  sodium chloride  •  sodium chloride  •  sodium chloride    Assessment & Plan   Assessment & Plan     Active Hospital Problems    Diagnosis  POA   • **COVID-19 [U07.1]  Yes   • COVID-19 virus detected [U07.1]  Yes   • Weakness [R53.1]  Yes   • History of stroke [Z86.73]  Not Applicable   •  Hyperlipidemia [E78.5]  Yes   • Paroxysmal atrial fibrillation (HCC) [I48.0]  Yes   • LOTTIE (acute kidney injury) (HCC) [N17.9]  Yes   • Essential hypertension [I10]  Yes      Resolved Hospital Problems   No resolved problems to display.        Brief Hospital Course to date:  Akua Torres is a 77 y.o. female with PMH significant for PAF on Coumadin, CVA, GERD, HTN, dyslipidemia, recurrent UTI with urinary incontinence, depression, YUNIOR (not on CPAP), who presented to the ED with complaint of generalized weakness and was found to have COVID 19 infection. She was satting well on admission.     This patient's problems and plans were partially entered by my partner and updated as appropriate by me 02/03/23.    COVID-19  - Tylenol as needed for fever  - Symptom management with Archana Wu Mucinex  - not currently hypoxic  - Given comorbidities and high risk for progression, status post remdesivir x3 days; discussed risks/benefits and side effects of remdesivir with patient and she is accepting of risks  - If becomes hypoxic, will start Decadron, otherwise will not place patient on steroids  --COVID-positive on 1/30, symptoms began on ~1/27 and will need to isolate for 10 days from symptom onset (through 2/6)  -- Discontinued telemetry, spotcheck oxygen    LOTTIE, resolved  --Creatinine 1.41 on admission, improved 0.82 on 2/2  - Likely secondary to dehydration  - LR bolus x2 given in the ED  - Hold further IVF for now     History of pyuria  - Recently diagnosed with UTI on 1/24, treated with Cipro  - Hold further ABX for now, UA clear and patient asymptomatic      Weakness  Dysarthria, resolved  - Likely secondary to above  - Fall precautions  - PT/OT consulted --> recommended rehab; patient stated she was amenable to rehab on 2/2, stating that her  is sick right now and unable to care for her  -- Stroke service evaluated the patient; TTE showed LV EF 66-70%, RV borderline dilated, saline test negative  --  MRI findings chronic in nature and reviewed by Stroke Neurology, who recommended no further intervention  -- Cleared by speech for diet  -- Plavix 75mg daily     PAF  - Rate controlled  - Anticoagulated on Coumadin, pharmacy to dose  --Patient had not been taking her warfarin appropriately, had been taking mostly 5 mg once a day  - subtherapeutic INR on admission, bridged with Lovenox  - Continue Rythmol    Mild thrombocytopenia  --Was not exposed to any heparin products prior to mild drop in platelets, continue to monitor (improving)     Hypertension  Dyslipidemia  - Continue atenolol, Lipitor  - Resumed lisinopril on 2/3     Fibromyalgia  - Continue Lyrica     Depression/anxiety  - Continue Lexapro  - As needed Atarax        DVT prophylaxis: On Coumadin, lovenox      Expected Discharge Location and Transportation: IRF, which patient is amenable to  Expected Discharge   Expected Discharge Date and Time     Expected Discharge Date Expected Discharge Time    Feb 6, 2023            DVT prophylaxis:  Medical and mechanical DVT prophylaxis orders are present.     AM-PAC 6 Clicks Score (PT): 18 (02/02/23 1107)    CODE STATUS:   Code Status and Medical Interventions:   Ordered at: 01/30/23 0308     Medical Intervention Limits:    NO intubation (DNI)     Level Of Support Discussed With:    Patient     Code Status (Patient has no pulse and is not breathing):    No CPR (Do Not Attempt to Resuscitate)     Medical Interventions (Patient has pulse or is breathing):    Limited Support       Debi West MD  02/03/23

## 2023-02-03 NOTE — CASE MANAGEMENT/SOCIAL WORK
Continued Stay Note  [facility]     Patient Name: Akua Torres  MRN: [id number]  Today's Date: 2/3/2023    Admit Date: 1/29/2023    Plan: Rehab at [place]   Discharge Plan     Row Name 02/03/23 1044       Plan    Plan Rehab at [place]    Patient/Family in Agreement with Plan yes    Plan Comments CM will continue to follow. Will be out of covid isolation 2-9-23. [facility] has the referral.    Final Discharge Disposition Code 03 - skilled nursing facility (SNF)               Discharge Codes    No documentation.               Expected Discharge Date and Time     Expected Discharge Date Expected Discharge Time    Feb 6, 2023             uSsi Baldwin RN

## 2023-02-04 ENCOUNTER — READMISSION MANAGEMENT (OUTPATIENT)
Dept: CALL CENTER | Facility: HOSPITAL | Age: 78
End: 2023-02-04
Payer: MEDICARE

## 2023-02-04 VITALS
WEIGHT: 210 LBS | HEIGHT: 68 IN | SYSTOLIC BLOOD PRESSURE: 125 MMHG | HEART RATE: 64 BPM | OXYGEN SATURATION: 94 % | BODY MASS INDEX: 31.83 KG/M2 | TEMPERATURE: 97.5 F | RESPIRATION RATE: 16 BRPM | DIASTOLIC BLOOD PRESSURE: 67 MMHG

## 2023-02-04 PROBLEM — R79.1 SUBTHERAPEUTIC INTERNATIONAL NORMALIZED RATIO (INR): Status: ACTIVE | Noted: 2023-02-04

## 2023-02-04 PROBLEM — U07.1 THROMBOCYTOPENIA DUE TO COVID-19 VIRUS: Status: ACTIVE | Noted: 2023-02-04

## 2023-02-04 PROBLEM — D69.59 THROMBOCYTOPENIA DUE TO COVID-19 VIRUS: Status: ACTIVE | Noted: 2023-02-04

## 2023-02-04 LAB
BACTERIA SPEC AEROBE CULT: NORMAL
BACTERIA SPEC AEROBE CULT: NORMAL
DEPRECATED RDW RBC AUTO: 51.5 FL (ref 37–54)
ERYTHROCYTE [DISTWIDTH] IN BLOOD BY AUTOMATED COUNT: 14.6 % (ref 12.3–15.4)
HCT VFR BLD AUTO: 47.2 % (ref 34–46.6)
HGB BLD-MCNC: 14.6 G/DL (ref 12–15.9)
INR PPP: 2.17 (ref 0.84–1.13)
MCH RBC QN AUTO: 29.3 PG (ref 26.6–33)
MCHC RBC AUTO-ENTMCNC: 30.9 G/DL (ref 31.5–35.7)
MCV RBC AUTO: 94.6 FL (ref 79–97)
PLATELET # BLD AUTO: 187 10*3/MM3 (ref 140–450)
PMV BLD AUTO: 10.4 FL (ref 6–12)
PROTHROMBIN TIME: 24.2 SECONDS (ref 11.4–14.4)
RBC # BLD AUTO: 4.99 10*6/MM3 (ref 3.77–5.28)
WBC NRBC COR # BLD: 6.63 10*3/MM3 (ref 3.4–10.8)

## 2023-02-04 PROCEDURE — 85610 PROTHROMBIN TIME: CPT | Performed by: NURSE PRACTITIONER

## 2023-02-04 PROCEDURE — G0378 HOSPITAL OBSERVATION PER HR: HCPCS

## 2023-02-04 PROCEDURE — 85027 COMPLETE CBC AUTOMATED: CPT | Performed by: STUDENT IN AN ORGANIZED HEALTH CARE EDUCATION/TRAINING PROGRAM

## 2023-02-04 PROCEDURE — 99239 HOSP IP/OBS DSCHRG MGMT >30: CPT | Performed by: STUDENT IN AN ORGANIZED HEALTH CARE EDUCATION/TRAINING PROGRAM

## 2023-02-04 RX ORDER — WARFARIN SODIUM 7.5 MG/1
7.5 TABLET ORAL
Status: DISCONTINUED | OUTPATIENT
Start: 2023-02-04 | End: 2023-02-04 | Stop reason: HOSPADM

## 2023-02-04 RX ORDER — CLOPIDOGREL BISULFATE 75 MG/1
75 TABLET ORAL DAILY
Qty: 90 TABLET | Refills: 0 | Status: SHIPPED | OUTPATIENT
Start: 2023-02-04

## 2023-02-04 RX ORDER — BENZONATATE 200 MG/1
200 CAPSULE ORAL 3 TIMES DAILY
Qty: 11 CAPSULE | Refills: 0 | Status: SHIPPED | OUTPATIENT
Start: 2023-02-04 | End: 2023-02-08

## 2023-02-04 RX ORDER — PANTOPRAZOLE SODIUM 40 MG/1
40 TABLET, DELAYED RELEASE ORAL DAILY
Qty: 30 TABLET | Refills: 0 | Status: SHIPPED | OUTPATIENT
Start: 2023-02-04

## 2023-02-04 RX ORDER — LISINOPRIL 20 MG/1
20 TABLET ORAL
Status: DISCONTINUED | OUTPATIENT
Start: 2023-02-04 | End: 2023-02-04 | Stop reason: HOSPADM

## 2023-02-04 RX ORDER — WARFARIN SODIUM 5 MG/1
5-7.5 TABLET ORAL NIGHTLY
Start: 2023-02-04

## 2023-02-04 RX ADMIN — CLOPIDOGREL BISULFATE 75 MG: 75 TABLET ORAL at 09:07

## 2023-02-04 RX ADMIN — BENZONATATE 200 MG: 100 CAPSULE ORAL at 09:08

## 2023-02-04 RX ADMIN — Medication 2000 UNITS: at 09:07

## 2023-02-04 RX ADMIN — ATENOLOL 25 MG: 25 TABLET ORAL at 09:07

## 2023-02-04 RX ADMIN — ALLOPURINOL 100 MG: 100 TABLET ORAL at 09:08

## 2023-02-04 RX ADMIN — BENZONATATE 200 MG: 100 CAPSULE ORAL at 16:32

## 2023-02-04 RX ADMIN — LISINOPRIL 20 MG: 20 TABLET ORAL at 09:07

## 2023-02-04 RX ADMIN — GUAIFENESIN 1200 MG: 600 TABLET, EXTENDED RELEASE ORAL at 09:07

## 2023-02-04 RX ADMIN — Medication 10 ML: at 09:08

## 2023-02-04 RX ADMIN — ESCITALOPRAM OXALATE 20 MG: 10 TABLET, FILM COATED ORAL at 09:08

## 2023-02-04 RX ADMIN — PROPAFENONE HYDROCHLORIDE 225 MG: 225 CAPSULE, EXTENDED RELEASE ORAL at 09:08

## 2023-02-04 RX ADMIN — WARFARIN SODIUM 7.5 MG: 7.5 TABLET ORAL at 16:53

## 2023-02-04 RX ADMIN — PREGABALIN 50 MG: 50 CAPSULE ORAL at 16:32

## 2023-02-04 RX ADMIN — PREGABALIN 50 MG: 50 CAPSULE ORAL at 09:08

## 2023-02-04 NOTE — DISCHARGE SUMMARY
[facility] Medicine Services  DISCHARGE SUMMARY    Patient Name: Akua Torres  : 1945  MRN: [id number]    Date of Admission: 2023 10:49 PM  Date of Discharge: 2023  Primary Care Physician: Nathalia Freeman PA-C    Consults     No orders found from 2022 to 2023.          Hospital Course     Presenting Problem:   COVID-19 [U07.1]    Active Hospital Problems    Diagnosis  POA   • **COVID-19 [U07.1]  Yes   • Subtherapeutic international normalized ratio (INR) [R79.1]  Yes   • Thrombocytopenia due to COVID-19 virus [U07.1, D69.59]  Yes   • COVID-19 virus detected [U07.1]  Yes   • Weakness [R53.1]  Yes   • History of stroke [Z86.73]  Not Applicable   • Hyperlipidemia [E78.5]  Yes   • Paroxysmal atrial fibrillation (HCC) [I48.0]  Yes   • LOTTIE (acute kidney injury) (HCC) [N17.9]  Yes   • Essential hypertension [I10]  Yes      Resolved Hospital Problems   No resolved problems to display.          Hospital Course:  Akua Torres is a 77 y.o. female with PMH significant for PAF on Coumadin, CVA with residual RLE weakness, carotid artery disease s/p left ICA stent, GERD, HTN, dyslipidemia, recurrent UTI with urinary incontinence, depression, YUNIOR (not on CPAP), who presented to the ED with complaint of generalized weakness and was found to have COVID 19 infection. She was satting well on admission.       This patient's problems and plans were partially entered by my partner and updated as appropriate by me 23.     COVID-19  - Tylenol as needed for fever  - Symptom management with Tessalon Perles, Mucinex  - Not currently hypoxic  - Given comorbidities and high risk for progression, status post remdesivir x3 days; discussed risks/benefits and side effects of remdesivir with patient and she is accepting of risks  --COVID-positive on , symptoms began on ~ and will need to isolate for 10 days from symptom onset (through , which patient was educated  on prior to discharge)  -- Discontinued telemetry, spotcheck oxygen     LOTTIE, resolved  --Creatinine 1.41 on admission, improved 0.82 on 2/2  - Likely secondary to dehydration  - LR bolus x2 given in the ED     History of pyuria  - Recently diagnosed with UTI on 1/24, treated with Cipro  - Hold further ABX for now, UA clear and patient asymptomatic      Weakness, improved  Dysarthria, resolved  PAD s/p L ICA stent  - Likely secondary to above  - Fall precautions  - PT/OT consulted --> recommended rehab; patient stated she was amenable to rehab on 2/2, stating that her  is sick right now and unable to care for her --> on 2/3 PM, patient decided she no longer wants to go to rehab.  She wanted to think about it overnight, but was considering no longer going to rehab.  --Discussed with patient on 2/4, and patient states that she is now walking much more easily using her walker, which she has at home.  She states her  is now recovered from his COVID infection.  She states that she would like to go home and does not want to wait on getting out of isolation to go to rehab.  She states that she is understanding and accepting of the risks of increased falls and worsening condition if she were to go home rather than rehab.  Encouraged patient to remain in isolation and await rehab, but patient declined.  Patient does have decision-making capacity.  --  PT/OT ordered by   -- Stroke service evaluated the patient; TTE showed LV EF 66-70%, RV borderline dilated, saline test negative  -- MRI findings chronic in nature and reviewed by Stroke Neurology, who recommended no further intervention  -- Cleared by speech for diet  -- Plavix 75mg daily started by stroke team per their recommendations     PAF  - Rate controlled  - Anticoagulated on Coumadin, pharmacy to dose  --Patient had not been taking her warfarin appropriately, had been taking mostly 5 mg once a day --> discharge, patient to take warfarin 7.5 mg once  daily except take 5 mg on Mondays and Fridays per her outpatient cardiologist office (Dr. Langford)  - subtherapeutic INR on admission, bridged with Lovenox, now therapeutic with INR 2.17  - Continue Rythmol     Mild thrombocytopenia, resolved, likely related to covid 19 infection/illness  --Was not exposed to any heparin products prior to mild drop in platelets, continue to monitor (improving)     Hypertension  Dyslipidemia  - Continue atenolol, Lipitor  - Resumed lisinopril on 2/3     Fibromyalgia  - Continue Lyrica     Depression/anxiety  - Continue Lexapro  - As needed Atarax    GERD  -- Pantoprazole        DVT prophylaxis: On Coumadin      Discharge Follow Up Recommendations for outpatient labs/diagnostics:   1. Follow-up with primary care doctor within 5 to 7 days regarding this hospitalization and for repeat labs (CBC, BMP, urinalysis), blood sugar monitoring given A1C was in the pre-diabetic range, and repeat chest imaging  2. Please call Dr. Langford office on 2/6/2023 first thing and ask for your INR to be monitored that day, given your warfarin dosing adjustments in the hospital  3. Please take your Warfarin as follows per the Pharmacist: Warfarin 7.5mg orally once daily except take 5mg on Mondays and Fridays      Day of Discharge     HPI:   The patient reports feeling well today.  Reports minimal cough.  Denied chest pain.  Has had normal bowel movements.  Eating and drinking well.  Reports that she gets around much more easily the past 2 days.  She is asking to go home.  She states she no longer wants to go to rehab.  She has a walker at home already per her report.    Review of Systems  Gen- No fevers, chills  CV- No chest pain, palpitations  Resp- + cough, no dyspnea  GI- No N/V/D, abd pain    Vital Signs:   Temp:  [97.5 °F (36.4 °C)-98.6 °F (37 °C)] 97.5 °F (36.4 °C)  Heart Rate:  [57-64] 64  Resp:  [16] 16  BP: (122-143)/(53-69) 125/67      Physical Exam:  Constitutional: No acute distress, awake, alert,  sitting up in bed  HENT: NCAT, mucous membranes moist  Respiratory: Clear to auscultation bilaterally, respiratory effort normal   Cardiovascular: RRR, no murmurs, rubs, or gallops  Gastrointestinal: Positive bowel sounds, soft, nontender, nondistended  Musculoskeletal: No bilateral ankle edema  Psychiatric: Appropriate affect, cooperative  Neurologic: strength symmetric in all extremities, Cranial Nerves grossly intact to confrontation, speech clear  Skin: No rashes on exposed skin    Pertinent  and/or Most Recent Results     LAB RESULTS:      Lab 02/04/23  0425 02/03/23  0446 02/02/23  0420 02/01/23  0424 01/31/23  0605 01/30/23  0618 01/30/23  0008   WBC 6.63 3.97 4.33 4.61 7.79  --  10.41   HEMOGLOBIN 14.6 12.9 12.9 12.5 12.5  --  13.8   HEMATOCRIT 47.2* 40.5 40.7 39.7 39.8  --  43.7   PLATELETS 187 132* 124* 110* 114*  --  145   NEUTROS ABS  --   --  2.01 2.42 5.49  --  8.06*   IMMATURE GRANS (ABS)  --   --  0.02 0.02 0.03  --  0.03   LYMPHS ABS  --   --  1.58 1.46 1.35  --  1.22   MONOS ABS  --   --  0.41 0.50 0.86  --  1.05*   EOS ABS  --   --  0.30 0.20 0.04  --  0.03   MCV 94.6 93.1 94.7 94.1 95.2  --  95.0   CRP  --   --  6.18* 10.00* 11.97*  --  5.82*   PROCALCITONIN  --   --   --   --   --   --  0.24   LACTATE  --   --   --   --   --   --  0.8   LDH  --   --   --   --   --   --  189   PROTIME 24.2* 22.4* 19.3* 17.3* 16.7*   < > 16.5*   APTT  --   --   --   --   --   --  30.6   D DIMER QUANT  --   --   --   --   --   --  0.36    < > = values in this interval not displayed.         Lab 02/02/23  0420 02/01/23  0424 01/31/23  0605 01/30/23  1730 01/30/23  0557 01/30/23  0008   SODIUM 135* 138 138  --   --  137   POTASSIUM 4.2 4.2 4.6  --   --  5.1   CHLORIDE 104 106 105  --   --  105   CO2 23.0 24.0 24.0  --   --  23.0   ANION GAP 8.0 8.0 9.0  --   --  9.0   BUN 24* 21 22  --   --  29*   CREATININE 0.82 0.96 1.19* 1.19*  --  1.41*   EGFR 73.3 60.7 46.9* 46.9*  --  38.3*   GLUCOSE 70 71 80  --   --  107*    CALCIUM 8.2* 8.0* 8.2*  --   --  8.5*   MAGNESIUM 2.2 1.6 1.7  --   --  2.0   PHOSPHORUS  --   --   --   --   --  2.6   HEMOGLOBIN A1C  --   --   --   --  5.90*  --    TSH  --   --   --   --   --  0.355         Lab 02/02/23  0420 02/01/23  0424 01/31/23  0605 01/30/23  1730 01/30/23  0008   TOTAL PROTEIN 5.8* 5.4* 5.6* 6.6 6.5   ALBUMIN 3.0* 3.0* 3.1* 3.9 3.7   GLOBULIN 2.8 2.4 2.5  --  2.8   ALT (SGPT) 12 10 9 12 10   AST (SGOT) 22 21 19 21 17   BILIRUBIN 0.4 0.6 0.9 1.0 1.1   INDIRECT BILIRUBIN  --   --   --  0.8  --    BILIRUBIN DIRECT  --  <0.2 0.2 0.2  --    ALK PHOS 80 74 80 103 107   LIPASE  --   --   --   --  68*         Lab 02/04/23  0425 02/03/23  0446 02/02/23  0420 02/01/23  0424 01/31/23  0605 01/30/23  0618 01/30/23  0008   TROPONIN T  --   --   --   --   --   --  <0.010   PROTIME 24.2* 22.4* 19.3* 17.3* 16.7*   < > 16.5*   INR 2.17* 1.97* 1.63* 1.42* 1.36*   < > 1.33*    < > = values in this interval not displayed.         Lab 01/30/23  0557   CHOLESTEROL 106   LDL CHOL 48   HDL CHOL 41   TRIGLYCERIDES 86         Lab 01/30/23  0008   FERRITIN 189.80*         Brief Urine Lab Results  (Last result in the past 365 days)      Color   Clarity   Blood   Leuk Est   Nitrite   Protein   CREAT   Urine HCG        01/29/23 2358 Yellow   Clear   Negative   Negative   Negative   Trace               Microbiology Results (last 10 days)     Procedure Component Value - Date/Time    Blood Culture - Blood, Hand, Left [[id number]]  (Normal) Collected: 01/30/23 0020    Lab Status: Final result Specimen: Blood from Hand, Left Updated: 02/04/23 0045     Blood Culture No growth at 5 days    Blood Culture - Blood, Arm, Left [[id number]]  (Normal) Collected: 01/30/23 0015    Lab Status: Final result Specimen: Blood from Arm, Left Updated: 02/04/23 0045     Blood Culture No growth at 5 days    COVID PRE-OP / PRE-PROCEDURE SCREENING ORDER (NO ISOLATION) - Swab, Nasopharynx [[id number]]  (Abnormal) Collected: 01/30/23 0006    Lab  Status: Final result Specimen: Swab from Nasopharynx Updated: 01/30/23 0117    Narrative:      The following orders were created for panel order COVID PRE-OP / PRE-PROCEDURE SCREENING ORDER (NO ISOLATION) - Swab, Nasopharynx.  Procedure                               Abnormality         Status                     ---------                               -----------         ------                     Respiratory Panel PCR w/...[[id number]]  Abnormal            Final result                 Please view results for these tests on the individual orders.    Respiratory Panel PCR w/COVID-19(SARS-CoV-2) SENG/FABIOLA/IVANIA/PAD/COR/MAD/CORTNEY In-House, NP Swab in UTM/VTM, 3-4 HR TAT - Swab, Nasopharynx [[id number]]  (Abnormal) Collected: 01/30/23 0006    Lab Status: Final result Specimen: Swab from Nasopharynx Updated: 01/30/23 0117     ADENOVIRUS, PCR Not Detected     Coronavirus 229E Not Detected     Coronavirus HKU1 Not Detected     Coronavirus NL63 Not Detected     Coronavirus OC43 Not Detected     COVID19 Detected     Human Metapneumovirus Not Detected     Human Rhinovirus/Enterovirus Not Detected     Influenza A PCR Not Detected     Influenza B PCR Not Detected     Parainfluenza Virus 1 Not Detected     Parainfluenza Virus 2 Not Detected     Parainfluenza Virus 3 Not Detected     Parainfluenza Virus 4 Not Detected     RSV, PCR Not Detected     Bordetella pertussis pcr Not Detected     Bordetella parapertussis PCR Not Detected     Chlamydophila pneumoniae PCR Not Detected     Mycoplasma pneumo by PCR Not Detected    Narrative:      In the setting of a positive respiratory panel with a viral infection PLUS a negative procalcitonin without other underlying concern for bacterial infection, consider observing off antibiotics or discontinuation of antibiotics and continue supportive care. If the respiratory panel is positive for atypical bacterial infection (Bordetella pertussis, Chlamydophila pneumoniae, or Mycoplasma pneumoniae),  consider antibiotic de-escalation to target atypical bacterial infection.          Adult Transthoracic Echo Complete W/ Cont if Necessary Per Protocol (With Agitated Saline)    Result Date: 1/31/2023  •  Left ventricular systolic function is normal. Left ventricular ejection fraction appears to be 66 - 70%. •  The right ventricular cavity is borderline dilated. •  Saline test results are negative. •  The right atrial cavity is mildly dilated.     CT Abdomen Pelvis Without Contrast    Result Date: 1/30/2023  CT OF THE ABDOMEN AND PELVIS WITHOUT CONTRAST Clinical indication: Sepsis. Comparison: 10/4/2022. Procedure: Noncontrast CT images of the abdomen and pelvis were obtained.  CT dose lowering techniques were used, to include: automated exposure control, adjustment for patient size, and or use of iterative reconstruction. Findings: Lung Bases: Patchy airspace densities are seen in both lung bases, left greater than right. No pleural effusion. Heart size is normal without pericardial effusion. Small hiatal hernia. Liver and biliary system: Stable right hepatic lobe cyst. No intra or extrahepatic biliary ductal dilatation is noted. The gallbladder is normal without stones, wall thickening or adjacent fluid. Pancreas: The pancreas is unremarkable. Spleen: The spleen is normal. Adrenals: The adrenal glands are within normal limits. Kidneys: The kidneys are symmetric in size and without hydronephrosis. Gastrointestinal: The stomach and scattered loops of small and large bowel have a nonobstructive pattern. No focal mucosal lesion or inflammatory changes are seen. The appendix is normal in the right lower quadrant. Mesentery and retroperitoneum: No mesenteric or retroperitoneal adenopathy. No abnormal fluid collection, mass or free air. Pelvis: The urinary bladder is moderately distended with a smooth contour. Rectum is normal. No free fluid. No deep pelvic or inguinal adenopathy. Reproductive: No acute abnormality. Body  wall: Normal. Bones: No acute osseous or hardware abnormality.     Impression: 1. Patchy bibasilar alveolar densities could reflect atelectasis, but aspiration or pneumonia are not excluded. 2. Small hiatal hernia. Electronically signed by:  Vaughn Hill M.D.  1/30/2023 12:09 AM Mountain Time    CT Head Without Contrast    Result Date: 1/30/2023  EXAMINATION: CT HEAD WITHOUT CONTRAST DATE: 1/30/2023 1:29 AM INDICATION: Generalized weakness COMPARISON: CT head 7/27/2021 TECHNIQUE: Noncontrast imaging obtained from the vertex to the skull base.  CT dose lowering techniques were used, to include: automated exposure control, adjustment for patient size, and or use of iterative reconstruction.? FINDINGS: Soft Tissues: No significant soft tissue abnormality. Skull: No underlying skull fracture or radiopaque foreign body. Sinuses: Paranasal sinuses are clear. Mastoids: Mastoid air cells are clear. Globes and Orbits: Globes and orbits are intact. Brain: No acute hemorrhage.  No midline shift, masses, or mass effect.  No evidence of acute infarct by noncontrast CT. Ventricles and Cisterns: Ventricular size and configuration is within normal limits. Basal cisterns are patent. No abnormal extra-axial fluid collection. Senescent Changes: Mild white matter hypoattenuation favoring chronic microvascular ischemic changes. Arteriosclerosis.     1. No acute intracranial abnormality by noncontrast examination. 2. Mild white matter hypoattenuation favoring chronic microvascular ischemic changes. Electronically signed by:  Cornelius Paul M.D.  1/29/2023 11:52 PM Mountain Time    CT Chest Without Contrast Diagnostic    Result Date: 1/30/2023  Exam: CT Chest without IV contrast. History: Lung opacities. Technique: CT examination of the chest was performed without IV contrast. Multiplanar reformatted images were provided.  CT dose lowering techniques were used, to include: automated exposure control, adjustment for patient size, and or  use of iterative reconstruction. Comparison: Chest x-ray performed earlier the same date. FINDINGS: Pulmonary Parenchyma and Airways: There is a very small amount airspace disease in the lung bases, left greater than right that could be pneumonia.. The central airways are patent. Pleural and Pericardial spaces: No pleural or pericardial effusion. No obvious pleural or pericardial mass. Axilla, Thyroid: No enlarged axillary or supraclavicular nodes. Visualized thyroid is normal.. Mediastinum and Netta: No mediastinal or hilar masses or enlarged nodes. Cardiovascular: No evidence of aortic aneurysm or aortic dissection. The heart size is normal. The pulmonary artery is normal in size. Upper Abdomen: Right hepatic lobe cyst. Bones: No fracture or aggressive osseous lesion.     There is some minimal patchy airspace disease of the lung bases, left greater than right that could be pneumonia. Electronically signed by:  Vikas Pang M.D.  1/30/2023 1:43 AM Mountain Time    MRI Angiogram Head Without Contrast    Addendum Date: 1/30/2023    Images from the 9/9/2019 head MRI now available for comparison and are notable for the following: *  Acute infarct in the left motor strip seen on 9/9/2019 demonstrates interval cavitation *  Elsewhere, grossly stable white matter changes *  No susceptibility weighted images performed on 9/9/2019, therefore the stability of the focus of susceptibility effect seen on the current examination cannot be assessed Electronically signed by:  Ajay Ritchie M.D.  1/30/2023 6:25 AM    Result Date: 1/30/2023  EXAM: MRI BRAIN WO CONTRAST, MRI ANGIOGRAM HEAD WO CONTRAST, MRI ANGIOGRAM NECK WO CONTRAST EXAM DATE: 1/30/2023 3:39 AM INDICATION: Weakness. COMPARISON: 9/9/2019 (report only), 9/12/2019 . TECHNIQUE: Multisequence, multiangle images were obtained through the brain without the administration of intravenous contrast. MR angiogram images of the head without intravenous contrast.  MR  angiogram images of the neck without intravenous contrast. NASCET criteria used for interpretation. Maximum intensity projections (MIPs) and/or 3D reconstructions constructed and reviewed at time of study interpretation. CONTRAST: None. FINDINGS: TECHNICAL: Motion degraded examinations, particularly the neck MRA. HEAD MRI: INTRACRANIAL CONTENTS: No intracranial mass. Punctate focus of susceptibility effect in the left centrum semiovale. No acute infarct. Moderate T2 hyperintensity within the supratentorial white matter. Including cavitary changes within the supratentorial white matter. Ventricles, sulci, and cisterns within normal limits. SOFT TISSUES: No significant abnormality. BONES / BONE MARROW: No suspicious bone marrow signal abnormality. ORBITS: Within normal limits. MASTOID AIR CELLS: No significant opacification. TMJ: Degenerative changes bilaterally. MAJOR INTRACRANIAL FLOW VOIDS: Preserved. HEAD MRA: RIGHT INTERNAL CAROTID ARTERY (ICA): Described in neck MRA below. LEFT INTERNAL CAROTID ARTERY (ICA): Described in neck MRA below. RIGHT VERTEBRAL ARTERY: Described in neck MRA below. LEFT VERTEBRAL ARTERY: Described in neck MRA below.  ANTERIOR CEREBRAL ARTERIES (ACAs): No significant stenosis identified. Right A1 appears hypoplastic. RIGHT MIDDLE CEREBRAL ARTERY (MCA): No significant stenosis identified. LEFT MIDDLE CEREBRAL ARTERY (MCA): No significant stenosis identified. RIGHT POSTERIOR CEREBRAL ARTERY (PCA): No significant stenosis identified. Near fetal PCA. LEFT POSTERIOR CEREBRAL ARTERY (PCA): No significant stenosis identified. BASILAR ARTERY: No significant stenosis identified. 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 NECK MRA: AORTIC ARCH: Not within the field of view or without gross abnormality.  BRACHIOCEPHALIC, SUBCLAVIAN, AND VISUALIZED AXILLARY ARTERIES: No gross abnormality where visualized. RIGHT COMMON CAROTID ARTERY (CCA): No  significant stenosis identified. Evaluation limited by motion. RIGHT INTERNAL CAROTID ARTERY (ICA): Evaluation limited by motion. No high-grade stenosis of the proximal internal carotid artery identified near the carotid bifurcation. Elsewhere, no significant stenosis identified. LEFT COMMON CAROTID ARTERY (CCA): Evaluation limited by motion. Origin outside the field-of-view of this examination. No high-grade stenosis identified. LEFT INTERNAL CAROTID ARTERY (ICA): Approximately 2 cm segment signal loss just distal to the carotid bifurcation, also present on 9/12/2019. Elsewhere, no significant stenosis identified. RIGHT VERTEBRAL ARTERY: Proximal cervical segment obscured by motion. Elsewhere, no significant stenosis identified. LEFT VERTEBRAL ARTERY: Proximal cervical segment obscured by motion. Elsewhere, no significant stenosis identified. ANTEGRADE FLOW: Antegrade flow confirmed within the cervical carotid and vertebral arteries. ANEURYSM / OTHER VASCULAR: Approximately 2-3 mm outpouchings arising inferiorly from both paraclinoid internal carotid arteries, similar to 9/12/2019. Approximately 6 mm outpouching arising from the right middle cerebral artery bifurcation, similar to 9/12/2019. OTHER SIGNIFICANT FINDINGS: None.     HEAD MRI: No acute intracranial abnormality identified. Moderate white matter changes, nonspecific but commonly due to chronic microangiopathic change. Punctate focus of susceptibility effect in the left centrum semiovale, nonspecific but commonly reflecting chronic microhemorrhage. HEAD MRA: No intracranial arterial stenosis or occlusion identified. Approximately 2-3 mm outpouchings suspicious for aneurysms arising inferiorly from both paraclinoid internal carotid arteries, similar to 9/12/2019. Approximately 6 mm outpouching consistent with aneurysm arising from the right middle cerebral artery bifurcation, similar to 9/12/2019. NECK MRA: Motion degraded examination. Approximately 2 cm  segment signal loss of the left internal carotid artery just distal to the bifurcation likely reflecting high-grade stenosis. This finding was also present on 9/12/2019. No other high-grade stenosis identified within the limits of this examination. Electronically signed by:  Ajay Ritchie M.D.  1/30/2023 4:26 AM Mountain Time    MRI Angiogram Neck Without Contrast    Addendum Date: 1/30/2023    Images from the 9/9/2019 head MRI now available for comparison and are notable for the following: *  Acute infarct in the left motor strip seen on 9/9/2019 demonstrates interval cavitation *  Elsewhere, grossly stable white matter changes *  No susceptibility weighted images performed on 9/9/2019, therefore the stability of the focus of susceptibility effect seen on the current examination cannot be assessed Electronically signed by:  Ajay Rtichie M.D.  1/30/2023 6:25 AM    Result Date: 1/30/2023  EXAM: MRI BRAIN WO CONTRAST, MRI ANGIOGRAM HEAD WO CONTRAST, MRI ANGIOGRAM NECK WO CONTRAST EXAM DATE: 1/30/2023 3:39 AM INDICATION: Weakness. COMPARISON: 9/9/2019 (report only), 9/12/2019 . TECHNIQUE: Multisequence, multiangle images were obtained through the brain without the administration of intravenous contrast. MR angiogram images of the head without intravenous contrast.  MR angiogram images of the neck without intravenous contrast. NASCET criteria used for interpretation. Maximum intensity projections (MIPs) and/or 3D reconstructions constructed and reviewed at time of study interpretation. CONTRAST: None. FINDINGS: TECHNICAL: Motion degraded examinations, particularly the neck MRA. HEAD MRI: INTRACRANIAL CONTENTS: No intracranial mass. Punctate focus of susceptibility effect in the left centrum semiovale. No acute infarct. Moderate T2 hyperintensity within the supratentorial white matter. Including cavitary changes within the supratentorial white matter. Ventricles, sulci, and cisterns within normal limits. SOFT  TISSUES: No significant abnormality. BONES / BONE MARROW: No suspicious bone marrow signal abnormality. ORBITS: Within normal limits. MASTOID AIR CELLS: No significant opacification. TMJ: Degenerative changes bilaterally. MAJOR INTRACRANIAL FLOW VOIDS: Preserved. HEAD MRA: RIGHT INTERNAL CAROTID ARTERY (ICA): Described in neck MRA below. LEFT INTERNAL CAROTID ARTERY (ICA): Described in neck MRA below. RIGHT VERTEBRAL ARTERY: Described in neck MRA below. LEFT VERTEBRAL ARTERY: Described in neck MRA below.  ANTERIOR CEREBRAL ARTERIES (ACAs): No significant stenosis identified. Right A1 appears hypoplastic. RIGHT MIDDLE CEREBRAL ARTERY (MCA): No significant stenosis identified. LEFT MIDDLE CEREBRAL ARTERY (MCA): No significant stenosis identified. RIGHT POSTERIOR CEREBRAL ARTERY (PCA): No significant stenosis identified. Near fetal PCA. LEFT POSTERIOR CEREBRAL ARTERY (PCA): No significant stenosis identified. BASILAR ARTERY: No significant stenosis identified. 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 NECK MRA: AORTIC ARCH: Not within the field of view or without gross abnormality.  BRACHIOCEPHALIC, SUBCLAVIAN, AND VISUALIZED AXILLARY ARTERIES: No gross abnormality where visualized. RIGHT COMMON CAROTID ARTERY (CCA): No significant stenosis identified. Evaluation limited by motion. RIGHT INTERNAL CAROTID ARTERY (ICA): Evaluation limited by motion. No high-grade stenosis of the proximal internal carotid artery identified near the carotid bifurcation. Elsewhere, no significant stenosis identified. LEFT COMMON CAROTID ARTERY (CCA): Evaluation limited by motion. Origin outside the field-of-view of this examination. No high-grade stenosis identified. LEFT INTERNAL CAROTID ARTERY (ICA): Approximately 2 cm segment signal loss just distal to the carotid bifurcation, also present on 9/12/2019. Elsewhere, no significant stenosis identified. RIGHT VERTEBRAL ARTERY: Proximal  cervical segment obscured by motion. Elsewhere, no significant stenosis identified. LEFT VERTEBRAL ARTERY: Proximal cervical segment obscured by motion. Elsewhere, no significant stenosis identified. ANTEGRADE FLOW: Antegrade flow confirmed within the cervical carotid and vertebral arteries. ANEURYSM / OTHER VASCULAR: Approximately 2-3 mm outpouchings arising inferiorly from both paraclinoid internal carotid arteries, similar to 9/12/2019. Approximately 6 mm outpouching arising from the right middle cerebral artery bifurcation, similar to 9/12/2019. OTHER SIGNIFICANT FINDINGS: None.     HEAD MRI: No acute intracranial abnormality identified. Moderate white matter changes, nonspecific but commonly due to chronic microangiopathic change. Punctate focus of susceptibility effect in the left centrum semiovale, nonspecific but commonly reflecting chronic microhemorrhage. HEAD MRA: No intracranial arterial stenosis or occlusion identified. Approximately 2-3 mm outpouchings suspicious for aneurysms arising inferiorly from both paraclinoid internal carotid arteries, similar to 9/12/2019. Approximately 6 mm outpouching consistent with aneurysm arising from the right middle cerebral artery bifurcation, similar to 9/12/2019. NECK MRA: Motion degraded examination. Approximately 2 cm segment signal loss of the left internal carotid artery just distal to the bifurcation likely reflecting high-grade stenosis. This finding was also present on 9/12/2019. No other high-grade stenosis identified within the limits of this examination. Electronically signed by:  Ajay Ritchie M.D.  1/30/2023 4:26 AM Mountain Time    MRI Brain Without Contrast    Addendum Date: 1/30/2023    Images from the 9/9/2019 head MRI now available for comparison and are notable for the following: *  Acute infarct in the left motor strip seen on 9/9/2019 demonstrates interval cavitation *  Elsewhere, grossly stable white matter changes *  No susceptibility  weighted images performed on 9/9/2019, therefore the stability of the focus of susceptibility effect seen on the current examination cannot be assessed Electronically signed by:  Ajay Ritchie M.D.  1/30/2023 6:25 AM    Result Date: 1/30/2023  EXAM: MRI BRAIN WO CONTRAST, MRI ANGIOGRAM HEAD WO CONTRAST, MRI ANGIOGRAM NECK WO CONTRAST EXAM DATE: 1/30/2023 3:39 AM INDICATION: Weakness. COMPARISON: 9/9/2019 (report only), 9/12/2019 . TECHNIQUE: Multisequence, multiangle images were obtained through the brain without the administration of intravenous contrast. MR angiogram images of the head without intravenous contrast.  MR angiogram images of the neck without intravenous contrast. NASCET criteria used for interpretation. Maximum intensity projections (MIPs) and/or 3D reconstructions constructed and reviewed at time of study interpretation. CONTRAST: None. FINDINGS: TECHNICAL: Motion degraded examinations, particularly the neck MRA. HEAD MRI: INTRACRANIAL CONTENTS: No intracranial mass. Punctate focus of susceptibility effect in the left centrum semiovale. No acute infarct. Moderate T2 hyperintensity within the supratentorial white matter. Including cavitary changes within the supratentorial white matter. Ventricles, sulci, and cisterns within normal limits. SOFT TISSUES: No significant abnormality. BONES / BONE MARROW: No suspicious bone marrow signal abnormality. ORBITS: Within normal limits. MASTOID AIR CELLS: No significant opacification. TMJ: Degenerative changes bilaterally. MAJOR INTRACRANIAL FLOW VOIDS: Preserved. HEAD MRA: RIGHT INTERNAL CAROTID ARTERY (ICA): Described in neck MRA below. LEFT INTERNAL CAROTID ARTERY (ICA): Described in neck MRA below. RIGHT VERTEBRAL ARTERY: Described in neck MRA below. LEFT VERTEBRAL ARTERY: Described in neck MRA below.  ANTERIOR CEREBRAL ARTERIES (ACAs): No significant stenosis identified. Right A1 appears hypoplastic. RIGHT MIDDLE CEREBRAL ARTERY (MCA): No significant  stenosis identified. LEFT MIDDLE CEREBRAL ARTERY (MCA): No significant stenosis identified. RIGHT POSTERIOR CEREBRAL ARTERY (PCA): No significant stenosis identified. Near fetal PCA. LEFT POSTERIOR CEREBRAL ARTERY (PCA): No significant stenosis identified. BASILAR ARTERY: No significant stenosis identified. 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 NECK MRA: AORTIC ARCH: Not within the field of view or without gross abnormality.  BRACHIOCEPHALIC, SUBCLAVIAN, AND VISUALIZED AXILLARY ARTERIES: No gross abnormality where visualized. RIGHT COMMON CAROTID ARTERY (CCA): No significant stenosis identified. Evaluation limited by motion. RIGHT INTERNAL CAROTID ARTERY (ICA): Evaluation limited by motion. No high-grade stenosis of the proximal internal carotid artery identified near the carotid bifurcation. Elsewhere, no significant stenosis identified. LEFT COMMON CAROTID ARTERY (CCA): Evaluation limited by motion. Origin outside the field-of-view of this examination. No high-grade stenosis identified. LEFT INTERNAL CAROTID ARTERY (ICA): Approximately 2 cm segment signal loss just distal to the carotid bifurcation, also present on 9/12/2019. Elsewhere, no significant stenosis identified. RIGHT VERTEBRAL ARTERY: Proximal cervical segment obscured by motion. Elsewhere, no significant stenosis identified. LEFT VERTEBRAL ARTERY: Proximal cervical segment obscured by motion. Elsewhere, no significant stenosis identified. ANTEGRADE FLOW: Antegrade flow confirmed within the cervical carotid and vertebral arteries. ANEURYSM / OTHER VASCULAR: Approximately 2-3 mm outpouchings arising inferiorly from both paraclinoid internal carotid arteries, similar to 9/12/2019. Approximately 6 mm outpouching arising from the right middle cerebral artery bifurcation, similar to 9/12/2019. OTHER SIGNIFICANT FINDINGS: None.     HEAD MRI: No acute intracranial abnormality identified. Moderate white  matter changes, nonspecific but commonly due to chronic microangiopathic change. Punctate focus of susceptibility effect in the left centrum semiovale, nonspecific but commonly reflecting chronic microhemorrhage. HEAD MRA: No intracranial arterial stenosis or occlusion identified. Approximately 2-3 mm outpouchings suspicious for aneurysms arising inferiorly from both paraclinoid internal carotid arteries, similar to 9/12/2019. Approximately 6 mm outpouching consistent with aneurysm arising from the right middle cerebral artery bifurcation, similar to 9/12/2019. NECK MRA: Motion degraded examination. Approximately 2 cm segment signal loss of the left internal carotid artery just distal to the bifurcation likely reflecting high-grade stenosis. This finding was also present on 9/12/2019. No other high-grade stenosis identified within the limits of this examination. Electronically signed by:  Ajay Ritchie M.D.  1/30/2023 4:26 AM Mountain Time    XR Chest 1 View    Result Date: 1/29/2023  Exam: Frontal chest INDICATION: Weakness and dizziness COMPARISON: July 27, 2021 FINDINGS: The lungs are clear and there are no effusions. The heart size is normal and the trachea is midline.  No mediastinal widening. Bones are intact.  Elevated left hemidiaphragm.     No active disease and no change from prior study. Electronically signed by:  Ivan Felton M.D.  1/29/2023 9:23 PM Mountain Time      Results for orders placed during the hospital encounter of 10/17/19    Duplex Carotid Ultrasound CAR    Interpretation Summary  · Proximal right internal carotid artery plaque without significant stenosis.  · Right internal carotid artery stenosis of 0-49%.  · Patent left internal carotid artery stent. Interval decrease in peak systolic velocity from 220 cm/s to 168 cm/s and decrease in peak diastolic velocity from 57 cm/s to 36 cm/s compared to duplex performed 1 month ago, one day after implant.      Results for orders placed during  the hospital encounter of 10/17/19    Duplex Carotid Ultrasound CAR    Interpretation Summary  · Proximal right internal carotid artery plaque without significant stenosis.  · Right internal carotid artery stenosis of 0-49%.  · Patent left internal carotid artery stent. Interval decrease in peak systolic velocity from 220 cm/s to 168 cm/s and decrease in peak diastolic velocity from 57 cm/s to 36 cm/s compared to duplex performed 1 month ago, one day after implant.      Results for orders placed during the hospital encounter of 01/29/23    Adult Transthoracic Echo Complete W/ Cont if Necessary Per Protocol (With Agitated Saline)    Interpretation Summary  •  Left ventricular systolic function is normal. Left ventricular ejection fraction appears to be 66 - 70%.  •  The right ventricular cavity is borderline dilated.  •  Saline test results are negative.  •  The right atrial cavity is mildly dilated.      Plan for Follow-up of Pending Labs/Results: PCP    Discharge Details        Discharge Medications      New Medications      Instructions Start Date   benzonatate 200 MG capsule  Commonly known as: TESSALON   200 mg, Oral, 3 Times Daily      clopidogrel 75 MG tablet  Commonly known as: PLAVIX   75 mg, Oral, Daily      pantoprazole 40 MG EC tablet  Commonly known as: PROTONIX   40 mg, Oral, Daily      PHARMACY MEDS TO BED CONSULT   Does not apply, Daily         Changes to Medications      Instructions Start Date   allopurinol 100 MG tablet  Commonly known as: ZYLOPRIM  What changed: how much to take   100 mg, Oral, Daily, For gout      atenolol 25 MG tablet  Commonly known as: TENORMIN  What changed: See the new instructions.   TAKE 1 TABLET(25 MG) BY MOUTH TWICE DAILY      warfarin 5 MG tablet  Commonly known as: COUMADIN  What changed:   how much to take  how to take this  when to take this  additional instructions   5-7.5 mg, Oral, Nightly, Per Dr. Langford office, should be taking warfarin 7.5 mg daily except 5 mg on  Monday and 5mg on Fri;         Continue These Medications      Instructions Start Date   acetaminophen 325 MG tablet  Commonly known as: TYLENOL    TAKE 2 TABLETS BY MOUTH EVERY 6 HOURS AS NEEDED FOR MILD PAIN      atorvastatin 80 MG tablet  Commonly known as: LIPITOR   80 mg, Oral, Nightly      cholecalciferol 25 MCG (1000 UT) tablet  Commonly known as: VITAMIN D3   2,000 Units, Oral, Daily      escitalopram 20 MG tablet  Commonly known as: LEXAPRO   20 mg, Oral, Daily, For depression and anxiety      hydrOXYzine 25 MG tablet  Commonly known as: ATARAX   25 mg, Oral, Every 8 Hours PRN, for anxiety      lisinopril-hydrochlorothiazide 20-25 MG per tablet  Commonly known as: PRINZIDE,ZESTORETIC   1 tablet, Oral, Daily      methocarbamol 750 MG tablet  Commonly known as: ROBAXIN   750 mg, Oral, Daily PRN      pregabalin 50 MG capsule  Commonly known as: LYRICA   50 mg, Oral, 3 Times Daily      propafenone  MG 12 hr capsule  Commonly known as: RYTHMOL SR   225 mg, Oral, Every 12 Hours Scheduled             Allergies   Allergen Reactions   • Nsaids Other (See Comments)     KIDNEY DAMAGE   • Quinidine Nausea And Vomiting and Other (See Comments)     FEVER     • Bactrim [Sulfamethoxazole-Trimethoprim] Rash   • Nitrofuran Derivatives Diarrhea   • Penicillins Rash         Discharge Disposition:  Home-Health Care [facility] – [place], [place]    Diet:  Hospital:  Diet Order   Procedures   • Diet: Cardiac Diets; Healthy Heart (2-3 Na+); Texture: Regular Texture (IDDSI 7); Fluid Consistency: Thin (IDDSI 0)       Activity:      Restrictions or Other Recommendations:  Continue COVID isolation through 2/6       CODE STATUS:    Code Status and Medical Interventions:   Ordered at: 01/30/23 0309     Medical Intervention Limits:    NO intubation (DNI)     Level Of Support Discussed With:    Patient     Code Status (Patient has no pulse and is not breathing):    No CPR (Do Not Attempt to Resuscitate)     Medical Interventions (Patient has pulse or  is breathing):    Limited Support       No future appointments.    Additional Instructions for the Follow-ups that You Need to Schedule     Call MD With Problems / Concerns   As directed      Please seek medical attention for any the following: Difficulty breathing, chest pain, bleeding, bloody diarrhea, and/or any other concerning symptom    Order Comments: Please seek medical attention for any the following: Difficulty breathing, chest pain, bleeding, bloody diarrhea, and/or any other concerning symptom          Discharge Follow-up with PCP   As directed       Currently Documented PCP:    Nathalia Freeman PA-C    PCP Phone Number:    [phone]     Follow Up Details: Follow-up with primary care doctor within 5 to 7 days regarding this hospitalization and for repeat labs (CBC, BMP)                     Debi West MD  02/04/23      Time Spent on Discharge:  I spent 40 minutes on this discharge activity which included: face-to-face encounter with the patient, reviewing the data in the system, coordination of the care with the nursing staff as well as consultants, documentation, and entering orders.

## 2023-02-04 NOTE — PLAN OF CARE
Goal Outcome Evaluation:  Plan of Care Reviewed With: patient        Progress: no change  Outcome Evaluation: Rested well through the night, Respirations easy and unlabored with occasional cough noted. Remains in airborne isolation for Covid 19. Ambulates with use of walker and SBA.

## 2023-02-04 NOTE — PROGRESS NOTES
"Pharmacy Consult  -  Warfarin    Akua Torres is a  77 y.o. female   Height - 172.7 cm (68\")  Weight - 95.3 kg (210 lb)    Consulting Provider: Chika VILLALOBOS  Indication: Afib  Goal INR: 2 - 3  Home Regimen: Patient states she takes Warfarin 5 mg oral Daily (35 mg/week)   - When INR < 2, she reports taking Warfarin 5 mg oral Daily, except for 7.5 mg on Tuesday and Thursday  Addendum 2/2/23:  Per Dr. Langford office, was instructed to take warfarin 7.5 mg oral daily Except 5 mg MonFri  Bridge Therapy: yes, therapeutic lovenox     Drug-Drug Interactions with current regimen:   Allopurinol may result in an increased INR and risk of bleeding (home)   Clopidogrel may result in an increased risk of bleeding (home)   Escitalopram may result in an increased risk of bleeding (home)   Enoxaparin may result in an increased risk of bleeding   Propafenone may result in an increased risk of bleeding   PRN acetaminophen may result in an increased INR and risk of bleeding   S/p Remdesivir (1/30 -2/1) may enhance the anticoagulant effects of warfarin   S/p Flagyl x1 on 1/29, vancomycin x1 on 1/29    Warfarin Dosing During Admission:    Date  1/30  1/31  2/1  2/2 2/3 2/4      INR   1.33 1.36  1.42 1.63  1.97 2.17      Dose   7.5 mg  7.5 mg 7.5 mg 7.5 mg 5 mg 7.5 mg        Education Provided:  Warfarin education provided on 1/31 verbally and in writing.  Discussed effects of warfarin, importance of checking INR, drug-drug and drug-food interactions, and signs/symptoms of bleeding and clotting.  Patient verbalized understanding through teach back.  All pertinent questions were answered.  Discharge Follow up:   Following Provider - Dr. Langford, goes to office weekly for INR checks   Follow up time range or appointment - within 2 to 3 days of discharge    Labs:  Results from last 7 days   Lab Units 02/04/23  0425 02/03/23  0446 02/02/23  0420 02/01/23  0424 01/31/23  0605 01/30/23  0618 01/30/23  0008   INR  2.17* 1.97* 1.63* 1.42* " 1.36* 1.31* 1.33*   APTT seconds  --   --   --   --   --   --  30.6   HEMOGLOBIN g/dL 14.6 12.9 12.9 12.5 12.5  --  13.8   HEMATOCRIT % 47.2* 40.5 40.7 39.7 39.8  --  43.7     Results from last 7 days   Lab Units 02/02/23  0420 02/01/23  0424 01/31/23  0605   SODIUM mmol/L 135* 138 138   POTASSIUM mmol/L 4.2 4.2 4.6   CHLORIDE mmol/L 104 106 105   CO2 mmol/L 23.0 24.0 24.0   BUN mg/dL 24* 21 22   CREATININE mg/dL 0.82 0.96 1.19*   CALCIUM mg/dL 8.2* 8.0* 8.2*   BILIRUBIN mg/dL 0.4 0.6 0.9   ALK PHOS U/L 80 74 80   ALT (SGPT) U/L 12 10 9   AST (SGOT) U/L 22 21 19   GLUCOSE mg/dL 70 71 80     Current dietary intake:  0-50 % of meals over past 24 hours  Diet Order   Procedures    Diet: Cardiac Diets; Healthy Heart (2-3 Na+); Texture: Regular Texture (IDDSI 7); Fluid Consistency: Thin (IDDSI 0)     Assessment/Plan:     1. Patient's INR now therapeutic today at 2.17, still trending up after boosted doses. Goal INR 2 to 3. (Sub therapeutic on admission)  Per Dr. Langford office, had been instructed to take warfarin 7.5 mg oral daily EXCEPT 5mg MonFri.        Ms. Torres denies any missed doses or increased GLV prior to admission. Noted variance between prescribed dose vs dose actually taking  2. Will continue patient's instructed dose with warfarin 7.5 mg tonight. Would expect INR to continue trending upwards due to warfarin 7.5 mg doses given so far during admission. Lovenox bridge dc'd after d/w Dr West 2/3.  3. Daily PT/INR ordered.  4. Monitor signs/symptoms of bleeding, dietary intake, and drug-drug interactions. Make dose adjustments as necessary.    Discharge recommendations: warfarin 7.5 mg oral daily except 5mg MonFri (home regimen per DR. Langford office)    Pharmacy will continue to follow.      Thank you  Madhav Chen PharmD  2/4/2023  10:54 EST

## 2023-02-04 NOTE — OUTREACH NOTE
Prep Survey    Flowsheet Row Responses   Denominational facility patient discharged from? [place]   Is LACE score < 7 ? Yes   Eligibility Not Eligible   What are the reasons patient is not eligible? [facility]   Does the patient have one of the following disease processes/diagnoses(primary or secondary)? Other   Prep survey completed? Yes          GINNA FUENTES - Registered Nurse

## 2023-02-06 ENCOUNTER — HOME HEALTH ADMISSION (OUTPATIENT)
Dept: HOME HEALTH SERVICES | Facility: HOME HEALTHCARE | Age: 78
End: 2023-02-06
Payer: MEDICARE

## 2023-02-06 NOTE — CASE MANAGEMENT/SOCIAL WORK
Continued Stay Note  [facility]     Patient Name: Akua Torres  MRN: [id number]  Today's Date: 2/6/2023    Admit Date: 1/29/2023    Plan: Rehab at [facility]   Discharge Plan     Row Name 02/06/23 1344       Plan    Final Discharge Disposition Code Home with home health 06               Discharge Codes    No documentation.               Expected Discharge Date and Time     Expected Discharge Date Expected Discharge Time    Feb 4, 2023             Krystyna Lam RN

## 2023-02-08 ENCOUNTER — HOME CARE VISIT (OUTPATIENT)
Dept: HOME HEALTH SERVICES | Facility: HOME HEALTHCARE | Age: 78
End: 2023-02-08
Payer: MEDICARE

## 2023-02-08 VITALS
HEART RATE: 56 BPM | DIASTOLIC BLOOD PRESSURE: 60 MMHG | RESPIRATION RATE: 16 BRPM | TEMPERATURE: 97.5 F | OXYGEN SATURATION: 93 % | SYSTOLIC BLOOD PRESSURE: 106 MMHG

## 2023-02-08 PROCEDURE — G0151 HHCP-SERV OF PT,EA 15 MIN: HCPCS

## 2023-02-09 NOTE — HOME HEALTH
SOC Note:  Pt is a 76 yo female recently hospitalized at [facility] 1/29-2/4 due to COVID-19    Home Health ordered for: PT and OT    Reason for Hosp/Primary Dx/Co-morbidities: COVID-19/PAF on Coumadin, CVA with residual RLE weakness, carotid artery disease s/p left ICA stent, GERD, HTN, dyslipidemia, recurrent UTI with urinary incontinence, depression, YUNIOR     Focus of Care: HHPT 1wk1, 2wk1, 1wk6 for gait training, balance training, endurance training, therapeutic exercise, pt education and HEP instruction related to COVID-19    Current Functional status/mobility/DME: Pt requires CGA for gait in home with FWW, pt has a SPC, w/c, bath chair, BSC     HB status/Living Arrangements: pt lives in a one story home with spouse, one small step at entry to home    Skin Integrity/wound status: no deficits noted    Code Status: full code    Fall Risk: high per Tinetti    POC confirmed with Nathalia Freeman PA-C via CC on 2/8/23    Plan for next visit: Issue and review HEP

## 2023-02-09 NOTE — CASE COMMUNICATION
Pt admitted to [facility] on 2/8/23    SOC Note:  Pt is a 76 yo female recently hospitalized at [facility] 1/29-2/4 due to COVID-19    Home Health ordered for: PT and OT    Reason for Hosp/Primary Dx/Co-morbidities: COVID-19/PAF on Coumadin, CVA with residual RLE weakness, carotid artery disease s/p left ICA stent, GERD, HTN, dyslipidemia, recurrent UTI with urinary incontinence, depression, YUNIOR     Focus of Care: HHPT 1wk1, 2wk1 , 1wk6 for gait training, balance training, endurance training, therapeutic exercise, pt education and HEP instruction related to COVID-19    Current Functional status/mobility/DME: Pt requires CGA for gait in home with FWW, pt has a SPC, w/c, bath chair, BSC     HB status/Living Arrangements: pt lives in a one story home with spouse, one small step at entry to home    Skin Integrity/wound status: no deficits noted    Code Status: full  code    Fall Risk: high per Tinetti    POC confirmed with Nathalia Freeman PA-C via CC on 2/8/23    Plan for next visit: Issue and review HEP    Review of pt's current medications revealed the following major interaction:  The hypoprothrombinemic effect of warfarin may be increased by allopurinol.  Please feel free to contact me with any questions or concerns!  Gloria Baca, PT

## 2023-02-13 ENCOUNTER — HOME CARE VISIT (OUTPATIENT)
Dept: HOME HEALTH SERVICES | Facility: HOME HEALTHCARE | Age: 78
End: 2023-02-13
Payer: MEDICARE

## 2023-02-13 VITALS
DIASTOLIC BLOOD PRESSURE: 70 MMHG | TEMPERATURE: 97.6 F | RESPIRATION RATE: 15 BRPM | OXYGEN SATURATION: 95 % | HEART RATE: 82 BPM | SYSTOLIC BLOOD PRESSURE: 126 MMHG

## 2023-02-13 VITALS — OXYGEN SATURATION: 95 % | SYSTOLIC BLOOD PRESSURE: 125 MMHG | DIASTOLIC BLOOD PRESSURE: 62 MMHG | HEART RATE: 70 BPM

## 2023-02-13 PROCEDURE — G0157 HHC PT ASSISTANT EA 15: HCPCS

## 2023-02-13 PROCEDURE — G0152 HHCP-SERV OF OT,EA 15 MIN: HCPCS

## 2023-02-13 NOTE — HOME HEALTH
"Routine Visit Note: Greeted at door by patient, who was first seen ambulating without AD. Patient displays no visable signs of distress, and reports that she has \"been getting tired easily\". Patient reports havent issues maintaing her balance during daily tasks, and states that her balance has been worse since recovering from a stroke / R side presents difficulty.     Skill/education provided: Instructed therapeutic exercises, gait training, balance training. Educated patient on pain management strategies, and HEP exercises.      Patient/caregiver response: Patient tolerated all treatment well today with no visable signs of distress. Patient reports feeling \"good\" following treatment session.     Plan for next visit: Patient would benefit from continued skilled physical therapy to adress deficits in endurance, BLE strength, balance, and overall functional mobility. Progress as tolerated next visit."

## 2023-02-14 NOTE — HOME HEALTH
76 y/o F with recent hospitalization for generalized weakness and was found to have COVID 19 infection. Pt. seen today for OT evaluation and presents with  decreased strength/endurance limiting safety and function with ADLs/IADLs. Pt. will benefit from continued OT services to increase strength, endurance, safety, and overall independence with IADLs at home.    PMH: significant for PAF on Coumadin, CVA with residual RLE weakness, carotid artery disease s/p left ICA stent, GERD, HTN, dyslipidemia, recurrent UTI with urinary incontinence, depression, YUNIOR (not on CPAP),

## 2023-02-16 ENCOUNTER — HOME CARE VISIT (OUTPATIENT)
Dept: HOME HEALTH SERVICES | Facility: HOME HEALTHCARE | Age: 78
End: 2023-02-16
Payer: MEDICARE

## 2023-02-16 VITALS
DIASTOLIC BLOOD PRESSURE: 82 MMHG | OXYGEN SATURATION: 98 % | HEART RATE: 62 BPM | TEMPERATURE: 97.4 F | SYSTOLIC BLOOD PRESSURE: 134 MMHG | RESPIRATION RATE: 15 BRPM

## 2023-02-16 PROCEDURE — G0157 HHC PT ASSISTANT EA 15: HCPCS

## 2023-02-17 NOTE — HOME HEALTH
"Routine Visit Note:Greeted at door by patient's , patient first seen sitting in living room with no visable signs of distress. Patient reports recent fall when ambulating in living room with rolling walkern while \"carrying a bowl of grapes\". Patient reports that she is \"a little sore\" but otherwise is \"doing okay\". Patient states that her friend \"helped me adjust my walker the other day\".     Skill/education provided: Instructed gait training, therapeutic exercise, and balance training. Educated patient on fall prevention strategies, and reviewed/encouraged patient to complete HEP multiple times daily.     Patient/caregiver response: Patient tolerated all treatment well today, with no signs of excessive fatigue or pain. Patient displays improvement in overall functional mobility / balance today.     Plan for next visit: Patient would benefit from continued skilled physical therapy to address deficits in balance, BLE strength, and overall functional mobility. Continue to progress with gait training and standing exercise/balance next visit."

## 2023-02-21 PROCEDURE — G0180 MD CERTIFICATION HHA PATIENT: HCPCS | Performed by: PHYSICIAN ASSISTANT

## 2023-02-23 ENCOUNTER — HOME CARE VISIT (OUTPATIENT)
Dept: HOME HEALTH SERVICES | Facility: HOME HEALTHCARE | Age: 78
End: 2023-02-23
Payer: MEDICARE

## 2023-02-23 VITALS
TEMPERATURE: 97.4 F | HEART RATE: 69 BPM | DIASTOLIC BLOOD PRESSURE: 83 MMHG | SYSTOLIC BLOOD PRESSURE: 130 MMHG | OXYGEN SATURATION: 100 %

## 2023-02-23 VITALS — HEART RATE: 67 BPM | SYSTOLIC BLOOD PRESSURE: 136 MMHG | DIASTOLIC BLOOD PRESSURE: 65 MMHG | OXYGEN SATURATION: 96 %

## 2023-02-23 PROCEDURE — G0157 HHC PT ASSISTANT EA 15: HCPCS

## 2023-02-23 PROCEDURE — G0152 HHCP-SERV OF OT,EA 15 MIN: HCPCS

## 2023-02-23 NOTE — HOME HEALTH
"Routine Visit Note: Greeted at door by patient, who was first seen ambulating with rolling walker with no visable signs of distress. Patient reports that she is \"feeling stronger\" and that she is starting to tolerate prolonged periods of exertion without fatigue.     Skill/education provided: Instructed gait training, therapeutic exercises, and balance exercises today. Encouraged patient /educated patient on importance of completing HEP multiple times daily.     Patient/caregiver response: Patient tolerated all treatment well today, with no excessive fatigue or reports of pain. Patient displays overall improvement today in functional mobility, and was able to ambulate greater distances today.     Plan for next visit: Patient would benefit from continued skilled physical therapy to address deficits in endurance, strength, balance, and to improve overall independence and functional mobility. Progress with gait training next vist / progress balance exercises."

## 2023-02-23 NOTE — HOME HEALTH
Pt. tolerated OT session well. Pt participated in 42 mins of ADLs/IADLs and theraputic exercises: Pt. demonstrated increased activity tolerance & balance for all OOB ADLs today with understanding of energy conservation techniques and importance of HEP. Continue OT per POC to increase independence and safety with ADLs/IADLs within the home.

## 2023-02-27 ENCOUNTER — HOME CARE VISIT (OUTPATIENT)
Dept: HOME HEALTH SERVICES | Facility: HOME HEALTHCARE | Age: 78
End: 2023-02-27
Payer: MEDICARE

## 2023-02-27 VITALS — DIASTOLIC BLOOD PRESSURE: 71 MMHG | HEART RATE: 81 BPM | SYSTOLIC BLOOD PRESSURE: 126 MMHG | OXYGEN SATURATION: 96 %

## 2023-02-27 PROCEDURE — G0152 HHCP-SERV OF OT,EA 15 MIN: HCPCS

## 2023-02-27 NOTE — HOME HEALTH
Pt. tolerated OT session well. Pt participated in 33 mins of ADLs/IADLs and theraputic exercises: Pt. demonstrated increased activity tolerance & balance for all OOB ADLs today with understanding of energy conservation techniques and importance of HEP. Pt. utilized her new walker basket for increased safety with items while walking today.  Continue OT per POC to increase independence and safety with ADLs/IADLs within the home.

## 2023-03-01 ENCOUNTER — HOME CARE VISIT (OUTPATIENT)
Dept: HOME HEALTH SERVICES | Facility: HOME HEALTHCARE | Age: 78
End: 2023-03-01
Payer: MEDICARE

## 2023-03-01 VITALS
HEART RATE: 80 BPM | OXYGEN SATURATION: 99 % | RESPIRATION RATE: 16 BRPM | TEMPERATURE: 97.6 F | DIASTOLIC BLOOD PRESSURE: 82 MMHG | SYSTOLIC BLOOD PRESSURE: 146 MMHG

## 2023-03-01 PROCEDURE — G0157 HHC PT ASSISTANT EA 15: HCPCS

## 2023-03-01 NOTE — HOME HEALTH
"Routine Visit Note: Greeted at door by patient, who was first seen ambulating without an assistive device and displayed no signs of distress. Patient reports that she has been \" feeling stronger\" and states that she has been compliant with HEP.     Skill/education provided: Instructed gait training, therapeutic exercise, and balance training today. Educated patient on pain management techniques/HEP.     Patient/caregiver response: Patient tolerated all treatment well today, with no visable dsigns of distress, pain, or excessive fatigue. Displayed improvement overall since previous visit.     Plan for next visit: Patient would benefit from contniued skilled PT to addres deficits in balance, strength, endurance, and to improve overall functional mobility. Progress with balance exercises and gait training next visit as tolerated."

## 2023-03-06 ENCOUNTER — HOME CARE VISIT (OUTPATIENT)
Dept: HOME HEALTH SERVICES | Facility: HOME HEALTHCARE | Age: 78
End: 2023-03-06
Payer: MEDICARE

## 2023-03-06 PROCEDURE — G0151 HHCP-SERV OF PT,EA 15 MIN: HCPCS

## 2023-03-07 VITALS
TEMPERATURE: 98 F | DIASTOLIC BLOOD PRESSURE: 82 MMHG | OXYGEN SATURATION: 98 % | RESPIRATION RATE: 16 BRPM | SYSTOLIC BLOOD PRESSURE: 132 MMHG | HEART RATE: 78 BPM

## 2023-03-09 ENCOUNTER — HOME CARE VISIT (OUTPATIENT)
Dept: HOME HEALTH SERVICES | Facility: HOME HEALTHCARE | Age: 78
End: 2023-03-09
Payer: MEDICARE

## 2023-03-09 VITALS — OXYGEN SATURATION: 95 % | HEART RATE: 71 BPM | SYSTOLIC BLOOD PRESSURE: 122 MMHG | DIASTOLIC BLOOD PRESSURE: 65 MMHG

## 2023-03-09 PROCEDURE — G0152 HHCP-SERV OF OT,EA 15 MIN: HCPCS

## 2023-03-09 NOTE — HOME HEALTH
OT discipline discharge today secondary to all OT goals met. Pt. denied pain now and in the last 24 hours.

## 2023-03-13 ENCOUNTER — HOME CARE VISIT (OUTPATIENT)
Dept: HOME HEALTH SERVICES | Facility: HOME HEALTHCARE | Age: 78
End: 2023-03-13
Payer: MEDICARE

## 2023-03-16 NOTE — TELEPHONE ENCOUNTER
Attempted to contact patient, left VM.    Consent (Temporal Branch)/Introductory Paragraph: The rationale for Mohs was explained to the patient and consent was obtained. The risks, benefits and alternatives to therapy were discussed in detail. Specifically, the risks of damage to the temporal branch of the facial nerve, infection, scarring, bleeding, prolonged wound healing, incomplete removal, allergy to anesthesia, and recurrence were addressed. Prior to the procedure, the treatment site was clearly identified and confirmed by the patient. All components of Universal Protocol/PAUSE Rule completed.

## 2023-03-23 ENCOUNTER — HOME CARE VISIT (OUTPATIENT)
Dept: HOME HEALTH SERVICES | Facility: HOME HEALTHCARE | Age: 78
End: 2023-03-23
Payer: MEDICARE

## 2023-03-29 ENCOUNTER — HOME CARE VISIT (OUTPATIENT)
Dept: HOME HEALTH SERVICES | Facility: HOME HEALTHCARE | Age: 78
End: 2023-03-29
Payer: MEDICARE

## 2023-03-29 VITALS — RESPIRATION RATE: 16 BRPM

## 2023-03-29 PROCEDURE — G0151 HHCP-SERV OF PT,EA 15 MIN: HCPCS

## 2023-05-01 ENCOUNTER — TELEPHONE (OUTPATIENT)
Dept: FAMILY MEDICINE CLINIC | Facility: CLINIC | Age: 78
End: 2023-05-01
Payer: MEDICARE

## 2023-05-01 NOTE — TELEPHONE ENCOUNTER
Caller: Akua Torres    Relationship: Self    Best call back number: [phone]    What is the best time to reach you: ANYTIME    Who are you requesting to speak with (clinical staff, provider,  specific staff member): ILIR MONGE    Do you know the name of the person who called: PATIENT    What was the call regarding: LEGS AND THIGHS WEAK; FREQUENT FALLS; SHOULD SHE SEE AN ORTHOPEDIC PROVIDER    Do you require a callback: YES

## 2023-05-02 NOTE — TELEPHONE ENCOUNTER
Patient states she will call back to schedule an appt if needed, she is going to follow up with her specialist first

## 2023-05-08 NOTE — TELEPHONE ENCOUNTER
Last seen 10/22/2022    Spoke to patient on 05/01 and tried to schedule a follow up appt. Patient states she will have to call back after speaking to family. No appt made yet.

## 2023-05-09 RX ORDER — LISINOPRIL 20 MG/1
TABLET ORAL
Qty: 30 TABLET | Refills: 6 | Status: SHIPPED | OUTPATIENT
Start: 2023-05-09

## 2023-05-09 RX ORDER — ATENOLOL 25 MG/1
TABLET ORAL
Qty: 60 TABLET | Refills: 5 | Status: SHIPPED | OUTPATIENT
Start: 2023-05-09

## 2023-05-09 NOTE — TELEPHONE ENCOUNTER
Okay refill of atenolol for 6 months, however do not okay refill on lisinopril until you check with patient on this as the chart note states it was discontinued January 30.

## 2023-06-03 ENCOUNTER — HOSPITAL ENCOUNTER (EMERGENCY)
Facility: HOSPITAL | Age: 78
Discharge: HOME | End: 2023-06-03
Attending: EMERGENCY MEDICINE
Payer: MEDICARE

## 2023-06-03 VITALS
RESPIRATION RATE: 17 BRPM | OXYGEN SATURATION: 99 % | HEIGHT: 63 IN | TEMPERATURE: 97.5 F | DIASTOLIC BLOOD PRESSURE: 65 MMHG | WEIGHT: 200 LBS | SYSTOLIC BLOOD PRESSURE: 147 MMHG | HEART RATE: 68 BPM | BODY MASS INDEX: 35.44 KG/M2

## 2023-06-03 DIAGNOSIS — R53.1 GENERALIZED WEAKNESS: ICD-10-CM

## 2023-06-03 DIAGNOSIS — R19.7 BLOODY DIARRHEA: Primary | ICD-10-CM

## 2023-06-03 LAB
ABO GROUP BLD: NORMAL
ALBUMIN SERPL-MCNC: 4.2 G/DL (ref 3.5–5.2)
ALBUMIN/GLOB SERPL: 1.4 G/DL
ALP SERPL-CCNC: 120 U/L (ref 39–117)
ALT SERPL W P-5'-P-CCNC: 11 U/L (ref 1–33)
ANION GAP SERPL CALCULATED.3IONS-SCNC: 11 MMOL/L (ref 5–15)
AST SERPL-CCNC: 14 U/L (ref 1–32)
BASOPHILS # BLD AUTO: 0.06 10*3/MM3 (ref 0–0.2)
BASOPHILS NFR BLD AUTO: 0.5 % (ref 0–1.5)
BILIRUB SERPL-MCNC: 1 MG/DL (ref 0–1.2)
BLD GP AB SCN SERPL QL: NEGATIVE
BUN SERPL-MCNC: 18 MG/DL (ref 8–23)
BUN/CREAT SERPL: 20 (ref 7–25)
CALCIUM SPEC-SCNC: 9 MG/DL (ref 8.6–10.5)
CHLORIDE SERPL-SCNC: 105 MMOL/L (ref 98–107)
CO2 SERPL-SCNC: 27 MMOL/L (ref 22–29)
CREAT SERPL-MCNC: 0.9 MG/DL (ref 0.57–1)
D-LACTATE SERPL-SCNC: 0.9 MMOL/L (ref 0.5–2)
DEPRECATED RDW RBC AUTO: 53 FL (ref 37–54)
EGFRCR SERPLBLD CKD-EPI 2021: 65.2 ML/MIN/1.73
EOSINOPHIL # BLD AUTO: 0.35 10*3/MM3 (ref 0–0.4)
EOSINOPHIL NFR BLD AUTO: 3 % (ref 0.3–6.2)
ERYTHROCYTE [DISTWIDTH] IN BLOOD BY AUTOMATED COUNT: 14.9 % (ref 12.3–15.4)
GLOBULIN UR ELPH-MCNC: 3.1 GM/DL
GLUCOSE SERPL-MCNC: 104 MG/DL (ref 65–99)
HCT VFR BLD AUTO: 48.9 % (ref 34–46.6)
HGB BLD-MCNC: 15.1 G/DL (ref 12–15.9)
HOLD SPECIMEN: NORMAL
IMM GRANULOCYTES # BLD AUTO: 0.05 10*3/MM3 (ref 0–0.05)
IMM GRANULOCYTES NFR BLD AUTO: 0.4 % (ref 0–0.5)
INR PPP: 1.91 (ref 0.89–1.12)
LYMPHOCYTES # BLD AUTO: 2 10*3/MM3 (ref 0.7–3.1)
LYMPHOCYTES NFR BLD AUTO: 17 % (ref 19.6–45.3)
MCH RBC QN AUTO: 30 PG (ref 26.6–33)
MCHC RBC AUTO-ENTMCNC: 30.9 G/DL (ref 31.5–35.7)
MCV RBC AUTO: 97 FL (ref 79–97)
MONOCYTES # BLD AUTO: 0.54 10*3/MM3 (ref 0.1–0.9)
MONOCYTES NFR BLD AUTO: 4.6 % (ref 5–12)
NEUTROPHILS NFR BLD AUTO: 74.5 % (ref 42.7–76)
NEUTROPHILS NFR BLD AUTO: 8.77 10*3/MM3 (ref 1.7–7)
NRBC BLD AUTO-RTO: 0 /100 WBC (ref 0–0.2)
PLATELET # BLD AUTO: 236 10*3/MM3 (ref 140–450)
PMV BLD AUTO: 9.7 FL (ref 6–12)
POTASSIUM SERPL-SCNC: 4.2 MMOL/L (ref 3.5–5.2)
PROT SERPL-MCNC: 7.3 G/DL (ref 6–8.5)
PROTHROMBIN TIME: 22 SECONDS (ref 12.2–14.5)
RBC # BLD AUTO: 5.04 10*6/MM3 (ref 3.77–5.28)
RH BLD: POSITIVE
SODIUM SERPL-SCNC: 143 MMOL/L (ref 136–145)
T&S EXPIRATION DATE: NORMAL
WBC NRBC COR # BLD: 11.77 10*3/MM3 (ref 3.4–10.8)
WHOLE BLOOD HOLD COAG: NORMAL
WHOLE BLOOD HOLD SPECIMEN: NORMAL

## 2023-06-03 PROCEDURE — 86901 BLOOD TYPING SEROLOGIC RH(D): CPT | Performed by: EMERGENCY MEDICINE

## 2023-06-03 PROCEDURE — 86850 RBC ANTIBODY SCREEN: CPT | Performed by: EMERGENCY MEDICINE

## 2023-06-03 PROCEDURE — 99283 EMERGENCY DEPT VISIT LOW MDM: CPT

## 2023-06-03 PROCEDURE — 80053 COMPREHEN METABOLIC PANEL: CPT

## 2023-06-03 PROCEDURE — 36415 COLL VENOUS BLD VENIPUNCTURE: CPT

## 2023-06-03 PROCEDURE — 85610 PROTHROMBIN TIME: CPT

## 2023-06-03 PROCEDURE — 85025 COMPLETE CBC W/AUTO DIFF WBC: CPT

## 2023-06-03 PROCEDURE — 86900 BLOOD TYPING SEROLOGIC ABO: CPT | Performed by: EMERGENCY MEDICINE

## 2023-06-03 PROCEDURE — 83605 ASSAY OF LACTIC ACID: CPT

## 2023-06-03 RX ORDER — SODIUM CHLORIDE 0.9 % (FLUSH) 0.9 %
10 SYRINGE (ML) INJECTION AS NEEDED
Status: DISCONTINUED | OUTPATIENT
Start: 2023-06-03 | End: 2023-06-03 | Stop reason: HOSPADM

## 2023-06-03 RX ORDER — ONDANSETRON 4 MG/1
4 TABLET, ORALLY DISINTEGRATING ORAL ONCE
Status: DISCONTINUED | OUTPATIENT
Start: 2023-06-03 | End: 2023-06-03 | Stop reason: HOSPADM

## 2023-06-03 RX ADMIN — SODIUM CHLORIDE 1000 ML: 9 INJECTION, SOLUTION INTRAVENOUS at 13:57

## 2023-06-03 NOTE — DISCHARGE INSTRUCTIONS
Drink extra fluids.  When you have provided the stool specimen bring it back here.  It must be diarrhea for them to be able to test it.  They cannot test formed stool.  Return anytime if you feel worse or have any other concerns.

## 2023-06-03 NOTE — ED PROVIDER NOTES
Subjective   History of Present Illness  Mrs. Torres presents with bloody diarrhea off and on for the last couple of weeks.  She denies fevers or chills.  She denies abdominal pain.  She has had no vomiting but tells me she feels nauseous.  She does report being on 2 different antibiotics over the last month or 2 for urinary tract infection.  She is on Plavix and Xarelto.  No one else at home has been sick.  She tells me the stools are loose and intermittently have bright red blood clots in them.  This has worsened over the last 3 days.  She does endorse some generalized weakness in her legs symmetrically.    Review of Systems    Past Medical History:   Diagnosis Date    A-fib 2019    Anxiety and depression     Arthritis     Cataract     Depression     Extremity pain     Fibromyalgia     GERD (gastroesophageal reflux disease)     Gout     H/O bladder infections     JUST FINISHED MACROBID ON 11-2-17 FOR RECENT UTI    Hypercholesteremia     Hypertension     Joint pain     Kidney disease, chronic, stage III (GFR 30-59 ml/min)     resolved after stopping antiinflammatory ~2015    Low back pain     Neck pain     Pneumonia 02/2020    Rheumatic fever     Skin cancer     Sleep apnea     no cpap    Stroke 09/2019    right sided weakness    Wears glasses        Allergies   Allergen Reactions    Nsaids Other (See Comments)     KIDNEY DAMAGE    Quinidine Nausea And Vomiting and Other (See Comments)     FEVER      Bactrim [Sulfamethoxazole-Trimethoprim] Rash    Nitrofuran Derivatives Diarrhea    Penicillins Rash       Past Surgical History:   Procedure Laterality Date    BACK SURGERY  2004    LUMBAR PER DR THORNTON x2    CARDIAC CATHETERIZATION  2019    CARPAL TUNNEL RELEASE Left     COLONOSCOPY  11/2020    EYE SURGERY      retina surgery    FINGER SURGERY Right     3RD FINGER    HEEL SPUR EXCISION Bilateral     INTERVENTIONAL RADIOLOGY PROCEDURE N/A 09/17/2019    Procedure: Carotid and Cerebral Angiogram/ Possible  Stent;  Surgeon: Imer Guerra MD;  Location:  [facility];  Service: Interventional Radiology    KNEE ARTHROSCOPY Bilateral     LUMBAR DISCECTOMY FUSION INSTRUMENTATION N/A 11/10/2017    Procedure: LUMBAR SPINAL FUSION ;  Surgeon: Gopi Man MD;  Location:  [facility];  Service:     REPLACEMENT TOTAL KNEE BILATERAL Bilateral     SHOULDER ARTHROSCOPY W/ ROTATOR CUFF REPAIR Right 01/19/2021    Procedure: ARTHROSCOPIC ROTATOR CUFF REPAIR WITH BICEPS TENODESIS RIGHT;  Surgeon: Lance Morales Jr., MD;  Location:  [facility];  Service: Orthopedics;  Laterality: Right;    SKIN BIOPSY      TOTAL HIP ARTHROPLASTY Right 09/09/2019    Procedure: TOTAL ANTERIOR RIGHT HIP ARTHROPLASTY;  Surgeon: Darrin New MD;  Location:  [facility];  Service: Orthopedics    TOTAL HIP ARTHROPLASTY Left 06/29/2020    Procedure: TOTAL HIP ARTHROPLASTY ANTERIOR LEFT;  Surgeon: Darrin New MD;  Location:  [facility];  Service: Orthopedics;  Laterality: Left;       Family History   Problem Relation Age of Onset    Cancer Mother     Colon polyps Mother     Hypertension Mother     Cancer Father     Hypertension Brother     Bone cancer Maternal Grandmother     Arthritis Maternal Grandfather     Hyperlipidemia Maternal Uncle     Kidney disease Maternal Uncle     Breast cancer Neg Hx     Ovarian cancer Neg Hx        Social History     Socioeconomic History    Marital status:    Tobacco Use    Smoking status: Never    Smokeless tobacco: Never   Vaping Use    Vaping Use: Never used   Substance and Sexual Activity    Alcohol use: No    Drug use: Never    Sexual activity: Defer           Objective   Physical Exam  Vitals and nursing note reviewed.   Constitutional:       General: She is not in acute distress.     Appearance: Normal appearance.   HENT:      Head: Normocephalic and atraumatic.      Nose: Nose normal. No congestion or rhinorrhea.   Eyes:      General: No scleral icterus.     Conjunctiva/sclera:  Conjunctivae normal.   Neck:      Comments: No JVD   Cardiovascular:      Rate and Rhythm: Normal rate and regular rhythm.      Heart sounds: No murmur heard.    No friction rub.   Pulmonary:      Effort: Pulmonary effort is normal.      Breath sounds: Normal breath sounds. No wheezing or rales.   Abdominal:      General: Bowel sounds are normal.      Palpations: Abdomen is soft.      Tenderness: There is no abdominal tenderness. There is no guarding or rebound.   Musculoskeletal:         General: No tenderness.      Cervical back: Normal range of motion and neck supple.      Right lower leg: No edema.      Left lower leg: No edema.   Skin:     General: Skin is warm and dry.      Coloration: Skin is not pale.      Findings: No erythema.   Neurological:      General: No focal deficit present.      Mental Status: She is alert and oriented to person, place, and time.      Motor: No weakness.      Coordination: Coordination normal.   Psychiatric:         Mood and Affect: Mood normal.         Behavior: Behavior normal.         Thought Content: Thought content normal.       Procedures           ED Course  ED Course as of 06/03/23 1541   Sat Jun 03, 2023   1306 Hemoglobin is higher than usual, remainder of labs unremarkable.  Probably volume depletion. [DT]   1514 On repeat exam she continues to be comfortable.  I spoke with him about findings thus far.  She has been here almost 4 hours and has not been able to provide a stool specimen.  We will send her home with stool collection kit. [DT]      ED Course User Index  [DT] Bj Harrington MD                                           Medical Decision Making  Initial differential diagnosis would include weakness due to acute blood loss, sepsis, C. difficile colitis, and others.  I reviewed records.  I ordered and interpreted labs which showed no evidence of anemia but did show volume depletion.  I gave IV fluids.  I ordered stool studies but she was unable to produce a  specimen.  I made arrangements for outpatient collection of her stool.    Problems Addressed:  Bloody diarrhea: complicated acute illness or injury  Generalized weakness: complicated acute illness or injury    Amount and/or Complexity of Data Reviewed  External Data Reviewed: notes.  Labs: ordered. Decision-making details documented in ED Course.        Final diagnoses:   Generalized weakness   Bloody diarrhea       ED Disposition  ED Disposition       ED Disposition   Discharge    Condition   Stable    Comment   --               Nathalia Freeman, PACassyC  [address] [id number] [phone]               Medication List        Stop      clopidogrel 75 MG tablet  Commonly known as: PLAVIX     warfarin 5 MG tablet  Commonly known as: COUMADIN                 Bj Harrington MD  06/03/23 4033

## 2023-06-05 ENCOUNTER — PATIENT OUTREACH (OUTPATIENT)
Dept: CASE MANAGEMENT | Facility: OTHER | Age: 78
End: 2023-06-05
Payer: MEDICARE

## 2023-06-05 NOTE — OUTREACH NOTE
"AMBULATORY CASE MANAGEMENT NOTE    Name and Relationship of Patient/Support Person: Akua Torres T - Self    Patient Outreach    Pt contacted regarding [facility] ED visit 6/3/23 with chief c/o listed as rectal bleeding.  Role of Ambulatory Nurse  explained and number provided.  States \"I'm doing better.\"  Denies any further bleeding at present.  Offered to assist in scheduling ED f/u appt with her PCP, Nathalia Freeman PA-C, however she declines at present.  States she believes it is hemorrhoid causing problem and wants to wait just a bit to see if resolves, but states she will call if problem persist.  She states she is drinking plenty to stay hydrated. Symptoms that would warrant seeking medical attention reviewed. [facility] 24/7 Nurse Call center explained and number provided.  States she ambulates with walker, is self sufficient, drives herself, manages her own medicines and reports she is compliant.  She denies any needs that RN-ACM could assist her with today and voiced her appreciation for the call. Encouraged pt to call RN-ACM for future needs.     Adult Patient Profile  Questions/Answers      Flowsheet Row Most Recent Value   Symptoms/Conditions Managed at Home gastrointestinal   Gastrointestinal Symptoms/Conditions bleeding, diarrhea   Gastrointestinal Management Strategies --  [plenty fluids to maintain good hydration.  Monitor for bleeding   F/u appts]   Hearing Difficulty or Deaf no   Wear Glasses or Blind yes  [wears glasses]   Concentrating, Remembering or Making Decisions Difficulty no   Difficulty Communicating no   Difficulty Eating/Swallowing no   Walking or Climbing Stairs Difficulty yes   Walking or Climbing Stairs ambulation difficulty, requires equipment   Dressing/Bathing Difficulty no   Doing Errands Independently Difficulty (such as shopping) no   Equipment Currently Used at Home bp cuff, cane, straight, wheelchair, walker, rolling, grab bar  [bedside commode   walk-in shower] "   People in Home spouse   Current Living Arrangements home          Social Work Assessment  Questions/Answers      Flowsheet Row Most Recent Value   People in Home spouse   Current Living Arrangements home   Primary Care Provided by self   Quality of Family Relationships supportive   Equipment Currently Used at Home bp cuff, cane, straight, wheelchair, walker, rolling, grab bar  [bedside commode   walk-in shower]          Send Education  Questions/Answers      Flowsheet Row Most Recent Value   Annual Wellness Visit:  Patient Has Completed   Other Patient Education/Resources  24/7 [facility] Nurse Call Line   24/7 Nurse Call Line Education Method Verbal   Advanced Directives: Patient Has          SDOH updated and reviewed with the patient during this program:  Financial Resource Strain: Low Risk     Difficulty of Paying Living Expenses: Not hard at all      Food Insecurity: No Food Insecurity    Worried About Running Out of Food in the Last Year: Never true    Ran Out of Food in the Last Year: Never true      Transportation Needs: No Transportation Needs    Lack of Transportation (Medical): No    Lack of Transportation (Non-Medical): No      Housing Stability: Low Risk     Unable to Pay for Housing in the Last Year: No    Number of Places Lived in the Last Year: 1    Unstable Housing in the Last Year: No       Mer QUIROZ  Ambulatory Case Management    6/5/2023, 10:45 EDT

## 2023-07-01 PROBLEM — M54.59 INTRACTABLE LOW BACK PAIN: Status: ACTIVE | Noted: 2023-07-01

## 2023-07-12 ENCOUNTER — TELEPHONE (OUTPATIENT)
Dept: HOSPITALIST | Facility: HOSPITAL | Age: 78
End: 2023-07-12

## 2023-07-12 NOTE — TELEPHONE ENCOUNTER
DELETE AFTER REVIEWING: Telephone encounter to be sent to the non-clinical pool.    The [facility] received a fax that requires your attention. The document has been indexed to the patient’s chart for your review.      Reason for sending: HOME HEALTH CERTIFICATION    Documents Description: PLAN OF CARE    Name of Sender: [facility]    Date Indexed: 07/12/23    Notes (if needed): PLEASE SIGN DATE AND RETURN

## 2023-07-19 ENCOUNTER — TELEPHONE (OUTPATIENT)
Dept: HOSPITALIST | Facility: HOSPITAL | Age: 78
End: 2023-07-19

## 2023-07-25 RX ORDER — ALLOPURINOL 100 MG/1
100 TABLET ORAL DAILY
Qty: 30 TABLET | Refills: 11 | Status: SHIPPED | OUTPATIENT
Start: 2023-07-25

## 2023-07-25 NOTE — TELEPHONE ENCOUNTER
Caller: Akua Torres    Relationship: Self    Best call back number:     Requested Prescriptions:   Requested Prescriptions     Pending Prescriptions Disp Refills    allopurinol (ZYLOPRIM) 100 MG tablet 30 tablet 11     Sig: Take 1 tablet by mouth Daily. For gout  Indications: Gout        Pharmacy where request should be sent: [id number] [place] [address] [facility] [phone] [facility] [phone] FX     Last office visit with prescribing clinician: 7/7/2023   Last telemedicine visit with prescribing clinician: Visit date not found   Next office visit with prescribing clinician: 10/30/2023     Additional details provided by patient: PATIENT IS OUT OF MEDICATION, PHARMACY GAVE HER 3 LOANERS    Does the patient have less than a 3 day supply:  [x] Yes  [] No    Jane Chamorro Rep   07/25/23 10:14 EDT

## 2023-07-27 ENCOUNTER — TELEPHONE (OUTPATIENT)
Dept: FAMILY MEDICINE CLINIC | Facility: CLINIC | Age: 78
End: 2023-07-27

## 2023-07-27 NOTE — TELEPHONE ENCOUNTER
The [facility] received a fax that requires your attention. The document has been indexed to the patient’s chart for your review.      Reason for sending: EXTERNAL MEDICAL RECORD NOTIFICATION    Documents Description: CLIENT COORDINATION     Name of Sender: JUANCARLOS SOLIMAN WITH [facility]    Date Indexed: 7/27/23    Notes (if needed): SEE FAX IN CHART UNDER HOME HEALTH

## 2023-07-27 NOTE — TELEPHONE ENCOUNTER
[facility]ed [facility], for review and FYI, no signature required

## 2023-07-28 ENCOUNTER — TELEPHONE (OUTPATIENT)
Dept: FAMILY MEDICINE CLINIC | Facility: CLINIC | Age: 78
End: 2023-07-28

## 2023-07-28 NOTE — TELEPHONE ENCOUNTER
The [facility] received a fax that requires your attention. The document has been indexed to the patient’s chart for your review.      Reason for sending: EXTERNAL MEDICAL RECORD NOTIFICATION    Documents Description: HOME HEALTH ORDER    Name of Sender: [facility]    Date Indexed: 7/28/23    Notes (if needed): SEE FAX IN CHART UNDER HOME HEALTH

## 2023-07-31 ENCOUNTER — TELEPHONE (OUTPATIENT)
Dept: FAMILY MEDICINE CLINIC | Facility: CLINIC | Age: 78
End: 2023-07-31
Payer: MEDICARE

## 2023-07-31 NOTE — TELEPHONE ENCOUNTER
Looks like something was scanned into chart today, I will print this off and have it signed and faxed

## 2023-08-08 ENCOUNTER — TELEPHONE (OUTPATIENT)
Dept: FAMILY MEDICINE CLINIC | Facility: CLINIC | Age: 78
End: 2023-08-08

## 2023-08-08 NOTE — TELEPHONE ENCOUNTER
The [facility] received a fax that requires your attention. The document has been indexed to the patient’s chart for your review.      Reason for sending: EXTERNAL MEDICAL RECORD NOTIFICATION    Documents Description: D/C HOME HEALTH    Name of Sender: [facility]    Date Indexed: 8/8/23    Notes (if needed): SEE FAX IN CHART UNDER HOME HEALTH

## 2023-08-21 RX ORDER — LISINOPRIL 20 MG/1
TABLET ORAL
Qty: 30 TABLET | Refills: 5 | Status: SHIPPED | OUTPATIENT
Start: 2023-08-21

## 2023-10-20 RX ORDER — ATENOLOL 25 MG/1
TABLET ORAL
Qty: 60 TABLET | Refills: 5 | Status: SHIPPED | OUTPATIENT
Start: 2023-10-20

## 2024-03-25 RX ORDER — LISINOPRIL 20 MG/1
TABLET ORAL
Qty: 30 TABLET | Refills: 0 | Status: SHIPPED | OUTPATIENT
Start: 2024-03-25

## 2024-04-24 RX ORDER — LISINOPRIL 20 MG/1
TABLET ORAL
Qty: 30 TABLET | Refills: 0 | Status: SHIPPED | OUTPATIENT
Start: 2024-04-24

## 2024-04-24 RX ORDER — ATENOLOL 25 MG/1
TABLET ORAL
Qty: 60 TABLET | Refills: 0 | Status: SHIPPED | OUTPATIENT
Start: 2024-04-24

## 2024-04-24 RX ORDER — ATORVASTATIN CALCIUM 80 MG/1
80 TABLET, FILM COATED ORAL NIGHTLY
Qty: 30 TABLET | Refills: 0 | Status: SHIPPED | OUTPATIENT
Start: 2024-04-24

## 2024-05-09 ENCOUNTER — OFFICE VISIT (OUTPATIENT)
Age: 79
End: 2024-05-09
Payer: MEDICARE

## 2024-05-09 ENCOUNTER — LAB (OUTPATIENT)
Facility: HOSPITAL | Age: 79
End: 2024-05-09
Payer: MEDICARE

## 2024-05-09 VITALS
BODY MASS INDEX: 34.62 KG/M2 | HEART RATE: 65 BPM | HEIGHT: 63 IN | DIASTOLIC BLOOD PRESSURE: 78 MMHG | SYSTOLIC BLOOD PRESSURE: 126 MMHG | TEMPERATURE: 97.3 F | WEIGHT: 195.4 LBS

## 2024-05-09 DIAGNOSIS — M1A.09X0 IDIOPATHIC CHRONIC GOUT OF MULTIPLE SITES WITHOUT TOPHUS: ICD-10-CM

## 2024-05-09 DIAGNOSIS — M15.9 GENERALIZED OSTEOARTHROSIS, INVOLVING MULTIPLE SITES: ICD-10-CM

## 2024-05-09 DIAGNOSIS — Z79.899 HIGH RISK MEDICATION USE: ICD-10-CM

## 2024-05-09 DIAGNOSIS — G62.9 NEUROPATHY: ICD-10-CM

## 2024-05-09 DIAGNOSIS — R53.83 OTHER FATIGUE: ICD-10-CM

## 2024-05-09 DIAGNOSIS — M79.7 FIBROMYALGIA: ICD-10-CM

## 2024-05-09 DIAGNOSIS — G62.9 NEUROPATHY: Primary | ICD-10-CM

## 2024-05-09 LAB
BACTERIA UR QL AUTO: ABNORMAL /HPF
BILIRUB UR QL STRIP: NEGATIVE
CLARITY UR: CLEAR
COLOR UR: YELLOW
GLUCOSE UR STRIP-MCNC: NEGATIVE MG/DL
HAV IGM SERPL QL IA: NORMAL
HBV CORE IGM SERPL QL IA: NORMAL
HBV SURFACE AG SERPL QL IA: NORMAL
HCV AB SER QL: NORMAL
HGB UR QL STRIP.AUTO: NEGATIVE
HOLD SPECIMEN: NORMAL
HYALINE CASTS UR QL AUTO: ABNORMAL /LPF
KETONES UR QL STRIP: ABNORMAL
LEUKOCYTE ESTERASE UR QL STRIP.AUTO: ABNORMAL
NITRITE UR QL STRIP: NEGATIVE
PH UR STRIP.AUTO: 5.5 [PH] (ref 5–8)
PROT UR QL STRIP: ABNORMAL
RBC # UR STRIP: ABNORMAL /HPF
REF LAB TEST METHOD: ABNORMAL
SP GR UR STRIP: 1.03 (ref 1–1.03)
SQUAMOUS #/AREA URNS HPF: ABNORMAL /HPF
UROBILINOGEN UR QL STRIP: ABNORMAL
WBC # UR STRIP: ABNORMAL /HPF

## 2024-05-09 PROCEDURE — 87086 URINE CULTURE/COLONY COUNT: CPT

## 2024-05-09 PROCEDURE — 85027 COMPLETE CBC AUTOMATED: CPT

## 2024-05-09 PROCEDURE — 82607 VITAMIN B-12: CPT

## 2024-05-09 PROCEDURE — 85007 BL SMEAR W/DIFF WBC COUNT: CPT

## 2024-05-09 PROCEDURE — 84165 PROTEIN E-PHORESIS SERUM: CPT

## 2024-05-09 PROCEDURE — 81001 URINALYSIS AUTO W/SCOPE: CPT

## 2024-05-09 PROCEDURE — 87186 SC STD MICRODIL/AGAR DIL: CPT

## 2024-05-09 PROCEDURE — 80053 COMPREHEN METABOLIC PANEL: CPT

## 2024-05-09 PROCEDURE — 36415 COLL VENOUS BLD VENIPUNCTURE: CPT

## 2024-05-09 PROCEDURE — 85652 RBC SED RATE AUTOMATED: CPT

## 2024-05-09 PROCEDURE — 86140 C-REACTIVE PROTEIN: CPT

## 2024-05-09 PROCEDURE — 80074 ACUTE HEPATITIS PANEL: CPT

## 2024-05-09 RX ORDER — PREGABALIN 25 MG/1
CAPSULE ORAL
Qty: 270 CAPSULE | Refills: 5 | Status: SHIPPED | OUTPATIENT
Start: 2024-05-09

## 2024-05-09 RX ORDER — PREGABALIN 25 MG/1
25 CAPSULE ORAL 3 TIMES DAILY
COMMUNITY
End: 2024-05-09 | Stop reason: SDUPTHER

## 2024-05-10 LAB
ALBUMIN SERPL ELPH-MCNC: 3.2 G/DL (ref 2.9–4.4)
ALBUMIN SERPL-MCNC: 3.7 G/DL (ref 3.5–5.2)
ALBUMIN/GLOB SERPL: 1.2 {RATIO} (ref 0.7–1.7)
ALBUMIN/GLOB SERPL: 1.4 G/DL
ALP SERPL-CCNC: 118 U/L (ref 39–117)
ALPHA1 GLOB SERPL ELPH-MCNC: 0.2 G/DL (ref 0–0.4)
ALPHA2 GLOB SERPL ELPH-MCNC: 0.9 G/DL (ref 0.4–1)
ALT SERPL W P-5'-P-CCNC: 18 U/L (ref 1–33)
ANION GAP SERPL CALCULATED.3IONS-SCNC: 12.2 MMOL/L (ref 5–15)
AST SERPL-CCNC: 15 U/L (ref 1–32)
B-GLOBULIN SERPL ELPH-MCNC: 0.9 G/DL (ref 0.7–1.3)
BASOPHILS # BLD MANUAL: 0 10*3/MM3 (ref 0–0.2)
BASOPHILS NFR BLD MANUAL: 0 % (ref 0–1.5)
BILIRUB SERPL-MCNC: 0.8 MG/DL (ref 0–1.2)
BUN SERPL-MCNC: 21 MG/DL (ref 8–23)
BUN/CREAT SERPL: 18.4 (ref 7–25)
CALCIUM SPEC-SCNC: 8.9 MG/DL (ref 8.6–10.5)
CHLORIDE SERPL-SCNC: 107 MMOL/L (ref 98–107)
CO2 SERPL-SCNC: 26.8 MMOL/L (ref 22–29)
CREAT SERPL-MCNC: 1.14 MG/DL (ref 0.57–1)
CRP SERPL-MCNC: <0.3 MG/DL (ref 0–0.5)
DEPRECATED RDW RBC AUTO: 45.4 FL (ref 37–54)
EGFRCR SERPLBLD CKD-EPI 2021: 48.8 ML/MIN/1.73
EOSINOPHIL # BLD MANUAL: 0.1 10*3/MM3 (ref 0–0.4)
EOSINOPHIL NFR BLD MANUAL: 1 % (ref 0.3–6.2)
ERYTHROCYTE [DISTWIDTH] IN BLOOD BY AUTOMATED COUNT: 13.2 % (ref 12.3–15.4)
ERYTHROCYTE [SEDIMENTATION RATE] IN BLOOD: 24 MM/HR (ref 0–30)
GAMMA GLOB SERPL ELPH-MCNC: 0.7 G/DL (ref 0.4–1.8)
GLOBULIN SER CALC-MCNC: 2.7 G/DL (ref 2.2–3.9)
GLOBULIN UR ELPH-MCNC: 2.6 GM/DL
GLUCOSE SERPL-MCNC: 106 MG/DL (ref 65–99)
HCT VFR BLD AUTO: 43.3 % (ref 34–46.6)
HGB BLD-MCNC: 13.9 G/DL (ref 12–15.9)
LABORATORY COMMENT REPORT: ABNORMAL
LYMPHOCYTES # BLD MANUAL: 2.47 10*3/MM3 (ref 0.7–3.1)
LYMPHOCYTES NFR BLD MANUAL: 2 % (ref 5–12)
M PROTEIN SERPL ELPH-MCNC: 0.1 G/DL
MCH RBC QN AUTO: 30 PG (ref 26.6–33)
MCHC RBC AUTO-ENTMCNC: 32.1 G/DL (ref 31.5–35.7)
MCV RBC AUTO: 93.3 FL (ref 79–97)
MONOCYTES # BLD: 0.2 10*3/MM3 (ref 0.1–0.9)
NEUTROPHILS # BLD AUTO: 7.11 10*3/MM3 (ref 1.7–7)
NEUTROPHILS NFR BLD MANUAL: 72 % (ref 42.7–76)
PLAT MORPH BLD: NORMAL
PLATELET # BLD AUTO: 204 10*3/MM3 (ref 140–450)
PMV BLD AUTO: 10.8 FL (ref 6–12)
POTASSIUM SERPL-SCNC: 4.2 MMOL/L (ref 3.5–5.2)
PROT PATTERN SERPL ELPH-IMP: ABNORMAL
PROT SERPL-MCNC: 5.9 G/DL (ref 6–8.5)
PROT SERPL-MCNC: 6.3 G/DL (ref 6–8.5)
RBC # BLD AUTO: 4.64 10*6/MM3 (ref 3.77–5.28)
RBC MORPH BLD: NORMAL
SODIUM SERPL-SCNC: 146 MMOL/L (ref 136–145)
VARIANT LYMPHS NFR BLD MANUAL: 25 % (ref 19.6–45.3)
VIT B12 BLD-MCNC: 857 PG/ML (ref 211–946)
WBC MORPH BLD: NORMAL
WBC NRBC COR # BLD AUTO: 9.88 10*3/MM3 (ref 3.4–10.8)

## 2024-05-12 LAB — BACTERIA SPEC AEROBE CULT: ABNORMAL

## 2024-05-21 ENCOUNTER — TELEPHONE (OUTPATIENT)
Age: 79
End: 2024-05-21
Payer: MEDICARE

## 2024-05-21 NOTE — TELEPHONE ENCOUNTER
[email or web address]  Audra Martin, APRN  [address], [place], [place] [address] [phone]    PT NEEDS BLOODWORK SENT TO PRIMARY CARE, STATED ELEVATED LEVELS AND DOCTOR WANTS TO SEE RESULTS.     -

## 2024-06-20 RX ORDER — ATORVASTATIN CALCIUM 80 MG/1
80 TABLET, FILM COATED ORAL NIGHTLY
Qty: 90 TABLET | OUTPATIENT
Start: 2024-06-20

## 2024-07-23 ENCOUNTER — OFFICE VISIT (OUTPATIENT)
Age: 79
End: 2024-07-23
Payer: MEDICARE

## 2024-07-23 VITALS
SYSTOLIC BLOOD PRESSURE: 120 MMHG | HEIGHT: 63 IN | DIASTOLIC BLOOD PRESSURE: 70 MMHG | WEIGHT: 197.3 LBS | BODY MASS INDEX: 34.96 KG/M2

## 2024-07-23 DIAGNOSIS — M24.512 CONTRACTURE OF JOINT OF LEFT SHOULDER REGION: ICD-10-CM

## 2024-07-23 DIAGNOSIS — Z80.8 FAMILY HISTORY OF BONE CANCER: ICD-10-CM

## 2024-07-23 DIAGNOSIS — M25.512 LEFT SHOULDER PAIN, UNSPECIFIED CHRONICITY: ICD-10-CM

## 2024-07-23 DIAGNOSIS — M19.012 PRIMARY OSTEOARTHRITIS OF LEFT SHOULDER: Primary | ICD-10-CM

## 2024-07-23 DIAGNOSIS — M61.9 CALCIFICATION AND OSSIFICATION OF MUSCLE: ICD-10-CM

## 2024-07-23 RX ORDER — MAGNESIUM 200 MG
1 TABLET ORAL DAILY
COMMUNITY
Start: 2024-05-22

## 2024-07-23 NOTE — PROGRESS NOTES
[facility] [place] Visit - Kalpana Bassett PA-C    Office Visit       Patient Name: Akua Torres    Chief Complaint:   Chief Complaint   Patient presents with    Left Shoulder - Pain       Referring Physician: Referring, Self    History of Present Illness:   Akua Torres is a 79 y.o. female who presents with left shoulder pain.  Ongoing for the last 3 years.  She does not recall specific injury or trauma.  She reports multiple falls without direct trauma to the left shoulder.      She says she has been going to [facility] orthopedics for the last year.  She has received multiple subacromial corticosteroid injections.  She says the injections helped initially but now they do not last very long.  She has also tried physical therapy.    No prior surgery on left shoulder.  Says she had a right shoulder arthroscopy for debridement with Dr. Morales a couple years ago.    Denies fever, chills, constitutional symptoms      Subjective     Review of Systems   Constitutional: Negative.  Negative for chills, fatigue and fever.   HENT: Negative.  Negative for congestion and dental problem.    Eyes: Negative.  Negative for blurred vision.   Respiratory: Negative.  Negative for shortness of breath.    Cardiovascular: Negative.  Negative for leg swelling.   Gastrointestinal: Negative.  Negative for abdominal pain.   Endocrine: Negative.  Negative for polyuria.   Genitourinary: Negative.  Negative for difficulty urinating.   Musculoskeletal:  Positive for arthralgias.   Skin: Negative.    Allergic/Immunologic: Negative.    Neurological: Negative.    Hematological: Negative.  Negative for adenopathy.   Psychiatric/Behavioral: Negative.  Negative for behavioral problems.         Past Medical History:   Past Medical History:   Diagnosis Date    A-fib 2019    Anxiety and depression     Arthritis     Cataract     Depression     Extremity  pain     Fibromyalgia     GERD (gastroesophageal reflux disease)     Gout     H/O bladder infections     JUST FINISHED MACROBID ON 11-2-17 FOR RECENT UTI    Hypercholesteremia     Hypertension     Joint pain     Kidney disease, chronic, stage III (GFR 30-59 ml/min)     resolved after stopping antiinflammatory ~2015    Low back pain     Neck pain     Osteoarthritis     Pneumonia 02/2020    Rheumatic fever     Skin cancer     Sleep apnea     no cpap    Spinal stenosis 06/27/2023    Stroke 09/2019    right sided weakness    Wears glasses        Past Surgical History:   Past Surgical History:   Procedure Laterality Date    BACK SURGERY  2004    LUMBAR PER DR MAN x2    CARDIAC CATHETERIZATION  2019    CARPAL TUNNEL RELEASE Left     COLONOSCOPY  11/2020    EYE SURGERY      retina surgery    FINGER SURGERY Right     3RD FINGER    HEEL SPUR EXCISION Bilateral     INTERVENTIONAL RADIOLOGY PROCEDURE N/A 09/17/2019    Procedure: Carotid and Cerebral Angiogram/ Possible Stent;  Surgeon: Imer Guerra MD;  Location:  [facility];  Service: Interventional Radiology    KNEE ARTHROSCOPY Bilateral     LUMBAR DISCECTOMY FUSION INSTRUMENTATION N/A 11/10/2017    Procedure: LUMBAR SPINAL FUSION ;  Surgeon: Gopi Man MD;  Location:  [facility];  Service:     REPLACEMENT TOTAL KNEE BILATERAL Bilateral     SHOULDER ARTHROSCOPY W/ ROTATOR CUFF REPAIR Right 01/19/2021    Procedure: ARTHROSCOPIC ROTATOR CUFF REPAIR WITH BICEPS TENODESIS RIGHT;  Surgeon: Lance Morales Jr., MD;  Location:  [facility];  Service: Orthopedics;  Laterality: Right;    SKIN BIOPSY      TOTAL HIP ARTHROPLASTY Right 09/09/2019    Procedure: TOTAL ANTERIOR RIGHT HIP ARTHROPLASTY;  Surgeon: Darrin New MD;  Location:  [facility];  Service: Orthopedics    TOTAL HIP ARTHROPLASTY Left 06/29/2020    Procedure: TOTAL HIP ARTHROPLASTY ANTERIOR LEFT;  Surgeon: Darrin New MD;  Location:  [facility];  Service: Orthopedics;  Laterality: Left;        Family History:   Family History   Problem Relation Age of Onset    Cancer Mother     Colon polyps Mother     Hypertension Mother     Cancer Father     Hypertension Brother     Aneurysm Brother     Bone cancer Maternal Grandmother     Arthritis Maternal Grandfather     Hyperlipidemia Maternal Uncle     Kidney disease Maternal Uncle     Breast cancer Neg Hx     Ovarian cancer Neg Hx        Social History:   Social History     Socioeconomic History    Marital status:    Tobacco Use    Smoking status: Never     Passive exposure: Never    Smokeless tobacco: Never   Vaping Use    Vaping status: Unknown   Substance and Sexual Activity    Alcohol use: No    Drug use: Never    Sexual activity: Defer       Medications:   Current Outpatient Medications:     acetaminophen (TYLENOL) 325 MG tablet, Take 1 tablet by mouth Every 6 (Six) Hours As Needed for Mild Pain., Disp: , Rfl:     allopurinol (ZYLOPRIM) 100 MG tablet, Take 1 tablet by mouth Daily. For gout  Indications: Gout, Disp: 30 tablet, Rfl: 11    atenolol (TENORMIN) 25 MG tablet, TAKE 1 TABLET(25 MG) BY MOUTH TWICE DAILY, Disp: 60 tablet, Rfl: 0    atorvastatin (LIPITOR) 80 MG tablet, TAKE 1 TABLET BY MOUTH EVERY NIGHT, Disp: 30 tablet, Rfl: 0    Cyanocobalamin (Vitamin B-12) 1000 MCG sublingual tablet, Place 1 tablet under the tongue Daily., Disp: , Rfl:     escitalopram (Lexapro) 10 MG tablet, Take 1 tablet by mouth Daily. For depression, Disp: 30 tablet, Rfl: 11    lisinopril (PRINIVIL,ZESTRIL) 20 MG tablet, TAKE 1 TABLET(20 MG) BY MOUTH DAILY FOR BLOOD PRESSURE, Disp: 30 tablet, Rfl: 0    methocarbamol (ROBAXIN) 750 MG tablet, Take 1 tablet by mouth 3 (Three) Times a Day. Indications: Musculoskeletal Pain, Disp: 90 tablet, Rfl: 11    pregabalin (LYRICA) 25 MG capsule, 1-3 tabs tid for pain, Disp: 270 capsule, Rfl: 5    propafenone SR (RYTHMOL SR) 225 MG 12 hr capsule, Take 1 capsule by mouth Every 12 (Twelve) Hours., Disp: , Rfl:     rivaroxaban  "(XARELTO) 20 MG tablet, Take 1 tablet by mouth Every Night. Indications: Atrial Fibrillation, Disp: , Rfl:     Allergies:   Allergies   Allergen Reactions    Nsaids Other (See Comments)     KIDNEY DAMAGE    Quinidine Nausea And Vomiting and Other (See Comments)     FEVER      Bactrim [Sulfamethoxazole-Trimethoprim] Rash    Nitrofuran Derivatives Diarrhea    Penicillins Rash       I have reviewed and updated the following portions of the patient's history and review of systems: allergies, current medications, past family history, past medical history, past social history, past surgical history and problem list.    Objective      Vital Signs:   Vitals:    07/23/24 1425   BP: 120/70   Weight: 89.5 kg (197 lb 4.8 oz)   Height: 160 cm (62.99\")       Ortho Exam:  General: no acute distress, comfortable  Vitals reviewed in chart    Musculoskeletal Exam:    SIDE: Left shoulder  No erythema or effusion  No skin changes    Tenderness: Rotator cuff distribution  Focal tenderness at deltoid insertion midhumerus    Range of motion measurements (degrees): Limited active range of motion secondary to pain  Passive range of motion to approximately 130 degrees of flexion and 100 degrees of abduction  Neurovascularly intact  Able to flex deltoid  Painful arc of motion: No  No evidence of septic joint      Results Review:   XR Shoulder 2+ View Left  Left Shoulder X-Ray    Indication: Pain    Study:  Grashey AP, axillary lateral, and scapular Y views    Comparison: 7/1/2023    Findings:  No acute fractures. No bony lesions. Normal soft tissues. AC joint:    Moderate joint space narrowing. Glenohumeral joint:   Moderate joint space   narrowing with inferior humeral osteophytes    Impression:    Degenerative changes to the AC and glenohumeral joints.         Assessment / Plan      Assessment:  Diagnoses and all orders for this visit:    1. Primary osteoarthritis of left shoulder (Primary)    2. Contracture of joint of left shoulder " region    3. Left shoulder pain, unspecified chronicity  -     XR Shoulder 2+ View Left  -     MRI Humerus Left With & Without Contrast; Future    4. Family history of bone cancer    5. Calcification and ossification of muscle        Quality Metrics:   BMI:   BMI is >= 30 and <35. (Class 1 Obesity). The following options were offered after discussion;: weight loss educational material (shared in after visit summary)       Tobacco:   Akua Torres  reports that she has never smoked. She has never been exposed to tobacco smoke. She has never used smokeless tobacco.          Plan:  Left shoulder x-ray films personally reviewed and interpreted today with the patient.  She has severe glenohumeral joint space loss of the left shoulder with large osteophytes. Discussed calcifications present distally near insertion of deltoid. She is focally tender at this location. Patient concerned due to family history of bone cancer.   I have ordered an MRI with contrast of the humerus to further assess the calcification present to ensure this is a benign finding.   We discussed possible treatment options for the left shoulder pending MRI study.  We will follow-up after MRI to discuss.      Follow Up:   After left shoulder MRI        Kalpana Bassett PA-C  [facility]       Dictated using Dragon Speech Recognition.

## 2024-07-26 ENCOUNTER — HOSPITAL ENCOUNTER (OUTPATIENT)
Facility: HOSPITAL | Age: 79
Discharge: HOME | End: 2024-07-26
Payer: MEDICARE

## 2024-07-26 DIAGNOSIS — M25.512 LEFT SHOULDER PAIN, UNSPECIFIED CHRONICITY: ICD-10-CM

## 2024-07-26 PROCEDURE — 73220 MRI UPPR EXTREMITY W/O&W/DYE: CPT

## 2024-07-26 PROCEDURE — A9577 INJ MULTIHANCE: HCPCS

## 2024-07-26 PROCEDURE — 0 GADOBENATE DIMEGLUMINE 529 MG/ML SOLUTION

## 2024-07-26 RX ADMIN — GADOBENATE DIMEGLUMINE 18 ML: 529 INJECTION, SOLUTION INTRAVENOUS at 10:20

## 2024-07-30 ENCOUNTER — OFFICE VISIT (OUTPATIENT)
Age: 79
End: 2024-07-30
Payer: MEDICARE

## 2024-07-30 VITALS
BODY MASS INDEX: 34.96 KG/M2 | SYSTOLIC BLOOD PRESSURE: 150 MMHG | HEIGHT: 63 IN | WEIGHT: 197.31 LBS | DIASTOLIC BLOOD PRESSURE: 78 MMHG

## 2024-07-30 DIAGNOSIS — M54.12 CERVICAL RADICULOPATHY: ICD-10-CM

## 2024-07-30 DIAGNOSIS — M19.012 PRIMARY OSTEOARTHRITIS OF LEFT SHOULDER: Primary | ICD-10-CM

## 2024-07-30 PROCEDURE — 3077F SYST BP >= 140 MM HG: CPT

## 2024-07-30 PROCEDURE — 20611 DRAIN/INJ JOINT/BURSA W/US: CPT

## 2024-07-30 PROCEDURE — 3078F DIAST BP <80 MM HG: CPT

## 2024-07-30 PROCEDURE — 99214 OFFICE O/P EST MOD 30 MIN: CPT

## 2024-07-30 RX ADMIN — TRIAMCINOLONE ACETONIDE 40 MG: 40 INJECTION, SUSPENSION INTRA-ARTICULAR; INTRAMUSCULAR at 16:02

## 2024-07-30 RX ADMIN — LIDOCAINE HYDROCHLORIDE 5 ML: 10 INJECTION, SOLUTION EPIDURAL; INFILTRATION; INTRACAUDAL; PERINEURAL at 16:02

## 2024-07-30 NOTE — PROGRESS NOTES
Procedure   - Large Joint Arthrocentesis: L glenohumeral on 7/30/2024 4:02 PM  Indications: pain  Details: 21 G needle, ultrasound-guided posterior approach  Medications: 5 mL lidocaine PF 1% 1 %; 40 mg triamcinolone acetonide 40 MG/ML  Outcome: tolerated well, no immediate complications  Procedure, treatment alternatives, risks and benefits explained, specific risks discussed. Consent was given by the patient. Immediately prior to procedure a time out was called to verify the correct patient, procedure, equipment, support staff and site/side marked as required. Patient was prepped and draped in the usual sterile fashion.

## 2024-07-30 NOTE — PROGRESS NOTES
[facility] [place] [facility] Office Follow Up       Office Follow Up Visit       Patient Name: Akua Torres    Chief Complaint:   Chief Complaint   Patient presents with    Follow-up     1 week MRI (7/26/24) recheck - Primary osteoarthritis of left shoulder       Referring Physician: Audra Martin APRN    History of Present Illness:   Akua Torres returns to clinic today for follow-up of left shoulder pain. Last visit 7/23/24. MRI left humerus ordered at that time due to concerns for lesion on x-ray with family history of bone cancer. MRI obtained 7/26/24. MRI appears consistent with calcific tendonitis. No change in symptoms since last visit. Pain radiates down entire ar.    She has had back and neck issues in the past.    7/23/24  Ongoing for the last 3 years.  She does not recall specific injury or trauma.  She reports multiple falls without direct trauma to the left shoulder.       She says she has been going to [facility] orthopedics for the last year.  She has received multiple subacromial corticosteroid injections.  She says the injections helped initially but now they do not last very long.  She has also tried physical therapy.     No prior surgery on left shoulder.  Says she had a right shoulder arthroscopy for debridement with Dr. Morales a couple years ago.    Subjective     Review of Systems   Constitutional: Negative.  Negative for chills, fatigue, fever, unexpected weight gain and unexpected weight loss.   HENT: Negative.  Negative for congestion, dental problem, postnasal drip and rhinorrhea.    Eyes: Negative.  Negative for blurred vision.   Respiratory: Negative.  Negative for shortness of breath.    Cardiovascular: Negative.  Negative for leg swelling.   Gastrointestinal: Negative.  Negative for abdominal pain, nausea and vomiting.   Endocrine: Negative.  Negative for polyuria.   Genitourinary: Negative.  Negative for  difficulty urinating.   Musculoskeletal:  Positive for arthralgias. Negative for gait problem, joint swelling and myalgias.   Skin:  Negative for skin lesions and wound.   Allergic/Immunologic: Negative.    Neurological: Negative.  Negative for dizziness, weakness, light-headedness and numbness.   Hematological: Negative.  Negative for adenopathy. Does not bruise/bleed easily.   Psychiatric/Behavioral: Negative.  Negative for behavioral problems and depressed mood.         I have reviewed and updated the following portions of the patient's history and review of systems: allergies, current medications, past family history, past medical history, past social history, past surgical history and problem list.    Medications:   Current Outpatient Medications:     acetaminophen (TYLENOL) 325 MG tablet, Take 1 tablet by mouth Every 6 (Six) Hours As Needed for Mild Pain., Disp: , Rfl:     allopurinol (ZYLOPRIM) 100 MG tablet, Take 1 tablet by mouth Daily. For gout  Indications: Gout, Disp: 30 tablet, Rfl: 11    atenolol (TENORMIN) 25 MG tablet, TAKE 1 TABLET(25 MG) BY MOUTH TWICE DAILY, Disp: 60 tablet, Rfl: 0    atorvastatin (LIPITOR) 80 MG tablet, TAKE 1 TABLET BY MOUTH EVERY NIGHT, Disp: 30 tablet, Rfl: 0    Cyanocobalamin (Vitamin B-12) 1000 MCG sublingual tablet, Place 1 tablet under the tongue Daily., Disp: , Rfl:     escitalopram (Lexapro) 10 MG tablet, Take 1 tablet by mouth Daily. For depression, Disp: 30 tablet, Rfl: 11    lisinopril (PRINIVIL,ZESTRIL) 20 MG tablet, TAKE 1 TABLET(20 MG) BY MOUTH DAILY FOR BLOOD PRESSURE, Disp: 30 tablet, Rfl: 0    methocarbamol (ROBAXIN) 750 MG tablet, Take 1 tablet by mouth 3 (Three) Times a Day. Indications: Musculoskeletal Pain, Disp: 90 tablet, Rfl: 11    pregabalin (LYRICA) 25 MG capsule, 1-3 tabs tid for pain, Disp: 270 capsule, Rfl: 5    propafenone SR (RYTHMOL SR) 225 MG 12 hr capsule, Take 1 capsule by mouth Every 12 (Twelve) Hours., Disp: , Rfl:     rivaroxaban (XARELTO)  "20 MG tablet, Take 1 tablet by mouth Every Night. Indications: Atrial Fibrillation, Disp: , Rfl:     Allergies:   Allergies   Allergen Reactions    Nsaids Other (See Comments)     KIDNEY DAMAGE    Quinidine Nausea And Vomiting and Other (See Comments)     FEVER      Bactrim [Sulfamethoxazole-Trimethoprim] Rash    Nitrofuran Derivatives Diarrhea    Penicillins Rash         Objective      Vital Signs:   Vitals:    07/30/24 1527   BP: 150/78   Weight: 89.5 kg (197 lb 5 oz)   Height: 160 cm (62.99\")       Ortho Exam:  General: no acute distress, comfortable  Vitals reviewed in chart    Musculoskeletal Exam:    SIDE: Left shoulder    Tenderness: Joint line    Range of motion measurements (degrees): Limited left shoulder motion secondary to pain  Painful arc of motion: yes  No evidence of septic joint      Results Review:  MRI Humerus Left With & Without Contrast  Narrative: MRI HUMERUS LEFT W WO CONTRAST    Date of Exam: 7/26/2024 9:23 AM EDT    Indication: Left humerus lesion, family history cancer.     Comparison: Shoulder radiograph 7/23/2024    Technique:  Routine multiplanar/multisequence images of the left humerus were obtained before and after the uneventful intravenous administration of 18 mL Multihance.    Findings:  Bones and joints: No suspicious marrow replacing lesions. Moderate degenerative changes of the shoulder. No abnormal marrow edema. No fracture. No abnormal marrow enhancement. Small glenohumeral joint effusion. Mild acromioclavicular degenerative   changes. Few glenohumeral loose bodies seen measuring up to 6 mm.    Soft tissues: No focal muscular atrophy. Along the anterior aspect of the mid humerus there is an area of mineralization measuring approximately 0.8 x 0.5 x 0.8 cm (axial STIR image 21). This mineralization is at the humeral attachment of the pectoralis   major. No abnormal enhancing lesions. Vascular flow voids are intact.  Impression: Impression:  Mineralization seen at the " humeral attachment of the pectoralis major muscle which may reflect calcific tendinopathy.    No suspicious lesions or acute abnormality of the humerus.    Electronically Signed: Cesar Ocasio MD    7/26/2024 2:06 PM EDT    Workstation ID: [id number]       MRI Humerus Left With & Without Contrast    Result Date: 7/26/2024  Impression: Mineralization seen at the humeral attachment of the pectoralis major muscle which may reflect calcific tendinopathy. No suspicious lesions or acute abnormality of the humerus. Electronically Signed: Cesar Ocasio MD  7/26/2024 2:06 PM EDT  Workstation ID: [id number]        Assessment / Plan      Assessment:   Diagnoses and all orders for this visit:    1. Primary osteoarthritis of left shoulder (Primary)  -     US Guided Injection  -     Ambulatory Referral to Pain Management Clinic    2. Cervical radiculopathy  -     Ambulatory Referral to Pain Management Clinic    Other orders  -     - Large Joint Arthrocentesis: L glenohumeral        Quality Metrics:   BMI:   BMI is >= 30 and <35. (Class 1 Obesity). The following options were offered after discussion;: weight loss educational material (shared in after visit summary)       Tobacco:   Akua Torres  reports that she has never smoked. She has never been exposed to tobacco smoke. She has never used smokeless tobacco.        Plan:  I personally reviewed and interpreted left humerus MRI images today with the patient.  No evidence of suspicious lesions.  Likely calcific tendinopathy at insertion of pectoralis major.   Recommend ultrasound guided intra-articular corticosteroid injection today for her left shoulder pain secondary to osteoarthritis. We will proceed with injection from posterior approach.  Referral to pain specialist as she has pain radiating down the left upper extremity likely secondary to the cervical spine.   We will follow-up in 2-3 months as needed to reassess.    Risks and benefits of a shoulder glenohumeral  injection were discussed and the patient desired to proceed. Verbal consent was obtained. The patient understood the risk of infection, potential skin changes, bump in blood glucose especially with diabetes, nerve injury, possibility of increased pain in the short term, and possible incomplete pain relief.  The glenohumeral joint space was identified from posterior approach with ultrasound guidance. Using sterile technique, the shoulder glenohumeral space was injected from a posterior approach with 1mL of 40 mg triamcinolone acetonide 40 MG/ML and 4cc of lidocaine with aspiration prior to injection. The patient tolerated the procedure without difficulty.  CPT CODE 61065 for major joint aspiration/injection      Follow Up:   2-3 months as needed    Kalpana Bassett PA-C  [facility] [place] [facility]    Dictated using Dragon Speech Recognition.

## 2024-08-06 ENCOUNTER — TELEPHONE (OUTPATIENT)
Dept: ORTHOPEDIC SURGERY | Facility: CLINIC | Age: 79
End: 2024-08-06
Payer: MEDICARE

## 2024-08-06 RX ORDER — TRIAMCINOLONE ACETONIDE 40 MG/ML
40 INJECTION, SUSPENSION INTRA-ARTICULAR; INTRAMUSCULAR
Status: COMPLETED | OUTPATIENT
Start: 2024-07-30 | End: 2024-07-30

## 2024-08-06 RX ORDER — LIDOCAINE HYDROCHLORIDE 10 MG/ML
5 INJECTION, SOLUTION EPIDURAL; INFILTRATION; INTRACAUDAL; PERINEURAL
Status: COMPLETED | OUTPATIENT
Start: 2024-07-30 | End: 2024-07-30

## 2024-08-06 NOTE — TELEPHONE ENCOUNTER
Provider: CHICO    Caller: ERYN    Relationship to Patient: SELF    Pharmacy:     Phone Number: [phone]    Reason for Call: PT CALLING TO GET AN UPDATE ON THE PAIN MGMT REFERRAL SHE IS SUPPOSED TO HAVE, THE OFFICE VISIT IS STILL OPEN - PLEASE ADVISE

## 2024-08-07 NOTE — TELEPHONE ENCOUNTER
LVM that her pain management referral was placed. If she had anymore questions please contact our office.    Diamond Calderon CMA (AAMA)

## 2024-08-07 NOTE — TELEPHONE ENCOUNTER
PER REFERRAL NOTES, PATIENT IS AWARE THE PAIN MANAGEMENT WILL CALL HER TO SCHEDULE ONCE THEY'VE REVIEWED EVERYTHING

## 2024-08-09 ENCOUNTER — TELEPHONE (OUTPATIENT)
Dept: ORTHOPEDIC SURGERY | Facility: CLINIC | Age: 79
End: 2024-08-09
Payer: MEDICARE

## 2024-08-12 DIAGNOSIS — M54.12 CERVICAL RADICULOPATHY: ICD-10-CM

## 2024-08-12 DIAGNOSIS — M54.2 NECK PAIN: Primary | ICD-10-CM

## 2024-08-18 ENCOUNTER — HOSPITAL ENCOUNTER (OUTPATIENT)
Dept: MRI IMAGING | Facility: HOSPITAL | Age: 79
Discharge: HOME | End: 2024-08-18
Payer: MEDICARE

## 2024-08-18 DIAGNOSIS — M54.12 CERVICAL RADICULOPATHY: ICD-10-CM

## 2024-08-18 DIAGNOSIS — M54.2 NECK PAIN: ICD-10-CM

## 2024-08-18 PROCEDURE — 72141 MRI NECK SPINE W/O DYE: CPT

## 2024-08-23 NOTE — PROGRESS NOTES
"Chief Complaint: \"Left shoulder pain. Neck pain. Occipital headaches.\"      History of Present Illness:   Patient: Ms. Akua Torres, 79 y.o. female   Referring Physician:  [facility]  Reason for Referral: Consultation for chronic intractable neck and left shoulder pain.   Pain History: Patient reports a greater than 3-year history of chronic intractable neck and left shoulder pain, which began without incident. Akua Torres is a poor historian. She reports a longstanding history of neck pain and headaches. She is a former patient last seen in 2017 for her lower back issues.  She has been receiving multiple subacromial corticosteroid injections at [facility], more recently at [facility]. Akua Torres underwent orthopedic surgical consultation with Kalpana Bassett PA-C  Orthopedic Surgery on 07/30/2024, and was found not to be a surgical candidate. She recommended an US injection of her left shoulder for osteoarthritis. On 07/30/2024 she underwent glenohumeral joint injection under ultrasound guidance with 40 mg triamcinolone + 4cc of lidocaine.  Patient reports that she only experienced minimal pain relief for a week or so. EMG/NCV 05/10/2023 sensorimotor axonal demyelinating polyneuropathy. MRI of the left humerus W WO contrast on 07/26/2024. moderate degenerative changes of the shoulder with a small glenohumeral joint effusion. Mild acromioclavicular degenerative changes. MRI of the cervical spine wo contrast on 08/18/2024: Multilevel spondylosis. Facet arthropathy and disc osteophyte complexes at all levels contributing to various degrees of multilevel canal and neuroforaminal stenosis. At C5-C6: Severe spinal canal and bilateral LT>RT neural foraminal stenosis. At C6-C7: Disc osteophyte complex, bilateral facet arthropathy. Moderate to severe spinal canal and bilateral neuroforaminal stenosis. Severe muscle atrophy of the posterior paravertebral region. Akua ASHWINI " Melissa has failed to obtain pain relief with conservative measures for more than 3 years including oral analgesics, opioids, topical analgesics, ice, heat, TENs, physical therapy (several rounds, last visit within the past 6 months), physical therapist directed home exercise program HEP (ongoing), therapeutic massage, to name a few. Pain has progressed in intensity over the past years.  Pain Description: Constant posterior neck pain with intermittent exacerbation, described as aching, dull, sharp, throbbing, and burning sensation.   Radiation of Pain: The pain radiates into the LT>RT occipital region causing headaches  Pain intensity today: 8/10   Average pain intensity last week: 8/10  Pain intensity ranges from: 4/10 to 9/10  Aggravating factors: Pain increases with extension, rotation of the cervical spine   Alleviating factors: Pain decreases with rest  Associated Symptoms:   Patient denies numbness or weakness in the upper extremities other than global numbness in her hands  Patient denies any new bladder or bowel problems.   Patient reports difficulties with her balance and reports several falls.   Patient reports daily bilateral cervicogenic and occipital lasting several hours   Pain interferes with ADLs, general activities (ability to walk, stand, transition from different positions), and affects patient's quality of life  Pain does not interfere with sleep  Muscle spasms: No  Stiffness: Neck    Review of previous therapies and additional medical records:  Akua Torres has already failed the following measures, including:   Conservative Measures: Oral analgesics, opioids, topical analgesics, ice, heat, TENs, physical therapy (several rounds, last visit within the past 6 months), physical therapist directed home exercise program HEP (ongoing), therapeutic massage  Interventional Measures: Several left shoulder injections by [facility], more recently at [facility]  Surgical Measures: No  history of previous cervical spine surgery  01/19/2021: Right shoulder arthroscopy w/ rotator cuff repair   Left carpal tunnel release   11/10/2017: lumbar discectomy fusion instrumentation Dr. Man   2004: lumbar surgery Dr. Man   09/09/2019: Right Total hip arthroplasty   06/29/2020: Left Total hip arthroplasty   Akua Torres underwent orthopedic surgical consultation with Kalpana Bassett PA-C [facility] on 07/30/2024, and was found not to be a surgical candidate.  Akua Torres presents with significant comorbidities including history of A-fib), Anxiety and depression, Fibromyalgia, GERD, Gout, HLP, HTN, sleep apnea, obesity, gout, Prediabetes, on Xarelto (rivaroxaban)  In terms of current analgesics, Akua Torres takes: acetaminophen, methocarbamol, pregabalin. Patient also takes allopurinol, escitalopram   I have reviewed Ben Report consistent with medication reconciliation.  SOAPP/ORT:  Low Risk     PHQ-2 Depression Screening  Little interest or pleasure in doing things? 0-->not at all   Feeling down, depressed, or hopeless? 1-->several days   PHQ-2 Total Score 1     Pain Self-Efficacy Questionnaire (PSEQ)  ITEM 08-27  2024        I can enjoy things despite the pain. 1        I can do most of the household chores (tidying up, washing dishes, etc), despite the pain. 1        I can socialize with my friends or family members as often as I used to do, despite the pain. 1        I can cope with my pain in most situations. 1        I can do some form of work, despite the pain (includes housework, paid, and unpaid work). 1        I can still do many of the things I enjoy doing, such as hobbies or leisure activity despite pain. 1        I can cope with my pain without medications. 1        I can accomplish most of my goals in life despite the pain. 0        I can live in a normal lifestyle, despite the pain. 0        I can gradually become more active, despite the pain. 0         TOTAL SCORE 7/60            Global Pain Scale 08-27 2024          Pain 22          Feelings 12          Clinical outcomes 12          Activities 20          GPS Total: 66            NECK PAIN DISABILITY INDEX QUESTIONNAIRE  DATE 08-27 2024           Pain intensity  0: No pain  1: Mild  2: Moderate  3: Fairly severe  4: very severe  5: Worst imaginable 4            Personal Care   0: I can look after myself normally without causing extra pain.  1: I can look after myself normally, but it causes extra pain.  2: It is painful to look after myself and I am slow and careful.  3: I need some help, but manage most of my personal care.  4: I need help every day in most aspects of self-care.  5: I do not get dressed; I wash with difficulty and stay in bed. 4           Lifting  0: I can lift heavy weights without extra pain.  1: I can lift heavy weights, but it gives extra pain.  2: Pain prevents me from lifting heavy weights off the floor but I can manage if they are conveniently positioned, for example, on a table.  3: Pain prevents me from lifting heavy weights, but I can manage light to medium weights if they are conveniently positioned.  4: I can lift very light weights.  5: I cannot lift or carry anything at all. 4           Reading  0: I can read as much as I want to with no pain in my neck.  1: I can read as much as I want to with slight pain in my neck.  2: I can read as much as I want to with moderate pain in my neck.  3: I cannot read as much as I want because of moderate pain in my neck.  4: I cannot read as much as I want because of severe pain in my neck.  5: I cannot read at all. 3           Headaches  0: I have no headaches at all.  1: I have slight headaches which come infrequently.  2: I have moderate headaches which come infrequently.  3: I have moderate headaches which come frequently.  4: I have severe headaches which come frequently.  5: I have headaches almost all the time. 4             Concentration  0: I can concentrate fully when I want to with no difficulty.  1: I can concentrate fully when I want to with slight difficulty.  2: I have a fair degree of difficulty in concentrating when I want to.  3: I have a lot of difficulty in concentrating when I want to.  4: I have a great deal of difficulty in concentrating when I want to.  5: I cannot concentrate at all. 3           Work  0: I can do as much work as I want to.  1: I can only do my usual work, but no more.  2: I can do most of my usual work, but no more.  3: I cannot do my usual work.  4: I can hardly do any work at all.  5: I cannot do any work at all. 4            Driving  0: I can drive my car without any neck pain.  1: I can drive my car as long as I want with slight pain in my neck.  2: I can drive my car as long as I want with moderate pain in my   neck.  3: I cannot drive my car as long as I want because of moderate pain   in my neck.  4: I can hardly drive at all because of severe pain in my neck.  5: I cannot drive my car at all. 2            Sleeping  0: I have no trouble sleeping.  1: My sleep is slightly disturbed (less than 1 hour sleepless).  2: My sleep is mildly disturbed (1-2 hours sleepless).  3: My sleep is moderately disturbed (2-3 hours sleepless).  4: My sleep is greatly disturbed (3-5 hours sleepless).  5: My sleep is completely disturbed (5-7 hours) 2            Recreation  0: I am able to engage in all of my recreational activities with no neck pain at all.  1: I am able to engage in all of my recreational activities with some pain in my neck.  2: I am able to engage in most, but not all of my recreational activities because of pain in my neck.  3: I am able to engage in a few of my recreational activities because of pain in my neck.  4: I can hardly do any recreational activities because of pain in my neck.  5: I cannot do any recreational activities at all. 5            TOTAL SCORE 35/50               Shoulder  Pain and Disability Index (SPADI)   PAIN SCALE:   How severe is your pain?   Pain scale 0/10 to 10/10 08-27 2024      What number describes your pain at its worst?  7      When lying on the involved side? 8      Reaching for something on a high shelf?  7      Touching the back of your neck? 9      Pushing with the involved arm? 0      Total Pain Score (MAX 50) 31             DISABILITY SCALE:   How much difficulty do you have?  Difficulty scale 0/10 to 10/10        Washing your hair? 9      Washing your back? 9      Putting on an undershirt or jumper?  8      Putting on a shirt that buttons down the front?  8      Putting on your pants?  10      Placing an object on a high shelf?  8      Carrying a heavy object of 10 pounds (4.5 KG) 2      Removing something from your back pocket?  0      Total Disability Score (MAX 80) 54             TOTAL SPADI SCORE () 85        Review of Diagnostic Studies:  I have independently reviewed and interpreted the images with the patient and used the images and a tridimensional spine model to explain findings. I have also reviewed the reports.  EMG/NCV 05/10/2023 sensorimotor axonal demyelinating polyneuropathy (polyrad?). Superimposed processes including focal neuropathies cannot be ruled out  MRI of the left humerus W WO contrast on 07/26/2024. Moderate degenerative changes of the shoulder. Small glenohumeral joint effusion. Mild acromioclavicular degenerative changes. Few glenohumeral loose bodies seen measuring up to 6 mm. Along the anterior aspect of the mid humerus there is an area of mineralization measuring approximately 0.8 x 0.5 x 0.8 cm. This mineralization is at the humeral attachment of the pectoralis major. No abnormal enhancing lesions.   MRI of the cervical spine wo contrast on 08/18/2024: Alignment is preserved. Straightening. The cervical spinal cord demonstrates no focal areas of intramedullary signal abnormality. Multilevel spondylosis. Axial  imaging:  C2-C3: Bilateral facet arthropathy. Moderate to severe left and mild right neuroforaminal stenosis.  C3-C4: Disc osteophyte complex, bilateral facet arthropathy. Moderate to severe spinal canal and bilateral neuroforaminal stenosis.  C4-C5: Disc osteophyte complex, bilateral facet arthropathy. Moderate spinal canal stenosis and severe bilateral neuroforaminal stenosis.   C5-C6: Disc osteophyte complex, bilateral facet arthropathy. Severe spinal canal and bilateral LT>RT neural foraminal stenosis.  C6-C7: Disc osteophyte complex, bilateral facet arthropathy. Moderate to severe spinal canal and bilateral neuroforaminal stenosis      Review of Systems      Patient Active Problem List   Diagnosis    Degenerative disc disease, lumbar    Lumbar stenosis with neurogenic claudication    History of lumbar fusion    Spondylosis of lumbar region without myelopathy or radiculopathy    Mild obesity    Physical deconditioning    Idiopathic gout    Anxiety and depression    Greater trochanteric bursitis of both hips    Status post total bilateral knee replacement    Back pain    Essential hypertension    Prediabetes    S/P lumbar spinal fusion    Renal insufficiency    Leukocytosis, likely reactive    LOTTIE (acute kidney injury)    Depression    Arthritis of right hip    Paroxysmal atrial fibrillation    YUNIOR treated with BiPAP    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    Acute blood loss anemia, mild, asymptomatic    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    Supraventricular tachycardia    Left prefrontal gyrus stroke    Arthritis of left hip    Hip pain    Status post total replacement of left hip    Hyperlipidemia    Elevated hemoglobin A1c    History of stroke    Acute postoperative pain    Nontraumatic complete tear of right rotator cuff    Traumatic rhabdomyolysis, initial encounter    Hematoma of right thigh    Pain of right lower extremity    Anemia    Fibromyalgia    COVID-19  virus detected    Weakness    COVID-19    Subtherapeutic international normalized ratio (INR)    Thrombocytopenia due to COVID-19 virus    Intractable low back pain    Impingement of left shoulder    Cervical spondylosis without myelopathy    DDD (degenerative disc disease), cervical    Occipital neuralgia    Connective tissue stenosis of neural canal of cervical region    Osteoarthritis of left shoulder    Lumbar postlaminectomy syndrome    Gait disturbance    Sarcopenia       Past Medical History:   Diagnosis Date    A-fib 2019    Ankylosing spondylitis of site in spine ?    Anxiety and depression     Arthritis     Cataract     Cervical disc disorder 2010    Chronic pain disorder     since approx. 2000    Depression     Extremity pain     Fibromyalgia     Fibromyalgia, primary approx.2020 per trena saavedra    GERD (gastroesophageal reflux disease)     Gout     H/O bladder infections     JUST FINISHED MACROBID ON 11-2-17 FOR RECENT UTI    Headache, tension-type 2010    Hypercholesteremia     Hypertension     Joint pain     Kidney disease, chronic, stage III (GFR 30-59 ml/min)     resolved after stopping antiinflammatory ~2015    Low back pain     Lumbosacral disc disease 2000    Neck pain     Osteoarthritis     Peripheral neuropathy approx. 2023    dr albrecht ordered an emg test    Pneumonia 02/2020    Rheumatic fever     Skin cancer     Sleep apnea     no cpap    Spinal stenosis 06/27/2023    Stroke 09/2019    right sided weakness    Wears glasses          Past Surgical History:   Procedure Laterality Date    BACK SURGERY  2004    LUMBAR PER DR THORNTON x2    CARDIAC CATHETERIZATION  2019    CARPAL TUNNEL RELEASE Left     COLONOSCOPY  11/2020    EPIDURAL BLOCK  1974    childbirth    EYE SURGERY      retina surgery    FINGER SURGERY Right     3RD FINGER    HEEL SPUR EXCISION Bilateral     INTERVENTIONAL RADIOLOGY PROCEDURE N/A 09/17/2019    Procedure: Carotid and Cerebral Angiogram/ Possible Stent;  Surgeon: Charlie  Imer WILEY MD;  Location:  [facility];  Service: Interventional Radiology    JOINT REPLACEMENT      both knees and both hips    KNEE ARTHROSCOPY Bilateral     LUMBAR DISCECTOMY FUSION INSTRUMENTATION N/A 11/10/2017    Procedure: LUMBAR SPINAL FUSION ;  Surgeon: Gopi Man MD;  Location:  [facility];  Service:     NECK SURGERY  2019    clogged  artery and put stent    ORTHOPEDIC SURGERY  ?    had total knee, total hip, 2 back    REPLACEMENT TOTAL KNEE BILATERAL Bilateral     SHOULDER ARTHROSCOPY W/ ROTATOR CUFF REPAIR Right 01/19/2021    Procedure: ARTHROSCOPIC ROTATOR CUFF REPAIR WITH BICEPS TENODESIS RIGHT;  Surgeon: Lance Morales Jr., MD;  Location:  [facility];  Service: Orthopedics;  Laterality: Right;    SKIN BIOPSY      SPINAL FUSION      SPINE SURGERY  2004 & 2008    TOTAL HIP ARTHROPLASTY Right 09/09/2019    Procedure: TOTAL ANTERIOR RIGHT HIP ARTHROPLASTY;  Surgeon: Darrin New MD;  Location:  [facility];  Service: Orthopedics    TOTAL HIP ARTHROPLASTY Left 06/29/2020    Procedure: TOTAL HIP ARTHROPLASTY ANTERIOR LEFT;  Surgeon: Darrin New MD;  Location:  [facility];  Service: Orthopedics;  Laterality: Left;    TRIGGER POINT INJECTION  ?    had knee, back, several in shoulder         Family History   Problem Relation Age of Onset    Cancer Mother     Colon polyps Mother     Hypertension Mother     Cancer Father     Hypertension Brother     Aneurysm Brother     Bone cancer Maternal Grandmother     Cancer Maternal Grandmother     Arthritis Maternal Grandfather     Hyperlipidemia Maternal Uncle     Kidney disease Maternal Uncle     Breast cancer Neg Hx     Ovarian cancer Neg Hx          Social History     Socioeconomic History    Marital status:    Tobacco Use    Smoking status: Never     Passive exposure: Never    Smokeless tobacco: Never   Vaping Use    Vaping status: Never Used   Substance and Sexual Activity    Alcohol use: No    Drug use: Never    Sexual activity: Not  "Currently     Partners: Male     Birth control/protection: Abstinence           Current Outpatient Medications:     acetaminophen (TYLENOL) 325 MG tablet, Take 1 tablet by mouth Every 6 (Six) Hours As Needed for Mild Pain., Disp: , Rfl:     allopurinol (ZYLOPRIM) 100 MG tablet, Take 1 tablet by mouth Daily. For gout  Indications: Gout, Disp: 30 tablet, Rfl: 11    atenolol (TENORMIN) 25 MG tablet, TAKE 1 TABLET(25 MG) BY MOUTH TWICE DAILY, Disp: 60 tablet, Rfl: 0    atorvastatin (LIPITOR) 80 MG tablet, TAKE 1 TABLET BY MOUTH EVERY NIGHT, Disp: 30 tablet, Rfl: 0    Cyanocobalamin (Vitamin B-12) 1000 MCG sublingual tablet, Place 1 tablet under the tongue Daily., Disp: , Rfl:     escitalopram (Lexapro) 10 MG tablet, Take 1 tablet by mouth Daily. For depression, Disp: 30 tablet, Rfl: 11    lisinopril (PRINIVIL,ZESTRIL) 20 MG tablet, TAKE 1 TABLET(20 MG) BY MOUTH DAILY FOR BLOOD PRESSURE, Disp: 30 tablet, Rfl: 0    methocarbamol (ROBAXIN) 750 MG tablet, Take 1 tablet by mouth 3 (Three) Times a Day. Indications: Musculoskeletal Pain, Disp: 90 tablet, Rfl: 11    pregabalin (LYRICA) 25 MG capsule, 1-3 tabs tid for pain, Disp: 270 capsule, Rfl: 5    propafenone SR (RYTHMOL SR) 225 MG 12 hr capsule, Take 1 capsule by mouth Every 12 (Twelve) Hours., Disp: , Rfl:     rivaroxaban (XARELTO) 20 MG tablet, Take 1 tablet by mouth Every Night. Indications: Atrial Fibrillation, Disp: , Rfl:       Allergies   Allergen Reactions    Nsaids Other (See Comments)     KIDNEY DAMAGE    Quinidine Nausea And Vomiting and Other (See Comments)     FEVER      Bactrim [Sulfamethoxazole-Trimethoprim] Rash    Nitrofuran Derivatives Diarrhea    Penicillins Rash         Ht 160 cm (63\")   Wt 87.5 kg (193 lb)   BMI 34.19 kg/m²       Physical Exam:  Constitutional: Patient appears well-developed, well-nourished, well-hydrated  HEENT: Head: Normocephalic and atraumatic  Eyes: Conjunctivae and lids are normal  Pupils: Equal, round, reactive to " light  Neck: Trachea normal. Neck supple. No JVD present.   Lymphatic: No cervical adenopathy  Musculoskeletal   Gait and station: Gait evaluation demonstrated shuffling and stooping. Unable to walk on heels, toes, tandem walking   Cervical Spine: Passive and active range of motion are limited secondary to pain. Extension, lateral flexion, rotation of the cervical spine increased and reproduced pain. Cervical facet joint loading maneuvers are positive.  Muscles: Presence of active trigger points at the levator scapulae, trapezius, rhomboids   Right Shoulder: The range of motion of the right glenohumeral joint is almost full and without pain. Rotator cuff strength is 5/5.   Left Shoulder: The range of motion of the left glenohumeral joint is limited with pain at the end of the ROM. Rotator cuff strength is 5/5. Subacromial Impingement   Neurological:   Patient is alert and oriented to person, place, and time.   Speech: Normal.   Cortical function: Normal mental status.   Reflex Scores:  Right brachioradialis: 2+  Left brachioradialis: 2+  Right biceps: 2+  Left biceps: 2+  Right triceps: 2+  Left triceps: 2+  Right patellar: 0+  Left patellar: 0+  Right Achilles: 0+  Left Achilles: 0+  Motor strength: 5/5  Motor Tone: Normal  Involuntary movements: None.   Superficial/Primitive Reflexes: Primitive reflexes were absent.   Right Villagran: Absent  Left Villagran: Absent  Right ankle clonus: Absent  Left ankle clonus: Absent   Babinsky: Absent  Spurling sign: Negative. Neck tornado test: Negative. Lhermitte sign: Negative. Long tract signs: Negative.   Sensory exam: Intact to light touch, intact pain and temperature sensation, intact vibration sensation and normal proprioception  Coordination: Finger to nose: Normal. Romberg's sign: Negative   Skin and subcutaneous tissue: Skin is warm and intact. No rash noted. No cyanosis.   Psychiatric: Judgment and insight: Normal. Recent and remote memory: Intact. Mood and affect:  Normal.       ASSESSMENT:   1. Cervical spondylosis without myelopathy    2. DDD (degenerative disc disease), cervical    3. Occipital neuralgia, unspecified laterality    4. 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 Sarcopenia    6. Osteoarthritis of left shoulder, unspecified osteoarthritis type    7. Impingement of left shoulder    8. Lumbar postlaminectomy syndrome    9. History of lumbar fusion    10. Fibromyalgia    11. YUNIOR treated with BiPAP    12. Anxiety and depression    13. Gait disturbance    14. Physical deconditioning        PLAN/MEDICAL DECISION MAKING:  Ms. Akua Torres, 79 y.o. female presents with a greater than 3-year history of chronic intractable posterior neck pain and left shoulder pain, which began without incident. Akua Torres reports a longstanding history of posterior neck pain and occipital headaches. She has been receiving multiple subacromial corticosteroid injections at [facility], more recently at [facility]. Akua Torres underwent orthopedic surgical consultation with Kalpana Bassett PA-C [facility] on 07/30/2024, and was found not to be a surgical candidate. On 07/30/2024 she underwent glenohumeral joint injection under ultrasound guidance with 40 mg triamcinolone + 4cc of lidocaine.  Patient reports that she experienced minimal pain relief that lasted for a week or so. EMG/NCV 05/10/2023 sensorimotor axonal demyelinating polyneuropathy. MRI of the left humerus W WO contrast on 07/26/2024. moderate degenerative changes of the shoulder with a small glenohumeral joint effusion. Mild acromioclavicular degenerative changes. MRI of the cervical spine wo contrast on 08/18/2024: Multilevel spondylosis. Facet arthropathy and disc osteophyte complexes at all levels contributing to various degrees of multilevel canal and neuroforaminal stenosis. At C5-C6: Severe spinal canal and bilateral LT>RT neural foraminal stenosis. At C6-C7: Disc  osteophyte complex, bilateral facet arthropathy. Moderate to severe spinal canal and bilateral neuroforaminal stenosis. Severe muscle atrophy of the posterior paravertebral region. Akua Torres has failed to obtain pain relief with conservative measures for more than 3 years including oral analgesics, opioids, topical analgesics, ice, heat, TENs, physical therapy (several rounds, last visit within the past 6 months), physical therapist directed home exercise program HEP (ongoing), therapeutic massage, to name a few. Pain has progressed in intensity over the past years. A comprehensive evaluation including history and physical exam along with pertinent physiologic and functional assessment was performed. Patient presents with intractable pain due to the diagnoses listed above.  Her pain appears to be multifactorial and involving different sources and mechanisms.  Patient has failed to respond to conservative modalities and  previous minimally invasive interventional pain management measures, as referenced under HPI. I have documented the impact of patient's moderate-to-severe pain contributing to significant impairment in daily activities, ADLs, and a negative impact on the patient's quality of life, as reflected on Global Pain Scale 66/100;  Neck pain disability index questionnaire 35/50; Shoulder Pain and Disability Index (SPADI) 85/130; Tinetti Gait & Balance Assessment Tool (severe risk for falls). I have reviewed pertinent supporting diagnostic studies of patient's chronic pain condition as well as all available pertinent medical records to patient's chronic pain condition including previous therapies, as referenced above. PHQ-2 Depression Screening 1; Pain Self-Efficacy Questionnaire (PSEQ) 7/60. I had a lengthy conversation with Ms. Akua Torres regarding her chronic pain condition and potential therapeutic options including risks, benefits, alternative therapies, to name a few. We have discussed  using a stepwise approach starting with the least intense level of care as determined by the extent required to diagnose and or treat a patient's condition. The proposed treatments are consistent with the patient's medical condition and known to be safe and effective by current guidelines and the standard of care. The duration and frequency proposed are considered appropriate for the service in accordance with accepted standards of medical practice for the diagnosis and treatment of the patient's condition and intended to improve the patient's level of function. These services will be furnished in a setting appropriate to the patient's medical needs and condition. Therefore, I have proposed the following plan:    1. Interventional pain management measures: Patient will need to stop rivaroxaban (Xarelto) 15-20 mg po qday at least 72 hours between last dose and procedure prior to intraneuraxial procedures, PNS, etc.  A. 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 Pain has been present for >3 years. Akua Torres average pain level for the past months has been rated as 8/10 ranging from 6/10 to 9/10. Patient has failed to obtain pain relief or functional improvement from conservative measures, as referenced under HPI. Pain assessment has been performed and documented at baseline, and will be reassessed after each diagnostic procedure using the same pain scale for each assessment. A disability scale has also been obtained at baseline and will be used for functional assessment.  Diagnostic interventions will be performed without sedation or with the use of light sedation (but without the use of opioids) depending on patient's specific medical requirements.  For radiofrequency procedures; we may proceed without sedation or with various degrees of sedation according to patient's specific requirements. All bilateral procedures will be done in one encounter. The patient will be scheduled for a first set of diagnostic bilateral cervical medial branch blocks at C4, C5, C6; for bilateral cervical facet joints at C4-C5, and C5-C6 to clarify the origin of chronic refractory mechanical/axial cervicalgia. If the patient experiences 80% or more pain relief along with significant functional improvement and increase in the range of motion of the cervical spine, then, the patient will be scheduled for a second set of diagnostic bilateral cervical medial branch blocks, to then, proceed with either Sprint PNS Extensa dual-lead system to the bilateral cervical medial branches of C4 as patient also presents with significant sarcopenia of the cervical paravertebral muscles versus bilateral cervical medial branch rhizotomies. If the patient experiences 50% or more pain relief and functional improvement for at least six months, we could repeat the procedure. If more than 2 years elapse since last RFTC, then, the patient will be required to undergo diagnostic blocks prior to repeating RFTC.   Otherwise, we may consider cervical epidural steroid injection by interlaminar approach using the lowest effective dose of steroids, under C-arm fluoroscopic guidance, with the use of contrast dye to confirm appropriate needle placement and spread of contrast dye. Per current cervical MRI, she may require a multilevel ACDF. Other options; if the patient would consider cervical surgery, then, she may need a cervical myelogram followed by CT post myelogram and fresh electrodiagnostic studies of the upper extremities for surgical delineation.   B.  Treatment of chronic left shoulder osteoarthritis/pain: We have discussed prospects of a first set of diagnostic nerve blocks of the terminal sensory articular  branches of the left suprascapular nerve, the left axillary nerve,  and the left lateral pectoral nerve. If the patient experiences more than 50% pain relief and improvement the range of motion of the shoulder joint, then, the patient will be scheduled for a second set of diagnostic nerve blocks of the terminal sensory articular branches of the left suprascapular nerve, the left axillary nerve, and the left lateral pectoral nerve. If the patient experiences more than 50% pain relief and improvement the range of motion of the shoulder joint, then, the patient will be scheduled for bipolar radiofrequency ablation of the terminal sensory articular branches of the left suprascapular nerve, the left axillary nerve, and left lateral pectoral nerve.   C. Other options for treatment of her occipital headaches and left shoulder pain: diagnostic and therapeutic left greater occipital nerve blocks by hydrodissection technique under ultrasound and C arm fluoroscopic guidance; diagnostic and therapeutic left dorsal scapularis nerve blocks by hydrodissection technique under ultrasound guidance, C arm fluoroscopic guidance, and PNS guidance; diagnostic and therapeutic left suprascapular nerve blocks by hydrodissection technique under ultrasound guidance, C arm fluoroscopic guidance, and PNS guidance followed by PNS with Nalu (peripheral nerve Vs brachial plexus)    2. Diagnostic studies: Cervical Spine X-rays, full views including flexion and extension to assess cervical stability    3. Pharmacological measures: Reviewed and discussed;   A. Patient takes acetaminophen, methocarbamol, pregabalin. Patient also takes allopurinol, escitalopram.   B. Trial with prilocaine 2%, lidocaine 10%, imipramine 3%, capsaicin 0.001%, and mannitol 20%  cream, apply 1 to 2 grams of cream to the affected areas every 4 to 6 hours as needed    4. Long-term rehabilitation efforts:  A. The patient has a history of falls. I performed a risk assessment  for falls using the Tinetti gait & balance assessment tool (scored high risk for falls). Fall precautions: Patient has been instructed regarding universal fall precautions, such as;   Using gait aids a cane and/or a walker at the appropriate height at all times for ambulation   Removing all area rugs and coffee tables to create a safe environment at home  Ensure clean, dry floors  Wearing supportive footwear and properly fitting clothing  Ensure bed/chair is appropriate height and patient's feet can touch the floor  Using a shower transfer bench  Using walk-in shower and having shower safety bars installed  Ensure proper lighting, minimize glare  Have nightlights operational and in use  Participation in an exercise program for gait training, balance training and strength  Avoid carrying laundry up and down steps  Ensure proper compliance and organization of medications to avoid errors   Avoid use of over the counter sedatives and alcohol consumption  Ensure easy access to call bell, glasses, TV control, telephone  Ensure glasses/hearing aids are in use or close by (on top of night table)  B. Patient will start a comprehensive physical therapy program at [facility], gait and balance training, neurodynamics, upper body strengthening, posture correction, core strengthening, ultrasound, ASTYM, E-STIM, myofascial release, cupping, dry needling, home exercise program, 2-3 x per week for 8 weeks   C. Patient will start an occupational therapy program to work on fine motor skills 2-3 x per week for 4-8 weeks  D. Contrast therapy: Apply ice-packs for 15-20 minutes, followed by heating pads for 15-20 minutes to affected area   E. Start a low impact exercise program such as water therapy, chair yoga  F. Referral to Dr. Jw Skaggs for psychological clearance for peripheral nerve stimulation, spinal cord stimulation, intrathecal therapies, cognitive behavioral therapy, biofeedback  G. Referral to  [facility]. Patient's Body mass index is 34.19 kg/m². Patient counseled on the importance of weight loss to help with overall health and pain control.   ROSA Torres  reports that she has never smoked. She has never been exposed to tobacco smoke. She has never used smokeless tobacco.     5. The patient and her family have been instructed to contact my office with any questions or difficulties. The patient understands the plan and agrees to proceed accordingly.    The patient has a documented plan of care to address chronic pain. Akua Torres reports a pain score of 8/10.  Given her pain assessment as noted, treatment options were discussed and the following options were decided upon as a follow-up plan to address the patient's pain: continuation of current treatment plan for pain, educational materials on pain management, home exercises and therapy, patient not interested in pharmacological measures, referral to Physical Therapy, referral to specialist for assistance in pain treatment guidance, steroid injections, use of non-medical modalities (ice, heat, stretching and/or behavior modifications), and interventional pain management measures .               Pain Management Panel  More data exists         Latest Ref Rng & Units 9/15/2018 9/10/2018   Pain Management Panel   Creatinine, Urine mg/dL - 88.5    Amphetamine, Urine Qual Negative Negative  Negative    Barbiturates Screen, Urine Negative Negative  Negative    Benzodiazepine Screen, Urine Negative Negative  Positive    Buprenorphine, Screen, Urine Negative Negative  Negative    Cocaine Screen, Urine Negative Negative  Negative    Methadone Screen , Urine Negative Negative  Negative    Methamphetamine, Ur Negative Negative  Negative       Details                    APRIL query complete. APRIL reviewed by Cricket Nettles MD.     Pain Medications               acetaminophen (TYLENOL) 325 MG tablet Take 1  tablet by mouth Every 6 (Six) Hours As Needed for Mild Pain.    escitalopram (Lexapro) 10 MG tablet Take 1 tablet by mouth Daily. For depression    methocarbamol (ROBAXIN) 750 MG tablet Take 1 tablet by mouth 3 (Three) Times a Day. Indications: Musculoskeletal Pain    pregabalin (LYRICA) 25 MG capsule 1-3 tabs tid for pain             No orders of the defined types were placed in this encounter.       Time spent face-to-face with the patient:  48 minutes  Time spent reviewing diagnostic studies, consultation reports, previous treatments, pre-charting, ordering diagnostic studies, referrals, and writing this note: 21 minutes  Total Time Spent: 69 minutes    Please note that portions of this note were completed with a voice recognition program.   Any copied data in any portion of my note has been reviewed by myself and accurate.     The 21st Century Cures Act makes medical notes like this available to patients in the interest of transparency. This is a medical document intended as peer to peer communication. It is written in medical language and may contain abbreviations or verbiage that are unfamiliar. It may appear blunt or direct. Medical documents are intended to carry relevant information, facts as evident, and the clinical opinion of the practitioner.     Cricket Nettles MD    Patient Care Team:  Audra Martin APRN as PCP - General (Nurse Practitioner)  Leon Hein MD (Inactive) as Consulting Physician (Neurosurgery)  Perkins, Corinne E, PT as Physical Therapist (Physical Therapy)  Cricket Nettles MD as Consulting Physician (Pain Medicine)  Gerber Cain MD as Consulting Physician (Rheumatology)     No orders of the defined types were placed in this encounter.        Future Appointments   Date Time Provider Department Center   9/12/2024 11:00 AM Princess Candelaria APRN [facility]   11/11/2024 11:00 AM Gerber Cain MD [facility]

## 2024-08-27 ENCOUNTER — OFFICE VISIT (OUTPATIENT)
Dept: PAIN MEDICINE | Facility: CLINIC | Age: 79
End: 2024-08-27
Payer: MEDICARE

## 2024-08-27 VITALS — HEIGHT: 63 IN | BODY MASS INDEX: 34.2 KG/M2 | WEIGHT: 193 LBS

## 2024-08-27 DIAGNOSIS — M50.30 DDD (DEGENERATIVE DISC DISEASE), CERVICAL: ICD-10-CM

## 2024-08-27 DIAGNOSIS — G47.33 OSA TREATED WITH BIPAP: ICD-10-CM

## 2024-08-27 DIAGNOSIS — M25.812 IMPINGEMENT OF LEFT SHOULDER: ICD-10-CM

## 2024-08-27 DIAGNOSIS — F32.A ANXIETY AND DEPRESSION: ICD-10-CM

## 2024-08-27 DIAGNOSIS — M47.812 CERVICAL SPONDYLOSIS WITHOUT MYELOPATHY: Primary | ICD-10-CM

## 2024-08-27 DIAGNOSIS — R26.9 GAIT DISTURBANCE: ICD-10-CM

## 2024-08-27 DIAGNOSIS — R53.81 PHYSICAL DECONDITIONING: ICD-10-CM

## 2024-08-27 DIAGNOSIS — F41.9 ANXIETY AND DEPRESSION: ICD-10-CM

## 2024-08-27 DIAGNOSIS — Z98.1 HISTORY OF LUMBAR FUSION: ICD-10-CM

## 2024-08-27 DIAGNOSIS — M99.41 CONNECTIVE TISSUE STENOSIS OF NEURAL CANAL OF CERVICAL REGION: ICD-10-CM

## 2024-08-27 DIAGNOSIS — M47.812 CERVICAL SPONDYLOSIS WITHOUT MYELOPATHY: ICD-10-CM

## 2024-08-27 DIAGNOSIS — M79.7 FIBROMYALGIA: ICD-10-CM

## 2024-08-27 DIAGNOSIS — M62.84 SARCOPENIA: ICD-10-CM

## 2024-08-27 DIAGNOSIS — M19.012 OSTEOARTHRITIS OF LEFT SHOULDER, UNSPECIFIED OSTEOARTHRITIS TYPE: ICD-10-CM

## 2024-08-27 DIAGNOSIS — M96.1 LUMBAR POSTLAMINECTOMY SYNDROME: ICD-10-CM

## 2024-08-27 DIAGNOSIS — M54.81 OCCIPITAL NEURALGIA, UNSPECIFIED LATERALITY: ICD-10-CM

## 2024-08-27 PROCEDURE — 1125F AMNT PAIN NOTED PAIN PRSNT: CPT | Performed by: ANESTHESIOLOGY

## 2024-08-27 PROCEDURE — 1160F RVW MEDS BY RX/DR IN RCRD: CPT | Performed by: ANESTHESIOLOGY

## 2024-08-27 PROCEDURE — 1159F MED LIST DOCD IN RCRD: CPT | Performed by: ANESTHESIOLOGY

## 2024-08-27 PROCEDURE — 99205 OFFICE O/P NEW HI 60 MIN: CPT | Performed by: ANESTHESIOLOGY

## 2024-08-29 ENCOUNTER — PATIENT ROUNDING (BHMG ONLY) (OUTPATIENT)
Dept: PAIN MEDICINE | Facility: CLINIC | Age: 79
End: 2024-08-29
Payer: MEDICARE

## 2024-08-29 NOTE — PROGRESS NOTES
A MY-CHART MESSAGE HAS BEEN SENT TO THE PATIENT FOR PATIENT ROUNDING WITH [facility].

## 2024-09-11 ENCOUNTER — DOCUMENTATION (OUTPATIENT)
Dept: PAIN MEDICINE | Facility: CLINIC | Age: 79
End: 2024-09-11

## 2024-09-11 ENCOUNTER — OUTSIDE FACILITY SERVICE (OUTPATIENT)
Dept: PAIN MEDICINE | Facility: CLINIC | Age: 79
End: 2024-09-11
Payer: MEDICARE

## 2024-09-11 PROCEDURE — 64491 INJ PARAVERT F JNT C/T 2 LEV: CPT | Performed by: ANESTHESIOLOGY

## 2024-09-11 PROCEDURE — 64490 INJ PARAVERT F JNT C/T 1 LEV: CPT | Performed by: ANESTHESIOLOGY

## 2024-09-11 NOTE — PROGRESS NOTES
[facility]       PROCEDURE DATE: 09/11/2024    PREOPERATIVE DIAGNOSES:  1. Cervical spondylosis without myelopathy   2. DDD (degenerative disc disease), cervical   3. Occipital neuralgia, unspecified laterality   4. 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 Sarcopenia   6. Osteoarthritis of left shoulder, unspecified osteoarthritis type   7. Impingement of left shoulder   8. Lumbar postlaminectomy syndrome   9. History of lumbar fusion   10. Fibromyalgia   11. YUNIOR treated with BiPAP   12. Anxiety and depression   13. Gait disturbance   14. Physical deconditioning     POSTOPERATIVE DIAGNOSES:  1. Cervical spondylosis without myelopathy   2. DDD (degenerative disc disease), cervical   3. Occipital neuralgia, unspecified laterality   4. 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 Sarcopenia   6. Osteoarthritis of left shoulder, unspecified osteoarthritis type   7. Impingement of left shoulder   8. Lumbar postlaminectomy syndrome   9. History of lumbar fusion   10. Fibromyalgia   11. YUNIOR treated with BiPAP   12. Anxiety and depression   13. Gait disturbance   14. Physical deconditioning     PROCEDURE: First set of diagnostic bilateral cervical medial branch blocks at   C4, C5, C6; for bilateral cervical facet joints at C4-C5 and C5-C6.    ANESTHESIA: Local anesthesia plus IV sedation    COMPLICATIONS: None    EBL: 0    MEDICAL NECESSITY: A comprehensive evaluation, including history and physical exam and pertinent physiologic and functional assessment, was performed. The patient presents with intractable pain due to the diagnoses listed above. The patient has failed to respond to conservative modalities including the impact of patient's moderate-to-severe pain contributing to significant impairment in daily activities, ADLs, and a negative impact on quality of life, as referenced under HPI. Supporting diagnostic studies of patient's chronic pain condition have been reviewed. We  have discussed using a stepwise approach starting with the shortest or least intense level of treatment, care, or service as determined by the extent required to diagnose and or treat a patient's condition. The treatments proposed are consistent with the patient's medical condition and are known to be as safe and effective by current guidelines and standard of care. See OV note.    PROCEDURE SUMMARY: After explaining all the risks and benefits of the procedure, an informed consent was obtained. The patient's surgical site was confirmed with the patient and marked in the holding room accordingly. The patient was transferred to the procedure room and placed on the operating room table in prone position. Time out was completed. Noninvasive monitors were applied. Administration of IV sedation was performed by a designated procedure room nurse, who monitored the patient's level of consciousness and physiological status. Pertinent information was reported on a sedation flow sheet, which is part of the patient's permanent medical records. Start sedation time: 12:47 PM. The patient's vital signs remained within normal limits.  The cervical spine area was prepped and draped in sterile fashion. Using C-arm fluoroscopic guidance, the waist of the articular pillars at C4, C5, C6, on both sides, (targets) were identified. The skin and tissues overlying the targets were anesthetized with 5 ml of 0.25% bupivacaine. Then, 22-gauge styletted needles were advanced to contact the targets at the above-mentioned levels without any difficulties. AP and contralateral oblique x-ray views were obtained confirming appropriate needle placement. Aspiration was negative for blood and/or CSF. Then, 0.5 ml of 1% lidocaine in 1% dextrose was injected per cervical level. Fluoroscopy was utilized using low-dose of radiation applying collimation, pulsed mode, and shielding following ALARA recommendations. Fluoroscopy time: 22 seconds. End sedation  time: 12:51 PM. The patient tolerated the procedure well and without incident.  Upon completion of the procedure, the patient was transferred to the recovery room in stable condition. The patient was discharged from the [facility] neurologically intact and with appropriate discharge instructions. The patient reported 100% pain relief. The range of motion of the cervical spine improved significantly. Cervical facet joint loading maneuvers became negative. Pain level before procedure: 9/10. Pain level after procedure: 0/10.     PLAN:   The patient will be scheduled for a second set of diagnostic bilateral cervical medial branch blocks at C4, C5, C6; for bilateral cervical facet joints at C4-C5, and C5-C6 to confirm the origin of her chronic refractory mechanical/axial cervicalgia. If the patient experiences 80% or more pain relief along with significant functional improvement and increase in the range of motion of the cervical spine, then, the patient will be scheduled for either Sprint PNS Extensa dual-lead system to the bilateral cervical medial branches of C4 as patient also presents with significant sarcopenia of the cervical paravertebral muscles versus bilateral cervical medial branch rhizotomies.   The patient will need to stop rivaroxaban (Xarelto) 15-20 mg po qday at least 72 hours between last dose and procedure prior to intraneuraxial procedures, PNS, etc. Otherwise, we may consider cervical epidural steroid injection by interlaminar approach using the lowest effective dose of steroids, under C-arm fluoroscopic guidance, with the use of contrast dye to confirm appropriate needle placement and spread of contrast dye. Per current cervical MRI, she may require a multilevel ACDF. Other options: if the patient would consider cervical surgery, then, she may need a cervical myelogram followed by CT post myelogram and fresh electrodiagnostic studies of the upper extremities for surgical delineation.  Treatment  of chronic left shoulder osteoarthritis/pain: We have discussed prospects of a first set of diagnostic nerve blocks of the terminal sensory articular branches of the left suprascapular nerve, the left axillary nerve, and the left lateral pectoral nerve. If the patient experiences more than 50% pain relief and improvement the range of motion of the shoulder joint, then, the patient will be scheduled for a second set of diagnostic nerve blocks of the terminal sensory articular branches of the left suprascapular nerve, the left axillary nerve, and the left lateral pectoral nerve. If the patient experiences more than 50% pain relief and improvement the range of motion of the shoulder joint, then, the patient will be scheduled for bipolar radiofrequency ablation of the terminal sensory articular branches of the left suprascapular nerve, the left axillary nerve, and left lateral pectoral nerve.   Other options for treatment of her occipital headaches and left shoulder pain: diagnostic and therapeutic left greater occipital nerve blocks by hydrodissection technique under ultrasound and C arm fluoroscopic guidance; diagnostic and therapeutic left dorsal scapularis nerve blocks by hydrodissection technique under ultrasound guidance, C arm fluoroscopic guidance, and PNS guidance; diagnostic and therapeutic left suprascapular nerve blocks by hydrodissection technique under ultrasound guidance, C arm fluoroscopic guidance, and PNS guidance followed by PNS with Nalu (peripheral nerve Vs brachial plexus)  Diagnostic studies: See OV note.  Pharmacological measures: See OV note.  Long-term rehabilitation efforts: See OV note.  The patient and her family have been instructed to contact my office with any questions or difficulties. The patient understands the plan and agrees to proceed accordingly.      Signatures  Electronically signed by: Cricket Nettles M.D.; SEPTEMBER 11, 2024, 13:22 EST (Author)

## 2024-09-12 ENCOUNTER — OFFICE VISIT (OUTPATIENT)
Dept: NEUROLOGY | Facility: CLINIC | Age: 79
End: 2024-09-12
Payer: MEDICARE

## 2024-09-12 ENCOUNTER — LAB (OUTPATIENT)
Dept: LAB | Facility: HOSPITAL | Age: 79
End: 2024-09-12
Payer: MEDICARE

## 2024-09-12 VITALS
BODY MASS INDEX: 34.2 KG/M2 | HEIGHT: 63 IN | WEIGHT: 193 LBS | OXYGEN SATURATION: 93 % | HEART RATE: 68 BPM | DIASTOLIC BLOOD PRESSURE: 68 MMHG | SYSTOLIC BLOOD PRESSURE: 140 MMHG

## 2024-09-12 DIAGNOSIS — R77.8 ABNORMAL SERUM PROTEIN ELECTROPHORESIS: ICD-10-CM

## 2024-09-12 DIAGNOSIS — G62.9 SENSORIMOTOR NEUROPATHY: Primary | ICD-10-CM

## 2024-09-12 DIAGNOSIS — G62.9 SENSORIMOTOR NEUROPATHY: ICD-10-CM

## 2024-09-12 PROCEDURE — 86038 ANTINUCLEAR ANTIBODIES: CPT

## 2024-09-12 PROCEDURE — 86225 DNA ANTIBODY NATIVE: CPT

## 2024-09-12 PROCEDURE — 36415 COLL VENOUS BLD VENIPUNCTURE: CPT

## 2024-09-12 RX ORDER — PREGABALIN 25 MG/1
CAPSULE ORAL
Qty: 150 CAPSULE | Refills: 2 | Status: SHIPPED | OUTPATIENT
Start: 2024-09-12 | End: 2024-12-11

## 2024-09-12 RX ORDER — VIBEGRON 75 MG/1
TABLET, FILM COATED ORAL
COMMUNITY
Start: 2024-09-10

## 2024-09-12 NOTE — PROGRESS NOTES
Neuro Office Visit      Encounter Date: 2024   Patient Name: Akua Torres  : 1945   MRN: [id number]   PCP:  Ashley Bain APRN     Chief Complaint:    Chief Complaint   Patient presents with    NURIS for neuropathy       History of Present Illness: Akua Torres is a 79 y.o. female who is here today in Neurology for  neuropathy    PMH of a-fib, ankylosing spondylitis of spine, anxiety and depression, arthritis, cataract, cervical disc disorder, chronic pain since , depression, fibromyalgia, GERD, gout, tension headaches, hypercholesterolemia, hypertension, joint pain, CKD stage III, low back pain, lumbosacral disc disease, neck pain, osteoarthritis, peripheral neuropathy, pneumonia, rheumatic fever, sleep apnea, prior CVA with right-sided weakness, spinal stenosis    SPEP notable for M spike elevated at 0.1. Vitamin B12 857.  CRP <0.30. Sedimentation rate 24. EMG performed on 5/10/2023 showed sensorimotor axonal and demyelinating polyneuropathy evident in all extremities tested, superimposed process including focal neuropathies cannot be ruled out.  No electrophysiologic evidence of isolated focal nerve pathology, proximal nerve, plexus or nerve root pathology, myopathy, or motor neuron pathology.    In clinic today Akua reports significant generalized pain, she follows with Dr. Nettles, received 2 nerve blocks yesterday per Dr. Nettles. She reports that neuropathy has been present for about 2-3 years. Numbness is present to mid calf on both legs, numbness into elbow bilateral hands extending up from her hands. Balance is poor, frequent falls, ambulates with a walker. Thighs feel weak when walking longer distances.She takes Lyrica 25 mg 2 tablets AM and 2 tablets PM - no side effects, does feel that Lyrica aids significantly with neuropathic pain and notes significantly worse symptoms if she forgets to take a dose. The pain will sometimes keep her up at night. Gabapentin was  not efficacious. No history of diabetes or heavy alcohol use.  Has done PT after prior surgeries and she does feel that this helped with her mobility.       Subjective      Review of Systems   HENT: Negative.     Eyes: Negative.    Respiratory: Negative.     Cardiovascular: Negative.    Gastrointestinal: Negative.    Genitourinary: Negative.    Musculoskeletal:  Positive for back pain, gait problem and neck pain.   Allergic/Immunologic: Negative.    Neurological:  Positive for weakness and numbness.   Psychiatric/Behavioral:  Positive for sleep disturbance.           Past Medical History:   Past Medical History:   Diagnosis Date    A-fib 2019    Ankylosing spondylitis of site in spine ?    Anxiety and depression     Arthritis     Cataract     Cervical disc disorder 2010    Chronic pain disorder     since approx. 2000    Depression     Extremity pain     Fibromyalgia     Fibromyalgia, primary approx.2020 per trena saavedra    GERD (gastroesophageal reflux disease)     Gout     H/O bladder infections     JUST FINISHED MACROBID ON 11-2-17 FOR RECENT UTI    Headache, tension-type 2010    Hypercholesteremia     Hypertension     Joint pain     Kidney disease, chronic, stage III (GFR 30-59 ml/min)     resolved after stopping antiinflammatory ~2015    Low back pain     Lumbosacral disc disease 2000    Neck pain     Osteoarthritis     Peripheral neuropathy approx. 2023    dr albrecht ordered an emg test    Pneumonia 02/2020    Rheumatic fever     Skin cancer     Sleep apnea     no cpap    Spinal stenosis 06/27/2023    Stroke 09/2019    right sided weakness    Wears glasses        Past Surgical History:   Past Surgical History:   Procedure Laterality Date    BACK SURGERY  2004    LUMBAR PER DR THORNTON x2    CARDIAC CATHETERIZATION  2019    CARPAL TUNNEL RELEASE Left     COLONOSCOPY  11/2020    EPIDURAL BLOCK  1974    childbirth    EYE SURGERY      retina surgery    FINGER SURGERY Right     3RD FINGER    HEEL SPUR EXCISION Bilateral      INTERVENTIONAL RADIOLOGY PROCEDURE N/A 09/17/2019    Procedure: Carotid and Cerebral Angiogram/ Possible Stent;  Surgeon: Imer Guerra MD;  Location:  [facility];  Service: Interventional Radiology    JOINT REPLACEMENT      both knees and both hips    KNEE ARTHROSCOPY Bilateral     LUMBAR DISCECTOMY FUSION INSTRUMENTATION N/A 11/10/2017    Procedure: LUMBAR SPINAL FUSION ;  Surgeon: Gopi Man MD;  Location: [facility];  Service:     NECK SURGERY  2019    clogged  artery and put stent    ORTHOPEDIC SURGERY  ?    had total knee, total hip, 2 back    REPLACEMENT TOTAL KNEE BILATERAL Bilateral     SHOULDER ARTHROSCOPY W/ ROTATOR CUFF REPAIR Right 01/19/2021    Procedure: ARTHROSCOPIC ROTATOR CUFF REPAIR WITH BICEPS TENODESIS RIGHT;  Surgeon: Lance Morales Jr., MD;  Location:  [facility];  Service: Orthopedics;  Laterality: Right;    SKIN BIOPSY      SPINAL FUSION      SPINE SURGERY  2004 & 2008    TOTAL HIP ARTHROPLASTY Right 09/09/2019    Procedure: TOTAL ANTERIOR RIGHT HIP ARTHROPLASTY;  Surgeon: Darrin New MD;  Location: [facility];  Service: Orthopedics    TOTAL HIP ARTHROPLASTY Left 06/29/2020    Procedure: TOTAL HIP ARTHROPLASTY ANTERIOR LEFT;  Surgeon: Darrin New MD;  Location: [facility];  Service: Orthopedics;  Laterality: Left;    TRIGGER POINT INJECTION  ?    had knee, back, several in shoulder       Family History:   Family History   Problem Relation Age of Onset    Cancer Mother     Colon polyps Mother     Hypertension Mother     Cancer Father     Hypertension Brother     Aneurysm Brother     Bone cancer Maternal Grandmother     Cancer Maternal Grandmother     Arthritis Maternal Grandfather     Hyperlipidemia Maternal Uncle     Kidney disease Maternal Uncle     Breast cancer Neg Hx     Ovarian cancer Neg Hx        Social History:   Social History     Socioeconomic History    Marital status:    Tobacco Use    Smoking status: Never     Passive exposure: Never     Smokeless tobacco: Never   Vaping Use    Vaping status: Never Used   Substance and Sexual Activity    Alcohol use: No    Drug use: Never    Sexual activity: Not Currently     Partners: Male     Birth control/protection: Abstinence       Medications:     Current Outpatient Medications:     Gemtesa 75 MG tablet, , Disp: , Rfl:     pregabalin (LYRICA) 25 MG capsule, Take 2 capsules by mouth Every Morning AND 3 capsules Every Evening. Do all this for 90 days., Disp: 150 capsule, Rfl: 2    rivaroxaban (XARELTO) 20 MG tablet, Take 1 tablet by mouth Every Night. Indications: Atrial Fibrillation, Disp: , Rfl:     acetaminophen (TYLENOL) 325 MG tablet, Take 1 tablet by mouth Every 6 (Six) Hours As Needed for Mild Pain., Disp: , Rfl:     allopurinol (ZYLOPRIM) 100 MG tablet, Take 1 tablet by mouth Daily. For gout  Indications: Gout, Disp: 30 tablet, Rfl: 11    atenolol (TENORMIN) 25 MG tablet, TAKE 1 TABLET(25 MG) BY MOUTH TWICE DAILY, Disp: 60 tablet, Rfl: 0    atorvastatin (LIPITOR) 80 MG tablet, TAKE 1 TABLET BY MOUTH EVERY NIGHT, Disp: 30 tablet, Rfl: 0    Cyanocobalamin (Vitamin B-12) 1000 MCG sublingual tablet, Place 1 tablet under the tongue Daily., Disp: , Rfl:     escitalopram (Lexapro) 10 MG tablet, Take 1 tablet by mouth Daily. For depression, Disp: 30 tablet, Rfl: 11    lisinopril (PRINIVIL,ZESTRIL) 20 MG tablet, TAKE 1 TABLET(20 MG) BY MOUTH DAILY FOR BLOOD PRESSURE, Disp: 30 tablet, Rfl: 0    methocarbamol (ROBAXIN) 750 MG tablet, Take 1 tablet by mouth 3 (Three) Times a Day. Indications: Musculoskeletal Pain, Disp: 90 tablet, Rfl: 11    propafenone SR (RYTHMOL SR) 225 MG 12 hr capsule, Take 1 capsule by mouth Every 12 (Twelve) Hours., Disp: , Rfl:     Allergies:   Allergies   Allergen Reactions    Nsaids Other (See Comments)     KIDNEY DAMAGE    Quinidine Nausea And Vomiting and Other (See Comments)     FEVER      Bactrim [Sulfamethoxazole-Trimethoprim] Rash    Nitrofuran Derivatives Diarrhea     "Penicillins Rash         STEADI Fall Risk Assessment was completed, and patient is at HIGH risk for falls. Assessment completed on:9/12/2024    Objective     Objective:    /68   Pulse 68   Ht 160 cm (63\")   Wt 87.5 kg (193 lb)   SpO2 93%   BMI 34.19 kg/m²   Body mass index is 34.19 kg/m².    Physical Exam  Vitals reviewed.   Constitutional:       Appearance: Normal appearance.   HENT:      Head: Normocephalic and atraumatic.      Mouth/Throat:      Mouth: Mucous membranes are moist.      Pharynx: Oropharynx is clear.   Pulmonary:      Effort: Pulmonary effort is normal.   Skin:     General: Skin is warm and dry.   Neurological:      Mental Status: She is alert.          Neurology Exam:    General apperance: NAD.     Mental status: Alert, awake and oriented to time place and person.    Fund of knowledge:  Normal.     Language and Speech: No aphasia or dysarthria.    Naming , Repetition and Comprehension:  Can name objects, repeat a sentence and follow commands. Speech is clear and fluent with good repetition, comprehension, and naming.    Cranial Nerves:   CN II: Visual fields are full. Intact. Pupils - PERRLA  CN III, IV and VI: Extraocular movements are intact. Normal saccades.   CN V: Facial sensation is intact.   CN VII: Muscles of facial expression reveal no asymmetry. Intact.   CN VIII: Hearing is intact.   CN IX and X: Palate elevates symmetrically. Intact  CN XI: Shoulder shrug is intact.   CN XII: Tongue is midline without evidence of atrophy or fasciculation.     Motor:  Right UE muscle strength 5/5. Normal tone.     Left UE muscle strength 5/5. Normal tone.      Right LE muscle strength 5-/5. Normal tone. (Residual from prior CVA)    Left LE muscle strength 5/5. Normal tone.      Sensory: Decreased sensation to light touch in BUE extending from fingertips to elbows, and decreased sensation from toes to mid calf bilaterally, decreased vibratory sense in BLE    DTRs: 1+ bilaterally in upper and " lower extremities.    Coordination: Normal finger-to-nose    Gait: Slow cautious gait, ambulates with walker.          Results:   Imaging:   MRI Cervical Spine Without Contrast    Result Date: 8/20/2024  Impression: Advanced multilevel cervical spondylosis is present including numerous areas of associated moderate to severe spinal canal and bilateral neuroforaminal narrowing, overall most advanced at C5-6. Electronically Signed: Sung Alfaro MD  8/20/2024 12:10 PM EDT  Workstation ID: [id number]    MRI Humerus Left With & Without Contrast    Result Date: 7/26/2024  Impression: Mineralization seen at the humeral attachment of the pectoralis major muscle which may reflect calcific tendinopathy. No suspicious lesions or acute abnormality of the humerus. Electronically Signed: Cesar Ocasio MD  7/26/2024 2:06 PM EDT  Workstation ID: [id number]       Labs:       Assessment / Plan      Assessment/Plan:   Diagnoses and all orders for this visit:    1. Sensorimotor neuropathy (Primary)  -     ABIMAEL by IFA, Reflex 9-biomarkers profile; Future  -     ABIMAEL; Future  -     Anti-DNA Antibody, Double-stranded; Future  -     pregabalin (LYRICA) 25 MG capsule; Take 2 capsules by mouth Every Morning AND 3 capsules Every Evening. Do all this for 90 days.  Dispense: 150 capsule; Refill: 2  -     Ambulatory Referral to Physical Therapy for Evaluation & Treatment    2. Abnormal serum protein electrophoresis  -     Ambulatory Referral to Hematology       [place] is in neurology clinic to establish care for peripheral neuropathy. SPEP notable for M spike elevated at 0.1,  Vitamin B12 857.  CRP <0.30. Sedimentation rate 24. EMG performed on 5/10/2023 showed sensorimotor axonal and demyelinating polyneuropathy evident in all extremities tested, superimposed process including focal neuropathies cannot be ruled out.  No electrophysiologic evidence of isolated focal nerve pathology, proximal nerve, plexus or nerve root pathology, myopathy, or  motor neuron pathology. No history of diabetes or of heavy alcohol use. I have placed hematology referral for further m-spike evaluation. I have also ordered additional lab work as part of neuropathy workup. I adjusted Lyrica dose to 50 mg AM and 75 mg PM given that her neuropathic pain keeps her awake at night, she is to contact clinic with response to adjusted dose in 1-2 weeks. She also reports balance impairment which may be coming from neuropathy and spinal stenosis along with general deconditioning, I have placed PT order with specific emphasis on gait and balance.    As part of this patient's treatment plan I am prescribing controlled substances. The patient has been made aware of appropriate use of such medications, including potential risk of somnolence, limited ability to drive and/or work safely, and potential for dependence or overdose. It has also been made clear that these medications are for use by the patient only, without concomitant use of alcohol or other substances unless prescribed. Keep out of reach of children.  Ben report has been reviewed. If this is going to be prescribed long term, [facility] Controlled Substance Agreement Contract has also been read and signed by patient and myself.    Will plan to see Akua back in clinic in 3 months or sooner for any questions or concerns.     Patient Education:       Reviewed medications, potential side effects and signs and symptoms to report. Discussed risk versus benefits of treatment plan with patient and/or family-including medications, labs and radiology that may be ordered. Addressed questions and concerns during visit. Patient and/or family verbalized understanding and agree with plan. Instructed to call the office with any questions and report to ER with any life-threatening symptoms.     Follow Up:   Return in about 3 months (around 12/12/2024).    I spent  56  minutes in the care of this patient. I personally spent 50 percent of this time  counseling and discussing diagnosis, diagnostic testing, evaluation, treatment options, and management .       During this visit the following were done:  Labs Reviewed [x]    Labs Ordered [x]    Radiology Reports Reviewed []    Radiology Ordered []    PCP Records Reviewed []    Referring Provider Records Reviewed [x]    ER Records Reviewed []    Hospital Records Reviewed []    History Obtained From Family []    Radiology Images Reviewed []    Other Reviewed [x]  EMG report  Records Requested []      WILFREDO Jacob  [facility]  [address] [address] [phone]

## 2024-09-14 LAB — DSDNA AB SER-ACNC: 2 IU/ML (ref 0–9)

## 2024-09-16 LAB
ANA SER QL IF: NEGATIVE
LABORATORY COMMENT REPORT: NORMAL

## 2024-09-17 ENCOUNTER — TELEPHONE (OUTPATIENT)
Dept: NEUROLOGY | Facility: CLINIC | Age: 79
End: 2024-09-17
Payer: MEDICARE

## 2024-09-17 DIAGNOSIS — M47.812 CERVICAL SPONDYLOSIS WITHOUT MYELOPATHY: Primary | ICD-10-CM

## 2024-09-23 ENCOUNTER — TELEPHONE (OUTPATIENT)
Dept: PAIN MEDICINE | Facility: CLINIC | Age: 79
End: 2024-09-23
Payer: MEDICARE

## 2024-09-30 ENCOUNTER — TELEPHONE (OUTPATIENT)
Dept: NEUROLOGY | Facility: CLINIC | Age: 79
End: 2024-09-30

## 2024-09-30 NOTE — TELEPHONE ENCOUNTER
Provider: WILFREDO FREDERICK    Caller: NEHAL STEVE    Relationship to Patient: SELF    Phone Number: [phone]      Reason for Call: PT STATES AT HER VISIT W/WILFREDO FREDERICK, HER LYRICA WAS INCREASED, RAUL ADVISED HER TO CALL IN AND LET HER KNOW HOW SHE WAS FEELING, PT INDICATES SHE IS NOW SLEEPING BETTER AND HAS AN APPT W/HEMATOLOGY ON 11/07.    When was the patient last seen: 09/12/24      PLEASE REVIEW AND ADVISE

## 2024-10-09 ENCOUNTER — OUTSIDE FACILITY SERVICE (OUTPATIENT)
Dept: PAIN MEDICINE | Facility: CLINIC | Age: 79
End: 2024-10-09
Payer: MEDICARE

## 2024-10-09 ENCOUNTER — DOCUMENTATION (OUTPATIENT)
Dept: PAIN MEDICINE | Facility: CLINIC | Age: 79
End: 2024-10-09

## 2024-10-09 NOTE — PROGRESS NOTES
[facility]     PROCEDURE DATE: 10/09/2024    PREOPERATIVE DIAGNOSES:  1. Cervical spondylosis without myelopathy   2. DDD (degenerative disc disease), cervical   3. Occipital neuralgia, unspecified laterality   4. 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 Sarcopenia   6. Osteoarthritis of left shoulder, unspecified osteoarthritis type   7. Impingement of left shoulder   8. Lumbar postlaminectomy syndrome   9. History of lumbar fusion   10. Fibromyalgia   11. YUNIOR treated with BiPAP   12. Anxiety and depression   13. Gait disturbance   14. Physical deconditioning     POSTOPERATIVE DIAGNOSES:  1. Cervical spondylosis without myelopathy   2. DDD (degenerative disc disease), cervical   3. Occipital neuralgia, unspecified laterality   4. 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 Sarcopenia   6. Osteoarthritis of left shoulder, unspecified osteoarthritis type   7. Impingement of left shoulder   8. Lumbar postlaminectomy syndrome   9. History of lumbar fusion   10. Fibromyalgia   11. YUNIOR treated with BiPAP   12. Anxiety and depression   13. Gait disturbance   14. Physical deconditioning     PROCEDURE PERFORMED: Second set of of diagnostic bilateral cervical medial branch blocks at C4, C5, C6; for bilateral cervical facet joints at C4-C5 and C5-C6.    ANESTHESIA: Local anesthesia plus IV sedation    COMPLICATIONS: None    EBL: 0    MEDICAL NECESSITY: A comprehensive evaluation, including history and physical exam and pertinent physiologic and functional assessment, was performed. The patient presents with intractable pain due to the diagnoses listed above. The patient has failed to respond to conservative modalities including the impact of patient's moderate-to-severe pain contributing to significant impairment in daily activities, ADLs, and a negative impact on quality of life, as referenced under HPI. Supporting diagnostic studies of patient's chronic pain condition have been  Patient discharged from [address] [facility]   Patient demonstrates independent and safe mobility in home without AD including steps  Instructed in hep of balance ex unsupported with feet in various positons and supported standing strenghtening ex with theraband    Recommend trial of OP PT reviewed. We have discussed using a stepwise approach starting with the shortest or least intense level of treatment, care, or service as determined by the extent required to diagnose and or treat a patient's condition. The treatments proposed are consistent with the patient's medical condition and are known to be as safe and effective by current guidelines and standard of care. First set of diagnostic bilateral cervical medial branch blocks at C4, C5, C6; for bilateral cervical facet joints at C4-C5 and C5-C6 = % pain relief and functional improvement that lasted for 24 hours. See OV note.    PROCEDURE SUMMARY: After explaining all the risks and benefits of the procedure, an informed consent was obtained. The patient's surgical site was confirmed with the patient and marked in the holding room accordingly. The patient was transferred to the procedure room and placed on the operating room table in prone position. Time out was completed. Noninvasive monitors were applied. Administration of IV sedation was performed by a designated procedure room nurse, who monitored the patient's level of consciousness and physiological status. Pertinent information was reported on a sedation flow sheet, which is part of the patient's permanent medical records. Start sedation time: 07:09 \AM. The patient's vital signs remained within normal limits.  The cervical spine area was prepped and draped in sterile fashion. Using C-arm fluoroscopic guidance, the waist of the articular pillars at C4, C5, C6, on both sides, (targets) were identified. The skin and tissues overlying the targets were anesthetized with 5 ml of 1% lidocaine. Then, 22-gauge styletted needles were advanced to contact the targets at the above-mentioned levels without any difficulties. AP and contralateral oblique x-ray views were obtained confirming appropriate needle placement. Aspiration was negative for blood and/or CSF. Then, 0.5 ml of 0.25% bupivacaine in 1%  dextrose was injected per cervical level. Fluoroscopy was utilized using low-dose of radiation applying collimation, pulsed mode, and shielding following ALARA recommendations. Fluoroscopy time: 21 seconds. End sedation time: 07:13 AM. The patient tolerated the procedure well and without incident.  Upon completion of the procedure, the patient was transferred to the recovery room in stable condition. The patient was discharged from the [facility] neurologically intact and with appropriate discharge instructions. The patient reported 80-90% pain relief. The range of motion of the cervical spine improved significantly. Cervical facet joint loading maneuvers became negative. Pain level before procedure: 8-9/10. Pain level after procedure: 1-2/10.     PLAN:   The patient will be scheduled for either Sprint PNS Extensa dual-lead system to the bilateral cervical medial branches of C4 as patient also presents with significant sarcopenia of the cervical paravertebral muscles. For Sprint, we may stop Xarelto. In no coverage for PNS, then, will proceed with bilateral cervical medial branch rhizotomies at C4, C5, C6; for bilateral cervical facet joints at C4-C5, and C5-C6.   The patient will need to stop rivaroxaban (Xarelto) 15-20 mg po qday at least 72 hours between last dose and procedure prior to intraneuraxial procedures, PNS, etc. Otherwise, we may consider cervical epidural steroid injection by interlaminar approach using the lowest effective dose of steroids, under C-arm fluoroscopic guidance, with the use of contrast dye to confirm appropriate needle placement and spread of contrast dye. Per current cervical MRI, she may require a multilevel ACDF. Other options: if the patient would consider cervical surgery, then, she may need a cervical myelogram followed by CT post myelogram and fresh electrodiagnostic studies of the upper extremities for surgical delineation.  Treatment of chronic left shoulder  osteoarthritis/pain: We have discussed prospects of a first set of diagnostic nerve blocks of the terminal sensory articular branches of the left suprascapular nerve, the left axillary nerve, and the left lateral pectoral nerve. If the patient experiences more than 50% pain relief and improvement the range of motion of the shoulder joint, then, the patient will be scheduled for a second set of diagnostic nerve blocks of the terminal sensory articular branches of the left suprascapular nerve, the left axillary nerve, and the left lateral pectoral nerve. If the patient experiences more than 50% pain relief and improvement the range of motion of the shoulder joint, then, the patient will be scheduled for bipolar radiofrequency ablation of the terminal sensory articular branches of the left suprascapular nerve, the left axillary nerve, and left lateral pectoral nerve.   Other options for treatment of her occipital headaches and left shoulder pain: diagnostic and therapeutic left greater occipital nerve blocks by hydrodissection technique under ultrasound and C arm fluoroscopic guidance; diagnostic and therapeutic left dorsal scapularis nerve blocks by hydrodissection technique under ultrasound guidance, C arm fluoroscopic guidance, and PNS guidance; diagnostic and therapeutic left suprascapular nerve blocks by hydrodissection technique under ultrasound guidance, C arm fluoroscopic guidance, and PNS guidance followed by PNS with Nalu (peripheral nerve Vs brachial plexus)  Diagnostic studies: See OV note.  Pharmacological measures: See OV note.  Long-term rehabilitation efforts: See OV note.  The patient and her family have been instructed to contact my office with any questions or difficulties. The patient understands the plan and agrees to proceed accordingly.      Signatures  Electronically signed by: Cricket Nettles M.D.; OCTOBER 09, 2024, 07:40 AM EST (Author)

## 2024-10-14 DIAGNOSIS — M47.812 CERVICAL SPONDYLOSIS WITHOUT MYELOPATHY: Primary | ICD-10-CM

## 2024-10-16 ENCOUNTER — TELEPHONE (OUTPATIENT)
Dept: NEUROLOGY | Facility: CLINIC | Age: 79
End: 2024-10-16
Payer: MEDICARE

## 2024-10-16 NOTE — TELEPHONE ENCOUNTER
CALLED [facility] AND THEY ARE UNABLE TO TREAT PATIENT AT THAT LOCATION - IT MUST BE THE [facility] - CALLED THE PATIENT TO SEE IF THEY WANTED TO DO THE [facility] LOCATION AND PATIENT SAID THAT SHE IS UNABLE TO DO PHYSICAL THERAPY AT THE MOMENT - SENDING IN BASKET MESSAGE TO LET RAUL KNOW

## 2024-10-17 ENCOUNTER — LAB (OUTPATIENT)
Dept: LAB | Facility: HOSPITAL | Age: 79
End: 2024-10-17
Payer: MEDICARE

## 2024-10-17 DIAGNOSIS — D47.2 MONOCLONAL GAMMOPATHY: ICD-10-CM

## 2024-10-17 DIAGNOSIS — D47.2 MONOCLONAL GAMMOPATHY: Primary | ICD-10-CM

## 2024-10-17 LAB
ALBUMIN SERPL-MCNC: 4.1 G/DL (ref 3.5–5.2)
ALBUMIN/GLOB SERPL: 1.6 G/DL
ALP SERPL-CCNC: 129 U/L (ref 39–117)
ALT SERPL W P-5'-P-CCNC: 14 U/L (ref 1–33)
ANION GAP SERPL CALCULATED.3IONS-SCNC: 10 MMOL/L (ref 5–15)
AST SERPL-CCNC: 27 U/L (ref 1–32)
B2 MICROGLOB SERPL-MCNC: 3.4 MG/L (ref 0.8–2.2)
BASOPHILS # BLD AUTO: 0.02 10*3/MM3 (ref 0–0.2)
BASOPHILS NFR BLD AUTO: 0.2 % (ref 0–1.5)
BILIRUB SERPL-MCNC: 1.2 MG/DL (ref 0–1.2)
BUN SERPL-MCNC: 16 MG/DL (ref 8–23)
BUN/CREAT SERPL: 16.5 (ref 7–25)
CALCIUM SPEC-SCNC: 9.1 MG/DL (ref 8.6–10.5)
CHLORIDE SERPL-SCNC: 103 MMOL/L (ref 98–107)
CO2 SERPL-SCNC: 28 MMOL/L (ref 22–29)
CREAT SERPL-MCNC: 0.97 MG/DL (ref 0.57–1)
CRP SERPL-MCNC: <0.3 MG/DL (ref 0–0.5)
DEPRECATED RDW RBC AUTO: 54.9 FL (ref 37–54)
EGFRCR SERPLBLD CKD-EPI 2021: 59.2 ML/MIN/1.73
EOSINOPHIL # BLD AUTO: 0.23 10*3/MM3 (ref 0–0.4)
EOSINOPHIL NFR BLD AUTO: 2.4 % (ref 0.3–6.2)
ERYTHROCYTE [DISTWIDTH] IN BLOOD BY AUTOMATED COUNT: 15.3 % (ref 12.3–15.4)
ERYTHROCYTE [SEDIMENTATION RATE] IN BLOOD: 38 MM/HR (ref 0–30)
GLOBULIN UR ELPH-MCNC: 2.6 GM/DL
GLUCOSE SERPL-MCNC: 119 MG/DL (ref 65–99)
HCT VFR BLD AUTO: 45.2 % (ref 34–46.6)
HGB BLD-MCNC: 14.3 G/DL (ref 12–15.9)
IMM GRANULOCYTES # BLD AUTO: 0.01 10*3/MM3 (ref 0–0.05)
IMM GRANULOCYTES NFR BLD AUTO: 0.1 % (ref 0–0.5)
LYMPHOCYTES # BLD AUTO: 1.99 10*3/MM3 (ref 0.7–3.1)
LYMPHOCYTES NFR BLD AUTO: 20.5 % (ref 19.6–45.3)
MCH RBC QN AUTO: 30 PG (ref 26.6–33)
MCHC RBC AUTO-ENTMCNC: 31.6 G/DL (ref 31.5–35.7)
MCV RBC AUTO: 95 FL (ref 79–97)
MONOCYTES # BLD AUTO: 0.57 10*3/MM3 (ref 0.1–0.9)
MONOCYTES NFR BLD AUTO: 5.9 % (ref 5–12)
NEUTROPHILS NFR BLD AUTO: 6.88 10*3/MM3 (ref 1.7–7)
NEUTROPHILS NFR BLD AUTO: 70.9 % (ref 42.7–76)
PLATELET # BLD AUTO: 233 10*3/MM3 (ref 140–450)
PMV BLD AUTO: 10.1 FL (ref 6–12)
POTASSIUM SERPL-SCNC: 4.1 MMOL/L (ref 3.5–5.2)
PROT SERPL-MCNC: 6.7 G/DL (ref 6–8.5)
RBC # BLD AUTO: 4.76 10*6/MM3 (ref 3.77–5.28)
SODIUM SERPL-SCNC: 141 MMOL/L (ref 136–145)
WBC NRBC COR # BLD AUTO: 9.7 10*3/MM3 (ref 3.4–10.8)

## 2024-10-17 PROCEDURE — 85652 RBC SED RATE AUTOMATED: CPT

## 2024-10-17 PROCEDURE — 80053 COMPREHEN METABOLIC PANEL: CPT

## 2024-10-17 PROCEDURE — 82784 ASSAY IGA/IGD/IGG/IGM EACH: CPT

## 2024-10-17 PROCEDURE — 83521 IG LIGHT CHAINS FREE EACH: CPT

## 2024-10-17 PROCEDURE — 82232 ASSAY OF BETA-2 PROTEIN: CPT

## 2024-10-17 PROCEDURE — 86140 C-REACTIVE PROTEIN: CPT

## 2024-10-17 PROCEDURE — 86334 IMMUNOFIX E-PHORESIS SERUM: CPT

## 2024-10-17 PROCEDURE — 84165 PROTEIN E-PHORESIS SERUM: CPT

## 2024-10-17 PROCEDURE — 82785 ASSAY OF IGE: CPT

## 2024-10-17 PROCEDURE — 85025 COMPLETE CBC W/AUTO DIFF WBC: CPT

## 2024-10-17 PROCEDURE — 36415 COLL VENOUS BLD VENIPUNCTURE: CPT

## 2024-10-18 LAB
ALBUMIN SERPL ELPH-MCNC: 3.7 G/DL (ref 2.9–4.4)
ALBUMIN/GLOB SERPL: 1.3 {RATIO} (ref 0.7–1.7)
ALPHA1 GLOB SERPL ELPH-MCNC: 0.3 G/DL (ref 0–0.4)
ALPHA2 GLOB SERPL ELPH-MCNC: 1 G/DL (ref 0.4–1)
B-GLOBULIN SERPL ELPH-MCNC: 1 G/DL (ref 0.7–1.3)
GAMMA GLOB SERPL ELPH-MCNC: 0.7 G/DL (ref 0.4–1.8)
GLOBULIN SER-MCNC: 2.9 G/DL (ref 2.2–3.9)
IGA SERPL-MCNC: 204 MG/DL (ref 64–422)
IGG SERPL-MCNC: 523 MG/DL (ref 586–1602)
IGM SERPL-MCNC: 185 MG/DL (ref 26–217)
INTERPRETATION SERPL IEP-IMP: ABNORMAL
KAPPA LC FREE SER-MCNC: 25.2 MG/L (ref 3.3–19.4)
KAPPA LC FREE/LAMBDA FREE SER: 1.07 {RATIO} (ref 0.26–1.65)
LABORATORY COMMENT REPORT: ABNORMAL
LAMBDA LC FREE SERPL-MCNC: 23.5 MG/L (ref 5.7–26.3)
M PROTEIN SERPL ELPH-MCNC: 0.1 G/DL
PROT SERPL-MCNC: 6.6 G/DL (ref 6–8.5)

## 2024-10-22 LAB — IGE SERPL-ACNC: 19 IU/ML (ref 6–495)

## 2024-11-11 ENCOUNTER — OFFICE VISIT (OUTPATIENT)
Age: 79
End: 2024-11-11
Payer: MEDICARE

## 2024-11-11 VITALS
HEART RATE: 70 BPM | DIASTOLIC BLOOD PRESSURE: 90 MMHG | TEMPERATURE: 97.7 F | HEIGHT: 63 IN | BODY MASS INDEX: 34.75 KG/M2 | WEIGHT: 196.1 LBS | SYSTOLIC BLOOD PRESSURE: 140 MMHG

## 2024-11-11 DIAGNOSIS — M1A.09X0 IDIOPATHIC CHRONIC GOUT OF MULTIPLE SITES WITHOUT TOPHUS: ICD-10-CM

## 2024-11-11 DIAGNOSIS — Z79.899 HIGH RISK MEDICATION USE: ICD-10-CM

## 2024-11-11 DIAGNOSIS — M15.9 GENERALIZED OSTEOARTHROSIS, INVOLVING MULTIPLE SITES: ICD-10-CM

## 2024-11-11 DIAGNOSIS — G62.9 SENSORIMOTOR NEUROPATHY: ICD-10-CM

## 2024-11-11 DIAGNOSIS — M79.7 FIBROMYALGIA: Primary | ICD-10-CM

## 2024-11-11 DIAGNOSIS — G62.9 NEUROPATHY: ICD-10-CM

## 2024-11-11 PROCEDURE — 99214 OFFICE O/P EST MOD 30 MIN: CPT | Performed by: INTERNAL MEDICINE

## 2024-11-11 PROCEDURE — 1160F RVW MEDS BY RX/DR IN RCRD: CPT | Performed by: INTERNAL MEDICINE

## 2024-11-11 PROCEDURE — 1159F MED LIST DOCD IN RCRD: CPT | Performed by: INTERNAL MEDICINE

## 2024-11-11 PROCEDURE — G2211 COMPLEX E/M VISIT ADD ON: HCPCS | Performed by: INTERNAL MEDICINE

## 2024-11-11 PROCEDURE — 3080F DIAST BP >= 90 MM HG: CPT | Performed by: INTERNAL MEDICINE

## 2024-11-11 PROCEDURE — 3077F SYST BP >= 140 MM HG: CPT | Performed by: INTERNAL MEDICINE

## 2024-11-11 RX ORDER — CHLORAL HYDRATE 500 MG
CAPSULE ORAL
COMMUNITY
Start: 2024-10-06

## 2024-11-11 RX ORDER — PREGABALIN 25 MG/1
CAPSULE ORAL
Qty: 270 CAPSULE | Refills: 1 | Status: SHIPPED | OUTPATIENT
Start: 2024-11-11

## 2024-11-11 RX ORDER — NEOMYCIN SULFATE, POLYMYXIN B SULFATE AND DEXAMETHASONE 3.5; 10000; 1 MG/ML; [USP'U]/ML; MG/ML
SUSPENSION/ DROPS OPHTHALMIC
COMMUNITY
Start: 2024-10-28

## 2024-11-11 RX ORDER — HYDROXYZINE HYDROCHLORIDE 10 MG/1
10 TABLET, FILM COATED ORAL 3 TIMES DAILY PRN
Qty: 90 TABLET | Refills: 5 | Status: SHIPPED | OUTPATIENT
Start: 2024-11-11

## 2024-11-11 NOTE — PROGRESS NOTES
Office Follow Up      Date: 11/11/2024   Patient Name: Akua Torres  MRN: [id number]  YOB: 1945    Referring Physician: Ashley Bain APRN     Chief Complaint:   Chief Complaint   Patient presents with    Osteoarthritis    Fibromyalgia       History of Present Illness: Akua Torres is a 79 y.o. female who is here today for follow up on osteoarthritis fibromyalgia and gout.    Patient with significant past medical history of neuropathy, chronic kidney disease, hypertension, peripheral vascular disease, stroke GERD, OA, fibromyalgia, gout. Her rheumatology labs were normal 3/17/2020. Her Uric acid was elevated at 7.7. She has a history of gout (+podagra) treated with allopurinol. No recent gout flares. Tolerates allopurinol well Dr. New replaced her left hip in June 2020. He also replaced her right hip in 2019. She had a CVA the day after her surgery that affected her right side.     She has known widespread osteoarthritis. She aches in most all of her joints as well as in her muscles.  She previously was on meloxicam which helped her a lot with pain and stiffness in the joints, but she was taken off this due to renal insufficiency. She is sore all over not only in her joints but also all her muscles. She has had 2 back surgeries in 2004 and 2012 with Dr. Dillard. She had bilateral knee replacements due to osteoarthritis. She has had a carotid stent due to peripheral vascular disease. She notes that her back is stiff and sore. Activity makes it worse. Rest makes it better. Her neck is stiff and sore. She has frequent widespread myalgias and muscle cramping. She gets headaches and extremity weakness and tremors. She has fatigue and weight gain. Avoids NSAIDs with renal insufficiency. Prescribed hydrocodone by Dr. Urena pain management, but she does not like to take opioids.     Overall feeling improved on pregabalin which helps her neuropathic pain.  She was  intolerant to more than 25 mg in the morning and 50 mg at night due to sedation on higher doses prescribed by neurology.  feet are numb and often cold feeling.  She reports frequent falls.  She feels the pregabalin helps her chronic pain does not seem to cause side effects.  She reports widespread itching  She is concerned about recent labs showing M spike 0.1 and has upcoming consultation with hematology Dr. Nuñez      Subjective   Review of Systems: Review of Systems   Constitutional:  Positive for fatigue. Negative for chills, fever and unexpected weight loss.   HENT:  Positive for sneezing. Negative for mouth sores, sinus pressure and sore throat.         Dry mouth   Eyes:  Positive for itching. Negative for pain and redness.   Respiratory:  Positive for shortness of breath. Negative for cough.    Cardiovascular:  Negative for chest pain.   Gastrointestinal:  Positive for constipation, diarrhea, nausea and GERD. Negative for abdominal pain, blood in stool and vomiting.   Endocrine: Positive for heat intolerance. Negative for polydipsia and polyuria.   Genitourinary:  Negative for dysuria, genital sores and hematuria.   Musculoskeletal:  Positive for back pain, gait problem, joint swelling, neck pain and neck stiffness. Negative for arthralgias and myalgias.        Morning stiffness   Skin:  Positive for dry skin and bruise. Negative for rash.        Itching   Allergic/Immunologic: Negative for immunocompromised state.   Neurological:  Positive for tremors, weakness, numbness and headache. Negative for seizures and memory problem.   Hematological:  Negative for adenopathy. Bruises/bleeds easily.   Psychiatric/Behavioral:  Positive for stress. Negative for depressed mood. The patient is nervous/anxious.    All other systems reviewed and are negative.       Medications:   Current Outpatient Medications:     acetaminophen (TYLENOL) 325 MG tablet, Take 1 tablet by mouth Every 6 (Six) Hours As Needed for Mild Pain.,  Disp: , Rfl:     allopurinol (ZYLOPRIM) 100 MG tablet, Take 1 tablet by mouth Daily. For gout  Indications: Gout, Disp: 30 tablet, Rfl: 11    atenolol (TENORMIN) 25 MG tablet, TAKE 1 TABLET(25 MG) BY MOUTH TWICE DAILY, Disp: 60 tablet, Rfl: 0    atorvastatin (LIPITOR) 80 MG tablet, TAKE 1 TABLET BY MOUTH EVERY NIGHT, Disp: 30 tablet, Rfl: 0    Cyanocobalamin (Vitamin B-12) 1000 MCG sublingual tablet, Place 1 tablet under the tongue Daily., Disp: , Rfl:     escitalopram (Lexapro) 10 MG tablet, Take 1 tablet by mouth Daily. For depression, Disp: 30 tablet, Rfl: 11    Gemtesa 75 MG tablet, , Disp: , Rfl:     lisinopril (PRINIVIL,ZESTRIL) 20 MG tablet, TAKE 1 TABLET(20 MG) BY MOUTH DAILY FOR BLOOD PRESSURE, Disp: 30 tablet, Rfl: 0    methocarbamol (ROBAXIN) 750 MG tablet, Take 1 tablet by mouth 3 (Three) Times a Day. Indications: Musculoskeletal Pain, Disp: 90 tablet, Rfl: 11    neomycin-polymyxin-dexamethasone (MAXITROL) 3.5-35363-8.1 ophthalmic suspension, APPLY 1 DROP TO BOTH EYES EVERY 8 HOURS 3 TIMES A DAY FOR ONE WEEK THEN TAPER WEEKLY, Disp: , Rfl:     Omega-3 Fatty Acids (fish oil) 1000 MG capsule capsule, TAKE ONE CAPSULE BY MOUTH TWO TIMES A DAY FOR 90 DAYS, Disp: , Rfl:     pregabalin (LYRICA) 25 MG capsule, Take 1 capsule by mouth Every Morning AND 2 capsules Every Evening., Disp: 270 capsule, Rfl: 1    propafenone SR (RYTHMOL SR) 225 MG 12 hr capsule, Take 1 capsule by mouth Every 12 (Twelve) Hours., Disp: , Rfl:     rivaroxaban (XARELTO) 20 MG tablet, Take 1 tablet by mouth Every Night. Indications: Atrial Fibrillation, Disp: , Rfl:     hydrOXYzine (ATARAX) 10 MG tablet, Take 1 tablet by mouth 3 (Three) Times a Day As Needed for Itching., Disp: 90 tablet, Rfl: 5    Allergies:   Allergies   Allergen Reactions    Nsaids Other (See Comments)     KIDNEY DAMAGE    Quinidine Nausea And Vomiting and Other (See Comments)     FEVER      Bactrim [Sulfamethoxazole-Trimethoprim] Rash    Nitrofuran Derivatives  "Diarrhea    Penicillins Rash       I have reviewed and updated the patient's chief complaint, history of present illness, review of systems, past medical history, surgical history, family history, social history, medications and allergy list as appropriate.     Objective    Vital Signs:   Vitals:    11/11/24 1101   BP: 140/90   BP Location: Right arm   Patient Position: Sitting   Cuff Size: Adult   Pulse: 70   Temp: 97.7 °F (36.5 °C)   Weight: 89 kg (196 lb 1.6 oz)   Height: 160 cm (62.99\")   PainSc:   8       Body mass index is 34.75 kg/m².  BMI is >= 30 and <35. (Class 1 Obesity). The following options were offered after discussion;: weight loss educational material (shared in after visit summary)      Physical Exam:  Physical Exam   MUSCULOSKELETAL:   No peripheral synovitis  Tender cervical spine  Markedly diminished range of motion left shoulder with crepitus and painful range of motion.  Unable to reach above shoulder level with left arm  Widespread tender points present above and below the waist.    Status post bilateral knee replacements    Complete joint exam was performed including the MCPs, PIPs, DIPs of the hands, wrists, elbows, shoulders, hips, knees and ankles.  No soft tissue swelling or tenderness is present except as above.    General: Elderly.  Ambulates with cane.  Cooperative, alert and oriented. Affect is normal. Hydration appears normal.   HEENT: Normocephalic and atraumatic. No notable alopecia. Lids and conjunctiva are normal. Pupils are equal and sclera are clear. Oropharynx is clear   NECK neck is supple without adenopathy, masses or thyromegaly.   CARDIOVASCULAR: Regular rate and rhythm. No murmurs, rubs or gallops   LUNGS: Effort is normal. Lungs are clear bilateral   ABDOMEN: Not examined  EXTREMITIES: Peripheral pulses are intact. No clubbing.   SKIN: No rashes. No subcutaneous nodules. No digital ulcers. No sclerodactyly.   NEUROLOGIC: Gait is normal. Strength testing is normal.  No " "focal neurologic deficits    Results Review:   Labs:   Lab Results   Component Value Date    GLUCOSE 119 (H) 10/17/2024    BUN 16 10/17/2024    CREATININE 0.97 10/17/2024    EGFRRESULT 53.9 (L) 11/14/2022    EGFR 59.2 (L) 10/17/2024    BCR 16.5 10/17/2024    K 4.1 10/17/2024    CO2 28.0 10/17/2024    CALCIUM 9.1 10/17/2024    PROTENTOTREF 6.6 10/17/2024    ALBUMIN 4.1 10/17/2024    ALBUMIN 3.7 10/17/2024    BILITOT 1.2 10/17/2024    AST 27 10/17/2024    ALT 14 10/17/2024     Lab Results   Component Value Date    WBC 9.70 10/17/2024    HGB 14.3 10/17/2024    HCT 45.2 10/17/2024    MCV 95.0 10/17/2024     10/17/2024     Lab Results   Component Value Date    SEDRATE 38 (H) 10/17/2024     Lab Results   Component Value Date    CRP <0.30 10/17/2024     No results found for: \"QUANTIFERO\", \"QUANTITB1\", \"QUANTITB2\", \"QUANTIFERN\", \"QUANTIFERM\", \"QUANTITBGLDP\"  No results found for: \"RF\"  Lab Results   Component Value Date    HEPBSAG Non-Reactive 05/09/2024    HEPAIGM Non-Reactive 05/09/2024    HEPBIGMCORE Non-Reactive 05/09/2024    HEPCVIRUSABY Non-Reactive 05/09/2024             Assessment / Plan       Fibromyalgia  prior cymbalta intolerance   Prior gabapentin  **current: Pregabalin ( rheumatology)  pain management Dr Nettles   *Avoids all NSAIDs due to CKD(Dr. Alfaro), Plavix use, peripheral vascular disease/hx stroke   Prior back surgery neurosurgeon Dr. Dillard.   Prior physical therapy , bilateral knee replacement Dr. Rizzo/Dr. Dumont.   Right hip replacement Dr. New  History of stroke.  Depression followed by Dr. Royal        Patient with fibromyalgia, neuropathy along with widespread osteoarthritis  -Persistent left shoulder pain due to osteoarthritis.  X-ray left shoulder reviewed showing moderate OA changes  Labs reviewed 10/17/2024 with stable findings beyond M spike 0.1 for which she has upcoming consultation with Dr. Nuñez December 2024.  No anemia.  creatinine 0.97/GFR 59, normal LFTs, normal " C-reactive protein, sed rate 38  Avoiding all NSAIDs with CKD    Pain significantly improved on pregabalin 25 mg 1 tablet a.m. and 2 tablets p.m.  She was intolerant to higher doses prescribed by neurology due to sedation.  -  refilled pregabalin 25mg AM and 50mg PM (intolerant higher dose) She is able to function better on pregabalin no side effects reported. Risk and benefits of medication discussed including possible sedation, dizziness, memory disturbance, allergic reaction.  -Hydroxyzine prescribed as needed itching   - I have encouraged regular low impact aerobic exercise up to 30 minutes per day. If this is unattainable, recommend a graded exercise program starting with 5 minutes of dedicated cardiovascular exercise daily, increasing by 1 minute daily until goal of 30 minutes daily is reached. - I have recommended conservative measures to improve sleep - recommend avoiding narcotics studies have shown that narcotics can worsen fibromyalgia pain. - Counseled on alternative therapies that can help with pain including massage therapy, aquatic therapy, physical therapy, acupuncture, chiropractics, and psychology evaluation labs ordered as below for monitoring. Ben reviewed and appropriate She has tried physical therapy  Return to clinic 6 months and sooner for left shoulder steroid injection for OA left shoulder    High risk medication use  Pregabalin.  Well-tolerated and effective.  She functions better on this medication.    Discussed risks of sedation, dizziness, falls, withdrawal  Pain management agreement signed 5/9/2024     Generalized osteoarthrosis, involving multiple sites  [facility]/Orthopedics Dr New   Pain Management Dr. Urena / Dr Nettles  **Avoids all NSAIDs due to CKD, Plavix use, peripheral vascular disease/hx stroke   Prior meloxicam helpful but stopped due to chronic kidney disease   celecoxib rx previously but she did not take.    -Schedule her for left shoulder steroid  injection at patient request for left shoulder osteoarthritis  -She has had injections with pain management for degenerative arthritis involving her spine       Idiopathic chronic gout of multiple sites without tophus  + hx podagra right great toe.      Allergies and active on Allopurinol 100mg daily   PCP has taken over rx allopurinol     Goal uric acid in gout is below 6.  Recommend Mediterranean diet for weight loss.  Recommend avoidance of known gout triggers such as shellfish, alcohol, soda, red meat.     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 Nuñez for abnormal SPEP with M spike elevated 0.1.  She plans to see hematology in December 2024     She has generalized osteoarthritis causing chronic pain and stiffness in most of her joints.   No sign of an inflammatory arthritis presently.   She avoids NSAIDS due to Plavix use/hx stroke/renal dz   -Follows with pain management Dr. Nettles      Assessment & Plan  Fibromyalgia    High risk medication use    Generalized osteoarthrosis, involving multiple sites    Idiopathic chronic gout of multiple sites without tophus    Sensorimotor neuropathy    Neuropathy           New Medications Ordered This Visit   Medications    pregabalin (LYRICA) 25 MG capsule     Sig: Take 1 capsule by mouth Every Morning AND 2 capsules Every Evening.     Dispense:  270 capsule     Refill:  1     Switching script from rheumatology to neurology office    hydrOXYzine (ATARAX) 10 MG tablet     Sig: Take 1 tablet by mouth 3 (Three) Times a Day As Needed for Itching.     Dispense:  90 tablet     Refill:  5             Follow Up:   Return in about 6 months (around 5/11/2025).        Gerber Cain MD  [facility] [place]

## 2024-11-12 NOTE — PROGRESS NOTES
Office Visit       Date: 11/14/2024   Patient Name: Akua Torres  MRN: [id number]  YOB: 1945    Referring Physician: Ashley Bain APRN     Chief Complaint:   Chief Complaint   Patient presents with    Injections    Arthritis of both hips.    Oasteoarthritis of left shoulder       History of Present Illness: Akua Torres is a 79 y.o. female who is here today for left shoulder steroid injection.  She has failed conservative measures. She has not had this joint injected within the last three months.    Subjective     Past Medical History:   Past Medical History:   Diagnosis Date    A-fib 2019    Ankylosing spondylitis of site in spine ?    Anxiety and depression     Arthritis     Cataract     Cervical disc disorder 2010    Chronic pain disorder     since approx. 2000    Depression     Extremity pain     Fibromyalgia     Fibromyalgia, primary approx.2020 per trena saavedra    GERD (gastroesophageal reflux disease)     Gout     H/O bladder infections     JUST FINISHED MACROBID ON 11-2-17 FOR RECENT UTI    Headache, tension-type 2010    Hypercholesteremia     Hypertension     Joint pain     Kidney disease, chronic, stage III (GFR 30-59 ml/min)     resolved after stopping antiinflammatory ~2015    Low back pain     Lumbosacral disc disease 2000    Neck pain     Osteoarthritis     Peripheral neuropathy approx. 2023    dr albrecht ordered an emg test    Pneumonia 02/2020    Rheumatic fever     Sensorimotor neuropathy 11/11/2024    Skin cancer     Sleep apnea     no cpap    Spinal stenosis 06/27/2023    Stroke 09/2019    right sided weakness    Wears glasses        Past Surgical History:   Past Surgical History:   Procedure Laterality Date    BACK SURGERY  2004    LUMBAR PER DR THORNTON x2    CARDIAC CATHETERIZATION  2019    CARPAL TUNNEL RELEASE Left     COLONOSCOPY  11/2020    EPIDURAL BLOCK  1974    childbirth    EYE SURGERY      retina surgery    FINGER SURGERY Right     3RD  FINGER    HEEL SPUR EXCISION Bilateral     INTERVENTIONAL RADIOLOGY PROCEDURE N/A 09/17/2019    Procedure: Carotid and Cerebral Angiogram/ Possible Stent;  Surgeon: Imer Guerra MD;  Location:  [facility];  Service: Interventional Radiology    JOINT REPLACEMENT      both knees and both hips    KNEE ARTHROSCOPY Bilateral     LUMBAR DISCECTOMY FUSION INSTRUMENTATION N/A 11/10/2017    Procedure: LUMBAR SPINAL FUSION ;  Surgeon: Gopi Man MD;  Location: [facility];  Service:     NECK SURGERY  2019    clogged  artery and put stent    ORTHOPEDIC SURGERY  ?    had total knee, total hip, 2 back    REPLACEMENT TOTAL KNEE BILATERAL Bilateral     SHOULDER ARTHROSCOPY W/ ROTATOR CUFF REPAIR Right 01/19/2021    Procedure: ARTHROSCOPIC ROTATOR CUFF REPAIR WITH BICEPS TENODESIS RIGHT;  Surgeon: Lance Morales Jr., MD;  Location: [facility] OR;  Service: Orthopedics;  Laterality: Right;    SKIN BIOPSY      SPINAL FUSION      SPINE SURGERY  2004 & 2008    TOTAL HIP ARTHROPLASTY Right 09/09/2019    Procedure: TOTAL ANTERIOR RIGHT HIP ARTHROPLASTY;  Surgeon: Darrin New MD;  Location: [facility];  Service: Orthopedics    TOTAL HIP ARTHROPLASTY Left 06/29/2020    Procedure: TOTAL HIP ARTHROPLASTY ANTERIOR LEFT;  Surgeon: Darrin New MD;  Location: [facility];  Service: Orthopedics;  Laterality: Left;    TRIGGER POINT INJECTION  ?    had knee, back, several in shoulder       Family History:   Family History   Problem Relation Age of Onset    Cancer Mother     Colon polyps Mother     Hypertension Mother     Cancer Father     Hypertension Brother     Aneurysm Brother     Bone cancer Maternal Grandmother     Cancer Maternal Grandmother     Arthritis Maternal Grandfather     Hyperlipidemia Maternal Uncle     Kidney disease Maternal Uncle     Breast cancer Neg Hx     Ovarian cancer Neg Hx        Social History:   Social History     Socioeconomic History    Marital status:    Tobacco Use    Smoking  status: Never     Passive exposure: Never    Smokeless tobacco: Never   Vaping Use    Vaping status: Never Used   Substance and Sexual Activity    Alcohol use: No    Drug use: Never    Sexual activity: Not Currently     Partners: Male     Birth control/protection: Abstinence       Medications:   Current Outpatient Medications:     acetaminophen (TYLENOL) 325 MG tablet, Take 1 tablet by mouth Every 6 (Six) Hours As Needed for Mild Pain., Disp: , Rfl:     allopurinol (ZYLOPRIM) 100 MG tablet, Take 1 tablet by mouth Daily. For gout  Indications: Gout, Disp: 30 tablet, Rfl: 11    atenolol (TENORMIN) 25 MG tablet, TAKE 1 TABLET(25 MG) BY MOUTH TWICE DAILY, Disp: 60 tablet, Rfl: 0    atorvastatin (LIPITOR) 80 MG tablet, TAKE 1 TABLET BY MOUTH EVERY NIGHT, Disp: 30 tablet, Rfl: 0    Cyanocobalamin (Vitamin B-12) 1000 MCG sublingual tablet, Place 1 tablet under the tongue Daily., Disp: , Rfl:     escitalopram (Lexapro) 10 MG tablet, Take 1 tablet by mouth Daily. For depression, Disp: 30 tablet, Rfl: 11    Gemtesa 75 MG tablet, , Disp: , Rfl:     hydrOXYzine (ATARAX) 10 MG tablet, Take 1 tablet by mouth 3 (Three) Times a Day As Needed for Itching., Disp: 90 tablet, Rfl: 5    lisinopril (PRINIVIL,ZESTRIL) 20 MG tablet, TAKE 1 TABLET(20 MG) BY MOUTH DAILY FOR BLOOD PRESSURE, Disp: 30 tablet, Rfl: 0    methocarbamol (ROBAXIN) 750 MG tablet, Take 1 tablet by mouth 3 (Three) Times a Day. Indications: Musculoskeletal Pain, Disp: 90 tablet, Rfl: 11    neomycin-polymyxin-dexamethasone (MAXITROL) 3.5-82301-8.1 ophthalmic suspension, APPLY 1 DROP TO BOTH EYES EVERY 8 HOURS 3 TIMES A DAY FOR ONE WEEK THEN TAPER WEEKLY, Disp: , Rfl:     Omega-3 Fatty Acids (fish oil) 1000 MG capsule capsule, TAKE ONE CAPSULE BY MOUTH TWO TIMES A DAY FOR 90 DAYS, Disp: , Rfl:     pregabalin (LYRICA) 25 MG capsule, Take 1 capsule by mouth Every Morning AND 2 capsules Every Evening., Disp: 270 capsule, Rfl: 1    propafenone SR (RYTHMOL SR) 225 MG 12 hr  "capsule, Take 1 capsule by mouth Every 12 (Twelve) Hours., Disp: , Rfl:     rivaroxaban (XARELTO) 20 MG tablet, Take 1 tablet by mouth Every Night. Indications: Atrial Fibrillation, Disp: , Rfl:     Current Facility-Administered Medications:     [COMPLETED] methylPREDNISolone acetate (DEPO-medrol) injection 80 mg, 80 mg, Intra-articular, Once, Billy Charles D, APRN, 80 mg at 11/14/24 1408    Allergies:   Allergies   Allergen Reactions    Nsaids Other (See Comments)     KIDNEY DAMAGE    Quinidine Nausea And Vomiting and Other (See Comments)     FEVER      Bactrim [Sulfamethoxazole-Trimethoprim] Rash    Nitrofuran Derivatives Diarrhea    Penicillins Rash       I reviewed the patient's chief complaint, history of present illness, review of systems, past medical history, surgical history, family history, social history, medications and allergy list.     Objective    Vital Signs:   Vitals:    11/14/24 1357   BP: 140/84   BP Location: Left arm   Pulse: 61   Temp: 98 °F (36.7 °C)   Weight: 87.7 kg (193 lb 6.4 oz)   Height: 160 cm (63\")   PainSc:   6   PainLoc: Shoulder  Comment: left.     Body mass index is 34.26 kg/m².           Physical Exam:  Physical Exam  Constitutional:       Appearance: Normal appearance.   HENT:      Head: Normocephalic and atraumatic.   Eyes:      Conjunctiva/sclera: Conjunctivae normal.      Pupils: Pupils are equal, round, and reactive to light.   Cardiovascular:      Rate and Rhythm: Normal rate.   Pulmonary:      Effort: Pulmonary effort is normal.   Musculoskeletal:         General: Normal range of motion.      Cervical back: Normal range of motion.      Comments: Left shoulder without warmth, erythema, effusion   Skin:     General: Skin is warm and dry.   Neurological:      General: No focal deficit present.      Mental Status: She is alert and oriented to person, place, and time.   Psychiatric:         Mood and Affect: Mood normal.         Behavior: Behavior normal.         Thought " Content: Thought content normal.         Judgment: Judgment normal.            Assessment / Plan    Assessment/Plan:   Diagnoses and all orders for this visit:    1. Primary osteoarthritis of left shoulder (Primary)  Assessment & Plan:  Left shoulder steroid injection: 11/14/2024    See procedure documentation and MAR      Other orders  -     methylPREDNISolone acetate (DEPO-medrol) injection 80 mg  -     Arthrocentesis    Arthrocentesis    Date/Time: 11/14/2024 2:08 PM    Performed by: Billy Charles APRN  Authorized by: Billy Charles APRN  Indications: pain   Body area: shoulder  Joint: left shoulder  Local anesthesia used: yes    Anesthesia:  Local anesthesia used: yes  Local Anesthetic: topical anesthetic (ethyl chloride)    Sedation:  Patient sedated: no    Preparation: Aseptic technique with iodine site preparation.  Needle gauge: 25.  Ultrasound guidance: no  Meds administered: 80 mg methylPREDNISolone acetate (DEPO-medrol) injection 80 mg/mL  Patient tolerance: patient tolerated the procedure well with no immediate complications            Follow Up:   Return for Next scheduled follow up.        WILFREDO Steinberg  [facility] [place] [facility]

## 2024-11-13 ENCOUNTER — OUTSIDE FACILITY SERVICE (OUTPATIENT)
Dept: PAIN MEDICINE | Facility: CLINIC | Age: 79
End: 2024-11-13
Payer: MEDICARE

## 2024-11-13 PROCEDURE — 64634 DESTROY C/TH FACET JNT ADDL: CPT | Performed by: ANESTHESIOLOGY

## 2024-11-13 PROCEDURE — 99152 MOD SED SAME PHYS/QHP 5/>YRS: CPT | Performed by: ANESTHESIOLOGY

## 2024-11-13 PROCEDURE — 64633 DESTROY CERV/THOR FACET JNT: CPT | Performed by: ANESTHESIOLOGY

## 2024-11-14 ENCOUNTER — CLINICAL SUPPORT (OUTPATIENT)
Age: 79
End: 2024-11-14
Payer: MEDICARE

## 2024-11-14 VITALS
WEIGHT: 193.4 LBS | SYSTOLIC BLOOD PRESSURE: 140 MMHG | TEMPERATURE: 98 F | DIASTOLIC BLOOD PRESSURE: 84 MMHG | HEART RATE: 61 BPM | HEIGHT: 63 IN | BODY MASS INDEX: 34.27 KG/M2

## 2024-11-14 DIAGNOSIS — M19.012 PRIMARY OSTEOARTHRITIS OF LEFT SHOULDER: Primary | ICD-10-CM

## 2024-11-14 RX ORDER — METHYLPREDNISOLONE ACETATE 80 MG/ML
80 INJECTION, SUSPENSION INTRA-ARTICULAR; INTRALESIONAL; INTRAMUSCULAR; SOFT TISSUE ONCE
Status: COMPLETED | OUTPATIENT
Start: 2024-11-14 | End: 2024-11-14

## 2024-11-14 RX ADMIN — METHYLPREDNISOLONE ACETATE 80 MG: 80 INJECTION, SUSPENSION INTRA-ARTICULAR; INTRALESIONAL; INTRAMUSCULAR; SOFT TISSUE at 14:09

## 2024-11-14 RX ADMIN — METHYLPREDNISOLONE ACETATE 80 MG: 80 INJECTION, SUSPENSION INTRA-ARTICULAR; INTRALESIONAL; INTRAMUSCULAR; SOFT TISSUE at 14:08

## 2024-11-15 ENCOUNTER — TELEPHONE (OUTPATIENT)
Dept: PAIN MEDICINE | Facility: CLINIC | Age: 79
End: 2024-11-15
Payer: MEDICARE

## 2024-11-15 NOTE — TELEPHONE ENCOUNTER
I spoke with the patient regarding how she is feeling after her procedure with Dr Nettles on 11/13/24. Patient reports that she is doing well.    Akua Torres underwent a bilateral cervical medial branch rhizotomies at C4, C5, C6; for bilateral cervical facet joints at C4-C5, and C5-C6  on 11/13/24. Patient reported 100% relief at the time of after procedure exam with Dr Nettles. In addition, patient experienced significant functional improvement (perforing activities without experiencing pain compared with examoination prior to procedure that produced these activities.)    Pain level before procedure: 7/10  Pain level after procedure: 0/10    Today, the patient reports 70% pain relief (pain level 3/10.)    Patient denies side effects or complications.  Patient does not have any questions or concerns at this time.    Advised patient of follow up with WILFREDO Tucker on 4/3/25.

## 2024-11-26 NOTE — HOME HEALTH
PT Discharge OASIS Visit Note:   Skill/education provided: reviewed HEP with pt; gait training without AD; assessment of progress toward goals; completion of POC and OASIS d/c  Patient/caregiver response: pt has met all POC goals and is in agreement with d/c from   Plan for next visit: n/a  Other pertinent info: none Have Your Skin Lesions Been Treated?: not been treated Is This A New Presentation, Or A Follow-Up?: Skin Lesions

## 2024-12-02 ENCOUNTER — TELEPHONE (OUTPATIENT)
Dept: PAIN MEDICINE | Facility: CLINIC | Age: 79
End: 2024-12-02
Payer: MEDICARE

## 2024-12-02 NOTE — TELEPHONE ENCOUNTER
Hub staff attempted to follow warm transfer process and was unsuccessful     Caller: Akua Torres    Relationship to patient: Self    Best call back number: [phone]    Patient is needing: PATIENT'S PAIN HAS NOT GOTTEN ANY BETTER AND SHE CAN'T BE UP MORE THAN 5 MINUTES WOULD LIKE TO KNOW WHAT CAN BE DONE - PLEASE REACH OUT AND ADVISE

## 2024-12-02 NOTE — TELEPHONE ENCOUNTER
Called patient and she stated she is having horrible pain and only able to stand for 5 minutes and that when her head drops into her chest it is very painful.     Patient does not want to go to the E.R and is currently taking Tylenol and Lyrica for her pain. Patient has a PCP visit tomorrow and will discuss issues with her PCP because there are other things going on with her as well. I advised patient to use ice for 15-20 minutes and then a heating pad for 15-20 minutes to help with pain.

## 2024-12-03 ENCOUNTER — HOSPITAL ENCOUNTER (EMERGENCY)
Facility: HOSPITAL | Age: 79
Discharge: HOME | End: 2024-12-03
Attending: EMERGENCY MEDICINE | Admitting: EMERGENCY MEDICINE
Payer: MEDICARE

## 2024-12-03 ENCOUNTER — APPOINTMENT (OUTPATIENT)
Facility: HOSPITAL | Age: 79
End: 2024-12-03
Payer: MEDICARE

## 2024-12-03 VITALS
BODY MASS INDEX: 33.13 KG/M2 | WEIGHT: 187 LBS | RESPIRATION RATE: 17 BRPM | HEIGHT: 63 IN | OXYGEN SATURATION: 92 % | SYSTOLIC BLOOD PRESSURE: 137 MMHG | DIASTOLIC BLOOD PRESSURE: 50 MMHG | HEART RATE: 58 BPM | TEMPERATURE: 98 F

## 2024-12-03 DIAGNOSIS — M54.2 CHRONIC NECK PAIN: ICD-10-CM

## 2024-12-03 DIAGNOSIS — M54.12 CERVICAL RADICULOPATHY: Primary | ICD-10-CM

## 2024-12-03 DIAGNOSIS — N39.0 URINARY TRACT INFECTION IN FEMALE: ICD-10-CM

## 2024-12-03 DIAGNOSIS — G89.29 CHRONIC NECK PAIN: ICD-10-CM

## 2024-12-03 DIAGNOSIS — R53.83 OTHER FATIGUE: ICD-10-CM

## 2024-12-03 LAB
ALBUMIN SERPL-MCNC: 3.9 G/DL (ref 3.5–5.2)
ALBUMIN/GLOB SERPL: 1.6 G/DL
ALP SERPL-CCNC: 126 U/L (ref 39–117)
ALT SERPL W P-5'-P-CCNC: 23 U/L (ref 1–33)
AMORPH URATE CRY URNS QL MICRO: ABNORMAL /HPF
ANION GAP SERPL CALCULATED.3IONS-SCNC: 9.2 MMOL/L (ref 5–15)
AST SERPL-CCNC: 27 U/L (ref 1–32)
BACTERIA UR QL AUTO: ABNORMAL /HPF
BASOPHILS # BLD AUTO: 0.02 10*3/MM3 (ref 0–0.2)
BASOPHILS NFR BLD AUTO: 0.2 % (ref 0–1.5)
BILIRUB SERPL-MCNC: 1 MG/DL (ref 0–1.2)
BILIRUB UR QL STRIP: NEGATIVE
BUN SERPL-MCNC: 22 MG/DL (ref 8–23)
BUN/CREAT SERPL: 24.4 (ref 7–25)
CALCIUM SPEC-SCNC: 8.9 MG/DL (ref 8.6–10.5)
CHLORIDE SERPL-SCNC: 106 MMOL/L (ref 98–107)
CLARITY UR: CLEAR
CO2 SERPL-SCNC: 26.8 MMOL/L (ref 22–29)
COLOR UR: YELLOW
CREAT SERPL-MCNC: 0.9 MG/DL (ref 0.57–1)
D-LACTATE SERPL-SCNC: 1 MMOL/L (ref 0.5–2)
DEPRECATED RDW RBC AUTO: 50.1 FL (ref 37–54)
EGFRCR SERPLBLD CKD-EPI 2021: 64.8 ML/MIN/1.73
EOSINOPHIL # BLD AUTO: 0.22 10*3/MM3 (ref 0–0.4)
EOSINOPHIL NFR BLD AUTO: 2 % (ref 0.3–6.2)
ERYTHROCYTE [DISTWIDTH] IN BLOOD BY AUTOMATED COUNT: 14.2 % (ref 12.3–15.4)
GLOBULIN UR ELPH-MCNC: 2.5 GM/DL
GLUCOSE SERPL-MCNC: 109 MG/DL (ref 65–99)
GLUCOSE UR STRIP-MCNC: NEGATIVE MG/DL
HCT VFR BLD AUTO: 46.9 % (ref 34–46.6)
HGB BLD-MCNC: 14.7 G/DL (ref 12–15.9)
HGB UR QL STRIP.AUTO: NEGATIVE
HOLD SPECIMEN: NORMAL
HYALINE CASTS UR QL AUTO: ABNORMAL /LPF
IMM GRANULOCYTES # BLD AUTO: 0.01 10*3/MM3 (ref 0–0.05)
IMM GRANULOCYTES NFR BLD AUTO: 0.1 % (ref 0–0.5)
KETONES UR QL STRIP: NEGATIVE
LEUKOCYTE ESTERASE UR QL STRIP.AUTO: ABNORMAL
LYMPHOCYTES # BLD AUTO: 2.58 10*3/MM3 (ref 0.7–3.1)
LYMPHOCYTES NFR BLD AUTO: 23.5 % (ref 19.6–45.3)
MAGNESIUM SERPL-MCNC: 2 MG/DL (ref 1.6–2.4)
MCH RBC QN AUTO: 29.5 PG (ref 26.6–33)
MCHC RBC AUTO-ENTMCNC: 31.3 G/DL (ref 31.5–35.7)
MCV RBC AUTO: 94.2 FL (ref 79–97)
MONOCYTES # BLD AUTO: 0.73 10*3/MM3 (ref 0.1–0.9)
MONOCYTES NFR BLD AUTO: 6.6 % (ref 5–12)
MUCOUS THREADS URNS QL MICRO: ABNORMAL /HPF
NEUTROPHILS NFR BLD AUTO: 67.6 % (ref 42.7–76)
NEUTROPHILS NFR BLD AUTO: 7.42 10*3/MM3 (ref 1.7–7)
NITRITE UR QL STRIP: NEGATIVE
PH UR STRIP.AUTO: 5.5 [PH] (ref 5–8)
PLATELET # BLD AUTO: 211 10*3/MM3 (ref 140–450)
PMV BLD AUTO: 10.3 FL (ref 6–12)
POTASSIUM SERPL-SCNC: 4.3 MMOL/L (ref 3.5–5.2)
PROT SERPL-MCNC: 6.4 G/DL (ref 6–8.5)
PROT UR QL STRIP: ABNORMAL
RBC # BLD AUTO: 4.98 10*6/MM3 (ref 3.77–5.28)
RBC # UR STRIP: ABNORMAL /HPF
REF LAB TEST METHOD: ABNORMAL
SODIUM SERPL-SCNC: 142 MMOL/L (ref 136–145)
SP GR UR STRIP: >=1.03 (ref 1–1.03)
SQUAMOUS #/AREA URNS HPF: ABNORMAL /HPF
TROPONIN T SERPL HS-MCNC: 15 NG/L
TROPONIN T SERPL HS-MCNC: 16 NG/L
TSH SERPL DL<=0.05 MIU/L-ACNC: 1.07 UIU/ML (ref 0.27–4.2)
UROBILINOGEN UR QL STRIP: ABNORMAL
WBC # UR STRIP: ABNORMAL /HPF
WBC NRBC COR # BLD AUTO: 10.98 10*3/MM3 (ref 3.4–10.8)
WHOLE BLOOD HOLD COAG: NORMAL
WHOLE BLOOD HOLD SPECIMEN: NORMAL

## 2024-12-03 PROCEDURE — 85025 COMPLETE CBC W/AUTO DIFF WBC: CPT | Performed by: EMERGENCY MEDICINE

## 2024-12-03 PROCEDURE — 84443 ASSAY THYROID STIM HORMONE: CPT

## 2024-12-03 PROCEDURE — 81001 URINALYSIS AUTO W/SCOPE: CPT | Performed by: EMERGENCY MEDICINE

## 2024-12-03 PROCEDURE — 84484 ASSAY OF TROPONIN QUANT: CPT | Performed by: EMERGENCY MEDICINE

## 2024-12-03 PROCEDURE — 72125 CT NECK SPINE W/O DYE: CPT

## 2024-12-03 PROCEDURE — 99284 EMERGENCY DEPT VISIT MOD MDM: CPT

## 2024-12-03 PROCEDURE — 71045 X-RAY EXAM CHEST 1 VIEW: CPT

## 2024-12-03 PROCEDURE — 80053 COMPREHEN METABOLIC PANEL: CPT | Performed by: EMERGENCY MEDICINE

## 2024-12-03 PROCEDURE — 70450 CT HEAD/BRAIN W/O DYE: CPT

## 2024-12-03 PROCEDURE — 83605 ASSAY OF LACTIC ACID: CPT

## 2024-12-03 PROCEDURE — 84484 ASSAY OF TROPONIN QUANT: CPT

## 2024-12-03 PROCEDURE — 36415 COLL VENOUS BLD VENIPUNCTURE: CPT

## 2024-12-03 PROCEDURE — 93005 ELECTROCARDIOGRAM TRACING: CPT | Performed by: EMERGENCY MEDICINE

## 2024-12-03 PROCEDURE — 83735 ASSAY OF MAGNESIUM: CPT | Performed by: EMERGENCY MEDICINE

## 2024-12-03 RX ORDER — CEFDINIR 300 MG/1
300 CAPSULE ORAL 2 TIMES DAILY
Qty: 14 CAPSULE | Refills: 0 | Status: SHIPPED | OUTPATIENT
Start: 2024-12-03 | End: 2024-12-10

## 2024-12-03 RX ORDER — SODIUM CHLORIDE 0.9 % (FLUSH) 0.9 %
10 SYRINGE (ML) INJECTION AS NEEDED
Status: DISCONTINUED | OUTPATIENT
Start: 2024-12-03 | End: 2024-12-03 | Stop reason: HOSPADM

## 2024-12-03 RX ORDER — CYCLOBENZAPRINE HCL 10 MG
5 TABLET ORAL ONCE
Status: COMPLETED | OUTPATIENT
Start: 2024-12-03 | End: 2024-12-03

## 2024-12-03 RX ADMIN — CYCLOBENZAPRINE HYDROCHLORIDE 5 MG: 10 TABLET, FILM COATED ORAL at 16:02

## 2024-12-03 NOTE — TELEPHONE ENCOUNTER
"Called patient to advise her that it is too soon and to allow the rhizotomies to take full effect. I also advised patient to participate in physical therapy as well and patient stated \"okay\".     Patient was understanding and had no further questions   "

## 2024-12-03 NOTE — FSED PROVIDER NOTE
"Subjective  History of Present Illness:    Patient is a 79-year-old female past medical history as listed below, along with chronic cervical neck problems.  Patient had nerve block and injection she says this past summer which did help for a little while.  Patient states she also had a injection/nerve block 11/13 that helped for approximately a day or 2.  Patient states she had struggled for a long time with neck pain but is getting worse.  Patient states that she is on her feet for any length of time her neck starts to hurt.  Patient states if she sits down and puts her neck back to the back of the chair and helps.  Patient also has known neuropathy diffusely and osteoarthritis diffusely.  Patient denies any new trauma or injury.  Patient states she intermittently has numbness and tingling down both arms for a long time.  Patient states she  also struggles with alternating constipation and diarrhea for many years.  Patient states she recently had an M spike that she is concerned about and is following up with hematology oncology Dr. Nuñez.  Patient states she has been progressively more fatigued and \"cannot do her normal activities and feels like she sits around all day.\"  Patient denies chest pain, shortness of breath, abdominal pain, hematemesis, vomiting, melena, hematochezia, fever, chills.       Nurses Notes reviewed and agree, including vitals, allergies, social history and prior medical history.     REVIEW OF SYSTEMS: All systems reviewed and not pertinent unless noted.  Review of Systems    Past Medical History:   Diagnosis Date    A-fib 2019    Ankylosing spondylitis of site in spine ?    Anxiety and depression     Arthritis     Cataract     Cervical disc disorder 2010    Chronic pain disorder     since approx. 2000    Depression     Extremity pain     Fibromyalgia     Fibromyalgia, primary approx.2020 per trena saavedra    GERD (gastroesophageal reflux disease)     Gout     H/O bladder infections     JUST " FINISHED MACROBID ON 11-2-17 FOR RECENT UTI    Headache, tension-type 2010    Hypercholesteremia     Hypertension     Joint pain     Kidney disease, chronic, stage III (GFR 30-59 ml/min)     resolved after stopping antiinflammatory ~2015    Low back pain     Lumbosacral disc disease 2000    Neck pain     Osteoarthritis     Peripheral neuropathy approx. 2023    dr albrecht ordered an emg test    Pneumonia 02/2020    Rheumatic fever     Sensorimotor neuropathy 11/11/2024    Skin cancer     Sleep apnea     no cpap    Spinal stenosis 06/27/2023    Stroke 09/2019    right sided weakness    Wears glasses        Allergies:    Nsaids, Quinidine, Bactrim [sulfamethoxazole-trimethoprim], Nitrofuran derivatives, and Penicillins      Past Surgical History:   Procedure Laterality Date    BACK SURGERY  2004    LUMBAR PER DR MAN x2    CARDIAC CATHETERIZATION  2019    CARPAL TUNNEL RELEASE Left     COLONOSCOPY  11/2020    EPIDURAL BLOCK  1974    childbirth    EYE SURGERY      retina surgery    FINGER SURGERY Right     3RD FINGER    HEEL SPUR EXCISION Bilateral     INTERVENTIONAL RADIOLOGY PROCEDURE N/A 09/17/2019    Procedure: Carotid and Cerebral Angiogram/ Possible Stent;  Surgeon: Imer Guerra MD;  Location:  [facility];  Service: Interventional Radiology    JOINT REPLACEMENT      both knees and both hips    KNEE ARTHROSCOPY Bilateral     LUMBAR DISCECTOMY FUSION INSTRUMENTATION N/A 11/10/2017    Procedure: LUMBAR SPINAL FUSION ;  Surgeon: Gopi Man MD;  Location:  [facility];  Service:     NECK SURGERY  2019    clogged  artery and put stent    ORTHOPEDIC SURGERY  ?    had total knee, total hip, 2 back    REPLACEMENT TOTAL KNEE BILATERAL Bilateral     SHOULDER ARTHROSCOPY W/ ROTATOR CUFF REPAIR Right 01/19/2021    Procedure: ARTHROSCOPIC ROTATOR CUFF REPAIR WITH BICEPS TENODESIS RIGHT;  Surgeon: Lance Morales Jr., MD;  Location:  [facility];  Service: Orthopedics;  Laterality: Right;    SKIN  "BIOPSY      SPINAL FUSION      SPINE SURGERY  2004 & 2008    TOTAL HIP ARTHROPLASTY Right 09/09/2019    Procedure: TOTAL ANTERIOR RIGHT HIP ARTHROPLASTY;  Surgeon: Darrin New MD;  Location:  [facility];  Service: Orthopedics    TOTAL HIP ARTHROPLASTY Left 06/29/2020    Procedure: TOTAL HIP ARTHROPLASTY ANTERIOR LEFT;  Surgeon: Darrin New MD;  Location:  [facility];  Service: Orthopedics;  Laterality: Left;    TRIGGER POINT INJECTION  ?    had knee, back, several in shoulder         Social History     Socioeconomic History    Marital status:    Tobacco Use    Smoking status: Never     Passive exposure: Never    Smokeless tobacco: Never   Vaping Use    Vaping status: Never Used   Substance and Sexual Activity    Alcohol use: No    Drug use: Never    Sexual activity: Not Currently     Partners: Male     Birth control/protection: Abstinence         Family History   Problem Relation Age of Onset    Cancer Mother     Colon polyps Mother     Hypertension Mother     Cancer Father     Hypertension Brother     Aneurysm Brother     Bone cancer Maternal Grandmother     Cancer Maternal Grandmother     Arthritis Maternal Grandfather     Hyperlipidemia Maternal Uncle     Kidney disease Maternal Uncle     Breast cancer Neg Hx     Ovarian cancer Neg Hx        Objective  Physical Exam:  /50   Pulse 58   Temp 98 °F (36.7 °C) (Oral)   Resp 17   Ht 160 cm (63\")   Wt 84.8 kg (187 lb)   SpO2 92%   BMI 33.13 kg/m²      Physical Exam  Constitutional:       Appearance: Well-developed. No acute distress  HENT:      Head: Normocephalic and atraumatic.      Mouth/Throat:      Mouth: Mucous membranes are moist.      Eyes:      Extraocular Movements: Extraocular movements intact.   Cardiovascular:      Rate and Rhythm: Normal rate and regular rhythm.      Heart sounds: Normal heart sounds.   Pulmonary:      Effort: Pulmonary effort is normal. No respiratory distress.      Breath sounds: Normal breath sounds.   Abdominal: "      General: There is no distension.      Palpations: Abdomen is soft and nontender. No rebound tenderness or guarding noted  Musculoskeletal:         General: Mild swelling and tenderness diffusely of lower extremities.      Extremities: Moves all 4s   Mild pain to palpation cervical neck diffusely.  Full active range of motion, elicits some pain.  2+ pulses in all extremities.   Skin:     General: Skin is warm and dry.      Capillary Refill: Capillary refill takes less than 2 seconds.   Neurological:      Mental Status:Alert and oriented to person, place, and time.   Mentation is normal   Psychiatric:         Mood and Affect: Mood normal.         Behavior: Behavior normal.     Procedures    ED Course:    ED Course as of 12/03/24 1813   Tue Dec 03, 2024   1529 An EKG was ordered, reviewed, and interpreted by myself:  Rate: 62.    Rhythm: Sinus rhythm    QTc: 401    ST elevation: n. STEMI: N    Left axis deviation, LVH criteria noted     [BARRY]   1646 Bacteria, UA(!): 1+ [OM]   1647 WBC, UA(!): 3-5 [OM]   1647 Leukocytes, UA(!): Trace [OM]      ED Course User Index  [BARRY] Juan Marino MD  [OM] Shellie Silva PA-C       Lab Results (last 24 hours)       Procedure Component Value Units Date/Time    CBC & Differential [[id number]]  (Abnormal) Collected: 12/03/24 1531    Specimen: Blood Updated: 12/03/24 1537    Narrative:      The following orders were created for panel order CBC & Differential.  Procedure                               Abnormality         Status                     ---------                               -----------         ------                     CBC Auto Differential[[id number]]        Abnormal            Final result                 Please view results for these tests on the individual orders.    Comprehensive Metabolic Panel [[id number]]  (Abnormal) Collected: 12/03/24 1531    Specimen: Blood Updated: 12/03/24 1556     Glucose 109 mg/dL      BUN 22 mg/dL      Creatinine 0.90 mg/dL      Sodium  142 mmol/L      Potassium 4.3 mmol/L      Chloride 106 mmol/L      CO2 26.8 mmol/L      Calcium 8.9 mg/dL      Total Protein 6.4 g/dL      Albumin 3.9 g/dL      ALT (SGPT) 23 U/L      AST (SGOT) 27 U/L      Alkaline Phosphatase 126 U/L      Total Bilirubin 1.0 mg/dL      Globulin 2.5 gm/dL      A/G Ratio 1.6 g/dL      BUN/Creatinine Ratio 24.4     Anion Gap 9.2 mmol/L      eGFR 64.8 mL/min/1.73     Narrative:      GFR Normal >60  Chronic Kidney Disease <60  Kidney Failure <15    The GFR formula is only valid for adults with stable renal function between ages 18 and 70.    Single High Sensitivity Troponin T [[id number]]  (Abnormal) Collected: 12/03/24 1531    Specimen: Blood Updated: 12/03/24 1553     HS Troponin T 16 ng/L     Magnesium [[id number]]  (Normal) Collected: 12/03/24 1531    Specimen: Blood Updated: 12/03/24 1556     Magnesium 2.0 mg/dL     CBC Auto Differential [[id number]]  (Abnormal) Collected: 12/03/24 1531    Specimen: Blood Updated: 12/03/24 1537     WBC 10.98 10*3/mm3      RBC 4.98 10*6/mm3      Hemoglobin 14.7 g/dL      Hematocrit 46.9 %      MCV 94.2 fL      MCH 29.5 pg      MCHC 31.3 g/dL      RDW 14.2 %      RDW-SD 50.1 fl      MPV 10.3 fL      Platelets 211 10*3/mm3      Neutrophil % 67.6 %      Lymphocyte % 23.5 %      Monocyte % 6.6 %      Eosinophil % 2.0 %      Basophil % 0.2 %      Immature Grans % 0.1 %      Neutrophils, Absolute 7.42 10*3/mm3      Lymphocytes, Absolute 2.58 10*3/mm3      Monocytes, Absolute 0.73 10*3/mm3      Eosinophils, Absolute 0.22 10*3/mm3      Basophils, Absolute 0.02 10*3/mm3      Immature Grans, Absolute 0.01 10*3/mm3     TSH [[id number]]  (Normal) Collected: 12/03/24 1531    Specimen: Blood Updated: 12/03/24 1602     TSH 1.070 uIU/mL     Lactic Acid, Plasma [[id number]]  (Normal) Collected: 12/03/24 1531    Specimen: Blood Updated: 12/03/24 1553     Lactate 1.0 mmol/L     Urinalysis With Microscopic If Indicated (No Culture) - Urine, Clean Catch [[id number]]   (Abnormal) Collected: 12/03/24 1600    Specimen: Urine, Clean Catch Updated: 12/03/24 1611     Color, UA Yellow     Appearance, UA Clear     pH, UA 5.5     Specific Gravity, UA >=1.030     Glucose, UA Negative     Ketones, UA Negative     Bilirubin, UA Negative     Blood, UA Negative     Protein, UA Trace     Leuk Esterase, UA Trace     Nitrite, UA Negative     Urobilinogen, UA 0.2 E.U./dL    Urinalysis, Microscopic Only - Urine, Clean Catch [[id number]]  (Abnormal) Collected: 12/03/24 1600    Specimen: Urine, Clean Catch Updated: 12/03/24 1612     RBC, UA None Seen /HPF      WBC, UA 3-5 /HPF      Bacteria, UA 1+ /HPF      Squamous Epithelial Cells, UA 0-2 /HPF      Hyaline Casts, UA 0-2 /LPF      Amorphous Crystals, UA Moderate/2+ /HPF      Mucus, UA Trace /HPF      Methodology Manual Light Microscopy    Single High Sensitivity Troponin T [[id number]]  (Abnormal) Collected: 12/03/24 1728    Specimen: Blood Updated: 12/03/24 1749     HS Troponin T 15 ng/L              CT Cervical Spine Without Contrast    Result Date: 12/3/2024  CT HEAD WO CONTRAST, CT CERVICAL SPINE WO CONTRAST Date of Exam: 12/3/2024 3:44 PM EST Indication: pain, radiculopathy. Comparison: MRI cervical spine 8/18/2024, MRI brain 1/30/2023 Technique: Axial CT images were obtained of the head and cervical spine without contrast administration.  Automated exposure control and iterative construction methods were used. Findings: CT head: Mild generalized volume loss appear stable and there is also a unchanged small area of chronic lacunar infarct seen within the subcortical white matter of the left posterior frontal lobe. There is otherwise no evidence of intracranial hemorrhage, mass or mass effect. The ventricles are normal in size and configuration. The orbits are normal. The paranasal sinuses appear clear. The calvarium is intact. CT cervical spine: Cortical margins appear intact without evidence of acute fracture. Mixed lytic and sclerotic changes  are present involving the dens with prominent surrounding partially calcified pannus present consistent with odontoid pannus, potentially rheumatoid. Advanced multilevel spondylotic endplate changes are also present, without evidence of suspicious focal lytic or sclerotic osseous lesion. The paraspinal soft tissues demonstrate no acute or suspicious findings. Multilevel spondylosis is again apparent, grossly similar to and better characterized on prior MRI, most advanced at C5-6 and C6-7 with probable unchanged moderate to severe spinal canal and neuroforaminal narrowing.     Impression: Impression: No acute intracranial findings. No acute fracture or traumatic malalignment of the cervical spine. Advanced multilevel spondylosis is present, better characterized on MRI without definite new bony canal impingement. 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 Electronically Signed: Sung Alfaro MD  12/3/2024 4:04 PM EST  Workstation ID: [id number]    CT Head Without Contrast    Result Date: 12/3/2024  CT HEAD WO CONTRAST, CT CERVICAL SPINE WO CONTRAST Date of Exam: 12/3/2024 3:44 PM EST Indication: pain, radiculopathy. Comparison: MRI cervical spine 8/18/2024, MRI brain 1/30/2023 Technique: Axial CT images were obtained of the head and cervical spine without contrast administration.  Automated exposure control and iterative construction methods were used. Findings: CT head: Mild generalized volume loss appear stable and there is also a unchanged small area of chronic lacunar infarct seen within the subcortical white matter of the left posterior frontal lobe. There is otherwise no evidence of intracranial hemorrhage, mass or mass effect. The ventricles are normal in size and configuration. The orbits are normal. The paranasal sinuses appear clear. The calvarium is intact. CT cervical spine: Cortical margins appear intact without evidence of acute fracture. Mixed lytic and  sclerotic changes are present involving the dens with prominent surrounding partially calcified pannus present consistent with odontoid pannus, potentially rheumatoid. Advanced multilevel spondylotic endplate changes are also present, without evidence of suspicious focal lytic or sclerotic osseous lesion. The paraspinal soft tissues demonstrate no acute or suspicious findings. Multilevel spondylosis is again apparent, grossly similar to and better characterized on prior MRI, most advanced at C5-6 and C6-7 with probable unchanged moderate to severe spinal canal and neuroforaminal narrowing.     Impression: Impression: No acute intracranial findings. No acute fracture or traumatic malalignment of the cervical spine. Advanced multilevel spondylosis is present, better characterized on MRI without definite new bony canal impingement. 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 Electronically Signed: Sung Alfaro MD  12/3/2024 4:04 PM EST  Workstation ID: [id number]    XR Chest 1 View    Result Date: 12/3/2024  XR CHEST 1 VW Date of Exam: 12/3/2024 3:55 PM EST Indication: Weak/Dizzy/AMS triage protocol Comparison: 1/29/2023 Findings: Heart and pulmonary vessels appear within normal limits. Lung fields appear clear of acute infiltrates or effusions.     Impression: Impression: No acute process. Electronically Signed: Teresita Salamanca MD  12/3/2024 3:58 PM EST  Workstation ID: [id number]        [facility]      Initial impression of presenting illness: Worsening chronic cervical neck pain, alternating constipation and diarrhea, overall worsening fatigue and general weakness.  Patient has appointment coming up with Dr. Nuñez for hematology.  Patient also follows with rheumatology, last saw them on 11/14.  Patient had left shoulder steroid injection 11/14.  Patient also follows with Dr. Dr Nettles pain management.  Patient has had back surgeries by Dr. Dillard, follows with hemant  orthopedics Dr. New    DDX: includes but is not limited to: Chronic pain, osteoarthritis, cervical radiculopathy, degenerative disc disease, metastatic disease, electrolyte abnormality,    Patient arrives comfortable, vital signs stable with vitals interpreted by myself.     Pertinent results:     Diagnostic information from other sources: Chart review    Interventions / Re-evaluation:     Medications   sodium chloride 0.9 % flush 10 mL (has no administration in time range)   cyclobenzaprine (FLEXERIL) tablet 5 mg (5 mg Oral Given 12/3/24 1602)       Results/clinical rationale were discussed with Patient and spouse and attending     Data interpreted: Nursing notes reviewed, vital signs reviewed.  Labs independently interpreted by me     Counseling: Discussed the results above with the patient regarding need for admission or discharge.  Patient understands and agrees plan of care.  Discussed with patients the results from today's visit. Discussed with patient strict return precautions and they verbalize understanding. Recommend to them following up with primary care as soon as possible. Patient is discharged hemodynamically stable and comfortable.   Discussed the cervical neck findings and to follow-up with a spine doctor.  Placed this on patient's paperwork and discussed to call and make an appointment.  Also discussed found to have a urinary tract infection, will prescribe an antibiotic and that could be contributing to some of her fatigue.  Initial troponin 16, repeat 17.  Patient denies any chest pain or shortness of breath.  Appears elevated troponins are chronic for this patient, last year was 21 and 17.  Patient has been resting comfortably in no pain while in the emergency department and vital signs hemodynamically stable.    -----  ED Disposition       ED Disposition   Discharge    Condition   Stable    Comment   --             Final diagnoses:   Cervical radiculopathy   Chronic neck pain   Urinary tract  infection in female   Other fatigue      Your Follow-Up Providers       Schedule an appointment as soon as possible for a visit  with Ashley Bain APRN.    Specialty: Family Medicine  [address]  [facility] [address] [phone]               Schedule an appointment as soon as possible for a visit  with John Paul Romero MD.    Specialty: Neurosurgery  [address]  [address]  [facility] [address] [phone]                       Contact information for after-discharge care    Follow-up information has not been specified.                    Your medication list        START taking these medications        Instructions Last Dose Given Next Dose Due   cefdinir 300 MG capsule  Commonly known as: OMNICEF      Take 1 capsule by mouth 2 (Two) Times a Day for 7 days.              CONTINUE taking these medications        Instructions Last Dose Given Next Dose Due   acetaminophen 325 MG tablet  Commonly known as: TYLENOL      Take 1 tablet by mouth Every 6 (Six) Hours As Needed for Mild Pain.       allopurinol 100 MG tablet  Commonly known as: ZYLOPRIM      Take 1 tablet by mouth Daily. For gout  Indications: Gout       atenolol 25 MG tablet  Commonly known as: TENORMIN      TAKE 1 TABLET(25 MG) BY MOUTH TWICE DAILY       atorvastatin 80 MG tablet  Commonly known as: LIPITOR      TAKE 1 TABLET BY MOUTH EVERY NIGHT       escitalopram 10 MG tablet  Commonly known as: Lexapro      Take 1 tablet by mouth Daily. For depression       fish oil 1000 MG capsule capsule      TAKE ONE CAPSULE BY MOUTH TWO TIMES A DAY FOR 90 DAYS       Gemtesa 75 MG tablet  Generic drug: Vibegron           hydrOXYzine 10 MG tablet  Commonly known as: ATARAX      Take 1 tablet by mouth 3 (Three) Times a Day As Needed for Itching.       lisinopril 20 MG tablet  Commonly known as: PRINIVIL,ZESTRIL      TAKE 1 TABLET(20 MG) BY MOUTH DAILY FOR BLOOD PRESSURE       methocarbamol 750 MG tablet  Commonly known as: ROBAXIN      Take 1  tablet by mouth 3 (Three) Times a Day. Indications: Musculoskeletal Pain       neomycin-polymyxin-dexamethasone [id number] ophthalmic suspension  Commonly known as: MAXITROL      APPLY 1 DROP TO BOTH EYES EVERY 8 HOURS 3 TIMES A DAY FOR ONE WEEK THEN TAPER WEEKLY       pregabalin 25 MG capsule  Commonly known as: LYRICA      Take 1 capsule by mouth Every Morning AND 2 capsules Every Evening.       propafenone  MG 12 hr capsule  Commonly known as: RYTHMOL SR      Take 1 capsule by mouth Every 12 (Twelve) Hours.       rivaroxaban 20 MG tablet  Commonly known as: XARELTO      Take 1 tablet by mouth Every Night. Indications: Atrial Fibrillation       Vitamin B-12 1000 MCG sublingual tablet      Place 1 tablet under the tongue Daily.                 Where to Get Your Medications        These medications were sent to [id number] - [place], [place] - [address] - [phone]  - [phone] FX  [address] [facility] [address]      Phone: [phone]   cefdinir 300 MG capsule

## 2024-12-03 NOTE — DISCHARGE INSTRUCTIONS
Please return for worsening or changing symptoms.  Please follow-up with primary care and neurosurgery on your paperwork.

## 2024-12-04 LAB
QT INTERVAL: 396 MS
QTC INTERVAL: 401 MS

## 2024-12-09 ENCOUNTER — LAB (OUTPATIENT)
Dept: LAB | Facility: HOSPITAL | Age: 79
End: 2024-12-09
Payer: MEDICARE

## 2024-12-09 ENCOUNTER — CONSULT (OUTPATIENT)
Dept: ONCOLOGY | Facility: CLINIC | Age: 79
End: 2024-12-09
Payer: MEDICARE

## 2024-12-09 ENCOUNTER — HOSPITAL ENCOUNTER (OUTPATIENT)
Dept: GENERAL RADIOLOGY | Facility: HOSPITAL | Age: 79
Discharge: HOME | End: 2024-12-09
Payer: MEDICARE

## 2024-12-09 VITALS
HEIGHT: 62 IN | DIASTOLIC BLOOD PRESSURE: 90 MMHG | SYSTOLIC BLOOD PRESSURE: 172 MMHG | HEART RATE: 61 BPM | WEIGHT: 190 LBS | RESPIRATION RATE: 18 BRPM | TEMPERATURE: 98.1 F | BODY MASS INDEX: 34.96 KG/M2 | OXYGEN SATURATION: 96 %

## 2024-12-09 DIAGNOSIS — D47.2 MONOCLONAL GAMMOPATHY: ICD-10-CM

## 2024-12-09 DIAGNOSIS — D47.2 MONOCLONAL GAMMOPATHY: Primary | ICD-10-CM

## 2024-12-09 LAB
ALBUMIN SERPL-MCNC: 3.8 G/DL (ref 3.5–5.2)
ALBUMIN/GLOB SERPL: 1.3 G/DL
ALP SERPL-CCNC: 134 U/L (ref 39–117)
ALT SERPL W P-5'-P-CCNC: 14 U/L (ref 1–33)
ANION GAP SERPL CALCULATED.3IONS-SCNC: 9 MMOL/L (ref 5–15)
AST SERPL-CCNC: 19 U/L (ref 1–32)
B2 MICROGLOB SERPL-MCNC: 3 MG/L (ref 0.8–2.2)
BASOPHILS # BLD AUTO: 0.03 10*3/MM3 (ref 0–0.2)
BASOPHILS NFR BLD AUTO: 0.3 % (ref 0–1.5)
BILIRUB SERPL-MCNC: 0.7 MG/DL (ref 0–1.2)
BUN SERPL-MCNC: 16 MG/DL (ref 8–23)
BUN/CREAT SERPL: 17.2 (ref 7–25)
CALCIUM SPEC-SCNC: 9.2 MG/DL (ref 8.6–10.5)
CHLORIDE SERPL-SCNC: 108 MMOL/L (ref 98–107)
CO2 SERPL-SCNC: 28 MMOL/L (ref 22–29)
CREAT SERPL-MCNC: 0.93 MG/DL (ref 0.57–1)
CRP SERPL-MCNC: <0.3 MG/DL (ref 0–0.5)
DEPRECATED RDW RBC AUTO: 50.7 FL (ref 37–54)
EGFRCR SERPLBLD CKD-EPI 2021: 62.3 ML/MIN/1.73
EOSINOPHIL # BLD AUTO: 0.33 10*3/MM3 (ref 0–0.4)
EOSINOPHIL NFR BLD AUTO: 2.9 % (ref 0.3–6.2)
ERYTHROCYTE [DISTWIDTH] IN BLOOD BY AUTOMATED COUNT: 13.9 % (ref 12.3–15.4)
ERYTHROCYTE [SEDIMENTATION RATE] IN BLOOD: 27 MM/HR (ref 0–30)
GLOBULIN UR ELPH-MCNC: 2.9 GM/DL
GLUCOSE SERPL-MCNC: 111 MG/DL (ref 65–99)
HCT VFR BLD AUTO: 45.3 % (ref 34–46.6)
HGB BLD-MCNC: 14.1 G/DL (ref 12–15.9)
IMM GRANULOCYTES # BLD AUTO: 0.01 10*3/MM3 (ref 0–0.05)
IMM GRANULOCYTES NFR BLD AUTO: 0.1 % (ref 0–0.5)
LYMPHOCYTES # BLD AUTO: 2.33 10*3/MM3 (ref 0.7–3.1)
LYMPHOCYTES NFR BLD AUTO: 20.4 % (ref 19.6–45.3)
MCH RBC QN AUTO: 30.1 PG (ref 26.6–33)
MCHC RBC AUTO-ENTMCNC: 31.1 G/DL (ref 31.5–35.7)
MCV RBC AUTO: 96.6 FL (ref 79–97)
MONOCYTES # BLD AUTO: 0.63 10*3/MM3 (ref 0.1–0.9)
MONOCYTES NFR BLD AUTO: 5.5 % (ref 5–12)
NEUTROPHILS NFR BLD AUTO: 70.8 % (ref 42.7–76)
NEUTROPHILS NFR BLD AUTO: 8.07 10*3/MM3 (ref 1.7–7)
PLATELET # BLD AUTO: 199 10*3/MM3 (ref 140–450)
PMV BLD AUTO: 10.1 FL (ref 6–12)
POTASSIUM SERPL-SCNC: 4.2 MMOL/L (ref 3.5–5.2)
PROT SERPL-MCNC: 6.7 G/DL (ref 6–8.5)
RBC # BLD AUTO: 4.69 10*6/MM3 (ref 3.77–5.28)
SODIUM SERPL-SCNC: 145 MMOL/L (ref 136–145)
WBC NRBC COR # BLD AUTO: 11.4 10*3/MM3 (ref 3.4–10.8)

## 2024-12-09 PROCEDURE — 77075 RADEX OSSEOUS SURVEY COMPL: CPT

## 2024-12-09 PROCEDURE — 99205 OFFICE O/P NEW HI 60 MIN: CPT | Performed by: INTERNAL MEDICINE

## 2024-12-09 PROCEDURE — 1159F MED LIST DOCD IN RCRD: CPT | Performed by: INTERNAL MEDICINE

## 2024-12-09 PROCEDURE — 84165 PROTEIN E-PHORESIS SERUM: CPT

## 2024-12-09 PROCEDURE — 1160F RVW MEDS BY RX/DR IN RCRD: CPT | Performed by: INTERNAL MEDICINE

## 2024-12-09 PROCEDURE — 1126F AMNT PAIN NOTED NONE PRSNT: CPT | Performed by: INTERNAL MEDICINE

## 2024-12-09 PROCEDURE — 3080F DIAST BP >= 90 MM HG: CPT | Performed by: INTERNAL MEDICINE

## 2024-12-09 PROCEDURE — 82784 ASSAY IGA/IGD/IGG/IGM EACH: CPT

## 2024-12-09 PROCEDURE — 86334 IMMUNOFIX E-PHORESIS SERUM: CPT

## 2024-12-09 PROCEDURE — 82785 ASSAY OF IGE: CPT

## 2024-12-09 PROCEDURE — 82232 ASSAY OF BETA-2 PROTEIN: CPT

## 2024-12-09 PROCEDURE — 83521 IG LIGHT CHAINS FREE EACH: CPT

## 2024-12-09 PROCEDURE — 85025 COMPLETE CBC W/AUTO DIFF WBC: CPT

## 2024-12-09 PROCEDURE — 36415 COLL VENOUS BLD VENIPUNCTURE: CPT

## 2024-12-09 PROCEDURE — 85652 RBC SED RATE AUTOMATED: CPT

## 2024-12-09 PROCEDURE — 86140 C-REACTIVE PROTEIN: CPT

## 2024-12-09 PROCEDURE — 80053 COMPREHEN METABOLIC PANEL: CPT

## 2024-12-09 PROCEDURE — 3077F SYST BP >= 140 MM HG: CPT | Performed by: INTERNAL MEDICINE

## 2024-12-09 NOTE — LETTER
December 9, 2024     WILFREDO Murray  [address] [facility] [address]    Patient: Akua Torres   YOB: 1945   Date of Visit: 12/9/2024     Dear WILFREDO Murray:       Thank you for referring Akua Torres to me for evaluation. Below are the relevant portions of my assessment and plan of care.    If you have questions, please do not hesitate to call me. I look forward to following Akua along with you.         Sincerely,        Kenji Nuñez MD        CC: Corinne E Perkins, PT  MD Gerber Wheatley MD Evva D Allen, Kenji Malik MD  12/09/24 1010  Sign when Signing Visit  CHIEF COMPLAINT: General Arthralgias    REASON FOR REFERRAL: Monoclonal gammopathy      RECORDS OBTAINED  Records of the patients history including those obtained from Dr. Gerber Cain were reviewed and summarized in detail.    Oncology/Hematology History Overview Note   1.  IgG kappa monoclonal gammopathy with scant elevation kappa light chain serum   2.  Left prefrontal gyrus stroke  3.  Hyperlipidemia  4.  Hypertension  5.  Gout on allopurinol  6.  Osteoarthritis with fibromyalgia seeing Dr. Gerber Cain.  Multiple back surgeries and chronic back pain.  7.  Paroxysmal atrial fibrillation on Xarelto    -5/9/2024 M spike 100 mg/dL IgG kappa  -10/17/2024 IgG slightly low 523 with normal IgA and IgM.  IgG kappa M spike 100 mg/dL.  Serum kappa 25 upper limit of normal 19 with normal lambda and ratio 1.07.  Normal C-reactive protein less than 0.3.  Sedimentation rate mildly elevated 38 upper limit of normal 30.  CBC and CMP unremarkable save for glucose 119 alkaline phosphatase 129.  Beta-2 microglobulin 3.4 upper limit of normal 2.2..  No lytic lesions mentioned on MRI humerus July 2024, MRI cervical spine August 2024, CT cervical spine December 2024.    -12/9/2024 [facility] oncology consult: Patient has been stable mild elevation of IgG kappa M protein and her  serum without significant elevation in her total protein, globulin, and without significant change in her albumin and ratios thereof.  She has no renal insufficiency or anemia.  No lytic lesions on other imaging done but I will get a bone survey and a bone marrow biopsy and myeloma panel including 24-hour urine immunoelectrophoresis.  She will see my nurse practitioner back in about a month to go over all of this.  If she has less than 4% plasma cells and no lytic lesions then she would not even meet the criteria for an MGUS.    If she has between 4 and 10% monoclonal plasma cells in the bone marrow light microscopy and in particular if there are complex cytogenetics or FISH results and she has no lytic bone lesions on bone survey, then this would be considered benign monoclonal gammopathy that we would follow-up with serum testing twice a year to watch for progression towards myeloma.  If she has greater than 10% plasma cells but no lytic bone lesions, calcium over 12, hemoglobin less than 10, creatinine over 2, then she would have a smoldering myeloma that we would watch quarterly.  If she has greater than 10% plasma cells with any of the following (lytic bone lesions, calcium over 12, hemoglobin over 10, creatinine over 2) then she would have full-blown myeloma and we would need to discuss treatment rather than watchful waiting.  If her bone marrow shows greater than 10% plasma cells I would also ask my nurse practitioner to order a PET for completeness sake.     Monoclonal gammopathy       HISTORY OF PRESENT ILLNESS:  The patient is a 79 y.o.  female, referred for monoclonal gammopathy stable and persistent x 6 months with arthralgias and fibromyalgia with purported ankylosing spondylitis    REVIEW OF SYSTEMS:  General joint aches but otherwise no specific complaints    Past Medical History:   Diagnosis Date   • A-fib 2019   • Ankylosing spondylitis of site in spine ?   • Anxiety and depression    • Arthritis     • Cataract    • Cervical disc disorder 2010   • Chronic pain disorder     since approx. 2000   • Depression    • Extremity pain    • Fibromyalgia    • Fibromyalgia, primary approx.2020 per trena saavedra   • GERD (gastroesophageal reflux disease)    • Gout    • H/O bladder infections     JUST FINISHED MACROBID ON 11-2-17 FOR RECENT UTI   • Headache, tension-type 2010   • Hypercholesteremia    • Hypertension    • Joint pain    • Kidney disease, chronic, stage III (GFR 30-59 ml/min)     resolved after stopping antiinflammatory ~2015   • Low back pain    • Lumbosacral disc disease 2000   • Neck pain    • Osteoarthritis    • Peripheral neuropathy approx. 2023    dr albrecht ordered an emg test   • Pneumonia 02/2020   • Rheumatic fever    • Sensorimotor neuropathy 11/11/2024   • Skin cancer    • Sleep apnea     no cpap   • Spinal stenosis 06/27/2023   • Stroke 09/2019    right sided weakness   • Wears glasses      Past Surgical History:   Procedure Laterality Date   • BACK SURGERY  2004    LUMBAR PER DR MAN x2   • CARDIAC CATHETERIZATION  2019   • CARPAL TUNNEL RELEASE Left    • COLONOSCOPY  11/2020   • EPIDURAL BLOCK  1974    childbirth   • EYE SURGERY      retina surgery   • FINGER SURGERY Right     3RD FINGER   • HEEL SPUR EXCISION Bilateral    • INTERVENTIONAL RADIOLOGY PROCEDURE N/A 09/17/2019    Procedure: Carotid and Cerebral Angiogram/ Possible Stent;  Surgeon: Imer Guerra MD;  Location:  [facility] CATH INVASIVE LOCATION;  Service: Interventional Radiology   • JOINT REPLACEMENT      both knees and both hips   • KNEE ARTHROSCOPY Bilateral    • LUMBAR DISCECTOMY FUSION INSTRUMENTATION N/A 11/10/2017    Procedure: LUMBAR SPINAL FUSION ;  Surgeon: Gopi Man MD;  Location:  [facility];  Service:    • NECK SURGERY  2019    clogged  artery and put stent   • ORTHOPEDIC SURGERY  ?    had total knee, total hip, 2 back   • REPLACEMENT TOTAL KNEE BILATERAL Bilateral    • SHOULDER ARTHROSCOPY W/ ROTATOR CUFF REPAIR  Right 01/19/2021    Procedure: ARTHROSCOPIC ROTATOR CUFF REPAIR WITH BICEPS TENODESIS RIGHT;  Surgeon: Lance Morales Jr., MD;  Location:  [facility];  Service: Orthopedics;  Laterality: Right;   • SKIN BIOPSY     • SPINAL FUSION     • SPINE SURGERY  2004 & 2008   • TOTAL HIP ARTHROPLASTY Right 09/09/2019    Procedure: TOTAL ANTERIOR RIGHT HIP ARTHROPLASTY;  Surgeon: Darrin New MD;  Location:  [facility];  Service: Orthopedics   • TOTAL HIP ARTHROPLASTY Left 06/29/2020    Procedure: TOTAL HIP ARTHROPLASTY ANTERIOR LEFT;  Surgeon: Darrin New MD;  Location:  [facility];  Service: Orthopedics;  Laterality: Left;   • TRIGGER POINT INJECTION  ?    had knee, back, several in shoulder       Current Outpatient Medications on File Prior to Visit   Medication Sig Dispense Refill   • acetaminophen (TYLENOL) 325 MG tablet Take 1 tablet by mouth Every 6 (Six) Hours As Needed for Mild Pain.     • allopurinol (ZYLOPRIM) 100 MG tablet Take 1 tablet by mouth Daily. For gout  Indications: Gout 30 tablet 11   • atenolol (TENORMIN) 25 MG tablet TAKE 1 TABLET(25 MG) BY MOUTH TWICE DAILY 60 tablet 0   • atorvastatin (LIPITOR) 80 MG tablet TAKE 1 TABLET BY MOUTH EVERY NIGHT 30 tablet 0   • cefdinir (OMNICEF) 300 MG capsule Take 1 capsule by mouth 2 (Two) Times a Day for 7 days. 14 capsule 0   • Cyanocobalamin (Vitamin B-12) 1000 MCG sublingual tablet Place 1 tablet under the tongue Daily.     • escitalopram (Lexapro) 10 MG tablet Take 1 tablet by mouth Daily. For depression 30 tablet 11   • Gemtesa 75 MG tablet      • hydrOXYzine (ATARAX) 10 MG tablet Take 1 tablet by mouth 3 (Three) Times a Day As Needed for Itching. 90 tablet 5   • lisinopril (PRINIVIL,ZESTRIL) 20 MG tablet TAKE 1 TABLET(20 MG) BY MOUTH DAILY FOR BLOOD PRESSURE 30 tablet 0   • methocarbamol (ROBAXIN) 750 MG tablet Take 1 tablet by mouth 3 (Three) Times a Day. Indications: Musculoskeletal Pain 90 tablet 11   • neomycin-polymyxin-dexamethasone (MAXITROL)  "3.5-99165-6.1 ophthalmic suspension APPLY 1 DROP TO BOTH EYES EVERY 8 HOURS 3 TIMES A DAY FOR ONE WEEK THEN TAPER WEEKLY     • Omega-3 Fatty Acids (fish oil) 1000 MG capsule capsule TAKE ONE CAPSULE BY MOUTH TWO TIMES A DAY FOR 90 DAYS     • pregabalin (LYRICA) 25 MG capsule Take 1 capsule by mouth Every Morning AND 2 capsules Every Evening. 270 capsule 1   • propafenone SR (RYTHMOL SR) 225 MG 12 hr capsule Take 1 capsule by mouth Every 12 (Twelve) Hours.     • rivaroxaban (XARELTO) 20 MG tablet Take 1 tablet by mouth Every Night. Indications: Atrial Fibrillation       No current facility-administered medications on file prior to visit.       Allergies   Allergen Reactions   • Nsaids Other (See Comments)     KIDNEY DAMAGE   • Quinidine Nausea And Vomiting and Other (See Comments)     FEVER     • Bactrim [Sulfamethoxazole-Trimethoprim] Rash   • Nitrofuran Derivatives Diarrhea   • Penicillins Rash       Social History     Socioeconomic History   • Marital status:    Tobacco Use   • Smoking status: Never     Passive exposure: Never   • Smokeless tobacco: Never   Vaping Use   • Vaping status: Never Used   Substance and Sexual Activity   • Alcohol use: No   • Drug use: Never   • Sexual activity: Not Currently     Partners: Male     Birth control/protection: Abstinence       Family History   Problem Relation Age of Onset   • Cancer Mother    • Colon polyps Mother    • Hypertension Mother    • Cancer Father    • Hypertension Brother    • Aneurysm Brother    • Hyperlipidemia Maternal Uncle    • Kidney disease Maternal Uncle    • Bone cancer Maternal Grandmother    • Cancer Maternal Grandmother    • Arthritis Maternal Grandfather    • Breast cancer Neg Hx    • Ovarian cancer Neg Hx        PHYSICAL EXAM:  No jaundice icterus or rash.  No palpable organomegaly.  No respiratory distress    /90 Comment: PT has not taken BP med this AM  Pulse 61   Temp 98.1 °F (36.7 °C)   Resp 18   Ht 157.5 cm (62\")   Wt " 86.2 kg (190 lb)   SpO2 96%   BMI 34.75 kg/m²     ECOG score: 2           ECOG: (2) Ambulatory & Capable of Self Care, Unable to Carry Out Work Activity, Up & About Greater Than 50% of Waking Hours    Lab Results   Component Value Date    HGB 14.7 12/03/2024    HCT 46.9 (H) 12/03/2024    MCV 94.2 12/03/2024     12/03/2024    WBC 10.98 (H) 12/03/2024    NEUTROABS 7.42 (H) 12/03/2024    LYMPHSABS 2.58 12/03/2024    MONOSABS 0.73 12/03/2024    EOSABS 0.22 12/03/2024    BASOSABS 0.02 12/03/2024     Lab Results   Component Value Date    GLUCOSE 109 (H) 12/03/2024    BUN 22 12/03/2024    CREATININE 0.90 12/03/2024     12/03/2024    K 4.3 12/03/2024     12/03/2024    CO2 26.8 12/03/2024    CALCIUM 8.9 12/03/2024    PROTEINTOT 6.4 12/03/2024    ALBUMIN 3.9 12/03/2024    BILITOT 1.0 12/03/2024    ALKPHOS 126 (H) 12/03/2024    AST 27 12/03/2024    ALT 23 12/03/2024         Assessment & Plan  1.  IgG kappa monoclonal gammopathy with scant elevation kappa light chain serum   2.  Left prefrontal gyrus stroke  3.  Hyperlipidemia  4.  Hypertension  5.  Gout on allopurinol  6.  Osteoarthritis with fibromyalgia seeing Dr. Gerber Cain.  Multiple back surgeries and chronic back pain.  7.  Paroxysmal atrial fibrillation on Xarelto    -5/9/2024 M spike 100 mg/dL IgG kappa  -10/17/2024 IgG slightly low 523 with normal IgA and IgM.  IgG kappa M spike 100 mg/dL.  Serum kappa 25 upper limit of normal 19 with normal lambda and ratio 1.07.  Normal C-reactive protein less than 0.3.  Sedimentation rate mildly elevated 38 upper limit of normal 30.  CBC and CMP unremarkable save for glucose 119 alkaline phosphatase 129.  Beta-2 microglobulin 3.4 upper limit of normal 2.2..  No lytic lesions mentioned on MRI humerus July 2024, MRI cervical spine August 2024, CT cervical spine December 2024.    -12/9/2024 [facility] hematology oncology consult: Patient has been stable mild elevation of IgG kappa M protein and her serum without  significant elevation in her total protein, globulin, and without significant change in her albumin and ratios thereof.  She has no renal insufficiency or anemia.  No lytic lesions on other imaging done but I will get a bone survey and a bone marrow biopsy and myeloma panel including 24-hour urine immunoelectrophoresis.  She will see my nurse practitioner back in about a month to go over all of this.  If she has less than 4% plasma cells and no lytic lesions then she would not even meet the criteria for an MGUS.    If she has between 4 and 10% monoclonal plasma cells in the bone marrow light microscopy and in particular if there are complex cytogenetics or FISH results and she has no lytic bone lesions on bone survey, then this would be considered benign monoclonal gammopathy that we would follow-up with serum testing twice a year to watch for progression towards myeloma.  If she has greater than 10% plasma cells but no lytic bone lesions, calcium over 12, hemoglobin less than 10, creatinine over 2, then she would have a smoldering myeloma that we would watch quarterly.  If she has greater than 10% plasma cells with any of the following (lytic bone lesions, calcium over 12, hemoglobin over 10, creatinine over 2) then she would have full-blown myeloma and we would need to discuss treatment rather than watchful waiting.  If her bone marrow shows greater than 10% plasma cells I would also ask my nurse practitioner to order a PET for completeness sake.    Total time of care today inclusive of time spent today prior to patient's arrival reviewing past records and cataloging as above and during visit interviewing her as to the potential causes and consequences of monoclonal gammopathy and the workup and management thereof and after visit instituting this plan as outlined took 1 hour patient care time throughout the day today.  Kenji Nuñez MD    12/9/2024

## 2024-12-09 NOTE — PROGRESS NOTES
CHIEF COMPLAINT: General Arthralgias    REASON FOR REFERRAL: Monoclonal gammopathy      RECORDS OBTAINED  Records of the patients history including those obtained from Dr. Gerber Cain were reviewed and summarized in detail.    Oncology/Hematology History Overview Note   1.  IgG kappa monoclonal gammopathy with scant elevation kappa light chain serum   2.  Left prefrontal gyrus stroke  3.  Hyperlipidemia  4.  Hypertension  5.  Gout on allopurinol  6.  Osteoarthritis with fibromyalgia seeing Dr. Gerber Cain.  Multiple back surgeries and chronic back pain.  7.  Paroxysmal atrial fibrillation on Xarelto    -5/9/2024 M spike 100 mg/dL IgG kappa  -10/17/2024 IgG slightly low 523 with normal IgA and IgM.  IgG kappa M spike 100 mg/dL.  Serum kappa 25 upper limit of normal 19 with normal lambda and ratio 1.07.  Normal C-reactive protein less than 0.3.  Sedimentation rate mildly elevated 38 upper limit of normal 30.  CBC and CMP unremarkable save for glucose 119 alkaline phosphatase 129.  Beta-2 microglobulin 3.4 upper limit of normal 2.2..  No lytic lesions mentioned on MRI humerus July 2024, MRI cervical spine August 2024, CT cervical spine December 2024.    -12/9/2024 [facility] hematology oncology consult: Patient has been stable mild elevation of IgG kappa M protein and her serum without significant elevation in her total protein, globulin, and without significant change in her albumin and ratios thereof.  She has no renal insufficiency or anemia.  No lytic lesions on other imaging done but I will get a bone survey and a bone marrow biopsy and myeloma panel including 24-hour urine immunoelectrophoresis.  She will see my nurse practitioner back in about a month to go over all of this.  If she has less than 4% plasma cells and no lytic lesions then she would not even meet the criteria for an MGUS.    If she has between 4 and 10% monoclonal plasma cells in the bone marrow light microscopy and in particular if there are  complex cytogenetics or FISH results and she has no lytic bone lesions on bone survey, then this would be considered benign monoclonal gammopathy that we would follow-up with serum testing twice a year to watch for progression towards myeloma.  If she has greater than 10% plasma cells but no lytic bone lesions, calcium over 12, hemoglobin less than 10, creatinine over 2, then she would have a smoldering myeloma that we would watch quarterly.  If she has greater than 10% plasma cells with any of the following (lytic bone lesions, calcium over 12, hemoglobin over 10, creatinine over 2) then she would have full-blown myeloma and we would need to discuss treatment rather than watchful waiting.  If her bone marrow shows greater than 10% plasma cells I would also ask my nurse practitioner to order a PET for completeness sake.     Monoclonal gammopathy       HISTORY OF PRESENT ILLNESS:  The patient is a 79 y.o.  female, referred for monoclonal gammopathy stable and persistent x 6 months with arthralgias and fibromyalgia with purported ankylosing spondylitis    REVIEW OF SYSTEMS:  General joint aches but otherwise no specific complaints    Past Medical History:   Diagnosis Date    A-fib 2019    Ankylosing spondylitis of site in spine ?    Anxiety and depression     Arthritis     Cataract     Cervical disc disorder 2010    Chronic pain disorder     since approx. 2000    Depression     Extremity pain     Fibromyalgia     Fibromyalgia, primary approx.2020 per trena saavedra    GERD (gastroesophageal reflux disease)     Gout     H/O bladder infections     JUST FINISHED MACROBID ON 11-2-17 FOR RECENT UTI    Headache, tension-type 2010    Hypercholesteremia     Hypertension     Joint pain     Kidney disease, chronic, stage III (GFR 30-59 ml/min)     resolved after stopping antiinflammatory ~2015    Low back pain     Lumbosacral disc disease 2000    Neck pain     Osteoarthritis     Peripheral neuropathy approx. 2023    dr albrecht ordered  an emg test    Pneumonia 02/2020    Rheumatic fever     Sensorimotor neuropathy 11/11/2024    Skin cancer     Sleep apnea     no cpap    Spinal stenosis 06/27/2023    Stroke 09/2019    right sided weakness    Wears glasses      Past Surgical History:   Procedure Laterality Date    BACK SURGERY  2004    LUMBAR PER DR MAN x2    CARDIAC CATHETERIZATION  2019    CARPAL TUNNEL RELEASE Left     COLONOSCOPY  11/2020    EPIDURAL BLOCK  1974    childbirth    EYE SURGERY      retina surgery    FINGER SURGERY Right     3RD FINGER    HEEL SPUR EXCISION Bilateral     INTERVENTIONAL RADIOLOGY PROCEDURE N/A 09/17/2019    Procedure: Carotid and Cerebral Angiogram/ Possible Stent;  Surgeon: Imer Guerra MD;  Location:  [facility];  Service: Interventional Radiology    JOINT REPLACEMENT      both knees and both hips    KNEE ARTHROSCOPY Bilateral     LUMBAR DISCECTOMY FUSION INSTRUMENTATION N/A 11/10/2017    Procedure: LUMBAR SPINAL FUSION ;  Surgeon: Gopi Man MD;  Location: [facility] OR;  Service:     NECK SURGERY  2019    clogged  artery and put stent    ORTHOPEDIC SURGERY  ?    had total knee, total hip, 2 back    REPLACEMENT TOTAL KNEE BILATERAL Bilateral     SHOULDER ARTHROSCOPY W/ ROTATOR CUFF REPAIR Right 01/19/2021    Procedure: ARTHROSCOPIC ROTATOR CUFF REPAIR WITH BICEPS TENODESIS RIGHT;  Surgeon: Lance Morales Jr., MD;  Location: [facility] OR;  Service: Orthopedics;  Laterality: Right;    SKIN BIOPSY      SPINAL FUSION      SPINE SURGERY  2004 & 2008    TOTAL HIP ARTHROPLASTY Right 09/09/2019    Procedure: TOTAL ANTERIOR RIGHT HIP ARTHROPLASTY;  Surgeon: Darrin New MD;  Location: [facility] OR;  Service: Orthopedics    TOTAL HIP ARTHROPLASTY Left 06/29/2020    Procedure: TOTAL HIP ARTHROPLASTY ANTERIOR LEFT;  Surgeon: Darrin New MD;  Location: [facility] OR;  Service: Orthopedics;  Laterality: Left;    TRIGGER POINT INJECTION  ?    had knee, back, several in shoulder       Current  Outpatient Medications on File Prior to Visit   Medication Sig Dispense Refill    acetaminophen (TYLENOL) 325 MG tablet Take 1 tablet by mouth Every 6 (Six) Hours As Needed for Mild Pain.      allopurinol (ZYLOPRIM) 100 MG tablet Take 1 tablet by mouth Daily. For gout  Indications: Gout 30 tablet 11    atenolol (TENORMIN) 25 MG tablet TAKE 1 TABLET(25 MG) BY MOUTH TWICE DAILY 60 tablet 0    atorvastatin (LIPITOR) 80 MG tablet TAKE 1 TABLET BY MOUTH EVERY NIGHT 30 tablet 0    cefdinir (OMNICEF) 300 MG capsule Take 1 capsule by mouth 2 (Two) Times a Day for 7 days. 14 capsule 0    Cyanocobalamin (Vitamin B-12) 1000 MCG sublingual tablet Place 1 tablet under the tongue Daily.      escitalopram (Lexapro) 10 MG tablet Take 1 tablet by mouth Daily. For depression 30 tablet 11    Gemtesa 75 MG tablet       hydrOXYzine (ATARAX) 10 MG tablet Take 1 tablet by mouth 3 (Three) Times a Day As Needed for Itching. 90 tablet 5    lisinopril (PRINIVIL,ZESTRIL) 20 MG tablet TAKE 1 TABLET(20 MG) BY MOUTH DAILY FOR BLOOD PRESSURE 30 tablet 0    methocarbamol (ROBAXIN) 750 MG tablet Take 1 tablet by mouth 3 (Three) Times a Day. Indications: Musculoskeletal Pain 90 tablet 11    neomycin-polymyxin-dexamethasone (MAXITROL) 3.5-82887-4.1 ophthalmic suspension APPLY 1 DROP TO BOTH EYES EVERY 8 HOURS 3 TIMES A DAY FOR ONE WEEK THEN TAPER WEEKLY      Omega-3 Fatty Acids (fish oil) 1000 MG capsule capsule TAKE ONE CAPSULE BY MOUTH TWO TIMES A DAY FOR 90 DAYS      pregabalin (LYRICA) 25 MG capsule Take 1 capsule by mouth Every Morning AND 2 capsules Every Evening. 270 capsule 1    propafenone SR (RYTHMOL SR) 225 MG 12 hr capsule Take 1 capsule by mouth Every 12 (Twelve) Hours.      rivaroxaban (XARELTO) 20 MG tablet Take 1 tablet by mouth Every Night. Indications: Atrial Fibrillation       No current facility-administered medications on file prior to visit.       Allergies   Allergen Reactions    Nsaids Other (See Comments)     KIDNEY DAMAGE     "Quinidine Nausea And Vomiting and Other (See Comments)     FEVER      Bactrim [Sulfamethoxazole-Trimethoprim] Rash    Nitrofuran Derivatives Diarrhea    Penicillins Rash       Social History     Socioeconomic History    Marital status:    Tobacco Use    Smoking status: Never     Passive exposure: Never    Smokeless tobacco: Never   Vaping Use    Vaping status: Never Used   Substance and Sexual Activity    Alcohol use: No    Drug use: Never    Sexual activity: Not Currently     Partners: Male     Birth control/protection: Abstinence       Family History   Problem Relation Age of Onset    Cancer Mother     Colon polyps Mother     Hypertension Mother     Cancer Father     Hypertension Brother     Aneurysm Brother     Hyperlipidemia Maternal Uncle     Kidney disease Maternal Uncle     Bone cancer Maternal Grandmother     Cancer Maternal Grandmother     Arthritis Maternal Grandfather     Breast cancer Neg Hx     Ovarian cancer Neg Hx        PHYSICAL EXAM:  No jaundice icterus or rash.  No palpable organomegaly.  No respiratory distress    /90 Comment: PT has not taken BP med this AM  Pulse 61   Temp 98.1 °F (36.7 °C)   Resp 18   Ht 157.5 cm (62\")   Wt 86.2 kg (190 lb)   SpO2 96%   BMI 34.75 kg/m²     ECOG score: 2           ECOG: (2) Ambulatory & Capable of Self Care, Unable to Carry Out Work Activity, Up & About Greater Than 50% of Waking Hours    Lab Results   Component Value Date    HGB 14.7 12/03/2024    HCT 46.9 (H) 12/03/2024    MCV 94.2 12/03/2024     12/03/2024    WBC 10.98 (H) 12/03/2024    NEUTROABS 7.42 (H) 12/03/2024    LYMPHSABS 2.58 12/03/2024    MONOSABS 0.73 12/03/2024    EOSABS 0.22 12/03/2024    BASOSABS 0.02 12/03/2024     Lab Results   Component Value Date    GLUCOSE 109 (H) 12/03/2024    BUN 22 12/03/2024    CREATININE 0.90 12/03/2024     12/03/2024    K 4.3 12/03/2024     12/03/2024    CO2 26.8 12/03/2024    CALCIUM 8.9 12/03/2024    PROTEINTOT 6.4 " 12/03/2024    ALBUMIN 3.9 12/03/2024    BILITOT 1.0 12/03/2024    ALKPHOS 126 (H) 12/03/2024    AST 27 12/03/2024    ALT 23 12/03/2024         Assessment & Plan   1.  IgG kappa monoclonal gammopathy with scant elevation kappa light chain serum   2.  Left prefrontal gyrus stroke  3.  Hyperlipidemia  4.  Hypertension  5.  Gout on allopurinol  6.  Osteoarthritis with fibromyalgia seeing Dr. Gerber Cain.  Multiple back surgeries and chronic back pain.  7.  Paroxysmal atrial fibrillation on Xarelto    -5/9/2024 M spike 100 mg/dL IgG kappa  -10/17/2024 IgG slightly low 523 with normal IgA and IgM.  IgG kappa M spike 100 mg/dL.  Serum kappa 25 upper limit of normal 19 with normal lambda and ratio 1.07.  Normal C-reactive protein less than 0.3.  Sedimentation rate mildly elevated 38 upper limit of normal 30.  CBC and CMP unremarkable save for glucose 119 alkaline phosphatase 129.  Beta-2 microglobulin 3.4 upper limit of normal 2.2..  No lytic lesions mentioned on MRI humerus July 2024, MRI cervical spine August 2024, CT cervical spine December 2024.    -12/9/2024 [facility], [place], operated by [facility] hematology oncology consult: Patient has been stable mild elevation of IgG kappa M protein and her serum without significant elevation in her total protein, globulin, and without significant change in her albumin and ratios thereof.  She has no renal insufficiency or anemia.  No lytic lesions on other imaging done but I will get a bone survey and a bone marrow biopsy and myeloma panel including 24-hour urine immunoelectrophoresis.  She will see my nurse practitioner back in about a month to go over all of this.  If she has less than 4% plasma cells and no lytic lesions then she would not even meet the criteria for an MGUS.    If she has between 4 and 10% monoclonal plasma cells in the bone marrow light microscopy and in particular if there are complex cytogenetics or FISH results and she has no lytic bone lesions on bone survey, then this would be  considered benign monoclonal gammopathy that we would follow-up with serum testing twice a year to watch for progression towards myeloma.  If she has greater than 10% plasma cells but no lytic bone lesions, calcium over 12, hemoglobin less than 10, creatinine over 2, then she would have a smoldering myeloma that we would watch quarterly.  If she has greater than 10% plasma cells with any of the following (lytic bone lesions, calcium over 12, hemoglobin over 10, creatinine over 2) then she would have full-blown myeloma and we would need to discuss treatment rather than watchful waiting.  If her bone marrow shows greater than 10% plasma cells I would also ask my nurse practitioner to order a PET for completeness sake.    Total time of care today inclusive of time spent today prior to patient's arrival reviewing past records and cataloging as above and during visit interviewing her as to the potential causes and consequences of monoclonal gammopathy and the workup and management thereof and after visit instituting this plan as outlined took 1 hour patient care time throughout the day today.  Kenji Nuñez MD    12/9/2024

## 2024-12-11 ENCOUNTER — LAB (OUTPATIENT)
Facility: HOSPITAL | Age: 79
End: 2024-12-11
Payer: MEDICARE

## 2024-12-11 DIAGNOSIS — D47.2 MONOCLONAL GAMMOPATHY: ICD-10-CM

## 2024-12-11 LAB
ALBUMIN SERPL ELPH-MCNC: 3.3 G/DL (ref 2.9–4.4)
ALBUMIN/GLOB SERPL: 1.2 {RATIO} (ref 0.7–1.7)
ALPHA1 GLOB SERPL ELPH-MCNC: 0.2 G/DL (ref 0–0.4)
ALPHA2 GLOB SERPL ELPH-MCNC: 1 G/DL (ref 0.4–1)
B-GLOBULIN SERPL ELPH-MCNC: 0.9 G/DL (ref 0.7–1.3)
GAMMA GLOB SERPL ELPH-MCNC: 0.6 G/DL (ref 0.4–1.8)
GLOBULIN SER-MCNC: 2.8 G/DL (ref 2.2–3.9)
IGA SERPL-MCNC: 212 MG/DL (ref 64–422)
IGG SERPL-MCNC: 505 MG/DL (ref 586–1602)
IGM SERPL-MCNC: 180 MG/DL (ref 26–217)
INTERPRETATION SERPL IEP-IMP: ABNORMAL
KAPPA LC FREE SER-MCNC: 22.8 MG/L (ref 3.3–19.4)
KAPPA LC FREE/LAMBDA FREE SER: 1.09 {RATIO} (ref 0.26–1.65)
LABORATORY COMMENT REPORT: ABNORMAL
LAMBDA LC FREE SERPL-MCNC: 20.9 MG/L (ref 5.7–26.3)
M PROTEIN SERPL ELPH-MCNC: 0.1 G/DL
PROT SERPL-MCNC: 6.1 G/DL (ref 6–8.5)

## 2024-12-11 PROCEDURE — 84156 ASSAY OF PROTEIN URINE: CPT

## 2024-12-11 PROCEDURE — 86335 IMMUNFIX E-PHORSIS/URINE/CSF: CPT

## 2024-12-11 PROCEDURE — 81050 URINALYSIS VOLUME MEASURE: CPT

## 2024-12-11 PROCEDURE — 84166 PROTEIN E-PHORESIS/URINE/CSF: CPT

## 2024-12-13 LAB — IGE SERPL-ACNC: 12 IU/ML (ref 6–495)

## 2024-12-16 LAB
ALBUMIN 24H MFR UR ELPH: 55.6 %
ALPHA1 GLOB 24H MFR UR ELPH: 1.7 %
ALPHA2 GLOB 24H MFR UR ELPH: 7.9 %
B-GLOBULIN MFR UR ELPH: 22.1 %
GAMMA GLOB 24H MFR UR ELPH: 12.7 %
INTERPRETATION UR IFE-IMP: NORMAL
Lab: NORMAL
M PROTEIN 24H MFR UR ELPH: NORMAL %
PROT 24H UR-MRATE: 38 MG/24 HR (ref 30–150)
PROT UR-MCNC: 19 MG/DL

## 2024-12-17 ENCOUNTER — OFFICE VISIT (OUTPATIENT)
Dept: NEUROLOGY | Facility: CLINIC | Age: 79
End: 2024-12-17
Payer: MEDICARE

## 2024-12-17 VITALS
HEIGHT: 62 IN | BODY MASS INDEX: 34.96 KG/M2 | HEART RATE: 91 BPM | SYSTOLIC BLOOD PRESSURE: 118 MMHG | WEIGHT: 190 LBS | OXYGEN SATURATION: 92 % | DIASTOLIC BLOOD PRESSURE: 60 MMHG

## 2024-12-17 DIAGNOSIS — I69.30 HISTORY OF CVA WITH RESIDUAL DEFICIT: ICD-10-CM

## 2024-12-17 DIAGNOSIS — R29.90 EPISODE OF TRANSIENT NEUROLOGIC SYMPTOMS: ICD-10-CM

## 2024-12-17 DIAGNOSIS — G62.9 SENSORIMOTOR NEUROPATHY: ICD-10-CM

## 2024-12-17 DIAGNOSIS — R47.01 EXPRESSIVE APHASIA: Primary | ICD-10-CM

## 2024-12-17 NOTE — PROGRESS NOTES
Neuro Office Visit      Encounter Date: 2024   Patient Name: Akua Torres  : 1945   MRN: [id number]   PCP:  Ashley Bain APRN     Chief Complaint:    Chief Complaint   Patient presents with    sensorimotor neuropathy    Neck Pain       History of Present Illness: Akua Torres is a 79 y.o. female who is here today in Neurology for  neuropathy    Initial visit 2024:  PMH of a-fib, ankylosing spondylitis of spine, anxiety and depression, arthritis, cataract, cervical disc disorder, chronic pain since , depression, fibromyalgia, GERD, gout, tension headaches, hypercholesterolemia, hypertension, joint pain, CKD stage III, low back pain, lumbosacral disc disease, neck pain, osteoarthritis, peripheral neuropathy, pneumonia, rheumatic fever, sleep apnea, prior CVA with right-sided weakness, spinal stenosis    SPEP notable for M spike elevated at 0.1. Vitamin B12 857.  CRP <0.30. Sedimentation rate 24. EMG performed on 5/10/2023 showed sensorimotor axonal and demyelinating polyneuropathy evident in all extremities tested, superimposed process including focal neuropathies cannot be ruled out.  No electrophysiologic evidence of isolated focal nerve pathology, proximal nerve, plexus or nerve root pathology, myopathy, or motor neuron pathology.    In clinic today Akua reports significant generalized pain, she follows with Dr. Nettles, received 2 nerve blocks yesterday per Dr. Nettles. She reports that neuropathy has been present for about 2-3 years. Numbness is present to mid calf on both legs, numbness into elbow bilateral hands extending up from her hands. Balance is poor, frequent falls, ambulates with a walker. Thighs feel weak when walking longer distances.She takes Lyrica 25 mg 2 tablets AM and 2 tablets PM - no side effects, does feel that Lyrica aids significantly with neuropathic pain and notes significantly worse symptoms if she forgets to take a dose. The pain will  sometimes keep her up at night. Gabapentin was not efficacious. No history of diabetes or heavy alcohol use.  Has done PT after prior surgeries and she does feel that this helped with her mobility.     Current visit 12/17/2024:  Akua returns to neurology clinic for continued evaluation of peripheral neuropathy. Since last clinic visit she has established with Dr. Nuñez and is planning on bone marrow biopsy upcoming for further evaluation. ABIMAEL was negative, anti-DNA was 2. She is taking Lyrica for her neuropathic pain, sent per Dr. Cain, she didn't tolerate the higher dose lyrica and is currently taking Lyrica 25 mg AM and 50 PM. She reports that this is helping with her neuropathy without grogginess. She reports concern for frequent falls. She notes that at times she will lose her balance. She feels like she gets weak in her thighs prior to when she falls.    A few weeks ago she had expressive aphasia for about 5-10 minutes, then resolved spontaneously. She feels like since that time her vision is blurrier. No new weakness or numbness. She does have history of prior CVA and left carotid artery stent per Dr. Guerra. She has atrial fibrillation and takes Atenolol 25 mg BID and Xarelto 20 mg daily.  She did not go to the hospital when this episode happened. She notes ongoing mild word finding difficulty that was present even before this most recent episode.    She reports longstanding history of neck pain, she reports that if she is standing for a few minutes her head will start to lower, when she sits down and lays back the pain/strength improves some, she reports recent visit to the ED on 12/3/2024 with follow up CT of her cervical spine for which she was referred to Dr. Guerra, will be seen on 12/26/24. She is taking PRN Tylenol for her neck pain. She was following with Dr. Nettles for her neck pain who did 2 nerve blocks and ablation, she feels that pain management interventions made the pain worse. Radicular  pain on both arms, worse on right arm than left. Recent ED visit on 12/3/2024 for neck pain, CT cervical spine showed: No acute fracture or traumatic malalignment of the cervical spine. Advanced multilevel spondylosis is present, better characterized on MRI without definite new bony canal impingement. 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    Subjective      Review of Systems   HENT: Negative.     Eyes: Negative.    Respiratory: Negative.     Cardiovascular: Negative.    Gastrointestinal: Negative.    Genitourinary: Negative.    Musculoskeletal:  Positive for back pain, gait problem and neck pain.   Allergic/Immunologic: Negative.    Neurological:  Positive for speech difficulty, weakness and numbness.   Psychiatric/Behavioral:  Positive for sleep disturbance.           Past Medical History:   Past Medical History:   Diagnosis Date    A-fib 2019    Ankylosing spondylitis of site in spine ?    Anxiety and depression     Arthritis     Cataract     Cervical disc disorder 2010    Chronic pain disorder     since approx. 2000    Depression     Extremity pain     Fibromyalgia     Fibromyalgia, primary approx.2020 per trena saavedra    GERD (gastroesophageal reflux disease)     Gout     H/O bladder infections     JUST FINISHED MACROBID ON 11-2-17 FOR RECENT UTI    Headache, tension-type 2010    Hypercholesteremia     Hypertension     Joint pain     Kidney disease, chronic, stage III (GFR 30-59 ml/min)     resolved after stopping antiinflammatory ~2015    Low back pain     Lumbosacral disc disease 2000    Neck pain     Osteoarthritis     Peripheral neuropathy approx. 2023    dr albrecht ordered an emg test    Pneumonia 02/2020    Rheumatic fever     Sensorimotor neuropathy 11/11/2024    Skin cancer     Sleep apnea     no cpap    Spinal stenosis 06/27/2023    Stroke 09/2019    right sided weakness    Wears glasses        Past Surgical History:   Past Surgical History:   Procedure  Laterality Date    BACK SURGERY  2004    LUMBAR PER DR MAN x2    CARDIAC CATHETERIZATION  2019    CARPAL TUNNEL RELEASE Left     COLONOSCOPY  11/2020    EPIDURAL BLOCK  1974    childbirth    EYE SURGERY      retina surgery    FINGER SURGERY Right     3RD FINGER    HEEL SPUR EXCISION Bilateral     INTERVENTIONAL RADIOLOGY PROCEDURE N/A 09/17/2019    Procedure: Carotid and Cerebral Angiogram/ Possible Stent;  Surgeon: Imer Guerra MD;  Location:  [facility];  Service: Interventional Radiology    JOINT REPLACEMENT      both knees and both hips    KNEE ARTHROSCOPY Bilateral     LUMBAR DISCECTOMY FUSION INSTRUMENTATION N/A 11/10/2017    Procedure: LUMBAR SPINAL FUSION ;  Surgeon: Gopi Man MD;  Location: [facility];  Service:     NECK SURGERY  2019    clogged  artery and put stent    ORTHOPEDIC SURGERY  ?    had total knee, total hip, 2 back    REPLACEMENT TOTAL KNEE BILATERAL Bilateral     SHOULDER ARTHROSCOPY W/ ROTATOR CUFF REPAIR Right 01/19/2021    Procedure: ARTHROSCOPIC ROTATOR CUFF REPAIR WITH BICEPS TENODESIS RIGHT;  Surgeon: Lance Morales Jr., MD;  Location:  [facility];  Service: Orthopedics;  Laterality: Right;    SKIN BIOPSY      SPINAL FUSION      SPINE SURGERY  2004 & 2008    TOTAL HIP ARTHROPLASTY Right 09/09/2019    Procedure: TOTAL ANTERIOR RIGHT HIP ARTHROPLASTY;  Surgeon: Darrin New MD;  Location:  [facility];  Service: Orthopedics    TOTAL HIP ARTHROPLASTY Left 06/29/2020    Procedure: TOTAL HIP ARTHROPLASTY ANTERIOR LEFT;  Surgeon: Darrin New MD;  Location:  [facility];  Service: Orthopedics;  Laterality: Left;    TRIGGER POINT INJECTION  ?    had knee, back, several in shoulder       Family History:   Family History   Problem Relation Age of Onset    Cancer Mother     Colon polyps Mother     Hypertension Mother     Cancer Father     Hypertension Brother     Aneurysm Brother     Hyperlipidemia Maternal Uncle     Kidney disease Maternal Uncle     Bone cancer  Maternal Grandmother     Cancer Maternal Grandmother     Arthritis Maternal Grandfather     Breast cancer Neg Hx     Ovarian cancer Neg Hx        Social History:   Social History     Socioeconomic History    Marital status:    Tobacco Use    Smoking status: Never     Passive exposure: Never    Smokeless tobacco: Never   Vaping Use    Vaping status: Never Used   Substance and Sexual Activity    Alcohol use: No    Drug use: Never    Sexual activity: Not Currently     Partners: Male     Birth control/protection: Abstinence       Medications:     Current Outpatient Medications:     acetaminophen (TYLENOL) 325 MG tablet, Take 1 tablet by mouth Every 6 (Six) Hours As Needed for Mild Pain., Disp: , Rfl:     allopurinol (ZYLOPRIM) 100 MG tablet, Take 1 tablet by mouth Daily. For gout  Indications: Gout, Disp: 30 tablet, Rfl: 11    atenolol (TENORMIN) 25 MG tablet, TAKE 1 TABLET(25 MG) BY MOUTH TWICE DAILY, Disp: 60 tablet, Rfl: 0    atorvastatin (LIPITOR) 80 MG tablet, TAKE 1 TABLET BY MOUTH EVERY NIGHT, Disp: 30 tablet, Rfl: 0    Cyanocobalamin (Vitamin B-12) 1000 MCG sublingual tablet, Place 1 tablet under the tongue Daily., Disp: , Rfl:     escitalopram (Lexapro) 10 MG tablet, Take 1 tablet by mouth Daily. For depression, Disp: 30 tablet, Rfl: 11    Gemtesa 75 MG tablet, , Disp: , Rfl:     hydrOXYzine (ATARAX) 10 MG tablet, Take 1 tablet by mouth 3 (Three) Times a Day As Needed for Itching., Disp: 90 tablet, Rfl: 5    lisinopril (PRINIVIL,ZESTRIL) 20 MG tablet, TAKE 1 TABLET(20 MG) BY MOUTH DAILY FOR BLOOD PRESSURE, Disp: 30 tablet, Rfl: 0    methocarbamol (ROBAXIN) 750 MG tablet, Take 1 tablet by mouth 3 (Three) Times a Day. Indications: Musculoskeletal Pain, Disp: 90 tablet, Rfl: 11    multivitamin with minerals (PRESERVISION AREDS PO), Take 1 tablet by mouth Daily., Disp: , Rfl:     neomycin-polymyxin-dexamethasone (MAXITROL) 3.5-29805-0.1 ophthalmic suspension, APPLY 1 DROP TO BOTH EYES EVERY 8 HOURS 3 TIMES A  "DAY FOR ONE WEEK THEN TAPER WEEKLY, Disp: , Rfl:     Omega-3 Fatty Acids (fish oil) 1000 MG capsule capsule, TAKE ONE CAPSULE BY MOUTH TWO TIMES A DAY FOR 90 DAYS, Disp: , Rfl:     pregabalin (LYRICA) 25 MG capsule, Take 1 capsule by mouth Every Morning AND 2 capsules Every Evening., Disp: 270 capsule, Rfl: 1    propafenone SR (RYTHMOL SR) 225 MG 12 hr capsule, Take 1 capsule by mouth Every 12 (Twelve) Hours., Disp: , Rfl:     rivaroxaban (XARELTO) 20 MG tablet, Take 1 tablet by mouth Every Night. Indications: Atrial Fibrillation, Disp: , Rfl:     Allergies:   Allergies   Allergen Reactions    Nsaids Other (See Comments)     KIDNEY DAMAGE    Quinidine Nausea And Vomiting and Other (See Comments)     FEVER      Bactrim [Sulfamethoxazole-Trimethoprim] Rash    Nitrofuran Derivatives Diarrhea    Penicillins Rash         STEADI Fall Risk Assessment was completed, and patient is at HIGH risk for falls. Assessment completed on:12/17/2024    Objective     Objective:    /60   Pulse 91   Ht 157.5 cm (62\")   Wt 86.2 kg (190 lb)   SpO2 92%   BMI 34.75 kg/m²   Body mass index is 34.75 kg/m².    Physical Exam  Vitals reviewed.   Constitutional:       Appearance: Normal appearance.   HENT:      Head: Normocephalic and atraumatic.      Mouth/Throat:      Mouth: Mucous membranes are moist.      Pharynx: Oropharynx is clear.   Pulmonary:      Effort: Pulmonary effort is normal.   Skin:     General: Skin is warm and dry.   Neurological:      Mental Status: She is alert.          Neurology Exam:    General apperance: NAD.     Mental status: Alert, awake and oriented to time place and person.    Fund of knowledge:  Normal.     Language and Speech: No aphasia or dysarthria. (Patient feels that she may have some expressive aphasia, not notable during exam)    Naming , Repetition and Comprehension:  Can name objects, repeat a sentence and follow commands. Speech is clear and fluent with good repetition, comprehension, and " naming.    Cranial Nerves:   CN II: Visual fields are full. Intact. Pupils - PERRLA  CN III, IV and VI: Extraocular movements are intact. Normal saccades.   CN V: Facial sensation is intact.   CN VII: Muscles of facial expression reveal no asymmetry. Intact.   CN VIII: Hearing is intact.   CN IX and X: Palate elevates symmetrically. Intact  CN XI: Shoulder shrug is intact.   CN XII: Tongue is midline without evidence of atrophy or fasciculation.     Motor:  Right UE muscle strength 5/5. Normal tone.     Left UE muscle strength 5/5. Normal tone.      Right LE muscle strength 5-/5. Normal tone. (Residual from prior CVA)    Left LE muscle strength 5/5. Normal tone.      Sensory: Decreased sensation to light touch in BUE extending from fingertips to elbows, and decreased sensation from toes to mid calf bilaterally    DTRs: 1+ bilaterally in upper and lower extremities.    Coordination: Normal finger-to-nose    Gait: Slow cautious gait, ambulates with walker.          Results:   Imaging:   MRI Cervical Spine Without Contrast    Result Date: 8/20/2024  Impression: Advanced multilevel cervical spondylosis is present including numerous areas of associated moderate to severe spinal canal and bilateral neuroforaminal narrowing, overall most advanced at C5-6. Electronically Signed: Sung Alfaro MD  8/20/2024 12:10 PM EDT  Workstation ID: [id number]    MRI Humerus Left With & Without Contrast    Result Date: 7/26/2024  Impression: Mineralization seen at the humeral attachment of the pectoralis major muscle which may reflect calcific tendinopathy. No suspicious lesions or acute abnormality of the humerus. Electronically Signed: Cesar Ocasio MD  7/26/2024 2:06 PM EDT  Workstation ID: [id number]       Labs:       Assessment / Plan      Assessment/Plan:   Diagnoses and all orders for this visit:    1. Expressive aphasia (Primary)  -     MRI Brain Without Contrast; Future  -     MRI Angiogram Head Without Contrast; Future  -      Duplex Carotid Ultrasound CAR; Future    2. Sensorimotor neuropathy    3. History of CVA with residual deficit    4. Episode of transient neurologic symptoms  -     MRI Brain Without Contrast; Future  -     MRI Angiogram Head Without Contrast; Future  -     Duplex Carotid Ultrasound CAR; Future         Akua returns to neurology clinic for continued evaluation of peripheral neuropathy. Since last clinic visit she has established with Dr. Nuñez and is planning on bone marrow biopsy upcoming for further evaluation. She is taking Lyrica for her neuropathic pain, sent per Dr. Cain, she didn't tolerate the higher dose lyrica and is currently taking Lyrica 25 mg AM and 50 PM. She reports that this is helping with her neuropathy without grogginess.     A few weeks ago she had expressive aphasia for about 5-10 minutes, then resolved spontaneously. She feels like since that time her vision is blurrier. I have ordered stat imaging of  MRI brain, MRA head, and carotid ultrasound due to concern for TIA vs CVA. She was advised to return to ED for any return of symptoms. She reports compliance with Xarelto and Atorvastatin. Pending result of imaging additional treatment options will be discussed.     She is to follow up with Dr. Guerra later this month for further evaluation of chronic neck pain despite treatment by pain management.    Will plan to see Akua back in clinic in 4 weeks to further review all aspects of her care.    Patient Education:       Reviewed medications, potential side effects and signs and symptoms to report. Discussed risk versus benefits of treatment plan with patient and/or family-including medications, labs and radiology that may be ordered. Addressed questions and concerns during visit. Patient and/or family verbalized understanding and agree with plan. Instructed to call the office with any questions and report to ER with any life-threatening symptoms.     Follow Up:   Return in about 4 weeks  (around 1/14/2025).    I spent  58  minutes in the care of this patient. I personally spent 50 percent of this time counseling and discussing diagnosis, diagnostic testing, evaluation, treatment options, and management .       During this visit the following were done:  Labs Reviewed [x]    Labs Ordered []    Radiology Reports Reviewed [x]    Radiology Ordered []    PCP Records Reviewed []    Referring Provider Records Reviewed []    ER Records Reviewed [x]    Hospital Records Reviewed []    History Obtained From Family []    Radiology Images Reviewed []    Other Reviewed []    Records Requested []      WILFREDO Jacob  [facility] NEUROLOGY  [address] [address] [phone]

## 2024-12-18 ENCOUNTER — TELEPHONE (OUTPATIENT)
Dept: NEUROLOGY | Facility: CLINIC | Age: 79
End: 2024-12-18
Payer: MEDICARE

## 2024-12-18 NOTE — TELEPHONE ENCOUNTER
CALLED PATIENT TO LET HER KNOW OF HER UPCOMING EXAM APPOINTMENTS - DUPLEX 12/20 AT 9:00 AT 1760 BUILDING  AND MRI ANGIO ON 12/19 AT 7:30AM AT 1740 BUILDING - SPOKE TO PATIENT

## 2024-12-19 ENCOUNTER — HOSPITAL ENCOUNTER (OUTPATIENT)
Dept: MRI IMAGING | Facility: HOSPITAL | Age: 79
Discharge: HOME | End: 2024-12-19
Payer: MEDICARE

## 2024-12-19 DIAGNOSIS — I67.1 INTRACRANIAL ANEURYSM: Primary | ICD-10-CM

## 2024-12-19 DIAGNOSIS — R47.01 EXPRESSIVE APHASIA: ICD-10-CM

## 2024-12-19 DIAGNOSIS — R29.90 EPISODE OF TRANSIENT NEUROLOGIC SYMPTOMS: ICD-10-CM

## 2024-12-19 PROCEDURE — 70544 MR ANGIOGRAPHY HEAD W/O DYE: CPT

## 2024-12-20 ENCOUNTER — HOSPITAL ENCOUNTER (OUTPATIENT)
Dept: CARDIOLOGY | Facility: HOSPITAL | Age: 79
Discharge: HOME | End: 2024-12-20
Payer: MEDICARE

## 2024-12-20 ENCOUNTER — TELEPHONE (OUTPATIENT)
Dept: INFUSION THERAPY | Facility: HOSPITAL | Age: 79
End: 2024-12-20
Payer: MEDICARE

## 2024-12-20 ENCOUNTER — PREP FOR SURGERY (OUTPATIENT)
Dept: OTHER | Facility: HOSPITAL | Age: 79
End: 2024-12-20
Payer: MEDICARE

## 2024-12-20 DIAGNOSIS — R29.90 EPISODE OF TRANSIENT NEUROLOGIC SYMPTOMS: ICD-10-CM

## 2024-12-20 DIAGNOSIS — R47.01 EXPRESSIVE APHASIA: ICD-10-CM

## 2024-12-20 LAB
BH CV VAS CAROTID LEFT DISTAL STENT EDV: 24.1 CM/S
BH CV VAS CAROTID LEFT DISTAL STENT: 107.1 CM/S
BH CV VAS CAROTID LEFT DISTAL TO STENT EDV: 30.5 CM/S
BH CV VAS CAROTID LEFT DISTAL TO STENT: 146.3 CM/S
BH CV VAS CAROTID LEFT MID STENT EDV: 38.3 CM/S
BH CV VAS CAROTID LEFT MID STENT: 139.2 CM/S
BH CV VAS CAROTID LEFT PROXIMAL STENT EDV: 32.1 CM/S
BH CV VAS CAROTID LEFT PROXIMAL STENT: 118.7 CM/S
BH CV VAS CAROTID LEFT PROXIMAL TO STENT: 69.8 CM/S
BH CV VAS CAROTID LEFT STENT NATIVE VESSEL PROXIMAL ED: 20.4 CM/S
BH CV XLRA MEAS LEFT CAROTID BULB EDV: 32.1 CM/SEC
BH CV XLRA MEAS LEFT CAROTID BULB PSV: 118.7 CM/SEC
BH CV XLRA MEAS LEFT DIST CCA EDV: 20.5 CM/SEC
BH CV XLRA MEAS LEFT DIST CCA PSV: 69.9 CM/SEC
BH CV XLRA MEAS LEFT DIST ICA EDV: 26.8 CM/SEC
BH CV XLRA MEAS LEFT DIST ICA PSV: 97.3 CM/SEC
BH CV XLRA MEAS LEFT ICA/CCA RATIO: 2.86
BH CV XLRA MEAS LEFT MID CCA EDV: 15.2 CM/SEC
BH CV XLRA MEAS LEFT MID CCA PSV: 51.8 CM/SEC
BH CV XLRA MEAS LEFT MID ICA EDV: 30.4 CM/SEC
BH CV XLRA MEAS LEFT MID ICA PSV: 146.4 CM/SEC
BH CV XLRA MEAS LEFT PROX CCA EDV: 17.4 CM/SEC
BH CV XLRA MEAS LEFT PROX CCA PSV: 62.1 CM/SEC
BH CV XLRA MEAS LEFT PROX ECA EDV: 22.3 CM/SEC
BH CV XLRA MEAS LEFT PROX ECA PSV: 150.8 CM/SEC
BH CV XLRA MEAS LEFT PROX ICA EDV: 38.4 CM/SEC
BH CV XLRA MEAS LEFT PROX ICA PSV: 139.2 CM/SEC
BH CV XLRA MEAS LEFT PROX SCLA PSV: 165.1 CM/SEC
BH CV XLRA MEAS LEFT VERTEBRAL A EDV: 18.7 CM/SEC
BH CV XLRA MEAS LEFT VERTEBRAL A PSV: 83.9 CM/SEC
BH CV XLRA MEAS RIGHT DIST CCA EDV: 13.9 CM/SEC
BH CV XLRA MEAS RIGHT DIST CCA PSV: 68.1 CM/SEC
BH CV XLRA MEAS RIGHT DIST ICA EDV: 28.6 CM/SEC
BH CV XLRA MEAS RIGHT DIST ICA PSV: 125.4 CM/SEC
BH CV XLRA MEAS RIGHT ICA/CCA RATIO: 1.67
BH CV XLRA MEAS RIGHT MID CCA EDV: 12.2 CM/SEC
BH CV XLRA MEAS RIGHT MID CCA PSV: 58.3 CM/SEC
BH CV XLRA MEAS RIGHT MID ICA EDV: 24.2 CM/SEC
BH CV XLRA MEAS RIGHT MID ICA PSV: 93.8 CM/SEC
BH CV XLRA MEAS RIGHT PROX CCA EDV: 8.9 CM/SEC
BH CV XLRA MEAS RIGHT PROX CCA PSV: 58.3 CM/SEC
BH CV XLRA MEAS RIGHT PROX ECA EDV: 11.8 CM/SEC
BH CV XLRA MEAS RIGHT PROX ECA PSV: 99.3 CM/SEC
BH CV XLRA MEAS RIGHT PROX ICA EDV: 18.6 CM/SEC
BH CV XLRA MEAS RIGHT PROX ICA PSV: 97.5 CM/SEC
BH CV XLRA MEAS RIGHT PROX SCLA PSV: 149.4 CM/SEC
BH CV XLRA MEAS RIGHT VERTEBRAL A EDV: 11.2 CM/SEC
BH CV XLRA MEAS RIGHT VERTEBRAL A PSV: 48.4 CM/SEC
BH CVPROX LEFT ICA HIDDEN LRR: 1 CM
QT INTERVAL: 396 MS
QTC INTERVAL: 401 MS

## 2024-12-20 PROCEDURE — 93880 EXTRACRANIAL BILAT STUDY: CPT

## 2024-12-20 RX ORDER — SODIUM CHLORIDE 0.9 % (FLUSH) 0.9 %
10 SYRINGE (ML) INJECTION AS NEEDED
Status: CANCELLED | OUTPATIENT
Start: 2024-12-20

## 2024-12-21 NOTE — TELEPHONE ENCOUNTER
Pre procedure call for 12- @ 1200 for Bone marrow biopsy. Reviewed date and time, need to go to registration, need for  and to remain NPO after midnight . Ok to take medications with sips of water Monday morning. Procedure described and patient aware of 2 hour post procedure stay. Phone number provided.

## 2024-12-23 ENCOUNTER — TELEPHONE (OUTPATIENT)
Dept: NEUROLOGY | Facility: CLINIC | Age: 79
End: 2024-12-23
Payer: MEDICARE

## 2024-12-23 ENCOUNTER — HOSPITAL ENCOUNTER (OUTPATIENT)
Dept: CT IMAGING | Facility: HOSPITAL | Age: 79
Discharge: HOME | End: 2024-12-23
Payer: MEDICARE

## 2024-12-23 VITALS
BODY MASS INDEX: 32.96 KG/M2 | WEIGHT: 186 LBS | RESPIRATION RATE: 18 BRPM | HEIGHT: 63 IN | OXYGEN SATURATION: 97 % | SYSTOLIC BLOOD PRESSURE: 142 MMHG | TEMPERATURE: 97.8 F | DIASTOLIC BLOOD PRESSURE: 62 MMHG | HEART RATE: 66 BPM

## 2024-12-23 DIAGNOSIS — D47.2 MONOCLONAL GAMMOPATHY: ICD-10-CM

## 2024-12-23 LAB
BASOPHILS # BLD AUTO: 0.02 10*3/MM3 (ref 0–0.2)
BASOPHILS NFR BLD AUTO: 0.3 % (ref 0–1.5)
DEPRECATED RDW RBC AUTO: 51 FL (ref 37–54)
EOSINOPHIL # BLD AUTO: 0.17 10*3/MM3 (ref 0–0.4)
EOSINOPHIL NFR BLD AUTO: 2.3 % (ref 0.3–6.2)
ERYTHROCYTE [DISTWIDTH] IN BLOOD BY AUTOMATED COUNT: 14.8 % (ref 12.3–15.4)
HCT VFR BLD AUTO: 40.4 % (ref 34–46.6)
HGB BLD-MCNC: 12.6 G/DL (ref 12–15.9)
IMM GRANULOCYTES # BLD AUTO: 0.02 10*3/MM3 (ref 0–0.05)
IMM GRANULOCYTES NFR BLD AUTO: 0.3 % (ref 0–0.5)
LYMPHOCYTES # BLD AUTO: 1.64 10*3/MM3 (ref 0.7–3.1)
LYMPHOCYTES NFR BLD AUTO: 22 % (ref 19.6–45.3)
MCH RBC QN AUTO: 29.5 PG (ref 26.6–33)
MCHC RBC AUTO-ENTMCNC: 31.2 G/DL (ref 31.5–35.7)
MCV RBC AUTO: 94.6 FL (ref 79–97)
MONOCYTES # BLD AUTO: 0.48 10*3/MM3 (ref 0.1–0.9)
MONOCYTES NFR BLD AUTO: 6.4 % (ref 5–12)
NEUTROPHILS NFR BLD AUTO: 5.14 10*3/MM3 (ref 1.7–7)
NEUTROPHILS NFR BLD AUTO: 68.7 % (ref 42.7–76)
NRBC BLD AUTO-RTO: 0 /100 WBC (ref 0–0.2)
PLATELET # BLD AUTO: 163 10*3/MM3 (ref 140–450)
PMV BLD AUTO: 10.7 FL (ref 6–12)
RBC # BLD AUTO: 4.27 10*6/MM3 (ref 3.77–5.28)
WBC NRBC COR # BLD AUTO: 7.47 10*3/MM3 (ref 3.4–10.8)

## 2024-12-23 PROCEDURE — 25010000002 FENTANYL CITRATE (PF) 50 MCG/ML SOLUTION: Performed by: RADIOLOGY

## 2024-12-23 PROCEDURE — 88311 DECALCIFY TISSUE: CPT | Performed by: INTERNAL MEDICINE

## 2024-12-23 PROCEDURE — 25010000002 LIDOCAINE 1 % SOLUTION: Performed by: NURSE PRACTITIONER

## 2024-12-23 PROCEDURE — 85025 COMPLETE CBC W/AUTO DIFF WBC: CPT | Performed by: NURSE PRACTITIONER

## 2024-12-23 PROCEDURE — 88360 TUMOR IMMUNOHISTOCHEM/MANUAL: CPT | Performed by: INTERNAL MEDICINE

## 2024-12-23 PROCEDURE — 88342 IMHCHEM/IMCYTCHM 1ST ANTB: CPT | Performed by: INTERNAL MEDICINE

## 2024-12-23 PROCEDURE — 88313 SPECIAL STAINS GROUP 2: CPT | Performed by: INTERNAL MEDICINE

## 2024-12-23 PROCEDURE — 88305 TISSUE EXAM BY PATHOLOGIST: CPT | Performed by: INTERNAL MEDICINE

## 2024-12-23 PROCEDURE — 77012 CT SCAN FOR NEEDLE BIOPSY: CPT

## 2024-12-23 PROCEDURE — 88341 IMHCHEM/IMCYTCHM EA ADD ANTB: CPT | Performed by: INTERNAL MEDICINE

## 2024-12-23 RX ORDER — SODIUM CHLORIDE 0.9 % (FLUSH) 0.9 %
10 SYRINGE (ML) INJECTION AS NEEDED
Status: DISCONTINUED | OUTPATIENT
Start: 2024-12-23 | End: 2024-12-24 | Stop reason: HOSPADM

## 2024-12-23 RX ORDER — FENTANYL CITRATE 50 UG/ML
INJECTION, SOLUTION INTRAMUSCULAR; INTRAVENOUS
Status: DISCONTINUED
Start: 2024-12-23 | End: 2024-12-24 | Stop reason: HOSPADM

## 2024-12-23 RX ORDER — LIDOCAINE HYDROCHLORIDE 10 MG/ML
10 INJECTION, SOLUTION INFILTRATION; PERINEURAL ONCE
Status: COMPLETED | OUTPATIENT
Start: 2024-12-23 | End: 2024-12-23

## 2024-12-23 RX ORDER — FENTANYL CITRATE 50 UG/ML
INJECTION, SOLUTION INTRAMUSCULAR; INTRAVENOUS AS NEEDED
Status: DISCONTINUED | OUTPATIENT
Start: 2024-12-23 | End: 2024-12-24 | Stop reason: HOSPADM

## 2024-12-23 RX ADMIN — LIDOCAINE HYDROCHLORIDE 10 ML: 10 INJECTION, SOLUTION INFILTRATION; PERINEURAL at 14:45

## 2024-12-23 RX ADMIN — FENTANYL CITRATE 50 MCG: 50 INJECTION, SOLUTION INTRAMUSCULAR; INTRAVENOUS at 14:30

## 2024-12-23 RX ADMIN — FENTANYL CITRATE 50 MCG: 50 INJECTION, SOLUTION INTRAMUSCULAR; INTRAVENOUS at 14:28

## 2024-12-23 NOTE — NURSING NOTE
CT guided bone marrow biopsy performed by Kimberli VILLALOBOS. Sample obtained & taken to lab per CT tech. 100 MCG Fentanyl was given during the procedure for a sedation time of 10 minutes. Report called to LI.

## 2024-12-23 NOTE — DISCHARGE INSTRUCTIONS
Avoid any strenuous activities, pulling, tugging, straining or lifting anything over 10 pounds for the next 24 HOURS.    You may remove the bandage tomorrow and shower tomorrow; but you will need to avoid tub baths or any situation where your are submersed in water for the next 5 days to avoid the risk of infection. If you have any problems or concern please contact your physician's office.     DO NOT DRIVE ON THE DAY OF YOUR PROCEDURE.    If you have any problems or concern please contact your physician's office.

## 2024-12-23 NOTE — TELEPHONE ENCOUNTER
----- Message from Princess Candelaria sent at 12/19/2024  4:03 PM EST -----  Please notify Akua that her MR angiogram did not show any acute blockages within her brain, it did show previously known aneurysms that appear stable as compared to prior imaging studies, I have reviewed her imaging with Dr. Reddy as well who recommended that her imaging be further discussed when she sees Dr. Guerra later this month. She still has upcoming MRI brain and carotid ultrasound studies as well. Recommend continuation of Xarelto and Atorvastatin as is for now.

## 2024-12-23 NOTE — PRE-PROCEDURE NOTE
[facility]   Vascular Interventional Radiology  History & Physicial        Patient Name:Akua Torres    : 1945    MRN: [id number]    Primary Care Physician: Ashley Bain APRN    Referring Physician: Kenji Nuñez MD     Date of admission: 2024    Subjective     Reason for Consult: Bone marrow biopsy/aspiration, r/o myeloma.    History of Present Illness     Akua Torres is a 79 y.o. female referred to IR as noted above.      Active Hospital Problems:  There are no active hospital problems to display for this patient.      Personal History     Past Medical History:   Diagnosis Date    -2019    Ankylosing spondylitis of site in spine ?    Anxiety and depression     Arthritis     Cataract     Cervical disc disorder 2010    Chronic pain disorder     since approx.     Depression     Extremity pain     Fibromyalgia     Fibromyalgia, primary approx. per trena saavedra    GERD (gastroesophageal reflux disease)     Gout     H/O bladder infections     JUST FINISHED MACROBID ON 17 FOR RECENT UTI    Headache, tension-type     Hypercholesteremia     Hypertension     Joint pain     Kidney disease, chronic, stage III (GFR 30-59 ml/min)     resolved after stopping antiinflammatory ~    Low back pain     Lumbosacral disc disease 2000    Neck pain     Osteoarthritis     Peripheral neuropathy approx.     dr albrecht ordered an emg test    Pneumonia 2020    Rheumatic fever     age 19    Sensorimotor neuropathy 2024    Skin cancer     Sleep apnea     no cpap    Spinal stenosis 2023    Stroke 2019    right sided weakness    Wears glasses        Past Surgical History:   Procedure Laterality Date    BACK SURGERY  2004    LUMBAR PER DR THORNTON x2    CARDIAC CATHETERIZATION  2019    CARPAL TUNNEL RELEASE Left     COLONOSCOPY  2020    EPIDURAL BLOCK  1974    childbirth    EYE SURGERY      retina surgery    FINGER SURGERY Right     3RD FINGER    HEEL SPUR EXCISION  Bilateral     INTERVENTIONAL RADIOLOGY PROCEDURE N/A 09/17/2019    Procedure: Carotid and Cerebral Angiogram/ Possible Stent;  Surgeon: Imer Guerra MD;  Location:  [facility];  Service: Interventional Radiology    JOINT REPLACEMENT      both knees and both hips    KNEE ARTHROSCOPY Bilateral     LUMBAR DISCECTOMY FUSION INSTRUMENTATION N/A 11/10/2017    Procedure: LUMBAR SPINAL FUSION ;  Surgeon: Gopi Man MD;  Location: [facility] OR;  Service:     NECK SURGERY  2019    clogged  artery and put stent    ORTHOPEDIC SURGERY  ?    had total knee, total hip, 2 back    REPLACEMENT TOTAL KNEE BILATERAL Bilateral     SHOULDER ARTHROSCOPY W/ ROTATOR CUFF REPAIR Right 01/19/2021    Procedure: ARTHROSCOPIC ROTATOR CUFF REPAIR WITH BICEPS TENODESIS RIGHT;  Surgeon: Lance Morales Jr., MD;  Location: [facility] OR;  Service: Orthopedics;  Laterality: Right;    SKIN BIOPSY      SPINAL FUSION      SPINE SURGERY  2004 & 2008    TOTAL HIP ARTHROPLASTY Right 09/09/2019    Procedure: TOTAL ANTERIOR RIGHT HIP ARTHROPLASTY;  Surgeon: Darrin New MD;  Location: [facility] OR;  Service: Orthopedics    TOTAL HIP ARTHROPLASTY Left 06/29/2020    Procedure: TOTAL HIP ARTHROPLASTY ANTERIOR LEFT;  Surgeon: Darrin New MD;  Location: [facility] OR;  Service: Orthopedics;  Laterality: Left;    TRIGGER POINT INJECTION  ?    had knee, back, several in shoulder       Family History: Her family history includes Aneurysm in her brother; Arthritis in her maternal grandfather; Bone cancer in her maternal grandmother; Cancer in her father, maternal grandmother, and mother; Colon polyps in her mother; Hyperlipidemia in her maternal uncle; Hypertension in her brother and mother; Kidney disease in her maternal uncle.     Social History: She  reports that she has never smoked. She has never been exposed to tobacco smoke. She has never used smokeless tobacco. She reports that she does not drink alcohol and does not use  "drugs.    Home Medications:  Vibegron, Vitamin B-12, acetaminophen, allopurinol, atenolol, atorvastatin, escitalopram, fish oil, hydrOXYzine, lisinopril, methocarbamol, multivitamin with minerals, neomycin-polymyxin-dexamethasone, pregabalin, propafenone SR, and rivaroxaban    Current Medications:    sodium chloride     Allergies:  Allergies   Allergen Reactions    Nsaids Other (See Comments)     KIDNEY DAMAGE    Quinidine Nausea And Vomiting and Other (See Comments)     FEVER      Bactrim [Sulfamethoxazole-Trimethoprim] Rash    Nitrofuran Derivatives Diarrhea    Penicillins Rash       Review of Systems    IR Procedure pertinent significant findings are mentioned in the PMH and PSH above.    Objective     Visit Vitals  /78 (BP Location: Right arm)   Pulse 70   Temp 97.8 °F (36.6 °C) (Temporal)   Resp 18   Ht 160 cm (63\")   Wt 84.4 kg (186 lb)   SpO2 97%   BMI 32.95 kg/m²        Physical Exam    A&Ox3.   Able to communicate  No Apparent Distress  Average physique   CVS: VS as noted. Chart reviewed. Stable for the procedure.  Respiratory: Non labored breathing. No signs of respiratory compromise.    Result Review      I have personally reviewed the results from the time of this admission to 12/23/2024 12:52 EST and agree with these findings.  [x]  Laboratory  []  Microbiology  [x]  Radiology  []  EKG/Telemetry   []  Cardiology/Vascular   []  Pathology  []  Old records  []  Other:    Most notable findings include: As noted:    Results from last 7 days   Lab Units 12/23/24  1145   WBC 10*3/mm3 7.47   HEMOGLOBIN g/dL 12.6   HEMATOCRIT % 40.4   PLATELETS 10*3/mm3 163                   Estimated Creatinine Clearance: 49.7 mL/min (by C-G formula based on SCr of 0.93 mg/dL). No results found for: \"CREATININE\"    COVID19   Date Value Ref Range Status   01/30/2023 Detected (C) Not Detected - Ref. Range Final        No results found for: \"PREGTESTUR\", \"PREGSERUM\", \"HCG\", \"HCGQUANT\"     ASA SCALE ASSESSMENT " (applicable ONLY if sedation planned):        MALLAMPATI CLASSIFICATION (applicable ONLY if sedation planned):       Assessment / Plan     Akua Torres is a 79 y.o. female referred to the IR service with above problem.    Plan:   As above.    WILFREDO Palomino   Vascular Interventional Radiology  12/23/24   12:52 PM EST

## 2024-12-23 NOTE — POST-PROCEDURE NOTE
Vascular Interventional Radiology  Procedure Note    Date: 12/23/24     Time: 13:16 EST     Pre-op Diagnosis: Bone Marrow Bx.    Post-op Diagnosis: Bone Marrow Bx.     Procedure: CT guided Bone Marrow Bx. Via PSIS.    Volume removed: 20 cc of BM aspirate.    Estimated Blood Loss (EBL): < 5 cc.    IVF: NA.    Findings: Above.    Specimens: Aspirate and Bone core.    Complications: NA.    Disposition: IR recovery.    WILFREDO Bradley    Vascular Interventional Radiology

## 2024-12-24 ENCOUNTER — TELEPHONE (OUTPATIENT)
Dept: INFUSION THERAPY | Facility: HOSPITAL | Age: 79
End: 2024-12-24
Payer: MEDICARE

## 2024-12-26 ENCOUNTER — OFFICE VISIT (OUTPATIENT)
Dept: NEUROSURGERY | Facility: CLINIC | Age: 79
End: 2024-12-26
Payer: MEDICARE

## 2024-12-26 VITALS — BODY MASS INDEX: 34.73 KG/M2 | TEMPERATURE: 96.9 F | WEIGHT: 196 LBS | HEIGHT: 63 IN

## 2024-12-26 DIAGNOSIS — M47.812 CERVICAL SPONDYLOSIS WITHOUT MYELOPATHY: Primary | ICD-10-CM

## 2024-12-26 DIAGNOSIS — I63.232 STENOSIS OF LEFT INTERNAL CAROTID ARTERY WITH CEREBRAL INFARCTION: ICD-10-CM

## 2024-12-26 LAB
CYTO UR: NORMAL
LAB AP ASPIRATE SMEAR: NORMAL
LAB AP CASE REPORT: NORMAL
LAB AP CBC AND DIFFERENTIAL: NORMAL
LAB AP CLINICAL INFORMATION: NORMAL
LAB AP CLOT SECTION: NORMAL
LAB AP CORE BIOPSY: NORMAL
LAB AP DIAGNOSIS COMMENT: NORMAL
LAB AP FLOW CYTOMETRY SUMMARY: NORMAL
PATH REPORT.FINAL DX SPEC: NORMAL
PATH REPORT.GROSS SPEC: NORMAL

## 2024-12-26 PROCEDURE — 99204 OFFICE O/P NEW MOD 45 MIN: CPT | Performed by: NEUROLOGICAL SURGERY

## 2024-12-27 ENCOUNTER — PATIENT ROUNDING (BHMG ONLY) (OUTPATIENT)
Dept: NEUROSURGERY | Facility: CLINIC | Age: 79
End: 2024-12-27
Payer: MEDICARE

## 2024-12-27 NOTE — PROGRESS NOTES
A MY-CHART MESSAGE HAS BEEN SENT TO THE PATIENT FOR PATIENT ROUNDING WITH [facility].

## 2024-12-27 NOTE — PROGRESS NOTES
A MY-CHART MESSAGE HAS BEEN SENT TO THE PATIENT FOR PATIENT ROUNDING WITH [facility].

## 2024-12-28 ENCOUNTER — HOSPITAL ENCOUNTER (OUTPATIENT)
Facility: HOSPITAL | Age: 79
Discharge: HOME | End: 2024-12-28
Payer: MEDICARE

## 2024-12-28 DIAGNOSIS — R47.01 EXPRESSIVE APHASIA: ICD-10-CM

## 2024-12-28 DIAGNOSIS — R29.90 EPISODE OF TRANSIENT NEUROLOGIC SYMPTOMS: ICD-10-CM

## 2024-12-28 PROCEDURE — 70551 MRI BRAIN STEM W/O DYE: CPT

## 2024-12-30 ENCOUNTER — TELEPHONE (OUTPATIENT)
Dept: NEUROLOGY | Facility: CLINIC | Age: 79
End: 2024-12-30
Payer: MEDICARE

## 2024-12-30 NOTE — TELEPHONE ENCOUNTER
----- Message from Benita Hermosillo sent at 12/30/2024  7:40 AM EST -----  Please notify Ms. Torres and Family that the MRI of her brain shows an old stroke in the left side of the brain. Mild chronic aging changes are seen. No other concerning findings. MRI brain is stable since 2019. Please follow up with WILFREDO Gerard as scheduled. Thanks, WILFREDO Leong covering provider

## 2024-12-30 NOTE — PROGRESS NOTES
Please notify Ms. Torres and Family that the MRI of her brain shows an old stroke in the left side of the brain. Mild chronic aging changes are seen. No other concerning findings. MRI brain is stable since 2019. Please follow up with WILFREDO Gerard as scheduled. Thanks, WILFREDO Leong covering provider

## 2025-01-03 LAB
CYTO UR: NORMAL
LAB AP ASPIRATE SMEAR: NORMAL
LAB AP CASE REPORT: NORMAL
LAB AP CBC AND DIFFERENTIAL: NORMAL
LAB AP CLINICAL INFORMATION: NORMAL
LAB AP CLOT SECTION: NORMAL
LAB AP CORE BIOPSY: NORMAL
LAB AP DIAGNOSIS COMMENT: NORMAL
LAB AP FLOW CYTOMETRY SUMMARY: NORMAL
LAB AP INTEGRATED ONCOLOGY, ADDENDUM: NORMAL
PATH REPORT.FINAL DX SPEC: NORMAL
PATH REPORT.GROSS SPEC: NORMAL

## 2025-01-06 ENCOUNTER — TELEPHONE (OUTPATIENT)
Dept: ONCOLOGY | Facility: CLINIC | Age: 80
End: 2025-01-06

## 2025-01-06 NOTE — TELEPHONE ENCOUNTER
Called patient to discuss rescheduling appt due to office closure today for inclement weather. She verbalized understanding and was rescheduled to 1/10/25 at 1:30.

## 2025-01-10 ENCOUNTER — OFFICE VISIT (OUTPATIENT)
Dept: ONCOLOGY | Facility: CLINIC | Age: 80
End: 2025-01-10
Payer: MEDICARE

## 2025-01-10 VITALS
SYSTOLIC BLOOD PRESSURE: 157 MMHG | WEIGHT: 189.7 LBS | DIASTOLIC BLOOD PRESSURE: 91 MMHG | OXYGEN SATURATION: 99 % | HEIGHT: 63 IN | HEART RATE: 105 BPM | TEMPERATURE: 96.9 F | BODY MASS INDEX: 33.61 KG/M2 | RESPIRATION RATE: 18 BRPM

## 2025-01-10 DIAGNOSIS — D47.2 MONOCLONAL GAMMOPATHY: Primary | ICD-10-CM

## 2025-01-10 PROCEDURE — 3080F DIAST BP >= 90 MM HG: CPT | Performed by: NURSE PRACTITIONER

## 2025-01-10 PROCEDURE — 1125F AMNT PAIN NOTED PAIN PRSNT: CPT | Performed by: NURSE PRACTITIONER

## 2025-01-10 PROCEDURE — 3077F SYST BP >= 140 MM HG: CPT | Performed by: NURSE PRACTITIONER

## 2025-01-10 PROCEDURE — 99214 OFFICE O/P EST MOD 30 MIN: CPT | Performed by: NURSE PRACTITIONER

## 2025-01-10 RX ORDER — NIFEDIPINE 30 MG/1
30 TABLET, EXTENDED RELEASE ORAL DAILY
COMMUNITY
Start: 2025-01-01 | End: 2026-01-01

## 2025-01-10 NOTE — PROGRESS NOTES
CHIEF COMPLAINT: Neck pain    Problem List:  Oncology/Hematology History Overview Note   1.  IgG kappa monoclonal gammopathy with scant elevation kappa light chain serum   2.  Left prefrontal gyrus stroke  3.  Hyperlipidemia  4.  Hypertension  5.  Gout on allopurinol  6.  Osteoarthritis with fibromyalgia seeing Dr. Gerber Cain.  Multiple back surgeries and chronic back pain.  7.  Paroxysmal atrial fibrillation on Xarelto    -5/9/2024 M spike 100 mg/dL IgG kappa  -10/17/2024 IgG slightly low 523 with normal IgA and IgM.  IgG kappa M spike 100 mg/dL.  Serum kappa 25 upper limit of normal 19 with normal lambda and ratio 1.07.  Normal C-reactive protein less than 0.3.  Sedimentation rate mildly elevated 38 upper limit of normal 30.  CBC and CMP unremarkable save for glucose 119 alkaline phosphatase 129.  Beta-2 microglobulin 3.4 upper limit of normal 2.2..  No lytic lesions mentioned on MRI humerus July 2024, MRI cervical spine August 2024, CT cervical spine December 2024.    -12/9/2024 [facility] hematology oncology consult: Patient has been stable mild elevation of IgG kappa M protein and her serum without significant elevation in her total protein, globulin, and without significant change in her albumin and ratios thereof.  She has no renal insufficiency or anemia.  No lytic lesions on other imaging done but I will get a bone survey and a bone marrow biopsy and myeloma panel including 24-hour urine immunoelectrophoresis.  She will see my nurse practitioner back in about a month to go over all of this.  If she has less than 4% plasma cells and no lytic lesions then she would not even meet the criteria for an MGUS.    If she has between 4 and 10% monoclonal plasma cells in the bone marrow light microscopy and in particular if there are complex cytogenetics or FISH results and she has no lytic bone lesions on bone survey, then this would be considered benign monoclonal gammopathy that we would follow-up with serum  testing twice a year to watch for progression towards myeloma.  If she has greater than 10% plasma cells but no lytic bone lesions, calcium over 12, hemoglobin less than 10, creatinine over 2, then she would have a smoldering myeloma that we would watch quarterly.  If she has greater than 10% plasma cells with any of the following (lytic bone lesions, calcium over 12, hemoglobin over 10, creatinine over 2) then she would have full-blown myeloma and we would need to discuss treatment rather than watchful waiting.  If her bone marrow shows greater than 10% plasma cells I would also ask my nurse practitioner to order a PET for completeness sake.    -12/9/2024 bone survey shows focal lucency measuring 6 mm within proximal right humeral shaft which is equivocal given lack of similar findings throughout the remainder of the skeleton.  -12/9/2024 labs: Beta-2 microglobulin 3.0.  CBC WBC 11.4, hemoglobin 14.1, hematocrit 45.3%, platelet count 199,000.  CMP creatinine 0.93, calcium 9.2, serum protein 6.7.  CRP <0.30.  Sedimentation rate 27.  Serum immunoelectrophoresis M spike 100 mg/dL IgG monoclonal protein with kappa light chain specificity.  Serum free light chains free kappa light chains 22.8, lambda light chains 20.9 with normal kappa/lambda ratio 1.09.  -12/23/2024 bone marrow biopsy shows normocellular bone marrow, 2-3% plasma cells and no evidence of dysplasia, increased blasts or lymphoma.  Normal peripheral blood smear.  No increased plasma cells and flow cytometry identifies a small population of polyclonal plasma cells with no phenotypic aberrancy.     Monoclonal gammopathy       HISTORY OF PRESENT ILLNESS:  The patient is a 79 y.o. female, here for follow up on management of MGUS.  Mrs. Torres reports overall she is in her usual state of health.  No changes since we saw her last.  Her cervical neck pain has been a little more bothersome this week.  She is here today to go over the results of her labs, bone  survey and bone marrow biopsy.    Past Medical History:   Diagnosis Date    A-fib 2019    Ankylosing spondylitis of site in spine ?    Anxiety and depression     Arthritis     Cataract     Cervical disc disorder 2010    Chronic pain disorder     since approx. 2000    CTS (carpal tunnel syndrome) ?    left hand    Depression     Extremity pain     Fibromyalgia     Fibromyalgia, primary approx.2020 per trena saavedra    GERD (gastroesophageal reflux disease)     Gout     H/O bladder infections     JUST FINISHED MACROBID ON 11-2-17 FOR RECENT UTI    Headache     Headache, tension-type 2010    Hypercholesteremia     Hypertension     Joint pain     Kidney disease, chronic, stage III (GFR 30-59 ml/min)     resolved after stopping antiinflammatory ~2015    Low back pain     Lumbosacral disc disease 2000    Neck pain     Osteoarthritis     Peripheral neuropathy approx. 2023    dr albrecht ordered an emg test    Pneumonia 02/2020    Rheumatic fever     age 19    Sensorimotor neuropathy 11/11/2024    Skin cancer     Sleep apnea     no cpap    Spinal stenosis 06/27/2023    Stroke 09/2019    right sided weakness    Wears glasses      Past Surgical History:   Procedure Laterality Date    BACK SURGERY  2004    LUMBAR PER DR MAN x2    CARDIAC CATHETERIZATION  2019    CAROTID STENT      CARPAL TUNNEL RELEASE Left     COLONOSCOPY  11/2020    EPIDURAL BLOCK  1974    childbirth    EYE SURGERY      retina surgery    FINGER SURGERY Right     3RD FINGER    HEEL SPUR EXCISION Bilateral     INTERVENTIONAL RADIOLOGY PROCEDURE N/A 09/17/2019    Procedure: Carotid and Cerebral Angiogram/ Possible Stent;  Surgeon: Imer Guerra MD;  Location:  [facility];  Service: Interventional Radiology    JOINT REPLACEMENT      both knees and both hips    KNEE ARTHROSCOPY Bilateral     LUMBAR DISCECTOMY FUSION INSTRUMENTATION N/A 11/10/2017    Procedure: LUMBAR SPINAL FUSION ;  Surgeon: Gopi Man MD;  Location: [facility] OR;   "Service:     NECK SURGERY  2019    clogged  artery and put stent    ORTHOPEDIC SURGERY  ?    had total knee, total hip, 2 back    REPLACEMENT TOTAL KNEE BILATERAL Bilateral     SHOULDER ARTHROSCOPY W/ ROTATOR CUFF REPAIR Right 01/19/2021    Procedure: ARTHROSCOPIC ROTATOR CUFF REPAIR WITH BICEPS TENODESIS RIGHT;  Surgeon: Lance Morales Jr., MD;  Location:  [facility];  Service: Orthopedics;  Laterality: Right;    SKIN BIOPSY      SPINAL FUSION      SPINE SURGERY  2004 & 2008    TOTAL HIP ARTHROPLASTY Right 09/09/2019    Procedure: TOTAL ANTERIOR RIGHT HIP ARTHROPLASTY;  Surgeon: Darrin New MD;  Location:  [facility];  Service: Orthopedics    TOTAL HIP ARTHROPLASTY Left 06/29/2020    Procedure: TOTAL HIP ARTHROPLASTY ANTERIOR LEFT;  Surgeon: Darrin New MD;  Location:  [facility];  Service: Orthopedics;  Laterality: Left;    TRIGGER POINT INJECTION  ?    had knee, back, several in shoulder       Allergies   Allergen Reactions    Nsaids Other (See Comments)     KIDNEY DAMAGE    Quinidine Nausea And Vomiting and Other (See Comments)     FEVER      Bactrim [Sulfamethoxazole-Trimethoprim] Rash    Nitrofuran Derivatives Diarrhea    Penicillins Rash       Family History and Social History reviewed and changed as necessary    REVIEW OF SYSTEM:   Chronic peripheral neuropathy and arthralgias    PHYSICAL EXAM:  Well-developed, well-nourished appearing female in no distress, here with her  today  Respirations regular and unlabored  Ambulates with a walker    Vitals:    01/10/25 1110   BP: 157/91   Pulse: 105   Resp: 18   Temp: 96.9 °F (36.1 °C)   TempSrc: Temporal   SpO2: 99%   Weight: 86 kg (189 lb 11.2 oz)   Height: 160 cm (62.99\")     Vitals:    01/10/25 1110   PainSc:   7   PainLoc: Comment: neck          ECOG score: 2           Vitals reviewed.  Labs reviewed, also reviewed results from bone survey 12/9/2024 and bone marrow biopsy 12/23/2024    ECOG: (2) Ambulatory & Capable of Self Care, Unable to " Carry Out Work Activity, Up & About Greater Than 50% of Waking Hours    Lab Results   Component Value Date    HGB 12.6 12/23/2024    HCT 40.4 12/23/2024    MCV 94.6 12/23/2024     12/23/2024    WBC 7.47 12/23/2024    NEUTROABS 5.14 12/23/2024    LYMPHSABS 1.64 12/23/2024    MONOSABS 0.48 12/23/2024    EOSABS 0.17 12/23/2024    BASOSABS 0.02 12/23/2024       Lab Results   Component Value Date    GLUCOSE 111 (H) 12/09/2024    BUN 16 12/09/2024    CREATININE 0.93 12/09/2024     12/09/2024    K 4.2 12/09/2024     (H) 12/09/2024    CO2 28.0 12/09/2024    CALCIUM 9.2 12/09/2024    PROTEINTOT 6.7 12/09/2024    ALBUMIN 3.8 12/09/2024    ALBUMIN 3.3 12/09/2024    BILITOT 0.7 12/09/2024    ALKPHOS 134 (H) 12/09/2024    AST 19 12/09/2024    ALT 14 12/09/2024     -12/9/2024 bone survey shows focal lucency measuring 6 mm within proximal right humeral shaft which is equivocal given lack of similar findings throughout the remainder of the skeleton.  -12/9/2024 labs: Beta-2 microglobulin 3.0.  CBC WBC 11.4, hemoglobin 14.1, hematocrit 45.3%, platelet count 199,000.  CMP creatinine 0.93, calcium 9.2, serum protein 6.7.  CRP <0.30.  Sedimentation rate 27.  Serum immunoelectrophoresis M spike 100 mg/dL IgG monoclonal protein with kappa light chain specificity.  Serum free light chains free kappa light chains 22.8, lambda light chains 20.9 with normal kappa/lambda ratio 1.09.  -12/23/2024 bone marrow biopsy shows normocellular bone marrow, 2-3% plasma cells and no evidence of dysplasia, increased blasts or lymphoma.  Normal peripheral blood smear.  No increased plasma cells and flow cytometry identifies a small population of polyclonal plasma cells with no phenotypic aberrancy.        ASSESSMENT & PLAN:    1.  IgG kappa monoclonal gammopathy with scant elevation kappa light chain serum   2.  Left prefrontal gyrus stroke  3.  Hyperlipidemia  4.  Hypertension  5.  Gout on allopurinol  6.  Osteoarthritis with  fibromyalgia seeing Dr. Gerber Cain.  Multiple back surgeries and chronic back pain.  7.  Paroxysmal atrial fibrillation on Xarelto  8.  Peripheral neuropathy in the lower extremities    Discussion: Her bone marrow biopsy was unremarkable, she had normocellular bone marrow with 2-3% plasma cells no evidence of dysplasia, increased blasts or lymphoma.  Normal peripheral smear.  No increased plasma cells, flow cytometry with small population of polyclonal plasma cells with no phenotypic aberrancy.  She has no anemia, renal function is normal.  Bone survey with unequivocal very small 6 mm questionable lucency in the proximal right humeral shaft but the remainder of the skeleton was negative.  M spike unchanged at 100 mg/dL IgG monoclonal protein with kappa light chain specificity, normal kappa/lambda ratio which is scant elevation of her kappa light chains and normal lambda light chains.  At this time would just recommend repeating myeloma panel again in 3 months to make sure it is stable and then would go to 6-month monitoring.  Would not put her through a PET scan with normal bone marrow and just a small lucency which is questionable on bone survey.  I will check with Dr. Nuñez when he returns on Monday to make sure he does not want anything different.  I reviewed all findings with the patient and her  and all questions were answered to their satisfaction.  Otherwise we will plan on seeing her back in 3 months for follow-up.    I spent 31 minutes caring for Akua on this date of service. This time includes time spent by me in the following activities: preparing for the visit, reviewing tests, obtaining and/or reviewing a separately obtained history, performing a medically appropriate examination and/or evaluation, counseling and educating the patient/family/caregiver, ordering medications, tests, or procedures, documenting information in the medical record, independently interpreting results and  communicating that information with the patient/family/caregiver, and care coordination.     Lary Cruz, APRN    01/10/2025

## 2025-01-13 ENCOUNTER — TREATMENT (OUTPATIENT)
Dept: PHYSICAL THERAPY | Facility: CLINIC | Age: 80
End: 2025-01-13
Payer: MEDICARE

## 2025-01-13 DIAGNOSIS — G89.29 CHRONIC PAIN OF BOTH SHOULDERS: ICD-10-CM

## 2025-01-13 DIAGNOSIS — M25.512 CHRONIC PAIN OF BOTH SHOULDERS: ICD-10-CM

## 2025-01-13 DIAGNOSIS — M54.2 NECK PAIN: Primary | ICD-10-CM

## 2025-01-13 DIAGNOSIS — M25.511 CHRONIC PAIN OF BOTH SHOULDERS: ICD-10-CM

## 2025-01-13 PROCEDURE — 97162 PT EVAL MOD COMPLEX 30 MIN: CPT | Performed by: PHYSICAL THERAPIST

## 2025-01-13 PROCEDURE — 97140 MANUAL THERAPY 1/> REGIONS: CPT | Performed by: PHYSICAL THERAPIST

## 2025-01-13 PROCEDURE — 97110 THERAPEUTIC EXERCISES: CPT | Performed by: PHYSICAL THERAPIST

## 2025-01-13 NOTE — PROGRESS NOTES
Physical Therapy Initial Evaluation and Plan of Care  [address] [facility], [address], [place] [id number]    Patient: Akua Torres   : 1945  Diagnosis/ICD-10 Code:  Neck pain [M54.2]  Referring practitioner: Imer Guerra MD  Date of Initial Visit: 2025  Today's Date: 2025  Patient seen for 1 session         Visit Diagnoses:    ICD-10-CM ICD-9-CM   1. Neck pain  M54.2 723.1   2. Chronic pain of both shoulders  M25.511 719.41    G89.29 338.29    M25.512          Subjective Questionnaire: NDI:54%    Patient Active Problem List    Diagnosis Date Noted    Monoclonal gammopathy 2024    High risk medication use 2024    Sensorimotor neuropathy 2024    Generalized osteoarthrosis, involving multiple sites 2024    Impingement of left shoulder 2024    Cervical spondylosis without myelopathy 2024    DDD (degenerative disc disease), cervical 2024    Occipital neuralgia 2024    Connective tissue stenosis of neural canal of cervical region 2024    Osteoarthritis of left shoulder 2024    Lumbar postlaminectomy syndrome 2024    Gait disturbance 2024    Sarcopenia 2024    Intractable low back pain 2023    Subtherapeutic international normalized ratio (INR) 2023    Thrombocytopenia due to COVID-19 virus 2023    COVID-19 virus detected 2023    Weakness 2023    COVID-19 2023    Traumatic rhabdomyolysis, initial encounter 10/04/2022    Hematoma of right thigh 10/04/2022    Pain of right lower extremity 10/04/2022    Anemia 10/04/2022    Fibromyalgia 10/04/2022    Nontraumatic complete tear of right rotator cuff 2021    Acute postoperative pain 2020    Arthritis of left hip 2020    Hip pain 2020    Status post total replacement of left hip 2020    Hyperlipidemia 2020    Elevated hemoglobin A1c 2020    History of stroke 2020    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  No significant valvular abnormality      YUNIOR treated with BiPAP 09/09/2019    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 07/18/2019     Note Last Updated: 9/17/2019     CT angiogram (9/10/2019): High-grade (near-occlusion) LICA stenosis and moderate AUSTIN stenosis, s/p LICA stent placement 9/17/19 by Dr. Charlie TAFOYA (acute kidney injury) 09/10/2018    Depression 09/10/2018    Leukocytosis, likely reactive 11/13/2017    Back pain 11/10/2017    Essential hypertension 11/10/2017    Prediabetes 11/10/2017    S/P lumbar spinal fusion 11/10/2017    Renal insufficiency 11/10/2017    Lumbar stenosis with neurogenic claudication 08/24/2017    History of lumbar fusion 08/24/2017    Spondylosis of lumbar region without myelopathy or radiculopathy 08/24/2017    Mild obesity 08/24/2017    Physical deconditioning 08/24/2017    Idiopathic gout 08/24/2017    Anxiety and depression 08/24/2017    Greater trochanteric bursitis of both hips 08/24/2017    Status post total bilateral knee replacement 08/24/2017    Degenerative disc disease, lumbar 08/17/2017     Past Medical History:   Diagnosis Date    A-fib 2019    Ankylosing spondylitis of site in spine ?    Anxiety and depression     Arthritis     Cataract     Cervical disc disorder 2010    Chronic pain disorder     since approx. 2000    CTS (carpal tunnel syndrome) ?    left hand    Depression     Extremity pain     Fibromyalgia     Fibromyalgia, primary approx.2020 per trena saavedra     GERD (gastroesophageal reflux disease)     Gout     H/O bladder infections     JUST FINISHED MACROBID ON 11-2-17 FOR RECENT UTI    Headache     Headache, tension-type 2010    Hypercholesteremia     Hypertension     Joint pain     Kidney disease, chronic, stage III (GFR 30-59 ml/min)     resolved after stopping antiinflammatory ~2015    Low back pain     Lumbosacral disc disease 2000    Neck pain     Osteoarthritis     Peripheral neuropathy approx. 2023    dr albrecht ordered an emg test    Pneumonia 02/2020    Rheumatic fever     age 19    Sensorimotor neuropathy 11/11/2024    Skin cancer     Sleep apnea     no cpap    Spinal stenosis 06/27/2023    Stroke 09/2019    right sided weakness    Wears glasses      Past Surgical History:   Procedure Laterality Date    BACK SURGERY  2004    LUMBAR PER DR MAN x2    CARDIAC CATHETERIZATION  2019    CAROTID STENT      CARPAL TUNNEL RELEASE Left     COLONOSCOPY  11/2020    EPIDURAL BLOCK  1974    childbirth    EYE SURGERY      retina surgery    FINGER SURGERY Right     3RD FINGER    HEEL SPUR EXCISION Bilateral     INTERVENTIONAL RADIOLOGY PROCEDURE N/A 09/17/2019    Procedure: Carotid and Cerebral Angiogram/ Possible Stent;  Surgeon: Imer Guerra MD;  Location:  [facility];  Service: Interventional Radiology    JOINT REPLACEMENT      both knees and both hips    KNEE ARTHROSCOPY Bilateral     LUMBAR DISCECTOMY FUSION INSTRUMENTATION N/A 11/10/2017    Procedure: LUMBAR SPINAL FUSION ;  Surgeon: Gopi Man MD;  Location:  [facility];  Service:     NECK SURGERY  2019    clogged  artery and put stent    ORTHOPEDIC SURGERY  ?    had total knee, total hip, 2 back    REPLACEMENT TOTAL KNEE BILATERAL Bilateral     SHOULDER ARTHROSCOPY W/ ROTATOR CUFF REPAIR Right 01/19/2021    Procedure: ARTHROSCOPIC ROTATOR CUFF REPAIR WITH BICEPS TENODESIS RIGHT;  Surgeon: Lance Morales Jr., MD;  Location:  [facility];  Service: Orthopedics;  Laterality: Right;     SKIN BIOPSY      SPINAL FUSION      SPINE SURGERY   &     TOTAL HIP ARTHROPLASTY Right 2019    Procedure: TOTAL ANTERIOR RIGHT HIP ARTHROPLASTY;  Surgeon: Darrin New MD;  Location: [facility] OR;  Service: Orthopedics    TOTAL HIP ARTHROPLASTY Left 2020    Procedure: TOTAL HIP ARTHROPLASTY ANTERIOR LEFT;  Surgeon: Darrin New MD;  Location:  [facility];  Service: Orthopedics;  Laterality: Left;    TRIGGER POINT INJECTION  ?    had knee, back, several in shoulder       Subjective Evaluation    History of Present Illness  Date of onset: 2024  Mechanism of injury: Patient with history of bilateral hip and knee replacements complains of chronic lower back pain.  Has history of stoke with stent placement in the left carotid artery.  Notes pain is better in sitting but worsens with standing and finds that she has an inability to raise her head from a flexed position.  She notes that she feels best in the mornings upon waking but worsens throughout the day, particularly if she is up and moving around a lot.  Standing tolerance/walking tolerance <10 minutes currently.  Pain is along the suboccipital region but radiates into both shoulders. Notes referred pain into bilateral upper arms. Has had pain management injections but almost felt worse afterwards.  Is supposed to return in April for additional treatment.       Patient Occupation: retired secretarial work. Quality of life: good    Pain  Current pain ratin  At best pain ratin  At worst pain rating: 10  Quality: sharp, pressure and radiating  Relieving factors: rest and support  Aggravating factors: standing and ambulation    Diagnostic Tests  MRI studies: abnormal (advanced DJD with stenosis)    Treatments  No previous or current treatments  Patient Goals  Patient goals for therapy: decreased pain           Objective          Postural Observations    Additional Postural Observation Details  Significant forward head and  shoulders    Tenderness     Additional Tenderness Details  Tender along the CT junction over the spinous processes.  Suboccipital tenderness regionally.     Neurological Testing     Sensation   Cervical/Thoracic   Left   Intact: light touch    Right   Intact: light touch    Reflexes   Left   Biceps (C5/C6): normal (2+)  Brachioradialis (C6): normal (2+)  Triceps (C7): normal (2+)  Villagran's reflex: negative    Right   Biceps (C5/C6): normal (2+)  Brachioradialis (C6): normal (2+)  Triceps (C7): normal (2+)  Villagran's reflex: negative    Active Range of Motion   Cervical/Thoracic Spine   Cervical    Flexion: 40 degrees with pain  Extension: 15 degrees   Left rotation: 35 degrees   Right rotation: 30 degrees with pain    Additional Active Range of Motion Details  Pain along the right lower facets with right rotation. General posterior neck pain with flexion. Shoulder AROM grossly functional     Strength/Myotome Testing     Left Shoulder     Planes of Motion   Flexion: 3+   Abduction: 3+   External rotation at 0°: 3     Right Shoulder     Planes of Motion   Flexion: 4   Abduction: 4   External rotation at 0°: 4     Left Elbow   Flexion: 5  Extension: 5    Right Elbow   Flexion: 5  Extension: 5    Tests   Cervical     Right   Positive Spurling's sign.           Assessment & Plan       Assessment  Impairments: abnormal or restricted ROM, activity intolerance, impaired physical strength, lacks appropriate home exercise program and pain with function   Functional limitations: carrying objects, lifting, pulling, pushing, uncomfortable because of pain and unable to perform repetitive tasks   Assessment details: Patient with PMHx of TIA, multiple joint pains, and bilateral knee/hip replacements and back surgery presents with complaints of severe neck pain and inability to extend the neck from a flexed position after prolonged standing.  Most of this is likely mechanical and related to significant degenerative changes present.   Plan will be to promote more functional mobility in an appropriate plane.  Will avoid traction techniques due to the present of a carotid stent.     Goals  Plan Goals: Short Term Goals 3 weeks  1. Patient to be compliant with initial HEP  2. Patient to demonstrate pain free cervical right rotation.  3  Patient to demonstrate at least 25 degrees cervical extension.    Long Term Goals 6-8 weeks  1. Patient to be independent with HEP.  2. Patient to report reduced neck pain frequency  3. Patient to perform 20 minutes of standing activities without increasing neck apin  4. Patient to demonstrate minimal palpable tenderness along the cervical spine.  5. Patient to improve NDI score to at least 30 %.          Plan  Therapy options: will be seen for skilled therapy services  Planned modality interventions: TENS, thermotherapy (hydrocollator packs), cryotherapy and dry needling  Planned therapy interventions: manual therapy, strengthening, stretching, postural training, joint mobilization and neuromuscular re-education  Frequency: 2x week  Duration in visits: 12  Treatment plan discussed with: patient        History # of Personal Factors and/or Comorbidities: HIGH (3+)  Examination of Body System(s): # of elements: MODERATE (3)  Clinical Presentation: EVOLVING  Clinical Decision Making: MODERATE      Timed:         Manual Therapy:    14     mins  01766;     Therapeutic Exercise:    10     mins  53278;     Neuromuscular Kenney:    0    mins  99477;    Therapeutic Activity:     0     mins  72512;     Gait Trainin     mins  80190;     Ultrasound:     0     mins  60836;    Ionto                               0    mins   37634  Self Care                       0     mins   41647        Un-Timed:  Electrical Stimulation:    0     mins  95037 ( );  Dry Needling     0     mins self-pay  Traction     0     mins 52481  Low Eval     0     Mins  26891  Mod Eval     25     Mins  12921  High Eval                       0      Mins  68533  Canalith Repos    0     mins 46814      Timed Treatment:   24   mins   Total Treatment:     49   mins          PT: Charli Cain, PT     License Number: [id number]    Electronically signed by Charli Cain, PT, 01/13/25, 3:38 PM EST    Certification Period: 1/13/2025 thru 4/12/2025  I certify that the therapy services are furnished while this patient is under my care.  The services outlined above are required by this patient, and will be reviewed every 90 days.         Physician Signature:__________________________________________________    PHYSICIAN: Imer Guerra MD  NPI: [id number]      DATE:     Please sign and return via fax to [email or web address] . Thank you, [facility].

## 2025-01-17 ENCOUNTER — TREATMENT (OUTPATIENT)
Dept: PHYSICAL THERAPY | Facility: CLINIC | Age: 80
End: 2025-01-17
Payer: MEDICARE

## 2025-01-17 DIAGNOSIS — G89.29 CHRONIC PAIN OF BOTH SHOULDERS: ICD-10-CM

## 2025-01-17 DIAGNOSIS — M25.512 CHRONIC PAIN OF BOTH SHOULDERS: ICD-10-CM

## 2025-01-17 DIAGNOSIS — M54.2 NECK PAIN: Primary | ICD-10-CM

## 2025-01-17 DIAGNOSIS — M25.511 CHRONIC PAIN OF BOTH SHOULDERS: ICD-10-CM

## 2025-01-17 NOTE — PROGRESS NOTES
Physical Therapy Daily Treatment Note  [address] [facility], [address], [facility] [address]      Patient: Akua Torres   : 1945  Referring practitioner: Imer Guerra MD  Date of Initial Visit: Type: THERAPY  Noted: 2025  Today's Date: 2025  Patient seen for 2 sessions       Visit Diagnoses:    ICD-10-CM ICD-9-CM   1. Neck pain  M54.2 723.1   2. Chronic pain of both shoulders  M25.511 719.41    G89.29 338.29    M25.512        Subjective   Patient reports that she feels significantly better. She has not had an episode where her head fell forward and has had less pain. She even felt confident enough to use her cane to come into the clinic.  Overall, she is very pleased.  her pain level is 0/10 upon arrival.      Objective   See Exercise, Manual, and Modality Logs for complete treatment.       Assessment & Plan       Assessment  Assessment details: We added more scapular and shoulder activities today.  Overall, she is improving and continuing to get better.  Plan to continue working on tolerable scapular exercises and progression as tolerated.  Very pleased with initial response to PT.           Timed:         Manual Therapy:    10     mins  36581;     Therapeutic Exercise:    30     mins  38884;     Neuromuscular Kenney:    0    mins  98830;    Therapeutic Activity:     0     mins  05345;     Gait Trainin     mins  94240;     Ultrasound:     0     mins  83430;    Ionto                               0    mins   94118  Self Care                       0     mins   45963  Canalith Repos    0     mins 97094      Un-Timed:  Electrical Stimulation:    0     mins  47350 ( );  Dry Needling     0     mins self-pay  Traction     0     mins 78015      Timed Treatment:   40   mins   Total Treatment:     40   mins    Charli Cain, PT  [place] License: [id number]

## 2025-01-21 ENCOUNTER — TREATMENT (OUTPATIENT)
Dept: PHYSICAL THERAPY | Facility: CLINIC | Age: 80
End: 2025-01-21
Payer: MEDICARE

## 2025-01-21 DIAGNOSIS — M54.2 NECK PAIN: Primary | ICD-10-CM

## 2025-01-21 DIAGNOSIS — M25.511 CHRONIC PAIN OF BOTH SHOULDERS: ICD-10-CM

## 2025-01-21 DIAGNOSIS — G89.29 CHRONIC PAIN OF BOTH SHOULDERS: ICD-10-CM

## 2025-01-21 DIAGNOSIS — M25.512 CHRONIC PAIN OF BOTH SHOULDERS: ICD-10-CM

## 2025-01-21 PROCEDURE — 97140 MANUAL THERAPY 1/> REGIONS: CPT | Performed by: PHYSICAL THERAPIST

## 2025-01-21 PROCEDURE — 97110 THERAPEUTIC EXERCISES: CPT | Performed by: PHYSICAL THERAPIST

## 2025-01-21 PROCEDURE — 97112 NEUROMUSCULAR REEDUCATION: CPT | Performed by: PHYSICAL THERAPIST

## 2025-01-21 NOTE — PROGRESS NOTES
Physical Therapy Daily Treatment Note  [address] [facility], [address], [facility] [address]      Patient: Akua Torres   : 1945  Referring practitioner: Imer Guerra MD  Date of Initial Visit: Type: THERAPY  Noted: 2025  Today's Date: 2025  Patient seen for 3 sessions       Visit Diagnoses:    ICD-10-CM ICD-9-CM   1. Neck pain  M54.2 723.1   2. Chronic pain of both shoulders  M25.511 719.41    G89.29 338.29    M25.512        Subjective   Patient reports that she is still dropping her head into flexion at times.  Otherwise, feels she is building some strength.  She is compliant with her exercises and working on them twice/day.  Feels working on nodding is the most helpful.  her pain level is 0/10 upon arrival.      Objective   Tenderness in the right mid to lower cervical TPs anteriorly.    Slowed gait with SC, flexed at the neck intermittently. Flexed at the hips.    See Exercise, Manual, and Modality Logs for complete treatment.       Assessment & Plan       Assessment  Assessment details: We added thoracic rotation and cervical retraction training. No reports of pain with exercise. She has been compliant and continues to make progress. Plan to progress to more standing activities at next session.           Timed:         Manual Therapy:    10     mins  29698;     Therapeutic Exercise:    20     mins  47314;     Neuromuscular Kenney:    10    mins  04601;    Therapeutic Activity:     0     mins  93929;     Gait Trainin     mins  04595;     Ultrasound:     0     mins  21829;    Ionto                               0    mins   16680  Self Care                       0     mins   21190  Canalith Repos    0     mins 11121      Un-Timed:  Electrical Stimulation:    0     mins  51336 ( );  Dry Needling     0     mins self-pay  Traction     0     mins 76932      Timed Treatment:   40   mins   Total Treatment:     40   mins    Charli Cain, PT  [place] License: [id number]

## 2025-01-22 ENCOUNTER — TELEPHONE (OUTPATIENT)
Dept: PAIN MEDICINE | Facility: CLINIC | Age: 80
End: 2025-01-22
Payer: MEDICARE

## 2025-01-22 NOTE — TELEPHONE ENCOUNTER
Caller: Akua Torres    Relationship to patient: Self    Best call back number: [phone]    Patient is needing: PT IS CALLING TO STATE THAT THE PREVIOUS ABLATION DID NOT WORK AND IS WANTING TO KNOW WHEN SHE WOULD BE ABLE TO GET SCHEDULED FOR ANOTHER ONE.. PLEASE ADVISE..

## 2025-01-23 NOTE — TELEPHONE ENCOUNTER
Caller: Akua Torres    Relationship: Self    Best call back number: [phone]    PATIENT CALLED BACK x 2 STATED HER NECK HAS BEEN HURTING / BOTHERING HER & REQUESTING ANOTHER ABLATION     PATIENT INFORMED / REMINDED CAN TAKE 1-2 BUSINESS DAYS TO ADDRESS NON-URGENT MESSAGES & PATIENT SHOULD HEAR BACK FROM OFFICE BY / BEFORE TOMORROW FRI 01-24-25 AROUND 1230/1300     THANKS

## 2025-01-24 NOTE — TELEPHONE ENCOUNTER
"PATIENT CALLED AGAIN. SAYS SHE IS PRACTICALLY DISABLED FROM THE PAIN. ASKING ABOUT ACUPUNCTURE PERHAPS OR WOULD DR. SCHULTZ GIVE HER \"CORTISONE\" INJECTIONS IN HER NECK? PLEASE CALL ASAP.  "

## 2025-01-27 NOTE — TELEPHONE ENCOUNTER
"Spoke with pt regarding what her pain has been doing. She advised that her head feels so heavy that she has a hard time lifting her head and has to rest often. She advised the RFA lasted her a couple months, but \"wore off\" rather quickly. I advised that to repeat a RFA at least 6 months min is needed to be able to repeat, and pt did now. Sending to Dr. Nettles for next steps   "

## 2025-01-30 ENCOUNTER — TELEPHONE (OUTPATIENT)
Dept: NEUROLOGY | Facility: CLINIC | Age: 80
End: 2025-01-30

## 2025-01-30 NOTE — TELEPHONE ENCOUNTER
Caller: Akua Torres    Relationship:  Self    Best call back number: [phone] (home)      PATIENT CALLED REQUESTING TO CANCEL SAME DAY APPT.    Did the patient call AFTER the start time of their scheduled appointment?  []YES  [x]NO    Was the patient's appointment rescheduled? []YES  [x]NO    Any additional information:   PT WILL CALL BACK TO RESCHEDULE

## 2025-02-14 ENCOUNTER — TRANSCRIBE ORDERS (OUTPATIENT)
Facility: HOSPITAL | Age: 80
End: 2025-02-14
Payer: MEDICARE

## 2025-02-14 ENCOUNTER — TELEPHONE (OUTPATIENT)
Age: 80
End: 2025-02-14

## 2025-02-14 ENCOUNTER — LAB (OUTPATIENT)
Facility: HOSPITAL | Age: 80
End: 2025-02-14
Payer: MEDICARE

## 2025-02-14 DIAGNOSIS — E78.00 PURE HYPERCHOLESTEROLEMIA: ICD-10-CM

## 2025-02-14 DIAGNOSIS — I48.0 PAF (PAROXYSMAL ATRIAL FIBRILLATION): ICD-10-CM

## 2025-02-14 DIAGNOSIS — I48.0 PAF (PAROXYSMAL ATRIAL FIBRILLATION): Primary | ICD-10-CM

## 2025-02-14 LAB
ALBUMIN SERPL-MCNC: 4 G/DL (ref 3.5–5.2)
ALP SERPL-CCNC: 131 U/L (ref 39–117)
ALT SERPL W P-5'-P-CCNC: 14 U/L (ref 1–33)
ANION GAP SERPL CALCULATED.3IONS-SCNC: 11 MMOL/L (ref 5–15)
AST SERPL-CCNC: 21 U/L (ref 1–32)
BILIRUB CONJ SERPL-MCNC: 0.1 MG/DL (ref 0–0.3)
BILIRUB INDIRECT SERPL-MCNC: 0.4 MG/DL
BILIRUB SERPL-MCNC: 0.5 MG/DL (ref 0–1.2)
BUN SERPL-MCNC: 17 MG/DL (ref 8–23)
BUN/CREAT SERPL: 17.9 (ref 7–25)
CALCIUM SPEC-SCNC: 9.4 MG/DL (ref 8.6–10.5)
CHLORIDE SERPL-SCNC: 105 MMOL/L (ref 98–107)
CHOLEST SERPL-MCNC: 114 MG/DL (ref 0–200)
CO2 SERPL-SCNC: 28 MMOL/L (ref 22–29)
CREAT SERPL-MCNC: 0.95 MG/DL (ref 0.57–1)
DEPRECATED RDW RBC AUTO: 44.5 FL (ref 37–54)
EGFRCR SERPLBLD CKD-EPI 2021: 60.7 ML/MIN/1.73
ERYTHROCYTE [DISTWIDTH] IN BLOOD BY AUTOMATED COUNT: 13.1 % (ref 12.3–15.4)
GLUCOSE SERPL-MCNC: 120 MG/DL (ref 65–99)
HCT VFR BLD AUTO: 42.8 % (ref 34–46.6)
HDLC SERPL-MCNC: 36 MG/DL (ref 40–60)
HGB BLD-MCNC: 13.8 G/DL (ref 12–15.9)
LDLC SERPL CALC-MCNC: 54 MG/DL (ref 0–100)
LDLC/HDLC SERPL: 1.41 {RATIO}
MCH RBC QN AUTO: 30.3 PG (ref 26.6–33)
MCHC RBC AUTO-ENTMCNC: 32.2 G/DL (ref 31.5–35.7)
MCV RBC AUTO: 93.9 FL (ref 79–97)
PLATELET # BLD AUTO: 221 10*3/MM3 (ref 140–450)
PMV BLD AUTO: 11.1 FL (ref 6–12)
POTASSIUM SERPL-SCNC: 4.1 MMOL/L (ref 3.5–5.2)
PROT SERPL-MCNC: 6.5 G/DL (ref 6–8.5)
RBC # BLD AUTO: 4.56 10*6/MM3 (ref 3.77–5.28)
SODIUM SERPL-SCNC: 144 MMOL/L (ref 136–145)
TRIGL SERPL-MCNC: 137 MG/DL (ref 0–150)
VLDLC SERPL-MCNC: 24 MG/DL (ref 5–40)
WBC NRBC COR # BLD AUTO: 7.6 10*3/MM3 (ref 3.4–10.8)

## 2025-02-14 PROCEDURE — 85027 COMPLETE CBC AUTOMATED: CPT

## 2025-02-14 PROCEDURE — 80061 LIPID PANEL: CPT

## 2025-02-14 PROCEDURE — 80076 HEPATIC FUNCTION PANEL: CPT

## 2025-02-14 PROCEDURE — 36415 COLL VENOUS BLD VENIPUNCTURE: CPT

## 2025-02-14 PROCEDURE — 80048 BASIC METABOLIC PNL TOTAL CA: CPT

## 2025-02-19 PROBLEM — I77.9 CAROTID ARTERIAL DISEASE: Status: ACTIVE | Noted: 2025-02-19

## 2025-02-19 NOTE — ASSESSMENT & PLAN NOTE
Within guidelines  BP log  Continue Lisinopril at current dose    Orders:    Basic Metabolic Panel; Future    Microalbumin / Creatinine Urine Ratio - Urine, Clean Catch; Future    Duplex Aorta IVC Iliac Graft Complete CAR; Future

## 2025-02-19 NOTE — ASSESSMENT & PLAN NOTE
Needs lipid profile    Orders:    Hepatic Function Panel; Future    High Sensitivity CRP; Future    Lipoprotein A (LPA); Future    Lipid Panel; Future

## 2025-02-19 NOTE — PROGRESS NOTES
Cardiology Established Patient Note     Name: Akua Torres  :   1945  PCP: Ashley Bain, APRN  Date:   2025  Department: [facility] [place] [facility] CARDIOLOGY  [address]  [facility] [address]  Fax [phone]  Phone [phone]    Chief complaint-Here for my heart.    Subjective     History of Present Illness  Akua Torres is a 79 y.o. female who presents today for routine 3-month follow-up.  The patient has a past medical history of paroxysmal atrial fibrillation, carotid disease status post CVA and Left ICA stent, hypertension, hyperlipidemia, chronic kidney disease stage III, mild MR.    Currently she has no chest pain or SOB, walking with a walker due to residuall RLE weakness following her stroke.    Left heart cath 2021 EF 65% normal coronaries  S/p CEA left-sided stent of ICA  Carotid duplex 10/2023 moderate plaque right proximal ICA, mild stenosis in the R ICA R CCA LICA less than 50%.    Renal artery duplex 2022 no evidence of renal artery stenosis, incidental finding of right renal cyst  2022 EF is 65 to 70% mild MR, mild TR      Cardiac Risk Factors  [] Tob [] Hx Tob Use [x]HTN [] DM [x] Chol [] Fm hx [] PVD [x] Age [] CAD     Lab Results   Component Value Date    GLUCOSE 120 (H) 2025    BUN 17 2025    CREATININE 0.95 2025    EGFRIFNONA 48 (L) 2021    BCR 17.9 2025    K 4.1 2025    CO2 28.0 2025    CALCIUM 9.4 2025    ALBUMIN 4.0 2025    AST 21 2025    ALT 14 2025     Lab Results   Component Value Date    CHOL 114 2025    CHLPL 147 2022    TRIG 137 2025    HDL 36 (L) 2025    LDL 54 2025      Lab Results   Component Value Date    WBC 7.60 2025    RBC 4.56 2025    HGB 13.8 2025    HCT 42.8 2025    MCV 93.9 2025     2025     Lab Results   Component Value Date    TSH 1.19  12/29/2024     Lab Results   Component Value Date    HGBA1C 5.90 (H) 01/30/2023        Past Medical History:   Diagnosis Date   • A-fib 2019   • Ankylosing spondylitis of site in spine ?   • Anxiety and depression    • Arthritis    • Carotid artery disease    • Cataract    • Cervical disc disorder 2010   • Chronic pain disorder     since approx. 2000   • CKD (chronic kidney disease)    • CTS (carpal tunnel syndrome) ?    left hand   • Depression    • Extremity pain    • Fibromyalgia    • Fibromyalgia, primary approx.2020 per trena saavedra   • GERD (gastroesophageal reflux disease)    • Gout    • H/O bladder infections     JUST FINISHED MACROBID ON 11-2-17 FOR RECENT UTI   • Headache    • Headache, tension-type 2010   • Hypercholesteremia    • Hypertension    • Joint pain    • Kidney disease, chronic, stage III (GFR 30-59 ml/min)     resolved after stopping antiinflammatory ~2015   • Leg cramps    • Low back pain    • Lumbosacral disc disease 2000   • Mitral regurgitation    • Neck pain    • Osteoarthritis    • PAF (paroxysmal atrial fibrillation)    • Palpitations    • Peripheral neuropathy approx. 2023    dr albrecht ordered an emg test   • Pneumonia 02/2020   • Rheumatic fever     age 19   • Sensorimotor neuropathy 11/11/2024   • Skin cancer    • Sleep apnea     no cpap   • Spinal stenosis 06/27/2023   • Stroke 09/2019    right sided weakness   • Tricuspid regurgitation    • Wears glasses       Past Surgical History:   Procedure Laterality Date   • BACK SURGERY  2004    LUMBAR PER DR THORNTON x2   • CARDIAC ABLATION  11/2024    Radiofrequency ablation, spinal nerve root,  lumbosacral, bilateral, after initial radiofrequency  ablation of nerve root at different level   • CARDIAC CATHETERIZATION  02/22/2019    SJE/MOOKIE/NORMAL LEFT VENTRICULAR FUCTION. NORMAL  CORONARIES. EF IS 65%   • CARDIAC CATHETERIZATION  12/13/2021    SJE ERES/IOP: angina, ABN stress study/ EF 65%. Normal LVSF.  Normal coronaries.   • CAROTID STENT      • CARPAL TUNNEL RELEASE Left    • COLONOSCOPY  11/2020   • EPIDURAL BLOCK  1974    childbirth   • EYE SURGERY      retina surgery   • FINGER SURGERY Right     3RD FINGER   • HEEL SPUR EXCISION Bilateral    • INTERVENTIONAL RADIOLOGY PROCEDURE N/A 09/17/2019    Procedure: Carotid and Cerebral Angiogram/ Possible Stent;  Surgeon: Imer Guerra MD;  Location:  [facility];  Service: Interventional Radiology   • JOINT REPLACEMENT      both knees and both hips   • KNEE ARTHROSCOPY Bilateral    • LUMBAR DISCECTOMY FUSION INSTRUMENTATION N/A 11/10/2017    Procedure: LUMBAR SPINAL FUSION ;  Surgeon: Gopi Man MD;  Location: [facility] OR;  Service:    • NECK SURGERY  2019    clogged  artery and put stent   • ORTHOPEDIC SURGERY  ?    had total knee, total hip, 2 back   • REPLACEMENT TOTAL KNEE BILATERAL Bilateral    • SHOULDER ARTHROSCOPY W/ ROTATOR CUFF REPAIR Right 01/19/2021    Procedure: ARTHROSCOPIC ROTATOR CUFF REPAIR WITH BICEPS TENODESIS RIGHT;  Surgeon: Lance Morales Jr., MD;  Location: [facility] OR;  Service: Orthopedics;  Laterality: Right;   • SKIN BIOPSY     • SPINAL FUSION     • SPINE SURGERY  2004 & 2008   • TOTAL HIP ARTHROPLASTY Right 09/09/2019    Procedure: TOTAL ANTERIOR RIGHT HIP ARTHROPLASTY;  Surgeon: Darrin New MD;  Location: [facility] OR;  Service: Orthopedics   • TOTAL HIP ARTHROPLASTY Left 06/29/2020    Procedure: TOTAL HIP ARTHROPLASTY ANTERIOR LEFT;  Surgeon: Darrin New MD;  Location: [facility] OR;  Service: Orthopedics;  Laterality: Left;   • TRIGGER POINT INJECTION  ?    had knee, back, several in shoulder     Family History   Problem Relation Age of Onset   • Cancer Mother    • Colon polyps Mother    • Hypertension Mother    • Cancer Father    • Hypertension Brother    • Aneurysm Brother    • Hyperlipidemia Brother    • Sudden death Brother    • Bone cancer Maternal Grandmother    • Cancer Maternal Grandmother    • Arthritis Maternal Grandfather    •  Hyperlipidemia Maternal Uncle    • Kidney disease Maternal Uncle    • Breast cancer Neg Hx    • Ovarian cancer Neg Hx      Social History     Socioeconomic History   • Marital status:    Tobacco Use   • Smoking status: Never     Passive exposure: Never   • Smokeless tobacco: Never   Vaping Use   • Vaping status: Never Used   Substance and Sexual Activity   • Alcohol use: No   • Drug use: Never   • Sexual activity: Not Currently     Partners: Male     Birth control/protection: Abstinence       Allergies   Allergen Reactions   • Nsaids Other (See Comments)     KIDNEY DAMAGE   • Quinidine Nausea And Vomiting and Other (See Comments)     FEVER     • Bactrim [Sulfamethoxazole-Trimethoprim] Rash   • Eliquis [Apixaban] Itching   • Nitrofuran Derivatives Diarrhea   • Penicillins Rash   • Savaysa [Edoxaban] Other (See Comments)     Patient cannotl remember what happens       Current Outpatient Medications:   •  acetaminophen (TYLENOL) 325 MG tablet, Take 1 tablet by mouth Every 6 (Six) Hours As Needed for Mild Pain., Disp: , Rfl:   •  allopurinol (ZYLOPRIM) 100 MG tablet, Take 1 tablet by mouth Daily. For gout  Indications: Gout, Disp: 30 tablet, Rfl: 11  •  atorvastatin (LIPITOR) 80 MG tablet, TAKE 1 TABLET BY MOUTH EVERY NIGHT, Disp: 30 tablet, Rfl: 0  •  Cyanocobalamin (Vitamin B-12) 1000 MCG sublingual tablet, Place 1 tablet under the tongue Daily., Disp: , Rfl:   •  escitalopram (Lexapro) 10 MG tablet, Take 1 tablet by mouth Daily. For depression, Disp: 30 tablet, Rfl: 11  •  Gemtesa 75 MG tablet, Take 1 tablet by mouth Daily., Disp: , Rfl:   •  hydrOXYzine pamoate (VISTARIL) 25 MG capsule, Take 1 capsule by mouth 3 (Three) Times a Day As Needed., Disp: , Rfl:   •  lisinopril (PRINIVIL,ZESTRIL) 20 MG tablet, TAKE 1 TABLET(20 MG) BY MOUTH DAILY FOR BLOOD PRESSURE, Disp: 30 tablet, Rfl: 0  •  methocarbamol (ROBAXIN) 750 MG tablet, Take 1 tablet by mouth 3 (Three) Times a Day. Indications: Musculoskeletal  "Pain, Disp: 90 tablet, Rfl: 11  •  multivitamin with minerals (PRESERVISION AREDS PO), Take 1 tablet by mouth Daily., Disp: , Rfl:   •  NIFEdipine XL (PROCARDIA XL) 30 MG 24 hr tablet, Take 1 tablet by mouth Daily., Disp: , Rfl:   •  Omega-3 Fatty Acids (fish oil) 1000 MG capsule capsule, TAKE ONE CAPSULE BY MOUTH TWO TIMES A DAY FOR 90 DAYS, Disp: , Rfl:   •  pregabalin (LYRICA) 25 MG capsule, Take 1 capsule by mouth Every Morning AND 2 capsules Every Evening., Disp: 270 capsule, Rfl: 1  •  propafenone SR (RYTHMOL SR) 225 MG 12 hr capsule, Take 1 capsule by mouth Every 12 (Twelve) Hours., Disp: , Rfl:   •  rivaroxaban (XARELTO) 20 MG tablet, Take 1 tablet by mouth Every Night. Indications: Atrial Fibrillation, Disp: , Rfl:     Review of Systems    Objective     Vital Signs:  BP 96/59 (BP Location: Right arm, Patient Position: Sitting, Cuff Size: Adult)   Pulse 84   Ht 160 cm (63\")   Wt 84.8 kg (187 lb)   BMI 33.13 kg/m²   Estimated body mass index is 33.13 kg/m² as calculated from the following:    Height as of this encounter: 160 cm (63\").    Weight as of this encounter: 84.8 kg (187 lb).               Neck:      Vascular: Carotid bruit present. No JVD.      Comments: L carotid bruit  Cardiovascular:      PMI at left midclavicular line. Normal rate. Regular rhythm. Normal S1. Normal S2.       Murmurs: There is no murmur.      No gallop.  No click. No rub.   Pulses:     Intact distal pulses.   Edema:     Peripheral edema absent.           Assessment and Plan     Assessment & Plan  Paroxysmal atrial fibrillation  CHDS VASC 4  EKG  Cont Xarelto 20 mg poi daily  Continue Propafenone at current dose and check Qtc   While she needs a BB to pprevent A Flutter with 1:1 conduction this was stopped due to SB.  Orders:  •  Thyroid Panel With TSH; Future    Essential hypertension    Within guidelines  BP log  Continue Lisinopril at current dose    Orders:  •  Basic Metabolic Panel; Future  •  Microalbumin / Creatinine " Urine Ratio - Urine, Clean Catch; Future  •  Duplex Aorta IVC Iliac Graft Complete CAR; Future    Hyperlipidemia, unspecified hyperlipidemia type     Needs lipid profile    Orders:  •  Hepatic Function Panel; Future  •  High Sensitivity CRP; Future  •  Lipoprotein A (LPA); Future  •  Lipid Panel; Future    History of CVA with residual deficit  Carotid duplex- nl in December 2024 by Dr Guerra-reviewed and concur  ContinueXarelto at current dose plus atorvastatin at current dose              Follow Up  No follow-ups on file.    Judy Langford MD

## 2025-02-19 NOTE — ASSESSMENT & PLAN NOTE
CHDS VASC 4  EKG  Cont Xarelto 20 mg poi daily  Continue Propafenone at current dose and check Qtc   While she needs a BB to pprevent A Flutter with 1:1 conduction this was stopped due to SB.  Orders:    Thyroid Panel With TSH; Future

## 2025-02-19 NOTE — ASSESSMENT & PLAN NOTE
Clinically stable  Orders:    Hepatic Function Panel; Future    High Sensitivity CRP; Future    Lipoprotein A (LPA); Future    Lipid Panel; Future

## 2025-02-20 ENCOUNTER — OFFICE VISIT (OUTPATIENT)
Age: 80
End: 2025-02-20
Payer: MEDICARE

## 2025-02-20 VITALS
BODY MASS INDEX: 33.13 KG/M2 | WEIGHT: 187 LBS | HEART RATE: 84 BPM | DIASTOLIC BLOOD PRESSURE: 59 MMHG | HEIGHT: 63 IN | SYSTOLIC BLOOD PRESSURE: 96 MMHG

## 2025-02-20 DIAGNOSIS — I10 ESSENTIAL HYPERTENSION: ICD-10-CM

## 2025-02-20 DIAGNOSIS — E78.5 HYPERLIPIDEMIA, UNSPECIFIED HYPERLIPIDEMIA TYPE: ICD-10-CM

## 2025-02-20 DIAGNOSIS — I69.30 HISTORY OF CVA WITH RESIDUAL DEFICIT: ICD-10-CM

## 2025-02-20 DIAGNOSIS — I48.0 PAROXYSMAL ATRIAL FIBRILLATION: Primary | ICD-10-CM

## 2025-02-20 RX ORDER — HYDROXYZINE PAMOATE 25 MG/1
25 CAPSULE ORAL 3 TIMES DAILY PRN
COMMUNITY
Start: 2025-02-06

## 2025-02-20 NOTE — ASSESSMENT & PLAN NOTE
Carotid duplex- nl in December 2024 by Dr Guerra-reviewed and concur  ContinueXarelto at current dose plus atorvastatin at current dose

## 2025-03-31 ENCOUNTER — TELEPHONE (OUTPATIENT)
Age: 80
End: 2025-03-31
Payer: MEDICARE

## 2025-03-31 NOTE — TELEPHONE ENCOUNTER
Spoke with patient and she would like to try the Eliquis. Please send to [facility] [address] in [place].     If patient still cannot afford she will give the office a call back.

## 2025-03-31 NOTE — TELEPHONE ENCOUNTER
Patient called stating she can no longer afford her Xarelto. She went to go pick it up this morning and it was $500. Please advise.    Can call patient back at [phone]

## 2025-04-16 ENCOUNTER — LAB (OUTPATIENT)
Facility: HOSPITAL | Age: 80
End: 2025-04-16
Payer: MEDICARE

## 2025-04-16 DIAGNOSIS — I48.0 PAROXYSMAL ATRIAL FIBRILLATION: ICD-10-CM

## 2025-04-16 DIAGNOSIS — I10 ESSENTIAL HYPERTENSION: ICD-10-CM

## 2025-04-16 DIAGNOSIS — D47.2 MONOCLONAL GAMMOPATHY: ICD-10-CM

## 2025-04-16 DIAGNOSIS — E78.5 HYPERLIPIDEMIA, UNSPECIFIED HYPERLIPIDEMIA TYPE: ICD-10-CM

## 2025-04-16 LAB
ALBUMIN SERPL-MCNC: 4 G/DL (ref 3.5–5.2)
ALBUMIN/GLOB SERPL: 1.4 G/DL
ALP SERPL-CCNC: 164 U/L (ref 39–117)
ALT SERPL W P-5'-P-CCNC: 19 U/L (ref 1–33)
ANION GAP SERPL CALCULATED.3IONS-SCNC: 14 MMOL/L (ref 5–15)
AST SERPL-CCNC: 32 U/L (ref 1–32)
BASOPHILS # BLD AUTO: 0.01 10*3/MM3 (ref 0–0.2)
BASOPHILS NFR BLD AUTO: 0.1 % (ref 0–1.5)
BILIRUB CONJ SERPL-MCNC: 0.2 MG/DL (ref 0–0.3)
BILIRUB SERPL-MCNC: 0.8 MG/DL (ref 0–1.2)
BUN SERPL-MCNC: 17 MG/DL (ref 8–23)
BUN/CREAT SERPL: 15.9 (ref 7–25)
CALCIUM SPEC-SCNC: 9.4 MG/DL (ref 8.6–10.5)
CHLORIDE SERPL-SCNC: 105 MMOL/L (ref 98–107)
CO2 SERPL-SCNC: 26 MMOL/L (ref 22–29)
CREAT SERPL-MCNC: 1.07 MG/DL (ref 0.57–1)
DEPRECATED RDW RBC AUTO: 47 FL (ref 37–54)
EGFRCR SERPLBLD CKD-EPI 2021: 52.6 ML/MIN/1.73
EOSINOPHIL # BLD AUTO: 0.25 10*3/MM3 (ref 0–0.4)
EOSINOPHIL NFR BLD AUTO: 3.3 % (ref 0.3–6.2)
ERYTHROCYTE [DISTWIDTH] IN BLOOD BY AUTOMATED COUNT: 13.7 % (ref 12.3–15.4)
ERYTHROCYTE [SEDIMENTATION RATE] IN BLOOD: 25 MM/HR (ref 0–30)
GLOBULIN UR ELPH-MCNC: 2.9 GM/DL
GLUCOSE SERPL-MCNC: 130 MG/DL (ref 65–99)
HCT VFR BLD AUTO: 41.7 % (ref 34–46.6)
HGB BLD-MCNC: 13.3 G/DL (ref 12–15.9)
IMM GRANULOCYTES # BLD AUTO: 0.01 10*3/MM3 (ref 0–0.05)
IMM GRANULOCYTES NFR BLD AUTO: 0.1 % (ref 0–0.5)
LYMPHOCYTES # BLD AUTO: 1.56 10*3/MM3 (ref 0.7–3.1)
LYMPHOCYTES NFR BLD AUTO: 20.8 % (ref 19.6–45.3)
MCH RBC QN AUTO: 29.3 PG (ref 26.6–33)
MCHC RBC AUTO-ENTMCNC: 31.9 G/DL (ref 31.5–35.7)
MCV RBC AUTO: 91.9 FL (ref 79–97)
MONOCYTES # BLD AUTO: 0.39 10*3/MM3 (ref 0.1–0.9)
MONOCYTES NFR BLD AUTO: 5.2 % (ref 5–12)
NEUTROPHILS NFR BLD AUTO: 5.29 10*3/MM3 (ref 1.7–7)
NEUTROPHILS NFR BLD AUTO: 70.5 % (ref 42.7–76)
PLATELET # BLD AUTO: 194 10*3/MM3 (ref 140–450)
PMV BLD AUTO: 10.9 FL (ref 6–12)
POTASSIUM SERPL-SCNC: 4 MMOL/L (ref 3.5–5.2)
PROT SERPL-MCNC: 6.9 G/DL (ref 6–8.5)
RBC # BLD AUTO: 4.54 10*6/MM3 (ref 3.77–5.28)
SODIUM SERPL-SCNC: 145 MMOL/L (ref 136–145)
WBC NRBC COR # BLD AUTO: 7.51 10*3/MM3 (ref 3.4–10.8)

## 2025-04-16 PROCEDURE — 82248 BILIRUBIN DIRECT: CPT

## 2025-04-16 PROCEDURE — 86334 IMMUNOFIX E-PHORESIS SERUM: CPT

## 2025-04-16 PROCEDURE — 84443 ASSAY THYROID STIM HORMONE: CPT

## 2025-04-16 PROCEDURE — 82570 ASSAY OF URINE CREATININE: CPT

## 2025-04-16 PROCEDURE — 80061 LIPID PANEL: CPT

## 2025-04-16 PROCEDURE — 84165 PROTEIN E-PHORESIS SERUM: CPT

## 2025-04-16 PROCEDURE — 84479 ASSAY OF THYROID (T3 OR T4): CPT

## 2025-04-16 PROCEDURE — 84436 ASSAY OF TOTAL THYROXINE: CPT

## 2025-04-16 PROCEDURE — 82784 ASSAY IGA/IGD/IGG/IGM EACH: CPT

## 2025-04-16 PROCEDURE — 85025 COMPLETE CBC W/AUTO DIFF WBC: CPT

## 2025-04-16 PROCEDURE — 80053 COMPREHEN METABOLIC PANEL: CPT

## 2025-04-16 PROCEDURE — 85652 RBC SED RATE AUTOMATED: CPT

## 2025-04-16 PROCEDURE — 83695 ASSAY OF LIPOPROTEIN(A): CPT

## 2025-04-16 PROCEDURE — 36415 COLL VENOUS BLD VENIPUNCTURE: CPT

## 2025-04-16 PROCEDURE — 83521 IG LIGHT CHAINS FREE EACH: CPT

## 2025-04-16 PROCEDURE — 82232 ASSAY OF BETA-2 PROTEIN: CPT

## 2025-04-16 PROCEDURE — 86141 C-REACTIVE PROTEIN HS: CPT

## 2025-04-16 PROCEDURE — 82043 UR ALBUMIN QUANTITATIVE: CPT

## 2025-04-17 LAB
ALBUMIN UR-MCNC: 30.5 MG/DL
B2 MICROGLOB SERPL-MCNC: 3.9 MG/L (ref 0.8–2.2)
CHOLEST SERPL-MCNC: 102 MG/DL (ref 0–200)
CREAT UR-MCNC: 143.8 MG/DL
CRP SERPL-MCNC: 0.2 MG/DL (ref 0.01–0.5)
HDLC SERPL-MCNC: 33 MG/DL (ref 40–60)
LDLC SERPL CALC-MCNC: 42 MG/DL (ref 0–100)
LDLC/HDLC SERPL: 1.13 {RATIO}
MICROALBUMIN/CREAT UR: 212.1 MG/G (ref 0–29)
T-UPTAKE NFR SERPL: 1.07 TBI (ref 0.8–1.3)
T4 SERPL-MCNC: 6.77 MCG/DL (ref 4.5–11.7)
TRIGL SERPL-MCNC: 159 MG/DL (ref 0–150)
TSH SERPL DL<=0.05 MIU/L-ACNC: 1.51 UIU/ML (ref 0.27–4.2)
VLDLC SERPL-MCNC: 27 MG/DL (ref 5–40)

## 2025-04-18 ENCOUNTER — LAB (OUTPATIENT)
Facility: HOSPITAL | Age: 80
End: 2025-04-18
Payer: MEDICARE

## 2025-04-18 DIAGNOSIS — D47.2 MONOCLONAL GAMMOPATHY: ICD-10-CM

## 2025-04-18 LAB
KAPPA LC FREE SER-MCNC: 22.6 MG/L (ref 3.3–19.4)
KAPPA LC FREE/LAMBDA FREE SER: 1.1 {RATIO} (ref 0.26–1.65)
LAMBDA LC FREE SERPL-MCNC: 20.6 MG/L (ref 5.7–26.3)
LPA SERPL-SCNC: 234.4 NMOL/L

## 2025-04-18 PROCEDURE — 81050 URINALYSIS VOLUME MEASURE: CPT

## 2025-04-18 PROCEDURE — 84156 ASSAY OF PROTEIN URINE: CPT

## 2025-04-18 PROCEDURE — 86335 IMMUNFIX E-PHORSIS/URINE/CSF: CPT

## 2025-04-18 PROCEDURE — 84166 PROTEIN E-PHORESIS/URINE/CSF: CPT

## 2025-04-21 LAB
ALBUMIN SERPL ELPH-MCNC: 3.4 G/DL (ref 2.9–4.4)
ALBUMIN/GLOB SERPL: 1.4 {RATIO} (ref 0.7–1.7)
ALPHA1 GLOB SERPL ELPH-MCNC: 0.3 G/DL (ref 0–0.4)
ALPHA2 GLOB SERPL ELPH-MCNC: 0.9 G/DL (ref 0.4–1)
B-GLOBULIN SERPL ELPH-MCNC: 0.9 G/DL (ref 0.7–1.3)
GAMMA GLOB SERPL ELPH-MCNC: 0.5 G/DL (ref 0.4–1.8)
GLOBULIN SER-MCNC: 2.6 G/DL (ref 2.2–3.9)
IGA SERPL-MCNC: 169 MG/DL (ref 64–422)
IGG SERPL-MCNC: 485 MG/DL (ref 586–1602)
IGM SERPL-MCNC: 163 MG/DL (ref 26–217)
INTERPRETATION SERPL IEP-IMP: ABNORMAL
LABORATORY COMMENT REPORT: ABNORMAL
M PROTEIN SERPL ELPH-MCNC: 0.1 G/DL
PROT SERPL-MCNC: 6 G/DL (ref 6–8.5)

## 2025-04-22 LAB
ALBUMIN 24H MFR UR ELPH: 45.1 %
ALPHA1 GLOB 24H MFR UR ELPH: 4.1 %
ALPHA2 GLOB 24H MFR UR ELPH: 9.3 %
B-GLOBULIN MFR UR ELPH: 26.6 %
GAMMA GLOB 24H MFR UR ELPH: 15 %
INTERPRETATION UR IFE-IMP: NORMAL
Lab: NORMAL
M PROTEIN 24H MFR UR ELPH: NORMAL %
PROT 24H UR-MRATE: 71 MG/24 HR (ref 30–150)
PROT UR-MCNC: 12.4 MG/DL

## 2025-04-22 NOTE — PROGRESS NOTES
Cardiology Established Patient Note     Name: Akua Torres  :   1945  PCP: Ashley Bain, APRN  Date:   2025  Department: [facility] [place] [facility] CARDIOLOGY  [address]  [facility] [address]  Fax [phone]  Phone [phone]    Chief Complaint: Here fpor my heart     Subjective     History of Present Illness  Akua Torres is a 79 y.o. female who presents today for 8-week follow-up.  Beta-blocker discontinued last visit and to review lab resultskentrell Torres is a 79 y.o. female who presents today for 8 week follow up.  Overall she is doing very well having no chest pain chest discomfort shortness of breath or shortness of breath.  She has history of paroxysmal A-fib but reports no palpitations presyncope or syncope.  Currently having a lot of balance issues and using a walker.  She is about to see a neurologist at [facility].  Problem list:    1.  Paroxysmal atrial fibrillation [id number]  2.  Carotid disease status post CVA and left ICA stent    Cerebral angio 2019 stent to left ICA  Carotid duplex 10/2023 moderate plaque right ICA mild stenosis R ICA, R CCA, L ICA   Normal carotid duplex  with Dr. Guerra  3.  Hypertension   Renal artery duplex 2022 negative for renal artery stenosis   Free light chain kappa 22.6  4.  Hyperlipidemia   Lipoprotein a 2025 234    FLP 2025LDL 42   5.  Mild MR, mild TR   2D echo 2022 EF 65 to 70% mild MR, mild TR  6.  Chronic kidney disease stage III   Microalbumin/creatinine ratio 2025 212      Lab Results   Component Value Date    GLUCOSE 130 (H) 2025    BUN 17 2025    CREATININE 1.07 (H) 2025    EGFRIFNONA 48 (L) 2021    BCR 15.9 2025    K 4.0 2025    CO2 26.0 2025    CALCIUM 9.4 2025    ALBUMIN 4.0 2025    ALBUMIN 3.4 2025    AST 32 2025    ALT 19 2025     Lab Results   Component  "Value Date    CHOL 102 04/16/2025    CHLPL 147 11/14/2022    TRIG 159 (H) 04/16/2025    HDL 33 (L) 04/16/2025    LDL 42 04/16/2025      Lab Results   Component Value Date    WBC 7.51 04/16/2025    RBC 4.54 04/16/2025    HGB 13.3 04/16/2025    HCT 41.7 04/16/2025    MCV 91.9 04/16/2025     04/16/2025     Lab Results   Component Value Date    TSH 1.510 04/16/2025     Lab Results   Component Value Date    HGBA1C 5.90 (H) 01/30/2023     No results found for: \"LPACHOL\"   Microalbumin, Urine   Date Value Ref Range Status   04/16/2025 30.5 mg/dL Final           Objective     Vital Signs:  BP 94/59 (BP Location: Right arm, Patient Position: Sitting)   Pulse 70   Ht 160 cm (63\")   Wt 85.6 kg (188 lb 12.8 oz)   BMI 33.44 kg/m²   Estimated body mass index is 33.44 kg/m² as calculated from the following:    Height as of this encounter: 160 cm (63\").    Weight as of this encounter: 85.6 kg (188 lb 12.8 oz).         Cardiovascular:      PMI at left midclavicular line. Normal rate. Regular rhythm. Normal S1. Normal S2.       Murmurs: There is a grade 1/6 high frequency blowing holosystolic murmur at the apex.      No gallop.  No click. No rub.   Pulses:     Intact distal pulses.   Edema:     Peripheral edema absent.           Assessment and Plan     Assessment & Plan  Paroxysmal atrial fibrillation  No recurrence  Stable  Continue current Rx.       Bilateral carotid artery stenosis  Needs repeat carotid duplex due to ongoing dizziness and previously noted moderate bilateral carotid disease       Primary hypertension  Hypertension is stable and controlled  Continue current treatment regimen.  Blood pressure will be reassessed in 6 months.         Hyperlipidemia LDL goal <55     Within guidelinesFLP 3 months         Mild mitral valve regurgitation  Stable  RSNV  2 D Ercho if symptomatic       Mild tricuspid regurgitation  Stable       Stage 3b chronic kidney disease  Needs SGLT 2 antagonist-however she has hx of recurrent " UTI therefore hold off for now.         Elevated lipoprotein(a)   Not within goal  Start Leqvio.    Orders:    Inclisiran Sodium solution prefilled syringe 284 mg      Follow Up  No follow-ups on file.    Judy Langford MD    [facility]

## 2025-04-22 NOTE — PROGRESS NOTES
Cardiology Established Patient Note     Name: Akua Torres  :   1945  PCP: Ashley Bain, APRN  Date:   2025  Department: [facility] [place] [facility] CARDIOLOGY  [address]  [facility] [address]  Fax [phone]  Phone [phone]    Chief Complaint: No chief complaint on file.     Subjective     History of Present Illness  Akua Torres is a 79 y.o. female who presents today for 8 week follow up.  Overall she is doing very well having no chest pain chest discomfort shortness of breath or shortness of breath.  She has history of paroxysmal A-fib but reports no palpitations presyncope or syncope.  Currently having a lot of balance issues and using a walker.  She is about to see a neurologist at [facility].  Problem list:  Paroxysmal A-fib  Carotid disease post CVA   S/p CEA left-sided stent of ICA  Carotid duplex 10/2023 moderate plaque right proximal ICA, mild stenosis in the R ICA R CCA LICA less than 50%.   Hypertension  Left heart cath 2021 EF 65% normal coronaries   Renal artery duplex 2022 no evidence of renal artery stenosis, incidental finding of right renal cyst  2022 EF is 65 to 70% mild MR, mild TR  Hyperlipidemia  CKD stage III    Current Outpatient Medications   Medication Instructions    acetaminophen (TYLENOL) 325 MG tablet 1 tablet, Every 6 Hours PRN    allopurinol (ZYLOPRIM) 100 mg, Oral, Daily, For gout    apixaban (ELIQUIS) 5 mg, Oral, 2 Times Daily    atorvastatin (LIPITOR) 80 mg, Oral, Nightly    Cyanocobalamin (Vitamin B-12) 1000 MCG sublingual tablet 1 tablet, Daily    escitalopram (LEXAPRO) 10 mg, Oral, Daily, For depression    Gemtesa 75 MG tablet Take 1 tablet by mouth Daily.    hydrOXYzine pamoate (VISTARIL) 25 mg, 3 Times Daily PRN    lisinopril (PRINIVIL,ZESTRIL) 20 MG tablet TAKE 1 TABLET(20 MG) BY MOUTH DAILY FOR BLOOD PRESSURE    methocarbamol (ROBAXIN) 750 mg, Oral, 3 Times Daily     multivitamin with minerals (PRESERVISION AREDS PO) 1 tablet, Daily    NIFEdipine XL (PROCARDIA XL) 30 mg, Daily    Omega-3 Fatty Acids (fish oil) 1000 MG capsule capsule TAKE ONE CAPSULE BY MOUTH TWO TIMES A DAY FOR 90 DAYS    pregabalin (LYRICA) 25 MG capsule Take 1 capsule by mouth Every Morning AND 2 capsules Every Evening.    propafenone SR (RYTHMOL SR) 225 mg, Oral, Every 12 Hours Scheduled        Lab Results   Component Value Date    GLUCOSE 130 (H) 04/16/2025    BUN 17 04/16/2025    CREATININE 1.07 (H) 04/16/2025    EGFRIFNONA 48 (L) 07/27/2021    BCR 15.9 04/16/2025    K 4.0 04/16/2025    CO2 26.0 04/16/2025    CALCIUM 9.4 04/16/2025    ALBUMIN 4.0 04/16/2025    ALBUMIN 3.4 04/16/2025    AST 32 04/16/2025    ALT 19 04/16/2025     Lab Results   Component Value Date    CHOL 102 04/16/2025    CHLPL 147 11/14/2022    TRIG 159 (H) 04/16/2025    HDL 33 (L) 04/16/2025    LDL 42 04/16/2025      Lab Results   Component Value Date    WBC 7.51 04/16/2025    RBC 4.54 04/16/2025    HGB 13.3 04/16/2025    HCT 41.7 04/16/2025    MCV 91.9 04/16/2025     04/16/2025     Lab Results   Component Value Date    TSH 1.510 04/16/2025     Lab Results   Component Value Date    HGBA1C 5.90 (H) 01/30/2023     Microalbumin, Urine   Date Value Ref Range Status   04/16/2025 30.5 mg/dL Final     Lipoprotein (a)   Date Value Ref Range Status   04/16/2025 234.4 (H) <75.0 nmol/L Final     Comment:     Note:  Values greater than or equal to 75.0 nmol/L may         indicate an independent risk factor for CHD,         but must be evaluated with caution when applied         to non- populations due to the         influence of genetic factors on Lp(a) across         ethnicities.      Microalbumin/Creatinine Ratio   Date Value Ref Range Status   04/16/2025 212.1 (H) 0.0 - 29.0 mg/g Final       Objective     Vital Signs:  There were no vitals taken for this visit.  Estimated body mass index is 33.13 kg/m² as calculated from the  "following:    Height as of 2/20/25: 160 cm (63\").    Weight as of 2/20/25: 84.8 kg (187 lb).       Physical Exam          Assessment and Plan     Assessment & Plan      Follow Up  No follow-ups on file.    [facility]  "

## 2025-04-22 NOTE — ASSESSMENT & PLAN NOTE
Needs repeat carotid duplex due to ongoing dizziness and previously noted moderate bilateral carotid disease

## 2025-04-23 ENCOUNTER — OFFICE VISIT (OUTPATIENT)
Age: 80
End: 2025-04-23
Payer: MEDICARE

## 2025-04-23 ENCOUNTER — PATIENT ROUNDING (BHMG ONLY) (OUTPATIENT)
Age: 80
End: 2025-04-23

## 2025-04-23 VITALS
HEIGHT: 63 IN | BODY MASS INDEX: 33.45 KG/M2 | SYSTOLIC BLOOD PRESSURE: 94 MMHG | HEART RATE: 70 BPM | WEIGHT: 188.8 LBS | DIASTOLIC BLOOD PRESSURE: 59 MMHG

## 2025-04-23 DIAGNOSIS — N18.32 STAGE 3B CHRONIC KIDNEY DISEASE: ICD-10-CM

## 2025-04-23 DIAGNOSIS — I07.1 MILD TRICUSPID REGURGITATION: ICD-10-CM

## 2025-04-23 DIAGNOSIS — E78.5 HYPERLIPIDEMIA LDL GOAL <55: ICD-10-CM

## 2025-04-23 DIAGNOSIS — E78.41 ELEVATED LIPOPROTEIN(A): ICD-10-CM

## 2025-04-23 DIAGNOSIS — I34.0 MILD MITRAL VALVE REGURGITATION: ICD-10-CM

## 2025-04-23 DIAGNOSIS — I10 PRIMARY HYPERTENSION: ICD-10-CM

## 2025-04-23 DIAGNOSIS — I65.23 BILATERAL CAROTID ARTERY STENOSIS: ICD-10-CM

## 2025-04-23 DIAGNOSIS — I48.0 PAROXYSMAL ATRIAL FIBRILLATION: Primary | ICD-10-CM

## 2025-04-23 RX ORDER — PROPRANOLOL HYDROCHLORIDE 10 MG/1
1 TABLET ORAL DAILY PRN
COMMUNITY
Start: 2025-03-06

## 2025-04-23 RX ORDER — HYDROXYZINE HYDROCHLORIDE 10 MG/1
10 TABLET, FILM COATED ORAL EVERY 24 HOURS
COMMUNITY

## 2025-04-23 RX ORDER — ACETAMINOPHEN 500 MG
1000 TABLET ORAL EVERY 8 HOURS PRN
COMMUNITY

## 2025-04-23 NOTE — PROGRESS NOTES
....My name is Mer Menon, and I am the Practice Manager for [facility].    I would like to thank you for being a loyal patient. If you do not mind, I would like to ask you some questions about your recent visit with us. Please feel free to reply if you wish to provide us with feedback on your visit with our practice.    First, could you tell me what went well with your recent visit?    Secondly, we are always looking for ways to make our patients' experiences even better. Do you have any recommendations on what we can do to improve your experience?    Finally, overall were you satisfied with your visit with us as a [facility] facility?    Over the next few days, you will be receiving a Patient Experience Survey. Please consider taking the survey, as it helps [facility] in improving their patient care.    Thank you for taking the time to answer our questions today.    I hope you have a good day..

## 2025-04-23 NOTE — ASSESSMENT & PLAN NOTE
Not within goal  Start Leqvio.    Orders:    Inclisiran Sodium solution prefilled syringe 284 mg

## 2025-04-25 ENCOUNTER — OFFICE VISIT (OUTPATIENT)
Dept: ONCOLOGY | Facility: CLINIC | Age: 80
End: 2025-04-25
Payer: MEDICARE

## 2025-04-25 VITALS
RESPIRATION RATE: 18 BRPM | TEMPERATURE: 98 F | BODY MASS INDEX: 34.37 KG/M2 | SYSTOLIC BLOOD PRESSURE: 114 MMHG | HEART RATE: 68 BPM | OXYGEN SATURATION: 97 % | WEIGHT: 194 LBS | DIASTOLIC BLOOD PRESSURE: 66 MMHG

## 2025-04-25 DIAGNOSIS — D47.2 MONOCLONAL GAMMOPATHY: Primary | ICD-10-CM

## 2025-04-25 NOTE — LETTER
April 25, 2025     WILFREDO Murray  [address] [facility] [address]    Patient: Akua Torres   YOB: 1945   Date of Visit: 4/25/2025     Dear WILFREDO Murray:       Thank you for referring Akua Torres to me for evaluation. Below are the relevant portions of my assessment and plan of care.    If you have questions, please do not hesitate to call me. I look forward to following Akua along with you.         Sincerely,        Kenji Nuñez MD        CC: Corinne E Perkins, PT  MD Gerber Wheatley MD Evva D Allen, APRN Christian N Ramsey, MD Avichai Eres, MD Hicks, Kenji DE LA ROSA MD  04/25/25 1346  Sign when Signing Visit  CHIEF COMPLAINT: Follow-up monoclonal gammopathy    Problem List:  Oncology/Hematology History Overview Note   1.  IgG kappa monoclonal gammopathy with scant elevation kappa light chain serum   2.  Left prefrontal gyrus stroke  3.  Hyperlipidemia  4.  Hypertension  5.  Gout on allopurinol  6.  Osteoarthritis with fibromyalgia seeing Dr. Gerber Cain.  Multiple back surgeries and chronic back pain.  7.  Paroxysmal atrial fibrillation on Xarelto  8.  Peripheral neuropathy in the lower extremities    -5/9/2024 M spike 100 mg/dL IgG kappa  -10/17/2024 IgG slightly low 523 with normal IgA and IgM.  IgG kappa M spike 100 mg/dL.  Serum kappa 25 upper limit of normal 19 with normal lambda and ratio 1.07.  Normal C-reactive protein less than 0.3.  Sedimentation rate mildly elevated 38 upper limit of normal 30.  CBC and CMP unremarkable save for glucose 119 alkaline phosphatase 129.  Beta-2 microglobulin 3.4 upper limit of normal 2.2..  No lytic lesions mentioned on MRI humerus July 2024, MRI cervical spine August 2024, CT cervical spine December 2024.    -12/9/2024 [facility]: Patient has been stable mild elevation of IgG kappa M protein and her serum without significant elevation in her total protein, globulin, and  without significant change in her albumin and ratios thereof.  She has no renal insufficiency or anemia.  No lytic lesions on other imaging done but I will get a bone survey and a bone marrow biopsy and myeloma panel including 24-hour urine immunoelectrophoresis.  She will see my nurse practitioner back in about a month to go over all of this.  If she has less than 4% plasma cells and no lytic lesions then she would not even meet the criteria for an MGUS.    If she has between 4 and 10% monoclonal plasma cells in the bone marrow light microscopy and in particular if there are complex cytogenetics or FISH results and she has no lytic bone lesions on bone survey, then this would be considered benign monoclonal gammopathy that we would follow-up with serum testing twice a year to watch for progression towards myeloma.  If she has greater than 10% plasma cells but no lytic bone lesions, calcium over 12, hemoglobin less than 10, creatinine over 2, then she would have a smoldering myeloma that we would watch quarterly.  If she has greater than 10% plasma cells with any of the following (lytic bone lesions, calcium over 12, hemoglobin over 10, creatinine over 2) then she would have full-blown myeloma and we would need to discuss treatment rather than watchful waiting.  If her bone marrow shows greater than 10% plasma cells I would also ask my nurse practitioner to order a PET for completeness sake.    -12/9/2024 bone survey shows focal lucency measuring 6 mm within proximal right humeral shaft which is equivocal given lack of similar findings throughout the remainder of the skeleton.  -12/9/2024 labs: Beta-2 microglobulin 3.0.  CBC WBC 11.4, hemoglobin 14.1, hematocrit 45.3%, platelet count 199,000.  CMP creatinine 0.93, calcium 9.2, serum protein 6.7.  CRP <0.30.  Sedimentation rate 27.  Serum immunoelectrophoresis M spike 100 mg/dL IgG monoclonal protein with kappa light chain specificity.  Serum free light chains free  kappa light chains 22.8, lambda light chains 20.9 with normal kappa/lambda ratio 1.09.  -12/23/2024 bone marrow biopsy shows normocellular bone marrow, 2-3% plasma cells and no evidence of dysplasia, increased blasts or lymphoma.  Normal peripheral blood smear.  No increased plasma cells and flow cytometry identifies a small population of polyclonal plasma cells with no phenotypic aberrancy.    -1/10/2025 [facility] hematology/oncology clinic follow-up: Her bone marrow biopsy was unremarkable, she had normocellular bone marrow with 2-3% plasma cells no evidence of dysplasia, increased blasts or lymphoma.  Normal peripheral smear.  No increased plasma cells, flow cytometry with small population of polyclonal plasma cells with no phenotypic aberrancy.  She has no anemia, renal function is normal.  Bone survey with unequivocal very small 6 mm questionable lucency in the proximal right humeral shaft but the remainder of the skeleton was negative.  M spike unchanged at 100 mg/dL IgG monoclonal protein with kappa light chain specificity, normal kappa/lambda ratio which is scant elevation of her kappa light chains and normal lambda light chains.  At this time would just recommend repeating myeloma panel again in 3 months to make sure it is stable and then would go to 6-month monitoring.  Would not put her through a PET scan with normal bone marrow and just a small lucency which is questionable on bone survey.  I will check with Dr. Nuñez when he returns on Monday to make sure he does not want anything different.  I reviewed all findings with the patient and her  and all questions were answered to their satisfaction.  Otherwise we will plan on seeing her back in 3 months for follow-up.    - 4/16/2025 IgG kappa M spike 100 mg/dL stable with normal IgA and IgM.  Stable 22.6 upper limit of normal 19.4 with normal ratio 1.1.  24-hour urine M spike negative.  Sedimentation rate 25 normal.  C-reactive protein today 0.195  normal.  CBC unremarkable. Glucose 130 creatinine 1.07 alkaline phosphatase 164 otherwise unremarkable CMP.    - 4/25/2025 [facility] hematology oncology follow-up: No change in her M panel.  Repeat M panel again in 6 months with bone survey.  No need for the 24-hour urine M panel.  Low likelihood for plasmacytoma so would not put her through PET at this point.     Monoclonal gammopathy       HISTORY OF PRESENT ILLNESS:  The patient is a 79 y.o. female, here for follow up on management of monoclonal gammopathy.  No new somatic complaints.  Specifically no bone pains.  Some arthritis.    Past Medical History:   Diagnosis Date   • A-fib 2019   • Allergic     penicillin, quinidine, nsaids   • Aneurysm 12/24    Dr Guerra said I had 2 small ones   • Ankylosing spondylitis of site in spine ?   • Anxiety and depression    • Arthritis    • Carotid artery disease    • Cataract    • Cervical disc disorder 2010   • Chronic pain disorder     since approx. 2000   • CKD (chronic kidney disease)    • CTS (carpal tunnel syndrome) ?    left hand   • Depression    • Difficulty walking    • Extremity pain    • Fibromyalgia    • Fibromyalgia, primary approx.2020 per trena saavedra   • GERD (gastroesophageal reflux disease)    • Gout    • H/O bladder infections     JUST FINISHED MACROBID ON 11-2-17 FOR RECENT UTI   • Headache    • Headache, tension-type 2010   • Hip arthrosis 2016    total replacement   • Hypercholesteremia    • Hypertension    • Joint pain    • Kidney disease, chronic, stage III (GFR 30-59 ml/min)     resolved after stopping antiinflammatory ~2015   • Knee swelling 2010    total replacement   • Leg cramps    • Low back pain    • Lumbosacral disc disease 2000   • Mitral regurgitation    • Neck pain    • Osteoarthritis    • PAF (paroxysmal atrial fibrillation)    • Palpitations    • Periarthritis of shoulder    • Peripheral neuropathy approx. 2023    dr albrecht ordered an emg test   • Pneumonia 02/2020   • Rheumatic fever     age  19   • Rotator cuff syndrome 2018    arthroplasty   • Sensorimotor neuropathy 11/11/2024   • Skin cancer    • Sleep apnea     no cpap   • Spinal stenosis 06/27/2023   • Stroke 09/2019    right sided weakness   • Tricuspid regurgitation    • Urinary tract infection had 1 recently   • Wears glasses      Past Surgical History:   Procedure Laterality Date   • ABLATION OF DYSRHYTHMIC FOCUS  11/2024    Dr Nettles, on neck   • BACK SURGERY  2004    LUMBAR PER DR MAN x2   • CARDIAC ABLATION  11/2024    Radiofrequency ablation, spinal nerve root,  lumbosacral, bilateral, after initial radiofrequency  ablation of nerve root at different level   • CARDIAC CATHETERIZATION  02/22/2019    SJE/MOOKIE/NORMAL LEFT VENTRICULAR FUCTION. NORMAL  CORONARIES. EF IS 65%   • CARDIAC CATHETERIZATION  12/13/2021    SJE ERES/IOP: angina, ABN stress study/ EF 65%. Normal LVSF.  Normal coronaries.   • CAROTID STENT     • CARPAL TUNNEL RELEASE Left    • COLONOSCOPY  11/2020   • EPIDURAL BLOCK  1974    childbirth   • EYE SURGERY      retina surgery   • FINGER SURGERY Right     3RD FINGER   • HEEL SPUR EXCISION Bilateral    • INTERVENTIONAL RADIOLOGY PROCEDURE N/A 09/17/2019    Procedure: Carotid and Cerebral Angiogram/ Possible Stent;  Surgeon: Imer Gurera MD;  Location:  [facility] CATH INVASIVE LOCATION;  Service: Interventional Radiology   • JOINT REPLACEMENT      both knees and both hips   • KNEE ARTHROSCOPY Bilateral    • LUMBAR DISCECTOMY FUSION INSTRUMENTATION N/A 11/10/2017    Procedure: LUMBAR SPINAL FUSION ;  Surgeon: Gopi Man MD;  Location:  [facility];  Service:    • NECK SURGERY  2019    clogged  artery and put stent   • ORTHOPEDIC SURGERY  ?    had total knee, total hip, 2 back   • REPLACEMENT TOTAL KNEE BILATERAL Bilateral    • SHOULDER ARTHROSCOPY W/ ROTATOR CUFF REPAIR Right 01/19/2021    Procedure: ARTHROSCOPIC ROTATOR CUFF REPAIR WITH BICEPS TENODESIS RIGHT;  Surgeon: Lance Morales Jr., MD;  Location:  [facility];   Service: Orthopedics;  Laterality: Right;   • SKIN BIOPSY     • SPINAL FUSION     • SPINE SURGERY  2004 & 2008   • TOTAL HIP ARTHROPLASTY Right 09/09/2019    Procedure: TOTAL ANTERIOR RIGHT HIP ARTHROPLASTY;  Surgeon: Darrin New MD;  Location:  [facility];  Service: Orthopedics   • TOTAL HIP ARTHROPLASTY Left 06/29/2020    Procedure: TOTAL HIP ARTHROPLASTY ANTERIOR LEFT;  Surgeon: Darrin New MD;  Location:  [facility];  Service: Orthopedics;  Laterality: Left;   • TRIGGER POINT INJECTION  ?    had knee, back, several in shoulder       Allergies   Allergen Reactions   • Nsaids Other (See Comments), Anaphylaxis, Hives, Rash and Unknown (See Comments)     KIDNEY DAMAGE   • Quinidine Nausea And Vomiting, Other (See Comments), Anaphylaxis, Nausea Only and Unknown (See Comments)     FEVER     quinidine gluconate   • Penicillins Rash, Hives, Unknown (See Comments) and Other (See Comments)   • Meloxicam Other (See Comments)     Mobic   • Nitrofurantoin Other (See Comments)   • Pravastatin Sodium Other (See Comments)     pravastatin sodium   • Tizanidine Other (See Comments)     tizanidine   • Bactrim [Sulfamethoxazole-Trimethoprim] Rash   • Clonidine Other (See Comments)   • Eliquis [Apixaban] Itching   • Nitrofuran Derivatives Diarrhea   • Other Other (See Comments)     Note: quinidine   • Savaysa [Edoxaban] Other (See Comments)     Patient cannotl remember what happens       Family History and Social History reviewed and changed as necessary    REVIEW OF SYSTEM:   No new somatic complaints.  Chronic arthritic neck pain    PHYSICAL EXAM:  No jaundice icterus or pallor.  No respiratory distress.    Vitals:    04/25/25 1327   BP: 114/66   Pulse: 68   Resp: 18   Temp: 98 °F (36.7 °C)   TempSrc: Temporal   SpO2: 97%   Weight: 88 kg (194 lb)     Vitals:    04/25/25 1327   PainSc: 8    PainLoc: Neck          ECOG score: 1           Vitals reviewed.    ECOG: (1) Restricted in Physically Strenuous Activity, Ambulatory  & Able to Do Work of Light Nature    Lab Results   Component Value Date    HGB 13.3 04/16/2025    HCT 41.7 04/16/2025    MCV 91.9 04/16/2025     04/16/2025    WBC 7.51 04/16/2025    NEUTROABS 5.29 04/16/2025    LYMPHSABS 1.56 04/16/2025    MONOSABS 0.39 04/16/2025    EOSABS 0.25 04/16/2025    BASOSABS 0.01 04/16/2025       Lab Results   Component Value Date    GLUCOSE 130 (H) 04/16/2025    BUN 17 04/16/2025    CREATININE 1.07 (H) 04/16/2025     04/16/2025    K 4.0 04/16/2025     04/16/2025    CO2 26.0 04/16/2025    CALCIUM 9.4 04/16/2025    PROTEINTOT 6.9 04/16/2025    PROTEINTOT 6.0 04/16/2025    ALBUMIN 4.0 04/16/2025    ALBUMIN 3.4 04/16/2025    BILITOT 0.8 04/16/2025    ALKPHOS 164 (H) 04/16/2025    AST 32 04/16/2025    ALT 19 04/16/2025             ASSESSMENT & PLAN:  1.  IgG kappa monoclonal gammopathy with scant elevation kappa light chain serum   2.  Left prefrontal gyrus stroke  3.  Hyperlipidemia  4.  Hypertension  5.  Gout on allopurinol  6.  Osteoarthritis with fibromyalgia seeing Dr. Gerber Cain.  Multiple back surgeries and chronic back pain.  7.  Paroxysmal atrial fibrillation on Xarelto  8.  Peripheral neuropathy in the lower extremities    -5/9/2024 M spike 100 mg/dL IgG kappa  -10/17/2024 IgG slightly low 523 with normal IgA and IgM.  IgG kappa M spike 100 mg/dL.  Serum kappa 25 upper limit of normal 19 with normal lambda and ratio 1.07.  Normal C-reactive protein less than 0.3.  Sedimentation rate mildly elevated 38 upper limit of normal 30.  CBC and CMP unremarkable save for glucose 119 alkaline phosphatase 129.  Beta-2 microglobulin 3.4 upper limit of normal 2.2..  No lytic lesions mentioned on MRI humerus July 2024, MRI cervical spine August 2024, CT cervical spine December 2024.    -12/9/2024 [facility] hematology oncology consult: Patient has been stable mild elevation of IgG kappa M protein and her serum without significant elevation in her total protein, globulin, and  kappa light chains 22.8, lambda light chains 20.9 with normal kappa/lambda ratio 1.09.  -12/23/2024 bone marrow biopsy shows normocellular bone marrow, 2-3% plasma cells and no evidence of dysplasia, increased blasts or lymphoma.  Normal peripheral blood smear.  No increased plasma cells and flow cytometry identifies a small population of polyclonal plasma cells with no phenotypic aberrancy.    -1/10/2025 [facility] hematology/oncology clinic follow-up: Her bone marrow biopsy was unremarkable, she had normocellular bone marrow with 2-3% plasma cells no evidence of dysplasia, increased blasts or lymphoma.  Normal peripheral smear.  No increased plasma cells, flow cytometry with small population of polyclonal plasma cells with no phenotypic aberrancy.  She has no anemia, renal function is normal.  Bone survey with unequivocal very small 6 mm questionable lucency in the proximal right humeral shaft but the remainder of the skeleton was negative.  M spike unchanged at 100 mg/dL IgG monoclonal protein with kappa light chain specificity, normal kappa/lambda ratio which is scant elevation of her kappa light chains and normal lambda light chains.  At this time would just recommend repeating myeloma panel again in 3 months to make sure it is stable and then would go to 6-month monitoring.  Would not put her through a PET scan with normal bone marrow and just a small lucency which is questionable on bone survey.  I will check with Dr. Nuñez when he returns on Monday to make sure he does not want anything different.  I reviewed all findings with the patient and her  and all questions were answered to their satisfaction.  Otherwise we will plan on seeing her back in 3 months for follow-up.    - 4/16/2025 IgG kappa M spike 100 mg/dL stable with normal IgA and IgM.  Stable 22.6 upper limit of normal 19.4 with normal ratio 1.1.  24-hour urine M spike negative.  Sedimentation rate 25 normal.  C-reactive protein today 0.195  normal.  CBC unremarkable. Glucose 130 creatinine 1.07 alkaline phosphatase 164 otherwise unremarkable CMP.    - 4/25/2025 [facility] hematology oncology follow-up: No change in her M panel.  Repeat M panel again in 6 months with bone survey.  No need for the 24-hour urine M panel.  Low likelihood for plasmacytoma so would not put her through PET at this point.    Total time of care today inclusive of time spent today prior to patient's arrival reviewing interval data and during visit translating patient putting forth plan took 35 minutes patient care time throughout the day today.  Kenji Nuñez MD    04/25/2025

## 2025-04-25 NOTE — PROGRESS NOTES
CHIEF COMPLAINT: Follow-up monoclonal gammopathy    Problem List:  Oncology/Hematology History Overview Note   1.  IgG kappa monoclonal gammopathy with scant elevation kappa light chain serum   2.  Left prefrontal gyrus stroke  3.  Hyperlipidemia  4.  Hypertension  5.  Gout on allopurinol  6.  Osteoarthritis with fibromyalgia seeing Dr. Gerber Cain.  Multiple back surgeries and chronic back pain.  7.  Paroxysmal atrial fibrillation on Xarelto  8.  Peripheral neuropathy in the lower extremities    -5/9/2024 M spike 100 mg/dL IgG kappa  -10/17/2024 IgG slightly low 523 with normal IgA and IgM.  IgG kappa M spike 100 mg/dL.  Serum kappa 25 upper limit of normal 19 with normal lambda and ratio 1.07.  Normal C-reactive protein less than 0.3.  Sedimentation rate mildly elevated 38 upper limit of normal 30.  CBC and CMP unremarkable save for glucose 119 alkaline phosphatase 129.  Beta-2 microglobulin 3.4 upper limit of normal 2.2..  No lytic lesions mentioned on MRI humerus July 2024, MRI cervical spine August 2024, CT cervical spine December 2024.    -12/9/2024 [facility] hematology oncology consult: Patient has been stable mild elevation of IgG kappa M protein and her serum without significant elevation in her total protein, globulin, and without significant change in her albumin and ratios thereof.  She has no renal insufficiency or anemia.  No lytic lesions on other imaging done but I will get a bone survey and a bone marrow biopsy and myeloma panel including 24-hour urine immunoelectrophoresis.  She will see my nurse practitioner back in about a month to go over all of this.  If she has less than 4% plasma cells and no lytic lesions then she would not even meet the criteria for an MGUS.    If she has between 4 and 10% monoclonal plasma cells in the bone marrow light microscopy and in particular if there are complex cytogenetics or FISH results and she has no lytic bone lesions on bone survey, then this would be  considered benign monoclonal gammopathy that we would follow-up with serum testing twice a year to watch for progression towards myeloma.  If she has greater than 10% plasma cells but no lytic bone lesions, calcium over 12, hemoglobin less than 10, creatinine over 2, then she would have a smoldering myeloma that we would watch quarterly.  If she has greater than 10% plasma cells with any of the following (lytic bone lesions, calcium over 12, hemoglobin over 10, creatinine over 2) then she would have full-blown myeloma and we would need to discuss treatment rather than watchful waiting.  If her bone marrow shows greater than 10% plasma cells I would also ask my nurse practitioner to order a PET for completeness sake.    -12/9/2024 bone survey shows focal lucency measuring 6 mm within proximal right humeral shaft which is equivocal given lack of similar findings throughout the remainder of the skeleton.  -12/9/2024 labs: Beta-2 microglobulin 3.0.  CBC WBC 11.4, hemoglobin 14.1, hematocrit 45.3%, platelet count 199,000.  CMP creatinine 0.93, calcium 9.2, serum protein 6.7.  CRP <0.30.  Sedimentation rate 27.  Serum immunoelectrophoresis M spike 100 mg/dL IgG monoclonal protein with kappa light chain specificity.  Serum free light chains free kappa light chains 22.8, lambda light chains 20.9 with normal kappa/lambda ratio 1.09.  -12/23/2024 bone marrow biopsy shows normocellular bone marrow, 2-3% plasma cells and no evidence of dysplasia, increased blasts or lymphoma.  Normal peripheral blood smear.  No increased plasma cells and flow cytometry identifies a small population of polyclonal plasma cells with no phenotypic aberrancy.    -1/10/2025 [facility] hematology/oncology clinic follow-up: Her bone marrow biopsy was unremarkable, she had normocellular bone marrow with 2-3% plasma cells no evidence of dysplasia, increased blasts or lymphoma.  Normal peripheral smear.  No increased plasma cells, flow cytometry with small  population of polyclonal plasma cells with no phenotypic aberrancy.  She has no anemia, renal function is normal.  Bone survey with unequivocal very small 6 mm questionable lucency in the proximal right humeral shaft but the remainder of the skeleton was negative.  M spike unchanged at 100 mg/dL IgG monoclonal protein with kappa light chain specificity, normal kappa/lambda ratio which is scant elevation of her kappa light chains and normal lambda light chains.  At this time would just recommend repeating myeloma panel again in 3 months to make sure it is stable and then would go to 6-month monitoring.  Would not put her through a PET scan with normal bone marrow and just a small lucency which is questionable on bone survey.  I will check with Dr. Nuñez when he returns on Monday to make sure he does not want anything different.  I reviewed all findings with the patient and her  and all questions were answered to their satisfaction.  Otherwise we will plan on seeing her back in 3 months for follow-up.    - 4/16/2025 IgG kappa M spike 100 mg/dL stable with normal IgA and IgM.  Stable 22.6 upper limit of normal 19.4 with normal ratio 1.1.  24-hour urine M spike negative.  Sedimentation rate 25 normal.  C-reactive protein today 0.195 normal.  CBC unremarkable. Glucose 130 creatinine 1.07 alkaline phosphatase 164 otherwise unremarkable CMP.    - 4/25/2025 [facility] hematology oncology follow-up: No change in her M panel.  Repeat M panel again in 6 months with bone survey.  No need for the 24-hour urine M panel.  Low likelihood for plasmacytoma so would not put her through PET at this point.     Monoclonal gammopathy       HISTORY OF PRESENT ILLNESS:  The patient is a 79 y.o. female, here for follow up on management of monoclonal gammopathy.  No new somatic complaints.  Specifically no bone pains.  Some arthritis.    Past Medical History:   Diagnosis Date    A-fib 2019    Allergic     penicillin, quinidine, nsaids     Aneurysm 12/24    Dr Guerra said I had 2 small ones    Ankylosing spondylitis of site in spine ?    Anxiety and depression     Arthritis     Carotid artery disease     Cataract     Cervical disc disorder 2010    Chronic pain disorder     since approx. 2000    CKD (chronic kidney disease)     CTS (carpal tunnel syndrome) ?    left hand    Depression     Difficulty walking     Extremity pain     Fibromyalgia     Fibromyalgia, primary approx.2020 per trena saavedra    GERD (gastroesophageal reflux disease)     Gout     H/O bladder infections     JUST FINISHED MACROBID ON 11-2-17 FOR RECENT UTI    Headache     Headache, tension-type 2010    Hip arthrosis 2016    total replacement    Hypercholesteremia     Hypertension     Joint pain     Kidney disease, chronic, stage III (GFR 30-59 ml/min)     resolved after stopping antiinflammatory ~2015    Knee swelling 2010    total replacement    Leg cramps     Low back pain     Lumbosacral disc disease 2000    Mitral regurgitation     Neck pain     Osteoarthritis     PAF (paroxysmal atrial fibrillation)     Palpitations     Periarthritis of shoulder     Peripheral neuropathy approx. 2023    dr albrecht ordered an emg test    Pneumonia 02/2020    Rheumatic fever     age 19    Rotator cuff syndrome 2018    arthroplasty    Sensorimotor neuropathy 11/11/2024    Skin cancer     Sleep apnea     no cpap    Spinal stenosis 06/27/2023    Stroke 09/2019    right sided weakness    Tricuspid regurgitation     Urinary tract infection had 1 recently    Wears glasses      Past Surgical History:   Procedure Laterality Date    ABLATION OF DYSRHYTHMIC FOCUS  11/2024    Dr Nettles, on neck    BACK SURGERY  2004    LUMBAR PER DR THORNTON x2    CARDIAC ABLATION  11/2024    Radiofrequency ablation, spinal nerve root,  lumbosacral, bilateral, after initial radiofrequency  ablation of nerve root at different level    CARDIAC CATHETERIZATION  02/22/2019    SJE/MOOKIE/NORMAL LEFT VENTRICULAR FUCTION. NORMAL   CORONARIES. EF IS 65%    CARDIAC CATHETERIZATION  12/13/2021    SJE ERES/IOP: angina, ABN stress study/ EF 65%. Normal LVSF.  Normal coronaries.    CAROTID STENT      CARPAL TUNNEL RELEASE Left     COLONOSCOPY  11/2020    EPIDURAL BLOCK  1974    childbirth    EYE SURGERY      retina surgery    FINGER SURGERY Right     3RD FINGER    HEEL SPUR EXCISION Bilateral     INTERVENTIONAL RADIOLOGY PROCEDURE N/A 09/17/2019    Procedure: Carotid and Cerebral Angiogram/ Possible Stent;  Surgeon: Imer Guerra MD;  Location:  [facility];  Service: Interventional Radiology    JOINT REPLACEMENT      both knees and both hips    KNEE ARTHROSCOPY Bilateral     LUMBAR DISCECTOMY FUSION INSTRUMENTATION N/A 11/10/2017    Procedure: LUMBAR SPINAL FUSION ;  Surgeon: Gopi Man MD;  Location:  [facility];  Service:     NECK SURGERY  2019    clogged  artery and put stent    ORTHOPEDIC SURGERY  ?    had total knee, total hip, 2 back    REPLACEMENT TOTAL KNEE BILATERAL Bilateral     SHOULDER ARTHROSCOPY W/ ROTATOR CUFF REPAIR Right 01/19/2021    Procedure: ARTHROSCOPIC ROTATOR CUFF REPAIR WITH BICEPS TENODESIS RIGHT;  Surgeon: Lance Morales Jr., MD;  Location:  [facility];  Service: Orthopedics;  Laterality: Right;    SKIN BIOPSY      SPINAL FUSION      SPINE SURGERY  2004 & 2008    TOTAL HIP ARTHROPLASTY Right 09/09/2019    Procedure: TOTAL ANTERIOR RIGHT HIP ARTHROPLASTY;  Surgeon: Darrin New MD;  Location:  [facility];  Service: Orthopedics    TOTAL HIP ARTHROPLASTY Left 06/29/2020    Procedure: TOTAL HIP ARTHROPLASTY ANTERIOR LEFT;  Surgeon: Darrin New MD;  Location:  [facility];  Service: Orthopedics;  Laterality: Left;    TRIGGER POINT INJECTION  ?    had knee, back, several in shoulder       Allergies   Allergen Reactions    Nsaids Other (See Comments), Anaphylaxis, Hives, Rash and Unknown (See Comments)     KIDNEY DAMAGE    Quinidine Nausea And Vomiting, Other (See Comments), Anaphylaxis, Nausea  Only and Unknown (See Comments)     FEVER     quinidine gluconate    Penicillins Rash, Hives, Unknown (See Comments) and Other (See Comments)    Meloxicam Other (See Comments)     Mobic    Nitrofurantoin Other (See Comments)    Pravastatin Sodium Other (See Comments)     pravastatin sodium    Tizanidine Other (See Comments)     tizanidine    Bactrim [Sulfamethoxazole-Trimethoprim] Rash    Clonidine Other (See Comments)    Eliquis [Apixaban] Itching    Nitrofuran Derivatives Diarrhea    Other Other (See Comments)     Note: quinidine    Savaysa [Edoxaban] Other (See Comments)     Patient cannotl remember what happens       Family History and Social History reviewed and changed as necessary    REVIEW OF SYSTEM:   No new somatic complaints.  Chronic arthritic neck pain    PHYSICAL EXAM:  No jaundice icterus or pallor.  No respiratory distress.    Vitals:    04/25/25 1327   BP: 114/66   Pulse: 68   Resp: 18   Temp: 98 °F (36.7 °C)   TempSrc: Temporal   SpO2: 97%   Weight: 88 kg (194 lb)     Vitals:    04/25/25 1327   PainSc: 8    PainLoc: Neck          ECOG score: 1           Vitals reviewed.    ECOG: (1) Restricted in Physically Strenuous Activity, Ambulatory & Able to Do Work of Light Nature    Lab Results   Component Value Date    HGB 13.3 04/16/2025    HCT 41.7 04/16/2025    MCV 91.9 04/16/2025     04/16/2025    WBC 7.51 04/16/2025    NEUTROABS 5.29 04/16/2025    LYMPHSABS 1.56 04/16/2025    MONOSABS 0.39 04/16/2025    EOSABS 0.25 04/16/2025    BASOSABS 0.01 04/16/2025       Lab Results   Component Value Date    GLUCOSE 130 (H) 04/16/2025    BUN 17 04/16/2025    CREATININE 1.07 (H) 04/16/2025     04/16/2025    K 4.0 04/16/2025     04/16/2025    CO2 26.0 04/16/2025    CALCIUM 9.4 04/16/2025    PROTEINTOT 6.9 04/16/2025    PROTEINTOT 6.0 04/16/2025    ALBUMIN 4.0 04/16/2025    ALBUMIN 3.4 04/16/2025    BILITOT 0.8 04/16/2025    ALKPHOS 164 (H) 04/16/2025    AST 32 04/16/2025    ALT 19  04/16/2025             ASSESSMENT & PLAN:  1.  IgG kappa monoclonal gammopathy with scant elevation kappa light chain serum   2.  Left prefrontal gyrus stroke  3.  Hyperlipidemia  4.  Hypertension  5.  Gout on allopurinol  6.  Osteoarthritis with fibromyalgia seeing Dr. Gerber Cain.  Multiple back surgeries and chronic back pain.  7.  Paroxysmal atrial fibrillation on Xarelto  8.  Peripheral neuropathy in the lower extremities    -5/9/2024 M spike 100 mg/dL IgG kappa  -10/17/2024 IgG slightly low 523 with normal IgA and IgM.  IgG kappa M spike 100 mg/dL.  Serum kappa 25 upper limit of normal 19 with normal lambda and ratio 1.07.  Normal C-reactive protein less than 0.3.  Sedimentation rate mildly elevated 38 upper limit of normal 30.  CBC and CMP unremarkable save for glucose 119 alkaline phosphatase 129.  Beta-2 microglobulin 3.4 upper limit of normal 2.2..  No lytic lesions mentioned on MRI humerus July 2024, MRI cervical spine August 2024, CT cervical spine December 2024.    -12/9/2024 [facility] hematology oncology consult: Patient has been stable mild elevation of IgG kappa M protein and her serum without significant elevation in her total protein, globulin, and without significant change in her albumin and ratios thereof.  She has no renal insufficiency or anemia.  No lytic lesions on other imaging done but I will get a bone survey and a bone marrow biopsy and myeloma panel including 24-hour urine immunoelectrophoresis.  She will see my nurse practitioner back in about a month to go over all of this.  If she has less than 4% plasma cells and no lytic lesions then she would not even meet the criteria for an MGUS.    If she has between 4 and 10% monoclonal plasma cells in the bone marrow light microscopy and in particular if there are complex cytogenetics or FISH results and she has no lytic bone lesions on bone survey, then this would be considered benign monoclonal gammopathy that we would follow-up with  serum testing twice a year to watch for progression towards myeloma.  If she has greater than 10% plasma cells but no lytic bone lesions, calcium over 12, hemoglobin less than 10, creatinine over 2, then she would have a smoldering myeloma that we would watch quarterly.  If she has greater than 10% plasma cells with any of the following (lytic bone lesions, calcium over 12, hemoglobin over 10, creatinine over 2) then she would have full-blown myeloma and we would need to discuss treatment rather than watchful waiting.  If her bone marrow shows greater than 10% plasma cells I would also ask my nurse practitioner to order a PET for completeness sake.    -12/9/2024 bone survey shows focal lucency measuring 6 mm within proximal right humeral shaft which is equivocal given lack of similar findings throughout the remainder of the skeleton.  -12/9/2024 labs: Beta-2 microglobulin 3.0.  CBC WBC 11.4, hemoglobin 14.1, hematocrit 45.3%, platelet count 199,000.  CMP creatinine 0.93, calcium 9.2, serum protein 6.7.  CRP <0.30.  Sedimentation rate 27.  Serum immunoelectrophoresis M spike 100 mg/dL IgG monoclonal protein with kappa light chain specificity.  Serum free light chains free kappa light chains 22.8, lambda light chains 20.9 with normal kappa/lambda ratio 1.09.  -12/23/2024 bone marrow biopsy shows normocellular bone marrow, 2-3% plasma cells and no evidence of dysplasia, increased blasts or lymphoma.  Normal peripheral blood smear.  No increased plasma cells and flow cytometry identifies a small population of polyclonal plasma cells with no phenotypic aberrancy.    -1/10/2025 [facility] hematology/oncology clinic follow-up: Her bone marrow biopsy was unremarkable, she had normocellular bone marrow with 2-3% plasma cells no evidence of dysplasia, increased blasts or lymphoma.  Normal peripheral smear.  No increased plasma cells, flow cytometry with small population of polyclonal plasma cells with no phenotypic aberrancy.   She has no anemia, renal function is normal.  Bone survey with unequivocal very small 6 mm questionable lucency in the proximal right humeral shaft but the remainder of the skeleton was negative.  M spike unchanged at 100 mg/dL IgG monoclonal protein with kappa light chain specificity, normal kappa/lambda ratio which is scant elevation of her kappa light chains and normal lambda light chains.  At this time would just recommend repeating myeloma panel again in 3 months to make sure it is stable and then would go to 6-month monitoring.  Would not put her through a PET scan with normal bone marrow and just a small lucency which is questionable on bone survey.  I will check with Dr. Nuñez when he returns on Monday to make sure he does not want anything different.  I reviewed all findings with the patient and her  and all questions were answered to their satisfaction.  Otherwise we will plan on seeing her back in 3 months for follow-up.    - 4/16/2025 IgG kappa M spike 100 mg/dL stable with normal IgA and IgM.  Stable 22.6 upper limit of normal 19.4 with normal ratio 1.1.  24-hour urine M spike negative.  Sedimentation rate 25 normal.  C-reactive protein today 0.195 normal.  CBC unremarkable. Glucose 130 creatinine 1.07 alkaline phosphatase 164 otherwise unremarkable CMP.    - 4/25/2025 [facility] oncology follow-up: No change in her M panel.  Repeat M panel again in 6 months with bone survey.  No need for the 24-hour urine M panel.  Low likelihood for plasmacytoma so would not put her through PET at this point.    Total time of care today inclusive of time spent today prior to patient's arrival reviewing interval data and during visit translating patient putting forth plan took 35 minutes patient care time throughout the day today.  Kenji Nuñez MD    04/25/2025

## 2025-04-29 ENCOUNTER — TELEPHONE (OUTPATIENT)
Age: 80
End: 2025-04-29
Payer: MEDICARE

## 2025-04-29 NOTE — TELEPHONE ENCOUNTER
Patient called and left a voicemail asking for cardiac clearance for upcoming procedure scheduled 05/15/2025 with Dr Shaq Quiroz, neurosurgery. The clearance can be faxed to [phone] and the patient can be reached at [phone] for further questions.

## 2025-06-04 ENCOUNTER — TELEPHONE (OUTPATIENT)
Age: 80
End: 2025-06-04

## 2025-07-07 ENCOUNTER — LAB (OUTPATIENT)
Facility: HOSPITAL | Age: 80
End: 2025-07-07
Payer: MEDICARE

## 2025-07-07 DIAGNOSIS — D47.2 MONOCLONAL GAMMOPATHY: ICD-10-CM

## 2025-07-07 LAB
ALBUMIN SERPL-MCNC: 4.1 G/DL (ref 3.5–5.2)
ALBUMIN/GLOB SERPL: 1.7 G/DL
ALP SERPL-CCNC: 147 U/L (ref 39–117)
ALT SERPL W P-5'-P-CCNC: 9 U/L (ref 1–33)
ANION GAP SERPL CALCULATED.3IONS-SCNC: 10.3 MMOL/L (ref 5–15)
AST SERPL-CCNC: 18 U/L (ref 1–32)
B2 MICROGLOB SERPL-MCNC: 3.9 MG/L (ref 0.8–2.2)
BASOPHILS # BLD AUTO: 0.01 10*3/MM3 (ref 0–0.2)
BASOPHILS NFR BLD AUTO: 0.1 % (ref 0–1.5)
BILIRUB SERPL-MCNC: 0.7 MG/DL (ref 0–1.2)
BUN SERPL-MCNC: 17 MG/DL (ref 8–23)
BUN/CREAT SERPL: 17 (ref 7–25)
CALCIUM SPEC-SCNC: 9.5 MG/DL (ref 8.6–10.5)
CHLORIDE SERPL-SCNC: 105 MMOL/L (ref 98–107)
CO2 SERPL-SCNC: 27.7 MMOL/L (ref 22–29)
CREAT SERPL-MCNC: 1 MG/DL (ref 0.57–1)
DEPRECATED RDW RBC AUTO: 51.7 FL (ref 37–54)
EGFRCR SERPLBLD CKD-EPI 2021: 56.7 ML/MIN/1.73
EOSINOPHIL # BLD AUTO: 0.38 10*3/MM3 (ref 0–0.4)
EOSINOPHIL NFR BLD AUTO: 3.5 % (ref 0.3–6.2)
ERYTHROCYTE [DISTWIDTH] IN BLOOD BY AUTOMATED COUNT: 15.3 % (ref 12.3–15.4)
ERYTHROCYTE [SEDIMENTATION RATE] IN BLOOD: 16 MM/HR (ref 0–30)
GLOBULIN UR ELPH-MCNC: 2.4 GM/DL
GLUCOSE SERPL-MCNC: 110 MG/DL (ref 65–99)
HCT VFR BLD AUTO: 41 % (ref 34–46.6)
HGB BLD-MCNC: 12.8 G/DL (ref 12–15.9)
IMM GRANULOCYTES # BLD AUTO: 0.02 10*3/MM3 (ref 0–0.05)
IMM GRANULOCYTES NFR BLD AUTO: 0.2 % (ref 0–0.5)
LYMPHOCYTES # BLD AUTO: 2.02 10*3/MM3 (ref 0.7–3.1)
LYMPHOCYTES NFR BLD AUTO: 18.4 % (ref 19.6–45.3)
MCH RBC QN AUTO: 28.3 PG (ref 26.6–33)
MCHC RBC AUTO-ENTMCNC: 31.2 G/DL (ref 31.5–35.7)
MCV RBC AUTO: 90.5 FL (ref 79–97)
MONOCYTES # BLD AUTO: 0.69 10*3/MM3 (ref 0.1–0.9)
MONOCYTES NFR BLD AUTO: 6.3 % (ref 5–12)
NEUTROPHILS NFR BLD AUTO: 7.88 10*3/MM3 (ref 1.7–7)
NEUTROPHILS NFR BLD AUTO: 71.5 % (ref 42.7–76)
PLATELET # BLD AUTO: 229 10*3/MM3 (ref 140–450)
PMV BLD AUTO: 10.8 FL (ref 6–12)
POTASSIUM SERPL-SCNC: 4.6 MMOL/L (ref 3.5–5.2)
PROT SERPL-MCNC: 6.5 G/DL (ref 6–8.5)
RBC # BLD AUTO: 4.53 10*6/MM3 (ref 3.77–5.28)
SODIUM SERPL-SCNC: 143 MMOL/L (ref 136–145)
WBC NRBC COR # BLD AUTO: 11 10*3/MM3 (ref 3.4–10.8)

## 2025-07-07 PROCEDURE — 82232 ASSAY OF BETA-2 PROTEIN: CPT

## 2025-07-07 PROCEDURE — 80053 COMPREHEN METABOLIC PANEL: CPT

## 2025-07-07 PROCEDURE — 83521 IG LIGHT CHAINS FREE EACH: CPT

## 2025-07-07 PROCEDURE — 86334 IMMUNOFIX E-PHORESIS SERUM: CPT

## 2025-07-07 PROCEDURE — 36415 COLL VENOUS BLD VENIPUNCTURE: CPT

## 2025-07-07 PROCEDURE — 85025 COMPLETE CBC W/AUTO DIFF WBC: CPT

## 2025-07-07 PROCEDURE — 82784 ASSAY IGA/IGD/IGG/IGM EACH: CPT

## 2025-07-07 PROCEDURE — 84165 PROTEIN E-PHORESIS SERUM: CPT

## 2025-07-07 PROCEDURE — 85652 RBC SED RATE AUTOMATED: CPT

## 2025-07-09 LAB
KAPPA LC FREE SER-MCNC: 20.8 MG/L (ref 3.3–19.4)
KAPPA LC FREE/LAMBDA FREE SER: 0.9 {RATIO} (ref 0.26–1.65)
LAMBDA LC FREE SERPL-MCNC: 23.1 MG/L (ref 5.7–26.3)

## 2025-07-10 ENCOUNTER — OFFICE VISIT (OUTPATIENT)
Age: 80
End: 2025-07-10
Payer: MEDICARE

## 2025-07-10 ENCOUNTER — LAB (OUTPATIENT)
Facility: HOSPITAL | Age: 80
End: 2025-07-10
Payer: MEDICARE

## 2025-07-10 VITALS
WEIGHT: 180 LBS | SYSTOLIC BLOOD PRESSURE: 91 MMHG | BODY MASS INDEX: 31.89 KG/M2 | DIASTOLIC BLOOD PRESSURE: 50 MMHG | HEIGHT: 63 IN | HEART RATE: 72 BPM

## 2025-07-10 DIAGNOSIS — E78.5 HYPERLIPIDEMIA LDL GOAL <55: Primary | ICD-10-CM

## 2025-07-10 DIAGNOSIS — I34.0 NONRHEUMATIC MITRAL VALVE REGURGITATION: ICD-10-CM

## 2025-07-10 DIAGNOSIS — I10 PRIMARY HYPERTENSION: ICD-10-CM

## 2025-07-10 DIAGNOSIS — I48.0 AF (PAROXYSMAL ATRIAL FIBRILLATION): ICD-10-CM

## 2025-07-10 DIAGNOSIS — D47.2 MONOCLONAL GAMMOPATHY: ICD-10-CM

## 2025-07-10 LAB
ALBUMIN SERPL ELPH-MCNC: 3.3 G/DL (ref 2.9–4.4)
ALBUMIN/GLOB SERPL: 1.2 {RATIO} (ref 0.7–1.7)
ALPHA1 GLOB SERPL ELPH-MCNC: 0.3 G/DL (ref 0–0.4)
ALPHA2 GLOB SERPL ELPH-MCNC: 1 G/DL (ref 0.4–1)
B-GLOBULIN SERPL ELPH-MCNC: 0.9 G/DL (ref 0.7–1.3)
GAMMA GLOB SERPL ELPH-MCNC: 0.7 G/DL (ref 0.4–1.8)
GLOBULIN SER-MCNC: 2.9 G/DL (ref 2.2–3.9)
IGA SERPL-MCNC: 175 MG/DL (ref 64–422)
IGG SERPL-MCNC: 504 MG/DL (ref 586–1602)
IGM SERPL-MCNC: 162 MG/DL (ref 26–217)
INTERPRETATION SERPL IEP-IMP: ABNORMAL
LABORATORY COMMENT REPORT: ABNORMAL
M PROTEIN SERPL ELPH-MCNC: 0.1 G/DL
PROT SERPL-MCNC: 6.2 G/DL (ref 6–8.5)

## 2025-07-10 PROCEDURE — 81050 URINALYSIS VOLUME MEASURE: CPT

## 2025-07-10 PROCEDURE — 84156 ASSAY OF PROTEIN URINE: CPT

## 2025-07-10 PROCEDURE — 86335 IMMUNFIX E-PHORSIS/URINE/CSF: CPT

## 2025-07-10 PROCEDURE — 84166 PROTEIN E-PHORESIS/URINE/CSF: CPT

## 2025-07-10 RX ORDER — METHOCARBAMOL 750 MG/1
750 TABLET, FILM COATED ORAL 3 TIMES DAILY
COMMUNITY

## 2025-07-10 RX ORDER — MIRABEGRON 50 MG/1
50 TABLET, FILM COATED, EXTENDED RELEASE ORAL DAILY
COMMUNITY
Start: 2025-04-30

## 2025-07-10 RX ORDER — HYDROXYZINE PAMOATE 25 MG/1
25 CAPSULE ORAL DAILY
COMMUNITY
Start: 2025-02-06

## 2025-07-10 RX ORDER — ANTIOX #8/OM3/DHA/EPA/LUT/ZEAX 250-2.5 MG
1 CAPSULE ORAL 2 TIMES DAILY
COMMUNITY

## 2025-07-10 NOTE — PROGRESS NOTES
Cardiology Established Patient Note     Name: Akua Torres  :   1945  PCP: Ashley Bain, APRN  Date:   2025  Department: [facility] [place] [facility] CARDIOLOGY  [address]  [facility] [address]  Fax [phone]  Phone [phone]    Chief Complaint: Here for follow up    Problem list:     1.  Paroxysmal atrial fibrillation [id number]  2.  Carotid disease status post CVA and left ICA stent               Cerebral angio 2019 stent to left ICA  Carotid duplex 10/2023 moderate plaque right ICA mild stenosis R ICA, R CCA, L ICA              Normal carotid duplex  with Dr. Guerra  3.  Hypertension              Renal artery duplex 2022 negative for renal artery stenosis              Free light chain kappa 22.6  4.  Hyperlipidemia              Lipoprotein a 2025 234               FLP 2025LDL 42   5.  Mild MR, mild TR              2D echo 2022 EF 65 to 70% mild MR, mild TR  6.  Chronic kidney disease stage III              Microalbumin/creatinine ratio 2025 212    Subjective     History of Present Illness  Akua Torres is a 80 y.o. female  She denieschest pain chest discomfort shortness of breath or shortness of breath.    She has history of paroxysmal A-fib but reports no palpitations presyncope or syncope.    Last visit was having a lot of balance issues and using a walker.    She had neck surgery at [facility] 5/15/25 and feels better.  Scheduled for abdominal aorta duplex in September.  No recurrence of  A Fib.      Recent labs reviewed-    Lab Results   Component Value Date    GLUCOSE 110 (H) 2025    BUN 17.0 2025    CREATININE 1.00 2025    EGFRIFNONA 48 (L) 2021    BCR 17.0 2025    K 4.6 2025    CO2 27.7 2025    CALCIUM 9.5 2025    ALBUMIN 4.1 2025    ALBUMIN 3.3 2025    AST 18 2025    ALT 9 2025     Lab Results   Component  "Value Date    CHOL 102 04/16/2025    CHLPL 147 11/14/2022    TRIG 159 (H) 04/16/2025    HDL 33 (L) 04/16/2025    LDL 42 04/16/2025      Lab Results   Component Value Date    WBC 11.00 (H) 07/07/2025    RBC 4.53 07/07/2025    HGB 12.8 07/07/2025    HCT 41.0 07/07/2025    MCV 90.5 07/07/2025     07/07/2025     Lab Results   Component Value Date    TSH 1.510 04/16/2025     Lab Results   Component Value Date    HGBA1C 5.90 (H) 01/30/2023       Microalbumin, Urine   Date Value Ref Range Status   04/16/2025 30.5 mg/dL Final           Objective     Vital Signs:  BP 91/50 (BP Location: Right arm, Patient Position: Sitting, Cuff Size: Adult)   Pulse 72   Ht 160 cm (63\")   Wt 81.6 kg (180 lb)   BMI 31.89 kg/m²   Estimated body mass index is 31.89 kg/m² as calculated from the following:    Height as of this encounter: 160 cm (63\").    Weight as of this encounter: 81.6 kg (180 lb).         Cardiovascular:      PMI at left midclavicular line. Normal rate. Regular rhythm. Normal S1. Normal S2.       Murmurs: There is a grade 1/6 high frequency blowing holosystolic murmur at the apex.      No gallop.  No click. No rub.   Pulses:     Intact distal pulses.   Edema:     Peripheral edema absent.             Assessment and Plan     Assessment & Plan  Hyperlipidemia LDL goal <55   Lipid abnormalities are stable    Plan:  Continue same medication/s without change.      Discussed medication dosage, use, side effects, and goals of treatment in detail.    Counseled patient on lifestyle modifications to help control hyperlipidemia.     Patient Treatment Goals:   LDL goal is less than 55    Followup in 6 months.    Orders:    Hepatic Function Panel; Future    High Sensitivity CRP; Future    Lipoprotein A (LPA); Future    Lipid Panel; Future    AF (paroxysmal atrial fibrillation)  Asymptomatic  Continue current Rx       Primary hypertension  Hypertension is stable and controlled  Continue current treatment regimen.  Blood pressure " will be reassessed in 6 months      .    Orders:    Comprehensive Metabolic Panel; Future    Microalbumin / Creatinine Urine Ratio - Urine, Clean Catch; Future    Nonrheumatic mitral valve regurgitation  Stable  Asymptomatic  2 D Echo if symptomatic.         Follow Up  Return in about 4 months (around 11/10/2025).    Judy Langford MD    [facility]

## 2025-07-15 LAB
ALBUMIN 24H MFR UR ELPH: 49.8 %
ALPHA1 GLOB 24H MFR UR ELPH: 1.6 %
ALPHA2 GLOB 24H MFR UR ELPH: 4.4 %
B-GLOBULIN MFR UR ELPH: 23.5 %
GAMMA GLOB 24H MFR UR ELPH: 20.8 %
INTERPRETATION UR IFE-IMP: NORMAL
M PROTEIN 24H MFR UR ELPH: NORMAL %
PROT 24H UR-MRATE: 150 MG/24 HR (ref 30–150)
PROT UR-MCNC: 13.6 MG/DL
SERVICE CMNT-IMP: NORMAL

## (undated) DEVICE — ADAPT ST INFUS ADMIN SYR 70IN

## (undated) DEVICE — SUCTION CANISTER, 2500CC, RIGID: Brand: DEROYAL

## (undated) DEVICE — COVER,LIGHT HANDLE,FLX,1/PK: Brand: MEDLINE INDUSTRIES, INC.

## (undated) DEVICE — 3M™ IOBAN™ 2 ANTIMICROBIAL INCISE DRAPE 6650EZ: Brand: IOBAN™ 2

## (undated) DEVICE — GLV SURG SENSICARE PI LF PF 7.5

## (undated) DEVICE — BNDG ELAS CO-FLEX SLF ADHR 4IN5YD LF STRL

## (undated) DEVICE — DEV INFL MONARCH 25W

## (undated) DEVICE — DRP C/ARMOR

## (undated) DEVICE — GLV SURG SENSICARE PI LF PF 7.0

## (undated) DEVICE — EMBOSHIELD NAV6 EMBOLIC PROTECTION SYSTEM 7.2 MM X 190 CM: Brand: EMBOSHIELD  NAV6

## (undated) DEVICE — SYR LUERLOK 50ML

## (undated) DEVICE — SPNG GZ WOVN 4X4IN 12PLY 10/BX STRL

## (undated) DEVICE — SOL LR 1000ML

## (undated) DEVICE — PK MAJ TOTL HIP ANT 10

## (undated) DEVICE — TB SXN FRAZIER 12F STRL

## (undated) DEVICE — PROXIMATE RH ROTATING HEAD SKIN STAPLERS (35 WIDE) CONTAINS 35 STAINLESS STEEL STAPLES: Brand: PROXIMATE

## (undated) DEVICE — GLV SURG DERMASSURE GRN LF PF SZ 6.5

## (undated) DEVICE — ROTATING HEMOSTATIC VALVE .096": Brand: RHV

## (undated) DEVICE — SPNG LAP PREWSH SFTPK 18X18IN STRL PK/5

## (undated) DEVICE — T-MAX DISPOSABLE FACE MASK 8 PER BOX

## (undated) DEVICE — DRSNG PAD ABD 8X10IN STRL

## (undated) DEVICE — ST ACC MICROPUNCTURE .018 TRANSLSS/SS/TP 5F/10CM 21G/7CM

## (undated) DEVICE — STERILE PVP: Brand: MEDLINE INDUSTRIES, INC.

## (undated) DEVICE — LIMB HOLDER, WRIST/ANKLE: Brand: DEROYAL

## (undated) DEVICE — ANTIBACTERIAL UNDYED BRAIDED (POLYGLACTIN 910), SYNTHETIC ABSORBABLE SUTURE: Brand: COATED VICRYL

## (undated) DEVICE — DRAPE,REIN 53X77,STERILE: Brand: MEDLINE

## (undated) DEVICE — CVR HNDL LIGHT RIGID

## (undated) DEVICE — STRYKER PERFORMANCE SERIES SAGITTAL BLADE: Brand: STRYKER PERFORMANCE SERIES

## (undated) DEVICE — DISPOSABLE BIPOLAR FORCEPS 7 3/4" (19.7CM) SCOVILLE BAYONET, INSULATED, 1.5MM TIP AND 12 FT. (3.6M) CABLE: Brand: KIRWAN

## (undated) DEVICE — PUMP PAIN AUTOFUSER AUTO SELCT NOBOLUS 1TO14ML/HR 550ML DISP

## (undated) DEVICE — DYONICS 25 INFLOW TUBE SET, 3 PER BOX

## (undated) DEVICE — GLV SURG SIGNATURE TOUCH PF LTX 8 STRL BX/50

## (undated) DEVICE — 1 ML TUBERCULIN SYRINGE REGULAR TIP: Brand: MONOJECT

## (undated) DEVICE — GOWN,PREVENTION PLUS,XXLARGE,STERILE: Brand: MEDLINE

## (undated) DEVICE — SKIN AFFIX SURG ADHESIVE 72/CS 0.55ML: Brand: MEDLINE

## (undated) DEVICE — 3M™ STERI-DRAPE™ INSTRUMENT POUCH 1018: Brand: STERI-DRAPE™

## (undated) DEVICE — INTENDED USE FOR SURGICAL MARKING ON INTACT SKIN, ALSO PROVIDES A PERMANENT METHOD OF IDENTIFYING OBJECTS IN THE OPERATING ROOM: Brand: WRITESITE® REGULAR TIP SKIN MARKER

## (undated) DEVICE — ST INF PRI SMRTSTE 20DRP 2VLV 24ML 117

## (undated) DEVICE — PK SPINE ORTHO 10

## (undated) DEVICE — BLD SHAVER 2EDGE 4MM

## (undated) DEVICE — GOWN,REINF,POLY,ECL,PP SLV,3XL,XLONG: Brand: MEDLINE

## (undated) DEVICE — ANGIO-SEAL VIP VASCULAR CLOSURE DEVICE: Brand: ANGIO-SEAL

## (undated) DEVICE — TP CLTH MEDIPORE SFT 2IN 10YD

## (undated) DEVICE — DRSNG TELFA ILND ADH 4X6IN

## (undated) DEVICE — RADIFOCUS GLIDEWIRE: Brand: GLIDEWIRE

## (undated) DEVICE — SLNG ULTRSLING2 11TO13IN MD

## (undated) DEVICE — INTRO SHEATH ART/FEM ENGAGE .038 5F12CM

## (undated) DEVICE — GLV SURG PREMIERPRO MIC LTX PF SZ8 BRN

## (undated) DEVICE — ST EXT IV SMARTSITE 2VLV SP M LL 5ML IV1

## (undated) DEVICE — INTRO SHEATH FLX 7F80CM

## (undated) DEVICE — PK CATH CARD 10

## (undated) DEVICE — SYS SKIN CLS DERMABOND PRINEO W/22CM MESH TP

## (undated) DEVICE — PAD ARMBRD SURG CONVOL 7.5X20X2IN

## (undated) DEVICE — GLV SURG PREMIERPRO GAMMEX NEOPRN PF SZ8 GRN

## (undated) DEVICE — GLV SURG PREMIERPRO MIC LTX PF SZ8.5 BRN

## (undated) DEVICE — SNAP KOVER: Brand: UNBRANDED

## (undated) DEVICE — SUT MONOCRYL PLS ANTIB UND 3/0  PS1 27IN

## (undated) DEVICE — DIFFUSER: Brand: CORE, MAESTRO

## (undated) DEVICE — JACKSON TABLE POSITIONER KIT: Brand: MEDLINE INDUSTRIES, INC.

## (undated) DEVICE — VIATRAC 14 PLUS PERIPHERAL DILATATION CATHETER 5.5 MM X 20 MM X 135 CM: Brand: VIATRAC

## (undated) DEVICE — PULLOVER TOGA, 2X LARGE: Brand: FLYTE, SURGICOOL

## (undated) DEVICE — 3M™ STERI-DRAPE™ U-DRAPE 1015: Brand: STERI-DRAPE™

## (undated) DEVICE — GOWN,REINF,POLY,ECL,PP SLV,XL: Brand: MEDLINE

## (undated) DEVICE — KT DRN EVAC WND PVC PCH WTROC RND 10F400

## (undated) DEVICE — SYR LL 10ML LF

## (undated) DEVICE — NDL HYPO ECLPS SFTY 22G 1 1/2IN

## (undated) DEVICE — SHOULDER STABILIZATION KIT,                                    DISPOSABLE 12 PER BOX

## (undated) DEVICE — ADAPT LUER STUB INTRAMEDIC PE/200 17G

## (undated) DEVICE — MEDI-VAC YANKAUER SUCTION HANDLE: Brand: CARDINAL HEALTH

## (undated) DEVICE — GLV SURG PREMIERPRO MIC LTX PF SZ7 BRN

## (undated) DEVICE — GAUZE,SPONGE,4"X4",16PLY,XRAY,STRL,LF: Brand: MEDLINE

## (undated) DEVICE — PK ARTHSCP SHLDR 10

## (undated) DEVICE — 3M™ DURAPORE™ SURGICAL TAPE 1538-3, 3 INCH X 10 YARD (7,5CM X 9,1M), 4 ROLLS/BOX: Brand: 3M™ DURAPORE™

## (undated) DEVICE — NDL SPINE 18G 31/2IN PNK

## (undated) DEVICE — VAPR COOLPULSE 90 ELECTRODE WITH HAND CONTROLS 90 DEGREES SUCTION WITH INTEGRATED HANDPIECE: Brand: VAPR COOLPULSE

## (undated) DEVICE — GLV SURG SENSICARE ORTHO PF LF 6.5 STRL

## (undated) DEVICE — CANNULA SMOOTH FLEX 6.5 X 72MM: Brand: CLEAR-TRAC

## (undated) DEVICE — DRSNG GZ PETROLTM XEROFORM CURAD 1X8IN STRL

## (undated) DEVICE — GLV SURG PREMIERPRO MIC LTX PF SZ6.5 BRN

## (undated) DEVICE — 3M™ STERI-STRIP™ REINFORCED ADHESIVE SKIN CLOSURES, R1546, 1/4 IN X 4 IN (6 MM X 100 MM), 10 STRIPS/ENVELOPE: Brand: 3M™ STERI-STRIP™

## (undated) DEVICE — OIL CARTRIDGE: Brand: CORE, MAESTRO

## (undated) DEVICE — CATH TEMPO 5F BER 100CM: Brand: TEMPO

## (undated) DEVICE — GLV SURG SENSICARE W/ALOE PF LF 9 STRL

## (undated) DEVICE — SUT ETHLN 3/0 PC5 18IN 1893G

## (undated) DEVICE — Device

## (undated) DEVICE — MEDI-VAC YANKAUER SUCTION HANDLE W/BULBOUS TIP: Brand: CARDINAL HEALTH

## (undated) DEVICE — 3.0MM PRECISION NEURO (MATCH HEAD)

## (undated) DEVICE — IRRISEPT WOUND DEBRIDMENT AND CLEANSING SYSTEM: Brand: IRRISEPT

## (undated) DEVICE — MEDI-VAC NON-CONDUCTIVE SUCTION TUBING: Brand: CARDINAL HEALTH

## (undated) DEVICE — 2963 MEDIPORE SOFT CLOTH TAPE 3 IN X 10 YD 12 RLS/CS: Brand: 3M™ MEDIPORE™

## (undated) DEVICE — CVR HNDL LT SURG ACCSSRY BLU STRL

## (undated) DEVICE — GLV SURG DERMASSURE GRN LF PF 7.0